# Patient Record
Sex: FEMALE | Race: BLACK OR AFRICAN AMERICAN | NOT HISPANIC OR LATINO | Employment: OTHER | URBAN - METROPOLITAN AREA
[De-identification: names, ages, dates, MRNs, and addresses within clinical notes are randomized per-mention and may not be internally consistent; named-entity substitution may affect disease eponyms.]

---

## 2017-08-31 ENCOUNTER — TELEPHONE (OUTPATIENT)
Dept: HEPATOLOGY | Facility: CLINIC | Age: 62
End: 2017-08-31

## 2017-08-31 NOTE — TELEPHONE ENCOUNTER
External referral received from Dr. Blake Darnell. HCV RNA and genotype 1a noted. Pt has CKD stage 3, nephrology notes are provided as well. Liver enzymes in 4/2017 WNL.    Spoke to pt. Offered to schedule at main campus but pt declined. States she only has a ride to [place] location.  Consult scheduled on 10/31. Reminder mailed.

## 2017-11-08 ENCOUNTER — TELEPHONE (OUTPATIENT)
Dept: HEPATOLOGY | Facility: CLINIC | Age: 62
End: 2017-11-08

## 2017-11-08 NOTE — TELEPHONE ENCOUNTER
Dr. Darnell ordered that patient be seen for hep c consult.  Patient cancelled 10/31 and 11/14 appt with ELIER Zafar at the [facility].  Attempt made to reach her by phone unsuccessful.  Sent letter requesting that she give us a call for scheduling.

## 2017-11-09 NOTE — TELEPHONE ENCOUNTER
Attempt made to reach patient again for scheduling unsuccessful.  Unable to LVM.  Medical records sent for scanning.

## 2018-08-09 ENCOUNTER — TELEPHONE (OUTPATIENT)
Dept: FAMILY MEDICINE | Facility: CLINIC | Age: 63
End: 2018-08-09

## 2018-08-09 ENCOUNTER — OFFICE VISIT (OUTPATIENT)
Dept: FAMILY MEDICINE | Facility: CLINIC | Age: 63
End: 2018-08-09
Payer: MEDICARE

## 2018-08-09 ENCOUNTER — HOSPITAL ENCOUNTER (OUTPATIENT)
Dept: RADIOLOGY | Facility: HOSPITAL | Age: 63
Discharge: HOME | End: 2018-08-09
Attending: FAMILY MEDICINE
Payer: MEDICARE

## 2018-08-09 VITALS
OXYGEN SATURATION: 99 % | DIASTOLIC BLOOD PRESSURE: 80 MMHG | HEART RATE: 74 BPM | RESPIRATION RATE: 18 BRPM | WEIGHT: 149.5 LBS | SYSTOLIC BLOOD PRESSURE: 142 MMHG | TEMPERATURE: 97 F | HEIGHT: 63 IN | BODY MASS INDEX: 26.49 KG/M2

## 2018-08-09 DIAGNOSIS — F31.10 BIPOLAR AFFECTIVE DISORDER, MANIC: ICD-10-CM

## 2018-08-09 DIAGNOSIS — I10 ESSENTIAL HYPERTENSION: Primary | ICD-10-CM
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
## 2018-08-09 PROBLEM — B18.2 CHRONIC HEPATITIS C VIRUS INFECTION: Status: ACTIVE | Noted: 2018-08-09

## 2018-08-09 PROCEDURE — 99202 OFFICE O/P NEW SF 15 MIN: CPT | Mod: S$GLB,,, | Performed by: FAMILY MEDICINE

## 2018-08-09 PROCEDURE — 3008F BODY MASS INDEX DOCD: CPT | Mod: CPTII,S$GLB,, | Performed by: FAMILY MEDICINE

## 2018-08-09 PROCEDURE — 99999 PR PBB SHADOW E&M-EST. PATIENT-LVL V: CPT | Mod: PBBFAC,,, | Performed by: FAMILY MEDICINE

## 2018-08-09 PROCEDURE — 73060 X-RAY EXAM OF HUMERUS: CPT | Mod: 26,LT,, | Performed by: RADIOLOGY

## 2018-08-09 PROCEDURE — 73030 X-RAY EXAM OF SHOULDER: CPT | Mod: TC,FY,PO,LT

## 2018-08-09 PROCEDURE — 73030 X-RAY EXAM OF SHOULDER: CPT | Mod: 26,LT,, | Performed by: RADIOLOGY

## 2018-08-09 PROCEDURE — 73060 X-RAY EXAM OF HUMERUS: CPT | Mod: TC,FY,PO,LT

## 2018-08-09 RX ORDER — VENLAFAXINE HYDROCHLORIDE 75 MG/1
1 CAPSULE, EXTENDED RELEASE ORAL DAILY
COMMUNITY
Start: 2017-07-25 | End: 2018-08-09 | Stop reason: SDUPTHER

## 2018-08-09 RX ORDER — LANOLIN ALCOHOL/MO/W.PET/CERES
1 CREAM (GRAM) TOPICAL DAILY
Refills: 0 | COMMUNITY
Start: 2018-07-28 | End: 2019-11-05 | Stop reason: SDUPTHER

## 2018-08-09 RX ORDER — PITAVASTATIN CALCIUM 2.09 MG/1
2 TABLET, FILM COATED ORAL
COMMUNITY
End: 2019-08-15 | Stop reason: SDUPTHER

## 2018-08-09 RX ORDER — NIFEDIPINE 60 MG/1
60 TABLET, EXTENDED RELEASE ORAL
COMMUNITY
Start: 2016-09-08 | End: 2018-08-14 | Stop reason: DRUGHIGH

## 2018-08-09 RX ORDER — CARVEDILOL 25 MG/1
25 TABLET ORAL 3 TIMES DAILY
COMMUNITY
Start: 2016-09-08 | End: 2018-11-21 | Stop reason: SDUPTHER

## 2018-08-09 RX ORDER — DIVALPROEX SODIUM 500 MG/1
500 TABLET, FILM COATED, EXTENDED RELEASE ORAL DAILY
COMMUNITY
End: 2018-10-15 | Stop reason: SDUPTHER

## 2018-08-09 RX ORDER — CLONIDINE HYDROCHLORIDE 0.1 MG/1
1 TABLET ORAL 3 TIMES DAILY
COMMUNITY
Start: 2017-06-29 | End: 2018-11-21 | Stop reason: SDUPTHER

## 2018-08-09 RX ORDER — NIFEDIPINE 90 MG/1
1 TABLET, EXTENDED RELEASE ORAL
COMMUNITY
Start: 2017-06-29 | End: 2018-11-21 | Stop reason: SDUPTHER

## 2018-08-09 RX ORDER — VENLAFAXINE HYDROCHLORIDE 75 MG/1
75 CAPSULE, EXTENDED RELEASE ORAL DAILY
Qty: 30 CAPSULE | Refills: 0 | Status: SHIPPED | OUTPATIENT
Start: 2018-08-09 | End: 2018-08-30 | Stop reason: SDUPTHER

## 2018-08-09 RX ORDER — ACETAMINOPHEN AND CODEINE PHOSPHATE 300; 30 MG/1; MG/1
1 TABLET ORAL DAILY PRN
Qty: 20 TABLET | Refills: 0 | Status: SHIPPED | OUTPATIENT
Start: 2018-08-09 | End: 2018-08-19

## 2018-08-09 RX ORDER — HYDRALAZINE HYDROCHLORIDE 50 MG/1
50 TABLET, FILM COATED ORAL 2 TIMES DAILY
COMMUNITY
End: 2018-11-21 | Stop reason: SDUPTHER

## 2018-08-09 NOTE — PROGRESS NOTES
Subjective:       Patient ID: Rose Milligan is a 63 y.o. female.    Chief Complaint: Fall (L shoulder pain from fall )      HPI   Ms. Scott presents to clinic today for concern of fall.   She states her last PCP was Dr. Mckinley.   She states she has had left shoulder pain for a few days.   She reports falling 2 Sunday ago and hit her shoulder.   She has had right rotator cuff tear in the past.   She has a hard time moving her left shoulder now.     She has a significant history of Bipolar disorder and will be seeing a psychiatrist here.   She recently moved to [place].   She also has a significant history of uncontrolled hypertension and cocaine use.   She denies any use of cocaine in the past 6 months.       Review of Systems   Constitutional: Negative for fever.   Respiratory: Negative for cough and shortness of breath.    Cardiovascular: Negative for chest pain.   Gastrointestinal: Negative for abdominal pain, diarrhea and vomiting.   Musculoskeletal: Positive for arthralgias.       Medication List with Changes/Refills   New Medications    ACETAMINOPHEN-CODEINE 300-30MG (TYLENOL #3) 300-30 MG TAB    Take 1 tablet by mouth daily as needed.   Current Medications    CARVEDILOL (COREG) 25 MG TABLET    Take 25 mg by mouth 3 (three) times daily.    CLONIDINE (CATAPRES) 0.1 MG TABLET    Take 1 tablet by mouth 3 (three) times daily.    DIVALPROEX ER (DEPAKOTE) 500 MG TB24    Take 500 mg by mouth once daily.    HYDRALAZINE (APRESOLINE) 50 MG TABLET    Take 50 mg by mouth 2 (two) times daily.    LISINOPRIL 10 MG TABLET    Take 1 tablet by mouth.    MAGNESIUM OXIDE (MAG-OX) 400 MG TABLET    Take 1 tablet by mouth once daily.    NIFEDIPINE (PROCARDIA-XL) 90 MG (OSM) 24 HR TABLET    Take 1 tablet by mouth.    ONDANSETRON (ZOFRAN-ODT) 4 MG TBDL    Take 4 mg by mouth every 8 (eight) hours as needed.    PITAVASTATIN CALCIUM 2 MG TAB TABLET    Take 2 mg by mouth.   Changed and/or Refilled Medications    Modified Medication  Previous Medication    VENLAFAXINE (EFFEXOR-XR) 75 MG 24 HR CAPSULE venlafaxine (EFFEXOR-XR) 75 MG 24 hr capsule       Take 1 capsule (75 mg total) by mouth once daily.    Take 1 capsule by mouth once daily.   Discontinued Medications    NIFEDIPINE (PROCARDIA-XL) 60 MG (OSM) 24 HR TABLET    Take 60 mg by mouth.       Patient Active Problem List   Diagnosis    Chronic hepatitis C virus infection    Essential hypertension    Bipolar affective disorder, manic    CKD (chronic kidney disease) stage 4, GFR 15-29 ml/min         Objective:     Physical Exam   Constitutional: She is oriented to person, place, and time. She appears well-developed and well-nourished. No distress.   HENT:   Head: Normocephalic and atraumatic.   Eyes: EOM are normal. Right eye exhibits no discharge. Left eye exhibits no discharge.   Cardiovascular: Normal rate and regular rhythm.   Pulmonary/Chest: Effort normal and breath sounds normal. No respiratory distress. She has no wheezes.   Musculoskeletal: She exhibits no edema.   limited rom in left shoulder due to pain   Tender on humerus      Neurological: She is alert and oriented to person, place, and time.   Skin: Skin is warm and dry. She is not diaphoretic. No erythema.   Psychiatric: She has a normal mood and affect.   Vitals reviewed.    Vitals:    08/09/18 1122   BP: (!) 142/80   Pulse: 74   Resp: 18   Temp: 97.4 °F (36.3 °C)   '  Assessment/  PLAN     Essential hypertension  -     Comprehensive metabolic panel; Future; Expected date: 08/23/2018  -     CBC auto differential; Future; Expected date: 08/23/2018    Bipolar affective disorder, manic  -     venlafaxine (EFFEXOR-XR) 75 MG 24 hr capsule; Take 1 capsule (75 mg total) by mouth once daily.  Dispense: 30 capsule; Refill: 0    Fall, initial encounter  -     Cancel: X-Ray Shoulder Trauma 3 view Left; Future; Expected date: 08/23/2018  -     X-Ray Humerus 2 View Left; Future; Expected date: 08/23/2018  -     Cancel: X-Ray Shoulder 2  or More Views Left; Future; Expected date: 08/09/2018  -     acetaminophen-codeine 300-30mg (TYLENOL #3) 300-30 mg Tab; Take 1 tablet by mouth daily as needed.  Dispense: 20 tablet; Refill: 0      Plan as above  rtc 1 month     Ric Tristan MD  [facility]

## 2018-08-09 NOTE — TELEPHONE ENCOUNTER
Unable to LVM for patient.     ----- Message from Ric Tristan MD sent at 8/9/2018  1:59 PM CDT -----  Please let pt know she has bone spur and arthritis   No fracture   If she want to see ortho, I can put referral in   My advice is to see ortho or try PT.

## 2018-08-10 NOTE — TELEPHONE ENCOUNTER
----- Message from Melanie Mota sent at 8/10/2018 12:50 PM CDT -----  Contact: granddaughter  She's calling  In regards to lab results ,pls call back at [phone] (home)

## 2018-08-13 ENCOUNTER — TELEPHONE (OUTPATIENT)
Dept: FAMILY MEDICINE | Facility: CLINIC | Age: 63
End: 2018-08-13

## 2018-08-13 DIAGNOSIS — M79.602 LEFT ARM PAIN: ICD-10-CM

## 2018-08-13 DIAGNOSIS — M19.012 OSTEOARTHRITIS OF LEFT SHOULDER, UNSPECIFIED OSTEOARTHRITIS TYPE: ICD-10-CM

## 2018-08-13 DIAGNOSIS — N18.4 CKD (CHRONIC KIDNEY DISEASE) STAGE 4, GFR 15-29 ML/MIN: Primary | ICD-10-CM

## 2018-08-13 NOTE — TELEPHONE ENCOUNTER
S/w patient. Scheduled Nephro and Ortho appointments. Patients verbalized understanding of appointment date and time.

## 2018-08-13 NOTE — TELEPHONE ENCOUNTER
Called patient to schedule nephro and ortho appointment. No answer. Could not leave voice message.

## 2018-08-13 NOTE — TELEPHONE ENCOUNTER
----- Message from Nandini Crowley sent at 8/13/2018  3:18 PM CDT -----  Contact: -robin   Calling for lab results. Needed for cardiology appt, which she's at now. Please return call back at [phone]    Thanks  Nandini Crowley

## 2018-08-13 NOTE — TELEPHONE ENCOUNTER
Spoke with pt voiced understanding of results.   Pt states she would like referral for ortho and nephro.

## 2018-08-13 NOTE — TELEPHONE ENCOUNTER
S/w patient.  Please let pt know that her kidney function does look decreased.   She said she already knows about this, not sure how decreased it is , because our last labs on her to compare is from 6 years ago.   If she needs referral to kidney doctor ,i  Can put one in patient verbalized understanding and states she would like a referral to see a kidney doctor.

## 2018-08-13 NOTE — TELEPHONE ENCOUNTER
S/w patient's granddaughter. Discussed lab results regarding kidney function. Patient's granddaughter verbalized understanding.

## 2018-08-14 ENCOUNTER — OFFICE VISIT (OUTPATIENT)
Dept: NEPHROLOGY | Facility: CLINIC | Age: 63
End: 2018-08-14
Payer: MEDICARE

## 2018-08-14 ENCOUNTER — OFFICE VISIT (OUTPATIENT)
Dept: ORTHOPEDICS | Facility: CLINIC | Age: 63
End: 2018-08-14
Payer: MEDICARE

## 2018-08-14 VITALS
HEART RATE: 85 BPM | WEIGHT: 149.5 LBS | BODY MASS INDEX: 26.49 KG/M2 | RESPIRATION RATE: 12 BRPM | DIASTOLIC BLOOD PRESSURE: 79 MMHG | HEIGHT: 63 IN | SYSTOLIC BLOOD PRESSURE: 114 MMHG

## 2018-08-14 VITALS
BODY MASS INDEX: 26.83 KG/M2 | HEART RATE: 81 BPM | DIASTOLIC BLOOD PRESSURE: 70 MMHG | SYSTOLIC BLOOD PRESSURE: 110 MMHG | HEIGHT: 63 IN | WEIGHT: 151.44 LBS

## 2018-08-14 DIAGNOSIS — M75.42 IMPINGEMENT SYNDROME OF LEFT SHOULDER: ICD-10-CM

## 2018-08-14 DIAGNOSIS — N18.30 CKD (CHRONIC KIDNEY DISEASE) STAGE 3, GFR 30-59 ML/MIN: Primary | ICD-10-CM

## 2018-08-14 DIAGNOSIS — B19.20 HEPATITIS C VIRUS INFECTION WITHOUT HEPATIC COMA, UNSPECIFIED CHRONICITY: ICD-10-CM

## 2018-08-14 DIAGNOSIS — N18.4 CKD (CHRONIC KIDNEY DISEASE) STAGE 4, GFR 15-29 ML/MIN: ICD-10-CM

## 2018-08-14 DIAGNOSIS — M75.82 ROTATOR CUFF TENDINITIS, LEFT: ICD-10-CM

## 2018-08-14 DIAGNOSIS — M25.512 ACUTE PAIN OF LEFT SHOULDER: Primary | ICD-10-CM

## 2018-08-14 DIAGNOSIS — M75.32 CALCIFIC TENDINITIS OF LEFT SHOULDER: ICD-10-CM

## 2018-08-14 DIAGNOSIS — R80.9 PROTEINURIA, UNSPECIFIED TYPE: ICD-10-CM

## 2018-08-14 DIAGNOSIS — M75.22 BICEPS TENDINITIS OF LEFT SHOULDER: ICD-10-CM

## 2018-08-14 DIAGNOSIS — M19.012 GLENOHUMERAL ARTHRITIS, LEFT: ICD-10-CM

## 2018-08-14 PROCEDURE — 99204 OFFICE O/P NEW MOD 45 MIN: CPT | Mod: 25,S$GLB,, | Performed by: PHYSICIAN ASSISTANT

## 2018-08-14 PROCEDURE — 99999 PR PBB SHADOW E&M-EST. PATIENT-LVL IV: CPT | Mod: PBBFAC,,, | Performed by: INTERNAL MEDICINE

## 2018-08-14 PROCEDURE — 99205 OFFICE O/P NEW HI 60 MIN: CPT | Mod: S$GLB,,, | Performed by: INTERNAL MEDICINE

## 2018-08-14 PROCEDURE — 3008F BODY MASS INDEX DOCD: CPT | Mod: CPTII,S$GLB,, | Performed by: INTERNAL MEDICINE

## 2018-08-14 PROCEDURE — 3008F BODY MASS INDEX DOCD: CPT | Mod: CPTII,S$GLB,, | Performed by: PHYSICIAN ASSISTANT

## 2018-08-14 PROCEDURE — 99999 PR PBB SHADOW E&M-EST. PATIENT-LVL IV: CPT | Mod: PBBFAC,,, | Performed by: PHYSICIAN ASSISTANT

## 2018-08-14 PROCEDURE — 20610 DRAIN/INJ JOINT/BURSA W/O US: CPT | Mod: LT,S$GLB,, | Performed by: PHYSICIAN ASSISTANT

## 2018-08-14 RX ORDER — LISINOPRIL 10 MG/1
1 TABLET ORAL
COMMUNITY
Start: 2017-06-29 | End: 2018-11-21 | Stop reason: SDUPTHER

## 2018-08-14 RX ORDER — ONDANSETRON 4 MG/1
4 TABLET, ORALLY DISINTEGRATING ORAL EVERY 8 HOURS PRN
Refills: 1 | COMMUNITY
Start: 2018-08-06 | End: 2019-02-04

## 2018-08-14 RX ORDER — METHYLPREDNISOLONE ACETATE 80 MG/ML
80 INJECTION, SUSPENSION INTRA-ARTICULAR; INTRALESIONAL; INTRAMUSCULAR; SOFT TISSUE ONCE
Status: COMPLETED | OUTPATIENT
Start: 2018-08-14 | End: 2018-08-14

## 2018-08-14 RX ADMIN — METHYLPREDNISOLONE ACETATE 80 MG: 80 INJECTION, SUSPENSION INTRA-ARTICULAR; INTRALESIONAL; INTRAMUSCULAR; SOFT TISSUE at 01:08

## 2018-08-14 NOTE — PROGRESS NOTES
"NEPHROLOGY CONSULTATION    PHYSICIAN REQUESTING THE CONSULT: Dr. Ric Tristan    REASON FOR CONSULTATION: Renal insufficiency    63 y.o.  female with history of HTN, Hep C, UTI, depression  presents to the renal clinic for evaluation of renal insufficiency. A consultation has been requested by the patient's PMD, Dr. Ric Tristan. Patient today presents to the clinic complaining of SOB with exercise, intermittent abdominal pain, hand paresthesia. She denies any headaches, congenital hearing loss, chest pain, hemoptysis, diarrhea, nausea/vomiting, hematuria, dysuria, LE swelling, rashes, nasal congestion. Patient reports strong NSAID use history. Sge had been on daily Aleve for about 3 years. Last use about 8 months ago.   Patient has a longstanding history of HTN that was diagnosed > 30 years ago. BP is currently well controlled at 110/70. However, patient reports poor BP control in past and states that her BP has been well controlled only for the past 4 years. In addition, patient reports history of Hep C (this was likely acquired due to crack use in past, currently not being treated, but patient will arrange for GI appointment this week), possible CHF (she was told by her Cardiologist Dr. Lionel Whaley that she had "CHF"), drug abuse (patient reports crack use in past which may have contributed to her heart disease, last use about 6 months ago). She denies any history of nephrolithiasis or DM2. Patient denies any history of renal disease in her family. Laboratory review revealed that the patient's renal function has been deteriorating with creatinine increasing from 1.3 on 10/5/11 to 2 on 8/9/18.     Past Medical History:   Diagnosis Date    Depression     Hepatitis C     Hypertension        No past surgical history on file.    Review of patient's allergies indicates:  No Known Allergies    Current Outpatient Medications   Medication Sig Dispense Refill    acetaminophen-codeine 300-30mg (TYLENOL #3) 300-30 mg Tab " Take 1 tablet by mouth daily as needed. 20 tablet 0    carvedilol (COREG) 25 MG tablet Take 25 mg by mouth 3 (three) times daily.      cloNIDine (CATAPRES) 0.1 MG tablet Take 1 tablet by mouth 3 (three) times daily.      divalproex ER (DEPAKOTE) 500 MG Tb24 Take 500 mg by mouth once daily.      hydrALAZINE (APRESOLINE) 50 MG tablet Take 50 mg by mouth 2 (two) times daily.      magnesium oxide (MAG-OX) 400 mg tablet Take 1 tablet by mouth once daily.  0    NIFEdipine (PROCARDIA-XL) 90 MG (OSM) 24 hr tablet Take 1 tablet by mouth.      ondansetron (ZOFRAN-ODT) 4 MG TbDL Take 4 mg by mouth every 8 (eight) hours as needed.  1    pitavastatin calcium 2 mg Tab tablet Take 2 mg by mouth.      venlafaxine (EFFEXOR-XR) 75 MG 24 hr capsule Take 1 capsule (75 mg total) by mouth once daily. 30 capsule 0    lisinopril 10 MG tablet Take 1 tablet by mouth.       No current facility-administered medications for this visit.        No family history on file.    Social History     Socioeconomic History    Marital status:      Spouse name: Not on file    Number of children: Not on file    Years of education: Not on file    Highest education level: Not on file   Social Needs    Financial resource strain: Not on file    Food insecurity - worry: Not on file    Food insecurity - inability: Not on file    Transportation needs - medical: Not on file    Transportation needs - non-medical: Not on file   Occupational History    Not on file   Tobacco Use    Smoking status: Current Some Day Smoker     Types: Cigarettes     Start date: 1/11/2016    Smokeless tobacco: Never Used   Substance and Sexual Activity    Alcohol use: Not on file    Drug use: Not on file    Sexual activity: Not on file   Other Topics Concern    Not on file   Social History Narrative    Not on file       Review of Systems:  1. GENERAL: patient denies any fever, weight changes, generalized weakness, dizziness.  2. HEENT: patient denies  "headaches, visual disturbances, swallowing problems, sinus pain, nasal congestion.  3. CARDIOVASCULAR: patient denies chest pain, palpitations.  4. PULMONARY: patient reports SOB with exercise, no coughing, hemoptysis, wheezing.  5. GASTROINTESTINAL: patient reports intermittent abdominal pain, no nausea, vomiting, diarrhea.  6. GENITOURINARY: patient denies dysuria, hematuria, hesitancy, frequency.  7. EXTREMITIES: patient denies LE edema, LE cramping.  8. DERMATOLOGY: patient denies rashes, ulcers.  9. NEURO: patient denies tremors, extremity weakness, she reports extremity numbness/tingling.  10. MUSCULOSKELETAL: patient denies joint pain, joint swelling.  11. HEMATOLOGY: patient denies rectal or gum bleeding.  12: PSYCH: patient denies anxiety, depression.      PHYSICAL EXAM:    /70   Pulse 81   Ht 5' 3" (1.6 m)   Wt 68.7 kg (151 lb 7.3 oz)   BMI 26.83 kg/m²     GENERAL: Pleasant lady presents to clinic with non-labored breathing.  HEENT: PER, no nasal discharge, no icterus, no oral exudates, moist mucosal membranes.  NECK: no thyroid mass, no lymphadenopathy.  HEART: RRR S1/S2, no rubs, good peripheral pulses.  LUNGS: CTA bilaterally, no wheezing, breathing is nonlabored.  ABDOMEN: soft, nontender, not distended, bowel sounds are present, no abdominal hernia.  EXTREM: no LE edema.  SKIN: no rashes, skin is warm and dry.  NEURO: A & O x 3, no obvious focal signs.    LABORATORY RESULTS:    Lab Results   Component Value Date    CREATININE 2.0 (H) 08/09/2018    BUN 31 (H) 08/09/2018     08/09/2018    K 4.9 08/09/2018     08/09/2018    CO2 28 08/09/2018      Lab Results   Component Value Date    CALCIUM 9.3 08/09/2018     Lab Results   Component Value Date    ALBUMIN 3.9 08/09/2018     Lab Results   Component Value Date    WBC 4.21 08/09/2018    HGB 12.3 08/09/2018    HCT 39.9 08/09/2018    MCV 96 08/09/2018     08/09/2018     Urinalysis: Trace protein, 1 RBC (1/25/18)    Renal US from " 8/30/17 ([facility]): left simple cyst, no hydronephrosis, other wise unremarkable.     ASSESSMENT AND PLAN:  63 y.o.  female with history of HTN, Hep C, UTI, depression  presents to the renal clinic for evaluation of renal insufficiency.     1. Renal insufficiency: Patient presents with renal insufficiency, consistent with CKD stage 4. Last creatinine was 2. Likely cause of her renal insufficiency is her past NSAID use. However, hypertension in past and hepatitis C may also have affected her renal function. Patient's renal function will be monitored closely and she will return to the clinic in 2-3 weeks for follow up. I will order a renal panel, CBC, urinalysis, protein creatinine ratio, PTH prior to his return visit. Patient was advised to avoid NSAID pain medications such as advil, aleve, motrin, ibuprofen, naprosyn, meloxicam, diclofenac, mobic and to hydrate with 60-80 ounces of water per day.     2. Electrolytes: at goal.     3. Acid base status: No acute issues.    4. Volume: Euvolemic.     5. Hypertension: Good BP control.     6. Medications: Reviewed. Agree with current medical regimen.     7. Hepatyitis C: patient will arrange for GI appointment.     8. Possible CHF: Will request records from Dr. Lionel Whaley.       Thank you very much for this consult. Please see my note in Epic for recommendations.    Total time spent was about 45 min. 50 % or more was spend on counseling about treatment options disease etiology. Level 5 consult.

## 2018-08-14 NOTE — LETTER
August 14, 2018      Ric Tristan MD  [address]  [place] [address]           [facility]  [address]  [place] [address]  Phone: [phone]  Fax: [phone]          Patient: Rose Milligan   MR Number: [id number]   YOB: 1955   Date of Visit: 8/14/2018       Dear Dr. Ric Tristan:    Thank you for referring Rose Milligan to me for evaluation. Attached you will find relevant portions of my assessment and plan of care.    If you have questions, please do not hesitate to call me. I look forward to following Rose Milligan along with you.    Sincerely,    Freddy Pedersen MD    Enclosure  CC:  No Recipients    If you would like to receive this communication electronically, please contact [email or web address] or [phone] to request more information on [facility] Link access.    For providers and/or their staff who would like to refer a patient to [facility], please contact us through our one-stop-shop provider referral line, Colin Schultz, at [phone].    If you feel you have received this communication in error or would no longer like to receive these types of communications, please e-mail [email or web address]

## 2018-08-14 NOTE — PATIENT INSTRUCTIONS
Avoid NSAID pain medications such as advil, aleve, motrin, ibuprofen, naprosyn, meloxicam, diclofenac, mobic.     Tylenol OK for pain.    Hydrate with 60-80 ounces of water per day.

## 2018-08-14 NOTE — PATIENT INSTRUCTIONS
Exercises for Shoulder Flexibility: External Rotation    This stretch can help restore shoulder flexibility and relieve pain over time. When stretching, be sure to breathe deeply. Follow any special instructions from your doctor or physical therapist:  1.  a doorway. Grasp the doorjamb with the hand on the frozen side. Your arm should be bent.  2. With the other hand, hold the elbow on the frozen side firmly against your body.  3. Standing in the same spot, rotate your body away from the doorjamb. Stop when you feel the stretch in the shoulder. At first, try to hold the stretch for 5 seconds.  4. Work up to doing 3 sets of this stretch, 3 times a day. Work up to holding the stretch for 30 to 60 seconds.  Note: Keep your arms as still as you can. Over time, rotate your body a little more to enhance the stretch. But be careful not to twist your back.  Frozen shoulder  Frozen shoulder is another name for adhesive capsulitis, which causes restricted movement in the shoulder. If you have frozen shoulder, this stretch may cause discomfort, especially when you first get started. A few months may pass before you achieve the results you want. But once your shoulder heals, it rarely becomes frozen again. So stick to your stretching program. If you have any questions, be sure to ask your doctor.   Date Last Reviewed: 8/16/2015 © 2000-2017 [employer]. [address] [place] [address], [place] [address]. All rights reserved. This information is not intended as a substitute for professional medical care. Always follow your healthcare professional's instructions.        Exercises for Shoulder Flexibility: Adduction (Reaching Across)    This stretch can help restore shoulder flexibility and relieve pain over time. When stretching, be sure to breathe deeply. And follow any special instructions from your doctor or physical therapist:  5. Put the hand from the side you want to stretch on your opposite shoulder. Your  elbow should point away from your body. Try to raise your elbow as close to shoulder height as you can.  6. With your other hand, push the raised elbow toward the opposite shoulder. Avoid turning your head. Stop when you feel the stretch. Try to hold the stretch for 5 seconds.  7. Work up to doing 3 sets of this stretch, 3 times a day. Work up to holding the stretch for 30 to 60 seconds.  Note: Be sure to push your elbow across your chest, not up toward your chin. Over time, try to push your elbow farther across your chest to enhance the stretch.  Frozen shoulder  Frozen shoulder is another name for adhesive capsulitis, which causes restricted movement in the shoulder. If you have frozen shoulder, this stretch may cause discomfort, especially when you first get started. A few months may pass before you achieve the results you want. Once your shoulder heals, it rarely becomes frozen again. So stick to your stretching program. If you have any questions, be sure to ask your doctor.   Date Last Reviewed: 8/16/2015  © 4291-0267 [employer]. [address] [place] [address]. All rights reserved. This information is not intended as a substitute for professional medical care. Always follow your healthcare professional's instructions.        Exercises for Shoulder Flexibility: Back Scratch    Improving your flexibility can reduce pain. Stretching exercises also can help increase your range of pain-free motion. Breathe normally when you exercise. Try to use smooth, fluid movements. Never force a stretch.  Note: Follow any special instructions you are given. If you feel pain, stop the exercise. If the pain continues after stopping, call your healthcare provider.  · Stand straight, placing the back of your hand on the side you want to stretch flat against your lower back.  · Throw one end of a towel over your shoulder. Grab it behind your back with your other hand.  · Pull down gently on the towel with  your front arm. Let your back arm slide up as high as is comfortable. Youll feel a stretch in your shoulder. Hold the stretch for a few seconds.  · Repeat 3 to 5 times. Build up to holding each stretch for 30 to 60 seconds.  For your safety, check with your healthcare provider before starting an exercise program.   Date Last Reviewed: 8/26/2015  © 4482-5838 [employer]. [address] [place] [address]. All rights reserved. This information is not intended as a substitute for professional medical care. Always follow your healthcare professional's instructions.        Exercises for Shoulder Flexibility: Wall Walk    Improving your flexibility can reduce pain. Stretching exercises also can help increase your range of pain-free motion. Breathe normally when you exercise. Use smooth, fluid movements.  Note: Follow any special instructions you are given. If you feel pain, stop the exercise. If the pain continues after stopping, call your healthcare provider:  · Stand with your shoulder about 2 feet from the wall.  · Raise your arm to shoulder level and gently walk your fingers up the wall as high as you can.  · Hold for a few seconds. Then walk your fingers back down.  · Repeat 3 times. Move closer to the wall as you repeat.  · Build up to holding each stretch for 30 seconds.  Caution: Do this stretch only if your healthcare provider recommends it. Dont do it when you are first injured.       Date Last Reviewed: 8/16/2015  © 0036-3466 [employer]. [address] [place] [address]. All rights reserved. This information is not intended as a substitute for professional medical care. Always follow your healthcare professional's instructions.        Pendulum (Flexibility)    8. Lean over next to a table, with your left arm supporting your weight on the table.  9. Relax your right arm and let it hang straight down.  10. Slowly begin to swing your right arm in a small [place].  Gradually make the [place] bigger if you can. Change direction after 1 minute of motion.  11. Next, swing your right arm backward and forward. Then move it side to side. Change direction after 1 minute of motion.  12. Repeat these movements for about 5 minutes.  13. Do this exercise 3 times a day, or as often as instructed.  Date Last Reviewed: 3/10/2016  © 3666-3182 [employer]. [address] [place] [address]. All rights reserved. This information is not intended as a substitute for professional medical care. Always follow your healthcare professional's instructions.        Shoulder Abduction (Flexibility)    14. Stand up straight. Or lie on your back on the floor with legs straight. Hold a broomstick horizontally. Cup the top of the broomstick with your right hand. Hold the broomstick just above the broom with your left hand, palm facing down.  15. Push the broomstick to the right with your left hand, raising your right arm up. Raise it only as high as feels comfortable.  16. Hold for a few seconds. Return to the starting position.  17. Repeat 3 to 5 times, or as instructed. Build up to holding each stretch for 10 to 30 seconds.     Tip: If you dont have a broom, you can also do this exercise with a cane, mop, or yardstick.      Date Last Reviewed: 3/10/2016  © 3007-7172 [employer]. [address] [place] [address]. All rights reserved. This information is not intended as a substitute for professional medical care. Always follow your healthcare professional's instructions.        Shoulder Flexion (Flexibility)    18. Sit in a chair sideways next to a table. Lay your right arm on the table, pointed forward.  19. Slowly lean forward.  Slide your arm forward on the table. Feel the stretch in your right shoulder.  20. Hold for 5 seconds. Slowly sit back up.  21. Repeat 5 times.  22. Repeat this exercise 3 times a day, or as instructed.  Date Last Reviewed: 3/10/2016  ©  1603-3951 [employer]. [address] [place] [address], [place] [address]. All rights reserved. This information is not intended as a substitute for professional medical care. Always follow your healthcare professional's instructions.

## 2018-08-14 NOTE — PROGRESS NOTES
Subjective:      Patient ID: Rose Milligan is a 63 y.o. female.    Chief Complaint: Pain and Injury of the Left Shoulder      HPI: Rose Milligan  is a 63 y.o. female who c/o Pain and Injury of the Left Shoulder   for duration of 2 weeks.  She injured her shoulder while at her sister's house.  She states her sister is a quarter.  She tripped over something on the floor falling into the dresser and striking the left shoulder before then falling on her left side on the ground.  Pain level today is 5/10 at rest.  Quality is aching and constant.  She denies numbness and tingling.  She was given a prescription of Tylenol No.  3 by Dr. Tristan last week.  That did not help alleviate her symptoms.  She complains of pain with movement of the shoulder as well as cold air.  She complains of associated stiffness.  She had no problems with the left shoulder prior to the injury.  She underwent a rotator cuff repair on the right shoulder last year.  She has recovered well from that.    History reviewed. No pertinent past medical history.  History reviewed. No pertinent surgical history.  History reviewed. No pertinent family history.  Social History     Socioeconomic History    Marital status:      Spouse name: Not on file    Number of children: Not on file    Years of education: Not on file    Highest education level: Not on file   Social Needs    Financial resource strain: Not on file    Food insecurity - worry: Not on file    Food insecurity - inability: Not on file    Transportation needs - medical: Not on file    Transportation needs - non-medical: Not on file   Occupational History    Not on file   Tobacco Use    Smoking status: Current Some Day Smoker     Types: Cigarettes     Start date: 1/11/2016    Smokeless tobacco: Never Used   Substance and Sexual Activity    Alcohol use: Not on file    Drug use: Not on file    Sexual activity: Not on file   Other Topics Concern    Not on file   Social History  Narrative    Not on file     Medication List with Changes/Refills   Current Medications    ACETAMINOPHEN-CODEINE 300-30MG (TYLENOL #3) 300-30 MG TAB    Take 1 tablet by mouth daily as needed.    CARVEDILOL (COREG) 25 MG TABLET    Take 25 mg by mouth 3 (three) times daily.    CLONIDINE (CATAPRES) 0.1 MG TABLET    Take 1 tablet by mouth 3 (three) times daily.    DIVALPROEX ER (DEPAKOTE) 500 MG TB24    Take 500 mg by mouth once daily.    HYDRALAZINE (APRESOLINE) 50 MG TABLET    Take 50 mg by mouth 2 (two) times daily.    MAGNESIUM OXIDE (MAG-OX) 400 MG TABLET    Take 1 tablet by mouth once daily.    NIFEDIPINE (PROCARDIA-XL) 90 MG (OSM) 24 HR TABLET    Take 1 tablet by mouth.    ONDANSETRON (ZOFRAN-ODT) 4 MG TBDL    Take 4 mg by mouth every 8 (eight) hours as needed.    PITAVASTATIN CALCIUM 2 MG TAB TABLET    Take 2 mg by mouth.    VENLAFAXINE (EFFEXOR-XR) 75 MG 24 HR CAPSULE    Take 1 capsule (75 mg total) by mouth once daily.   Discontinued Medications    NIFEDIPINE (PROCARDIA-XL) 60 MG (OSM) 24 HR TABLET    Take 60 mg by mouth.     Review of patient's allergies indicates:  No Known Allergies    Review of Systems   Constitution: Negative for fever.   Cardiovascular: Negative for chest pain.   Respiratory: Negative for cough and shortness of breath.    Skin: Negative for rash.   Musculoskeletal: Positive for joint pain and stiffness. Negative for joint swelling.   Gastrointestinal: Negative for heartburn.   Neurological: Negative for headaches and numbness.         Objective:        General    Nursing note and vitals reviewed.  Constitutional: She is oriented to person, place, and time. She appears well-developed and well-nourished.   HENT:   Head: Normocephalic and atraumatic.   Eyes: EOM are normal.   Cardiovascular: Normal rate and regular rhythm.    Pulmonary/Chest: Effort normal and breath sounds normal.   Abdominal: Soft.   Neurological: She is alert and oriented to person, place, and time.   Psychiatric: She  has a normal mood and affect. Her behavior is normal.         Right Shoulder Exam     Inspection/Observation   Swelling: absent  Bruising: absent  Scars: absent  Deformity: absent  Scapular Dyskinesia: negative    Range of Motion   Active abduction: normal   Passive abduction: normal   Extension: normal   Forward Flexion: normal   Forward Elevation: normal  Adduction: normal  External Rotation 0 degrees: normal   Internal rotation 0 degrees: normal     Left Shoulder Exam     Inspection/Observation   Swelling: absent  Bruising: absent  Scars: absent  Deformity: absent  Scapular Dyskinesia: negative    Tenderness   The patient is tender to palpation of the greater tuberosity, acromioclavicular joint and biceps tendon.    Range of Motion   Active abduction:  90 abnormal   Passive abduction: abnormal   Extension: abnormal   Forward Flexion: abnormal   Forward Elevation: 90 (passively) abnormal  Adduction: abnormal  External Rotation 0 degrees: abnormal   Internal rotation 0 degrees: abnormal     Tests & Signs   Cross arm: positive  Drop arm: negative  Mills test: positive  Impingement: positive  Active Compression Test (Nicholas's Sign): positive  Speed's Test: positive    Other   Sensation: normal     Comments:  Diffuse TTP  Some pain free PROM      Muscle Strength   Right Upper Extremity   Shoulder Abduction: 5/5   Shoulder Internal Rotation: 5/5   Shoulder External Rotation: 5/5   Left Upper Extremity  Shoulder Abduction: 4/5   Shoulder Internal Rotation: 5/5   Shoulder External Rotation: 4/5     Vascular Exam       Left Pulses      Radial:                    2+      Capillary Refill  Left Hand: normal capillary refill            Xray:   Left shoulder from 08/09/2018 images and report were reviewed today.  I agree with the radiologist's interpretation.  There are advanced degenerative changes at the left acromioclavicular joint with an inferiorly directed osteophyte that measures on the order of 5 mm.  Degenerative  changes are also seen at the glenohumeral joint albeit to a lesser extent.  A small calcific/ossific density projects about the acromial humeral space.  There is no acute fracture, subluxation, or dislocation.  Left humerus from 08/09/2018 images and report were reviewed today.  I agree with the radiologist's interpretation.  There are degenerative changes at the acromioclavicular joint.  No fracture or dislocation is seen.  Tiny posterior olecranon spur is seen.  Soft tissues appear normal.    Labs  GFR 29.6 on 8/9/18 coupled with decreased BUN and increased Cr indicates decreased kidney function    Assessment:       Encounter Diagnoses   Name Primary?    Acute pain of left shoulder Yes    Impingement syndrome of left shoulder     Rotator cuff tendinitis, left     Biceps tendinitis of left shoulder     Calcific tendinitis of left shoulder     Glenohumeral arthritis, left           Plan:       Ngoc was seen today for pain and injury.    Diagnoses and all orders for this visit:    Acute pain of left shoulder  -     methylPREDNISolone acetate injection 80 mg; Inject 1 mL (80 mg total) into the articular space once.  -     Ambulatory Referral to Physical/Occupational Therapy    Impingement syndrome of left shoulder  -     methylPREDNISolone acetate injection 80 mg; Inject 1 mL (80 mg total) into the articular space once.  -     Ambulatory Referral to Physical/Occupational Therapy    Rotator cuff tendinitis, left  -     methylPREDNISolone acetate injection 80 mg; Inject 1 mL (80 mg total) into the articular space once.  -     Ambulatory Referral to Physical/Occupational Therapy    Biceps tendinitis of left shoulder  -     methylPREDNISolone acetate injection 80 mg; Inject 1 mL (80 mg total) into the articular space once.  -     Ambulatory Referral to Physical/Occupational Therapy    Calcific tendinitis of left shoulder  -     methylPREDNISolone acetate injection 80 mg; Inject 1 mL (80 mg total) into the articular  space once.  -     Ambulatory Referral to Physical/Occupational Therapy    Glenohumeral arthritis, left  -     methylPREDNISolone acetate injection 80 mg; Inject 1 mL (80 mg total) into the articular space once.  -     Ambulatory Referral to Physical/Occupational Therapy    Ms. Grossman is a new patient new problem of the left shoulder as above. We have discussed treatment options. She wishes to pursue conservative treatment in the form of physical therapy.  We also discussed a subacromial steroid injection.  After reviewing the risks and benefits of the injection, she elected to proceed with the injection.  I noted her decreased BUN, increased creatinine level, and decreased GFR level.  Based on her lab results from 08/09/2018, she has decreased kidney function should not take oral anti-inflammatories.  Instead, I will submit an order to [facility] pharmacy for topical anti-inflammatory cream.  I will see her back in the office in 6 weeks.  If she has problems before then, she will notify the office.  She verbalizes understanding and agrees with the above plan.    Follow-up in about 6 weeks (around 9/25/2018).        Left Shoulder Injection Report:  After verbal consent was obtained for left shoulder injection, patient ID, site, and side were verified.  The left shoulder was sterilly prepped in the standard fashion.  A 22-gauge needle was introduced into left subacromial space from the posterior portal approach without complication. The left shoulder was then injected with 20 mg lidocaine plain and 80 mg depomedrol.  A sterile bandaid was applied.  The patient was informed to apply an ice pack approximately 10min once arriving home and not to do anything strenuous for 24hours. She was instructed to call if there were any problems. The patient was discharged in stable condition.  Pain improved significantly as did active and passive range of motion following the injection.    The patient understands, chooses and  consents to this plan and accepts all   the risks which include but are not limited to the risks mentioned above.     Disclaimer: This note was prepared using a voice recognition system and is likely to have sound alike errors within the text.

## 2018-08-17 DIAGNOSIS — Z12.39 BREAST CANCER SCREENING: ICD-10-CM

## 2018-08-21 ENCOUNTER — OFFICE VISIT (OUTPATIENT)
Dept: OBSTETRICS AND GYNECOLOGY | Facility: CLINIC | Age: 63
End: 2018-08-21
Payer: MEDICARE

## 2018-08-21 VITALS
DIASTOLIC BLOOD PRESSURE: 80 MMHG | SYSTOLIC BLOOD PRESSURE: 134 MMHG | WEIGHT: 146.38 LBS | HEIGHT: 63 IN | BODY MASS INDEX: 25.94 KG/M2

## 2018-08-21 DIAGNOSIS — R68.82 DECREASED LIBIDO: ICD-10-CM

## 2018-08-21 DIAGNOSIS — Z91.410 HISTORY OF ADULT DOMESTIC PHYSICAL ABUSE: ICD-10-CM

## 2018-08-21 DIAGNOSIS — Z78.0 ASYMPTOMATIC MENOPAUSAL STATE: ICD-10-CM

## 2018-08-21 DIAGNOSIS — Z01.419 WELL WOMAN EXAM WITH ROUTINE GYNECOLOGICAL EXAM: Primary | ICD-10-CM

## 2018-08-21 PROCEDURE — G0101 CA SCREEN;PELVIC/BREAST EXAM: HCPCS | Mod: S$GLB,,, | Performed by: OBSTETRICS & GYNECOLOGY

## 2018-08-21 PROCEDURE — 99999 PR PBB SHADOW E&M-EST. PATIENT-LVL III: CPT | Mod: PBBFAC,,, | Performed by: OBSTETRICS & GYNECOLOGY

## 2018-08-21 RX ORDER — HYDRALAZINE HYDROCHLORIDE 50 MG/1
50 TABLET, FILM COATED ORAL
COMMUNITY
End: 2018-08-21 | Stop reason: SDUPTHER

## 2018-08-21 RX ORDER — VENLAFAXINE HYDROCHLORIDE 75 MG/1
1 CAPSULE, EXTENDED RELEASE ORAL
COMMUNITY
Start: 2017-07-25 | End: 2018-08-21 | Stop reason: SDUPTHER
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
## 2018-08-22 ENCOUNTER — APPOINTMENT (OUTPATIENT)
Dept: RADIOLOGY | Facility: CLINIC | Age: 63
End: 2018-08-22
Attending: OBSTETRICS & GYNECOLOGY
Payer: MEDICARE

## 2018-08-22 DIAGNOSIS — Z01.419 WELL WOMAN EXAM WITH ROUTINE GYNECOLOGICAL EXAM: ICD-10-CM

## 2018-08-22 DIAGNOSIS — Z78.0 ASYMPTOMATIC MENOPAUSAL STATE: ICD-10-CM

## 2018-08-22 PROCEDURE — 77080 DXA BONE DENSITY AXIAL: CPT | Mod: 26,,, | Performed by: RADIOLOGY

## 2018-08-22 PROCEDURE — 77080 DXA BONE DENSITY AXIAL: CPT | Mod: TC,PO

## 2018-08-30 DIAGNOSIS — F31.10 BIPOLAR AFFECTIVE DISORDER, MANIC: ICD-10-CM

## 2018-08-30 RX ORDER — VENLAFAXINE HYDROCHLORIDE 75 MG/1
75 CAPSULE, EXTENDED RELEASE ORAL DAILY
Qty: 30 CAPSULE | Refills: 0 | Status: SHIPPED | OUTPATIENT
Start: 2018-08-30 | End: 2018-10-01 | Stop reason: SDUPTHER

## 2018-08-30 NOTE — TELEPHONE ENCOUNTER
----- Message from Heather Hays sent at 8/30/2018  8:07 AM CDT -----  Contact: Pt   Pt requested medication be prescribed for anxiety and to be called in to pharmacy below:       Please call patient once called in,            ..  [facility]  [phone]

## 2018-09-07 DIAGNOSIS — N18.30 CKD (CHRONIC KIDNEY DISEASE) STAGE 3, GFR 30-59 ML/MIN: Primary | ICD-10-CM

## 2018-09-08 ENCOUNTER — LAB VISIT (OUTPATIENT)
Dept: LAB | Facility: HOSPITAL | Age: 63
End: 2018-09-08
Attending: INTERNAL MEDICINE
Payer: MEDICARE

## 2018-09-08 DIAGNOSIS — N18.30 CKD (CHRONIC KIDNEY DISEASE) STAGE 3, GFR 30-59 ML/MIN: ICD-10-CM

## 2018-09-08 LAB
BACTERIA #/AREA URNS HPF: NORMAL /HPF
BILIRUB UR QL STRIP: NEGATIVE
CLARITY UR: CLEAR
COLOR UR: YELLOW
CREAT UR-MCNC: 167 MG/DL
GLUCOSE UR QL STRIP: NEGATIVE
HGB UR QL STRIP: NEGATIVE
KETONES UR QL STRIP: NEGATIVE
LEUKOCYTE ESTERASE UR QL STRIP: ABNORMAL
MICROSCOPIC COMMENT: NORMAL
NITRITE UR QL STRIP: NEGATIVE
PH UR STRIP: 6 [PH] (ref 5–8)
PROT UR QL STRIP: NEGATIVE
PROT UR-MCNC: 26 MG/DL
PROT/CREAT UR: 0.16 MG/G{CREAT}
RBC #/AREA URNS HPF: 1 /HPF (ref 0–4)
SP GR UR STRIP: 1.01 (ref 1–1.03)
SQUAMOUS #/AREA URNS HPF: 1 /HPF
URN SPEC COLLECT METH UR: ABNORMAL
WBC #/AREA URNS HPF: 3 /HPF (ref 0–5)

## 2018-09-08 PROCEDURE — 84156 ASSAY OF PROTEIN URINE: CPT

## 2018-09-08 PROCEDURE — 81000 URINALYSIS NONAUTO W/SCOPE: CPT | Mod: PO

## 2018-10-01 DIAGNOSIS — F31.10 BIPOLAR AFFECTIVE DISORDER, MANIC: ICD-10-CM

## 2018-10-01 RX ORDER — VENLAFAXINE HYDROCHLORIDE 75 MG/1
75 CAPSULE, EXTENDED RELEASE ORAL DAILY
Qty: 30 CAPSULE | Refills: 0 | Status: SHIPPED | OUTPATIENT
Start: 2018-10-01 | End: 2018-10-15 | Stop reason: SDUPTHER

## 2018-10-01 NOTE — TELEPHONE ENCOUNTER
----- Message from Fernando Bauman MA sent at 10/1/2018  8:50 AM CDT -----  Contact: self      ----- Message -----  From: Milagros Mcdaniel  Sent: 10/1/2018   8:13 AM  To: Deepak SPAIN Staff    Pt is requesting to have depression medication called in to pharmacy. Please call back at [phone].    Pt uses:      [facility] [place] [facility] - [address] AT [facility] [place] [address]  Phone: [phone] Fax: [phone]    [facility]    [place]    Thanks,   Milagros Mcdaniel

## 2018-10-15 ENCOUNTER — LAB VISIT (OUTPATIENT)
Dept: LAB | Facility: HOSPITAL | Age: 63
End: 2018-10-15
Attending: FAMILY MEDICINE
Payer: MEDICARE

## 2018-10-15 ENCOUNTER — OFFICE VISIT (OUTPATIENT)
Dept: FAMILY MEDICINE | Facility: CLINIC | Age: 63
End: 2018-10-15
Payer: MEDICARE

## 2018-10-15 VITALS
SYSTOLIC BLOOD PRESSURE: 138 MMHG | DIASTOLIC BLOOD PRESSURE: 78 MMHG | OXYGEN SATURATION: 98 % | HEART RATE: 91 BPM | HEIGHT: 63 IN | TEMPERATURE: 98 F | BODY MASS INDEX: 26.09 KG/M2 | WEIGHT: 147.25 LBS

## 2018-10-15 DIAGNOSIS — Z79.899 ON VALPROIC ACID THERAPY: ICD-10-CM

## 2018-10-15 DIAGNOSIS — N28.9 ABNORMAL RENAL FUNCTION: ICD-10-CM

## 2018-10-15 DIAGNOSIS — F31.9 BIPOLAR 1 DISORDER: Primary | ICD-10-CM

## 2018-10-15 DIAGNOSIS — F31.10 BIPOLAR AFFECTIVE DISORDER, MANIC: ICD-10-CM

## 2018-10-15 LAB
ALBUMIN SERPL BCP-MCNC: 3.7 G/DL
ANION GAP SERPL CALC-SCNC: 10 MMOL/L
BUN SERPL-MCNC: 22 MG/DL
CALCIUM SERPL-MCNC: 9 MG/DL
CHLORIDE SERPL-SCNC: 108 MMOL/L
CO2 SERPL-SCNC: 25 MMOL/L
CREAT SERPL-MCNC: 1.9 MG/DL
EST. GFR  (AFRICAN AMERICAN): 31.4 ML/MIN/1.73 M^2
EST. GFR  (NON AFRICAN AMERICAN): 27.3 ML/MIN/1.73 M^2
GLUCOSE SERPL-MCNC: 70 MG/DL
PHOSPHATE SERPL-MCNC: 3 MG/DL
POTASSIUM SERPL-SCNC: 4.8 MMOL/L
SODIUM SERPL-SCNC: 143 MMOL/L
VALPROATE SERPL-MCNC: 91.5 UG/ML

## 2018-10-15 PROCEDURE — 99214 OFFICE O/P EST MOD 30 MIN: CPT | Mod: S$PBB,,, | Performed by: FAMILY MEDICINE

## 2018-10-15 PROCEDURE — 99999 PR PBB SHADOW E&M-EST. PATIENT-LVL III: CPT | Mod: PBBFAC,,, | Performed by: FAMILY MEDICINE

## 2018-10-15 PROCEDURE — 3008F BODY MASS INDEX DOCD: CPT | Mod: CPTII,,, | Performed by: FAMILY MEDICINE

## 2018-10-15 PROCEDURE — 36415 COLL VENOUS BLD VENIPUNCTURE: CPT | Mod: PO

## 2018-10-15 PROCEDURE — 80069 RENAL FUNCTION PANEL: CPT

## 2018-10-15 PROCEDURE — 99213 OFFICE O/P EST LOW 20 MIN: CPT | Mod: PBBFAC,PO | Performed by: FAMILY MEDICINE

## 2018-10-15 PROCEDURE — 1101F PT FALLS ASSESS-DOCD LE1/YR: CPT | Mod: CPTII,,, | Performed by: FAMILY MEDICINE

## 2018-10-15 PROCEDURE — 80164 ASSAY DIPROPYLACETIC ACD TOT: CPT

## 2018-10-15 RX ORDER — DIVALPROEX SODIUM 500 MG/1
500 TABLET, FILM COATED, EXTENDED RELEASE ORAL DAILY
Qty: 30 TABLET | Refills: 0 | Status: SHIPPED | OUTPATIENT
Start: 2018-10-15 | End: 2018-11-11 | Stop reason: SDUPTHER

## 2018-10-15 RX ORDER — VENLAFAXINE HYDROCHLORIDE 75 MG/1
75 CAPSULE, EXTENDED RELEASE ORAL DAILY
Qty: 30 CAPSULE | Refills: 0 | Status: SHIPPED | OUTPATIENT
Start: 2018-10-15 | End: 2018-12-03 | Stop reason: SDUPTHER
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
## 2018-10-16 ENCOUNTER — TELEPHONE (OUTPATIENT)
Dept: FAMILY MEDICINE | Facility: CLINIC | Age: 63
End: 2018-10-16

## 2018-10-16 ENCOUNTER — INITIAL CONSULT (OUTPATIENT)
Dept: PSYCHIATRY | Facility: CLINIC | Age: 63
End: 2018-10-16
Payer: MEDICARE

## 2018-10-16 DIAGNOSIS — F31.9 BIPOLAR AFFECTIVE DISORDER, REMISSION STATUS UNSPECIFIED: Primary | ICD-10-CM

## 2018-10-16 PROCEDURE — 90791 PSYCH DIAGNOSTIC EVALUATION: CPT | Mod: S$GLB,,, | Performed by: PSYCHOLOGIST
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
## 2018-10-23 ENCOUNTER — OFFICE VISIT (OUTPATIENT)
Dept: PSYCHIATRY | Facility: CLINIC | Age: 63
End: 2018-10-23
Payer: COMMERCIAL

## 2018-10-23 DIAGNOSIS — F31.9 BIPOLAR AFFECTIVE DISORDER, REMISSION STATUS UNSPECIFIED: Primary | ICD-10-CM

## 2018-10-23 PROCEDURE — 90834 PSYTX W PT 45 MINUTES: CPT | Mod: S$GLB,,, | Performed by: PSYCHOLOGIST
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
## 2018-10-30 ENCOUNTER — OFFICE VISIT (OUTPATIENT)
Dept: PSYCHIATRY | Facility: CLINIC | Age: 63
End: 2018-10-30
Payer: MEDICARE

## 2018-10-30 DIAGNOSIS — F31.9 BIPOLAR AFFECTIVE DISORDER, REMISSION STATUS UNSPECIFIED: Primary | ICD-10-CM

## 2018-10-30 PROCEDURE — 90834 PSYTX W PT 45 MINUTES: CPT | Mod: S$GLB,,, | Performed by: PSYCHOLOGIST
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
## 2018-11-04 PROBLEM — F31.9 BIPOLAR AFFECTIVE DISORDER: Status: ACTIVE | Noted: 2018-11-04

## 2018-11-06 ENCOUNTER — OFFICE VISIT (OUTPATIENT)
Dept: PSYCHIATRY | Facility: CLINIC | Age: 63
End: 2018-11-06
Payer: MEDICARE

## 2018-11-06 DIAGNOSIS — F31.9 BIPOLAR AFFECTIVE DISORDER, REMISSION STATUS UNSPECIFIED: Primary | ICD-10-CM

## 2018-11-06 PROCEDURE — 90834 PSYTX W PT 45 MINUTES: CPT | Mod: S$GLB,,, | Performed by: PSYCHOLOGIST

## 2018-11-06 NOTE — PROGRESS NOTES
"Individual Psychotherapy (PhD/LCSW)    11/6/2018    Site:  [place]         Therapeutic Intervention: Met with patient.  Outpatient - Insight oriented psychotherapy 45 min - CPT code 95087    Chief complaint/reason for encounter: depression and mood swings     Interval history and content of current session: Patient presented casually dressed, oriented, and alert.  Patient reported experiencing no depressive symptoms.  Patient's thoughts, feelings, and behavior were explored for classic signs of macy such as pressured speech, impulsive behavior, euphoric mood, flight of ideas, high energy level, reduced need for sleep, and inflated self-esteem.  Patient reported that she currently experiences sleep disturbance and restlessness.  She indicated that she had to leave work early on Friday because she could not "keep still."  She noted that she has not felt this happy in some time. She reported that her family members have commented on some of the changes that she has made and how she appears to be happier.  Patient discussed continuing to keep her distance from her ex partner as she noted that he is no good for her since he will encourage her to do drugs and drink alcohol.  Discussed patient's social support system.  Patient reported that she has been in communication with a member of her Hindu and the patient plans to gift her one of her suits.  She reported that she reached out to her granddaughter who she was previously upset with and she has forgiven her.  Patient also reported that she receives social support from her sister.  Patient discussed being concerned about her mother as her mother's face was swollen. Patient discussed her goals for [id number].  Patient indicated that she plans her days each day at a time and focuses on making small steps toward her goals.    Treatment plan:  · Target symptoms: mood swings  · Why chosen therapy is appropriate versus another modality: relevant to diagnosis, patient " responds to this modality  · Outcome monitoring methods: self-report  · Therapeutic intervention type: insight oriented psychotherapy    Risk parameters:  Patient reports no suicidal ideation  Patient reports no homicidal ideation  Patient reports no self-injurious behavior  Patient reports no violent behavior    Verbal deficits: None    Patient's response to intervention:  The patient's response to intervention is accepting.    Progress toward goals and other mental status changes:  The patient's progress toward goals is fair .    Diagnosis:     ICD-10-CM ICD-9-CM   1. Bipolar affective disorder, remission status unspecified F31.9 296.80       Plan:  individual psychotherapy    Return to clinic: 1 week    Length of Service (minutes): 45

## 2018-11-11 RX ORDER — DIVALPROEX SODIUM 500 MG/1
TABLET, FILM COATED, EXTENDED RELEASE ORAL
Qty: 30 TABLET | Refills: 0 | Status: SHIPPED | OUTPATIENT
Start: 2018-11-11 | End: 2018-12-18 | Stop reason: SDUPTHER

## 2018-11-13 ENCOUNTER — OFFICE VISIT (OUTPATIENT)
Dept: PSYCHIATRY | Facility: CLINIC | Age: 63
End: 2018-11-13
Payer: COMMERCIAL

## 2018-11-13 ENCOUNTER — PATIENT OUTREACH (OUTPATIENT)
Dept: ADMINISTRATIVE | Facility: HOSPITAL | Age: 63
End: 2018-11-13

## 2018-11-13 DIAGNOSIS — F31.9 BIPOLAR AFFECTIVE DISORDER, REMISSION STATUS UNSPECIFIED: Primary | ICD-10-CM

## 2018-11-13 PROCEDURE — 90834 PSYTX W PT 45 MINUTES: CPT | Mod: S$GLB,,, | Performed by: PSYCHOLOGIST

## 2018-11-13 NOTE — PROGRESS NOTES
Individual Psychotherapy (PhD/LCSW)    11/13/2018    Site:  [place]         Therapeutic Intervention: Met with patient.  Outpatient - Insight oriented psychotherapy 45 min - CPT code 75344    Chief complaint/reason for encounter: depression, mood swings     Interval history and content of current session: Patient presented casually dressed, oriented, and alert.  Patient reported no symptoms of depression.  She denied current suicidal and homicidal ideations.  Patient reported feeling frustrated about getting a loan for her granddaughter and recently finding out that her granddaughter has not been repaying the loan.  Patient reported that her granddaughter still has not responded to her message that she sent last week.  Discussed patient using money to show her love for others.  Patient described a history of providing for others financially.  She reported that her ex boyfriend has been contacting her, but she still does not want to be with him.  Patient reported that she is getting back to being herself.  She has been doing things that she enjoys, such as spending time with her mother and sister and dressing well.  Patient reported that she cooked dinner and shared it with loved ones, which made her feel happy.  She noted that others have been telling her about how happy she looks and seems.         Treatment plan:  · Target symptoms: depression, mood swings  · Why chosen therapy is appropriate versus another modality: relevant to diagnosis, patient responds to this modality  · Outcome monitoring methods: self-report  · Therapeutic intervention type: insight oriented psychotherapy    Risk parameters:  Patient reports no suicidal ideation  Patient reports no homicidal ideation  Patient reports no self-injurious behavior  Patient reports no violent behavior    Verbal deficits: None    Patient's response to intervention:  The patient's response to intervention is accepting.    Progress toward goals and other mental  status changes:  The patient's progress toward goals is fair .    Diagnosis:     ICD-10-CM ICD-9-CM   1. Bipolar affective disorder, remission status unspecified F31.9 296.80       Plan:  individual psychotherapy    Return to clinic: 1 week    Length of Service (minutes): 45

## 2018-11-13 NOTE — PROGRESS NOTES
I have attempted without success to contact this patient to schedule an appointment.Contact on file is no longer in service.

## 2018-11-20 ENCOUNTER — TELEPHONE (OUTPATIENT)
Dept: PSYCHIATRY | Facility: CLINIC | Age: 63
End: 2018-11-20

## 2018-11-20 ENCOUNTER — OFFICE VISIT (OUTPATIENT)
Dept: PSYCHIATRY | Facility: CLINIC | Age: 63
End: 2018-11-20
Payer: MEDICARE

## 2018-11-20 ENCOUNTER — CLINICAL SUPPORT (OUTPATIENT)
Dept: CARDIOLOGY | Facility: CLINIC | Age: 63
End: 2018-11-20
Payer: MEDICARE

## 2018-11-20 ENCOUNTER — OFFICE VISIT (OUTPATIENT)
Dept: URGENT CARE | Facility: CLINIC | Age: 63
End: 2018-11-20
Payer: MEDICARE

## 2018-11-20 VITALS
DIASTOLIC BLOOD PRESSURE: 80 MMHG | HEIGHT: 63 IN | TEMPERATURE: 99 F | HEART RATE: 82 BPM | BODY MASS INDEX: 25.62 KG/M2 | WEIGHT: 144.63 LBS | OXYGEN SATURATION: 99 % | SYSTOLIC BLOOD PRESSURE: 150 MMHG | RESPIRATION RATE: 20 BRPM

## 2018-11-20 DIAGNOSIS — R07.9 CHEST PAIN, UNSPECIFIED TYPE: ICD-10-CM

## 2018-11-20 DIAGNOSIS — R07.9 CHEST PAIN, UNSPECIFIED TYPE: Primary | ICD-10-CM

## 2018-11-20 DIAGNOSIS — F31.9 BIPOLAR AFFECTIVE DISORDER, REMISSION STATUS UNSPECIFIED: Primary | ICD-10-CM

## 2018-11-20 PROCEDURE — 90834 PSYTX W PT 45 MINUTES: CPT | Mod: S$GLB,,, | Performed by: PSYCHOLOGIST

## 2018-11-20 PROCEDURE — 3008F BODY MASS INDEX DOCD: CPT | Mod: CPTII,S$GLB,, | Performed by: NURSE PRACTITIONER

## 2018-11-20 PROCEDURE — 93005 ELECTROCARDIOGRAM TRACING: CPT | Mod: S$GLB,,, | Performed by: NURSE PRACTITIONER

## 2018-11-20 PROCEDURE — 93010 ELECTROCARDIOGRAM REPORT: CPT | Mod: S$GLB,,, | Performed by: INTERNAL MEDICINE

## 2018-11-20 PROCEDURE — 99213 OFFICE O/P EST LOW 20 MIN: CPT | Mod: S$GLB,,, | Performed by: NURSE PRACTITIONER

## 2018-11-20 PROCEDURE — 1101F PT FALLS ASSESS-DOCD LE1/YR: CPT | Mod: CPTII,S$GLB,, | Performed by: NURSE PRACTITIONER

## 2018-11-20 PROCEDURE — 99999 PR PBB SHADOW E&M-EST. PATIENT-LVL III: CPT | Mod: PBBFAC,,, | Performed by: NURSE PRACTITIONER

## 2018-11-20 NOTE — PROGRESS NOTES
"Individual Psychotherapy (PhD/LCSW)    11/20/2018    Site:  [place]         Therapeutic Intervention: Met with patient.  Outpatient - Insight oriented psychotherapy 45 min - CPT code 71089    Chief complaint/reason for encounter: depression, anxiety, and mood swings     Interval history and content of current session: Patient presented casually dressed, oriented, and alert.  Patient reported experiencing sadness, excessive worry, tearfulness, and sleep disturbance.  Patient denied current suicidal and homicidal ideations.  Explored source of patient's mood.  Patient indicated that she is worried about her daughter's health and her son's wellbeing while he is in retirement.  She also indicated that she experienced some discord with her cousins recently.  She noted that she regrets selling her home in [place], [place] to move closer to her family.  She indicated feeling hurt and sad about how some of her family members treat her.  Patient reported that she heard that he granddaughter got hurt, but her granddaughter did not answer her call when the patient called to check on her.  Patient reported that she as been feeling sick (headache, redness in one eye, chest pain, pain in her arm) for the last two days and she has not been taking her medications because she does not have money for refills.  Encouraged patient to go to the Urgent Care (UC), patient agreed as she stated that she was "tired of feeling like this," and she was assisted to the UC.         Treatment plan:  · Target symptoms: mood swings, depression, anxiety  · Why chosen therapy is appropriate versus another modality: relevant to diagnosis, patient responds to this modality  · Outcome monitoring methods: self-report  · Therapeutic intervention type: insight oriented psychotherapy    Risk parameters:  Patient reports no suicidal ideation  Patient reports no homicidal ideation  Patient reports no self-injurious behavior  Patient reports no violent " behavior    Verbal deficits: None    Patient's response to intervention:  The patient's response to intervention is accepting.    Progress toward goals and other mental status changes:  The patient's progress toward goals is fair .    Diagnosis:     ICD-10-CM ICD-9-CM   1. Bipolar affective disorder, remission status unspecified F31.9 296.80       Plan:  individual psychotherapy    Return to clinic: 1 week    Length of Service (minutes): 45

## 2018-11-20 NOTE — PATIENT INSTRUCTIONS

## 2018-11-20 NOTE — PROGRESS NOTES
Subjective:       Patient ID: Rose Milligan is a 63 y.o. female.    Chief Complaint: Chest Pain    63 year old presents to Urgent Care with reports of chest pain with right arm numbness. Denies any other problems or concerns at this time.       Chest Pain    This is a new problem. The current episode started today. The onset quality is sudden. The problem has been unchanged. The pain is at a severity of 4/10. The pain is mild. The quality of the pain is described as sharp. The pain radiates to the right shoulder. Pertinent negatives include no abdominal pain, back pain, dizziness, fever, nausea, numbness, shortness of breath, vomiting or weakness.     Review of Systems   Constitutional: Negative for appetite change, chills and fever.   HENT: Negative for ear pain, sinus pressure, sore throat and trouble swallowing.    Eyes: Negative for visual disturbance.   Respiratory: Negative for shortness of breath.    Cardiovascular: Positive for chest pain.   Gastrointestinal: Negative for abdominal pain, diarrhea, nausea and vomiting.   Endocrine: Negative for cold intolerance, polyphagia and polyuria.   Genitourinary: Negative for decreased urine volume and dysuria.   Musculoskeletal: Negative for back pain.   Skin: Negative for rash.   Allergic/Immunologic: Negative for environmental allergies and food allergies.   Neurological: Negative for dizziness, tremors, weakness and numbness.   Hematological: Does not bruise/bleed easily.   Psychiatric/Behavioral: Negative for confusion and hallucinations. The patient is not nervous/anxious and is not hyperactive.    All other systems reviewed and are negative.      Objective:     Physical Exam   Constitutional: She is oriented to person, place, and time. She appears well-developed and well-nourished.   HENT:   Head: Normocephalic and atraumatic.   Right Ear: External ear normal.   Left Ear: External ear normal.   Nose: Nose normal.   Mouth/Throat: Oropharynx is clear and moist.    Eyes: Conjunctivae and EOM are normal. Pupils are equal, round, and reactive to light.   Neck: Normal range of motion. Neck supple.   Cardiovascular: Normal rate, regular rhythm, normal heart sounds and intact distal pulses.   No murmur heard.  Pulmonary/Chest: Effort normal and breath sounds normal. She has no wheezes.   Abdominal: Soft. Bowel sounds are normal. There is no tenderness.   Musculoskeletal: Normal range of motion.   Neurological: She is alert and oriented to person, place, and time. She has normal reflexes.   Skin: Skin is warm and dry. No rash noted.   Psychiatric: She has a normal mood and affect. Her behavior is normal. Judgment and thought content normal.   Nursing note and vitals reviewed.  EKG-NSR. Instructed patient to report to ER if symptoms do not improve or worsen.   Assessment:     1. Chest pain, unspecified type      Plan:   Ngoc was seen today for chest pain.    Diagnoses and all orders for this visit:    Chest pain, unspecified type  -     EKG 12-lead; Future

## 2018-11-21 ENCOUNTER — OFFICE VISIT (OUTPATIENT)
Dept: CARDIOLOGY | Facility: CLINIC | Age: 63
End: 2018-11-21
Payer: MEDICARE

## 2018-11-21 ENCOUNTER — OFFICE VISIT (OUTPATIENT)
Dept: OPHTHALMOLOGY | Facility: CLINIC | Age: 63
End: 2018-11-21
Payer: MEDICARE

## 2018-11-21 VITALS
WEIGHT: 142.88 LBS | HEART RATE: 72 BPM | BODY MASS INDEX: 25.32 KG/M2 | SYSTOLIC BLOOD PRESSURE: 152 MMHG | HEIGHT: 63 IN | DIASTOLIC BLOOD PRESSURE: 90 MMHG

## 2018-11-21 DIAGNOSIS — H20.9 IRITIS OF RIGHT EYE: Primary | ICD-10-CM

## 2018-11-21 DIAGNOSIS — N18.4 CKD (CHRONIC KIDNEY DISEASE) STAGE 4, GFR 15-29 ML/MIN: ICD-10-CM

## 2018-11-21 DIAGNOSIS — R00.2 PALPITATIONS: ICD-10-CM

## 2018-11-21 DIAGNOSIS — I10 ESSENTIAL HYPERTENSION: Primary | ICD-10-CM

## 2018-11-21 DIAGNOSIS — R07.89 OTHER CHEST PAIN: ICD-10-CM

## 2018-11-21 DIAGNOSIS — B18.2 CHRONIC HEPATITIS C WITHOUT HEPATIC COMA: ICD-10-CM

## 2018-11-21 PROCEDURE — 99999 PR PBB SHADOW E&M-EST. PATIENT-LVL III: CPT | Mod: PBBFAC,,, | Performed by: INTERNAL MEDICINE

## 2018-11-21 PROCEDURE — 3008F BODY MASS INDEX DOCD: CPT | Mod: CPTII,S$GLB,, | Performed by: INTERNAL MEDICINE

## 2018-11-21 PROCEDURE — 99204 OFFICE O/P NEW MOD 45 MIN: CPT | Mod: S$GLB,,, | Performed by: INTERNAL MEDICINE

## 2018-11-21 PROCEDURE — 99999 PR PBB SHADOW E&M-EST. PATIENT-LVL II: CPT | Mod: PBBFAC,,, | Performed by: OPTOMETRIST

## 2018-11-21 PROCEDURE — 1101F PT FALLS ASSESS-DOCD LE1/YR: CPT | Mod: CPTII,S$GLB,, | Performed by: INTERNAL MEDICINE

## 2018-11-21 PROCEDURE — 92002 INTRM OPH EXAM NEW PATIENT: CPT | Mod: S$GLB,,, | Performed by: OPTOMETRIST

## 2018-11-21 RX ORDER — HYDRALAZINE HYDROCHLORIDE 50 MG/1
50 TABLET, FILM COATED ORAL 2 TIMES DAILY
Qty: 90 TABLET | Refills: 1 | Status: SHIPPED | OUTPATIENT
Start: 2018-11-21 | End: 2019-08-15

## 2018-11-21 RX ORDER — PREDNISOLONE ACETATE 10 MG/ML
SUSPENSION/ DROPS OPHTHALMIC
Qty: 5 ML | Refills: 1 | Status: SHIPPED | OUTPATIENT
Start: 2018-11-21 | End: 2019-08-19

## 2018-11-21 RX ORDER — CARVEDILOL 25 MG/1
25 TABLET ORAL 2 TIMES DAILY WITH MEALS
Qty: 60 TABLET | Refills: 1 | Status: SHIPPED | OUTPATIENT
Start: 2018-11-21 | End: 2019-08-15

## 2018-11-21 RX ORDER — LISINOPRIL 10 MG/1
10 TABLET ORAL DAILY
Qty: 30 TABLET | Refills: 1 | Status: SHIPPED | OUTPATIENT
Start: 2018-11-21 | End: 2019-08-15

## 2018-11-21 RX ORDER — CLONIDINE HYDROCHLORIDE 0.1 MG/1
0.1 TABLET ORAL 3 TIMES DAILY
Qty: 90 TABLET | Refills: 1 | Status: SHIPPED | OUTPATIENT
Start: 2018-11-21 | End: 2019-08-15 | Stop reason: SDUPTHER

## 2018-11-21 RX ORDER — NIFEDIPINE 90 MG/1
90 TABLET, EXTENDED RELEASE ORAL DAILY
Qty: 30 TABLET | Refills: 1 | Status: SHIPPED | OUTPATIENT
Start: 2018-11-21 | End: 2019-08-15

## 2018-11-21 NOTE — PROGRESS NOTES
Subjective:   Patient ID:  Rose Milligan is a 63 y.o. female who presents for cardiac consult of Chest Pain; Hospital Follow Up; and Establish Care      HPI  The patient came in today for cardiac consult of Chest Pain; Hospital Follow Up; and Establish Care    11/21/18  This is a 63 year old female pt with HTN, tobacco abuse, bipolar, hep C, CKD4 presents for initial CV evaluation for chest pain/HTN.      Pt went to [facility] yesterday. She went for chest pain and L shoulder pain. Chest pain started about 3 days ago, and was off medicines for 2 weeks. She had bloodwork, ECG and was told to follow up hereShe called her PCP and only picked up bipolar meds.   Chest pain feels like tightness, for past few days been constant. Mild headache and nausea. Now she feels lightheaded. PT also gets palpitations frequenltly.     Patient feels no sob, no leg swelling, no PND , no dizziness, no syncope, no CNS symptoms.    Patient is compliant with medications.    Past Medical History:   Diagnosis Date    CHF (congestive heart failure)     Depression     Hepatitis C     Hypertension        Past Surgical History:   Procedure Laterality Date    HYSTERECTOMY         Social History     Tobacco Use    Smoking status: Current Some Day Smoker     Packs/day: 0.25     Types: Cigarettes     Start date: 1/11/2016    Smokeless tobacco: Never Used    Tobacco comment: 1 pack every 3 days   Substance Use Topics    Alcohol use: No     Frequency: Never    Drug use: No       Family History   Problem Relation Age of Onset    Heart attack Maternal Grandmother           Medication List           Accurate as of 11/21/18  9:56 AM. If you have any questions, ask your nurse or doctor.               CHANGE how you take these medications    carvedilol 25 MG tablet  Commonly known as:  COREG  Take 1 tablet (25 mg total) by mouth 2 (two) times daily with meals.  What changed:  when to take this  Changed by:  Cullen Hannon Md, MD     lisinopril 10 MG  tablet  Take 1 tablet (10 mg total) by mouth once daily.  What changed:  when to take this  Changed by:  Cullen Hannon Md, MD     NIFEdipine 90 MG (OSM) 24 hr tablet  Commonly known as:  PROCARDIA-XL  Take 1 tablet (90 mg total) by mouth once daily.  What changed:  when to take this  Changed by:  Cullen Hannon Md, MD        CONTINUE taking these medications    cloNIDine 0.1 MG tablet  Commonly known as:  CATAPRES  Take 1 tablet (0.1 mg total) by mouth 3 (three) times daily.     divalproex  MG Tb24  Commonly known as:  DEPAKOTE  TAKE 1 TABLET BY MOUTH EVERY DAY     hydrALAZINE 50 MG tablet  Commonly known as:  APRESOLINE  Take 1 tablet (50 mg total) by mouth 2 (two) times daily.     magnesium oxide 400 mg (241.3 mg magnesium) tablet  Commonly known as:  MAG-OX     ondansetron 4 MG Tbdl  Commonly known as:  ZOFRAN-ODT     pitavastatin calcium 2 mg Tab tablet  Commonly known as:  LIVALO     venlafaxine 75 MG 24 hr capsule  Commonly known as:  EFFEXOR-XR  Take 1 capsule (75 mg total) by mouth once daily.           Where to Get Your Medications      [facility] [address] [facility] & [address] [address]    Phone:  [phone]   · carvedilol 25 MG tablet  · cloNIDine 0.1 MG tablet  · hydrALAZINE 50 MG tablet  · lisinopril 10 MG tablet  · NIFEdipine 90 MG (OSM) 24 hr tablet         Review of Systems   Constitutional: Negative.    HENT: Negative.    Eyes: Negative.    Respiratory: Positive for shortness of breath.    Cardiovascular: Positive for chest pain and palpitations.   Gastrointestinal: Negative.    Genitourinary: Negative.    Musculoskeletal: Negative.    Skin: Negative.    Neurological: Positive for dizziness.   Endo/Heme/Allergies: Negative.    Psychiatric/Behavioral: The patient is nervous/anxious.    All 12 systems otherwise negative.      Wt Readings from Last 3 Encounters:   11/21/18 64.8 kg (142  "lb 13.7 oz)   11/20/18 65.6 kg (144 lb 10 oz)   10/15/18 66.8 kg (147 lb 4.3 oz)     Temp Readings from Last 3 Encounters:   11/20/18 98.6 °F (37 °C)   10/15/18 98.4 °F (36.9 °C)   08/09/18 97.4 °F (36.3 °C) (Oral)     BP Readings from Last 3 Encounters:   11/21/18 (!) 152/90   11/20/18 (!) 150/80   10/15/18 138/78     Pulse Readings from Last 3 Encounters:   11/21/18 72   11/20/18 82   10/15/18 91       BP (!) 152/90 (Patient Position: Sitting, BP Method: Small (Manual))   Pulse 72   Ht 5' 3" (1.6 m)   Wt 64.8 kg (142 lb 13.7 oz)   BMI 25.31 kg/m²     Objective:   Physical Exam   Constitutional: She is oriented to person, place, and time. She appears well-developed and well-nourished. No distress.   HENT:   Head: Normocephalic and atraumatic.   Nose: Nose normal.   Mouth/Throat: Oropharynx is clear and moist.   Eyes: Conjunctivae and EOM are normal. No scleral icterus.   Neck: Normal range of motion. Neck supple. No JVD present. No thyromegaly present.   Cardiovascular: Normal rate, regular rhythm, S1 normal and S2 normal. Exam reveals no gallop, no S3, no S4 and no friction rub.   No murmur heard.  Pulmonary/Chest: Effort normal and breath sounds normal. No stridor. No respiratory distress. She has no wheezes. She has no rales. She exhibits no tenderness.   Abdominal: Soft. Bowel sounds are normal. She exhibits no distension and no mass. There is no tenderness. There is no rebound.   Genitourinary:   Genitourinary Comments: Deferred   Musculoskeletal: Normal range of motion. She exhibits no edema, tenderness or deformity.   Lymphadenopathy:     She has no cervical adenopathy.   Neurological: She is alert and oriented to person, place, and time. She exhibits normal muscle tone. Coordination normal.   Skin: Skin is warm and dry. No rash noted. She is not diaphoretic. No erythema. No pallor.   Psychiatric: She has a normal mood and affect. Her behavior is normal. Judgment and thought content normal.   Nursing " note and vitals reviewed.      Lab Results   Component Value Date     10/15/2018    K 4.8 10/15/2018     10/15/2018    CO2 25 10/15/2018    BUN 22 10/15/2018    CREATININE 1.9 (H) 10/15/2018    GLU 70 10/15/2018    AST 57 (H) 08/09/2018    ALT 32 08/09/2018    ALBUMIN 3.7 10/15/2018    PROT 7.4 08/09/2018    BILITOT 0.4 08/09/2018    WBC 4.21 08/09/2018    HGB 12.3 08/09/2018    HCT 39.9 08/09/2018    MCV 96 08/09/2018     08/09/2018    TSH 0.600 08/25/2011    CHOL 179 08/25/2011    HDL 73 08/25/2011    LDLCALC 84.6 08/25/2011    TRIG 107 08/25/2011     Assessment:      1. Essential hypertension    2. CKD (chronic kidney disease) stage 4, GFR 15-29 ml/min    3. Other chest pain    4. Palpitations    5. Chronic hepatitis C without hepatic coma        Plan:   1. Chest pain, atypical  - likely sec to HTN, off meds past 2 weeks  - check 2D ECHO, had recent neg workup at [facility]  - stress test if CP persists    2. HTN  - restart home meds, refills given  - low salt diet  - nurse BP check next week    3. CKD4  - f/u with nephro/PCP    4. Palpitations  - Holter    5. Hep C  - cont tx per PCP    RTC in 3-4 weeks    Thank you for allowing me to participate in this patient's care. Please do not hesitate to contact me with any questions or concerns. Consult note has been forwarded to the referral physician.

## 2018-11-21 NOTE — PROGRESS NOTES
HPI     New Patient  Chief complaint: irritation, OD  Onset: about 4 days ago  Right eye is red   Matting in the AM  Blurred vision   Light sensitive 3+  Patient has been using a drop that her mother had but is not sure what it   is   Patient states eye was extremely itchy and drops help to clear the itching     Uses glasses and did not bring to exam      Last edited by BILLY Anthony, OD on 11/21/2018  2:42 PM. (History)            Assessment /Plan     For exam results, see Encounter Report.    Iritis of right eye  -     prednisoLONE acetate (PRED FORTE) 1 % DrpS; One drop OD 4 X a day for 4 days, then 3 X a day for 3 days, then 2 X a day for 2 days, then 1 X for one day, then stop.  Dispense: 5 mL; Refill: 1      OD:  Subclinical iritis with photophobia, circumlimbal flush (mild), trace if any flare with no cells,  IOP less in affected eye compared to OS,  Sluggish pupil OD and OS.  Drops above.  RTC 5 days to [facility] for FU.  She will also need a full dilated exam once this clears as her [facility] without is poor.

## 2018-11-21 NOTE — PATIENT INSTRUCTIONS
Established High Blood Pressure    High blood pressure (hypertension) is a chronic disease. Often, healthcare providers dont know what causes it. But it can be caused by certain health conditions and medicines.  If you have high blood pressure, you may not have any symptoms. If you do have symptoms, they may include headache, dizziness, changes in your vision, chest pain, and shortness of breath. But even without symptoms, high blood pressure thats not treated raises your risk for heart attack and stroke. High blood pressure is a serious health risk and shouldnt be ignored.  A blood pressure reading is made up of two numbers: a higher number over a lower number. The top number is the systolic pressure. The bottom number is the diastolic pressure. A normal blood pressure is a systolic pressure of  less than 120 over a diastolic pressure of less than 80. You will see your blood pressure readings written together. For example, a person with a systolic pressure of 188 and a diastolic pressure of 78 will have 118/78 written in the medical record.  High blood pressure is when either the top number is 140 or higher, or the bottom number is 90 or higher. This must be the result when taking your blood pressure a number of times. The blood pressures between normal and high are called prehypertension.  Home care  If you have high blood pressure, you should do what is listed below to lower your blood pressure. If you are taking medicines for high blood pressure, these methods may reduce or end your need for medicines in the future.  · Begin a weight-loss program if you are overweight.  · Cut back on how much salt you get in your diet. Heres how to do this:  ¨ Dont eat foods that have a lot of salt. These include olives, pickles, smoked meats, and salted potato chips.  ¨ Dont add salt to your food at the table.  ¨ Use only small amounts of salt when cooking.  · Start an exercise program. Talk with your healthcare  provider about the type of exercise program that would be best for you. It doesn't have to be hard. Even brisk walking for 20 minutes 3 times a week is a good form of exercise.  · Dont take medicines that stimulate the heart. This includes many over-the-counter cold and sinus decongestant pills and sprays, as well as diet pills. Check the warnings about hypertension on the label. Before buying any over-the-counter medicines or supplements, always ask the pharmacist about the product's potential interaction with your high blood pressure and your high blood pressure medicines.  · Stimulants such as amphetamine or cocaine could be deadly for someone with high blood pressure. Never take these.  · Limit how much caffeine you get in your diet. Switch to caffeine-free products.  · Stop smoking. If you are a long-time smoker, this can be hard. Talk to your healthcare provider about medicines and nicotine replacement options to help you. Also, enroll in a stop-smoking program to make it more likely that you will quit for good.  · Learn how to handle stress. This is an important part of any program to lower blood pressure. Learn about relaxation methods like meditation, yoga, or biofeedback.  · If your provider prescribed medicines, take them exactly as directed. Missing doses may cause your blood pressure get out of control.  · If you miss a dose or doses, check with your healthcare provider or pharmacist about what to do.  · Consider buying an automatic blood pressure machine. Ask your provider for a recommendation. You can get one of these at most pharmacies.     The American Heart Association recommends the following guidelines for home blood pressure monitoring:  · Don't smoke or drink coffee for 30 minutes before taking your blood pressure.  · Go to the bathroom before the test.  · Relax for 5 minutes before taking the measurement.  · Sit with your back supported (don't sit on a couch or soft chair); keep your feet on  the floor uncrossed. Place your arm on a solid flat surface (like a table) with the upper part of the arm at heart level. Place the middle of the cuff directly above the eye of the elbow. Check the monitor's instruction manual for an illustration.  · Take multiple readings. When you measure, take 2 to 3 readings one minute apart and record all of the results.  · Take your blood pressure at the same time every day, or as your healthcare provider recommends.  · Record the date, time, and blood pressure reading.  · Take the record with you to your next medical appointment. If your blood pressure monitor has a built-in memory, simply take the monitor with you to your next appointment.  · Call your provider if you have several high readings. Don't be frightened by a single high blood pressure reading, but if you get several high readings, check in with your healthcare provider.  · Note: When blood pressure reaches a systolic (top number) of 180 or higher OR diastolic (bottom number) of 110 or higher, seek emergency medical treatment.  Follow-up care  You will need to see your healthcare provider regularly. This is to check your blood pressure and to make changes to your medicines. Make a follow-up appointment as directed. Bring the record of your home blood pressure readings to the appointment.  When to seek medical advice  Call your healthcare provider right away if any of these occur:  · Blood pressure reaches a systolic (upper number) of 180 or higher OR a diastolic (bottom number) of 110 or higher  · Chest pain or shortness of breath  · Severe headache  · Throbbing or rushing sound in the ears  · Nosebleed  · Sudden severe pain in your belly (abdomen)  · Extreme drowsiness, confusion, or fainting  · Dizziness or spinning sensation (vertigo)  · Weakness of an arm or leg or one side of the face  · You have problems speaking or seeing   Date Last Reviewed: 12/1/2016  © 2311-8564 [employer]. [address] [place] [address]  [place] [address]. All rights reserved. This information is not intended as a substitute for professional medical care. Always follow your healthcare professional's instructions.

## 2018-11-27 ENCOUNTER — OFFICE VISIT (OUTPATIENT)
Dept: OPHTHALMOLOGY | Facility: CLINIC | Age: 63
End: 2018-11-27
Payer: MEDICARE

## 2018-11-27 ENCOUNTER — OFFICE VISIT (OUTPATIENT)
Dept: PSYCHIATRY | Facility: CLINIC | Age: 63
End: 2018-11-27
Payer: COMMERCIAL

## 2018-11-27 DIAGNOSIS — H20.9 IRITIS OF RIGHT EYE: Primary | ICD-10-CM

## 2018-11-27 DIAGNOSIS — F31.9 BIPOLAR AFFECTIVE DISORDER, REMISSION STATUS UNSPECIFIED: Primary | ICD-10-CM

## 2018-11-27 PROCEDURE — 92012 INTRM OPH EXAM EST PATIENT: CPT | Mod: S$GLB,,, | Performed by: OPTOMETRIST

## 2018-11-27 PROCEDURE — 99999 PR PBB SHADOW E&M-EST. PATIENT-LVL III: CPT | Mod: PBBFAC,,, | Performed by: OPTOMETRIST

## 2018-11-27 PROCEDURE — 90834 PSYTX W PT 45 MINUTES: CPT | Mod: S$GLB,,, | Performed by: PSYCHOLOGIST
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
## 2018-11-28 DIAGNOSIS — N18.30 CKD (CHRONIC KIDNEY DISEASE) STAGE 3, GFR 30-59 ML/MIN: Primary | ICD-10-CM

## 2018-12-01 ENCOUNTER — LAB VISIT (OUTPATIENT)
Dept: LAB | Facility: HOSPITAL | Age: 63
End: 2018-12-01
Attending: INTERNAL MEDICINE
Payer: MEDICARE

## 2018-12-01 DIAGNOSIS — B19.20 HEPATITIS C VIRUS INFECTION WITHOUT HEPATIC COMA, UNSPECIFIED CHRONICITY: ICD-10-CM

## 2018-12-01 DIAGNOSIS — N18.30 CKD (CHRONIC KIDNEY DISEASE) STAGE 3, GFR 30-59 ML/MIN: ICD-10-CM

## 2018-12-01 LAB
ALBUMIN SERPL BCP-MCNC: 3.2 G/DL
ANION GAP SERPL CALC-SCNC: 9 MMOL/L
BASOPHILS # BLD AUTO: 0.02 K/UL
BASOPHILS NFR BLD: 0.5 %
BUN SERPL-MCNC: 28 MG/DL
CALCIUM SERPL-MCNC: 8.9 MG/DL
CHLORIDE SERPL-SCNC: 110 MMOL/L
CO2 SERPL-SCNC: 26 MMOL/L
CREAT SERPL-MCNC: 2 MG/DL
DIFFERENTIAL METHOD: ABNORMAL
EOSINOPHIL # BLD AUTO: 0.2 K/UL
EOSINOPHIL NFR BLD: 3.7 %
ERYTHROCYTE [DISTWIDTH] IN BLOOD BY AUTOMATED COUNT: 13.2 %
EST. GFR  (AFRICAN AMERICAN): 29.6 ML/MIN/1.73 M^2
EST. GFR  (NON AFRICAN AMERICAN): 25.6 ML/MIN/1.73 M^2
GLUCOSE SERPL-MCNC: 68 MG/DL
HCT VFR BLD AUTO: 38.7 %
HGB BLD-MCNC: 11.9 G/DL
IMM GRANULOCYTES # BLD AUTO: 0.01 K/UL
IMM GRANULOCYTES NFR BLD AUTO: 0.2 %
LYMPHOCYTES # BLD AUTO: 2.2 K/UL
LYMPHOCYTES NFR BLD: 50.8 %
MCH RBC QN AUTO: 30.2 PG
MCHC RBC AUTO-ENTMCNC: 30.7 G/DL
MCV RBC AUTO: 98 FL
MONOCYTES # BLD AUTO: 0.5 K/UL
MONOCYTES NFR BLD: 11.7 %
NEUTROPHILS # BLD AUTO: 1.5 K/UL
NEUTROPHILS NFR BLD: 33.1 %
NRBC BLD-RTO: 0 /100 WBC
PHOSPHATE SERPL-MCNC: 4.8 MG/DL
PLATELET # BLD AUTO: 223 K/UL
PMV BLD AUTO: 11.9 FL
POTASSIUM SERPL-SCNC: 4.2 MMOL/L
PTH-INTACT SERPL-MCNC: 89 PG/ML
RBC # BLD AUTO: 3.94 M/UL
SODIUM SERPL-SCNC: 145 MMOL/L
WBC # BLD AUTO: 4.37 K/UL

## 2018-12-01 PROCEDURE — 80069 RENAL FUNCTION PANEL: CPT

## 2018-12-01 PROCEDURE — 82595 ASSAY OF CRYOGLOBULIN: CPT

## 2018-12-01 PROCEDURE — 36415 COLL VENOUS BLD VENIPUNCTURE: CPT | Mod: PO

## 2018-12-01 PROCEDURE — 85025 COMPLETE CBC W/AUTO DIFF WBC: CPT

## 2018-12-01 PROCEDURE — 83970 ASSAY OF PARATHORMONE: CPT

## 2018-12-03 ENCOUNTER — OFFICE VISIT (OUTPATIENT)
Dept: NEPHROLOGY | Facility: CLINIC | Age: 63
End: 2018-12-03
Payer: MEDICARE

## 2018-12-03 VITALS
SYSTOLIC BLOOD PRESSURE: 180 MMHG | BODY MASS INDEX: 25.31 KG/M2 | HEIGHT: 63 IN | DIASTOLIC BLOOD PRESSURE: 110 MMHG | HEART RATE: 76 BPM

## 2018-12-03 DIAGNOSIS — B18.2 CHRONIC HEPATITIS C WITHOUT HEPATIC COMA: ICD-10-CM

## 2018-12-03 DIAGNOSIS — F31.10 BIPOLAR AFFECTIVE DISORDER, MANIC: ICD-10-CM

## 2018-12-03 DIAGNOSIS — N18.4 CKD (CHRONIC KIDNEY DISEASE) STAGE 4, GFR 15-29 ML/MIN: Primary | ICD-10-CM

## 2018-12-03 PROCEDURE — 1101F PT FALLS ASSESS-DOCD LE1/YR: CPT | Mod: CPTII,S$GLB,, | Performed by: INTERNAL MEDICINE

## 2018-12-03 PROCEDURE — 3008F BODY MASS INDEX DOCD: CPT | Mod: CPTII,S$GLB,, | Performed by: INTERNAL MEDICINE

## 2018-12-03 PROCEDURE — 99214 OFFICE O/P EST MOD 30 MIN: CPT | Mod: S$GLB,,, | Performed by: INTERNAL MEDICINE

## 2018-12-03 PROCEDURE — 99999 PR PBB SHADOW E&M-EST. PATIENT-LVL III: CPT | Mod: PBBFAC,,, | Performed by: INTERNAL MEDICINE

## 2018-12-03 RX ORDER — VENLAFAXINE HYDROCHLORIDE 75 MG/1
75 CAPSULE, EXTENDED RELEASE ORAL DAILY
Qty: 30 CAPSULE | Refills: 6 | Status: SHIPPED | OUTPATIENT
Start: 2018-12-03 | End: 2019-05-02

## 2018-12-03 NOTE — PROGRESS NOTES
PROGRESS NOTE FOR ESTABLISHED PATIENT    PHYSICIAN REQUESTING THE CONSULT: Dr. Ric Tristan    REASON FOR VISIT: Renal insufficiency    63 y.o.  female with history of HTN, Hep C, UTI, depression  presents to the renal clinic for evaluation of renal insufficiency.     Patient reports that she ran out of her depression medication. Also with LE paresthesia.      Past Medical History:   Diagnosis Date    CHF (congestive heart failure)     Depression     Hepatitis C     Hypertension        Past Surgical History:   Procedure Laterality Date    HYSTERECTOMY         Review of patient's allergies indicates:  No Known Allergies    Current Outpatient Medications   Medication Sig Dispense Refill    carvedilol (COREG) 25 MG tablet Take 1 tablet (25 mg total) by mouth 2 (two) times daily with meals. 60 tablet 1    cloNIDine (CATAPRES) 0.1 MG tablet Take 1 tablet (0.1 mg total) by mouth 3 (three) times daily. 90 tablet 1    divalproex ER (DEPAKOTE) 500 MG Tb24 TAKE 1 TABLET BY MOUTH EVERY DAY 30 tablet 0    hydrALAZINE (APRESOLINE) 50 MG tablet Take 1 tablet (50 mg total) by mouth 2 (two) times daily. 90 tablet 1    lisinopril 10 MG tablet Take 1 tablet (10 mg total) by mouth once daily. 30 tablet 1    magnesium oxide (MAG-OX) 400 mg tablet Take 1 tablet by mouth once daily.  0    NIFEdipine (PROCARDIA-XL) 90 MG (OSM) 24 hr tablet Take 1 tablet (90 mg total) by mouth once daily. 30 tablet 1    ondansetron (ZOFRAN-ODT) 4 MG TbDL Take 4 mg by mouth every 8 (eight) hours as needed.  1    pitavastatin calcium 2 mg Tab tablet Take 2 mg by mouth.      prednisoLONE acetate (PRED FORTE) 1 % DrpS One drop OD 4 X a day for 4 days, then 3 X a day for 3 days, then 2 X a day for 2 days, then 1 X for one day, then stop. 5 mL 1    venlafaxine (EFFEXOR-XR) 75 MG 24 hr capsule Take 1 capsule (75 mg total) by mouth once daily. 30 capsule 0     No current facility-administered medications for this visit.        Family History  "  Problem Relation Age of Onset    Heart attack Maternal Grandmother        Social History     Socioeconomic History    Marital status:      Spouse name: Not on file    Number of children: Not on file    Years of education: Not on file    Highest education level: Not on file   Social Needs    Financial resource strain: Not on file    Food insecurity - worry: Not on file    Food insecurity - inability: Not on file    Transportation needs - medical: Not on file    Transportation needs - non-medical: Not on file   Occupational History    Not on file   Tobacco Use    Smoking status: Current Some Day Smoker     Packs/day: 0.25     Types: Cigarettes     Start date: 1/11/2016    Smokeless tobacco: Never Used    Tobacco comment: 1 pack every 3 days   Substance and Sexual Activity    Alcohol use: No     Frequency: Never    Drug use: No    Sexual activity: Not Currently   Other Topics Concern    Not on file   Social History Narrative    Not on file       Review of Systems:  1. GENERAL: patient denies any fever, weight changes, generalized weakness, dizziness.  2. HEENT: patient denies headaches, visual disturbances, swallowing problems, sinus pain, nasal congestion.  3. CARDIOVASCULAR: patient denies chest pain, palpitations.  4. PULMONARY: patient denies SOB, no coughing, hemoptysis, wheezing.  5. GASTROINTESTINAL: patient denies abdominal pain, no nausea, vomiting, diarrhea.  6. GENITOURINARY: patient denies dysuria, hematuria, hesitancy, frequency.  7. EXTREMITIES: patient denies LE edema, LE cramping.  8. DERMATOLOGY: patient denies rashes, ulcers.  9. NEURO: patient denies tremors, extremity weakness, she reports extremity numbness/tingling.  10. MUSCULOSKELETAL: patient denies joint pain, joint swelling.  11. HEMATOLOGY: patient denies rectal or gum bleeding.  12: PSYCH: patient denies anxiety, depression.      PHYSICAL EXAM:    BP (!) 180/110   Pulse 76   Ht 5' 3" (1.6 m)   BMI 25.31 kg/m² "     GENERAL: Pleasant lady presents to clinic with non-labored breathing.  HEENT: PER, no nasal discharge, no icterus, no oral exudates, moist mucosal membranes.  NECK: no thyroid mass, no lymphadenopathy.  HEART: RRR S1/S2, no rubs, good peripheral pulses.  LUNGS: CTA bilaterally, no wheezing, breathing is nonlabored.  ABDOMEN: soft, nontender, not distended, bowel sounds are present, no abdominal hernia.  EXTREM: no LE edema.  SKIN: no rashes, skin is warm and dry.  NEURO: A & O x 3, no obvious focal signs.    LABORATORY RESULTS:    Lab Results   Component Value Date    CREATININE 2.0 (H) 12/01/2018    BUN 28 (H) 12/01/2018     12/01/2018    K 4.2 12/01/2018     12/01/2018    CO2 26 12/01/2018      Lab Results   Component Value Date    PTH 89.0 (H) 12/01/2018    CALCIUM 8.9 12/01/2018    PHOS 4.8 (H) 12/01/2018     Lab Results   Component Value Date    ALBUMIN 3.2 (L) 12/01/2018     Lab Results   Component Value Date    WBC 4.37 12/01/2018    HGB 11.9 (L) 12/01/2018    HCT 38.7 12/01/2018    MCV 98 12/01/2018     12/01/2018     Urinalysis:no protein, no blood (12/1/18)    Renal US from 8/30/17 (): left simple cyst, no hydronephrosis, other wise unremarkable.     ASSESSMENT AND PLAN:  63 y.o.  female with history of HTN, Hep C, UTI, depression  presents to the renal clinic for evaluation of renal insufficiency.     1. Renal insufficiency: Patient presents with renal insufficiency, consistent with CKD stage 4. Last creatinine was 2. Likely cause of her renal insufficiency is her past NSAID use. However, hypertension in past and hepatitis C may also have affected her renal function. Patient's renal function will be monitored closely and she will return to the clinic in 3 months for follow up.  Patient was advised to avoid NSAID pain medications such as advil, aleve, motrin, ibuprofen, naprosyn, meloxicam, diclofenac, mobic and to hydrate with 60-80 ounces of water per day.      2. Electrolytes: at goal.     3. Acid base status: No acute issues.    4. Volume: Euvolemic.     BP elevated. Pateint will return in 1 week for BP check.     6. Medications: Reviewed. Agree with current medical regimen.     7. Hepatitis C: patient will arrange again for GI appointment.

## 2018-12-11 ENCOUNTER — CLINICAL SUPPORT (OUTPATIENT)
Dept: CARDIOLOGY | Facility: CLINIC | Age: 63
End: 2018-12-11
Attending: INTERNAL MEDICINE
Payer: MEDICARE

## 2018-12-11 ENCOUNTER — TELEPHONE (OUTPATIENT)
Dept: CARDIOLOGY | Facility: CLINIC | Age: 63
End: 2018-12-11

## 2018-12-11 ENCOUNTER — CLINICAL SUPPORT (OUTPATIENT)
Dept: NEPHROLOGY | Facility: CLINIC | Age: 63
End: 2018-12-11
Payer: MEDICARE

## 2018-12-11 VITALS — HEART RATE: 80 BPM | DIASTOLIC BLOOD PRESSURE: 98 MMHG | SYSTOLIC BLOOD PRESSURE: 140 MMHG

## 2018-12-11 DIAGNOSIS — R00.2 PALPITATIONS: ICD-10-CM

## 2018-12-11 DIAGNOSIS — I10 ESSENTIAL HYPERTENSION: ICD-10-CM

## 2018-12-11 DIAGNOSIS — R07.89 OTHER CHEST PAIN: ICD-10-CM

## 2018-12-11 LAB
AORTIC VALVE REGURGITATION: ABNORMAL
CRYOGLOB SER QL: NORMAL
DIASTOLIC DYSFUNCTION: YES
ESTIMATED PA SYSTOLIC PRESSURE: 44.59
GLOBAL PERICARDIAL EFFUSION: ABNORMAL
RETIRED EF AND QEF - SEE NOTES: 60 (ref 55–65)

## 2018-12-11 PROCEDURE — 99999 PR PBB SHADOW E&M-EST. PATIENT-LVL I: CPT | Mod: PBBFAC,,,

## 2018-12-11 PROCEDURE — 93224 XTRNL ECG REC UP TO 48 HRS: CPT | Mod: S$GLB,,, | Performed by: INTERNAL MEDICINE

## 2018-12-11 PROCEDURE — 93306 TTE W/DOPPLER COMPLETE: CPT | Mod: S$GLB,,, | Performed by: INTERNAL MEDICINE

## 2018-12-11 NOTE — PROGRESS NOTES
Nurse visit.  bp 140/98   Pulse 80.  No c/o of any symptoms.  Pt states she is taking medication as directed.

## 2018-12-11 NOTE — TELEPHONE ENCOUNTER
----- Message from Cullen Hannon MD sent at 12/11/2018  2:52 PM CST -----  Please call patient regarding normal result of overall heart function with mild stiffening of heart, need to control BP well and have a low salt diet to prevent extra fluid overload. If he/she has any questions please let me know or have him/her schedule an appt to see me soon to discuss. Thank you

## 2018-12-14 ENCOUNTER — TELEPHONE (OUTPATIENT)
Dept: CARDIOLOGY | Facility: CLINIC | Age: 63
End: 2018-12-14

## 2018-12-14 ENCOUNTER — TELEPHONE (OUTPATIENT)
Dept: PSYCHIATRY | Facility: CLINIC | Age: 63
End: 2018-12-14

## 2018-12-14 NOTE — TELEPHONE ENCOUNTER
Spoke with patient to advise patient of normal result of overall heart monitor did not reveal any adverse rhythms.  Left message to call office.

## 2018-12-17 ENCOUNTER — TELEPHONE (OUTPATIENT)
Dept: CARDIOLOGY | Facility: CLINIC | Age: 63
End: 2018-12-17

## 2018-12-17 NOTE — TELEPHONE ENCOUNTER
----- Message from Cullen Hannon MD sent at 12/14/2018  3:50 PM CST -----  Please call patient regarding normal result of overall heart monitor did not reveal any adverse rhythms. If he/she has any questions please let me know or have him/her schedule an appt to see me soon to discuss. Thank you

## 2018-12-18 RX ORDER — DIVALPROEX SODIUM 500 MG/1
TABLET, FILM COATED, EXTENDED RELEASE ORAL
Qty: 30 TABLET | Refills: 0 | Status: SHIPPED | OUTPATIENT
Start: 2018-12-18 | End: 2019-02-04

## 2019-01-07 ENCOUNTER — OFFICE VISIT (OUTPATIENT)
Dept: PSYCHIATRY | Facility: CLINIC | Age: 64
End: 2019-01-07
Payer: COMMERCIAL

## 2019-01-07 DIAGNOSIS — F31.9 BIPOLAR AFFECTIVE DISORDER, REMISSION STATUS UNSPECIFIED: Primary | ICD-10-CM

## 2019-01-07 PROCEDURE — 90834 PSYTX W PT 45 MINUTES: CPT | Mod: S$GLB,,, | Performed by: PSYCHOLOGIST

## 2019-01-07 PROCEDURE — 90834 PR PSYCHOTHERAPY W/PATIENT, 45 MIN: ICD-10-PCS | Mod: S$GLB,,, | Performed by: PSYCHOLOGIST

## 2019-01-07 NOTE — PROGRESS NOTES
Individual Psychotherapy (PhD/LCSW)    1/7/2019    Site:  [place]         Therapeutic Intervention: Met with patient.  Outpatient - Insight oriented psychotherapy 45 min - CPT code 63990    Chief complaint/reason for encounter: depression and mood swings     Interval history and content of current session: Patient presented casually dressed, oriented, and alert.  Patient reported that she has begun to feel less sad and can experience periods of catalino.  Patient denied current suicidal and homicidal ideations.  Patient discussed enjoying spending time alone and she noted that she only wants positive people in her life so she has been distancing herself from some of the people that she has had conflicts with.  Active listening skills were used as patient described a history of discord in her relationships with family members that led to her to feeling sad.  Patient reported that she plans to start exercising as it was helpful for improving her mood in the past.  Educated patient on the benefits of exercise to improve one's mood.  Patient also reported that she is out of her medication because she no longer has a psychiatrist.  Patient was referred to psychiatrist Lotus Cohen MD for a medication evaluation to help stabilize her mood.    Treatment plan:  · Target symptoms: mood swings  · Why chosen therapy is appropriate versus another modality: relevant to diagnosis, patient responds to this modality  · Outcome monitoring methods: self-report  · Therapeutic intervention type: insight oriented psychotherapy    Risk parameters:  Patient reports no suicidal ideation  Patient reports no homicidal ideation  Patient reports no self-injurious behavior  Patient reports no violent behavior    Verbal deficits: None    Patient's response to intervention:  The patient's response to intervention is accepting.    Progress toward goals and other mental status changes:  The patient's progress toward goals is fair  .    Diagnosis:     ICD-10-CM ICD-9-CM   1. Bipolar affective disorder, remission status unspecified F31.9 296.80       Plan:  individual psychotherapy and consult psychiatrist for medication evaluation    Return to clinic: 1 week    Length of Service (minutes): 45

## 2019-01-28 ENCOUNTER — OFFICE VISIT (OUTPATIENT)
Dept: PSYCHIATRY | Facility: CLINIC | Age: 64
End: 2019-01-28
Payer: MEDICARE

## 2019-01-28 DIAGNOSIS — F31.9 BIPOLAR AFFECTIVE DISORDER, REMISSION STATUS UNSPECIFIED: Primary | ICD-10-CM

## 2019-01-28 PROCEDURE — 90834 PR PSYCHOTHERAPY W/PATIENT, 45 MIN: ICD-10-PCS | Mod: S$GLB,,, | Performed by: PSYCHOLOGIST

## 2019-01-28 PROCEDURE — 90834 PSYTX W PT 45 MINUTES: CPT | Mod: S$GLB,,, | Performed by: PSYCHOLOGIST
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
## 2019-02-04 ENCOUNTER — OFFICE VISIT (OUTPATIENT)
Dept: PSYCHIATRY | Facility: CLINIC | Age: 64
End: 2019-02-04
Payer: COMMERCIAL

## 2019-02-04 ENCOUNTER — OFFICE VISIT (OUTPATIENT)
Dept: PSYCHIATRY | Facility: CLINIC | Age: 64
End: 2019-02-04
Payer: MEDICARE

## 2019-02-04 VITALS
BODY MASS INDEX: 28.08 KG/M2 | SYSTOLIC BLOOD PRESSURE: 188 MMHG | HEART RATE: 86 BPM | DIASTOLIC BLOOD PRESSURE: 116 MMHG | WEIGHT: 158.5 LBS

## 2019-02-04 DIAGNOSIS — F31.9 BIPOLAR I DISORDER: Primary | ICD-10-CM

## 2019-02-04 DIAGNOSIS — F14.21 COCAINE USE DISORDER, MODERATE, IN EARLY REMISSION: ICD-10-CM

## 2019-02-04 PROCEDURE — 90834 PR PSYCHOTHERAPY W/PATIENT, 45 MIN: ICD-10-PCS | Mod: S$GLB,,, | Performed by: PSYCHOLOGIST

## 2019-02-04 PROCEDURE — 99999 PR PBB SHADOW E&M-EST. PATIENT-LVL II: ICD-10-PCS | Mod: PBBFAC,,, | Performed by: PSYCHIATRY & NEUROLOGY

## 2019-02-04 PROCEDURE — 99999 PR PBB SHADOW E&M-EST. PATIENT-LVL II: CPT | Mod: PBBFAC,,, | Performed by: PSYCHIATRY & NEUROLOGY

## 2019-02-04 PROCEDURE — 90792 PSYCH DIAG EVAL W/MED SRVCS: CPT | Mod: S$GLB,,, | Performed by: PSYCHIATRY & NEUROLOGY

## 2019-02-04 PROCEDURE — 90834 PSYTX W PT 45 MINUTES: CPT | Mod: S$GLB,,, | Performed by: PSYCHOLOGIST

## 2019-02-04 PROCEDURE — 90792 PR PSYCHIATRIC DIAGNOSTIC EVALUATION W/MEDICAL SERVICES: ICD-10-PCS | Mod: S$GLB,,, | Performed by: PSYCHIATRY & NEUROLOGY

## 2019-02-04 RX ORDER — METHOCARBAMOL 500 MG/1
500 TABLET, FILM COATED ORAL 4 TIMES DAILY
COMMUNITY
End: 2020-05-04 | Stop reason: SDUPTHER

## 2019-02-04 RX ORDER — ZIPRASIDONE HYDROCHLORIDE 20 MG/1
20 CAPSULE ORAL 2 TIMES DAILY
Qty: 60 CAPSULE | Refills: 2 | Status: SHIPPED | OUTPATIENT
Start: 2019-02-04 | End: 2019-02-26 | Stop reason: SDUPTHER

## 2019-02-04 RX ORDER — HYDROXYZINE HYDROCHLORIDE 25 MG/1
25-50 TABLET, FILM COATED ORAL NIGHTLY
Qty: 60 TABLET | Refills: 1 | Status: SHIPPED | OUTPATIENT
Start: 2019-02-04 | End: 2019-05-03

## 2019-02-04 RX ORDER — ZIPRASIDONE HYDROCHLORIDE 20 MG/1
20 CAPSULE ORAL 2 TIMES DAILY
Qty: 60 CAPSULE | Refills: 11 | Status: SHIPPED | OUTPATIENT
Start: 2019-02-04 | End: 2019-02-04

## 2019-02-04 RX ORDER — HYDROXYZINE PAMOATE 25 MG/1
25 CAPSULE ORAL NIGHTLY
Qty: 60 CAPSULE | Refills: 1 | Status: SHIPPED | OUTPATIENT
Start: 2019-02-04 | End: 2019-02-04

## 2019-02-04 NOTE — PATIENT INSTRUCTIONS
"  Bipolar Disorder  Bipolar disorder is an illness that causes strong mood swings between depression and macy. It used to be called "manic depression." The mood swings are different from the normal ups and downs we all normally experience in our lives. They are more severe, last longer, and can interfere with work and relationships. These episodes are changes from our usual moods and behavior. Their severity can be mild, or drastic and explosive.  · In a manic episode, you may think fast and do things quickly. It may seem like you are getting a lot done. At first, this may feel very good. But in the extreme this can lead to a lifestyle that is disorganized, chaotic, and includes risky behavior (spending sprees, sexual acting-out, or drug use). In later stages, it may affect eating (no interest in food) and sleeping (unable to sleep for days at a time). Speech may speed up and become difficult for others to understand. You may appear to others as if you are in your own world.  · In a depressive episode, you may feel a lack of interest in normal activities. Sometimes there is sadness or guilt without any clear reason. Thinking may become slow and there can be a lack energy or feeling of hopelessness. Some people have thoughts of harming themselves at this stage. Thoughts can even turn to suicide.  Between these phases you may actually feel OK. This does not mean that the illness is gone. People with this disorder will usually have to treat it all of their life. Medicine and good care can greatly reduce the symptoms.  The exact cause of this illness is unknown. However, there is a genetic link that makes a person more likely to get this problem. Also, the use of drugs such as speed (amphetamine) and cocaine increase the chances of this illness appearing.  Home care  · On-going care and support helps people manage this disease.  Find a healthcare provider and therapist who meet your needs.  Seek help when you feel " like you may be heading into either a manic episode or a depressive state.  · Be sure to take your medicine and get regular blood work to check your the levels of medicine in your body.  Take the medicine and get the follow-up lab work as prescribed, even if you think you dont need to do it.  · Be certain to tell each of your healthcare providers about all of the prescription drugs, over-the-counter medicines, and supplements you take. Certain supplements interact with medicines and result in dangerous side effects.  You can also use your pharmacist as a resource person when you have questions about drug interactions.  · Talk with your family and trusted friends about your thoughts and feelings.  Ask them to help you recognize behavior changes early so you can get help and medicines can be adjusted.  · Alcohol and drugs can bring on an episode, and also make them worse  · If your life is severely impacted by this illness, the Americans with Disabilities Act (ADA) may provide help. The ADA protects people with chronic physical and mental health problems. If you are having trouble keeping jobs, managing workplace issues, or caring for yourself because of your bipolar disorder, contact your local ADA office to see if they can help. The [place] Department  of Justice operates a toll-free ADA information line at: 492.942.6136 (Voice); or 809-462-1062 (TTY).  They can help you locate a local office.    Follow-up care  Follow up with your doctor or therapist as advised. They can help you to find ways to improve your life.  Call 911  Call your healthcare provider right away if any of these occur:  · You have suicidal thoughts, a plan, and the means to  harm yourself  · Trouble breathing  · Confusion  · Drowsiness or trouble wakening  · Fainting or loss of consciousness  · Rapid heart rate, very low heart rate, or a new irregular heart rate  · Seizure  · New chest pain that becomes more severe, lasts longer, or spreads into your  shoulder, arm, neck, jaw, or back  When to seek medical advice  Call your healthcare provider right away if any of these occur:  · Feeling like your symptoms are getting worse (depression, agitation, excess energy)  · Unable to eat or sleep for more than 48 hours  · Feeling out of control (racing thoughts, poor concentration)  · Feeling like you want to harm yourself or another  · Being unable to care for yourself  Date Last Reviewed: 9/29/2015 © 2000-2017 [employer]. [address] [place] [address]. All rights reserved. This information is not intended as a substitute for professional medical care. Always follow your healthcare professional's instructions.        Hydroxyzine capsules or tablets  What is this medicine?  HYDROXYZINE (saira DROX i zeen) is an antihistamine. This medicine is used to treat allergy symptoms. It is also used to treat anxiety and tension. This medicine can be used with other medicines to induce sleep before surgery.  How should I use this medicine?  Take this medicine by mouth with a full glass of water. Follow the directions on the prescription label. You may take this medicine with food or on an empty stomach. Take your medicine at regular intervals. Do not take your medicine more often than directed.  Talk to your pediatrician regarding the use of this medicine in children. Special care may be needed. While this drug may be prescribed for children as young as 6 years of age for selected conditions, precautions do apply.  Patients over 65 years old may have a stronger reaction and need a smaller dose.  What side effects may I notice from receiving this medicine?  Side effects that you should report to your doctor or health care professional as soon as possible:  · fast or irregular heartbeat  · difficulty passing urine  · seizures  · slurred speech or confusion  · tremor  Side effects that usually do not require medical attention (report to your doctor or health  care professional if they continue or are bothersome):  · constipation  · drowsiness  · fatigue  · headache  · stomach upset  What may interact with this medicine?  · alcohol  · barbiturate medicines for sleep or seizures  · medicines for colds, allergies  · medicines for depression, anxiety, or emotional disturbances  · medicines for pain  · medicines for sleep  · muscle relaxants  What if I miss a dose?  If you miss a dose, take it as soon as you can. If it is almost time for your next dose, take only that dose. Do not take double or extra doses.  Where should I keep my medicine?  Keep out of the reach of children.  Store at room temperature between 15 and 30 degrees C (59 and 86 degrees F). Keep container tightly closed. Throw away any unused medicine after the expiration date.  What should I tell my health care provider before I take this medicine?  They need to know if you have any of these conditions:  · any chronic illness  · difficulty passing urine  · glaucoma  · heart disease  · kidney disease  · liver disease  · lung disease  · an unusual or allergic reaction to hydroxyzine, cetirizine, other medicines, foods, dyes, or preservatives  · pregnant or trying to get pregnant  · breast-feeding  What should I watch for while using this medicine?  Tell your doctor or health care professional if your symptoms do not improve.  You may get drowsy or dizzy. Do not drive, use machinery, or do anything that needs mental alertness until you know how this medicine affects you. Do not stand or sit up quickly, especially if you are an older patient. This reduces the risk of dizzy or fainting spells. Alcohol may interfere with the effect of this medicine. Avoid alcoholic drinks.  Your mouth may get dry. Chewing sugarless gum or sucking hard candy, and drinking plenty of water may help. Contact your doctor if the problem does not go away or is severe.  This medicine may cause dry eyes and blurred vision. If you wear contact  lenses you may feel some discomfort. Lubricating drops may help. See your eye doctor if the problem does not go away or is severe.  If you are receiving skin tests for allergies, tell your doctor you are using this medicine.  NOTE:This sheet is a summary. It may not cover all possible information. If you have questions about this medicine, talk to your doctor, pharmacist, or health care provider. Copyright© 2017 [employer]        Ziprasidone capsules  What is this medicine?  ZIPRASIDONE (zi PRAY si done) is used to treat schizophrenia and bipolar disorder, also known as manic-depression.  How should I use this medicine?  Take this medicine by mouth with a glass of water. Follow the directions on the prescription label. Take with food. Take your doses at regular intervals. Do not take your medicine more often than directed. Do not stop taking except on the advice of your doctor or health care professional.  Talk to your pediatrician regarding the use of this medicine in children. Special care may be needed.  What side effects may I notice from receiving this medicine?  Side effects that you should report to your doctor or health care professional as soon as possible:  · allergic reactions like skin rash, itching or hives, swelling of the face, lips, or tongue  · change in emotion or behavior such as feeling depressed, angry, or anxious  · chest pain  · difficulty breathing  · difficulty swallowing  · excessive thirst and/or hunger  · fast or irregular heartbeat or palpitations  · fever or chills, sore throat  · frequently needing to urinate  · inability to control muscle movements in the face, hands, arms, or legs  · loss of balance or difficulty walking  · lump or swelling on the neck  · prolonged erection  · redness, blistering, peeling or loosening of the skin, including inside the mouth  · seizures  · stiff muscles or jaw  · tremor  · uncontrollable movements or spasms of the face, tongue or mouth  · weakness  or loss of strength  Side effects that usually do not require medical attention (report to your doctor or health care professional if they continue or are bothersome):  · constipation  · drowsiness  · headache  · nausea or vomiting  · upset stomach  What may interact with this medicine?  Do not take this medicine with any of the following medications:  · alfuzosin  · arsenic trioxide  · certain antibiotics like clarithromycin, erythromycin, gatifloxacin, grepafloxacin, levofloxacin, moxifloxacin, pentamidine, sparfloxacin, telithromycin, troleandomycin  · certain medicines for depression  · certain medicines for fungal infections like fluconazole, itraconazole, ketoconazole, posaconazole, voriconazole  · certain medicines for irregular heart beat like amiodarone, dofetilide, dronedarone, flecainide, procainamide, quinidine, sotalol  · chloroquine  · cisapride  · clozapine  · dolasetron  · droperidol  · halofantrine  · haloperidol  · methadone  · other medicines that prolong the QT interval (cause an abnormal heart rhythm)  · palonosetron  · phenothiazines like chlorpromazine, mesoridazine, thioridazine  · pimozide  · propafenone  · risperidone  · sertindole  · tacrolimus  · vardenafil  This medicine may also interact with the following medications:  · carbamazepine  · certain medicines for blood pressure  · certain medicines for Parkinson's disease  · diuretics  · stimulant medicines for attention disorders, weight loss, or to stay awake like amphetamine, dextroamphetamine  What if I miss a dose?  If you miss a dose, take it as soon as you can. If it is almost time for your next dose, take only that dose. Do not take double or extra doses.  Where should I keep my medicine?  Keep out of the reach of children.  Store at room temperature between 15 and 30 degrees C (59 and 86 degrees F). Throw away any unused medicine after the expiration date.  What should I tell my health care provider before I take this  medicine?  They need to know if you have any of these conditions:  · dementia  · diabetes or high blood sugar  · heart disease, including heart failure  · irregular heartbeat  · liver disease  · low potassium level in the blood  · Parkinson's disease or other movement disorders  · previous heart attack or stroke  · suicidal thoughts, plans, or attempt by you or a family member  · an unusual or allergic reaction to ziprasidone, other medicines, foods, dyes, or preservatives  · pregnant or trying to get pregnant  · breast-feeding  What should I watch for while using this medicine?  Visit your doctor or health care professional for regular checks on your progress. It may be several weeks before you see the full effects of this medicine. Do not suddenly stop taking this medicine. Your doctor may want you to gradually reduce the dose.  You may get drowsy or dizzy. Do not drive, use machinery, or do anything that needs mental alertness until you know how this drug affects you. Do not stand or sit up quickly, especially if you are an older patient. This reduces the risk of dizzy or fainting spells. Alcohol can make you more drowsy and dizzy. Avoid alcoholic drinks.  This medicine can make you more sensitive to the sun. Keep out of the sun. If you cannot avoid being in the sun, wear protective clothing and use sunscreen. Do not use sun lamps or tanning beds/booths.  This medicine can reduce the response of your body to heat or cold. Dress warm in cold weather and stay hydrated in hot weather. If possible, avoid extreme temperatures like saunas, hot tubs, very hot or cold showers, or activities that can cause dehydration such as vigorous exercise.  Your mouth may get dry. Chewing sugarless gum or sucking hard candy, and drinking plenty of water may help. Contact your doctor if the problem does not go away or is severe.  NOTE:This sheet is a summary. It may not cover all possible information. If you have questions about this  medicine, talk to your doctor, pharmacist, or health care provider. Copyright© 2017 [employer]
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
## 2019-02-04 NOTE — PROGRESS NOTES
Pt stated she had not taken her medication this morning. She stated that she is on liver medication that has to be taken with food and that she has not eaten yet.
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
## 2019-02-26 ENCOUNTER — OFFICE VISIT (OUTPATIENT)
Dept: PSYCHIATRY | Facility: CLINIC | Age: 64
End: 2019-02-26
Payer: MEDICARE

## 2019-02-26 ENCOUNTER — CLINICAL SUPPORT (OUTPATIENT)
Dept: CARDIOLOGY | Facility: CLINIC | Age: 64
End: 2019-02-26
Payer: MEDICARE

## 2019-02-26 VITALS
SYSTOLIC BLOOD PRESSURE: 133 MMHG | WEIGHT: 167.31 LBS | BODY MASS INDEX: 29.64 KG/M2 | DIASTOLIC BLOOD PRESSURE: 85 MMHG | HEART RATE: 70 BPM

## 2019-02-26 DIAGNOSIS — F31.9 BIPOLAR I DISORDER: ICD-10-CM

## 2019-02-26 DIAGNOSIS — F31.10 BIPOLAR AFFECTIVE DISORDER, MANIC: Primary | ICD-10-CM

## 2019-02-26 PROCEDURE — 93000 ELECTROCARDIOGRAM COMPLETE: CPT | Mod: S$GLB,,, | Performed by: INTERNAL MEDICINE

## 2019-02-26 PROCEDURE — 3008F BODY MASS INDEX DOCD: CPT | Mod: CPTII,S$GLB,, | Performed by: PSYCHIATRY & NEUROLOGY

## 2019-02-26 PROCEDURE — 99999 PR PBB SHADOW E&M-EST. PATIENT-LVL II: ICD-10-PCS | Mod: PBBFAC,,, | Performed by: PSYCHIATRY & NEUROLOGY

## 2019-02-26 PROCEDURE — 93000 EKG 12-LEAD: ICD-10-PCS | Mod: S$GLB,,, | Performed by: INTERNAL MEDICINE

## 2019-02-26 PROCEDURE — 3079F PR MOST RECENT DIASTOLIC BLOOD PRESSURE 80-89 MM HG: ICD-10-PCS | Mod: CPTII,S$GLB,, | Performed by: PSYCHIATRY & NEUROLOGY

## 2019-02-26 PROCEDURE — 3075F PR MOST RECENT SYSTOLIC BLOOD PRESS GE 130-139MM HG: ICD-10-PCS | Mod: CPTII,S$GLB,, | Performed by: PSYCHIATRY & NEUROLOGY

## 2019-02-26 PROCEDURE — 99212 PR OFFICE/OUTPT VISIT, EST, LEVL II, 10-19 MIN: ICD-10-PCS | Mod: S$GLB,,, | Performed by: PSYCHIATRY & NEUROLOGY

## 2019-02-26 PROCEDURE — 99999 PR PBB SHADOW E&M-EST. PATIENT-LVL II: CPT | Mod: PBBFAC,,, | Performed by: PSYCHIATRY & NEUROLOGY

## 2019-02-26 PROCEDURE — 1101F PT FALLS ASSESS-DOCD LE1/YR: CPT | Mod: CPTII,S$GLB,, | Performed by: PSYCHIATRY & NEUROLOGY

## 2019-02-26 PROCEDURE — 1101F PR PT FALLS ASSESS DOC 0-1 FALLS W/OUT INJ PAST YR: ICD-10-PCS | Mod: CPTII,S$GLB,, | Performed by: PSYCHIATRY & NEUROLOGY

## 2019-02-26 PROCEDURE — 99212 OFFICE O/P EST SF 10 MIN: CPT | Mod: S$GLB,,, | Performed by: PSYCHIATRY & NEUROLOGY

## 2019-02-26 PROCEDURE — 3075F SYST BP GE 130 - 139MM HG: CPT | Mod: CPTII,S$GLB,, | Performed by: PSYCHIATRY & NEUROLOGY

## 2019-02-26 PROCEDURE — 3079F DIAST BP 80-89 MM HG: CPT | Mod: CPTII,S$GLB,, | Performed by: PSYCHIATRY & NEUROLOGY

## 2019-02-26 PROCEDURE — 3008F PR BODY MASS INDEX (BMI) DOCUMENTED: ICD-10-PCS | Mod: CPTII,S$GLB,, | Performed by: PSYCHIATRY & NEUROLOGY

## 2019-02-26 RX ORDER — ZIPRASIDONE HYDROCHLORIDE 20 MG/1
20 CAPSULE ORAL 2 TIMES DAILY
Qty: 60 CAPSULE | Refills: 3 | Status: SHIPPED | OUTPATIENT
Start: 2019-02-26 | End: 2019-02-28

## 2019-02-26 RX ORDER — TOPIRAMATE 25 MG/1
25 TABLET ORAL NIGHTLY
Qty: 30 TABLET | Refills: 2 | Status: SHIPPED | OUTPATIENT
Start: 2019-02-26 | End: 2019-05-03
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
## 2019-02-26 NOTE — PATIENT INSTRUCTIONS
Topiramate tablets  What is this medicine?  TOPIRAMATE (toe PYRE a mate) is used to treat seizures in adults or children with epilepsy. It is also used for the prevention of migraine headaches.  How should I use this medicine?  Take this medicine by mouth with a glass of water. Follow the directions on the prescription label. Do not crush or chew. You may take this medicine with meals. Take your medicine at regular intervals. Do not take it more often than directed.  Talk to your pediatrician regarding the use of this medic  Ziprasidone capsules  What is this medicine?  ZIPRASIDONE (zi PRAY si done) is used to treat schizophrenia and bipolar disorder, also known as manic-depression.  How should I use this medicine?  Take this medicine by mouth with a glass of water. Follow the directions on the prescription label. Take with food. Take your doses at regular intervals. Do not take your medicine more often than directed. Do not stop taking except on the advice of your doctor or health care professional.  Talk to your pediatrician regarding the use of this medicine in children. Special care may be needed.  What side effects may I notice from receiving this medicine?  Side effects that you should report to your doctor or health care professional as soon as possible:  · allergic reactions like skin rash, itching or hives, swelling of the face, lips, or tongue  · change in emotion or behavior such as feeling depressed, angry, or anxious  · chest pain  · difficulty breathing  · difficulty swallowing  · excessive thirst and/or hunger  · fast or irregular heartbeat or palpitations  · fever or chills, sore throat  · frequently needing to urinate  · inability to control muscle movements in the face, hands, arms, or legs  · loss of balance or difficulty walking  · lump or swelling on the neck  · prolonged erection  · redness, blistering, peeling or loosening of the skin, including inside the mouth  · seizures  · stiff muscles  or jaw  · tremor  · uncontrollable movements or spasms of the face, tongue or mouth  · weakness or loss of strength  Side effects that usually do not require medical attention (report to your doctor or health care professional if they continue or are bothersome):  · constipation  · drowsiness  · headache  · nausea or vomiting  · upset stomach  What may interact with this medicine?  Do not take this medicine with any of the following medications:  · alfuzosin  · arsenic trioxide  · certain antibiotics like clarithromycin, erythromycin, gatifloxacin, grepafloxacin, levofloxacin, moxifloxacin, pentamidine, sparfloxacin, telithromycin, troleandomycin  · certain medicines for depression  · certain medicines for fungal infections like fluconazole, itraconazole, ketoconazole, posaconazole, voriconazole  · certain medicines for irregular heart beat like amiodarone, dofetilide, dronedarone, flecainide, procainamide, quinidine, sotalol  · chloroquine  · cisapride  · clozapine  · dolasetron  · droperidol  · halofantrine  · haloperidol  · methadone  · other medicines that prolong the QT interval (cause an abnormal heart rhythm)  · palonosetron  · phenothiazines like chlorpromazine, mesoridazine, thioridazine  · pimozide  · propafenone  · risperidone  · sertindole  · tacrolimus  · vardenafil  This medicine may also interact with the following medications:  · carbamazepine  · certain medicines for blood pressure  · certain medicines for Parkinson's disease  · diuretics  · stimulant medicines for attention disorders, weight loss, or to stay awake like amphetamine, dextroamphetamine  What if I miss a dose?  If you miss a dose, take it as soon as you can. If it is almost time for your next dose, take only that dose. Do not take double or extra doses.  Where should I keep my medicine?  Keep out of the reach of children.  Store at room temperature between 15 and 30 degrees C (59 and 86 degrees F). Throw away any unused medicine after  the expiration date.  What should I tell my health care provider before I take this medicine?  They need to know if you have any of these conditions:  · dementia  · diabetes or high blood sugar  · heart disease, including heart failure  · irregular heartbeat  · liver disease  · low potassium level in the blood  · Parkinson's disease or other movement disorders  · previous heart attack or stroke  · suicidal thoughts, plans, or attempt by you or a family member  · an unusual or allergic reaction to ziprasidone, other medicines, foods, dyes, or preservatives  · pregnant or trying to get pregnant  · breast-feeding  What should I watch for while using this medicine?  Visit your doctor or health care professional for regular checks on your progress. It may be several weeks before you see the full effects of this medicine. Do not suddenly stop taking this medicine. Your doctor may want you to gradually reduce the dose.  You may get drowsy or dizzy. Do not drive, use machinery, or do anything that needs mental alertness until you know how this drug affects you. Do not stand or sit up quickly, especially if you are an older patient. This reduces the risk of dizzy or fainting spells. Alcohol can make you more drowsy and dizzy. Avoid alcoholic drinks.  This medicine can make you more sensitive to the sun. Keep out of the sun. If you cannot avoid being in the sun, wear protective clothing and use sunscreen. Do not use sun lamps or tanning beds/booths.  This medicine can reduce the response of your body to heat or cold. Dress warm in cold weather and stay hydrated in hot weather. If possible, avoid extreme temperatures like saunas, hot tubs, very hot or cold showers, or activities that can cause dehydration such as vigorous exercise.  Your mouth may get dry. Chewing sugarless gum or sucking hard candy, and drinking plenty of water may help. Contact your doctor if the problem does not go away or is severe.  NOTE:This sheet is a  summary. It may not cover all possible information. If you have questions about this medicine, talk to your doctor, pharmacist, or health care provider. Copyright© 2017 [employer]      ine in children. Special care may be needed. While this drug may be prescribed for children as young as 2 years of age for selected conditions, precautions do apply.  What side effects may I notice from receiving this medicine?  Side effects that you should report to your doctor or health care professional as soon as possible:  · allergic reactions like skin rash, itching or hives, swelling of the face, lips, or tongue  · decreased sweating and/or rise in body temperature  · depression  · difficulty breathing, fast or irregular breathing patterns  · difficulty speaking  · difficulty walking or controlling muscle movements  · hearing impairment  · redness, blistering, peeling or loosening of the skin, including inside the mouth  · tingling, pain or numbness in the hands or feet  · unusual bleeding or bruising  · unusually weak or tired  · worsening of mood, thoughts or actions of suicide or dying  Side effects that usually do not require medical attention (report to your doctor or health care professional if they continue or are bothersome):  · altered taste  · back pain, joint or muscle aches and pains  · diarrhea, or constipation  · headache  · loss of appetite  · nausea  · stomach upset, indigestion  · tremors  What may interact with this medicine?  Do not take this medicine with any of the following medications:  · probenecid  This medicine may also interact with the following medications:  · acetazolamide  · alcohol  · amitriptyline  · aspirin and aspirin-like medicines  · birth control pills  · certain medicines for depression  · certain medicines for seizures  · certain medicines that treat or prevent blood clots like warfarin, enoxaparin, dalteparin, apixaban, dabigatran, and  rivaroxaban  · digoxin  · hydrochlorothiazide  · lithium  · medicines for pain, sleep, or muscle relaxation  · metformin  · methazolamide  · NSAIDS, medicines for pain and inflammation, like ibuprofen or naproxen  · pioglitazone  · risperidone  What if I miss a dose?  If you miss a dose, take it as soon as you can. If your next dose is to be taken in less than 6 hours, then do not take the missed dose. Take the next dose at your regular time. Do not take double or extra doses.  Where should I keep my medicine?  Keep out of the reach of children.  Store at room temperature between 15 and 30 degrees C (59 and 86 degrees F) in a tightly closed container. Protect from moisture. Throw away any unused medicine after the expiration date.  What should I tell my health care provider before I take this medicine?  They need to know if you have any of these conditions:  · bleeding disorders  · cirrhosis of the liver or liver disease  · diarrhea  · glaucoma  · kidney stones or kidney disease  · low blood counts, like low white cell, platelet, or red cell counts  · lung disease like asthma, obstructive pulmonary disease, emphysema  · metabolic acidosis  · on a ketogenic diet  · schedule for surgery or a procedure  · suicidal thoughts, plans, or attempt; a previous suicide attempt by you or a family member  · an unusual or allergic reaction to topiramate, other medicines, foods, dyes, or preservatives  · pregnant or trying to get pregnant  · breast-feeding  What should I watch for while using this medicine?  Visit your doctor or health care professional for regular checks on your progress. Do not stop taking this medicine suddenly. This increases the risk of seizures if you are using this medicine to control epilepsy. Wear a medical identification bracelet or chain to say you have epilepsy or seizures, and carry a card that lists all your medicines.  This medicine can decrease sweating and increase your body temperature. Watch for  signs of  sweating or fever, especially in children. Avoid extreme heat, hot baths, and saunas. Be careful about exercising, especially in hot weather. Contact your health care provider right away if you notice a fever or decrease in sweating.  You should drink plenty of fluids while taking this medicine. If you have had kidney stones in the past, this will help to reduce your chances of forming kidney stones.  If you have stomach pain, with nausea or vomiting and yellowing of your eyes or skin, call your doctor immediately.  You may get drowsy, dizzy, or have blurred vision. Do not drive, use machinery, or do anything that needs mental alertness until you know how this medicine affects you. To reduce dizziness, do not sit or stand up quickly, especially if you are an older patient. Alcohol can increase drowsiness and dizziness. Avoid alcoholic drinks.  If you notice blurred vision, eye pain, or other eye problems, seek medical attention at once for an eye exam.  The use of this medicine may increase the chance of suicidal thoughts or actions. Pay special attention to how you are responding while on this medicine. Any worsening of mood, or thoughts of suicide or dying should be reported to your health care professional right away.  This medicine may increase the chance of developing metabolic acidosis. If left untreated, this can cause kidney stones, bone disease, or slowed growth in children. Symptoms include breathing fast, fatigue, loss of appetite, irregular heartbeat, or loss of consciousness. Call your doctor immediately if you experience any of these side effects. Also, tell your doctor about any surgery you plan on having while taking this medicine since this may increase your risk for metabolic acidosis.  Birth control pills may not work properly while you are taking this medicine. Talk to your doctor about using an extra method of birth control.  Women who become pregnant while using this medicine  may enroll in the [place] Antiepileptic Drug Pregnancy Registry by calling 1-425.482.5348. This registry collects information about the safety of antiepileptic drug use during pregnancy.  NOTE:This sheet is a summary. It may not cover all possible information. If you have questions about this medicine, talk to your doctor, pharmacist, or health care provider. Copyright© 2017 [employer]

## 2019-02-28 RX ORDER — RISPERIDONE 0.5 MG/1
0.5 TABLET ORAL NIGHTLY
Qty: 30 TABLET | Refills: 1 | Status: SHIPPED | OUTPATIENT
Start: 2019-02-28 | End: 2019-05-03

## 2019-02-28 NOTE — PROGRESS NOTES
Reviewed EKG, some changes noted from prior EKG in November. At this time will switch patient from Ziprasidone to Risperidone. Discussed plan with patient and she agrees. Will order follow up EKG and monitor for further changes.

## 2019-03-04 RX ORDER — DIVALPROEX SODIUM 500 MG/1
TABLET, FILM COATED, EXTENDED RELEASE ORAL
Qty: 30 TABLET | Refills: 0 | OUTPATIENT
Start: 2019-03-04

## 2019-05-02 ENCOUNTER — OFFICE VISIT (OUTPATIENT)
Dept: FAMILY MEDICINE | Facility: CLINIC | Age: 64
End: 2019-05-02
Payer: MEDICARE

## 2019-05-02 VITALS
HEART RATE: 72 BPM | SYSTOLIC BLOOD PRESSURE: 136 MMHG | BODY MASS INDEX: 28.95 KG/M2 | DIASTOLIC BLOOD PRESSURE: 76 MMHG | HEIGHT: 63 IN | OXYGEN SATURATION: 97 % | TEMPERATURE: 98 F | WEIGHT: 163.38 LBS

## 2019-05-02 DIAGNOSIS — F31.9 BIPOLAR 1 DISORDER: ICD-10-CM

## 2019-05-02 DIAGNOSIS — B18.2 CHRONIC HEPATITIS C WITHOUT HEPATIC COMA: ICD-10-CM

## 2019-05-02 DIAGNOSIS — I10 ESSENTIAL HYPERTENSION: Primary | ICD-10-CM

## 2019-05-02 DIAGNOSIS — M54.5 CHRONIC LOW BACK PAIN, UNSPECIFIED BACK PAIN LATERALITY, WITH SCIATICA PRESENCE UNSPECIFIED: ICD-10-CM

## 2019-05-02 DIAGNOSIS — N18.4 CKD (CHRONIC KIDNEY DISEASE) STAGE 4, GFR 15-29 ML/MIN: ICD-10-CM

## 2019-05-02 DIAGNOSIS — Z23 NEED FOR VACCINATION AGAINST STREPTOCOCCUS PNEUMONIAE: ICD-10-CM

## 2019-05-02 DIAGNOSIS — G89.29 CHRONIC LOW BACK PAIN, UNSPECIFIED BACK PAIN LATERALITY, WITH SCIATICA PRESENCE UNSPECIFIED: ICD-10-CM

## 2019-05-02 DIAGNOSIS — T50.905A ADVERSE EFFECT OF DRUG, INITIAL ENCOUNTER: ICD-10-CM

## 2019-05-02 PROCEDURE — 99214 OFFICE O/P EST MOD 30 MIN: CPT | Mod: S$GLB,,, | Performed by: FAMILY MEDICINE

## 2019-05-02 PROCEDURE — 99499 RISK ADDL DX/OHS AUDIT: ICD-10-PCS | Mod: S$GLB,,, | Performed by: FAMILY MEDICINE

## 2019-05-02 PROCEDURE — 99999 PR PBB SHADOW E&M-EST. PATIENT-LVL III: CPT | Mod: PBBFAC,,, | Performed by: FAMILY MEDICINE

## 2019-05-02 PROCEDURE — 90471 IMMUNIZATION ADMIN: CPT | Mod: PBBFAC,PO

## 2019-05-02 PROCEDURE — 99499 UNLISTED E&M SERVICE: CPT | Mod: S$GLB,,, | Performed by: FAMILY MEDICINE

## 2019-05-02 PROCEDURE — 99214 PR OFFICE/OUTPT VISIT, EST, LEVL IV, 30-39 MIN: ICD-10-PCS | Mod: S$GLB,,, | Performed by: FAMILY MEDICINE

## 2019-05-02 PROCEDURE — 99999 PR PBB SHADOW E&M-EST. PATIENT-LVL III: ICD-10-PCS | Mod: PBBFAC,,, | Performed by: FAMILY MEDICINE

## 2019-05-02 RX ORDER — VENLAFAXINE 37.5 MG/1
TABLET ORAL
Qty: 10 TABLET | Refills: 0 | Status: SHIPPED | OUTPATIENT
Start: 2019-05-02 | End: 2019-05-02 | Stop reason: SDUPTHER

## 2019-05-02 RX ORDER — VENLAFAXINE 37.5 MG/1
TABLET ORAL
Qty: 10 TABLET | Refills: 0 | Status: SHIPPED | OUTPATIENT
Start: 2019-05-02 | End: 2019-11-12

## 2019-05-02 RX ORDER — MUPIROCIN 20 MG/G
OINTMENT TOPICAL 3 TIMES DAILY
Qty: 15 G | Refills: 0 | Status: SHIPPED | OUTPATIENT
Start: 2019-05-02 | End: 2021-07-30
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
## 2019-05-03 ENCOUNTER — OFFICE VISIT (OUTPATIENT)
Dept: PSYCHIATRY | Facility: CLINIC | Age: 64
End: 2019-05-03
Payer: MEDICARE

## 2019-05-03 VITALS — BODY MASS INDEX: 28.9 KG/M2 | WEIGHT: 163.13 LBS

## 2019-05-03 DIAGNOSIS — F31.9 BIPOLAR AFFECTIVE DISORDER, REMISSION STATUS UNSPECIFIED: Primary | ICD-10-CM

## 2019-05-03 PROCEDURE — 99213 PR OFFICE/OUTPT VISIT, EST, LEVL III, 20-29 MIN: ICD-10-PCS | Mod: S$PBB,,, | Performed by: PSYCHIATRY & NEUROLOGY

## 2019-05-03 PROCEDURE — 99999 PR PBB SHADOW E&M-EST. PATIENT-LVL I: ICD-10-PCS | Mod: PBBFAC,,, | Performed by: PSYCHIATRY & NEUROLOGY

## 2019-05-03 PROCEDURE — 99213 OFFICE O/P EST LOW 20 MIN: CPT | Mod: S$PBB,,, | Performed by: PSYCHIATRY & NEUROLOGY

## 2019-05-03 PROCEDURE — 99999 PR PBB SHADOW E&M-EST. PATIENT-LVL I: CPT | Mod: PBBFAC,,, | Performed by: PSYCHIATRY & NEUROLOGY

## 2019-05-03 RX ORDER — HYDROXYZINE PAMOATE 25 MG/1
CAPSULE ORAL
Qty: 90 CAPSULE | Refills: 2 | Status: SHIPPED | OUTPATIENT
Start: 2019-05-03 | End: 2019-05-03 | Stop reason: SDUPTHER

## 2019-05-03 RX ORDER — RISPERIDONE 0.25 MG/1
0.25 TABLET ORAL 2 TIMES DAILY
Qty: 60 TABLET | Refills: 3 | Status: SHIPPED | OUTPATIENT
Start: 2019-05-03 | End: 2019-05-03

## 2019-05-03 RX ORDER — HYDROXYZINE PAMOATE 25 MG/1
CAPSULE ORAL
Qty: 90 CAPSULE | Refills: 2 | Status: SHIPPED | OUTPATIENT
Start: 2019-05-03 | End: 2019-10-22 | Stop reason: SDUPTHER

## 2019-05-03 RX ORDER — RISPERIDONE 0.25 MG/1
0.25 TABLET ORAL 2 TIMES DAILY
Qty: 60 TABLET | Refills: 3 | Status: SHIPPED | OUTPATIENT
Start: 2019-05-03 | End: 2019-11-12

## 2019-05-03 NOTE — PATIENT INSTRUCTIONS
Do not continue Topamax   Vistaril 25 mg as needed twice a day and at bedtime 50 mg  Risperdal .25 mg twice a day   Effexor (venelefaxine) decrease the dose 37.5 mg for 10 days and then stop  Come back in 4 weeks.     Resources:  · National Institutes of Mental Xuodhc808-568-4411rcl.[facility].nih.gov  · National [place] on Mental Povbzxi221-963-2939fjy.[place].org   · Mental Health Eaxhcfd397-189-5140fzn.[facility].org  · National Suicide Fckwaca842-383-0542 (800-SUICIDE)  · National Suicide Prevention Ggvnmjlx599-709-5749 (440-705-CUJN)www.suicidepreventionlifeline.org     Are you having suicidal thoughts?  You may be feeling helpless, hopeless, and that you cant go on. You may even have thoughts of suicide. But help is available. There are ways to ease this pain and manage the problems in your life.  If you are thinking about harming or killing yourself, please tell your healthcare provider or someone you care about immediately or go to the nearest crisis walk-in center or emergency room.  You can also call, toll-free,  · 800-SUICIDE (445-069-9066)   · 902-206-OHEX ([phone])

## 2019-05-07 ENCOUNTER — TELEPHONE (OUTPATIENT)
Dept: NEPHROLOGY | Facility: CLINIC | Age: 64
End: 2019-05-07

## 2019-05-07 NOTE — TELEPHONE ENCOUNTER
----- Message from Trinidad Carter sent at 5/7/2019  8:39 AM CDT -----  Contact: pt  .Type:  Sooner Apoointment Request    Caller is requesting a sooner appointment.  Caller declined first available appointment listed below.  Caller will not accept being placed on the waitlist and is requesting a message be sent to doctor.  Name of Caller: pt  When is the first available appointment? 6/24 (lilian)  Symptoms: liver check   Would the patient rather a call back or a response via [facility]?  Call back  Best Call Back Number: [phone] (home)    Additional Information:  Pt stated she completed the liver medication

## 2019-05-07 NOTE — TELEPHONE ENCOUNTER
----- Message from Xiomara Carter sent at 5/7/2019 10:20 AM CDT -----  .Type:  Patient Returning Call    Who Called:patient  Who Left Message for Patient:saad  Does the patient know what this is regarding?:  Would the patient rather a call back or a response via [facility]? call  Best Call Back Number:.[phone] (home)   Additional Information:

## 2019-05-09 ENCOUNTER — OFFICE VISIT (OUTPATIENT)
Dept: NEPHROLOGY | Facility: CLINIC | Age: 64
End: 2019-05-09
Payer: MEDICARE

## 2019-05-09 VITALS
DIASTOLIC BLOOD PRESSURE: 120 MMHG | BODY MASS INDEX: 29.46 KG/M2 | SYSTOLIC BLOOD PRESSURE: 168 MMHG | HEIGHT: 63 IN | WEIGHT: 166.25 LBS | HEART RATE: 78 BPM

## 2019-05-09 DIAGNOSIS — N18.30 CKD (CHRONIC KIDNEY DISEASE) STAGE 3, GFR 30-59 ML/MIN: Primary | ICD-10-CM

## 2019-05-09 PROCEDURE — 1101F PT FALLS ASSESS-DOCD LE1/YR: CPT | Mod: CPTII,S$GLB,, | Performed by: INTERNAL MEDICINE

## 2019-05-09 PROCEDURE — 3077F PR MOST RECENT SYSTOLIC BLOOD PRESSURE >= 140 MM HG: ICD-10-PCS | Mod: CPTII,S$GLB,, | Performed by: INTERNAL MEDICINE

## 2019-05-09 PROCEDURE — 99214 OFFICE O/P EST MOD 30 MIN: CPT | Mod: S$GLB,,, | Performed by: INTERNAL MEDICINE

## 2019-05-09 PROCEDURE — 3077F SYST BP >= 140 MM HG: CPT | Mod: CPTII,S$GLB,, | Performed by: INTERNAL MEDICINE

## 2019-05-09 PROCEDURE — 3080F DIAST BP >= 90 MM HG: CPT | Mod: CPTII,S$GLB,, | Performed by: INTERNAL MEDICINE

## 2019-05-09 PROCEDURE — 99214 PR OFFICE/OUTPT VISIT, EST, LEVL IV, 30-39 MIN: ICD-10-PCS | Mod: S$GLB,,, | Performed by: INTERNAL MEDICINE

## 2019-05-09 PROCEDURE — 99999 PR PBB SHADOW E&M-EST. PATIENT-LVL III: ICD-10-PCS | Mod: PBBFAC,,, | Performed by: INTERNAL MEDICINE

## 2019-05-09 PROCEDURE — 3080F PR MOST RECENT DIASTOLIC BLOOD PRESSURE >= 90 MM HG: ICD-10-PCS | Mod: CPTII,S$GLB,, | Performed by: INTERNAL MEDICINE

## 2019-05-09 PROCEDURE — 3008F PR BODY MASS INDEX (BMI) DOCUMENTED: ICD-10-PCS | Mod: CPTII,S$GLB,, | Performed by: INTERNAL MEDICINE

## 2019-05-09 PROCEDURE — 1101F PR PT FALLS ASSESS DOC 0-1 FALLS W/OUT INJ PAST YR: ICD-10-PCS | Mod: CPTII,S$GLB,, | Performed by: INTERNAL MEDICINE

## 2019-05-09 PROCEDURE — 99999 PR PBB SHADOW E&M-EST. PATIENT-LVL III: CPT | Mod: PBBFAC,,, | Performed by: INTERNAL MEDICINE

## 2019-05-09 PROCEDURE — 3008F BODY MASS INDEX DOCD: CPT | Mod: CPTII,S$GLB,, | Performed by: INTERNAL MEDICINE
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
## 2019-05-30 ENCOUNTER — OFFICE VISIT (OUTPATIENT)
Dept: GASTROENTEROLOGY | Facility: CLINIC | Age: 64
End: 2019-05-30
Payer: MEDICARE

## 2019-05-30 ENCOUNTER — LAB VISIT (OUTPATIENT)
Dept: LAB | Facility: HOSPITAL | Age: 64
End: 2019-05-30
Attending: INTERNAL MEDICINE
Payer: MEDICARE

## 2019-05-30 ENCOUNTER — PROCEDURE VISIT (OUTPATIENT)
Dept: GASTROENTEROLOGY | Facility: CLINIC | Age: 64
End: 2019-05-30
Attending: INTERNAL MEDICINE
Payer: MEDICARE

## 2019-05-30 VITALS
WEIGHT: 165.38 LBS | WEIGHT: 165.38 LBS | SYSTOLIC BLOOD PRESSURE: 158 MMHG | DIASTOLIC BLOOD PRESSURE: 102 MMHG | BODY MASS INDEX: 29.3 KG/M2 | BODY MASS INDEX: 29.3 KG/M2 | SYSTOLIC BLOOD PRESSURE: 158 MMHG | HEIGHT: 63 IN | DIASTOLIC BLOOD PRESSURE: 102 MMHG | HEIGHT: 63 IN | HEART RATE: 80 BPM | HEART RATE: 80 BPM

## 2019-05-30 DIAGNOSIS — B18.2 CHRONIC HEPATITIS C WITHOUT HEPATIC COMA: ICD-10-CM

## 2019-05-30 DIAGNOSIS — B18.2 CHRONIC HEPATITIS C WITHOUT HEPATIC COMA: Primary | ICD-10-CM

## 2019-05-30 LAB
ALBUMIN SERPL BCP-MCNC: 3.5 G/DL (ref 3.5–5.2)
ALBUMIN SERPL BCP-MCNC: 3.5 G/DL (ref 3.5–5.2)
ALP SERPL-CCNC: 102 U/L (ref 55–135)
ALP SERPL-CCNC: 102 U/L (ref 55–135)
ALT SERPL W/O P-5'-P-CCNC: 11 U/L (ref 10–44)
ALT SERPL W/O P-5'-P-CCNC: 11 U/L (ref 10–44)
ANION GAP SERPL CALC-SCNC: 10 MMOL/L (ref 8–16)
AST SERPL-CCNC: 20 U/L (ref 10–40)
AST SERPL-CCNC: 20 U/L (ref 10–40)
BILIRUB DIRECT SERPL-MCNC: 0.2 MG/DL (ref 0.1–0.3)
BILIRUB SERPL-MCNC: 0.3 MG/DL (ref 0.1–1)
BILIRUB SERPL-MCNC: 0.3 MG/DL (ref 0.1–1)
BUN SERPL-MCNC: 18 MG/DL (ref 8–23)
CALCIUM SERPL-MCNC: 9.4 MG/DL (ref 8.7–10.5)
CHLORIDE SERPL-SCNC: 107 MMOL/L (ref 95–110)
CO2 SERPL-SCNC: 23 MMOL/L (ref 23–29)
CREAT SERPL-MCNC: 1.9 MG/DL (ref 0.5–1.4)
EST. GFR  (AFRICAN AMERICAN): 31.4 ML/MIN/1.73 M^2
EST. GFR  (NON AFRICAN AMERICAN): 27.3 ML/MIN/1.73 M^2
GLUCOSE SERPL-MCNC: 90 MG/DL (ref 70–110)
POTASSIUM SERPL-SCNC: 4.1 MMOL/L (ref 3.5–5.1)
PROT SERPL-MCNC: 7.4 G/DL (ref 6–8.4)
PROT SERPL-MCNC: 7.4 G/DL (ref 6–8.4)
SODIUM SERPL-SCNC: 140 MMOL/L (ref 136–145)

## 2019-05-30 PROCEDURE — 99204 OFFICE O/P NEW MOD 45 MIN: CPT | Mod: S$GLB,,, | Performed by: INTERNAL MEDICINE

## 2019-05-30 PROCEDURE — 80053 COMPREHEN METABOLIC PANEL: CPT

## 2019-05-30 PROCEDURE — 86790 VIRUS ANTIBODY NOS: CPT

## 2019-05-30 PROCEDURE — 99204 PR OFFICE/OUTPT VISIT, NEW, LEVL IV, 45-59 MIN: ICD-10-PCS | Mod: S$GLB,,, | Performed by: INTERNAL MEDICINE

## 2019-05-30 PROCEDURE — 99499 UNLISTED E&M SERVICE: CPT | Mod: S$GLB,,, | Performed by: INTERNAL MEDICINE

## 2019-05-30 PROCEDURE — 99499 UNLISTED E&M SERVICE: CPT | Mod: HCNC,S$GLB,, | Performed by: INTERNAL MEDICINE

## 2019-05-30 PROCEDURE — 91200 LIVER ELASTOGRAPHY: CPT | Mod: PBBFAC,PN | Performed by: INTERNAL MEDICINE

## 2019-05-30 PROCEDURE — 99999 PR PBB SHADOW E&M-EST. PATIENT-LVL III: ICD-10-PCS | Mod: PBBFAC,,, | Performed by: INTERNAL MEDICINE

## 2019-05-30 PROCEDURE — 99499 RISK ADDL DX/OHS AUDIT: ICD-10-PCS | Mod: S$GLB,,, | Performed by: INTERNAL MEDICINE

## 2019-05-30 PROCEDURE — 36415 COLL VENOUS BLD VENIPUNCTURE: CPT

## 2019-05-30 PROCEDURE — 99499 RISK ADDL DX/OHS AUDIT: ICD-10-PCS | Mod: HCNC,S$GLB,, | Performed by: INTERNAL MEDICINE

## 2019-05-30 PROCEDURE — 91200 LIVER ELASTOGRAPHY: CPT | Mod: S$GLB,,, | Performed by: INTERNAL MEDICINE

## 2019-05-30 PROCEDURE — 91200 PR LIVER ELASTOGRAPHY W/OUT IMAG W/INTERP & REPORT: ICD-10-PCS | Mod: S$GLB,,, | Performed by: INTERNAL MEDICINE

## 2019-05-30 PROCEDURE — 99999 PR PBB SHADOW E&M-EST. PATIENT-LVL III: CPT | Mod: PBBFAC,,, | Performed by: INTERNAL MEDICINE

## 2019-05-30 PROCEDURE — 86706 HEP B SURFACE ANTIBODY: CPT

## 2019-05-30 PROCEDURE — 87340 HEPATITIS B SURFACE AG IA: CPT

## 2019-05-30 PROCEDURE — 87522 HEPATITIS C REVRS TRNSCRPJ: CPT

## 2019-05-30 RX ORDER — ZIPRASIDONE HYDROCHLORIDE 20 MG/1
20 CAPSULE ORAL 2 TIMES DAILY
COMMUNITY
End: 2019-10-07 | Stop reason: SDUPTHER

## 2019-05-30 RX ORDER — TOPIRAMATE 25 MG/1
25 TABLET ORAL NIGHTLY
COMMUNITY
End: 2019-08-19

## 2019-05-30 NOTE — PROGRESS NOTES
Subjective:     Rose Milligan is here for evaluation of Hepatitis C      HPI  Rose Milligan is here for follow-up of hepatitis-C.  [facility]  She thinks she started treatment in March or April.  She has now finished 8 weeks of [facility].  She wants to transfer her care to [facility].  Overall she feels well and denies any acute issues.    No evidence of liver decompensation: no ascites, confusion or GI bleeding.      Review of Systems   Constitutional: Negative.    HENT: Negative.    Eyes: Negative.    Respiratory: Negative.    Cardiovascular: Negative.    Gastrointestinal: Negative.    Genitourinary: Negative.    Musculoskeletal: Negative.    Skin: Negative.    Neurological: Negative.    Psychiatric/Behavioral: Negative.        Objective:     Physical Exam   Constitutional: She is oriented to person, place, and time. She appears well-developed and well-nourished. No distress.   HENT:   Head: Normocephalic and atraumatic.   Mouth/Throat: Oropharynx is clear and moist. No oropharyngeal exudate.   Eyes: Pupils are equal, round, and reactive to light. Conjunctivae are normal. Right eye exhibits no discharge. Left eye exhibits no discharge. No scleral icterus.   Pulmonary/Chest: Effort normal and breath sounds normal. No respiratory distress. She has no wheezes.   Abdominal: Soft. She exhibits no distension. There is no tenderness.   Musculoskeletal: She exhibits no edema.   Neurological: She is alert and oriented to person, place, and time.   Psychiatric: She has a normal mood and affect. Her behavior is normal.   Vitals reviewed.      Computed MELD-Na score unavailable. Necessary lab results were not found in the last year.  Computed MELD score unavailable. Necessary lab results were not found in the last year.    WBC   Date Value Ref Range Status   02/26/2019 7.50 3.90 - 12.70 K/uL Final     Hemoglobin   Date Value Ref Range Status   02/26/2019 14.2 12.0 - 16.0 g/dL Final     Hematocrit    Date Value Ref Range Status   02/26/2019 45.0 37.0 - 48.5 % Final     Platelets   Date Value Ref Range Status   02/26/2019 280 150 - 350 K/uL Final     BUN, Bld   Date Value Ref Range Status   02/26/2019 28 (H) 8 - 23 mg/dL Final     Creatinine   Date Value Ref Range Status   02/26/2019 1.8 (H) 0.5 - 1.4 mg/dL Final     Glucose   Date Value Ref Range Status   02/26/2019 111 (H) 70 - 110 mg/dL Final     Calcium   Date Value Ref Range Status   02/26/2019 9.1 8.7 - 10.5 mg/dL Final     Sodium   Date Value Ref Range Status   02/26/2019 144 136 - 145 mmol/L Final     Potassium   Date Value Ref Range Status   02/26/2019 4.5 3.5 - 5.1 mmol/L Final     Chloride   Date Value Ref Range Status   02/26/2019 107 95 - 110 mmol/L Final     AST   Date Value Ref Range Status   08/09/2018 57 (H) 10 - 40 U/L Final     ALT   Date Value Ref Range Status   08/09/2018 32 10 - 44 U/L Final     Alkaline Phosphatase   Date Value Ref Range Status   08/09/2018 87 55 - 135 U/L Final     Total Bilirubin   Date Value Ref Range Status   08/09/2018 0.4 0.1 - 1.0 mg/dL Final     Comment:     For infants and newborns, interpretation of results should be based  on gestational age, weight and in agreement with clinical  observations.  Premature Infant recommended reference ranges:  Up to 24 hours.............<8.0 mg/dL  Up to 48 hours............<12.0 mg/dL  3-5 days..................<15.0 mg/dL  6-29 days.................<15.0 mg/dL       Albumin   Date Value Ref Range Status   02/26/2019 3.6 3.5 - 5.2 g/dL Final     No results found for: INR      Assessment/Plan:     1. Chronic hepatitis C without hepatic coma      Rose Milligan is a 63 y.o. female withHepatitis C    Chronic hepatitis C without hepatic coma-status post treatment  -would like to stage with fibroscan  -check for end of treatment response  -will need SVR 12 labs  -check hepatitis a and B immune status  -would like to get outside records from [facility] especially ultrasound for  completeness  -     Hepatitis B surface antibody; Future; Expected date: 05/30/2019  -     Hepatitis B surface antigen; Future; Expected date: 05/30/2019  -     Hepatitis C RNA, quantitative, PCR; Future; Expected date: 05/30/2019  -     US Elastography Liver; Future; Expected date: 05/30/2019  -     Comprehensive metabolic panel; Future; Expected date: 05/30/2019  -     Hepatitis A antibody, IgG; Future; Expected date: 05/30/2019  -     Hepatic function panel; Future; Expected date: 05/30/2019  -     Hepatitis C RNA, quantitative, PCR; Future; Expected date: 05/30/2019    Return to clinic as needed based on the above testing      Enid Thomas MD

## 2019-05-30 NOTE — PROCEDURES
Fibroscan Procedure     Name: Rose Milligan  Date of Procedure : 2019   :: Enid Thomas MD  Diagnosis: HCV    Probe: M    Fibroscan readin.7 KPa    Fibrosis:F 0-1     CAP readin dB/m    Steatosis: :<S1       Interpretation:  No significant liver fibrosis or steatosis.

## 2019-05-31 LAB
HBV SURFACE AB SER-ACNC: POSITIVE M[IU]/ML
HBV SURFACE AG SERPL QL IA: NEGATIVE
HEPATITIS A ANTIBODY, IGG: POSITIVE

## 2019-06-03 LAB
HCV RNA SERPL NAA+PROBE-LOG IU: <1.08 LOG (10) IU/ML
HCV RNA SERPL QL NAA+PROBE: DETECTED IU/ML
HCV RNA SPEC NAA+PROBE-ACNC: <12 IU/ML

## 2019-06-05 DIAGNOSIS — B18.2 CHRONIC HEPATITIS C WITHOUT HEPATIC COMA: Primary | ICD-10-CM

## 2019-08-09 ENCOUNTER — OFFICE VISIT (OUTPATIENT)
Dept: FAMILY MEDICINE | Facility: CLINIC | Age: 64
End: 2019-08-09
Payer: MEDICARE

## 2019-08-09 DIAGNOSIS — I16.1 HYPERTENSIVE EMERGENCY: Primary | ICD-10-CM

## 2019-08-09 PROCEDURE — 1101F PR PT FALLS ASSESS DOC 0-1 FALLS W/OUT INJ PAST YR: ICD-10-PCS | Mod: HCNC,CPTII,S$GLB, | Performed by: FAMILY MEDICINE

## 2019-08-09 PROCEDURE — 99999 PR PBB SHADOW E&M-EST. PATIENT-LVL I: ICD-10-PCS | Mod: PBBFAC,HCNC,, | Performed by: FAMILY MEDICINE

## 2019-08-09 PROCEDURE — 1101F PT FALLS ASSESS-DOCD LE1/YR: CPT | Mod: HCNC,CPTII,S$GLB, | Performed by: FAMILY MEDICINE

## 2019-08-09 PROCEDURE — 99999 PR PBB SHADOW E&M-EST. PATIENT-LVL I: CPT | Mod: PBBFAC,HCNC,, | Performed by: FAMILY MEDICINE

## 2019-08-09 PROCEDURE — 99213 PR OFFICE/OUTPT VISIT, EST, LEVL III, 20-29 MIN: ICD-10-PCS | Mod: HCNC,S$GLB,, | Performed by: FAMILY MEDICINE

## 2019-08-09 PROCEDURE — 99213 OFFICE O/P EST LOW 20 MIN: CPT | Mod: HCNC,S$GLB,, | Performed by: FAMILY MEDICINE

## 2019-08-11 NOTE — PROGRESS NOTES
Subjective:      Patient ID: Rose Milligan is a 64 y.o. female.    Chief Complaint: No chief complaint on file.    HPI    Patient arrived without appointment today  Notes she was just discharged from the hospital due to CHF exacerbation  Notes that she has had changes in her medications from discharge but unsure when ones changes/did not bring meds with her  Severe headache today  Throbbing over entire head  Blood pressure 165/125 - with multiple readings about the same   No chest pain, sob, or changes in urination   Does have some blurry vision   Denies any drug use at this time - hx of drug abuse/cocaine     Past Medical History:   Diagnosis Date    CHF (congestive heart failure)     Depression     Hepatitis C     Hypertension        Past Surgical History:   Procedure Laterality Date    HYSTERECTOMY         Family History   Problem Relation Age of Onset    Heart attack Maternal Grandmother        Social History     Socioeconomic History    Marital status:      Spouse name: Not on file    Number of children: Not on file    Years of education: Not on file    Highest education level: Not on file   Occupational History    Not on file   Social Needs    Financial resource strain: Not on file    Food insecurity:     Worry: Not on file     Inability: Not on file    Transportation needs:     Medical: Not on file     Non-medical: Not on file   Tobacco Use    Smoking status: Current Some Day Smoker     Packs/day: 0.25     Types: Cigarettes     Start date: 1/11/2016    Smokeless tobacco: Never Used    Tobacco comment: 1 pack every 3 days   Substance and Sexual Activity    Alcohol use: No     Frequency: Never    Drug use: No    Sexual activity: Not Currently   Lifestyle    Physical activity:     Days per week: Not on file     Minutes per session: Not on file    Stress: Not on file   Relationships    Social connections:     Talks on phone: Not on file     Gets together: Not on file     Attends  Alevism service: Not on file     Active member of club or organization: Not on file     Attends meetings of clubs or organizations: Not on file     Relationship status: Not on file   Other Topics Concern    Not on file   Social History Narrative    Not on file       Health Maintenance Topics with due status: Not Due       Topic Last Completion Date    DEXA SCAN 08/22/2018    Pneumococcal Vaccine (65+ Low/Medium Risk) 05/02/2019       Medication List with Changes/Refills   Current Medications    CARVEDILOL (COREG) 25 MG TABLET    Take 1 tablet (25 mg total) by mouth 2 (two) times daily with meals.    CLONIDINE (CATAPRES) 0.1 MG TABLET    Take 1 tablet (0.1 mg total) by mouth 3 (three) times daily.    HYDRALAZINE (APRESOLINE) 50 MG TABLET    Take 1 tablet (50 mg total) by mouth 2 (two) times daily.    HYDROXYZINE PAMOATE (VISTARIL) 25 MG CAP    Take 2 capsules (50 mg total) by mouth every evening. May also take 1 capsule (25 mg total) 2 (two) times daily as needed.    LISINOPRIL 10 MG TABLET    Take 1 tablet (10 mg total) by mouth once daily.    MAGNESIUM OXIDE (MAG-OX) 400 MG TABLET    Take 1 tablet by mouth once daily.    METHOCARBAMOL (ROBAXIN) 500 MG TAB    Take 500 mg by mouth 4 (four) times daily.    MUPIROCIN (BACTROBAN) 2 % OINTMENT    Apply topically 3 (three) times daily.    NIFEDIPINE (PROCARDIA-XL) 90 MG (OSM) 24 HR TABLET    Take 1 tablet (90 mg total) by mouth once daily.    PITAVASTATIN CALCIUM 2 MG TAB TABLET    Take 2 mg by mouth.    PREDNISOLONE ACETATE (PRED FORTE) 1 % DRPS    One drop OD 4 X a day for 4 days, then 3 X a day for 3 days, then 2 X a day for 2 days, then 1 X for one day, then stop.    RISPERIDONE (RISPERDAL) 0.25 MG TAB    Take 1 tablet (0.25 mg total) by mouth 2 (two) times daily.    TOPIRAMATE (TOPAMAX) 25 MG TABLET    Take 25 mg by mouth every evening.    VENLAFAXINE (EFFEXOR) 37.5 MG TAB    Take one pill daily for three days, then 1/2 pill daily for three days, then stop taking  medication.    ZIPRASIDONE (GEODON) 20 MG CAP    Take 20 mg by mouth 2 (two) times daily.       Review of patient's allergies indicates:   Allergen Reactions    Tramadol Itching       Review of Systems   Constitutional: Negative for fever.   HENT: Negative for ear pain.    Eyes: Positive for blurred vision. Negative for pain.   Respiratory: Negative for shortness of breath.    Cardiovascular: Negative for chest pain, palpitations and leg swelling.   Genitourinary: Negative for frequency.   Neurological: Positive for headaches.       Objective:     There were no vitals filed for this visit.  There is no height or weight on file to calculate BMI.    Physical Exam   Constitutional: She is oriented to person, place, and time. She appears well-developed and well-nourished. No distress.   HENT:   Head: Normocephalic.   Cardiovascular: Normal rate and regular rhythm.   No murmur heard.  Pulmonary/Chest: Effort normal and breath sounds normal. No respiratory distress. She has no wheezes.   Musculoskeletal: She exhibits no edema.   Neurological: She is alert and oriented to person, place, and time.   Skin: Skin is warm and dry.   Psychiatric: She has a normal mood and affect.   Nursing note and vitals reviewed.      Assessment and Plan:     Hypertensive emergency    I advised that with her elevated blood pressure reading and symptoms that she would need to go to ER for further workup/decrease in blood pressure   Patient agreed to go, although declined EMS transport  Son came  patient from clinic - she was to be taken straingt to [facility] where she was just discharged from   Advised patient to get records from [facility] on discharge/medication changes and bring back to clinic for follow up     No follow-ups on file.

## 2019-08-12 ENCOUNTER — LAB VISIT (OUTPATIENT)
Dept: LAB | Facility: HOSPITAL | Age: 64
End: 2019-08-12
Attending: INTERNAL MEDICINE
Payer: MEDICARE

## 2019-08-12 DIAGNOSIS — N18.30 CKD (CHRONIC KIDNEY DISEASE) STAGE 3, GFR 30-59 ML/MIN: ICD-10-CM

## 2019-08-12 LAB
ALBUMIN SERPL BCP-MCNC: 3.2 G/DL (ref 3.5–5.2)
ANION GAP SERPL CALC-SCNC: 10 MMOL/L (ref 8–16)
BUN SERPL-MCNC: 34 MG/DL (ref 8–23)
CALCIUM SERPL-MCNC: 9.1 MG/DL (ref 8.7–10.5)
CHLORIDE SERPL-SCNC: 107 MMOL/L (ref 95–110)
CO2 SERPL-SCNC: 24 MMOL/L (ref 23–29)
CREAT SERPL-MCNC: 2.1 MG/DL (ref 0.5–1.4)
EST. GFR  (AFRICAN AMERICAN): 27.7 ML/MIN/1.73 M^2
EST. GFR  (NON AFRICAN AMERICAN): 24 ML/MIN/1.73 M^2
GLUCOSE SERPL-MCNC: 90 MG/DL (ref 70–110)
PHOSPHATE SERPL-MCNC: 3.4 MG/DL (ref 2.7–4.5)
POTASSIUM SERPL-SCNC: 4.6 MMOL/L (ref 3.5–5.1)
SODIUM SERPL-SCNC: 141 MMOL/L (ref 136–145)

## 2019-08-12 PROCEDURE — 36415 COLL VENOUS BLD VENIPUNCTURE: CPT | Mod: HCNC

## 2019-08-12 PROCEDURE — 80069 RENAL FUNCTION PANEL: CPT | Mod: HCNC

## 2019-08-14 ENCOUNTER — TELEPHONE (OUTPATIENT)
Dept: CARDIOLOGY | Facility: CLINIC | Age: 64
End: 2019-08-14

## 2019-08-14 NOTE — TELEPHONE ENCOUNTER
Returned call to patient states she went to the [facility] and was prescribed Lasix--would like to know if  would give her a prescription for lasix--informed patient will need to schedule an office appointment with Dr. Hannon to discuss medications--appointment has been scheduled on 8/15/19 at [facility] at 8:20 A.M.--patient verbalizes understanding of all information

## 2019-08-14 NOTE — TELEPHONE ENCOUNTER
----- Message from Pk Dandre sent at 8/14/2019  9:24 AM CDT -----  Contact: self [phone]  Pt would like to have prescription for Lasix sent to pharmacy; pt states medication was prescribed to her by ER doctor at [facility].  Please call back at [phone].  Md Linnette        .  [facility]/pharmacy #[id number] - [place] - [address] AT [facility]  [address] [place] [address]  Phone: [phone] Fax: [phone]

## 2019-08-15 ENCOUNTER — OFFICE VISIT (OUTPATIENT)
Dept: CARDIOLOGY | Facility: CLINIC | Age: 64
End: 2019-08-15
Payer: MEDICARE

## 2019-08-15 ENCOUNTER — CLINICAL SUPPORT (OUTPATIENT)
Dept: CARDIOLOGY | Facility: CLINIC | Age: 64
End: 2019-08-15
Payer: MEDICARE

## 2019-08-15 ENCOUNTER — HOSPITAL ENCOUNTER (OUTPATIENT)
Dept: RADIOLOGY | Facility: HOSPITAL | Age: 64
Discharge: HOME | End: 2019-08-15
Attending: INTERNAL MEDICINE
Payer: MEDICARE

## 2019-08-15 VITALS
SYSTOLIC BLOOD PRESSURE: 184 MMHG | HEIGHT: 63 IN | DIASTOLIC BLOOD PRESSURE: 126 MMHG | HEART RATE: 87 BPM | WEIGHT: 171.31 LBS | BODY MASS INDEX: 30.35 KG/M2

## 2019-08-15 DIAGNOSIS — I10 ESSENTIAL HYPERTENSION: Primary | ICD-10-CM

## 2019-08-15 DIAGNOSIS — I10 ESSENTIAL HYPERTENSION: ICD-10-CM

## 2019-08-15 DIAGNOSIS — I50.30 (HFPEF) HEART FAILURE WITH PRESERVED EJECTION FRACTION: ICD-10-CM

## 2019-08-15 DIAGNOSIS — B18.2 CHRONIC HEPATITIS C WITHOUT HEPATIC COMA: ICD-10-CM

## 2019-08-15 DIAGNOSIS — I50.30 (HFPEF) HEART FAILURE WITH PRESERVED EJECTION FRACTION: Primary | ICD-10-CM

## 2019-08-15 DIAGNOSIS — E78.49 OTHER HYPERLIPIDEMIA: ICD-10-CM

## 2019-08-15 DIAGNOSIS — N18.4 CKD (CHRONIC KIDNEY DISEASE) STAGE 4, GFR 15-29 ML/MIN: ICD-10-CM

## 2019-08-15 PROCEDURE — 99999 PR PBB SHADOW E&M-EST. PATIENT-LVL III: CPT | Mod: PBBFAC,HCNC,, | Performed by: INTERNAL MEDICINE

## 2019-08-15 PROCEDURE — 3080F DIAST BP >= 90 MM HG: CPT | Mod: HCNC,CPTII,S$GLB, | Performed by: INTERNAL MEDICINE

## 2019-08-15 PROCEDURE — 93000 ELECTROCARDIOGRAM COMPLETE: CPT | Mod: HCNC,S$GLB,, | Performed by: INTERNAL MEDICINE

## 2019-08-15 PROCEDURE — 1101F PT FALLS ASSESS-DOCD LE1/YR: CPT | Mod: HCNC,CPTII,S$GLB, | Performed by: INTERNAL MEDICINE

## 2019-08-15 PROCEDURE — 1101F PR PT FALLS ASSESS DOC 0-1 FALLS W/OUT INJ PAST YR: ICD-10-PCS | Mod: HCNC,CPTII,S$GLB, | Performed by: INTERNAL MEDICINE

## 2019-08-15 PROCEDURE — 71046 X-RAY EXAM CHEST 2 VIEWS: CPT | Mod: 26,HCNC,, | Performed by: RADIOLOGY

## 2019-08-15 PROCEDURE — 3080F PR MOST RECENT DIASTOLIC BLOOD PRESSURE >= 90 MM HG: ICD-10-PCS | Mod: HCNC,CPTII,S$GLB, | Performed by: INTERNAL MEDICINE

## 2019-08-15 PROCEDURE — 99215 PR OFFICE/OUTPT VISIT, EST, LEVL V, 40-54 MIN: ICD-10-PCS | Mod: HCNC,S$GLB,, | Performed by: INTERNAL MEDICINE

## 2019-08-15 PROCEDURE — 71046 X-RAY EXAM CHEST 2 VIEWS: CPT | Mod: TC,HCNC

## 2019-08-15 PROCEDURE — 99215 OFFICE O/P EST HI 40 MIN: CPT | Mod: HCNC,S$GLB,, | Performed by: INTERNAL MEDICINE

## 2019-08-15 PROCEDURE — 3077F PR MOST RECENT SYSTOLIC BLOOD PRESSURE >= 140 MM HG: ICD-10-PCS | Mod: HCNC,CPTII,S$GLB, | Performed by: INTERNAL MEDICINE

## 2019-08-15 PROCEDURE — 99999 PR PBB SHADOW E&M-EST. PATIENT-LVL III: ICD-10-PCS | Mod: PBBFAC,HCNC,, | Performed by: INTERNAL MEDICINE

## 2019-08-15 PROCEDURE — 93000 EKG 12-LEAD: ICD-10-PCS | Mod: HCNC,S$GLB,, | Performed by: INTERNAL MEDICINE

## 2019-08-15 PROCEDURE — 71046 XR CHEST PA AND LATERAL: ICD-10-PCS | Mod: 26,HCNC,, | Performed by: RADIOLOGY

## 2019-08-15 PROCEDURE — 3077F SYST BP >= 140 MM HG: CPT | Mod: HCNC,CPTII,S$GLB, | Performed by: INTERNAL MEDICINE

## 2019-08-15 RX ORDER — CLONIDINE HYDROCHLORIDE 0.1 MG/1
0.1 TABLET ORAL 3 TIMES DAILY
Qty: 90 TABLET | Refills: 1 | Status: SHIPPED | OUTPATIENT
Start: 2019-08-15 | End: 2019-10-15 | Stop reason: SDUPTHER

## 2019-08-15 RX ORDER — PITAVASTATIN CALCIUM 2.09 MG/1
2 TABLET, FILM COATED ORAL DAILY
Qty: 30 TABLET | Refills: 1 | Status: SHIPPED | OUTPATIENT
Start: 2019-08-15 | End: 2019-11-05 | Stop reason: SDUPTHER

## 2019-08-15 RX ORDER — CARVEDILOL 12.5 MG/1
12.5 TABLET ORAL 2 TIMES DAILY WITH MEALS
Qty: 60 TABLET | Refills: 1 | Status: SHIPPED | OUTPATIENT
Start: 2019-08-15 | End: 2019-10-15 | Stop reason: SDUPTHER

## 2019-08-15 RX ORDER — HYDRALAZINE HYDROCHLORIDE 25 MG/1
25 TABLET, FILM COATED ORAL EVERY 8 HOURS
Qty: 90 TABLET | Refills: 1 | Status: SHIPPED | OUTPATIENT
Start: 2019-08-15 | End: 2019-10-15 | Stop reason: SDUPTHER

## 2019-08-15 RX ORDER — FUROSEMIDE 40 MG/1
40 TABLET ORAL DAILY
Qty: 60 TABLET | Refills: 1 | Status: SHIPPED | OUTPATIENT
Start: 2019-08-15 | End: 2019-10-15 | Stop reason: SDUPTHER

## 2019-08-15 NOTE — PATIENT INSTRUCTIONS
Left-Sided Congestive Heart Failure (CHF)    The heart is a large muscle that pumps blood throughout the body. Blood carries oxygen to all the organs (including the brain), muscles, and skin of your body. After the body takes the oxygen out of the blood, the blood returns to the heart. The right side of the heart collects that blood and pumps it to the lungs to receive fresh oxygen. This oxygen-rich blood from the lungs then returns to the left side of the heart, where it is pumped back out to the brain and rest of the body, starting the process all over.   Heart failure occurs when the heart muscle is weakened. This can occur after a heart attack or with significant coronary artery disease. This affects the pumping action of the heart.   When the left side of the heart is weakened, it cant handle the blood it is getting from the lungs. Pressure then builds up in the veins of the lungs, causing fluid to leak into the lung tissues. This may be referred to as congestive heart failure. This causes you to feel short of breath, weak, or dizzy. These symptoms are often worse with exertion, such as climbing stairs or walking up hills. Lying flat is uncomfortable and can make your breathing worse. This may make sleeping difficult and force you to use extra pillows to elevate your upper body to help you sleep well.  Causes of heart failure include:  · Coronary artery disease  · Heart attack in the past (also known as acute myocardial infarction, or AMI)  · High blood pressure  · Damaged heart valve  · Diabetes  · Obesity  · Cigarette smoking  · Alcohol abuse  Treatment  Heart failure is a chronic condition. There is no cure. The purpose of medical treatment is to improve the pumping action of the heart, and remove excess water and fluids from the body. A number of medicines can help reach this goal, improve symptoms and keep the heart from becoming weaker. In some cases of severe heart failure, a mechanical device will be  placed in the heart to help the heart pump. Another major goal is to better treat the causes of heart failure, such as diabetes, high blood pressure, and your lifestyle.  Home care  · Check your weight every day. A sudden increase in weight gain could mean worsening heart failure.  ¨ Use the same scale every day.  ¨ Weigh yourself at the same time every day.  ¨ Make sure the scale is on the floor, not on a rug.  ¨ Keep a record of your weight every day, so your healthcare provider can see it. If you are not given a log sheet for this, keep a separate journal for this purpose.   · Cut back on how much salt (sodium) you eat:  ¨ Your provider will tell you how much salt to have daily, usually 2,000 mg or less.  ¨ Avoid high-salt foods. These include olives, pickles, smoked meats, processed foods, and salted potato chips.  ¨ Don't add salt to your food at the table. Use only small amounts of salt when cooking.  ¨ Avoid binging on salt-heavy meals.  · Follow your healthcare provider's recommendations about how much fluid you should have.  · Stop smoking.  · Cut back on the amount of alcohol you drink.  · Lose weight if you are overweight. The excess weight adds a lot of stress on the workload of the heart.  · Stay active. Talk to your provider about an exercise program that is safe for your heart.  · Keep your feet elevated to reduce swelling. Ask your provider about support hose as a preventive treatment for daytime leg swelling.  · Follow your healthcare provider's instructions closely.  Besides taking your medicine as instructed, an important part of treatment includes lifestyle changes. These include diet, physical activity, stopping smoking, and weight control.  Improve your diet. Often in the hospital, people are given a heart healthy diet. This includes more fresh foods, lower saturated fat, less processed foods, and lower salt.  Follow-up care  Follow up with your healthcare provider, or as advised. Make sure to  keep any appointments that were made for you. This can help better control heart failure.  If an X-ray was done, you will be told of any new findings that may affect your care.  Call 911  Call 911 if you:  · Become severely short of breath  · Feel lightheaded, or feel like you might pass out or faint  · Have chest pain or discomfort that is different than usual, the medicines your provider told you to use for this don't help, or the pain lasts longer than 10 to 15 minutes  · Develop a rapid heart rate suddenly  When to seek medical advice  Call your healthcare provider right away if you have any of these signs of worsening heart failure:  · Sudden weight gain --  more than 2 pounds in 1 day, or 5 pounds in 1 week, or whatever weight gain you were told to report by your provider  · Trouble breathing not related to being active  · New or increased swelling of your legs or ankles  · Swelling or pain in your abdomen  · Breathing trouble at night, waking up short of breath or needing more pillows to elevate your upper body to help you breathe  · Frequent coughing that doesnt go away  · Feeling much more tired than usual  Date Last Reviewed: 1/4/2016  © 1639-4055 [employer]. [address] [place] [address], [place] [address]. All rights reserved. This information is not intended as a substitute for professional medical care. Always follow your healthcare professional's instructions.          Low-Salt Diet  This diet removes foods that are high in salt. It also limits the amount of salt you use when cooking. It is most often used for people with high blood pressure, edema (fluid retention), and kidney, liver, or heart disease.  Table salt contains the mineral sodium. Your body needs sodium to work normally. But too much sodium can make your health problems worse. Your healthcare provider is recommending a low-salt (also called low-sodium) diet for you. Your total daily allowance of salt is 1,500 to 2,300 milligrams  (mg). It is less than 1 teaspoon of table salt. This means you can have only about 500 to 700 mg of sodium at each meal. People with certain health problems should limit salt intake to the lower end of the recommended range.    When you cook, dont add much salt. If you can cook without using salt, even better. Dont add salt to your food at the table.  When shopping, read food labels. Salt is often called sodium on the label. Choose foods that are salt-free, low salt, or very low salt. Note that foods with reduced salt may not lower your salt intake enough.    Beans, potatoes, and pasta  Ok: Dry beans, split peas, lentils, potatoes, rice, macaroni, pasta, spaghetti without added salt  Avoid: Potato chips, tortilla chips, and similar products  Breads and cereals  Ok: Low-sodium breads, rolls, cereals, and cakes; low-salt crackers, matzo crackers  Avoid: Salted crackers, pretzels, popcorn, Slovak toast, pancakes, muffins  Dairy  Ok: Milk, chocolate milk, hot chocolate mix, low-salt cheeses, and yogurt  Avoid: Processed cheese and cheese spreads; Roquefort, Camembert, and cottage cheese; buttermilk, instant breakfast drink  Desserts  Ok: Ice cream, frozen yogurt, juice bars, gelatin, cookies and pies, sugar, honey, jelly, hard candy  Avoid: Most pies, cakes and cookies prepared or processed with salt; instant pudding  Drinks  Ok: Tea, coffee, fizzy (carbonated) drinks, juices  Avoid: Flavored coffees, electrolyte replacement drinks, sports drinks  Meats  Ok: All fresh meat, fish, poultry, low-salt tuna, eggs, egg substitute  Avoid: Smoked, pickled, brine-cured, or salted meats and fish. This includes mcconnell, chipped beef, corned beef, hot dogs, deli meats, ham, kosher meats, salt pork, sausage, canned tuna, salted codfish, smoked salmon, herring, sardines, or anchovies.  Seasonings and spices  Ok: Most seasonings are okay. Good substitutes for salt include: fresh herb blends, hot sauce, lemon, garlic, gaston, vinegar,  dry mustard, parsley, cilantro, horseradish, tomato paste, regular margarine, mayonnaise, unsalted butter, cream cheese, vegetable oil, cream, low-salt salad dressing and gravy.  Avoid: Regular ketchup, relishes, pickles, soy sauce, teriyaki sauce, Worcestershire sauce, BBQ sauce, tartar sauce, meat tenderizer, chili sauce, regular gravy, regular salad dressing, salted butter  Soups  Ok: Low-salt soups and broths made with allowed foods  Avoid: Bouillon cubes, soups with smoked or salted meats, regular soup and broth  Vegetables  Ok: Most vegetables are okay; also low-salt tomato and vegetable juices  Avoid: Sauerkraut and other brine-soaked vegetables; pickles and other pickled vegetables; tomato juice, olives  Date Last Reviewed: 8/1/2016  © 5423-0912 [employer]. [address] [place] [address], [place] [address]. All rights reserved. This information is not intended as a substitute for professional medical care. Always follow your healthcare professional's instructions.

## 2019-08-15 NOTE — Clinical Note
Complicated pt but needs to work on herself and do better. Lack of insight or care but hopefully will get better.

## 2019-08-15 NOTE — PROGRESS NOTES
Subjective:   Patient ID:  Rose Milligan is a 64 y.o. female who presents for cardiac consult of Hypertension and Congestive Heart Failure      HPI  The patient came in today for cardiac consult of Hypertension and Congestive Heart Failure      Rose Milligan is a 64 y.o. female  with HFpEF, HTN, pulm HTN, tobacco abuse, h/o drug abuse, bipolar, hep C, CKD4 presents for follow up CV eval.      11/21/18  Pt went to [facility] yesterday. She went for chest pain and L shoulder pain. Chest pain started about 3 days ago, and was off medicines for 2 weeks. She had bloodwork, ECG and was told to follow up hereShe called her PCP and only picked up bipolar meds. Chest pain feels like tightness, for past few days been constant. Mild headache and nausea. Now she feels lightheaded. PT also gets palpitations frequenltly.     8/15/19 - hosp follow up   She was recently admitted to [facility] for CHF exac, presented to Dr. Cope's office with HTN urgency, unsure of her meds, she was sent back to ER at [facility]. She started to have a cough last and then had worsening orthopnea. She was diuresed 3.5L. She still has AUGUSTE. She doesn't have med list again, talked to her friend on phone has only 4 bottles at home, called pharmacy only hydralazine was being taken.     Patient feels  no PND , no dizziness, no syncope, no CNS symptoms.    Patient is not compliant with medications.    ECG - NSR, LAE, LVH, septal infarct, inferolat TWI    2D ECHO 12/11/2018   CONCLUSIONS     1 - Normal left ventricular systolic function (EF 60-65%).     2 - Impaired LV relaxation, elevated LAP (grade 2 diastolic dysfunction).     3 - Normal right ventricular systolic function .     4 - Severe left atrial enlargement.     5 - Pulmonary hypertension. The estimated PA systolic pressure is 45 mmHg.     6 - Concentric hypertrophy.     HOLTER  TEST DESCRIPTION   PREDOMINANT RHYTHM  1. Sinus rhythm with heart rates varying between 55 and 102 bpm with an average of 75 bpm.    VENTRICULAR ARRHYTHMIAS  1. There were very rare PVCs totalling 72 and averaging 1 per hour.   2. There were no episodes of ventricular tachycardia.    SUPRA VENTRICULAR ARRHYTHMIAS  1. There were very rare PACs totalling 50 and averaging 1 per hour.   2. There were no episodes of sustained supraventricular tachycardia.      Past Medical History:   Diagnosis Date    CHF (congestive heart failure)     Depression     Hepatitis C     Hypertension     Other hyperlipidemia 8/15/2019       Past Surgical History:   Procedure Laterality Date    HYSTERECTOMY         Social History     Tobacco Use    Smoking status: Current Some Day Smoker     Packs/day: 0.25     Types: Cigarettes     Start date: 1/11/2016    Smokeless tobacco: Never Used    Tobacco comment: 1 pack every 3 days   Substance Use Topics    Alcohol use: Yes     Frequency: Never    Drug use: No       Family History   Problem Relation Age of Onset    Heart attack Maternal Grandmother        Patient's Medications   New Prescriptions    FUROSEMIDE (LASIX) 40 MG TABLET    Take 1 tablet (40 mg total) by mouth once daily. For next 3 days take twice a day   Previous Medications    HYDROXYZINE PAMOATE (VISTARIL) 25 MG CAP    Take 2 capsules (50 mg total) by mouth every evening. May also take 1 capsule (25 mg total) 2 (two) times daily as needed.    MAGNESIUM OXIDE (MAG-OX) 400 MG TABLET    Take 1 tablet by mouth once daily.    METHOCARBAMOL (ROBAXIN) 500 MG TAB    Take 500 mg by mouth 4 (four) times daily.    MUPIROCIN (BACTROBAN) 2 % OINTMENT    Apply topically 3 (three) times daily.    PREDNISOLONE ACETATE (PRED FORTE) 1 % DRPS    One drop OD 4 X a day for 4 days, then 3 X a day for 3 days, then 2 X a day for 2 days, then 1 X for one day, then stop.    RISPERIDONE (RISPERDAL) 0.25 MG TAB    Take 1 tablet (0.25 mg total) by mouth 2 (two) times daily.    TOPIRAMATE (TOPAMAX) 25 MG TABLET    Take 25 mg by mouth every evening.    VENLAFAXINE (EFFEXOR) 37.5 MG  TAB    Take one pill daily for three days, then 1/2 pill daily for three days, then stop taking medication.    ZIPRASIDONE (GEODON) 20 MG CAP    Take 20 mg by mouth 2 (two) times daily.   Modified Medications    Modified Medication Previous Medication    CARVEDILOL (COREG) 12.5 MG TABLET carvedilol (COREG) 25 MG tablet       Take 1 tablet (12.5 mg total) by mouth 2 (two) times daily with meals.    Take 1 tablet (25 mg total) by mouth 2 (two) times daily with meals.    CLONIDINE (CATAPRES) 0.1 MG TABLET cloNIDine (CATAPRES) 0.1 MG tablet       Take 1 tablet (0.1 mg total) by mouth 3 (three) times daily.    Take 1 tablet (0.1 mg total) by mouth 3 (three) times daily.    HYDRALAZINE (APRESOLINE) 25 MG TABLET hydrALAZINE (APRESOLINE) 50 MG tablet       Take 1 tablet (25 mg total) by mouth every 8 (eight) hours.    Take 1 tablet (50 mg total) by mouth 2 (two) times daily.    PITAVASTATIN CALCIUM (LIVALO) 2 MG TAB TABLET pitavastatin calcium 2 mg Tab tablet       Take 1 tablet (2 mg total) by mouth once daily.    Take 2 mg by mouth.   Discontinued Medications    LISINOPRIL 10 MG TABLET    Take 1 tablet (10 mg total) by mouth once daily.    NIFEDIPINE (PROCARDIA-XL) 90 MG (OSM) 24 HR TABLET    Take 1 tablet (90 mg total) by mouth once daily.       Review of Systems   Constitutional: Negative.    HENT: Negative.    Eyes: Negative.    Respiratory: Positive for shortness of breath.    Cardiovascular: Positive for chest pain and palpitations.   Gastrointestinal: Negative.    Genitourinary: Negative.    Musculoskeletal: Negative.    Skin: Negative.    Neurological: Positive for dizziness.   Endo/Heme/Allergies: Negative.    Psychiatric/Behavioral: The patient is nervous/anxious.    All 12 systems otherwise negative.      Wt Readings from Last 3 Encounters:   08/15/19 77.7 kg (171 lb 4.8 oz)   05/30/19 75 kg (165 lb 5.5 oz)   05/30/19 75 kg (165 lb 5.5 oz)     Temp Readings from Last 3 Encounters:   05/02/19 98.1 °F (36.7 °C)  "  11/20/18 98.6 °F (37 °C)   10/15/18 98.4 °F (36.9 °C)     BP Readings from Last 3 Encounters:   08/15/19 (!) 184/126   05/30/19 (!) 158/102   05/30/19 (!) 158/102     Pulse Readings from Last 3 Encounters:   08/15/19 87   05/30/19 80   05/30/19 80       BP (!) 184/126 (BP Method: Large (Manual))   Pulse 87   Ht 5' 3" (1.6 m)   Wt 77.7 kg (171 lb 4.8 oz)   BMI 30.34 kg/m²     Objective:   Physical Exam   Constitutional: She is oriented to person, place, and time. She appears well-developed and well-nourished. No distress.   HENT:   Head: Normocephalic and atraumatic.   Nose: Nose normal.   Mouth/Throat: Oropharynx is clear and moist.   Eyes: Conjunctivae and EOM are normal. No scleral icterus.   Neck: Normal range of motion. Neck supple. No JVD present. No thyromegaly present.   Cardiovascular: Normal rate, regular rhythm, S1 normal and S2 normal. Exam reveals no gallop, no S3, no S4 and no friction rub.   No murmur heard.  Pulmonary/Chest: Effort normal and breath sounds normal. No stridor. No respiratory distress. She has no wheezes. She has no rales. She exhibits no tenderness.   Abdominal: Soft. Bowel sounds are normal. She exhibits no distension and no mass. There is no tenderness. There is no rebound.   Genitourinary:   Genitourinary Comments: Deferred   Musculoskeletal: Normal range of motion. She exhibits no edema, tenderness or deformity.   Lymphadenopathy:     She has no cervical adenopathy.   Neurological: She is alert and oriented to person, place, and time. She exhibits normal muscle tone. Coordination normal.   Skin: Skin is warm and dry. No rash noted. She is not diaphoretic. No erythema. No pallor.   Psychiatric: She has a normal mood and affect. Her behavior is normal. Judgment and thought content normal.   Nursing note and vitals reviewed.      Lab Results   Component Value Date     08/12/2019    K 4.6 08/12/2019     08/12/2019    CO2 24 08/12/2019    BUN 34 (H) 08/12/2019    " CREATININE 2.1 (H) 08/12/2019    GLU 90 08/12/2019    AST 20 05/30/2019    AST 20 05/30/2019    ALT 11 05/30/2019    ALT 11 05/30/2019    ALBUMIN 3.2 (L) 08/12/2019    PROT 7.4 05/30/2019    PROT 7.4 05/30/2019    BILITOT 0.3 05/30/2019    BILITOT 0.3 05/30/2019    WBC 7.50 02/26/2019    HGB 14.2 02/26/2019    HCT 45.0 02/26/2019    MCV 91 02/26/2019     02/26/2019    TSH 0.600 08/25/2011    CHOL 179 08/25/2011    HDL 73 08/25/2011    LDLCALC 84.6 08/25/2011    TRIG 107 08/25/2011     Assessment:      1. (HFpEF) heart failure with preserved ejection fraction    2. Essential hypertension    3. CKD (chronic kidney disease) stage 4, GFR 15-29 ml/min    4. Chronic hepatitis C without hepatic coma    5. Other hyperlipidemia        Plan:   1. HFPEF, recent exac  - AUGUSTE today  - increase lasix to 40 BID x 3 days  - BNP, BMP, and Mg ordered   - cont rest of meds  - low salt diet  - CXR ordered today     2. HTN  - restart home meds, refills given  - low salt diet    3. CKD4  - f/u with nephro/PCP    4. Hep C  - cont tx per PCP    5. HLD  - cont statin    A total of 45 minutes was spent in face to face time, of which 50 % was spent in counseling patient on disease process, complications, treatment, and side effects of medications.     The patient was explained the above plan and given opportunity to ask questions.  She understands, chooses and consents to this plan and accepts all the risks, which include but are not limited to the risks mentioned above.   She understands the alternative of having no testing, interventions or treatments at this time. She left content and without further questions.     Follow up next week in clinic.    Thank you for allowing me to participate in this patient's care. Please do not hesitate to contact me with any questions or concerns. Consult note has been forwarded to the referral physician.

## 2019-08-16 ENCOUNTER — TELEPHONE (OUTPATIENT)
Dept: CARDIOLOGY | Facility: CLINIC | Age: 64
End: 2019-08-16

## 2019-08-16 NOTE — TELEPHONE ENCOUNTER
Called to patient to give results of labs-abnormal result. BNP is elevated due to heart failure with fluid, continue lasix twice a day as we discussed for a few days and close follow up next week for labwork and re-eval---abnormal result.  and chest xray Enlarged heart likely due to extra fluid around the heart, continue current plan as discussed today, will see in clinic next week. Will get an echo to evaluate better.

## 2019-08-16 NOTE — TELEPHONE ENCOUNTER
----- Message from Cullen Hannon MD sent at 8/15/2019  4:28 PM CDT -----  Please call the patient regarding her abnormal result. BNP is elevated due to heart failure with fluid, continue lasix twice a day as we discussed for a few days and close follow up next week for labwork and re-eval.

## 2019-08-19 ENCOUNTER — OFFICE VISIT (OUTPATIENT)
Dept: NEPHROLOGY | Facility: CLINIC | Age: 64
End: 2019-08-19
Payer: MEDICARE

## 2019-08-19 VITALS
HEART RATE: 64 BPM | HEIGHT: 63 IN | SYSTOLIC BLOOD PRESSURE: 150 MMHG | RESPIRATION RATE: 20 BRPM | DIASTOLIC BLOOD PRESSURE: 96 MMHG | BODY MASS INDEX: 29.77 KG/M2 | WEIGHT: 168 LBS

## 2019-08-19 DIAGNOSIS — N18.4 CKD (CHRONIC KIDNEY DISEASE) STAGE 4, GFR 15-29 ML/MIN: Primary | ICD-10-CM

## 2019-08-19 DIAGNOSIS — N25.81 HYPERPARATHYROIDISM, SECONDARY RENAL: ICD-10-CM

## 2019-08-19 DIAGNOSIS — I10 ESSENTIAL HYPERTENSION: ICD-10-CM

## 2019-08-19 PROCEDURE — 99214 OFFICE O/P EST MOD 30 MIN: CPT | Mod: HCNC,S$GLB,, | Performed by: INTERNAL MEDICINE

## 2019-08-19 PROCEDURE — 3080F PR MOST RECENT DIASTOLIC BLOOD PRESSURE >= 90 MM HG: ICD-10-PCS | Mod: HCNC,CPTII,S$GLB, | Performed by: INTERNAL MEDICINE

## 2019-08-19 PROCEDURE — 99999 PR PBB SHADOW E&M-EST. PATIENT-LVL III: ICD-10-PCS | Mod: PBBFAC,HCNC,, | Performed by: INTERNAL MEDICINE

## 2019-08-19 PROCEDURE — 1101F PT FALLS ASSESS-DOCD LE1/YR: CPT | Mod: HCNC,CPTII,S$GLB, | Performed by: INTERNAL MEDICINE

## 2019-08-19 PROCEDURE — 3077F PR MOST RECENT SYSTOLIC BLOOD PRESSURE >= 140 MM HG: ICD-10-PCS | Mod: HCNC,CPTII,S$GLB, | Performed by: INTERNAL MEDICINE

## 2019-08-19 PROCEDURE — 3080F DIAST BP >= 90 MM HG: CPT | Mod: HCNC,CPTII,S$GLB, | Performed by: INTERNAL MEDICINE

## 2019-08-19 PROCEDURE — 99214 PR OFFICE/OUTPT VISIT, EST, LEVL IV, 30-39 MIN: ICD-10-PCS | Mod: HCNC,S$GLB,, | Performed by: INTERNAL MEDICINE

## 2019-08-19 PROCEDURE — 3077F SYST BP >= 140 MM HG: CPT | Mod: HCNC,CPTII,S$GLB, | Performed by: INTERNAL MEDICINE

## 2019-08-19 PROCEDURE — 1101F PR PT FALLS ASSESS DOC 0-1 FALLS W/OUT INJ PAST YR: ICD-10-PCS | Mod: HCNC,CPTII,S$GLB, | Performed by: INTERNAL MEDICINE

## 2019-08-19 PROCEDURE — 99999 PR PBB SHADOW E&M-EST. PATIENT-LVL III: CPT | Mod: PBBFAC,HCNC,, | Performed by: INTERNAL MEDICINE
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
## 2019-08-22 ENCOUNTER — PATIENT OUTREACH (OUTPATIENT)
Dept: ADMINISTRATIVE | Facility: HOSPITAL | Age: 64
End: 2019-08-22

## 2019-08-23 ENCOUNTER — TELEPHONE (OUTPATIENT)
Dept: CARDIOLOGY | Facility: CLINIC | Age: 64
End: 2019-08-23

## 2019-08-23 NOTE — TELEPHONE ENCOUNTER
Called to patient to give test results--BNP is elevated due to heart failure with fluid, continue lasix twice a day as we discussed for a few days and close follow up next week for labwork and re-eval and schedule appointment--  someone picked up call but did not respond

## 2019-08-25 DIAGNOSIS — F31.10 BIPOLAR AFFECTIVE DISORDER, MANIC: ICD-10-CM

## 2019-08-26 RX ORDER — VENLAFAXINE HYDROCHLORIDE 75 MG/1
CAPSULE, EXTENDED RELEASE ORAL
Qty: 30 CAPSULE | Refills: 6 | OUTPATIENT
Start: 2019-08-26

## 2019-09-13 ENCOUNTER — PATIENT OUTREACH (OUTPATIENT)
Dept: ADMINISTRATIVE | Facility: HOSPITAL | Age: 64
End: 2019-09-13

## 2019-10-07 RX ORDER — ZIPRASIDONE HYDROCHLORIDE 20 MG/1
20 CAPSULE ORAL 2 TIMES DAILY
Qty: 60 CAPSULE | Refills: 3 | Status: SHIPPED | OUTPATIENT
Start: 2019-10-07 | End: 2019-11-12 | Stop reason: SDUPTHER

## 2019-10-07 RX ORDER — ZIPRASIDONE HYDROCHLORIDE 20 MG/1
20 CAPSULE ORAL 2 TIMES DAILY
Qty: 60 CAPSULE | Refills: 3 | Status: CANCELLED | OUTPATIENT
Start: 2019-10-07

## 2019-10-15 ENCOUNTER — OFFICE VISIT (OUTPATIENT)
Dept: CARDIOLOGY | Facility: CLINIC | Age: 64
End: 2019-10-15
Payer: MEDICARE

## 2019-10-15 VITALS
HEART RATE: 75 BPM | WEIGHT: 174.25 LBS | SYSTOLIC BLOOD PRESSURE: 152 MMHG | DIASTOLIC BLOOD PRESSURE: 100 MMHG | BODY MASS INDEX: 30.87 KG/M2

## 2019-10-15 DIAGNOSIS — E66.09 CLASS 1 OBESITY DUE TO EXCESS CALORIES WITH SERIOUS COMORBIDITY AND BODY MASS INDEX (BMI) OF 30.0 TO 30.9 IN ADULT: ICD-10-CM

## 2019-10-15 DIAGNOSIS — B18.2 CHRONIC HEPATITIS C WITHOUT HEPATIC COMA: ICD-10-CM

## 2019-10-15 DIAGNOSIS — N18.4 CKD (CHRONIC KIDNEY DISEASE) STAGE 4, GFR 15-29 ML/MIN: ICD-10-CM

## 2019-10-15 DIAGNOSIS — E78.49 OTHER HYPERLIPIDEMIA: ICD-10-CM

## 2019-10-15 DIAGNOSIS — I10 ESSENTIAL HYPERTENSION: Primary | ICD-10-CM

## 2019-10-15 DIAGNOSIS — I50.32 CHRONIC HEART FAILURE WITH PRESERVED EJECTION FRACTION: ICD-10-CM

## 2019-10-15 PROBLEM — E66.811 CLASS 1 OBESITY DUE TO EXCESS CALORIES WITH SERIOUS COMORBIDITY AND BODY MASS INDEX (BMI) OF 30.0 TO 30.9 IN ADULT: Status: ACTIVE | Noted: 2019-10-15

## 2019-10-15 PROCEDURE — 3077F PR MOST RECENT SYSTOLIC BLOOD PRESSURE >= 140 MM HG: ICD-10-PCS | Mod: HCNC,CPTII,S$GLB, | Performed by: INTERNAL MEDICINE

## 2019-10-15 PROCEDURE — 99999 PR PBB SHADOW E&M-EST. PATIENT-LVL III: ICD-10-PCS | Mod: PBBFAC,HCNC,, | Performed by: INTERNAL MEDICINE

## 2019-10-15 PROCEDURE — 99214 OFFICE O/P EST MOD 30 MIN: CPT | Mod: HCNC,S$GLB,, | Performed by: INTERNAL MEDICINE

## 2019-10-15 PROCEDURE — 1101F PT FALLS ASSESS-DOCD LE1/YR: CPT | Mod: HCNC,CPTII,S$GLB, | Performed by: INTERNAL MEDICINE

## 2019-10-15 PROCEDURE — 3080F DIAST BP >= 90 MM HG: CPT | Mod: HCNC,CPTII,S$GLB, | Performed by: INTERNAL MEDICINE

## 2019-10-15 PROCEDURE — 99214 PR OFFICE/OUTPT VISIT, EST, LEVL IV, 30-39 MIN: ICD-10-PCS | Mod: HCNC,S$GLB,, | Performed by: INTERNAL MEDICINE

## 2019-10-15 PROCEDURE — 3077F SYST BP >= 140 MM HG: CPT | Mod: HCNC,CPTII,S$GLB, | Performed by: INTERNAL MEDICINE

## 2019-10-15 PROCEDURE — 1101F PR PT FALLS ASSESS DOC 0-1 FALLS W/OUT INJ PAST YR: ICD-10-PCS | Mod: HCNC,CPTII,S$GLB, | Performed by: INTERNAL MEDICINE

## 2019-10-15 PROCEDURE — 99999 PR PBB SHADOW E&M-EST. PATIENT-LVL III: CPT | Mod: PBBFAC,HCNC,, | Performed by: INTERNAL MEDICINE

## 2019-10-15 PROCEDURE — 3080F PR MOST RECENT DIASTOLIC BLOOD PRESSURE >= 90 MM HG: ICD-10-PCS | Mod: HCNC,CPTII,S$GLB, | Performed by: INTERNAL MEDICINE

## 2019-10-15 RX ORDER — HYDRALAZINE HYDROCHLORIDE 25 MG/1
25 TABLET, FILM COATED ORAL EVERY 8 HOURS
Qty: 90 TABLET | Refills: 1 | Status: SHIPPED | OUTPATIENT
Start: 2019-10-15 | End: 2019-10-24 | Stop reason: SDUPTHER

## 2019-10-15 RX ORDER — CLONIDINE HYDROCHLORIDE 0.1 MG/1
0.1 TABLET ORAL 3 TIMES DAILY
Qty: 90 TABLET | Refills: 1 | Status: SHIPPED | OUTPATIENT
Start: 2019-10-15 | End: 2019-11-05 | Stop reason: SDUPTHER

## 2019-10-15 RX ORDER — CARVEDILOL 25 MG/1
25 TABLET ORAL 2 TIMES DAILY WITH MEALS
Qty: 60 TABLET | Refills: 1 | Status: SHIPPED | OUTPATIENT
Start: 2019-10-15 | End: 2019-11-05 | Stop reason: SDUPTHER

## 2019-10-15 RX ORDER — FUROSEMIDE 40 MG/1
40 TABLET ORAL DAILY
Qty: 60 TABLET | Refills: 1 | Status: SHIPPED | OUTPATIENT
Start: 2019-10-15 | End: 2019-11-05 | Stop reason: SDUPTHER

## 2019-10-15 NOTE — PROGRESS NOTES
Subjective:   Patient ID:  Rose Milligan is a 64 y.o. female who presents for cardiac consult of Follow-up      HPI  The patient came in today for cardiac consult of Follow-up      Rose Milligan is a 64 y.o. female  with HFpEF, HTN, pulm HTN, tobacco abuse, h/o drug abuse, bipolar, hep C, CKD4 presents for follow up CV eval.      11/21/18  Pt went to [facility] yesterday. She went for chest pain and L shoulder pain. Chest pain started about 3 days ago, and was off medicines for 2 weeks. She had bloodwork, ECG and was told to follow up hereShe called her PCP and only picked up bipolar meds. Chest pain feels like tightness, for past few days been constant. Mild headache and nausea. Now she feels lightheaded. PT also gets palpitations frequenltly.     8/15/19 - hosp follow up   She was recently admitted to [facility] for CHF exac, presented to Dr. Cope's office with HTN urgency, unsure of her meds, she was sent back to ER at [facility]. She started to have a cough last and then had worsening orthopnea. She was diuresed 3.5L. She still has AUGUSTE. She doesn't have med list again, talked to her friend on phone has only 4 bottles at home, called pharmacy only hydralazine was being taken.     10/15/19  BP uncontrolled this AM. She has gained about 10-15 lbs lately, has been drinking more sodas and eating more. No AUGUSTE.     Patient feels  no PND , no dizziness, no syncope, no CNS symptoms.    Patient is not compliant with medications.    ECG - NSR, LAE, LVH, septal infarct, inferolat TWI    2D ECHO 12/11/2018   CONCLUSIONS     1 - Normal left ventricular systolic function (EF 60-65%).     2 - Impaired LV relaxation, elevated LAP (grade 2 diastolic dysfunction).     3 - Normal right ventricular systolic function .     4 - Severe left atrial enlargement.     5 - Pulmonary hypertension. The estimated PA systolic pressure is 45 mmHg.     6 - Concentric hypertrophy.     HOLTER  TEST DESCRIPTION   PREDOMINANT RHYTHM  1. Sinus rhythm with heart rates  varying between 55 and 102 bpm with an average of 75 bpm.   VENTRICULAR ARRHYTHMIAS  1. There were very rare PVCs totalling 72 and averaging 1 per hour.   2. There were no episodes of ventricular tachycardia.    SUPRA VENTRICULAR ARRHYTHMIAS  1. There were very rare PACs totalling 50 and averaging 1 per hour.   2. There were no episodes of sustained supraventricular tachycardia.      Past Medical History:   Diagnosis Date    CHF (congestive heart failure)     Depression     Hepatitis C     Hypertension     Other hyperlipidemia 8/15/2019       Past Surgical History:   Procedure Laterality Date    HYSTERECTOMY         Social History     Tobacco Use    Smoking status: Current Some Day Smoker     Packs/day: 0.25     Types: Cigarettes     Start date: 1/11/2016    Smokeless tobacco: Never Used    Tobacco comment: 1 pack every 3 days   Substance Use Topics    Alcohol use: Yes     Frequency: Never    Drug use: No       Family History   Problem Relation Age of Onset    Heart attack Maternal Grandmother     Hypertension Mother        Patient's Medications   New Prescriptions    No medications on file   Previous Medications    HYDROXYZINE PAMOATE (VISTARIL) 25 MG CAP    Take 2 capsules (50 mg total) by mouth every evening. May also take 1 capsule (25 mg total) 2 (two) times daily as needed.    MAGNESIUM OXIDE (MAG-OX) 400 MG TABLET    Take 1 tablet by mouth once daily.    METHOCARBAMOL (ROBAXIN) 500 MG TAB    Take 500 mg by mouth 4 (four) times daily.    MUPIROCIN (BACTROBAN) 2 % OINTMENT    Apply topically 3 (three) times daily.    PITAVASTATIN CALCIUM (LIVALO) 2 MG TAB TABLET    Take 1 tablet (2 mg total) by mouth once daily.    RISPERIDONE (RISPERDAL) 0.25 MG TAB    Take 1 tablet (0.25 mg total) by mouth 2 (two) times daily.    VENLAFAXINE (EFFEXOR) 37.5 MG TAB    Take one pill daily for three days, then 1/2 pill daily for three days, then stop taking medication.    ZIPRASIDONE (GEODON) 20 MG CAP    Take 1  capsule (20 mg total) by mouth 2 (two) times daily.   Modified Medications    Modified Medication Previous Medication    CARVEDILOL (COREG) 25 MG TABLET carvedilol (COREG) 12.5 MG tablet       Take 1 tablet (25 mg total) by mouth 2 (two) times daily with meals.    Take 1 tablet (12.5 mg total) by mouth 2 (two) times daily with meals.    CLONIDINE (CATAPRES) 0.1 MG TABLET cloNIDine (CATAPRES) 0.1 MG tablet       Take 1 tablet (0.1 mg total) by mouth 3 (three) times daily.    Take 1 tablet (0.1 mg total) by mouth 3 (three) times daily.    FUROSEMIDE (LASIX) 40 MG TABLET furosemide (LASIX) 40 MG tablet       Take 1 tablet (40 mg total) by mouth once daily. For next 3 days take twice a day    Take 1 tablet (40 mg total) by mouth once daily. For next 3 days take twice a day    HYDRALAZINE (APRESOLINE) 25 MG TABLET hydrALAZINE (APRESOLINE) 25 MG tablet       Take 1 tablet (25 mg total) by mouth every 8 (eight) hours.    Take 1 tablet (25 mg total) by mouth every 8 (eight) hours.   Discontinued Medications    No medications on file       Review of Systems   Constitutional: Negative.    HENT: Negative.    Eyes: Negative.    Respiratory: Positive for shortness of breath.    Cardiovascular: Positive for chest pain and palpitations.   Gastrointestinal: Negative.    Genitourinary: Negative.    Musculoskeletal: Negative.    Skin: Negative.    Neurological: Positive for dizziness.   Endo/Heme/Allergies: Negative.    Psychiatric/Behavioral: The patient is nervous/anxious.    All 12 systems otherwise negative.      Wt Readings from Last 3 Encounters:   10/15/19 79.1 kg (174 lb 4.4 oz)   08/19/19 76.2 kg (167 lb 15.9 oz)   08/15/19 77.7 kg (171 lb 4.8 oz)     Temp Readings from Last 3 Encounters:   05/02/19 98.1 °F (36.7 °C)   11/20/18 98.6 °F (37 °C)   10/15/18 98.4 °F (36.9 °C)     BP Readings from Last 3 Encounters:   10/15/19 (!) 152/100   08/19/19 (!) 150/96   08/15/19 (!) 184/126     Pulse Readings from Last 3 Encounters:    10/15/19 75   08/19/19 64   08/15/19 87       BP (!) 152/100 (BP Location: Right arm, Patient Position: Sitting, BP Method: Medium (Manual))   Pulse 75   Wt 79.1 kg (174 lb 4.4 oz)   BMI 30.87 kg/m²     Objective:   Physical Exam   Constitutional: She is oriented to person, place, and time. She appears well-developed and well-nourished. No distress.   HENT:   Head: Normocephalic and atraumatic.   Nose: Nose normal.   Mouth/Throat: Oropharynx is clear and moist.   Eyes: Conjunctivae and EOM are normal. No scleral icterus.   Neck: Normal range of motion. Neck supple. No JVD present. No thyromegaly present.   Cardiovascular: Normal rate, regular rhythm, S1 normal and S2 normal. Exam reveals no gallop, no S3, no S4 and no friction rub.   No murmur heard.  Pulmonary/Chest: Effort normal and breath sounds normal. No stridor. No respiratory distress. She has no wheezes. She has no rales. She exhibits no tenderness.   Abdominal: Soft. Bowel sounds are normal. She exhibits no distension and no mass. There is no tenderness. There is no rebound.   Genitourinary:   Genitourinary Comments: Deferred   Musculoskeletal: Normal range of motion. She exhibits no edema, tenderness or deformity.   Lymphadenopathy:     She has no cervical adenopathy.   Neurological: She is alert and oriented to person, place, and time. She exhibits normal muscle tone. Coordination normal.   Skin: Skin is warm and dry. No rash noted. She is not diaphoretic. No erythema. No pallor.   Psychiatric: She has a normal mood and affect. Her behavior is normal. Judgment and thought content normal.   Nursing note and vitals reviewed.      Lab Results   Component Value Date     08/15/2019    K 4.4 08/15/2019     08/15/2019    CO2 27 08/15/2019    BUN 22 08/15/2019    CREATININE 2.0 (H) 08/15/2019    GLU 85 08/15/2019    MG 2.0 08/15/2019    AST 20 05/30/2019    AST 20 05/30/2019    ALT 11 05/30/2019    ALT 11 05/30/2019    ALBUMIN 3.2 (L) 08/12/2019     PROT 7.4 05/30/2019    PROT 7.4 05/30/2019    BILITOT 0.3 05/30/2019    BILITOT 0.3 05/30/2019    WBC 7.50 02/26/2019    HGB 14.2 02/26/2019    HCT 45.0 02/26/2019    MCV 91 02/26/2019     02/26/2019    TSH 0.600 08/25/2011    CHOL 179 08/25/2011    HDL 73 08/25/2011    LDLCALC 84.6 08/25/2011    TRIG 107 08/25/2011    BNP 1,513 (H) 08/15/2019     Assessment:      1. Essential hypertension    2. Chronic heart failure with preserved ejection fraction    3. Other hyperlipidemia    4. CKD (chronic kidney disease) stage 4, GFR 15-29 ml/min    5. Chronic hepatitis C without hepatic coma    6. Class 1 obesity due to excess calories with serious comorbidity and body mass index (BMI) of 30.0 to 30.9 in adult        Plan:   1. HFPEF  - cont meds  - cont lasix  - euvolemic    2. HTN - uncontrolled  - restart home meds, refills given  - low salt diet    3. CKD4  - f/u with nephro/PCP    4. Hep C  - cont tx per PCP    5. HLD  - cont statin    6. Obese  - needs weight loss with diet/exercise     Thank you for allowing me to participate in this patient's care. Please do not hesitate to contact me with any questions or concerns. Consult note has been forwarded to the referral physician.

## 2019-10-17 DIAGNOSIS — Z12.39 BREAST CANCER SCREENING: ICD-10-CM

## 2019-10-22 ENCOUNTER — TELEPHONE (OUTPATIENT)
Dept: CARDIOLOGY | Facility: CLINIC | Age: 64
End: 2019-10-22

## 2019-10-22 RX ORDER — HYDROXYZINE PAMOATE 25 MG/1
CAPSULE ORAL
Qty: 90 CAPSULE | Refills: 2 | Status: SHIPPED | OUTPATIENT
Start: 2019-10-22 | End: 2019-11-12 | Stop reason: SDUPTHER

## 2019-10-22 NOTE — TELEPHONE ENCOUNTER
----- Message from Shannan Hoyos sent at 10/22/2019  8:53 AM CDT -----  Contact: pt  1. What is the name of the medication you are requesting? Hydroxyzine  2. What is the dose? 25mg  3. How do you take the medication? Orally, topically, etc? Orally  4. How often do you take this medication? n/a  5. Do you need a 30 day or 90 day supply? n/a  6. How many refills are you requesting? n/a  7. What is your preferred pharmacy and location of the pharmacy? [facility] [address] / [address]  8. Who can we contact with further questions? The pt at [phone]

## 2019-10-22 NOTE — TELEPHONE ENCOUNTER
Called to patient to inform that refill request for hydroxyzine has been forwarded to Dr. Zora Cope PCP--no answer

## 2019-10-24 RX ORDER — HYDRALAZINE HYDROCHLORIDE 25 MG/1
25 TABLET, FILM COATED ORAL EVERY 8 HOURS
Qty: 90 TABLET | Refills: 1 | Status: SHIPPED | OUTPATIENT
Start: 2019-10-24 | End: 2019-11-05 | Stop reason: SDUPTHER

## 2019-11-05 ENCOUNTER — TELEPHONE (OUTPATIENT)
Dept: PSYCHIATRY | Facility: CLINIC | Age: 64
End: 2019-11-05

## 2019-11-05 ENCOUNTER — OFFICE VISIT (OUTPATIENT)
Dept: CARDIOLOGY | Facility: CLINIC | Age: 64
End: 2019-11-05
Payer: MEDICARE

## 2019-11-05 VITALS
WEIGHT: 168.88 LBS | DIASTOLIC BLOOD PRESSURE: 80 MMHG | SYSTOLIC BLOOD PRESSURE: 122 MMHG | HEART RATE: 65 BPM | BODY MASS INDEX: 29.91 KG/M2

## 2019-11-05 DIAGNOSIS — E66.09 CLASS 1 OBESITY DUE TO EXCESS CALORIES WITH SERIOUS COMORBIDITY AND BODY MASS INDEX (BMI) OF 30.0 TO 30.9 IN ADULT: ICD-10-CM

## 2019-11-05 DIAGNOSIS — N18.4 CKD (CHRONIC KIDNEY DISEASE) STAGE 4, GFR 15-29 ML/MIN: ICD-10-CM

## 2019-11-05 DIAGNOSIS — B18.2 CHRONIC HEPATITIS C WITHOUT HEPATIC COMA: ICD-10-CM

## 2019-11-05 DIAGNOSIS — E78.49 OTHER HYPERLIPIDEMIA: ICD-10-CM

## 2019-11-05 DIAGNOSIS — I10 ESSENTIAL HYPERTENSION: ICD-10-CM

## 2019-11-05 DIAGNOSIS — I50.32 CHRONIC HEART FAILURE WITH PRESERVED EJECTION FRACTION: Primary | ICD-10-CM

## 2019-11-05 PROCEDURE — 3079F DIAST BP 80-89 MM HG: CPT | Mod: CPTII,S$GLB,, | Performed by: INTERNAL MEDICINE

## 2019-11-05 PROCEDURE — 3079F PR MOST RECENT DIASTOLIC BLOOD PRESSURE 80-89 MM HG: ICD-10-PCS | Mod: CPTII,S$GLB,, | Performed by: INTERNAL MEDICINE

## 2019-11-05 PROCEDURE — 1101F PR PT FALLS ASSESS DOC 0-1 FALLS W/OUT INJ PAST YR: ICD-10-PCS | Mod: CPTII,S$GLB,, | Performed by: INTERNAL MEDICINE

## 2019-11-05 PROCEDURE — 99499 RISK ADDL DX/OHS AUDIT: ICD-10-PCS | Mod: HCNC,S$GLB,, | Performed by: INTERNAL MEDICINE

## 2019-11-05 PROCEDURE — 99999 PR PBB SHADOW E&M-EST. PATIENT-LVL III: CPT | Mod: PBBFAC,,, | Performed by: INTERNAL MEDICINE

## 2019-11-05 PROCEDURE — 3074F SYST BP LT 130 MM HG: CPT | Mod: CPTII,S$GLB,, | Performed by: INTERNAL MEDICINE

## 2019-11-05 PROCEDURE — 1101F PT FALLS ASSESS-DOCD LE1/YR: CPT | Mod: CPTII,S$GLB,, | Performed by: INTERNAL MEDICINE

## 2019-11-05 PROCEDURE — 99999 PR PBB SHADOW E&M-EST. PATIENT-LVL III: ICD-10-PCS | Mod: PBBFAC,,, | Performed by: INTERNAL MEDICINE

## 2019-11-05 PROCEDURE — 3074F PR MOST RECENT SYSTOLIC BLOOD PRESSURE < 130 MM HG: ICD-10-PCS | Mod: CPTII,S$GLB,, | Performed by: INTERNAL MEDICINE

## 2019-11-05 PROCEDURE — 99499 UNLISTED E&M SERVICE: CPT | Mod: HCNC,S$GLB,, | Performed by: INTERNAL MEDICINE

## 2019-11-05 PROCEDURE — 99214 PR OFFICE/OUTPT VISIT, EST, LEVL IV, 30-39 MIN: ICD-10-PCS | Mod: S$GLB,,, | Performed by: INTERNAL MEDICINE

## 2019-11-05 PROCEDURE — 99214 OFFICE O/P EST MOD 30 MIN: CPT | Mod: S$GLB,,, | Performed by: INTERNAL MEDICINE

## 2019-11-05 RX ORDER — PITAVASTATIN CALCIUM 2.09 MG/1
2 TABLET, FILM COATED ORAL DAILY
Qty: 30 TABLET | Refills: 1 | Status: SHIPPED | OUTPATIENT
Start: 2019-11-05 | End: 2019-11-21 | Stop reason: SDUPTHER

## 2019-11-05 RX ORDER — CLONIDINE HYDROCHLORIDE 0.1 MG/1
0.1 TABLET ORAL 3 TIMES DAILY
Qty: 90 TABLET | Refills: 1 | Status: SHIPPED | OUTPATIENT
Start: 2019-11-05 | End: 2020-05-01 | Stop reason: SDUPTHER

## 2019-11-05 RX ORDER — FUROSEMIDE 40 MG/1
40 TABLET ORAL DAILY
Qty: 60 TABLET | Refills: 1 | Status: SHIPPED | OUTPATIENT
Start: 2019-11-05 | End: 2020-05-01 | Stop reason: SDUPTHER

## 2019-11-05 RX ORDER — CARVEDILOL 25 MG/1
25 TABLET ORAL 2 TIMES DAILY WITH MEALS
Qty: 60 TABLET | Refills: 1 | Status: SHIPPED | OUTPATIENT
Start: 2019-11-05 | End: 2019-11-13 | Stop reason: SDUPTHER

## 2019-11-05 RX ORDER — LANOLIN ALCOHOL/MO/W.PET/CERES
1 CREAM (GRAM) TOPICAL DAILY
Refills: 0 | COMMUNITY
Start: 2019-11-05 | End: 2022-09-29

## 2019-11-05 RX ORDER — HYDRALAZINE HYDROCHLORIDE 25 MG/1
25 TABLET, FILM COATED ORAL EVERY 8 HOURS
Qty: 90 TABLET | Refills: 1 | Status: SHIPPED | OUTPATIENT
Start: 2019-11-05 | End: 2020-05-01 | Stop reason: SDUPTHER

## 2019-11-05 NOTE — PROGRESS NOTES
Subjective:   Patient ID:  Rose Milligan is a 64 y.o. female who presents for cardiac consult of Hypertension (3 week f/u )      Hypertension   Pertinent negatives include no chest pain, palpitations or shortness of breath.     The patient came in today for cardiac consult of Hypertension (3 week f/u )      Rose Milligan is a 64 y.o. female  with HFpEF, HTN, pulm HTN, tobacco abuse, h/o drug abuse, bipolar, hep C, CKD4 presents for follow up CV eval.      11/21/18  Pt went to [facility] yesterday. She went for chest pain and L shoulder pain. Chest pain started about 3 days ago, and was off medicines for 2 weeks. She had bloodwork, ECG and was told to follow up hereShe called her PCP and only picked up bipolar meds. Chest pain feels like tightness, for past few days been constant. Mild headache and nausea. Now she feels lightheaded. PT also gets palpitations frequenltly.     8/15/19 - hosp follow up   She was recently admitted to [facility] for CHF exac, presented to Dr. Cope's office with HTN urgency, unsure of her meds, she was sent back to ER at [facility]. She started to have a cough last and then had worsening orthopnea. She was diuresed 3.5L. She still has AUGUSTE. She doesn't have med list again, talked to her friend on phone has only 4 bottles at home, called pharmacy only hydralazine was being taken.     10/15/19  BP uncontrolled this AM. She has gained about 10-15 lbs lately, has been drinking more sodas and eating more. No AUGUSTE.     11/5/19  Overall has been doing well lately. No SOB/AUGUSTE. No chest pain. Has been compliant with meds, lost a few pounds lately.     Patient feels  no PND , no dizziness, no syncope, no CNS symptoms.    Patient is not compliant with medications.    ECG - NSR, LAE, LVH, septal infarct, inferolat TWI    2D ECHO 12/11/2018   CONCLUSIONS     1 - Normal left ventricular systolic function (EF 60-65%).     2 - Impaired LV relaxation, elevated LAP (grade 2 diastolic dysfunction).     3 - Normal right  ventricular systolic function .     4 - Severe left atrial enlargement.     5 - Pulmonary hypertension. The estimated PA systolic pressure is 45 mmHg.     6 - Concentric hypertrophy.     HOLTER  TEST DESCRIPTION   PREDOMINANT RHYTHM  1. Sinus rhythm with heart rates varying between 55 and 102 bpm with an average of 75 bpm.   VENTRICULAR ARRHYTHMIAS  1. There were very rare PVCs totalling 72 and averaging 1 per hour.   2. There were no episodes of ventricular tachycardia.    SUPRA VENTRICULAR ARRHYTHMIAS  1. There were very rare PACs totalling 50 and averaging 1 per hour.   2. There were no episodes of sustained supraventricular tachycardia.      Past Medical History:   Diagnosis Date    CHF (congestive heart failure)     Depression     Hepatitis C     Hypertension     Other hyperlipidemia 8/15/2019       Past Surgical History:   Procedure Laterality Date    HYSTERECTOMY         Social History     Tobacco Use    Smoking status: Current Some Day Smoker     Packs/day: 0.25     Types: Cigarettes     Start date: 1/11/2016    Smokeless tobacco: Never Used    Tobacco comment: 1 pack every 3 days   Substance Use Topics    Alcohol use: Yes     Frequency: Never    Drug use: No       Family History   Problem Relation Age of Onset    Heart attack Maternal Grandmother     Hypertension Mother        Patient's Medications   New Prescriptions    No medications on file   Previous Medications    CARVEDILOL (COREG) 25 MG TABLET    Take 1 tablet (25 mg total) by mouth 2 (two) times daily with meals.    CLONIDINE (CATAPRES) 0.1 MG TABLET    Take 1 tablet (0.1 mg total) by mouth 3 (three) times daily.    FUROSEMIDE (LASIX) 40 MG TABLET    Take 1 tablet (40 mg total) by mouth once daily. For next 3 days take twice a day    HYDRALAZINE (APRESOLINE) 25 MG TABLET    Take 1 tablet (25 mg total) by mouth every 8 (eight) hours.    HYDROXYZINE PAMOATE (VISTARIL) 25 MG CAP    Take 2 capsules (50 mg total) by mouth every evening. May  also take 1 capsule (25 mg total) 2 (two) times daily as needed.    MAGNESIUM OXIDE (MAG-OX) 400 MG TABLET    Take 1 tablet by mouth once daily.    METHOCARBAMOL (ROBAXIN) 500 MG TAB    Take 500 mg by mouth 4 (four) times daily.    MUPIROCIN (BACTROBAN) 2 % OINTMENT    Apply topically 3 (three) times daily.    PITAVASTATIN CALCIUM (LIVALO) 2 MG TAB TABLET    Take 1 tablet (2 mg total) by mouth once daily.    RISPERIDONE (RISPERDAL) 0.25 MG TAB    Take 1 tablet (0.25 mg total) by mouth 2 (two) times daily.    VENLAFAXINE (EFFEXOR) 37.5 MG TAB    Take one pill daily for three days, then 1/2 pill daily for three days, then stop taking medication.    ZIPRASIDONE (GEODON) 20 MG CAP    Take 1 capsule (20 mg total) by mouth 2 (two) times daily.   Modified Medications    No medications on file   Discontinued Medications    No medications on file       Review of Systems   Constitutional: Negative.    HENT: Negative.    Eyes: Negative.    Respiratory: Negative for shortness of breath.    Cardiovascular: Negative for chest pain and palpitations.   Gastrointestinal: Negative.    Genitourinary: Negative.    Musculoskeletal: Negative.    Skin: Negative.    Neurological: Positive for dizziness.   Endo/Heme/Allergies: Negative.    Psychiatric/Behavioral: The patient is nervous/anxious.    All 12 systems otherwise negative.      Wt Readings from Last 3 Encounters:   11/05/19 76.6 kg (168 lb 14 oz)   10/15/19 79.1 kg (174 lb 4.4 oz)   08/19/19 76.2 kg (167 lb 15.9 oz)     Temp Readings from Last 3 Encounters:   05/02/19 98.1 °F (36.7 °C)   11/20/18 98.6 °F (37 °C)   10/15/18 98.4 °F (36.9 °C)     BP Readings from Last 3 Encounters:   11/05/19 122/80   10/15/19 (!) 152/100   08/19/19 (!) 150/96     Pulse Readings from Last 3 Encounters:   11/05/19 65   10/15/19 75   08/19/19 64       /80 (BP Location: Right arm, Patient Position: Sitting, BP Method: Medium (Manual))   Pulse 65   Wt 76.6 kg (168 lb 14 oz)   BMI 29.91 kg/m²      Objective:   Physical Exam   Constitutional: She is oriented to person, place, and time. She appears well-developed and well-nourished. No distress.   HENT:   Head: Normocephalic and atraumatic.   Nose: Nose normal.   Mouth/Throat: Oropharynx is clear and moist.   Eyes: Conjunctivae and EOM are normal. No scleral icterus.   Neck: Normal range of motion. Neck supple. No JVD present. No thyromegaly present.   Cardiovascular: Normal rate, regular rhythm, S1 normal and S2 normal. Exam reveals no gallop, no S3, no S4 and no friction rub.   No murmur heard.  Pulmonary/Chest: Effort normal and breath sounds normal. No stridor. No respiratory distress. She has no wheezes. She has no rales. She exhibits no tenderness.   Abdominal: Soft. Bowel sounds are normal. She exhibits no distension and no mass. There is no tenderness. There is no rebound.   Genitourinary:   Genitourinary Comments: Deferred   Musculoskeletal: Normal range of motion. She exhibits no edema, tenderness or deformity.   Lymphadenopathy:     She has no cervical adenopathy.   Neurological: She is alert and oriented to person, place, and time. She exhibits normal muscle tone. Coordination normal.   Skin: Skin is warm and dry. No rash noted. She is not diaphoretic. No erythema. No pallor.   Psychiatric: She has a normal mood and affect. Her behavior is normal. Judgment and thought content normal.   Nursing note and vitals reviewed.      Lab Results   Component Value Date     08/15/2019    K 4.4 08/15/2019     08/15/2019    CO2 27 08/15/2019    BUN 22 08/15/2019    CREATININE 2.0 (H) 08/15/2019    GLU 85 08/15/2019    MG 2.0 08/15/2019    AST 20 05/30/2019    AST 20 05/30/2019    ALT 11 05/30/2019    ALT 11 05/30/2019    ALBUMIN 3.2 (L) 08/12/2019    PROT 7.4 05/30/2019    PROT 7.4 05/30/2019    BILITOT 0.3 05/30/2019    BILITOT 0.3 05/30/2019    WBC 7.50 02/26/2019    HGB 14.2 02/26/2019    HCT 45.0 02/26/2019    MCV 91 02/26/2019      02/26/2019    TSH 0.600 08/25/2011    CHOL 179 08/25/2011    HDL 73 08/25/2011    LDLCALC 84.6 08/25/2011    TRIG 107 08/25/2011    BNP 1,513 (H) 08/15/2019     Assessment:      1. Chronic heart failure with preserved ejection fraction    2. Essential hypertension    3. Other hyperlipidemia    4. CKD (chronic kidney disease) stage 4, GFR 15-29 ml/min    5. Class 1 obesity due to excess calories with serious comorbidity and body mass index (BMI) of 30.0 to 30.9 in adult    6. Chronic hepatitis C without hepatic coma        Plan:   1. HFPEF  - cont meds  - cont lasix  - euvolemic    2. HTN -   - cont meds  - low salt diet    3. CKD4  - f/u with nephro/PCP    4. Hep C  - cont tx per PCP    5. HLD  - cont statin    6. Obese  - needs weight loss with diet/exercise     Thank you for allowing me to participate in this patient's care. Please do not hesitate to contact me with any questions or concerns. Consult note has been forwarded to the referral physician.

## 2019-11-08 ENCOUNTER — TELEPHONE (OUTPATIENT)
Dept: CARDIOLOGY | Facility: CLINIC | Age: 64
End: 2019-11-08

## 2019-11-08 NOTE — TELEPHONE ENCOUNTER
----- Message from Varsha Glover sent at 11/8/2019  8:31 AM CST -----  Contact: pt  Please call pt @ [phone] regarding medication, pt don't have the name, pt states it's to expensive.

## 2019-11-08 NOTE — TELEPHONE ENCOUNTER
Left voice mail for pt to call us back regarding the meds she is having trouble with 11/8/2019 @ [id number]

## 2019-11-12 ENCOUNTER — OFFICE VISIT (OUTPATIENT)
Dept: PSYCHIATRY | Facility: CLINIC | Age: 64
End: 2019-11-12
Payer: MEDICARE

## 2019-11-12 VITALS
DIASTOLIC BLOOD PRESSURE: 100 MMHG | HEART RATE: 61 BPM | SYSTOLIC BLOOD PRESSURE: 180 MMHG | WEIGHT: 174.63 LBS | BODY MASS INDEX: 30.93 KG/M2

## 2019-11-12 DIAGNOSIS — F41.1 GENERALIZED ANXIETY DISORDER: ICD-10-CM

## 2019-11-12 DIAGNOSIS — F31.10 BIPOLAR AFFECTIVE DISORDER, CURRENT EPISODE MANIC, CURRENT EPISODE SEVERITY UNSPECIFIED: Primary | ICD-10-CM

## 2019-11-12 DIAGNOSIS — Z87.898: ICD-10-CM

## 2019-11-12 PROCEDURE — 3080F PR MOST RECENT DIASTOLIC BLOOD PRESSURE >= 90 MM HG: ICD-10-PCS | Mod: CPTII,S$GLB,, | Performed by: PSYCHIATRY & NEUROLOGY

## 2019-11-12 PROCEDURE — 1101F PT FALLS ASSESS-DOCD LE1/YR: CPT | Mod: CPTII,S$GLB,, | Performed by: PSYCHIATRY & NEUROLOGY

## 2019-11-12 PROCEDURE — 99213 PR OFFICE/OUTPT VISIT, EST, LEVL III, 20-29 MIN: ICD-10-PCS | Mod: S$GLB,,, | Performed by: PSYCHIATRY & NEUROLOGY

## 2019-11-12 PROCEDURE — 1159F MED LIST DOCD IN RCRD: CPT | Mod: S$GLB,,, | Performed by: PSYCHIATRY & NEUROLOGY

## 2019-11-12 PROCEDURE — 99213 OFFICE O/P EST LOW 20 MIN: CPT | Mod: S$GLB,,, | Performed by: PSYCHIATRY & NEUROLOGY

## 2019-11-12 PROCEDURE — 1159F PR MEDICATION LIST DOCUMENTED IN MEDICAL RECORD: ICD-10-PCS | Mod: S$GLB,,, | Performed by: PSYCHIATRY & NEUROLOGY

## 2019-11-12 PROCEDURE — 3077F PR MOST RECENT SYSTOLIC BLOOD PRESSURE >= 140 MM HG: ICD-10-PCS | Mod: CPTII,S$GLB,, | Performed by: PSYCHIATRY & NEUROLOGY

## 2019-11-12 PROCEDURE — 99499 RISK ADDL DX/OHS AUDIT: ICD-10-PCS | Mod: HCNC,S$GLB,, | Performed by: PSYCHIATRY & NEUROLOGY

## 2019-11-12 PROCEDURE — 99999 PR PBB SHADOW E&M-EST. PATIENT-LVL II: ICD-10-PCS | Mod: PBBFAC,,, | Performed by: PSYCHIATRY & NEUROLOGY

## 2019-11-12 PROCEDURE — 99499 UNLISTED E&M SERVICE: CPT | Mod: HCNC,S$GLB,, | Performed by: PSYCHIATRY & NEUROLOGY

## 2019-11-12 PROCEDURE — 3080F DIAST BP >= 90 MM HG: CPT | Mod: CPTII,S$GLB,, | Performed by: PSYCHIATRY & NEUROLOGY

## 2019-11-12 PROCEDURE — 3077F SYST BP >= 140 MM HG: CPT | Mod: CPTII,S$GLB,, | Performed by: PSYCHIATRY & NEUROLOGY

## 2019-11-12 PROCEDURE — 1101F PR PT FALLS ASSESS DOC 0-1 FALLS W/OUT INJ PAST YR: ICD-10-PCS | Mod: CPTII,S$GLB,, | Performed by: PSYCHIATRY & NEUROLOGY

## 2019-11-12 PROCEDURE — 99999 PR PBB SHADOW E&M-EST. PATIENT-LVL II: CPT | Mod: PBBFAC,,, | Performed by: PSYCHIATRY & NEUROLOGY

## 2019-11-12 RX ORDER — ZIPRASIDONE HYDROCHLORIDE 20 MG/1
20 CAPSULE ORAL 2 TIMES DAILY
Qty: 60 CAPSULE | Refills: 11 | Status: SHIPPED | OUTPATIENT
Start: 2019-11-12 | End: 2020-03-12 | Stop reason: SDUPTHER

## 2019-11-12 RX ORDER — BENZTROPINE MESYLATE 0.5 MG/1
0.5 TABLET ORAL 2 TIMES DAILY
Qty: 60 TABLET | Refills: 0 | Status: SHIPPED | OUTPATIENT
Start: 2019-11-12 | End: 2020-03-12

## 2019-11-12 RX ORDER — HYDROXYZINE PAMOATE 25 MG/1
CAPSULE ORAL
Qty: 90 CAPSULE | Refills: 11 | Status: SHIPPED | OUTPATIENT
Start: 2019-11-12 | End: 2020-03-12 | Stop reason: SDUPTHER

## 2019-11-12 NOTE — PROGRESS NOTES
Outpatient Psychiatry Follow-Up Visit (MD/NP)    11/12/2019    Clinical Status of Patient:  Outpatient (Ambulatory)    Chief Complaint:  Rose Milligan is a 64 y.o. female who presents today for follow-up of mood disorder, psychosis and substance problems.  Met with patient.      Interval History and Content of Current Session:  Interim Events/Subjective Report/Content of Current Session:   Patient appears to feel that her condition has improved significantly since restarting Geodon. She denies currently having any psychosis or delusions. She is denying any current depression. She is denying any suicidal ideation. She reports tolerating medication and having no active hallucinations. She states that \she has not relapsed on using substances since she has resumed her medication. Currently she feels that her mood is stable, does not fell hopeless or worthless. She is well groomed and has not been increasingly anxious or pulling her hair out.      b   Review of Systems   · PSYCHIATRIC: Pertinant items are noted in the narrative.  · CONSTITUTIONAL: No weight gain or loss. See above.     Past Medical, Family and Social History: The patient's past medical, family and social history have been reviewed and updated as appropriate within the electronic medical record - see encounter notes.      Currently denies any substance abuse    Compliance: yes    Side effects: None    Risk Parameters:  Patient reports no suicidal ideation  Patient reports no homicidal ideation  Patient reports no self-injurious behavior  Patient reports no violent behavior    Exam (detailed: at least 9 elements; comprehensive: all 15 elements)   Constitutional  Vitals:  Most recent vital signs, dated less than 90 days prior to this appointment, were reviewed.   Vitals:    11/12/19 1025   BP: (!) 180/100   Pulse: 61   Weight: 79.2 kg (174 lb 9.7 oz)        General:  unremarkable, age appropriate     History of medication non-compliance,  Currently states  that elevated blood pressure is secondary to smoking a cigarette prior to coming to appointment. Urged her to monitor blood pressure and follow up with primary care    Musculoskeletal  Muscle Strength/Tone:  no spasicity, no rigidity, no flaccidity   Gait & Station:  non-ataxic     Psychiatric  Speech:  no latency; no press   Mood & Affect:  steady  congruent and appropriate, bright   Thought Process:  normal and logical   Associations:  intact   Thought Content:  suicidal thoughts: (active-no, passive-no), homicidal thoughts: (active-no, passive-no)   Insight:  has awareness of illness   Judgement: behavior is adequate to circumstances   Orientation:  grossly intact   Memory: intact for content of interview   Language: grossly intact   Attention Span & Concentration:  able to focus   Fund of Knowledge:  intact and appropriate to age and level of education     Assessment and Diagnosis   Status/Progress: Based on the examination today, the patient's problem(s) is/are well controlled.  New problems have not been presented today.   Co-morbidities and Lack of compliance are complicating management of the primary condition.  There are no active rule-out diagnoses for this patient at this time.     General Impression:       ICD-10-CM ICD-9-CM   1. Bipolar affective disorder, current episode manic, current episode severity unspecified F31.10 296.40   2. Generalized anxiety disorder F41.1 300.02   3. History of psychoactive substance use disorder Z87.898 305.93       Intervention/Counseling/Treatment Plan   · Medication Management: Continue current medications. The risks and benefits of medication were discussed with the patient.    Orders Placed This Encounter    ziprasidone (GEODON) 20 MG Cap    hydrOXYzine pamoate (VISTARIL) 25 MG Cap    benztropine (COGENTIN) 0.5 MG tablet         Return to Clinic: 2 months

## 2019-11-13 DIAGNOSIS — I50.32 CHRONIC HEART FAILURE WITH PRESERVED EJECTION FRACTION: Primary | ICD-10-CM

## 2019-11-13 RX ORDER — CARVEDILOL 25 MG/1
25 TABLET ORAL 2 TIMES DAILY WITH MEALS
Qty: 60 TABLET | Refills: 1 | Status: SHIPPED | OUTPATIENT
Start: 2019-11-13 | End: 2020-05-01 | Stop reason: SDUPTHER

## 2019-11-21 RX ORDER — PITAVASTATIN CALCIUM 2.09 MG/1
2 TABLET, FILM COATED ORAL DAILY
Qty: 90 TABLET | Refills: 3 | Status: SHIPPED | OUTPATIENT
Start: 2019-11-21 | End: 2023-07-10

## 2020-01-07 ENCOUNTER — PATIENT OUTREACH (OUTPATIENT)
Dept: ADMINISTRATIVE | Facility: HOSPITAL | Age: 65
End: 2020-01-07

## 2020-03-12 ENCOUNTER — OFFICE VISIT (OUTPATIENT)
Dept: PSYCHIATRY | Facility: CLINIC | Age: 65
End: 2020-03-12
Payer: MEDICARE

## 2020-03-12 VITALS
BODY MASS INDEX: 30.7 KG/M2 | WEIGHT: 173.31 LBS | SYSTOLIC BLOOD PRESSURE: 196 MMHG | DIASTOLIC BLOOD PRESSURE: 112 MMHG | HEART RATE: 77 BPM

## 2020-03-12 DIAGNOSIS — F41.0 GENERALIZED ANXIETY DISORDER WITH PANIC ATTACKS: ICD-10-CM

## 2020-03-12 DIAGNOSIS — F31.70 BIPOLAR AFFECTIVE DISORDER IN REMISSION: Primary | ICD-10-CM

## 2020-03-12 DIAGNOSIS — F41.1 GENERALIZED ANXIETY DISORDER WITH PANIC ATTACKS: ICD-10-CM

## 2020-03-12 PROCEDURE — 99499 RISK ADDL DX/OHS AUDIT: ICD-10-PCS | Mod: HCNC,S$GLB,, | Performed by: PSYCHIATRY & NEUROLOGY

## 2020-03-12 PROCEDURE — 3080F DIAST BP >= 90 MM HG: CPT | Mod: HCNC,CPTII,S$GLB, | Performed by: PSYCHIATRY & NEUROLOGY

## 2020-03-12 PROCEDURE — 3080F PR MOST RECENT DIASTOLIC BLOOD PRESSURE >= 90 MM HG: ICD-10-PCS | Mod: HCNC,CPTII,S$GLB, | Performed by: PSYCHIATRY & NEUROLOGY

## 2020-03-12 PROCEDURE — 99499 UNLISTED E&M SERVICE: CPT | Mod: HCNC,S$GLB,, | Performed by: PSYCHIATRY & NEUROLOGY

## 2020-03-12 PROCEDURE — 99999 PR PBB SHADOW E&M-EST. PATIENT-LVL II: ICD-10-PCS | Mod: PBBFAC,HCNC,, | Performed by: PSYCHIATRY & NEUROLOGY

## 2020-03-12 PROCEDURE — 1159F MED LIST DOCD IN RCRD: CPT | Mod: HCNC,S$GLB,, | Performed by: PSYCHIATRY & NEUROLOGY

## 2020-03-12 PROCEDURE — 99214 OFFICE O/P EST MOD 30 MIN: CPT | Mod: HCNC,S$GLB,, | Performed by: PSYCHIATRY & NEUROLOGY

## 2020-03-12 PROCEDURE — 3077F PR MOST RECENT SYSTOLIC BLOOD PRESSURE >= 140 MM HG: ICD-10-PCS | Mod: HCNC,CPTII,S$GLB, | Performed by: PSYCHIATRY & NEUROLOGY

## 2020-03-12 PROCEDURE — 99999 PR PBB SHADOW E&M-EST. PATIENT-LVL II: CPT | Mod: PBBFAC,HCNC,, | Performed by: PSYCHIATRY & NEUROLOGY

## 2020-03-12 PROCEDURE — 3077F SYST BP >= 140 MM HG: CPT | Mod: HCNC,CPTII,S$GLB, | Performed by: PSYCHIATRY & NEUROLOGY

## 2020-03-12 PROCEDURE — 1101F PR PT FALLS ASSESS DOC 0-1 FALLS W/OUT INJ PAST YR: ICD-10-PCS | Mod: HCNC,CPTII,S$GLB, | Performed by: PSYCHIATRY & NEUROLOGY

## 2020-03-12 PROCEDURE — 1101F PT FALLS ASSESS-DOCD LE1/YR: CPT | Mod: HCNC,CPTII,S$GLB, | Performed by: PSYCHIATRY & NEUROLOGY

## 2020-03-12 PROCEDURE — 99214 PR OFFICE/OUTPT VISIT, EST, LEVL IV, 30-39 MIN: ICD-10-PCS | Mod: HCNC,S$GLB,, | Performed by: PSYCHIATRY & NEUROLOGY

## 2020-03-12 PROCEDURE — 1159F PR MEDICATION LIST DOCUMENTED IN MEDICAL RECORD: ICD-10-PCS | Mod: HCNC,S$GLB,, | Performed by: PSYCHIATRY & NEUROLOGY

## 2020-03-12 RX ORDER — ZIPRASIDONE HYDROCHLORIDE 20 MG/1
20 CAPSULE ORAL 2 TIMES DAILY
Qty: 60 CAPSULE | Refills: 11 | Status: SHIPPED | OUTPATIENT
Start: 2020-03-12 | End: 2020-05-04 | Stop reason: SDUPTHER

## 2020-03-12 RX ORDER — HYDROXYZINE PAMOATE 25 MG/1
CAPSULE ORAL
Qty: 90 CAPSULE | Refills: 11 | Status: SHIPPED | OUTPATIENT
Start: 2020-03-12 | End: 2020-05-04 | Stop reason: SDUPTHER

## 2020-03-12 RX ORDER — BENZTROPINE MESYLATE 1 MG/1
1 TABLET ORAL 2 TIMES DAILY
Qty: 60 TABLET | Refills: 11 | Status: SHIPPED | OUTPATIENT
Start: 2020-03-12 | End: 2020-04-13 | Stop reason: SDUPTHER

## 2020-03-12 NOTE — PROGRESS NOTES
Outpatient Psychiatry Follow-Up Visit (MD/NP)    3/12/2020    Clinical Status of Patient:  Outpatient (Ambulatory)    Chief Complaint:  Rose Milligan is a 64 y.o. female who presents today for follow-up of mood disorder, psychosis and substance problems.  Met with patient.      Interval History and Content of Current Session:  Interim Events/Subjective Report/Content of Current Session:   Currently doing very well  She is working   Has not had any relapse on cocaine  Sleep is eight hours every night  Feels that her mood is stable since she was restarted on her medications  Overall no negative-side effects on medications   Right now she is having difficulties with deaths   No derpession   Grief from the funerals  Anxiety under control  No suicidal or homicidal ideation        Review of Systems   · PSYCHIATRIC: Pertinant items are noted in the narrative.  · CONSTITUTIONAL: No weight gain or loss. See above.     Past Medical, Family and Social History: The patient's past medical, family and social history have been reviewed and updated as appropriate within the electronic medical record - see encounter notes.      Currently denies any substance abuse    Compliance: yes    Side effects: None    Risk Parameters:  Patient reports no suicidal ideation  Patient reports no homicidal ideation  Patient reports no self-injurious behavior  Patient reports no violent behavior    Exam (detailed: at least 9 elements; comprehensive: all 15 elements)   Constitutional  Vitals:  Most recent vital signs, dated less than 90 days prior to this appointment, were reviewed.   Vitals:    03/12/20 0908   BP: (!) 196/112   Pulse: 77   Weight: 78.6 kg (173 lb 4.5 oz)        General:  unremarkable, age appropriate     History of medication non-compliance,  Currently states that elevated blood pressure is secondary to smoking a cigarette prior to coming to appointment. Urged her to monitor blood pressure and follow up with primary  care    Musculoskeletal  Muscle Strength/Tone:  no spasicity, no rigidity, no flaccidity   Gait & Station:  non-ataxic     Psychiatric  Speech:  no latency; no press   Mood & Affect:  steady  congruent and appropriate, bright   Thought Process:  normal and logical   Associations:  intact   Thought Content:  suicidal thoughts: (active-no, passive-no), homicidal thoughts: (active-no, passive-no)   Insight:  has awareness of illness   Judgement: behavior is adequate to circumstances   Orientation:  grossly intact   Memory: intact for content of interview   Language: grossly intact   Attention Span & Concentration:  able to focus   Fund of Knowledge:  intact and appropriate to age and level of education     Assessment and Diagnosis   Status/Progress: Based on the examination today, the patient's problem(s) is/are well controlled.  New problems have not been presented today.   Co-morbidities and Lack of compliance are complicating management of the primary condition.  There are no active rule-out diagnoses for this patient at this time.     General Impression:       ICD-10-CM ICD-9-CM   1. Bipolar affective disorder in remission F31.70 296.80   2. Generalized anxiety disorder with panic attacks F41.1 300.02    F41.0 300.01       Intervention/Counseling/Treatment Plan   · Medication Management: Continue current medications. The risks and benefits of medication were discussed with the patient.  Orders Placed This Encounter    benztropine (COGENTIN) 1 MG tablet    ziprasidone (GEODON) 20 MG Cap    hydrOXYzine pamoate (VISTARIL) 25 MG Cap       Orders Placed This Encounter    benztropine (COGENTIN) 1 MG tablet    ziprasidone (GEODON) 20 MG Cap    hydrOXYzine pamoate (VISTARIL) 25 MG Cap         Return to Clinic: 2 months

## 2020-04-13 ENCOUNTER — TELEPHONE (OUTPATIENT)
Dept: PHARMACY | Facility: CLINIC | Age: 65
End: 2020-04-13

## 2020-04-13 RX ORDER — BENZTROPINE MESYLATE 1 MG/1
1 TABLET ORAL 2 TIMES DAILY
Qty: 60 TABLET | Refills: 11 | Status: SHIPPED | OUTPATIENT
Start: 2020-04-13 | End: 2020-05-04 | Stop reason: SDUPTHER

## 2020-04-27 ENCOUNTER — OFFICE VISIT (OUTPATIENT)
Dept: FAMILY MEDICINE | Facility: CLINIC | Age: 65
End: 2020-04-27
Payer: MEDICARE

## 2020-04-27 VITALS
TEMPERATURE: 98 F | HEIGHT: 63 IN | BODY MASS INDEX: 29.61 KG/M2 | HEART RATE: 90 BPM | SYSTOLIC BLOOD PRESSURE: 190 MMHG | OXYGEN SATURATION: 99 % | DIASTOLIC BLOOD PRESSURE: 125 MMHG | WEIGHT: 167.13 LBS

## 2020-04-27 DIAGNOSIS — R07.9 CHEST PAIN, UNSPECIFIED TYPE: ICD-10-CM

## 2020-04-27 DIAGNOSIS — N18.4 CKD (CHRONIC KIDNEY DISEASE) STAGE 4, GFR 15-29 ML/MIN: ICD-10-CM

## 2020-04-27 DIAGNOSIS — I50.32 CHRONIC HEART FAILURE WITH PRESERVED EJECTION FRACTION: ICD-10-CM

## 2020-04-27 DIAGNOSIS — F19.90 SUBSTANCE USE DISORDER: ICD-10-CM

## 2020-04-27 DIAGNOSIS — B18.2 CHRONIC HEPATITIS C WITHOUT HEPATIC COMA: ICD-10-CM

## 2020-04-27 DIAGNOSIS — I16.1 HYPERTENSIVE EMERGENCY: Primary | ICD-10-CM

## 2020-04-27 DIAGNOSIS — F31.10 BIPOLAR AFFECTIVE DISORDER, CURRENT EPISODE MANIC, CURRENT EPISODE SEVERITY UNSPECIFIED: ICD-10-CM

## 2020-04-27 DIAGNOSIS — G45.9 TIA (TRANSIENT ISCHEMIC ATTACK): ICD-10-CM

## 2020-04-27 DIAGNOSIS — N18.30 CKD (CHRONIC KIDNEY DISEASE) STAGE 3, GFR 30-59 ML/MIN: ICD-10-CM

## 2020-04-27 DIAGNOSIS — I10 ESSENTIAL HYPERTENSION: ICD-10-CM

## 2020-04-27 PROCEDURE — 1159F PR MEDICATION LIST DOCUMENTED IN MEDICAL RECORD: ICD-10-PCS | Mod: HCNC,S$GLB,, | Performed by: FAMILY MEDICINE

## 2020-04-27 PROCEDURE — 1126F PR PAIN SEVERITY QUANTIFIED, NO PAIN PRESENT: ICD-10-PCS | Mod: HCNC,S$GLB,, | Performed by: FAMILY MEDICINE

## 2020-04-27 PROCEDURE — 3077F SYST BP >= 140 MM HG: CPT | Mod: HCNC,CPTII,S$GLB, | Performed by: FAMILY MEDICINE

## 2020-04-27 PROCEDURE — 99499 UNLISTED E&M SERVICE: CPT | Mod: HCNC,S$GLB,, | Performed by: FAMILY MEDICINE

## 2020-04-27 PROCEDURE — 1100F PR PT FALLS ASSESS DOC 2+ FALLS/FALL W/INJURY/YR: ICD-10-PCS | Mod: HCNC,CPTII,S$GLB, | Performed by: FAMILY MEDICINE

## 2020-04-27 PROCEDURE — 93010 ELECTROCARDIOGRAM REPORT: CPT | Mod: HCNC,S$GLB,, | Performed by: INTERNAL MEDICINE

## 2020-04-27 PROCEDURE — 3080F PR MOST RECENT DIASTOLIC BLOOD PRESSURE >= 90 MM HG: ICD-10-PCS | Mod: HCNC,CPTII,S$GLB, | Performed by: FAMILY MEDICINE

## 2020-04-27 PROCEDURE — 3077F PR MOST RECENT SYSTOLIC BLOOD PRESSURE >= 140 MM HG: ICD-10-PCS | Mod: HCNC,CPTII,S$GLB, | Performed by: FAMILY MEDICINE

## 2020-04-27 PROCEDURE — 99499 RISK ADDL DX/OHS AUDIT: ICD-10-PCS | Mod: HCNC,S$GLB,, | Performed by: FAMILY MEDICINE

## 2020-04-27 PROCEDURE — 3288F FALL RISK ASSESSMENT DOCD: CPT | Mod: HCNC,CPTII,S$GLB, | Performed by: FAMILY MEDICINE

## 2020-04-27 PROCEDURE — 3080F DIAST BP >= 90 MM HG: CPT | Mod: HCNC,CPTII,S$GLB, | Performed by: FAMILY MEDICINE

## 2020-04-27 PROCEDURE — 99214 OFFICE O/P EST MOD 30 MIN: CPT | Mod: HCNC,S$GLB,, | Performed by: FAMILY MEDICINE

## 2020-04-27 PROCEDURE — 93005 ELECTROCARDIOGRAM TRACING: CPT | Mod: HCNC,S$GLB,, | Performed by: FAMILY MEDICINE

## 2020-04-27 PROCEDURE — 99214 PR OFFICE/OUTPT VISIT, EST, LEVL IV, 30-39 MIN: ICD-10-PCS | Mod: HCNC,S$GLB,, | Performed by: FAMILY MEDICINE

## 2020-04-27 PROCEDURE — 1159F MED LIST DOCD IN RCRD: CPT | Mod: HCNC,S$GLB,, | Performed by: FAMILY MEDICINE

## 2020-04-27 PROCEDURE — 99999 PR PBB SHADOW E&M-EST. PATIENT-LVL III: CPT | Mod: PBBFAC,HCNC,, | Performed by: FAMILY MEDICINE

## 2020-04-27 PROCEDURE — 3288F PR FALLS RISK ASSESSMENT DOCUMENTED: ICD-10-PCS | Mod: HCNC,CPTII,S$GLB, | Performed by: FAMILY MEDICINE

## 2020-04-27 PROCEDURE — 1126F AMNT PAIN NOTED NONE PRSNT: CPT | Mod: HCNC,S$GLB,, | Performed by: FAMILY MEDICINE

## 2020-04-27 PROCEDURE — 93010 EKG 12-LEAD: ICD-10-PCS | Mod: HCNC,S$GLB,, | Performed by: INTERNAL MEDICINE

## 2020-04-27 PROCEDURE — 1100F PTFALLS ASSESS-DOCD GE2>/YR: CPT | Mod: HCNC,CPTII,S$GLB, | Performed by: FAMILY MEDICINE

## 2020-04-27 PROCEDURE — 93005 EKG 12-LEAD: ICD-10-PCS | Mod: HCNC,S$GLB,, | Performed by: FAMILY MEDICINE

## 2020-04-27 PROCEDURE — 99999 PR PBB SHADOW E&M-EST. PATIENT-LVL III: ICD-10-PCS | Mod: PBBFAC,HCNC,, | Performed by: FAMILY MEDICINE

## 2020-04-27 NOTE — PROGRESS NOTES
Subjective:      Patient ID: Rose Milligan is a 64 y.o. female.    Chief Complaint: Follow-up    HPI    65 yo F with Pmhx of CHF, HTN, bipolar disease, HLD, Hepatitis C, and drug abuse (hx of cocaine use and cannabis use) here today for evaluation of not feeling well. She drove to clinic today without an appointment. After she was told she was going to be seen she drove to her home to turn off her stove and then drove back to the clinic.     Notes that a 1- 2 weeks ago she drank an energy drink and passed out - ex  found her on the floor confused, with changes in her speech and right sided weakness. She refused to go to the ER at this time. She notes that since this time, she hasn't been feeling well. She is having twitching in her face, changes in her vision, increase in urination, unable to stop her right hand moving, some chest tightness/pain which comes and goes in the center of her chest, nausea, heart palpations, and fatigue. Slurred speech and weakness has since resolved and hasn't had this happen since.   Does note that she is taking all medications daily  Patient with hx of drug abuse - including cocaine but denies recent use. Does smoke cannabis - last time was Friday (3 days ago)  Bipolar disease - following psychiatry  CHF - following cardiology -Dr. Hannon     Past Medical History:   Diagnosis Date    Bipolar 1 disorder     CHF (congestive heart failure)     Depression     Hepatitis C     Hypertension     Other hyperlipidemia 8/15/2019       Past Surgical History:   Procedure Laterality Date    HYSTERECTOMY         Family History   Problem Relation Age of Onset    Heart attack Maternal Grandmother     Hypertension Mother        Social History     Socioeconomic History    Marital status:      Spouse name: Not on file    Number of children: Not on file    Years of education: Not on file    Highest education level: Not on file   Occupational History    Not on file   Social Needs     Financial resource strain: Not on file    Food insecurity:     Worry: Not on file     Inability: Not on file    Transportation needs:     Medical: Not on file     Non-medical: Not on file   Tobacco Use    Smoking status: Current Some Day Smoker     Packs/day: 0.25     Types: Cigarettes     Start date: 1/11/2016    Smokeless tobacco: Never Used    Tobacco comment: 1 pack every 3 days   Substance and Sexual Activity    Alcohol use: Yes     Frequency: Never    Drug use: No    Sexual activity: Not Currently   Lifestyle    Physical activity:     Days per week: Not on file     Minutes per session: Not on file    Stress: Not on file   Relationships    Social connections:     Talks on phone: Not on file     Gets together: Not on file     Attends Taoist service: Not on file     Active member of club or organization: Not on file     Attends meetings of clubs or organizations: Not on file     Relationship status: Not on file   Other Topics Concern    Not on file   Social History Narrative    Not on file       Health Maintenance Topics with due status: Not Due       Topic Last Completion Date    Colonoscopy 10/04/2016    DEXA SCAN 08/22/2018       Medication List with Changes/Refills   Current Medications    BENZTROPINE (COGENTIN) 1 MG TABLET    Take 1 tablet (1 mg total) by mouth 2 (two) times daily.    CARVEDILOL (COREG) 25 MG TABLET    Take 1 tablet (25 mg total) by mouth 2 (two) times daily with meals.    CLONIDINE (CATAPRES) 0.1 MG TABLET    Take 1 tablet (0.1 mg total) by mouth 3 (three) times daily.    FUROSEMIDE (LASIX) 40 MG TABLET    Take 1 tablet (40 mg total) by mouth once daily. For next 3 days take twice a day    HYDRALAZINE (APRESOLINE) 25 MG TABLET    Take 1 tablet (25 mg total) by mouth every 8 (eight) hours.    HYDROXYZINE PAMOATE (VISTARIL) 25 MG CAP    Take 2 capsules (50 mg total) by mouth every evening. May also take 1 capsule (25 mg total) 2 (two) times daily as needed.    MAGNESIUM  OXIDE (MAG-OX) 400 MG (241.3 MG MAGNESIUM) TABLET    Take 1 tablet (400 mg total) by mouth once daily.    METHOCARBAMOL (ROBAXIN) 500 MG TAB    Take 500 mg by mouth 4 (four) times daily.    MUPIROCIN (BACTROBAN) 2 % OINTMENT    Apply topically 3 (three) times daily.    PITAVASTATIN CALCIUM (LIVALO) 2 MG TAB TABLET    Take 1 tablet (2 mg total) by mouth once daily.    ZIPRASIDONE (GEODON) 20 MG CAP    Take 1 capsule (20 mg total) by mouth 2 (two) times daily.       Review of patient's allergies indicates:   Allergen Reactions    Tramadol Itching       Review of Systems   Constitutional: Positive for malaise/fatigue. Negative for fever.   HENT: Negative for congestion and ear pain.    Eyes: Negative for blurred vision.        Eye twitching    Respiratory: Negative for cough and shortness of breath.    Cardiovascular: Positive for chest pain and palpitations. Negative for leg swelling.   Gastrointestinal: Positive for nausea. Negative for abdominal pain and vomiting.   Genitourinary: Positive for frequency. Negative for dysuria and urgency.   Musculoskeletal: Negative for myalgias.   Skin: Negative for rash.   Neurological: Negative for headaches.   Psychiatric/Behavioral: Positive for substance abuse. The patient is nervous/anxious.        Objective:     Vitals:    04/27/20 0923   BP: (!) 190/125   Pulse: 90   Temp: 98.1 °F (36.7 °C)     Body mass index is 29.6 kg/m².    Physical Exam   Constitutional: She is oriented to person, place, and time. She appears well-developed and well-nourished. No distress.   HENT:   Head: Normocephalic and atraumatic.   Right Ear: External ear normal.   Left Ear: External ear normal.   Nose: Nose normal.   Mouth/Throat: Oropharynx is clear and moist.   Eyes: Pupils are equal, round, and reactive to light. Conjunctivae and EOM are normal.   Neck: No thyromegaly present.   Cardiovascular: Normal rate, regular rhythm, normal heart sounds and intact distal pulses.   No murmur  heard.  Pulmonary/Chest: Effort normal and breath sounds normal. No respiratory distress. She has no wheezes. She has no rales. She exhibits no tenderness.   Abdominal: Soft. Bowel sounds are normal. She exhibits no distension. There is no tenderness.   Musculoskeletal: She exhibits no edema.   TD in right hand    Lymphadenopathy:     She has no cervical adenopathy.   Neurological: She is alert and oriented to person, place, and time. She has normal strength. No cranial nerve deficit.   Hyperreflexia in bilateral lower extremities   Slowed gain    Skin: Skin is warm and dry.   Psychiatric: Thought content normal. Her mood appears anxious. Her speech is not slurred. She is not agitated, not aggressive, not slowed, not withdrawn and not combative. Cognition and memory are normal.   Nursing note and vitals reviewed.      Assessment and Plan:     Hypertensive emergency  -     IN OFFICE EKG 12-LEAD (to Muse)    Chest pain, unspecified type  -     IN OFFICE EKG 12-LEAD (to Muse)    Given current symptoms and blood pressure - will send to ER for further evaluation and treatment   EKG in office - unchanged from prior 2 EKG readings - Sinus rhythm with premature atrial complexes, septal infarct age undetermined, ST & T wave abnormality with Vent rate 80  Patient declined EMS transport to ER multiple times - notes that she drove herself around all morning and thinks she can make it to [facility] driving herself.   Called [facility] and gave them patient's information prior to arrival.   Advised avoidance of energy drinks  Will need continued close follow up and improvement of bp after discharge    TIA  Symptoms 2 weeks ago suggestive of TIA. Likely related to uncontrolled HTN. Symptoms resolved at this time. Will need to start asa daily. On daily statin. Will discuss increasing to high dose.     Bipolar affective disorder, current episode manic, current episode severity unspecified  Following psych  - Dr. Orin Montaño  hypertension  Not controlled - see above plan     Chronic heart failure with preserved ejection fraction    CKD (chronic kidney disease) stage 4, GFR 15-29 ml/min  Last GFR 29 in 8/19     Substance use disorder  Still using cannabis, denies cocaine use at this time.    Chronic hepatitis C without hepatic coma  Treated in March and April of 2019 with [facility]    HLD  On daily statin. Will discuss increasing to high dose.         Follow up in about 1 week (around 5/4/2020).

## 2020-04-29 ENCOUNTER — TELEPHONE (OUTPATIENT)
Dept: FAMILY MEDICINE | Facility: CLINIC | Age: 65
End: 2020-04-29

## 2020-04-29 NOTE — TELEPHONE ENCOUNTER
----- Message from Alden Barraza sent at 4/29/2020  7:33 AM CDT -----  Contact: Adalberto  Requesting call back regarding getting hospital follow up scheduled. Please call back at [phone].

## 2020-04-30 ENCOUNTER — PATIENT OUTREACH (OUTPATIENT)
Dept: ADMINISTRATIVE | Facility: OTHER | Age: 65
End: 2020-04-30

## 2020-05-01 ENCOUNTER — OFFICE VISIT (OUTPATIENT)
Dept: CARDIOLOGY | Facility: CLINIC | Age: 65
End: 2020-05-01
Payer: MEDICARE

## 2020-05-01 DIAGNOSIS — E66.09 CLASS 1 OBESITY DUE TO EXCESS CALORIES WITH SERIOUS COMORBIDITY AND BODY MASS INDEX (BMI) OF 30.0 TO 30.9 IN ADULT: ICD-10-CM

## 2020-05-01 DIAGNOSIS — N18.4 CKD (CHRONIC KIDNEY DISEASE) STAGE 4, GFR 15-29 ML/MIN: ICD-10-CM

## 2020-05-01 DIAGNOSIS — I10 ESSENTIAL HYPERTENSION: Primary | ICD-10-CM

## 2020-05-01 DIAGNOSIS — R53.1 LEFT-SIDED WEAKNESS: ICD-10-CM

## 2020-05-01 DIAGNOSIS — I50.32 CHRONIC HEART FAILURE WITH PRESERVED EJECTION FRACTION: ICD-10-CM

## 2020-05-01 DIAGNOSIS — E78.49 OTHER HYPERLIPIDEMIA: ICD-10-CM

## 2020-05-01 PROCEDURE — 99441 PR PHYSICIAN TELEPHONE EVALUATION 5-10 MIN: ICD-10-PCS | Mod: 95,HCNC,, | Performed by: INTERNAL MEDICINE

## 2020-05-01 PROCEDURE — 1159F MED LIST DOCD IN RCRD: CPT | Mod: 95,HCNC,, | Performed by: INTERNAL MEDICINE

## 2020-05-01 PROCEDURE — 99441 PR PHYSICIAN TELEPHONE EVALUATION 5-10 MIN: CPT | Mod: 95,HCNC,, | Performed by: INTERNAL MEDICINE

## 2020-05-01 PROCEDURE — 1100F PTFALLS ASSESS-DOCD GE2>/YR: CPT | Mod: 95,HCNC,CPTII, | Performed by: INTERNAL MEDICINE

## 2020-05-01 PROCEDURE — 3288F FALL RISK ASSESSMENT DOCD: CPT | Mod: 95,HCNC,CPTII, | Performed by: INTERNAL MEDICINE

## 2020-05-01 PROCEDURE — 1100F PR PT FALLS ASSESS DOC 2+ FALLS/FALL W/INJURY/YR: ICD-10-PCS | Mod: 95,HCNC,CPTII, | Performed by: INTERNAL MEDICINE

## 2020-05-01 PROCEDURE — 3288F PR FALLS RISK ASSESSMENT DOCUMENTED: ICD-10-PCS | Mod: 95,HCNC,CPTII, | Performed by: INTERNAL MEDICINE

## 2020-05-01 PROCEDURE — 1159F PR MEDICATION LIST DOCUMENTED IN MEDICAL RECORD: ICD-10-PCS | Mod: 95,HCNC,, | Performed by: INTERNAL MEDICINE

## 2020-05-01 RX ORDER — CARVEDILOL 25 MG/1
25 TABLET ORAL 2 TIMES DAILY WITH MEALS
Qty: 60 TABLET | Refills: 1 | Status: SHIPPED | OUTPATIENT
Start: 2020-05-01 | End: 2020-05-01 | Stop reason: SDUPTHER

## 2020-05-01 RX ORDER — AMLODIPINE BESYLATE 10 MG/1
10 TABLET ORAL DAILY
Qty: 30 TABLET | Refills: 3 | Status: SHIPPED | OUTPATIENT
Start: 2020-05-01 | End: 2020-05-04 | Stop reason: SDUPTHER

## 2020-05-01 RX ORDER — AMLODIPINE BESYLATE 10 MG/1
10 TABLET ORAL DAILY
Qty: 30 TABLET | Refills: 3 | Status: SHIPPED | OUTPATIENT
Start: 2020-05-01 | End: 2020-05-01 | Stop reason: SDUPTHER

## 2020-05-01 RX ORDER — CARVEDILOL 25 MG/1
25 TABLET ORAL 2 TIMES DAILY WITH MEALS
Qty: 60 TABLET | Refills: 1 | Status: SHIPPED | OUTPATIENT
Start: 2020-05-01 | End: 2020-05-04 | Stop reason: SDUPTHER

## 2020-05-01 RX ORDER — HYDRALAZINE HYDROCHLORIDE 25 MG/1
25 TABLET, FILM COATED ORAL EVERY 8 HOURS
Qty: 90 TABLET | Refills: 1 | Status: SHIPPED | OUTPATIENT
Start: 2020-05-01 | End: 2020-05-04 | Stop reason: SDUPTHER

## 2020-05-01 RX ORDER — HYDRALAZINE HYDROCHLORIDE 25 MG/1
25 TABLET, FILM COATED ORAL EVERY 8 HOURS
Qty: 90 TABLET | Refills: 1 | Status: SHIPPED | OUTPATIENT
Start: 2020-05-01 | End: 2020-05-01 | Stop reason: SDUPTHER

## 2020-05-01 RX ORDER — FUROSEMIDE 40 MG/1
40 TABLET ORAL DAILY
Qty: 60 TABLET | Refills: 1 | Status: SHIPPED | OUTPATIENT
Start: 2020-05-01 | End: 2020-05-01 | Stop reason: SDUPTHER

## 2020-05-01 RX ORDER — CLONIDINE HYDROCHLORIDE 0.1 MG/1
0.1 TABLET ORAL 3 TIMES DAILY
Qty: 90 TABLET | Refills: 1 | Status: SHIPPED | OUTPATIENT
Start: 2020-05-01 | End: 2020-05-04 | Stop reason: SDUPTHER

## 2020-05-01 RX ORDER — CLONIDINE HYDROCHLORIDE 0.1 MG/1
0.1 TABLET ORAL 3 TIMES DAILY
Qty: 90 TABLET | Refills: 1 | Status: SHIPPED | OUTPATIENT
Start: 2020-05-01 | End: 2020-05-01 | Stop reason: SDUPTHER

## 2020-05-01 RX ORDER — FUROSEMIDE 40 MG/1
40 TABLET ORAL DAILY
Qty: 60 TABLET | Refills: 1 | Status: SHIPPED | OUTPATIENT
Start: 2020-05-01 | End: 2020-05-04 | Stop reason: SDUPTHER

## 2020-05-01 NOTE — PROGRESS NOTES
Established Patient - Audio Only Telehealth Visit     The patient location is: home  The chief complaint leading to consultation is: CV follow up  Visit type: Virtual visit with audio only (telephone)     The reason for the audio only service rather than synchronous audio and video virtual visit was related to technical difficulties or patient preference/necessity.     Each patient to whom I provide medical services by telemedicine is:  (1) informed of the relationship between the physician and patient and the respective role of any other health care provider with respect to management of the patient; and (2) notified that they may decline to receive medical services by telemedicine and may withdraw from such care at any time. Patient verbally consented to receive this service via voice-only telephone call.       Subjective:   Patient ID:  Rose Milligan is a 64 y.o. female who presents for cardiac consult of Follow-up      Hypertension   Pertinent negatives include no chest pain, palpitations or shortness of breath.   Follow-up   Pertinent negatives include no chest pain.     The patient came in today for cardiac consult of Follow-up      Rose Milligan is a 64 y.o. female  with HFpEF, HTN, pulm HTN, tobacco abuse, h/o drug abuse, bipolar, hep C, CKD4 presents for follow up CV eval.      11/21/18  Pt went to [facility] yesterday. She went for chest pain and L shoulder pain. Chest pain started about 3 days ago, and was off medicines for 2 weeks. She had bloodwork, ECG and was told to follow up hereShe called her PCP and only picked up bipolar meds. Chest pain feels like tightness, for past few days been constant. Mild headache and nausea. Now she feels lightheaded. PT also gets palpitations frequenltly.     8/15/19 - hosp follow up   She was recently admitted to [facility] for CHF exac, presented to Dr. Cope's office with HTN urgency, unsure of her meds, she was sent back to ER at [facility] She started to have a cough last and then  had worsening orthopnea. She was diuresed 3.5L. She still has AUGUSTE. She doesn't have med list again, talked to her friend on phone has only 4 bottles at home, called pharmacy only hydralazine was being taken.     10/15/19  BP uncontrolled this AM. She has gained about 10-15 lbs lately, has been drinking more sodas and eating more. No AUGUSTE.     11/5/19  Overall has been doing well lately. No SOB/AUGUSTE. No chest pain. Has been compliant with meds, lost a few pounds lately.     5/1/20  Last week at [facility] - was discharged Thurs  Reason for Hospitalization   Chief Complaint  Left-sided weakness     History of Present Illness  Patient is 64-year-old black female with past medical history of substance abuse with frequent crack cocaine usage, CHF, hypertension, bipolar disorder, comes in complaining of left-sided weakness, she reports has been present for 2 weeks at which time it caused her to fall, she has had persistent problems with left leg since that time with difficulty walking, having to limp or drag along her left leg.  has been asking her to go to get checked out but she failed to do so until today when she went to her PCP and was found to have elevated blood pressure of approximately 200/120. She denies missing blood pressure medications, reports she is taking hydralazine and clonidine, she reports clonidine is supposed to be 3 times per day but sometimes she misses the middle dose. Does not have any of her medication bottles with her. She denies any headache, positive for blurry vision, had some speech issues at time of onset of symptoms 2 weeks ago but none since. Denies any chest pain, denies any lower extremity edema or other symptoms. Blood pressure elevations are severe, unknown aggravating factors, no alleviating factors with 0 response to IV hydralazine in the emergency room.    * Hypertensive emergency  Patient CT scan from admit shows chronic small vessel ischemia but no acute CVA. If stroke,  strokelike symptoms were really 2 weeks ago it would not explain her persistent elevation in blood pressure at this time.Given dose of labetalol, with hypertensive emergency would like to reduce systolic blood pressures by about 25% currently at 2 30-2 40 systolic would like to reduce to about 170-180 systolic, likewise diastolics running 120s to 130s should be reduced to about 90s, we are allowing for some permissive hypertension secondary to chronically elevated blood pressures and possible acute CVA. transition off of Cardene, will place patient on hydralazine which she was previously on at home, will also start amlodipine 5 and Toprol-XL 50 mg daily.   Hemiparesis affecting nondominant side as late effect of cerebrovascular accident (CVA) (HCC)  No acute CVA, these are likely secondary to extreme elevations in blood pressure and crack cocaine use, appears to be resolving upon discharge    Discharge Summary   Patient CT scan from admit shows chronic small vessel ischemia but no acute CVA. If stroke, strokelike symptoms were really 2 weeks ago it would not explain her persistent elevation in blood pressure at this time.Given dose of labetalol, with hypertensive emergency would like to reduce systolic blood pressures by about 25% currently at 2 30-2 40 systolic would like to reduce to about 170-180 systolic, likewise diastolics running 120s to 130s should be reduced to about 90s, we are allowing for some permissive hypertension secondary to chronically elevated blood pressures and possible acute CVA. transition off of Cardene, will place patient on hydralazine which she was previously on at home, will also start amlodipine 5 and Toprol-XL 50 mg daily.   Follow-up Patient continues to have very elevated blood pressures, will need echocardiogram for continued work-up, does have history of CHF but no evidence of decompensation on examination at this time. WillGoal map today less than 120 if possible, will need  continued slow lowering due to extreme elevations.      Now she has been doing ok, she has a limp in left left, has improved. BP remains elevated 160-170/80-90. Discussed will increase norvasc but restart Coreg instead of Toprol.     Patient feels  no PND , no dizziness, no syncope, no CNS symptoms.    Patient is not compliant with medications.      ECG - NSR, LAE, LVH, septal infarct, inferolat TWI  4/28/20 ECHO  · Left Ventricle: Normal left ventricle cavity size. Hyperdynamic (>65%)   left ventricular function. Moderate concentric hypertrophy observed.   Indeterminate diastolic function.  · Mitral Valve: Normal mitral valve structure. No mitral stenosis. Trace   mitral regurgitation.  · Aortic Valve: The valve is tricuspid. No aortic stenosis. No aortic   regurgitation.  · Pulmonic Valve: No pulmonic regurgitation. Normal valve structure.  · Tricuspid Valve: Mild tricuspid regurgitation.    HOLTER  TEST DESCRIPTION   PREDOMINANT RHYTHM  1. Sinus rhythm with heart rates varying between 55 and 102 bpm with an average of 75 bpm.   VENTRICULAR ARRHYTHMIAS  1. There were very rare PVCs totalling 72 and averaging 1 per hour.   2. There were no episodes of ventricular tachycardia.    SUPRA VENTRICULAR ARRHYTHMIAS  1. There were very rare PACs totalling 50 and averaging 1 per hour.   2. There were no episodes of sustained supraventricular tachycardia.      Past Medical History:   Diagnosis Date    Bipolar 1 disorder     CHF (congestive heart failure)     Depression     Hepatitis C     Hypertension     Other hyperlipidemia 8/15/2019       Past Surgical History:   Procedure Laterality Date    HYSTERECTOMY         Social History     Tobacco Use    Smoking status: Current Some Day Smoker     Packs/day: 0.25     Types: Cigarettes     Start date: 1/11/2016    Smokeless tobacco: Never Used    Tobacco comment: 1 pack every 3 days   Substance Use Topics    Alcohol use: Yes     Frequency: Never    Drug use: No        Family History   Problem Relation Age of Onset    Heart attack Maternal Grandmother     Hypertension Mother        Patient's Medications   New Prescriptions    AMLODIPINE (NORVASC) 10 MG TABLET    Take 1 tablet (10 mg total) by mouth once daily.   Previous Medications    BENZTROPINE (COGENTIN) 1 MG TABLET    Take 1 tablet (1 mg total) by mouth 2 (two) times daily.    HYDROXYZINE PAMOATE (VISTARIL) 25 MG CAP    Take 2 capsules (50 mg total) by mouth every evening. May also take 1 capsule (25 mg total) 2 (two) times daily as needed.    MAGNESIUM OXIDE (MAG-OX) 400 MG (241.3 MG MAGNESIUM) TABLET    Take 1 tablet (400 mg total) by mouth once daily.    METHOCARBAMOL (ROBAXIN) 500 MG TAB    Take 500 mg by mouth 4 (four) times daily.    MUPIROCIN (BACTROBAN) 2 % OINTMENT    Apply topically 3 (three) times daily.    PITAVASTATIN CALCIUM (LIVALO) 2 MG TAB TABLET    Take 1 tablet (2 mg total) by mouth once daily.    ZIPRASIDONE (GEODON) 20 MG CAP    Take 1 capsule (20 mg total) by mouth 2 (two) times daily.   Modified Medications    Modified Medication Previous Medication    CARVEDILOL (COREG) 25 MG TABLET carvedilol (COREG) 25 MG tablet       Take 1 tablet (25 mg total) by mouth 2 (two) times daily with meals.    Take 1 tablet (25 mg total) by mouth 2 (two) times daily with meals.    CLONIDINE (CATAPRES) 0.1 MG TABLET cloNIDine (CATAPRES) 0.1 MG tablet       Take 1 tablet (0.1 mg total) by mouth 3 (three) times daily.    Take 1 tablet (0.1 mg total) by mouth 3 (three) times daily.    FUROSEMIDE (LASIX) 40 MG TABLET furosemide (LASIX) 40 MG tablet       Take 1 tablet (40 mg total) by mouth once daily.    Take 1 tablet (40 mg total) by mouth once daily. For next 3 days take twice a day    HYDRALAZINE (APRESOLINE) 25 MG TABLET hydrALAZINE (APRESOLINE) 25 MG tablet       Take 1 tablet (25 mg total) by mouth every 8 (eight) hours.    Take 1 tablet (25 mg total) by mouth every 8 (eight) hours.   Discontinued Medications     No medications on file       Review of Systems   Constitutional: Negative.    HENT: Negative.    Eyes: Negative.    Respiratory: Negative for shortness of breath.    Cardiovascular: Negative for chest pain and palpitations.   Gastrointestinal: Negative.    Genitourinary: Negative.    Musculoskeletal: Negative.    Skin: Negative.    Neurological: Positive for dizziness.   Endo/Heme/Allergies: Negative.    Psychiatric/Behavioral: The patient is not nervous/anxious.    All 12 systems otherwise negative.      Wt Readings from Last 3 Encounters:   04/27/20 75.8 kg (167 lb 1.7 oz)   11/05/19 76.6 kg (168 lb 14 oz)   10/15/19 79.1 kg (174 lb 4.4 oz)     Temp Readings from Last 3 Encounters:   04/27/20 98.1 °F (36.7 °C) (Tympanic)   05/02/19 98.1 °F (36.7 °C)   11/20/18 98.6 °F (37 °C)     BP Readings from Last 3 Encounters:   04/27/20 (!) 190/125   11/05/19 122/80   10/15/19 (!) 152/100     Pulse Readings from Last 3 Encounters:   04/27/20 90   11/05/19 65   10/15/19 75       There were no vitals taken for this visit.    Objective:   Physical Exam   Constitutional: She is oriented to person, place, and time.   Neurological: She is alert and oriented to person, place, and time.   Psychiatric: She has a normal mood and affect. Her behavior is normal. Judgment and thought content normal.       Lab Results   Component Value Date     08/15/2019    K 4.4 08/15/2019     08/15/2019    CO2 27 08/15/2019    BUN 22 08/15/2019    CREATININE 2.0 (H) 08/15/2019    GLU 85 08/15/2019    MG 2.0 08/15/2019    AST 20 05/30/2019    AST 20 05/30/2019    ALT 11 05/30/2019    ALT 11 05/30/2019    ALBUMIN 3.2 (L) 08/12/2019    PROT 7.4 05/30/2019    PROT 7.4 05/30/2019    BILITOT 0.3 05/30/2019    BILITOT 0.3 05/30/2019    WBC 7.50 02/26/2019    HGB 14.2 02/26/2019    HCT 45.0 02/26/2019    MCV 91 02/26/2019     02/26/2019    TSH 0.600 08/25/2011    CHOL 179 08/25/2011    HDL 73 08/25/2011    LDLCALC 84.6 08/25/2011    TRIG 107  08/25/2011    BNP 1,513 (H) 08/15/2019     Assessment:      1. Essential hypertension    2. Chronic heart failure with preserved ejection fraction    3. Other hyperlipidemia    4. CKD (chronic kidney disease) stage 4, GFR 15-29 ml/min    5. Class 1 obesity due to excess calories with serious comorbidity and body mass index (BMI) of 30.0 to 30.9 in adult        Plan:   1. HFPEF  - cont meds  - cont lasix  - euvolemic    2. HTN -   - cont meds - increase norvasc   - low salt diet    3. CKD4  - f/u with nephro/PCP    4. Hep C  - cont tx per PCP    5. HLD  - cont statin    6. Obese  - needs weight loss with diet/exercise     7. Recent TIA/CVA - L sided weakness?  - cont meds  - BP improved  - close follow up    Thank you for allowing me to participate in this patient's care. Please do not hesitate to contact me with any questions or concerns. Consult note has been forwarded to the referral physician.          This service was not originating from a related E/M service provided within the previous 7 days nor will  to an E/M service or procedure within the next 24 hours or my soonest available appointment.  Prevailing standard of care was able to be met in this audio-only visit.

## 2020-05-01 NOTE — Clinical Note
2 week follow up with BP log. Can you make sure she gets her scrips, I increased Norvasc to 10 mg. Thanks

## 2020-05-04 ENCOUNTER — OFFICE VISIT (OUTPATIENT)
Dept: FAMILY MEDICINE | Facility: CLINIC | Age: 65
End: 2020-05-04
Payer: MEDICARE

## 2020-05-04 VITALS
WEIGHT: 163.81 LBS | HEART RATE: 73 BPM | BODY MASS INDEX: 29.02 KG/M2 | SYSTOLIC BLOOD PRESSURE: 136 MMHG | TEMPERATURE: 98 F | DIASTOLIC BLOOD PRESSURE: 97 MMHG | HEIGHT: 63 IN

## 2020-05-04 DIAGNOSIS — F19.90 SUBSTANCE USE DISORDER: ICD-10-CM

## 2020-05-04 DIAGNOSIS — Z09 HOSPITAL DISCHARGE FOLLOW-UP: Primary | ICD-10-CM

## 2020-05-04 DIAGNOSIS — E78.5 HYPERLIPIDEMIA, UNSPECIFIED HYPERLIPIDEMIA TYPE: ICD-10-CM

## 2020-05-04 DIAGNOSIS — B18.2 CHRONIC HEPATITIS C WITHOUT HEPATIC COMA: ICD-10-CM

## 2020-05-04 DIAGNOSIS — R29.898 LEFT LEG WEAKNESS: ICD-10-CM

## 2020-05-04 DIAGNOSIS — N18.4 CKD (CHRONIC KIDNEY DISEASE) STAGE 4, GFR 15-29 ML/MIN: ICD-10-CM

## 2020-05-04 DIAGNOSIS — F31.70 BIPOLAR AFFECTIVE DISORDER IN REMISSION: ICD-10-CM

## 2020-05-04 DIAGNOSIS — I10 ESSENTIAL HYPERTENSION: ICD-10-CM

## 2020-05-04 DIAGNOSIS — I50.32 CHRONIC HEART FAILURE WITH PRESERVED EJECTION FRACTION: ICD-10-CM

## 2020-05-04 PROCEDURE — 1126F AMNT PAIN NOTED NONE PRSNT: CPT | Mod: HCNC,S$GLB,, | Performed by: REGISTERED NURSE

## 2020-05-04 PROCEDURE — 99999 PR PBB SHADOW E&M-EST. PATIENT-LVL V: CPT | Mod: PBBFAC,HCNC,, | Performed by: REGISTERED NURSE

## 2020-05-04 PROCEDURE — 3080F PR MOST RECENT DIASTOLIC BLOOD PRESSURE >= 90 MM HG: ICD-10-PCS | Mod: HCNC,CPTII,S$GLB, | Performed by: REGISTERED NURSE

## 2020-05-04 PROCEDURE — 99214 PR OFFICE/OUTPT VISIT, EST, LEVL IV, 30-39 MIN: ICD-10-PCS | Mod: HCNC,S$GLB,, | Performed by: REGISTERED NURSE

## 2020-05-04 PROCEDURE — 1126F PR PAIN SEVERITY QUANTIFIED, NO PAIN PRESENT: ICD-10-PCS | Mod: HCNC,S$GLB,, | Performed by: REGISTERED NURSE

## 2020-05-04 PROCEDURE — 3075F SYST BP GE 130 - 139MM HG: CPT | Mod: HCNC,CPTII,S$GLB, | Performed by: REGISTERED NURSE

## 2020-05-04 PROCEDURE — 1101F PR PT FALLS ASSESS DOC 0-1 FALLS W/OUT INJ PAST YR: ICD-10-PCS | Mod: HCNC,CPTII,S$GLB, | Performed by: REGISTERED NURSE

## 2020-05-04 PROCEDURE — 1159F PR MEDICATION LIST DOCUMENTED IN MEDICAL RECORD: ICD-10-PCS | Mod: HCNC,S$GLB,, | Performed by: REGISTERED NURSE

## 2020-05-04 PROCEDURE — 1159F MED LIST DOCD IN RCRD: CPT | Mod: HCNC,S$GLB,, | Performed by: REGISTERED NURSE

## 2020-05-04 PROCEDURE — 3080F DIAST BP >= 90 MM HG: CPT | Mod: HCNC,CPTII,S$GLB, | Performed by: REGISTERED NURSE

## 2020-05-04 PROCEDURE — 3075F PR MOST RECENT SYSTOLIC BLOOD PRESS GE 130-139MM HG: ICD-10-PCS | Mod: HCNC,CPTII,S$GLB, | Performed by: REGISTERED NURSE

## 2020-05-04 PROCEDURE — 99999 PR PBB SHADOW E&M-EST. PATIENT-LVL V: ICD-10-PCS | Mod: PBBFAC,HCNC,, | Performed by: REGISTERED NURSE

## 2020-05-04 PROCEDURE — 1101F PT FALLS ASSESS-DOCD LE1/YR: CPT | Mod: HCNC,CPTII,S$GLB, | Performed by: REGISTERED NURSE

## 2020-05-04 PROCEDURE — 99214 OFFICE O/P EST MOD 30 MIN: CPT | Mod: HCNC,S$GLB,, | Performed by: REGISTERED NURSE

## 2020-05-04 RX ORDER — HYDROXYZINE HYDROCHLORIDE 25 MG/1
TABLET, FILM COATED ORAL
COMMUNITY
End: 2021-05-04 | Stop reason: SDUPTHER

## 2020-05-04 RX ORDER — HYDRALAZINE HYDROCHLORIDE 25 MG/1
25 TABLET, FILM COATED ORAL EVERY 8 HOURS
Qty: 90 TABLET | Refills: 1 | Status: SHIPPED | OUTPATIENT
Start: 2020-05-04 | End: 2020-05-22 | Stop reason: SDUPTHER

## 2020-05-04 RX ORDER — LISINOPRIL 10 MG/1
TABLET ORAL
COMMUNITY
End: 2020-05-22

## 2020-05-04 RX ORDER — HYDROXYZINE PAMOATE 25 MG/1
50 CAPSULE ORAL
COMMUNITY
End: 2021-05-04

## 2020-05-04 RX ORDER — ZIPRASIDONE HYDROCHLORIDE 20 MG/1
20 CAPSULE ORAL
COMMUNITY
End: 2021-07-10 | Stop reason: SDUPTHER

## 2020-05-04 RX ORDER — NIFEDIPINE 90 MG/1
TABLET, EXTENDED RELEASE ORAL
COMMUNITY
End: 2020-05-22 | Stop reason: SDUPTHER

## 2020-05-04 RX ORDER — LANOLIN ALCOHOL/MO/W.PET/CERES
CREAM (GRAM) TOPICAL
COMMUNITY
End: 2021-07-30 | Stop reason: SDUPTHER

## 2020-05-04 RX ORDER — RISPERIDONE 0.5 MG/1
TABLET ORAL
COMMUNITY
End: 2022-05-16

## 2020-05-04 RX ORDER — TOPIRAMATE 25 MG/1
TABLET ORAL
COMMUNITY
End: 2022-05-16

## 2020-05-04 RX ORDER — AMLODIPINE BESYLATE 10 MG/1
10 TABLET ORAL DAILY
Qty: 30 TABLET | Refills: 3 | Status: SHIPPED | OUTPATIENT
Start: 2020-05-04 | End: 2020-05-22 | Stop reason: SDUPTHER

## 2020-05-04 RX ORDER — CLONIDINE HYDROCHLORIDE 0.1 MG/1
0.1 TABLET ORAL
COMMUNITY
End: 2020-05-22 | Stop reason: SDUPTHER

## 2020-05-04 RX ORDER — NIFEDIPINE 90 MG/1
TABLET, FILM COATED, EXTENDED RELEASE ORAL
COMMUNITY
End: 2020-05-22

## 2020-05-04 RX ORDER — ATORVASTATIN CALCIUM 40 MG/1
40 TABLET, FILM COATED ORAL
COMMUNITY
Start: 2020-04-29 | End: 2021-04-29

## 2020-05-04 RX ORDER — CLONIDINE HYDROCHLORIDE 0.1 MG/1
0.1 TABLET ORAL 3 TIMES DAILY
Qty: 90 TABLET | Refills: 1 | Status: SHIPPED | OUTPATIENT
Start: 2020-05-04 | End: 2020-05-04 | Stop reason: SDUPTHER

## 2020-05-04 RX ORDER — RISPERIDONE 0.25 MG/1
0.25 TABLET ORAL
COMMUNITY
End: 2022-05-16

## 2020-05-04 RX ORDER — FUROSEMIDE 40 MG/1
40 TABLET ORAL
COMMUNITY
Start: 2020-04-29 | End: 2020-11-05 | Stop reason: SDUPTHER

## 2020-05-04 RX ORDER — BENZTROPINE MESYLATE 1 MG/1
1 TABLET ORAL
COMMUNITY
End: 2022-05-16

## 2020-05-04 RX ORDER — METHOCARBAMOL 500 MG/1
TABLET, FILM COATED ORAL
COMMUNITY
End: 2022-09-29

## 2020-05-04 RX ORDER — CARVEDILOL 25 MG/1
25 TABLET ORAL 2 TIMES DAILY WITH MEALS
Qty: 60 TABLET | Refills: 1 | Status: SHIPPED | OUTPATIENT
Start: 2020-05-04 | End: 2020-05-22 | Stop reason: SDUPTHER

## 2020-05-04 RX ORDER — ONDANSETRON 4 MG/1
TABLET, ORALLY DISINTEGRATING ORAL
COMMUNITY
End: 2022-09-29

## 2020-05-04 RX ORDER — ATENOLOL 100 MG/1
TABLET ORAL
COMMUNITY
End: 2020-05-22

## 2020-05-04 RX ORDER — IRBESARTAN 75 MG/1
TABLET ORAL
COMMUNITY
Start: 2020-04-20 | End: 2020-05-22 | Stop reason: SDUPTHER

## 2020-05-04 RX ORDER — ASPIRIN 325 MG
TABLET, DELAYED RELEASE (ENTERIC COATED) ORAL
COMMUNITY
Start: 2020-04-29 | End: 2021-07-30

## 2020-05-04 RX ORDER — TRAZODONE HYDROCHLORIDE 150 MG/1
TABLET ORAL
COMMUNITY
End: 2022-05-16

## 2020-05-04 RX ORDER — METOPROLOL SUCCINATE 50 MG/1
50 TABLET, EXTENDED RELEASE ORAL
COMMUNITY
Start: 2020-04-30 | End: 2020-05-22

## 2020-05-04 RX ORDER — NAPROXEN AND ESOMEPRAZOLE MAGNESIUM 20; 500 MG/1; MG/1
TABLET, DELAYED RELEASE ORAL
COMMUNITY
End: 2021-07-31

## 2020-05-04 NOTE — PROGRESS NOTES
Subjective:       Patient ID: Rose Milligan is a 64 y.o. female.    Chief Complaint   Patient presents with    Hospital Follow Up         HOSPITAL: [facility]  Admitted: 4/27/2020  Discharged: 4/29/2020        HPI    Rose Milligan is here today for follow-up of recent hospitalization.  I have reviewed the patient's medical history in detail and updated the computerized patient record.    Per discharge summary [facility]:  History of Present Illness  Patient is 64-year-old black female with past medical history of substance abuse with frequent crack cocaine usage, CHF, hypertension, bipolar disorder, comes in complaining of left-sided weakness, she reports has been present for 2 weeks at which time it caused her to fall, she has had persistent problems with left leg since that time with difficulty walking, having to limp or drag along her left leg.  has been asking her to go to get checked out but she failed to do so until today when she went to her PCP and was found to have elevated blood pressure of approximately 200/120. She denies missing blood pressure medications, reports she is taking hydralazine and clonidine, she reports clonidine is supposed to be 3 times per day but sometimes she misses the middle dose. Does not have any of her medication bottles with her. She denies any headache, positive for blurry vision, had some speech issues at time of onset of symptoms 2 weeks ago but none since. Denies any chest pain, denies any lower extremity edema or other symptoms. Blood pressure elevations are severe, unknown aggravating factors, no alleviating factors with 0 response to IV hydralazine in the emergency room.     Hypertensive emergency  Patient CT scan from admit shows chronic small vessel ischemia but no acute CVA. If stroke, strokelike symptoms were really 2 weeks ago it would not explain her persistent elevation in blood pressure at this time.Given dose of labetalol, with hypertensive emergency would like to  "reduce systolic blood pressures by about 25% currently at 2 30-2 40 systolic would like to reduce to about 170-180 systolic, likewise diastolics running 120s to 130s should be reduced to about 90s, we are allowing for some permissive hypertension secondary to chronically elevated blood pressures and possible acute CVA. transition off of Cardene, will place patient on hydralazine which she was previously on at home, will also start amlodipine 5 and Toprol-XL 50 mg daily.     Hemiparesis affecting nondominant side as late effect of cerebrovascular accident (CVA) ([facility])  No acute CVA, these are likely secondary to extreme elevations in blood pressure and crack cocaine use, appears to be resolving upon discharge    CKD (chronic kidney disease) stage 3, GFR 30-59 ml/min ([facility])  Also related to chronically elevated blood pressures, creatinine actually little improved from 11 months ago, improvement after admission    Bipolar affective disorder, currently manic, moderate ([facility])  Inappropriately elevated affect will need psychiatric medication while here we will try to verify exactly which psychiatric medication she is taking, mental status stable    Chronic systolic (congestive) heart failure (HCC)  Secondary to chronic cocaine use and chronically elevated blood pressure. Known chronic CHF, by examination no obvious decompensation, no JVD, no peripheral edema.     Substance abuse ([facility])  Reports she has been clean from crack cocaine for 2 weeks, continues to smoke, denies any alcohol usage. Continue to  on dangers of crack cocaine use, will need further evaluation into cardiac status.       She reports feeling well and doing better.  Her main concern is left leg weakness, she reports "they told me I had a light stroke".  Muscle strength normal to upper extremities, feels some weakness to the LLE.  Home blood pressures running 150/90ish at home, on medication as ordered.  Does see Dr. Cohen once every 3 months for her " mental health needs.  No fever, chills, chest pain, edema, dizziness, slurred speech, memory problems or headaches.  Denies drug use relapse.      Review of Systems   Constitutional: Negative.    HENT: Negative.    Eyes: Negative.    Respiratory: Negative.    Cardiovascular: Negative.    Gastrointestinal: Negative.    Musculoskeletal: Negative.    Integumentary:  Negative.   Neurological: Negative for vertigo, numbness and headaches.        LLE weakness   Hematological: Negative.    Psychiatric/Behavioral: Negative.           Review of patient's allergies indicates:   Allergen Reactions    Tramadol Itching         Patient Active Problem List   Diagnosis    Chronic hepatitis C virus infection    Essential hypertension    Bipolar affective disorder, manic    CKD (chronic kidney disease) stage 4, GFR 15-29 ml/min    Bipolar affective disorder    Other chest pain    (HFpEF) heart failure with preserved ejection fraction    Other hyperlipidemia    Class 1 obesity due to excess calories with serious comorbidity and body mass index (BMI) of 30.0 to 30.9 in adult    Substance use disorder    CKD (chronic kidney disease) stage 3, GFR 30-59 ml/min    Left-sided weakness         Current Outpatient Medications:     amLODIPine (NORVASC) 10 MG tablet, Take 1 tablet (10 mg total) by mouth once daily., Disp: 30 tablet, Rfl: 3    aspirin (ECOTRIN) 325 MG EC tablet, , Disp: , Rfl:     atenoloL (TENORMIN) 100 MG tablet, atenolol 100 mg tablet, Disp: , Rfl:     atorvastatin (LIPITOR) 40 MG tablet, Take 40 mg by mouth., Disp: , Rfl:     benztropine (COGENTIN) 1 MG tablet, Take 1 mg by mouth., Disp: , Rfl:     carvediloL (COREG) 25 MG tablet, Take 1 tablet (25 mg total) by mouth 2 (two) times daily with meals., Disp: 60 tablet, Rfl: 1    cloNIDine (CATAPRES) 0.1 MG tablet, Take 0.1 mg by mouth., Disp: , Rfl:     flu vaccine [id number],4 yr up, 45 mcg (15 mcg x 3)/0.5 mL Susp, Fluvirin [id number] 45 mcg (15 mcg x 3)/0.5  mL intramuscular suspension, Disp: , Rfl:     furosemide (LASIX) 40 MG tablet, Take 40 mg by mouth., Disp: , Rfl:     hydrALAZINE (APRESOLINE) 25 MG tablet, Take 1 tablet (25 mg total) by mouth every 8 (eight) hours., Disp: 90 tablet, Rfl: 1    hydroxyzine HCL (ATARAX) 25 MG tablet, hydroxyzine HCl 25 mg tablet, Disp: , Rfl:     hydrOXYzine pamoate (VISTARIL) 25 MG Cap, Take 50 mg by mouth., Disp: , Rfl:     influenza (FLUZONE HIGH-DOSE) 180 mcg/0.5 mL vaccine, Fluzone High-Dose [id number] (PF) 180 mcg/0.5 mL intramuscular syringe  ADM 0.5ML IM UTD, Disp: , Rfl:     irbesartan (AVAPRO) 75 MG tablet, , Disp: , Rfl:     lisinopriL 10 MG tablet, lisinopril 10 mg tablet, Disp: , Rfl:     magnesium oxide (MAG-OX) 400 mg (241.3 mg magnesium) tablet, Take 1 tablet (400 mg total) by mouth once daily., Disp: , Rfl: 0    magnesium oxide (MAG-OX) 400 mg (241.3 mg magnesium) tablet, magnesium oxide 400 mg (241.3 mg magnesium) tablet  TAKE 1 TABLET BY MOUTH EVERY DAY, Disp: , Rfl:     methocarbamoL (ROBAXIN) 500 MG Tab, methocarbamol 500 mg tablet  TAKE 2 TABLETS BY MOUTH EVERY 6 HOURS AS NEEDED FOR SPASM, Disp: , Rfl:     metoprolol succinate (TOPROL-XL) 50 MG 24 hr tablet, Take 50 mg by mouth., Disp: , Rfl:     naproxen-esomeprazole (VIMOVO) 500-20 mg TbID, naproxen 500 mg tablet  TAKE 1 TABLET BY MOUTH TWICE A DAY, Disp: , Rfl:     NIFEdipine (ADALAT CC) 90 MG TbSR, nifedipine ER 90 mg tablet,extended release  TAKE 1 TABLET DAILY, Disp: , Rfl:     NIFEdipine (PROCARDIA-XL) 90 MG (OSM) 24 hr tablet, nifedipine ER 90 mg tablet,extended release 24 hr  TAKE 1 TABLET BY MOUTH EVERY DAY, Disp: , Rfl:     ondansetron (ZOFRAN-ODT) 4 MG TbDL, ondansetron 4 mg disintegrating tablet  TAKE 1 TABLET BY MOUTH EVERY 8 HOURS AS NEEDED FOR NAUSEA, Disp: , Rfl:     pitavastatin calcium (LIVALO) 2 mg Tab tablet, Take 1 tablet (2 mg total) by mouth once daily., Disp: 90 tablet, Rfl: 3    risperiDONE (RISPERDAL) 0.25 MG Tab, Take  "0.25 mg by mouth., Disp: , Rfl:     topiramate (TOPAMAX) 25 MG tablet, topiramate 25 mg tablet  TAKE 1 TABLET (25 MG TOTAL) BY MOUTH EVERY EVENING., Disp: , Rfl:     traZODone (DESYREL) 150 MG tablet, trazodone 150 mg tablet, Disp: , Rfl:     ziprasidone (GEODON) 20 MG Cap, Take 20 mg by mouth., Disp: , Rfl:     mupirocin (BACTROBAN) 2 % ointment, Apply topically 3 (three) times daily. (Patient not taking: Reported on 5/4/2020), Disp: 15 g, Rfl: 0    risperiDONE (RISPERDAL) 0.5 MG Tab, risperidone 0.5 mg tablet  TAKE 1 TABLET (0.5 MG TOTAL) BY MOUTH EVERY EVENING., Disp: , Rfl:       Past Medical History:   Diagnosis Date    Bipolar 1 disorder     CHF (congestive heart failure)     Depression     Hepatitis C     Hypertension     Other hyperlipidemia 8/15/2019       Past Surgical History:   Procedure Laterality Date    HYSTERECTOMY         Family History   Problem Relation Age of Onset    Heart attack Maternal Grandmother     Hypertension Mother        Social History     Tobacco Use    Smoking status: Current Some Day Smoker     Packs/day: 0.25     Types: Cigarettes     Start date: 1/11/2016    Smokeless tobacco: Never Used    Tobacco comment: 1 pack every 3 days   Substance Use Topics    Alcohol use: Yes     Frequency: Never    Drug use: No           Objective:     Vitals:    05/04/20 1107   BP: (!) 136/97   Pulse: 73   Temp: 98 °F (36.7 °C)   Weight: 74.3 kg (163 lb 12.8 oz)   Height: 5' 3" (1.6 m)       Estimated body mass index is 29.02 kg/m² as calculated from the following:    Height as of this encounter: 5' 3" (1.6 m).    Weight as of this encounter: 74.3 kg (163 lb 12.8 oz).      Physical Exam   Constitutional: She is oriented to person, place, and time. She appears well-developed and well-nourished. No distress.   HENT:   Head: Normocephalic.   Right Ear: External ear normal.   Left Ear: External ear normal.   Nose: Nose normal.   Mouth/Throat: Oropharynx is clear and moist. No " "oropharyngeal exudate.   Eyes: Pupils are equal, round, and reactive to light. Conjunctivae and EOM are normal. No scleral icterus.   Neck: Normal range of motion. Neck supple. No JVD present. No tracheal deviation present. No thyromegaly present.   Cardiovascular: Normal rate, regular rhythm, normal heart sounds and intact distal pulses. Exam reveals no gallop and no friction rub.   No murmur heard.  Pulmonary/Chest: Effort normal and breath sounds normal. No respiratory distress. She exhibits no tenderness.   Abdominal: Soft. Bowel sounds are normal.   Lymphadenopathy:     She has no cervical adenopathy.   Neurological: She is alert and oriented to person, place, and time. No sensory deficit. She exhibits normal muscle tone. Coordination and gait normal.   Left leg slightly weaker than right, no severe limp or gait problem   Skin: She is not diaphoretic.         Diagnosis       1. Hospital discharge follow-up    2. Left leg weakness    3. Essential hypertension    4. Hyperlipidemia, unspecified hyperlipidemia type    5. Chronic heart failure with preserved ejection fraction    6. CKD (chronic kidney disease) stage 4, GFR 15-29 ml/min    7. Chronic hepatitis C without hepatic coma    8. Substance use disorder    9. Bipolar affective disorder in remission          Assessment/ Plan     Hospital discharge follow-up  [facility] records reviewed in Epic.    Left leg weakness --- patient reports having a "light stroke" but per documentation, this is due to previous CVA, nothing acute.  No problems with speech and memory.  -     Ambulatory referral/consult to Physical/Occupational Therapy; Future; Expected date: 05/11/2020    Essential hypertension --- controlled today in office but she reports home readings running around 150/80.  She was seen by Cardiology on 5/1/2020, no medication adjustment.  Continue BP monitoring.  Low-salt/caffeine intake, healthy diet.  Smoking cessation.    Hyperlipidemia, unspecified hyperlipidemia " type --- stable, on medication as ordered.  Followed by Cardiology and PCP.    Chronic heart failure with preserved ejection fraction --- stable, followed by Cardiology, on medication as ordered.  Last echocardiogram shows EF 60-65 % with PA pressure of 45, grade 2 diastolic dysfunction and severe left atrial enlargement.    CKD (chronic kidney disease) stage 4, GFR 15-29 ml/min --- stable, followed by Neph (Dr. Pedersen) with last office visit 8/2019.  Need to follow-up with Nephrology for recheck/evaluation and update treatment plan.    Chronic hepatitis C without hepatic coma --- stable, has been followed by Dr. Thomas previously.  No jaundice or abdominal issues reported today.    Substance use disorder --- drug of choice is cocaine, she reports has not used in a few weeks and does not plan on restarting.  Support given.    Bipolar affective disorder in remission --- stable, followed by Dr. Cohen on medication as ordered.          Follow-up:  Return to clinic for annual wellness exam with PCP in 3 months.        Patient Care Team:  Zora Cope MD as PCP - General (Family Medicine)      MALLORY Granados  [facility]

## 2020-05-05 PROBLEM — R07.89 OTHER CHEST PAIN: Status: RESOLVED | Noted: 2018-11-21 | Resolved: 2020-05-05

## 2020-05-05 PROBLEM — F31.10 BIPOLAR AFFECTIVE DISORDER, MANIC: Status: RESOLVED | Noted: 2018-08-09 | Resolved: 2020-05-05

## 2020-05-05 PROBLEM — N18.30 CKD (CHRONIC KIDNEY DISEASE) STAGE 3, GFR 30-59 ML/MIN: Status: RESOLVED | Noted: 2020-04-27 | Resolved: 2020-05-05

## 2020-05-20 ENCOUNTER — PATIENT OUTREACH (OUTPATIENT)
Dept: ADMINISTRATIVE | Facility: OTHER | Age: 65
End: 2020-05-20

## 2020-05-22 ENCOUNTER — OFFICE VISIT (OUTPATIENT)
Dept: CARDIOLOGY | Facility: CLINIC | Age: 65
End: 2020-05-22
Payer: MEDICARE

## 2020-05-22 VITALS
HEIGHT: 63 IN | HEART RATE: 85 BPM | WEIGHT: 161.19 LBS | BODY MASS INDEX: 28.56 KG/M2 | OXYGEN SATURATION: 97 % | SYSTOLIC BLOOD PRESSURE: 142 MMHG | DIASTOLIC BLOOD PRESSURE: 90 MMHG

## 2020-05-22 DIAGNOSIS — I10 ESSENTIAL HYPERTENSION: ICD-10-CM

## 2020-05-22 DIAGNOSIS — E78.49 OTHER HYPERLIPIDEMIA: ICD-10-CM

## 2020-05-22 DIAGNOSIS — N18.4 CKD (CHRONIC KIDNEY DISEASE) STAGE 4, GFR 15-29 ML/MIN: ICD-10-CM

## 2020-05-22 DIAGNOSIS — R53.1 LEFT-SIDED WEAKNESS: ICD-10-CM

## 2020-05-22 DIAGNOSIS — E66.09 CLASS 1 OBESITY DUE TO EXCESS CALORIES WITH SERIOUS COMORBIDITY AND BODY MASS INDEX (BMI) OF 30.0 TO 30.9 IN ADULT: ICD-10-CM

## 2020-05-22 DIAGNOSIS — I50.32 CHRONIC HEART FAILURE WITH PRESERVED EJECTION FRACTION: Primary | ICD-10-CM

## 2020-05-22 PROCEDURE — 1159F PR MEDICATION LIST DOCUMENTED IN MEDICAL RECORD: ICD-10-PCS | Mod: HCNC,S$GLB,, | Performed by: INTERNAL MEDICINE

## 2020-05-22 PROCEDURE — 1126F AMNT PAIN NOTED NONE PRSNT: CPT | Mod: HCNC,S$GLB,, | Performed by: INTERNAL MEDICINE

## 2020-05-22 PROCEDURE — 1101F PR PT FALLS ASSESS DOC 0-1 FALLS W/OUT INJ PAST YR: ICD-10-PCS | Mod: HCNC,CPTII,S$GLB, | Performed by: INTERNAL MEDICINE

## 2020-05-22 PROCEDURE — 99999 PR PBB SHADOW E&M-EST. PATIENT-LVL III: CPT | Mod: PBBFAC,HCNC,, | Performed by: INTERNAL MEDICINE

## 2020-05-22 PROCEDURE — 1126F PR PAIN SEVERITY QUANTIFIED, NO PAIN PRESENT: ICD-10-PCS | Mod: HCNC,S$GLB,, | Performed by: INTERNAL MEDICINE

## 2020-05-22 PROCEDURE — 1101F PT FALLS ASSESS-DOCD LE1/YR: CPT | Mod: HCNC,CPTII,S$GLB, | Performed by: INTERNAL MEDICINE

## 2020-05-22 PROCEDURE — 3077F SYST BP >= 140 MM HG: CPT | Mod: HCNC,CPTII,S$GLB, | Performed by: INTERNAL MEDICINE

## 2020-05-22 PROCEDURE — 99499 RISK ADDL DX/OHS AUDIT: ICD-10-PCS | Mod: HCNC,S$GLB,, | Performed by: INTERNAL MEDICINE

## 2020-05-22 PROCEDURE — 1159F MED LIST DOCD IN RCRD: CPT | Mod: HCNC,S$GLB,, | Performed by: INTERNAL MEDICINE

## 2020-05-22 PROCEDURE — 99214 OFFICE O/P EST MOD 30 MIN: CPT | Mod: HCNC,S$GLB,, | Performed by: INTERNAL MEDICINE

## 2020-05-22 PROCEDURE — 99499 UNLISTED E&M SERVICE: CPT | Mod: HCNC,S$GLB,, | Performed by: INTERNAL MEDICINE

## 2020-05-22 PROCEDURE — 3080F PR MOST RECENT DIASTOLIC BLOOD PRESSURE >= 90 MM HG: ICD-10-PCS | Mod: HCNC,CPTII,S$GLB, | Performed by: INTERNAL MEDICINE

## 2020-05-22 PROCEDURE — 99999 PR PBB SHADOW E&M-EST. PATIENT-LVL III: ICD-10-PCS | Mod: PBBFAC,HCNC,, | Performed by: INTERNAL MEDICINE

## 2020-05-22 PROCEDURE — 99214 PR OFFICE/OUTPT VISIT, EST, LEVL IV, 30-39 MIN: ICD-10-PCS | Mod: HCNC,S$GLB,, | Performed by: INTERNAL MEDICINE

## 2020-05-22 PROCEDURE — 3080F DIAST BP >= 90 MM HG: CPT | Mod: HCNC,CPTII,S$GLB, | Performed by: INTERNAL MEDICINE

## 2020-05-22 PROCEDURE — 3077F PR MOST RECENT SYSTOLIC BLOOD PRESSURE >= 140 MM HG: ICD-10-PCS | Mod: HCNC,CPTII,S$GLB, | Performed by: INTERNAL MEDICINE

## 2020-05-22 RX ORDER — CLONIDINE HYDROCHLORIDE 0.1 MG/1
0.1 TABLET ORAL 3 TIMES DAILY
Qty: 90 TABLET | Refills: 1 | Status: SHIPPED | OUTPATIENT
Start: 2020-05-22 | End: 2021-12-29 | Stop reason: SDUPTHER

## 2020-05-22 RX ORDER — AMLODIPINE BESYLATE 10 MG/1
10 TABLET ORAL DAILY
Qty: 90 TABLET | Refills: 1 | Status: SHIPPED | OUTPATIENT
Start: 2020-05-22 | End: 2021-07-10 | Stop reason: SDUPTHER

## 2020-05-22 RX ORDER — IRBESARTAN 75 MG/1
75 TABLET ORAL DAILY
Qty: 90 TABLET | Refills: 1 | Status: SHIPPED | OUTPATIENT
Start: 2020-05-22 | End: 2022-12-02 | Stop reason: SDUPTHER

## 2020-05-22 RX ORDER — CARVEDILOL 25 MG/1
25 TABLET ORAL 2 TIMES DAILY WITH MEALS
Qty: 120 TABLET | Refills: 1 | Status: SHIPPED | OUTPATIENT
Start: 2020-05-22 | End: 2020-07-13 | Stop reason: SDUPTHER

## 2020-05-22 RX ORDER — HYDRALAZINE HYDROCHLORIDE 50 MG/1
50 TABLET, FILM COATED ORAL EVERY 8 HOURS
Qty: 90 TABLET | Refills: 1 | Status: SHIPPED | OUTPATIENT
Start: 2020-05-22 | End: 2021-07-06 | Stop reason: SDUPTHER

## 2020-05-22 RX ORDER — NIFEDIPINE 90 MG/1
90 TABLET, EXTENDED RELEASE ORAL DAILY
Qty: 90 TABLET | Refills: 1 | Status: SHIPPED | OUTPATIENT
Start: 2020-05-22 | End: 2021-07-30

## 2020-05-22 NOTE — PROGRESS NOTES
Subjective:   Patient ID:  Rose Milligan is a 64 y.o. female who presents for cardiac consult of Follow-up      Follow-up   Pertinent negatives include no chest pain.   Hypertension   Pertinent negatives include no chest pain, palpitations or shortness of breath.     The patient came in today for cardiac consult of Follow-up      Rose Milligan is a 64 y.o. female  with HFpEF, HTN, pulm HTN, tobacco abuse, h/o drug abuse, bipolar, hep C, CKD4 presents for follow up CV eval.      11/21/18  Pt went to [facility] yesterday. She went for chest pain and L shoulder pain. Chest pain started about 3 days ago, and was off medicines for 2 weeks. She had bloodwork, ECG and was told to follow up hereShe called her PCP and only picked up bipolar meds. Chest pain feels like tightness, for past few days been constant. Mild headache and nausea. Now she feels lightheaded. PT also gets palpitations frequenltly.     8/15/19 - hosp follow up   She was recently admitted to [facility] for CHF exac, presented to Dr. Cope's office with HTN urgency, unsure of her meds, she was sent back to ER at [facility]. She started to have a cough last and then had worsening orthopnea. She was diuresed 3.5L. She still has AUGUSTE. She doesn't have med list again, talked to her friend on phone has only 4 bottles at home, called pharmacy only hydralazine was being taken.     10/15/19  BP uncontrolled this AM. She has gained about 10-15 lbs lately, has been drinking more sodas and eating more. No AUGUSTE.     11/5/19  Overall has been doing well lately. No SOB/AGUUSTE. No chest pain. Has been compliant with meds, lost a few pounds lately.     5/1/20  Last week at [facility] - was discharged Thurs  Reason for Hospitalization   Chief Complaint  Left-sided weakness     History of Present Illness  Patient is 64-year-old black female with past medical history of substance abuse with frequent crack cocaine usage, CHF, hypertension, bipolar disorder, comes in complaining of left-sided  weakness, she reports has been present for 2 weeks at which time it caused her to fall, she has had persistent problems with left leg since that time with difficulty walking, having to limp or drag along her left leg.  has been asking her to go to get checked out but she failed to do so until today when she went to her PCP and was found to have elevated blood pressure of approximately 200/120. She denies missing blood pressure medications, reports she is taking hydralazine and clonidine, she reports clonidine is supposed to be 3 times per day but sometimes she misses the middle dose. Does not have any of her medication bottles with her. She denies any headache, positive for blurry vision, had some speech issues at time of onset of symptoms 2 weeks ago but none since. Denies any chest pain, denies any lower extremity edema or other symptoms. Blood pressure elevations are severe, unknown aggravating factors, no alleviating factors with 0 response to IV hydralazine in the emergency room.    * Hypertensive emergency  Patient CT scan from admit shows chronic small vessel ischemia but no acute CVA. If stroke, strokelike symptoms were really 2 weeks ago it would not explain her persistent elevation in blood pressure at this time.Given dose of labetalol, with hypertensive emergency would like to reduce systolic blood pressures by about 25% currently at 2 30-2 40 systolic would like to reduce to about 170-180 systolic, likewise diastolics running 120s to 130s should be reduced to about 90s, we are allowing for some permissive hypertension secondary to chronically elevated blood pressures and possible acute CVA. transition off of Cardene, will place patient on hydralazine which she was previously on at home, will also start amlodipine 5 and Toprol-XL 50 mg daily.   Hemiparesis affecting nondominant side as late effect of cerebrovascular accident (CVA) (HCC)  No acute CVA, these are likely secondary to extreme  elevations in blood pressure and crack cocaine use, appears to be resolving upon discharge    Discharge Summary   Patient CT scan from admit shows chronic small vessel ischemia but no acute CVA. If stroke, strokelike symptoms were really 2 weeks ago it would not explain her persistent elevation in blood pressure at this time.Given dose of labetalol, with hypertensive emergency would like to reduce systolic blood pressures by about 25% currently at 2 30-2 40 systolic would like to reduce to about 170-180 systolic, likewise diastolics running 120s to 130s should be reduced to about 90s, we are allowing for some permissive hypertension secondary to chronically elevated blood pressures and possible acute CVA. transition off of Cardene, will place patient on hydralazine which she was previously on at home, will also start amlodipine 5 and Toprol-XL 50 mg daily.   Follow-up Patient continues to have very elevated blood pressures, will need echocardiogram for continued work-up, does have history of CHF but no evidence of decompensation on examination at this time. WillGoal map today less than 120 if possible, will need continued slow lowering due to extreme elevations.      Now she has been doing ok, she has a limp in left left, has improved. BP remains elevated 160-170/80-90. Discussed will increase norvasc but restart Coreg instead of Toprol.     5/22/20  She has been drinking some energy drinks which caused her to pass out, her BP was 200/140. She fell on L shoulder and has improved with strength. BP improved but remains elevated, will adjust meds again.     Patient feels  no PND , no dizziness, no syncope, no CNS symptoms. She is a  for [employer].     Patient is not compliant with medications.      ECG - NSR, LAE, LVH, septal infarct, inferolat TWI  4/28/20 ECHO  · Left Ventricle: Normal left ventricle cavity size. Hyperdynamic (>65%)   left ventricular function. Moderate concentric hypertrophy observed.    Indeterminate diastolic function.  · Mitral Valve: Normal mitral valve structure. No mitral stenosis. Trace   mitral regurgitation.  · Aortic Valve: The valve is tricuspid. No aortic stenosis. No aortic   regurgitation.  · Pulmonic Valve: No pulmonic regurgitation. Normal valve structure.  · Tricuspid Valve: Mild tricuspid regurgitation.    HOLTER  TEST DESCRIPTION   PREDOMINANT RHYTHM  1. Sinus rhythm with heart rates varying between 55 and 102 bpm with an average of 75 bpm.   VENTRICULAR ARRHYTHMIAS  1. There were very rare PVCs totalling 72 and averaging 1 per hour.   2. There were no episodes of ventricular tachycardia.    SUPRA VENTRICULAR ARRHYTHMIAS  1. There were very rare PACs totalling 50 and averaging 1 per hour.   2. There were no episodes of sustained supraventricular tachycardia.      Past Medical History:   Diagnosis Date    Bipolar 1 disorder     CHF (congestive heart failure)     Depression     Hepatitis C     Hypertension     Other hyperlipidemia 8/15/2019       Past Surgical History:   Procedure Laterality Date    HYSTERECTOMY         Social History     Tobacco Use    Smoking status: Current Some Day Smoker     Packs/day: 0.25     Types: Cigarettes     Start date: 1/11/2016    Smokeless tobacco: Never Used    Tobacco comment: 1 pack every 3 days   Substance Use Topics    Alcohol use: Yes     Frequency: Never    Drug use: No       Family History   Problem Relation Age of Onset    Heart attack Maternal Grandmother     Hypertension Mother        Patient's Medications   New Prescriptions    No medications on file   Previous Medications    ASPIRIN (ECOTRIN) 325 MG EC TABLET        ATORVASTATIN (LIPITOR) 40 MG TABLET    Take 40 mg by mouth.    BENZTROPINE (COGENTIN) 1 MG TABLET    Take 1 mg by mouth.    FLU VACCINE [id number],4 YR UP, 45 MCG (15 MCG X 3)/0.5 ML SUSP    Fluvirin [id number] 45 mcg (15 mcg x 3)/0.5 mL intramuscular suspension    FUROSEMIDE (LASIX) 40 MG TABLET    Take 40 mg  by mouth.    HYDROXYZINE HCL (ATARAX) 25 MG TABLET    hydroxyzine HCl 25 mg tablet    HYDROXYZINE PAMOATE (VISTARIL) 25 MG CAP    Take 50 mg by mouth.    INFLUENZA (FLUZONE HIGH-DOSE) 180 MCG/0.5 ML VACCINE    Fluzone High-Dose [id number] (PF) 180 mcg/0.5 mL intramuscular syringe   ADM 0.5ML IM UTD    MAGNESIUM OXIDE (MAG-OX) 400 MG (241.3 MG MAGNESIUM) TABLET    Take 1 tablet (400 mg total) by mouth once daily.    MAGNESIUM OXIDE (MAG-OX) 400 MG (241.3 MG MAGNESIUM) TABLET    magnesium oxide 400 mg (241.3 mg magnesium) tablet   TAKE 1 TABLET BY MOUTH EVERY DAY    METHOCARBAMOL (ROBAXIN) 500 MG TAB    methocarbamol 500 mg tablet   TAKE 2 TABLETS BY MOUTH EVERY 6 HOURS AS NEEDED FOR SPASM    MUPIROCIN (BACTROBAN) 2 % OINTMENT    Apply topically 3 (three) times daily.    NAPROXEN-ESOMEPRAZOLE (VIMOVO) 500-20 MG TBID    naproxen 500 mg tablet   TAKE 1 TABLET BY MOUTH TWICE A DAY    ONDANSETRON (ZOFRAN-ODT) 4 MG TBDL    ondansetron 4 mg disintegrating tablet   TAKE 1 TABLET BY MOUTH EVERY 8 HOURS AS NEEDED FOR NAUSEA    PITAVASTATIN CALCIUM (LIVALO) 2 MG TAB TABLET    Take 1 tablet (2 mg total) by mouth once daily.    RISPERIDONE (RISPERDAL) 0.25 MG TAB    Take 0.25 mg by mouth.    RISPERIDONE (RISPERDAL) 0.5 MG TAB    risperidone 0.5 mg tablet   TAKE 1 TABLET (0.5 MG TOTAL) BY MOUTH EVERY EVENING.    TOPIRAMATE (TOPAMAX) 25 MG TABLET    topiramate 25 mg tablet   TAKE 1 TABLET (25 MG TOTAL) BY MOUTH EVERY EVENING.    TRAZODONE (DESYREL) 150 MG TABLET    trazodone 150 mg tablet    ZIPRASIDONE (GEODON) 20 MG CAP    Take 20 mg by mouth.   Modified Medications    Modified Medication Previous Medication    AMLODIPINE (NORVASC) 10 MG TABLET amLODIPine (NORVASC) 10 MG tablet       Take 1 tablet (10 mg total) by mouth once daily.    Take 1 tablet (10 mg total) by mouth once daily.    CARVEDILOL (COREG) 25 MG TABLET carvediloL (COREG) 25 MG tablet       Take 1 tablet (25 mg total) by mouth 2 (two) times daily with meals.    Take  1 tablet (25 mg total) by mouth 2 (two) times daily with meals.    CLONIDINE (CATAPRES) 0.1 MG TABLET cloNIDine (CATAPRES) 0.1 MG tablet       Take 1 tablet (0.1 mg total) by mouth 3 (three) times daily.    Take 0.1 mg by mouth.    HYDRALAZINE (APRESOLINE) 50 MG TABLET hydrALAZINE (APRESOLINE) 25 MG tablet       Take 1 tablet (50 mg total) by mouth every 8 (eight) hours.    Take 1 tablet (25 mg total) by mouth every 8 (eight) hours.    IRBESARTAN (AVAPRO) 75 MG TABLET irbesartan (AVAPRO) 75 MG tablet       Take 1 tablet (75 mg total) by mouth once daily.        NIFEDIPINE (PROCARDIA-XL) 90 MG (OSM) 24 HR TABLET NIFEdipine (PROCARDIA-XL) 90 MG (OSM) 24 hr tablet       Take 1 tablet (90 mg total) by mouth once daily.    nifedipine ER 90 mg tablet,extended release 24 hr   TAKE 1 TABLET BY MOUTH EVERY DAY   Discontinued Medications    ATENOLOL (TENORMIN) 100 MG TABLET    atenolol 100 mg tablet    LISINOPRIL 10 MG TABLET    lisinopril 10 mg tablet    METOPROLOL SUCCINATE (TOPROL-XL) 50 MG 24 HR TABLET    Take 50 mg by mouth.    NIFEDIPINE (ADALAT CC) 90 MG TBSR    nifedipine ER 90 mg tablet,extended release   TAKE 1 TABLET DAILY       Review of Systems   Constitutional: Negative.    HENT: Negative.    Eyes: Negative.    Respiratory: Negative for shortness of breath.    Cardiovascular: Negative for chest pain and palpitations.   Gastrointestinal: Negative.    Genitourinary: Negative.    Musculoskeletal: Negative.    Skin: Negative.    Neurological: Positive for dizziness.   Endo/Heme/Allergies: Negative.    Psychiatric/Behavioral: The patient is not nervous/anxious.    All 12 systems otherwise negative.      Wt Readings from Last 3 Encounters:   05/22/20 73.1 kg (161 lb 2.5 oz)   05/04/20 74.3 kg (163 lb 12.8 oz)   04/27/20 75.8 kg (167 lb 1.7 oz)     Temp Readings from Last 3 Encounters:   05/04/20 98 °F (36.7 °C)   04/27/20 98.1 °F (36.7 °C) (Tympanic)   05/02/19 98.1 °F (36.7 °C)     BP Readings from Last 3 Encounters:  "  05/22/20 (!) 142/90   05/04/20 (!) 136/97   04/27/20 (!) 190/125     Pulse Readings from Last 3 Encounters:   05/22/20 85   05/04/20 73   04/27/20 90       BP (!) 142/90 (BP Location: Left arm, Patient Position: Sitting, BP Method: Large (Manual))   Pulse 85   Ht 5' 3" (1.6 m)   Wt 73.1 kg (161 lb 2.5 oz)   SpO2 97%   BMI 28.55 kg/m²     Objective:   Physical Exam   Constitutional: She is oriented to person, place, and time. She appears well-developed and well-nourished. No distress.   HENT:   Head: Normocephalic and atraumatic.   Nose: Nose normal.   Mouth/Throat: Oropharynx is clear and moist.   Eyes: Conjunctivae and EOM are normal. No scleral icterus.   Neck: Normal range of motion. Neck supple. No JVD present. No thyromegaly present.   Cardiovascular: Normal rate, regular rhythm, S1 normal and S2 normal. Exam reveals no gallop, no S3, no S4 and no friction rub.   No murmur heard.  Pulmonary/Chest: Effort normal and breath sounds normal. No stridor. No respiratory distress. She has no wheezes. She has no rales. She exhibits no tenderness.   Abdominal: Soft. Bowel sounds are normal. She exhibits no distension and no mass. There is no tenderness. There is no rebound.   Genitourinary:   Genitourinary Comments: Deferred   Musculoskeletal: Normal range of motion. She exhibits no edema, tenderness or deformity.   Lymphadenopathy:     She has no cervical adenopathy.   Neurological: She is alert and oriented to person, place, and time. She exhibits normal muscle tone. Coordination normal.   Skin: Skin is warm and dry. No rash noted. She is not diaphoretic. No erythema. No pallor.   Psychiatric: She has a normal mood and affect. Her behavior is normal. Judgment and thought content normal.   Nursing note and vitals reviewed.      Lab Results   Component Value Date     08/15/2019    K 4.4 08/15/2019     08/15/2019    CO2 27 08/15/2019    BUN 22 08/15/2019    CREATININE 2.0 (H) 08/15/2019    GLU 85 " 08/15/2019    MG 2.0 08/15/2019    AST 20 05/30/2019    AST 20 05/30/2019    ALT 11 05/30/2019    ALT 11 05/30/2019    ALBUMIN 3.2 (L) 08/12/2019    PROT 7.4 05/30/2019    PROT 7.4 05/30/2019    BILITOT 0.3 05/30/2019    BILITOT 0.3 05/30/2019    WBC 7.50 02/26/2019    HGB 14.2 02/26/2019    HCT 45.0 02/26/2019    MCV 91 02/26/2019     02/26/2019    TSH 0.600 08/25/2011    CHOL 179 08/25/2011    HDL 73 08/25/2011    LDLCALC 84.6 08/25/2011    TRIG 107 08/25/2011    BNP 1,513 (H) 08/15/2019     Assessment:      1. Chronic heart failure with preserved ejection fraction    2. Other hyperlipidemia    3. Essential hypertension    4. CKD (chronic kidney disease) stage 4, GFR 15-29 ml/min    5. Class 1 obesity due to excess calories with serious comorbidity and body mass index (BMI) of 30.0 to 30.9 in adult    6. Left-sided weakness        Plan:   1. HFPEF  - cont meds  - cont lasix  - euvolemic    2. HTN - adjusted meds   - cont meds   - low salt diet    3. CKD4  - f/u with nephro/PCP    4. Hep C  - cont tx per PCP    5. HLD  - cont statin    6. Obese  - needs weight loss with diet/exercise     7. Recent TIA/CVA - L sided weakness? - resolved   - cont meds  - BP improved    Thank you for allowing me to participate in this patient's care. Please do not hesitate to contact me with any questions or concerns. Consult note has been forwarded to the referral physician.       This service was not originating from a related E/M service provided within the previous 7 days nor will  to an E/M service or procedure within the next 24 hours or my soonest available appointment.  Prevailing standard of care was able to be met in this audio-only visit.

## 2020-06-24 ENCOUNTER — PATIENT OUTREACH (OUTPATIENT)
Dept: ADMINISTRATIVE | Facility: OTHER | Age: 65
End: 2020-06-24

## 2020-06-24 NOTE — PROGRESS NOTES
Requested updates within Care Everywhere.  Patient's chart was reviewed for overdue DAVID topics.

## 2020-07-13 DIAGNOSIS — I50.32 CHRONIC HEART FAILURE WITH PRESERVED EJECTION FRACTION: ICD-10-CM

## 2020-07-13 RX ORDER — CARVEDILOL 25 MG/1
25 TABLET ORAL 2 TIMES DAILY WITH MEALS
Qty: 120 TABLET | Refills: 1 | Status: SHIPPED | OUTPATIENT
Start: 2020-07-13 | End: 2020-11-05 | Stop reason: SDUPTHER

## 2020-07-27 ENCOUNTER — PATIENT OUTREACH (OUTPATIENT)
Dept: ADMINISTRATIVE | Facility: HOSPITAL | Age: 65
End: 2020-07-27

## 2020-08-14 NOTE — PROGRESS NOTES
"Subjective:       Patient ID: Rose Milligan is a 65 y.o. female.    Chief Complaint   Patient presents with    Fall    Extremity Weakness       HPI     Patient reports chronic LLE weakness.  She did have a hospitalization this past April due to HTN and possible TIA.  Since that time, her left leg has been dragging and weak.  She had a fall in June of 2020 in which her left leg gave out on her and she lost her balance.  She thinks she may have had a "reaction to an energy pill my friend gave me".  Not sure what she took but she said she needed "something to wake me up".  No pain since injury, mainly concerned about leg weakness.    Denies dizziness, blurred vision, cp or sob.      Review of Systems   Constitutional: Negative.    Respiratory: Negative.    Cardiovascular: Negative.    Musculoskeletal: Positive for gait problem. Negative for arthralgias and joint swelling.   Skin: Negative.    Neurological: Positive for weakness. Negative for tremors, syncope, light-headedness, numbness and headaches.         Review of patient's allergies indicates:   Allergen Reactions    Tramadol Itching         Patient Active Problem List   Diagnosis    Chronic hepatitis C virus infection    Essential hypertension    CKD (chronic kidney disease) stage 4, GFR 15-29 ml/min    Bipolar affective disorder    (HFpEF) heart failure with preserved ejection fraction    Other hyperlipidemia    Class 1 obesity due to excess calories with serious comorbidity and body mass index (BMI) of 30.0 to 30.9 in adult    Substance use disorder    Left-sided weakness         Current Outpatient Medications:     amLODIPine (NORVASC) 10 MG tablet, Take 1 tablet (10 mg total) by mouth once daily., Disp: 90 tablet, Rfl: 1    aspirin (ECOTRIN) 325 MG EC tablet, , Disp: , Rfl:     atorvastatin (LIPITOR) 40 MG tablet, Take 40 mg by mouth., Disp: , Rfl:     benztropine (COGENTIN) 1 MG tablet, Take 1 mg by mouth., Disp: , Rfl:     carvediloL (COREG) " 25 MG tablet, Take 1 tablet (25 mg total) by mouth 2 (two) times daily with meals., Disp: 120 tablet, Rfl: 1    cloNIDine (CATAPRES) 0.1 MG tablet, Take 1 tablet (0.1 mg total) by mouth 3 (three) times daily., Disp: 90 tablet, Rfl: 1    furosemide (LASIX) 40 MG tablet, Take 40 mg by mouth., Disp: , Rfl:     hydrALAZINE (APRESOLINE) 50 MG tablet, Take 1 tablet (50 mg total) by mouth every 8 (eight) hours., Disp: 90 tablet, Rfl: 1    hydroxyzine HCL (ATARAX) 25 MG tablet, hydroxyzine HCl 25 mg tablet, Disp: , Rfl:     hydrOXYzine pamoate (VISTARIL) 25 MG Cap, Take 50 mg by mouth., Disp: , Rfl:     irbesartan (AVAPRO) 75 MG tablet, Take 1 tablet (75 mg total) by mouth once daily., Disp: 90 tablet, Rfl: 1    magnesium oxide (MAG-OX) 400 mg (241.3 mg magnesium) tablet, Take 1 tablet (400 mg total) by mouth once daily., Disp: , Rfl: 0    magnesium oxide (MAG-OX) 400 mg (241.3 mg magnesium) tablet, magnesium oxide 400 mg (241.3 mg magnesium) tablet  TAKE 1 TABLET BY MOUTH EVERY DAY, Disp: , Rfl:     methocarbamoL (ROBAXIN) 500 MG Tab, methocarbamol 500 mg tablet  TAKE 2 TABLETS BY MOUTH EVERY 6 HOURS AS NEEDED FOR SPASM, Disp: , Rfl:     mupirocin (BACTROBAN) 2 % ointment, Apply topically 3 (three) times daily., Disp: 15 g, Rfl: 0    naproxen-esomeprazole (VIMOVO) 500-20 mg TbID, naproxen 500 mg tablet  TAKE 1 TABLET BY MOUTH TWICE A DAY, Disp: , Rfl:     NIFEdipine (PROCARDIA-XL) 90 MG (OSM) 24 hr tablet, Take 1 tablet (90 mg total) by mouth once daily., Disp: 90 tablet, Rfl: 1    ondansetron (ZOFRAN-ODT) 4 MG TbDL, ondansetron 4 mg disintegrating tablet  TAKE 1 TABLET BY MOUTH EVERY 8 HOURS AS NEEDED FOR NAUSEA, Disp: , Rfl:     pitavastatin calcium (LIVALO) 2 mg Tab tablet, Take 1 tablet (2 mg total) by mouth once daily., Disp: 90 tablet, Rfl: 3    risperiDONE (RISPERDAL) 0.25 MG Tab, Take 0.25 mg by mouth., Disp: , Rfl:     risperiDONE (RISPERDAL) 0.5 MG Tab, risperidone 0.5 mg tablet  TAKE 1 TABLET  "(0.5 MG TOTAL) BY MOUTH EVERY EVENING., Disp: , Rfl:     topiramate (TOPAMAX) 25 MG tablet, topiramate 25 mg tablet  TAKE 1 TABLET (25 MG TOTAL) BY MOUTH EVERY EVENING., Disp: , Rfl:     traZODone (DESYREL) 150 MG tablet, trazodone 150 mg tablet, Disp: , Rfl:     ziprasidone (GEODON) 20 MG Cap, Take 20 mg by mouth., Disp: , Rfl:         Past medical, surgical, family and social histories have been reviewed today.      Objective:     Vitals:    08/17/20 1329   BP: 128/76   Pulse: 79   Resp: 18   Temp: 97.3 °F (36.3 °C)   TempSrc: Tympanic   SpO2: 99%   Weight: 73.3 kg (161 lb 9.6 oz)   Height: 5' 2.75" (1.594 m)   PainSc: 0-No pain         Physical Exam  Vitals signs reviewed.   Constitutional:       General: She is not in acute distress.  HENT:      Head: Normocephalic and atraumatic.   Eyes:      Pupils: Pupils are equal, round, and reactive to light.   Neck:      Musculoskeletal: Normal range of motion and neck supple. No muscular tenderness.      Vascular: No carotid bruit.   Cardiovascular:      Rate and Rhythm: Normal rate and regular rhythm.      Pulses: Normal pulses.      Heart sounds: Normal heart sounds. No murmur. No gallop.    Pulmonary:      Effort: Pulmonary effort is normal.      Breath sounds: Normal breath sounds.   Musculoskeletal: Normal range of motion.         General: No swelling, tenderness or deformity.   Skin:     General: Skin is warm and dry.      Capillary Refill: Capillary refill takes less than 2 seconds.   Neurological:      General: No focal deficit present.      Mental Status: She is alert and oriented to person, place, and time.      Sensory: No sensory deficit.      Motor: Weakness (LLE) present.      Gait: Gait abnormal (drags LLE slightly).      Deep Tendon Reflexes: Reflexes normal.   Psychiatric:         Mood and Affect: Mood normal.         Behavior: Behavior normal.         Thought Content: Thought content normal.         Judgment: Judgment normal.           Diagnosis "       1. Left leg weakness    2. Gait abnormality          Assessment/ Plan     Left leg weakness --- referred to [facility] for gait training and rehab of LLE.  Safety precautions at home discussed.  -     Ambulatory referral/consult to Physical/Occupational Therapy; Future; Expected date: 08/24/2020    Gait abnormality  -     Ambulatory referral/consult to Physical/Occupational Therapy; Future; Expected date: 08/24/2020        Follow-up:  Return prn.      MALLORY Granados  [facility]

## 2020-08-17 ENCOUNTER — OFFICE VISIT (OUTPATIENT)
Dept: FAMILY MEDICINE | Facility: CLINIC | Age: 65
End: 2020-08-17
Payer: MEDICARE

## 2020-08-17 VITALS
HEIGHT: 63 IN | HEART RATE: 79 BPM | TEMPERATURE: 97 F | BODY MASS INDEX: 28.64 KG/M2 | DIASTOLIC BLOOD PRESSURE: 76 MMHG | SYSTOLIC BLOOD PRESSURE: 128 MMHG | WEIGHT: 161.63 LBS | RESPIRATION RATE: 18 BRPM | OXYGEN SATURATION: 99 %

## 2020-08-17 DIAGNOSIS — R29.898 LEFT LEG WEAKNESS: Primary | ICD-10-CM

## 2020-08-17 DIAGNOSIS — R26.9 GAIT ABNORMALITY: ICD-10-CM

## 2020-08-17 PROCEDURE — 99499 RISK ADDL DX/OHS AUDIT: ICD-10-PCS | Mod: S$GLB,,, | Performed by: REGISTERED NURSE

## 2020-08-17 PROCEDURE — 3078F PR MOST RECENT DIASTOLIC BLOOD PRESSURE < 80 MM HG: ICD-10-PCS | Mod: HCNC,CPTII,S$GLB, | Performed by: REGISTERED NURSE

## 2020-08-17 PROCEDURE — 3008F BODY MASS INDEX DOCD: CPT | Mod: HCNC,CPTII,S$GLB, | Performed by: REGISTERED NURSE

## 2020-08-17 PROCEDURE — 3078F DIAST BP <80 MM HG: CPT | Mod: HCNC,CPTII,S$GLB, | Performed by: REGISTERED NURSE

## 2020-08-17 PROCEDURE — 99213 PR OFFICE/OUTPT VISIT, EST, LEVL III, 20-29 MIN: ICD-10-PCS | Mod: HCNC,S$GLB,, | Performed by: REGISTERED NURSE

## 2020-08-17 PROCEDURE — 1159F PR MEDICATION LIST DOCUMENTED IN MEDICAL RECORD: ICD-10-PCS | Mod: HCNC,S$GLB,, | Performed by: REGISTERED NURSE

## 2020-08-17 PROCEDURE — 1159F MED LIST DOCD IN RCRD: CPT | Mod: HCNC,S$GLB,, | Performed by: REGISTERED NURSE

## 2020-08-17 PROCEDURE — 3074F SYST BP LT 130 MM HG: CPT | Mod: HCNC,CPTII,S$GLB, | Performed by: REGISTERED NURSE

## 2020-08-17 PROCEDURE — 3074F PR MOST RECENT SYSTOLIC BLOOD PRESSURE < 130 MM HG: ICD-10-PCS | Mod: HCNC,CPTII,S$GLB, | Performed by: REGISTERED NURSE

## 2020-08-17 PROCEDURE — 99213 OFFICE O/P EST LOW 20 MIN: CPT | Mod: HCNC,S$GLB,, | Performed by: REGISTERED NURSE

## 2020-08-17 PROCEDURE — 99999 PR PBB SHADOW E&M-EST. PATIENT-LVL V: ICD-10-PCS | Mod: PBBFAC,HCNC,, | Performed by: REGISTERED NURSE

## 2020-08-17 PROCEDURE — 1101F PT FALLS ASSESS-DOCD LE1/YR: CPT | Mod: HCNC,CPTII,S$GLB, | Performed by: REGISTERED NURSE

## 2020-08-17 PROCEDURE — 99999 PR PBB SHADOW E&M-EST. PATIENT-LVL V: CPT | Mod: PBBFAC,HCNC,, | Performed by: REGISTERED NURSE

## 2020-08-17 PROCEDURE — 1101F PR PT FALLS ASSESS DOC 0-1 FALLS W/OUT INJ PAST YR: ICD-10-PCS | Mod: HCNC,CPTII,S$GLB, | Performed by: REGISTERED NURSE

## 2020-08-17 PROCEDURE — 1126F AMNT PAIN NOTED NONE PRSNT: CPT | Mod: HCNC,S$GLB,, | Performed by: REGISTERED NURSE

## 2020-08-17 PROCEDURE — 1126F PR PAIN SEVERITY QUANTIFIED, NO PAIN PRESENT: ICD-10-PCS | Mod: HCNC,S$GLB,, | Performed by: REGISTERED NURSE

## 2020-08-17 PROCEDURE — 99499 UNLISTED E&M SERVICE: CPT | Mod: S$GLB,,, | Performed by: REGISTERED NURSE

## 2020-08-17 PROCEDURE — 3008F PR BODY MASS INDEX (BMI) DOCUMENTED: ICD-10-PCS | Mod: HCNC,CPTII,S$GLB, | Performed by: REGISTERED NURSE

## 2020-08-26 ENCOUNTER — TELEPHONE (OUTPATIENT)
Dept: FAMILY MEDICINE | Facility: CLINIC | Age: 65
End: 2020-08-26

## 2020-08-26 NOTE — TELEPHONE ENCOUNTER
*DANE*  Patient said that when she gets upset even the slightest bit, one side of her face begins to twitch. She's currently otw to her therapy appt, but her daughter is a nurse and told her to call the doctor when it happens because she thinks it might be a stroke.  I advised her that if she thinks she's having a stroke she needs to call 911 immediately, or go to the ER.  If she's driving and begins to feel weak, faint, headaches, or anything that alters her physical and mental state she should pull over and call 911.   She verbally verified understanding, says she feels ok right now, and thanked me for the information.

## 2020-11-05 DIAGNOSIS — I50.32 CHRONIC HEART FAILURE WITH PRESERVED EJECTION FRACTION: ICD-10-CM

## 2020-11-05 RX ORDER — CARVEDILOL 25 MG/1
25 TABLET ORAL 2 TIMES DAILY WITH MEALS
Qty: 60 TABLET | Refills: 5 | Status: SHIPPED | OUTPATIENT
Start: 2020-11-05 | End: 2021-03-30 | Stop reason: SDUPTHER

## 2020-11-05 RX ORDER — FUROSEMIDE 40 MG/1
40 TABLET ORAL DAILY
Qty: 30 TABLET | Refills: 6 | Status: SHIPPED | OUTPATIENT
Start: 2020-11-05 | End: 2021-03-30 | Stop reason: SDUPTHER

## 2021-02-03 ENCOUNTER — PES CALL (OUTPATIENT)
Dept: ADMINISTRATIVE | Facility: CLINIC | Age: 66
End: 2021-02-03

## 2021-02-03 DIAGNOSIS — Z12.31 OTHER SCREENING MAMMOGRAM: ICD-10-CM

## 2021-03-26 ENCOUNTER — TELEPHONE (OUTPATIENT)
Dept: ADMINISTRATIVE | Facility: HOSPITAL | Age: 66
End: 2021-03-26

## 2021-03-30 DIAGNOSIS — I50.32 CHRONIC HEART FAILURE WITH PRESERVED EJECTION FRACTION: ICD-10-CM

## 2021-03-30 RX ORDER — FUROSEMIDE 40 MG/1
40 TABLET ORAL DAILY
Qty: 90 TABLET | Refills: 1 | Status: SHIPPED | OUTPATIENT
Start: 2021-03-30 | End: 2021-07-10 | Stop reason: SDUPTHER

## 2021-03-30 RX ORDER — CARVEDILOL 25 MG/1
25 TABLET ORAL 2 TIMES DAILY WITH MEALS
Qty: 180 TABLET | Refills: 1 | Status: SHIPPED | OUTPATIENT
Start: 2021-03-30 | End: 2022-09-29

## 2021-03-31 ENCOUNTER — PATIENT OUTREACH (OUTPATIENT)
Dept: ADMINISTRATIVE | Facility: OTHER | Age: 66
End: 2021-03-31

## 2021-04-06 DIAGNOSIS — E78.49 OTHER HYPERLIPIDEMIA: ICD-10-CM

## 2021-04-06 DIAGNOSIS — I50.32 CHRONIC HEART FAILURE WITH PRESERVED EJECTION FRACTION: ICD-10-CM

## 2021-04-06 DIAGNOSIS — F19.90 SUBSTANCE USE DISORDER: Primary | ICD-10-CM

## 2021-04-06 DIAGNOSIS — I10 ESSENTIAL HYPERTENSION: ICD-10-CM

## 2021-04-06 DIAGNOSIS — N18.4 CKD (CHRONIC KIDNEY DISEASE) STAGE 4, GFR 15-29 ML/MIN: ICD-10-CM

## 2021-04-07 ENCOUNTER — TELEPHONE (OUTPATIENT)
Dept: PSYCHIATRY | Facility: CLINIC | Age: 66
End: 2021-04-07

## 2021-05-04 ENCOUNTER — PES CALL (OUTPATIENT)
Dept: ADMINISTRATIVE | Facility: CLINIC | Age: 66
End: 2021-05-04

## 2021-05-04 RX ORDER — HYDROXYZINE HYDROCHLORIDE 25 MG/1
TABLET, FILM COATED ORAL
Qty: 30 TABLET | Refills: 0 | Status: SHIPPED | OUTPATIENT
Start: 2021-05-04 | End: 2021-07-07 | Stop reason: SDUPTHER

## 2021-05-10 ENCOUNTER — OFFICE VISIT (OUTPATIENT)
Dept: FAMILY MEDICINE | Facility: CLINIC | Age: 66
End: 2021-05-10
Payer: MEDICARE

## 2021-05-10 VITALS
DIASTOLIC BLOOD PRESSURE: 82 MMHG | WEIGHT: 173.19 LBS | HEIGHT: 63 IN | OXYGEN SATURATION: 98 % | SYSTOLIC BLOOD PRESSURE: 140 MMHG | BODY MASS INDEX: 30.69 KG/M2 | HEART RATE: 76 BPM | RESPIRATION RATE: 20 BRPM | TEMPERATURE: 98 F

## 2021-05-10 DIAGNOSIS — N18.4 CKD (CHRONIC KIDNEY DISEASE) STAGE 4, GFR 15-29 ML/MIN: ICD-10-CM

## 2021-05-10 DIAGNOSIS — H53.8 BLURRED VISION, BILATERAL: ICD-10-CM

## 2021-05-10 DIAGNOSIS — Z00.00 ENCOUNTER FOR PREVENTIVE HEALTH EXAMINATION: Primary | ICD-10-CM

## 2021-05-10 DIAGNOSIS — I10 ESSENTIAL HYPERTENSION: ICD-10-CM

## 2021-05-10 DIAGNOSIS — F31.70 BIPOLAR AFFECTIVE DISORDER IN REMISSION: ICD-10-CM

## 2021-05-10 DIAGNOSIS — F19.90 SUBSTANCE USE DISORDER: ICD-10-CM

## 2021-05-10 DIAGNOSIS — E66.09 CLASS 1 OBESITY DUE TO EXCESS CALORIES WITH SERIOUS COMORBIDITY AND BODY MASS INDEX (BMI) OF 30.0 TO 30.9 IN ADULT: ICD-10-CM

## 2021-05-10 DIAGNOSIS — B18.2 CHRONIC HEPATITIS C WITHOUT HEPATIC COMA: ICD-10-CM

## 2021-05-10 DIAGNOSIS — I50.32 CHRONIC HEART FAILURE WITH PRESERVED EJECTION FRACTION: ICD-10-CM

## 2021-05-10 DIAGNOSIS — Z12.31 SCREENING MAMMOGRAM, ENCOUNTER FOR: ICD-10-CM

## 2021-05-10 DIAGNOSIS — I27.9 CHRONIC PULMONARY HEART DISEASE: ICD-10-CM

## 2021-05-10 DIAGNOSIS — Z72.0 TOBACCO USE: ICD-10-CM

## 2021-05-10 PROBLEM — I27.81 COR PULMONALE, CHRONIC: Status: ACTIVE | Noted: 2021-05-10

## 2021-05-10 PROCEDURE — 99499 UNLISTED E&M SERVICE: CPT | Mod: S$GLB,,, | Performed by: NURSE PRACTITIONER

## 2021-05-10 PROCEDURE — 1158F ADVNC CARE PLAN TLK DOCD: CPT | Mod: S$GLB,,, | Performed by: NURSE PRACTITIONER

## 2021-05-10 PROCEDURE — 1101F PR PT FALLS ASSESS DOC 0-1 FALLS W/OUT INJ PAST YR: ICD-10-PCS | Mod: CPTII,S$GLB,, | Performed by: NURSE PRACTITIONER

## 2021-05-10 PROCEDURE — 99999 PR PBB SHADOW E&M-EST. PATIENT-LVL V: CPT | Mod: PBBFAC,,, | Performed by: NURSE PRACTITIONER

## 2021-05-10 PROCEDURE — 1158F PR ADVANCE CARE PLANNING DISCUSS DOCUMENTED IN MEDICAL RECORD: ICD-10-PCS | Mod: S$GLB,,, | Performed by: NURSE PRACTITIONER

## 2021-05-10 PROCEDURE — G0439 PPPS, SUBSEQ VISIT: HCPCS | Mod: S$GLB,,, | Performed by: NURSE PRACTITIONER

## 2021-05-10 PROCEDURE — 3008F BODY MASS INDEX DOCD: CPT | Mod: CPTII,S$GLB,, | Performed by: NURSE PRACTITIONER

## 2021-05-10 PROCEDURE — 3288F PR FALLS RISK ASSESSMENT DOCUMENTED: ICD-10-PCS | Mod: CPTII,S$GLB,, | Performed by: NURSE PRACTITIONER

## 2021-05-10 PROCEDURE — 3288F FALL RISK ASSESSMENT DOCD: CPT | Mod: CPTII,S$GLB,, | Performed by: NURSE PRACTITIONER

## 2021-05-10 PROCEDURE — 3008F PR BODY MASS INDEX (BMI) DOCUMENTED: ICD-10-PCS | Mod: CPTII,S$GLB,, | Performed by: NURSE PRACTITIONER

## 2021-05-10 PROCEDURE — 99499 RISK ADDL DX/OHS AUDIT: ICD-10-PCS | Mod: S$GLB,,, | Performed by: NURSE PRACTITIONER

## 2021-05-10 PROCEDURE — 1101F PT FALLS ASSESS-DOCD LE1/YR: CPT | Mod: CPTII,S$GLB,, | Performed by: NURSE PRACTITIONER

## 2021-05-10 PROCEDURE — 1126F AMNT PAIN NOTED NONE PRSNT: CPT | Mod: S$GLB,,, | Performed by: NURSE PRACTITIONER

## 2021-05-10 PROCEDURE — 99999 PR PBB SHADOW E&M-EST. PATIENT-LVL V: ICD-10-PCS | Mod: PBBFAC,,, | Performed by: NURSE PRACTITIONER

## 2021-05-10 PROCEDURE — 1126F PR PAIN SEVERITY QUANTIFIED, NO PAIN PRESENT: ICD-10-PCS | Mod: S$GLB,,, | Performed by: NURSE PRACTITIONER

## 2021-05-10 PROCEDURE — G0439 PR MEDICARE ANNUAL WELLNESS SUBSEQUENT VISIT: ICD-10-PCS | Mod: S$GLB,,, | Performed by: NURSE PRACTITIONER

## 2021-05-11 PROBLEM — I69.359 HEMIPARESIS AFFECTING NONDOMINANT SIDE AS LATE EFFECT OF CEREBROVASCULAR ACCIDENT (CVA): Status: ACTIVE | Noted: 2020-04-28

## 2021-05-11 PROBLEM — Z72.0 TOBACCO USE: Status: ACTIVE | Noted: 2021-05-11

## 2021-05-11 PROBLEM — F19.11 HISTORY OF SUBSTANCE ABUSE: Status: ACTIVE | Noted: 2021-05-11

## 2021-06-10 ENCOUNTER — TELEPHONE (OUTPATIENT)
Dept: PSYCHIATRY | Facility: CLINIC | Age: 66
End: 2021-06-10

## 2021-07-06 DIAGNOSIS — I50.32 CHRONIC HEART FAILURE WITH PRESERVED EJECTION FRACTION: ICD-10-CM

## 2021-07-06 RX ORDER — HYDRALAZINE HYDROCHLORIDE 50 MG/1
50 TABLET, FILM COATED ORAL EVERY 8 HOURS
Qty: 90 TABLET | Refills: 1 | Status: SHIPPED | OUTPATIENT
Start: 2021-07-06 | End: 2021-07-10 | Stop reason: SDUPTHER

## 2021-07-07 RX ORDER — HYDROXYZINE HYDROCHLORIDE 25 MG/1
TABLET, FILM COATED ORAL
Qty: 30 TABLET | Refills: 0 | Status: SHIPPED | OUTPATIENT
Start: 2021-07-07 | End: 2021-12-29

## 2021-07-10 ENCOUNTER — HOSPITAL ENCOUNTER (EMERGENCY)
Facility: HOSPITAL | Age: 66
Discharge: HOME | End: 2021-07-10
Attending: EMERGENCY MEDICINE
Payer: MEDICARE

## 2021-07-10 VITALS
RESPIRATION RATE: 18 BRPM | WEIGHT: 176.06 LBS | HEART RATE: 73 BPM | BODY MASS INDEX: 31.18 KG/M2 | DIASTOLIC BLOOD PRESSURE: 90 MMHG | OXYGEN SATURATION: 99 % | SYSTOLIC BLOOD PRESSURE: 180 MMHG | TEMPERATURE: 98 F

## 2021-07-10 DIAGNOSIS — I10 HYPERTENSION, UNSPECIFIED TYPE: ICD-10-CM

## 2021-07-10 DIAGNOSIS — Z76.0 ENCOUNTER FOR MEDICATION REFILL: Primary | ICD-10-CM

## 2021-07-10 DIAGNOSIS — I50.32 CHRONIC HEART FAILURE WITH PRESERVED EJECTION FRACTION: ICD-10-CM

## 2021-07-10 PROCEDURE — 99281 EMR DPT VST MAYX REQ PHY/QHP: CPT

## 2021-07-10 RX ORDER — ZIPRASIDONE HYDROCHLORIDE 20 MG/1
20 CAPSULE ORAL 2 TIMES DAILY
Qty: 60 CAPSULE | Refills: 0 | Status: SHIPPED | OUTPATIENT
Start: 2021-07-10 | End: 2021-07-30 | Stop reason: SDUPTHER

## 2021-07-10 RX ORDER — HYDRALAZINE HYDROCHLORIDE 50 MG/1
50 TABLET, FILM COATED ORAL EVERY 8 HOURS
Qty: 90 TABLET | Refills: 0 | Status: SHIPPED | OUTPATIENT
Start: 2021-07-10 | End: 2021-09-08

## 2021-07-10 RX ORDER — AMLODIPINE BESYLATE 10 MG/1
10 TABLET ORAL DAILY
Qty: 30 TABLET | Refills: 0 | Status: SHIPPED | OUTPATIENT
Start: 2021-07-10 | End: 2021-08-02

## 2021-07-10 RX ORDER — FUROSEMIDE 40 MG/1
40 TABLET ORAL DAILY
Qty: 30 TABLET | Refills: 0 | Status: SHIPPED | OUTPATIENT
Start: 2021-07-10 | End: 2021-07-16 | Stop reason: SDUPTHER

## 2021-07-16 DIAGNOSIS — I50.32 CHRONIC HEART FAILURE WITH PRESERVED EJECTION FRACTION: ICD-10-CM

## 2021-07-16 RX ORDER — FUROSEMIDE 40 MG/1
40 TABLET ORAL DAILY
Qty: 30 TABLET | Refills: 0 | Status: SHIPPED | OUTPATIENT
Start: 2021-07-16 | End: 2021-12-29 | Stop reason: SDUPTHER

## 2021-07-30 ENCOUNTER — OFFICE VISIT (OUTPATIENT)
Dept: FAMILY MEDICINE | Facility: CLINIC | Age: 66
End: 2021-07-30
Payer: MEDICARE

## 2021-07-30 ENCOUNTER — LAB VISIT (OUTPATIENT)
Dept: LAB | Facility: HOSPITAL | Age: 66
End: 2021-07-30
Attending: FAMILY MEDICINE
Payer: MEDICARE

## 2021-07-30 VITALS
HEART RATE: 89 BPM | HEIGHT: 63 IN | WEIGHT: 176.13 LBS | BODY MASS INDEX: 31.21 KG/M2 | SYSTOLIC BLOOD PRESSURE: 118 MMHG | OXYGEN SATURATION: 98 % | TEMPERATURE: 98 F | DIASTOLIC BLOOD PRESSURE: 72 MMHG

## 2021-07-30 DIAGNOSIS — I10 ESSENTIAL HYPERTENSION: ICD-10-CM

## 2021-07-30 DIAGNOSIS — Z00.00 ENCOUNTER FOR WELLNESS EXAMINATION: Primary | ICD-10-CM

## 2021-07-30 DIAGNOSIS — F19.90 SUBSTANCE USE DISORDER: ICD-10-CM

## 2021-07-30 DIAGNOSIS — I50.32 CHRONIC HEART FAILURE WITH PRESERVED EJECTION FRACTION: ICD-10-CM

## 2021-07-30 DIAGNOSIS — N18.4 CKD (CHRONIC KIDNEY DISEASE) STAGE 4, GFR 15-29 ML/MIN: ICD-10-CM

## 2021-07-30 DIAGNOSIS — Z86.19 HISTORY OF HEPATITIS C: ICD-10-CM

## 2021-07-30 DIAGNOSIS — Z78.0 POSTMENOPAUSAL: ICD-10-CM

## 2021-07-30 DIAGNOSIS — Z79.899 LONG TERM USE OF DRUG: ICD-10-CM

## 2021-07-30 DIAGNOSIS — Z23 NEED FOR VACCINATION AGAINST STREPTOCOCCUS PNEUMONIAE: ICD-10-CM

## 2021-07-30 DIAGNOSIS — F31.70 BIPOLAR AFFECTIVE DISORDER IN REMISSION: ICD-10-CM

## 2021-07-30 DIAGNOSIS — M65.30 TRIGGER FINGER, UNSPECIFIED FINGER, UNSPECIFIED LATERALITY: ICD-10-CM

## 2021-07-30 DIAGNOSIS — Z00.00 ENCOUNTER FOR WELLNESS EXAMINATION: ICD-10-CM

## 2021-07-30 DIAGNOSIS — Z13.820 SCREENING FOR OSTEOPOROSIS: ICD-10-CM

## 2021-07-30 LAB
ERYTHROCYTE [DISTWIDTH] IN BLOOD BY AUTOMATED COUNT: 13.2 % (ref 11.5–14.5)
HCT VFR BLD AUTO: 46.2 % (ref 37–48.5)
HGB BLD-MCNC: 14.9 G/DL (ref 12–16)
MCH RBC QN AUTO: 27.8 PG (ref 27–31)
MCHC RBC AUTO-ENTMCNC: 32.3 G/DL (ref 32–36)
MCV RBC AUTO: 86 FL (ref 82–98)
PLATELET # BLD AUTO: 331 K/UL (ref 150–450)
PMV BLD AUTO: 10.4 FL (ref 9.2–12.9)
RBC # BLD AUTO: 5.36 M/UL (ref 4–5.4)
WBC # BLD AUTO: 7.43 K/UL (ref 3.9–12.7)

## 2021-07-30 PROCEDURE — 99397 PR PREVENTIVE VISIT,EST,65 & OVER: ICD-10-PCS | Mod: 25,S$GLB,, | Performed by: FAMILY MEDICINE

## 2021-07-30 PROCEDURE — 3008F PR BODY MASS INDEX (BMI) DOCUMENTED: ICD-10-PCS | Mod: CPTII,S$GLB,, | Performed by: FAMILY MEDICINE

## 2021-07-30 PROCEDURE — G0009 PNEUMOCOCCAL POLYSACCHARIDE VACCINE 23-VALENT =>2YO SQ IM: ICD-10-PCS | Mod: S$GLB,,, | Performed by: FAMILY MEDICINE

## 2021-07-30 PROCEDURE — 3078F PR MOST RECENT DIASTOLIC BLOOD PRESSURE < 80 MM HG: ICD-10-PCS | Mod: CPTII,S$GLB,, | Performed by: FAMILY MEDICINE

## 2021-07-30 PROCEDURE — G0009 ADMIN PNEUMOCOCCAL VACCINE: HCPCS | Mod: S$GLB,,, | Performed by: FAMILY MEDICINE

## 2021-07-30 PROCEDURE — 1160F PR REVIEW ALL MEDS BY PRESCRIBER/CLIN PHARMACIST DOCUMENTED: ICD-10-PCS | Mod: CPTII,S$GLB,, | Performed by: FAMILY MEDICINE

## 2021-07-30 PROCEDURE — 83036 HEMOGLOBIN GLYCOSYLATED A1C: CPT | Performed by: FAMILY MEDICINE

## 2021-07-30 PROCEDURE — 99499 RISK ADDL DX/OHS AUDIT: ICD-10-PCS | Mod: S$GLB,,, | Performed by: FAMILY MEDICINE

## 2021-07-30 PROCEDURE — 1159F MED LIST DOCD IN RCRD: CPT | Mod: CPTII,S$GLB,, | Performed by: FAMILY MEDICINE

## 2021-07-30 PROCEDURE — 1126F AMNT PAIN NOTED NONE PRSNT: CPT | Mod: CPTII,S$GLB,, | Performed by: FAMILY MEDICINE

## 2021-07-30 PROCEDURE — 36415 COLL VENOUS BLD VENIPUNCTURE: CPT | Mod: PO | Performed by: FAMILY MEDICINE

## 2021-07-30 PROCEDURE — 3078F DIAST BP <80 MM HG: CPT | Mod: CPTII,S$GLB,, | Performed by: FAMILY MEDICINE

## 2021-07-30 PROCEDURE — 99499 UNLISTED E&M SERVICE: CPT | Mod: S$GLB,,, | Performed by: FAMILY MEDICINE

## 2021-07-30 PROCEDURE — 80053 COMPREHEN METABOLIC PANEL: CPT | Performed by: FAMILY MEDICINE

## 2021-07-30 PROCEDURE — 1159F PR MEDICATION LIST DOCUMENTED IN MEDICAL RECORD: ICD-10-PCS | Mod: CPTII,S$GLB,, | Performed by: FAMILY MEDICINE

## 2021-07-30 PROCEDURE — 1160F RVW MEDS BY RX/DR IN RCRD: CPT | Mod: CPTII,S$GLB,, | Performed by: FAMILY MEDICINE

## 2021-07-30 PROCEDURE — 3074F PR MOST RECENT SYSTOLIC BLOOD PRESSURE < 130 MM HG: ICD-10-PCS | Mod: CPTII,S$GLB,, | Performed by: FAMILY MEDICINE

## 2021-07-30 PROCEDURE — 84443 ASSAY THYROID STIM HORMONE: CPT | Performed by: FAMILY MEDICINE

## 2021-07-30 PROCEDURE — 99397 PER PM REEVAL EST PAT 65+ YR: CPT | Mod: 25,S$GLB,, | Performed by: FAMILY MEDICINE

## 2021-07-30 PROCEDURE — 90732 PNEUMOCOCCAL POLYSACCHARIDE VACCINE 23-VALENT =>2YO SQ IM: ICD-10-PCS | Mod: S$GLB,,, | Performed by: FAMILY MEDICINE

## 2021-07-30 PROCEDURE — 3008F BODY MASS INDEX DOCD: CPT | Mod: CPTII,S$GLB,, | Performed by: FAMILY MEDICINE

## 2021-07-30 PROCEDURE — 99999 PR PBB SHADOW E&M-EST. PATIENT-LVL V: CPT | Mod: PBBFAC,,, | Performed by: FAMILY MEDICINE

## 2021-07-30 PROCEDURE — 1101F PT FALLS ASSESS-DOCD LE1/YR: CPT | Mod: CPTII,S$GLB,, | Performed by: FAMILY MEDICINE

## 2021-07-30 PROCEDURE — 3074F SYST BP LT 130 MM HG: CPT | Mod: CPTII,S$GLB,, | Performed by: FAMILY MEDICINE

## 2021-07-30 PROCEDURE — 1126F PR PAIN SEVERITY QUANTIFIED, NO PAIN PRESENT: ICD-10-PCS | Mod: CPTII,S$GLB,, | Performed by: FAMILY MEDICINE

## 2021-07-30 PROCEDURE — 99999 PR PBB SHADOW E&M-EST. PATIENT-LVL V: ICD-10-PCS | Mod: PBBFAC,,, | Performed by: FAMILY MEDICINE

## 2021-07-30 PROCEDURE — 1101F PR PT FALLS ASSESS DOC 0-1 FALLS W/OUT INJ PAST YR: ICD-10-PCS | Mod: CPTII,S$GLB,, | Performed by: FAMILY MEDICINE

## 2021-07-30 PROCEDURE — 90732 PPSV23 VACC 2 YRS+ SUBQ/IM: CPT | Mod: S$GLB,,, | Performed by: FAMILY MEDICINE

## 2021-07-30 PROCEDURE — 80061 LIPID PANEL: CPT | Performed by: FAMILY MEDICINE

## 2021-07-30 PROCEDURE — 3288F PR FALLS RISK ASSESSMENT DOCUMENTED: ICD-10-PCS | Mod: CPTII,S$GLB,, | Performed by: FAMILY MEDICINE

## 2021-07-30 PROCEDURE — 85027 COMPLETE CBC AUTOMATED: CPT | Performed by: FAMILY MEDICINE

## 2021-07-30 PROCEDURE — 3288F FALL RISK ASSESSMENT DOCD: CPT | Mod: CPTII,S$GLB,, | Performed by: FAMILY MEDICINE

## 2021-07-30 RX ORDER — ASPIRIN 81 MG/1
81 TABLET ORAL DAILY
Qty: 90 TABLET | Refills: 3 | Status: SHIPPED | OUTPATIENT
Start: 2021-07-30 | End: 2022-09-15

## 2021-07-30 RX ORDER — ZIPRASIDONE HYDROCHLORIDE 20 MG/1
20 CAPSULE ORAL 2 TIMES DAILY
Qty: 60 CAPSULE | Refills: 1 | Status: SHIPPED | OUTPATIENT
Start: 2021-07-30 | End: 2021-12-27

## 2021-07-30 RX ORDER — FLUTICASONE PROPIONATE 50 MCG
1 SPRAY, SUSPENSION (ML) NASAL
COMMUNITY
Start: 2021-07-22 | End: 2022-07-22

## 2021-07-31 DIAGNOSIS — N18.4 CKD (CHRONIC KIDNEY DISEASE) STAGE 4, GFR 15-29 ML/MIN: Primary | ICD-10-CM

## 2021-07-31 LAB
ALBUMIN SERPL BCP-MCNC: 3.9 G/DL (ref 3.5–5.2)
ALP SERPL-CCNC: 128 U/L (ref 55–135)
ALT SERPL W/O P-5'-P-CCNC: 10 U/L (ref 10–44)
ANION GAP SERPL CALC-SCNC: 10 MMOL/L (ref 8–16)
AST SERPL-CCNC: 17 U/L (ref 10–40)
BILIRUB SERPL-MCNC: 0.2 MG/DL (ref 0.1–1)
BUN SERPL-MCNC: 36 MG/DL (ref 8–23)
CALCIUM SERPL-MCNC: 10 MG/DL (ref 8.7–10.5)
CHLORIDE SERPL-SCNC: 106 MMOL/L (ref 95–110)
CHOLEST SERPL-MCNC: 131 MG/DL (ref 120–199)
CHOLEST/HDLC SERPL: 2.8 {RATIO} (ref 2–5)
CO2 SERPL-SCNC: 26 MMOL/L (ref 23–29)
CREAT SERPL-MCNC: 3 MG/DL (ref 0.5–1.4)
EST. GFR  (AFRICAN AMERICAN): 17.7 ML/MIN/1.73 M^2
EST. GFR  (NON AFRICAN AMERICAN): 15.4 ML/MIN/1.73 M^2
ESTIMATED AVG GLUCOSE: 114 MG/DL (ref 68–131)
GLUCOSE SERPL-MCNC: 66 MG/DL (ref 70–110)
HBA1C MFR BLD: 5.6 % (ref 4–5.6)
HDLC SERPL-MCNC: 47 MG/DL (ref 40–75)
HDLC SERPL: 35.9 % (ref 20–50)
LDLC SERPL CALC-MCNC: 67.2 MG/DL (ref 63–159)
NONHDLC SERPL-MCNC: 84 MG/DL
POTASSIUM SERPL-SCNC: 4.6 MMOL/L (ref 3.5–5.1)
PROT SERPL-MCNC: 8.2 G/DL (ref 6–8.4)
SODIUM SERPL-SCNC: 142 MMOL/L (ref 136–145)
TRIGL SERPL-MCNC: 84 MG/DL (ref 30–150)
TSH SERPL DL<=0.005 MIU/L-ACNC: 1.1 UIU/ML (ref 0.4–4)

## 2021-08-02 ENCOUNTER — TELEPHONE (OUTPATIENT)
Dept: ORTHOPEDICS | Facility: CLINIC | Age: 66
End: 2021-08-02

## 2021-08-03 ENCOUNTER — TELEPHONE (OUTPATIENT)
Dept: ORTHOPEDICS | Facility: CLINIC | Age: 66
End: 2021-08-03

## 2021-08-04 ENCOUNTER — TELEPHONE (OUTPATIENT)
Dept: ORTHOPEDICS | Facility: CLINIC | Age: 66
End: 2021-08-04

## 2021-12-28 NOTE — TELEPHONE ENCOUNTER
Does patient have a psychiatrist? If not, can you message psychiatry department and ask them to schedule this lady an appointment. She was seeing Dr. Cohen but she has left and I'm worried patient has not gotten set up with a new provider. She NEEDS to be following psychiatry due to her mental illness.

## 2021-12-29 ENCOUNTER — TELEPHONE (OUTPATIENT)
Dept: PSYCHIATRY | Facility: CLINIC | Age: 66
End: 2021-12-29
Payer: MEDICARE

## 2021-12-29 DIAGNOSIS — I50.32 CHRONIC HEART FAILURE WITH PRESERVED EJECTION FRACTION: ICD-10-CM

## 2021-12-29 RX ORDER — ZIPRASIDONE HYDROCHLORIDE 20 MG/1
CAPSULE ORAL
Qty: 60 CAPSULE | Refills: 3 | Status: SHIPPED | OUTPATIENT
Start: 2021-12-29 | End: 2022-05-16

## 2021-12-29 RX ORDER — HYDRALAZINE HYDROCHLORIDE 50 MG/1
50 TABLET, FILM COATED ORAL EVERY 8 HOURS
Qty: 270 TABLET | Refills: 1 | Status: SHIPPED | OUTPATIENT
Start: 2021-12-29 | End: 2022-08-23

## 2021-12-29 RX ORDER — FUROSEMIDE 40 MG/1
40 TABLET ORAL DAILY
Qty: 30 TABLET | Refills: 2 | Status: SHIPPED | OUTPATIENT
Start: 2021-12-29 | End: 2022-03-09

## 2021-12-29 RX ORDER — HYDROXYZINE HYDROCHLORIDE 25 MG/1
TABLET, FILM COATED ORAL
Qty: 30 TABLET | Refills: 0 | Status: SHIPPED | OUTPATIENT
Start: 2021-12-29 | End: 2023-08-04

## 2021-12-29 RX ORDER — CLONIDINE HYDROCHLORIDE 0.1 MG/1
0.1 TABLET ORAL 3 TIMES DAILY
Qty: 90 TABLET | Refills: 1 | Status: SHIPPED | OUTPATIENT
Start: 2021-12-29 | End: 2023-02-28

## 2021-12-29 NOTE — TELEPHONE ENCOUNTER
No new care gaps identified.  Powered by [email or web address]. Reference number: [id number].   12/29/2021 9:33:32 AM CST

## 2021-12-29 NOTE — TELEPHONE ENCOUNTER
That's ok. I know she does have a hard time making appointments - I just wanted to make sure she had one in the future. Thanks for getting her scheduled.

## 2022-01-31 ENCOUNTER — TELEPHONE (OUTPATIENT)
Dept: FAMILY MEDICINE | Facility: CLINIC | Age: 67
End: 2022-01-31

## 2022-01-31 NOTE — TELEPHONE ENCOUNTER
Called pt several times to see what pt symptoms are, pt phone is not receiving calls at this time. Called 4x

## 2022-01-31 NOTE — TELEPHONE ENCOUNTER
Spoke with pt and pt stated that she has been having tingling in her fingers that radiates to her elbow and pt ha been very tired. Pt has been rescheduled for wednesday

## 2022-02-01 NOTE — PROGRESS NOTES
"Subjective:       Patient ID: Rose Milligan is a 66 y.o. female.      Chief Complaint   Patient presents with    Numbness       HPI    Mrs. Milligan is here today w/ her son.  She was recently diagnosed with TIA, recent [facility] ER visit on 1/27 and 2/2/22.    See chart note:    Reason Comments   Weakness Pt presents per EMS w/ generalized weakness, progressing x "days". States weakness/numbness began to medial RUE "this weekend", and has progressed to generalized weakness. Seen Thursday HTN emergency and numbness to RUE. Acute CVA workup negative, but showed chronic infarcts and micro-hemorrhages in brain. AAOx4, resp e/u, MENDEZ, NADN   Extremity Weakness      She continues with tingling in right hand, radiating up right arm.  Some RUE numbness reported.  Reports fatigue and "my mouth has been crooked".  Right shoulder joint has been sore w/ stiffness, localized pain w/out swelling.  Is not currently followed by Neurology.  On ASA and statin.      Review of Systems   Constitutional: Positive for activity change and fatigue. Negative for appetite change, chills, diaphoresis and fever.   Eyes: Negative for photophobia and visual disturbance.   Respiratory: Negative.    Musculoskeletal: Positive for arthralgias and gait problem. Negative for joint swelling.   Neurological: Positive for speech difficulty, weakness and numbness. Negative for dizziness, tremors, syncope, light-headedness and headaches.           Review of patient's allergies indicates:   Allergen Reactions    Tramadol Itching         Patient Active Problem List   Diagnosis    Chronic hepatitis C virus infection    Essential hypertension    CKD (chronic kidney disease) stage 4, GFR 15-29 ml/min    Bipolar affective disorder    (HFpEF) heart failure with preserved ejection fraction    Other hyperlipidemia    Class 1 obesity due to excess calories with serious comorbidity and body mass index (BMI) of 30.0 to 30.9 in adult    Substance use disorder    " Chronic pulmonary heart disease    Tobacco use           Current Outpatient Medications:     amLODIPine (NORVASC) 10 MG tablet, TAKE 1 TABLET BY MOUTH EVERY DAY, Disp: 90 tablet, Rfl: 3    aspirin (ECOTRIN) 81 MG EC tablet, Take 1 tablet (81 mg total) by mouth once daily., Disp: 90 tablet, Rfl: 3    benztropine (COGENTIN) 1 MG tablet, Take 1 mg by mouth., Disp: , Rfl:     carvediloL (COREG) 25 MG tablet, Take 1 tablet (25 mg total) by mouth 2 (two) times daily with meals., Disp: 180 tablet, Rfl: 1    cloNIDine (CATAPRES) 0.1 MG tablet, Take 1 tablet (0.1 mg total) by mouth 3 (three) times daily., Disp: 90 tablet, Rfl: 1    fluticasone propionate (FLONASE) 50 mcg/actuation nasal spray, 1 spray by Nasal route., Disp: , Rfl:     furosemide (LASIX) 40 MG tablet, Take 1 tablet (40 mg total) by mouth once daily., Disp: 30 tablet, Rfl: 2    hydrALAZINE (APRESOLINE) 50 MG tablet, Take 1 tablet (50 mg total) by mouth every 8 (eight) hours., Disp: 270 tablet, Rfl: 1    hydrOXYzine HCL (ATARAX) 25 MG tablet, TAKE 1 TABLET EVERY DAY AS NEEDED, Disp: 30 tablet, Rfl: 0    irbesartan (AVAPRO) 75 MG tablet, Take 1 tablet (75 mg total) by mouth once daily., Disp: 90 tablet, Rfl: 1    magnesium oxide (MAG-OX) 400 mg (241.3 mg magnesium) tablet, Take 1 tablet (400 mg total) by mouth once daily., Disp: , Rfl: 0    methocarbamoL (ROBAXIN) 500 MG Tab, methocarbamol 500 mg tablet  TAKE 2 TABLETS BY MOUTH EVERY 6 HOURS AS NEEDED FOR SPASM, Disp: , Rfl:     ondansetron (ZOFRAN-ODT) 4 MG TbDL, ondansetron 4 mg disintegrating tablet  TAKE 1 TABLET BY MOUTH EVERY 8 HOURS AS NEEDED FOR NAUSEA, Disp: , Rfl:     pitavastatin calcium (LIVALO) 2 mg Tab tablet, Take 1 tablet (2 mg total) by mouth once daily. (Patient not taking: Reported on 8/17/2020), Disp: 90 tablet, Rfl: 3    risperiDONE (RISPERDAL) 0.25 MG Tab, Take 0.25 mg by mouth., Disp: , Rfl:     risperiDONE (RISPERDAL) 0.5 MG Tab, risperidone 0.5 mg tablet  TAKE 1 TABLET  "(0.5 MG TOTAL) BY MOUTH EVERY EVENING., Disp: , Rfl:     topiramate (TOPAMAX) 25 MG tablet, topiramate 25 mg tablet  TAKE 1 TABLET (25 MG TOTAL) BY MOUTH EVERY EVENING., Disp: , Rfl:     traZODone (DESYREL) 150 MG tablet, trazodone 150 mg tablet, Disp: , Rfl:     ziprasidone (GEODON) 20 MG Cap, TAKE 1 CAPSULE TWICE DAILY, Disp: 60 capsule, Rfl: 3          Past medical, surgical, family and social histories have been reviewed today.      Objective:     Vitals:    02/02/22 1554   BP: 138/70   Pulse: 76   Temp: 98.1 °F (36.7 °C)   SpO2: 97%   Weight: 70 kg (154 lb 3.4 oz)   Height: 5' 3" (1.6 m)         Physical Exam  Constitutional:       General: She is not in acute distress.     Appearance: She is not ill-appearing or diaphoretic.   HENT:      Head:      Comments: Mild mouth drooping  Cardiovascular:      Rate and Rhythm: Normal rate and regular rhythm.   Pulmonary:      Effort: Pulmonary effort is normal.      Breath sounds: Normal breath sounds.   Musculoskeletal:         General: Tenderness present. No swelling.      Right shoulder: Tenderness present. No swelling or deformity. Decreased range of motion. Normal strength. Normal pulse.        Arms:       Right lower leg: No edema.      Left lower leg: No edema.   Skin:     Capillary Refill: Capillary refill takes less than 2 seconds.   Neurological:      Mental Status: She is alert and oriented to person, place, and time.      Motor: Weakness present.      Gait: Gait abnormal (in w/c, not evaluated).   Psychiatric:         Mood and Affect: Mood normal. Affect is flat.         Speech: Speech is not rapid and pressured or slurred.         Behavior: Behavior normal.         Thought Content: Thought content normal.         Judgment: Judgment normal.           Assessment       ICD-10-CM ICD-9-CM    1. TIA (transient ischemic attack)  G45.9 435.9 Ambulatory referral/consult to Neurology   2. Facial droop  R29.810 781.94 Ambulatory referral/consult to Neurology   3. " Right shoulder pain, unspecified chronicity  M25.511 719.41 diclofenac sodium (VOLTAREN) 1 % Gel        Follow-up       Return to clinic as needed.      MALLORY Granados  [facility]

## 2022-02-02 ENCOUNTER — OFFICE VISIT (OUTPATIENT)
Dept: FAMILY MEDICINE | Facility: CLINIC | Age: 67
End: 2022-02-02
Payer: MEDICARE

## 2022-02-02 VITALS
OXYGEN SATURATION: 97 % | DIASTOLIC BLOOD PRESSURE: 70 MMHG | BODY MASS INDEX: 27.32 KG/M2 | HEIGHT: 63 IN | TEMPERATURE: 98 F | SYSTOLIC BLOOD PRESSURE: 138 MMHG | WEIGHT: 154.19 LBS | HEART RATE: 76 BPM

## 2022-02-02 DIAGNOSIS — R29.810 FACIAL DROOP: ICD-10-CM

## 2022-02-02 DIAGNOSIS — G45.9 TIA (TRANSIENT ISCHEMIC ATTACK): Primary | ICD-10-CM

## 2022-02-02 DIAGNOSIS — M25.511 RIGHT SHOULDER PAIN, UNSPECIFIED CHRONICITY: ICD-10-CM

## 2022-02-02 PROCEDURE — 1126F PR PAIN SEVERITY QUANTIFIED, NO PAIN PRESENT: ICD-10-PCS | Mod: HCNC,CPTII,S$GLB, | Performed by: REGISTERED NURSE

## 2022-02-02 PROCEDURE — 3078F DIAST BP <80 MM HG: CPT | Mod: HCNC,CPTII,S$GLB, | Performed by: REGISTERED NURSE

## 2022-02-02 PROCEDURE — 1100F PR PT FALLS ASSESS DOC 2+ FALLS/FALL W/INJURY/YR: ICD-10-PCS | Mod: HCNC,CPTII,S$GLB, | Performed by: REGISTERED NURSE

## 2022-02-02 PROCEDURE — 1159F MED LIST DOCD IN RCRD: CPT | Mod: HCNC,CPTII,S$GLB, | Performed by: REGISTERED NURSE

## 2022-02-02 PROCEDURE — 3008F BODY MASS INDEX DOCD: CPT | Mod: HCNC,CPTII,S$GLB, | Performed by: REGISTERED NURSE

## 2022-02-02 PROCEDURE — 3288F FALL RISK ASSESSMENT DOCD: CPT | Mod: HCNC,CPTII,S$GLB, | Performed by: REGISTERED NURSE

## 2022-02-02 PROCEDURE — 99214 OFFICE O/P EST MOD 30 MIN: CPT | Mod: HCNC,S$GLB,, | Performed by: REGISTERED NURSE

## 2022-02-02 PROCEDURE — 99999 PR PBB SHADOW E&M-EST. PATIENT-LVL V: CPT | Mod: PBBFAC,HCNC,, | Performed by: REGISTERED NURSE

## 2022-02-02 PROCEDURE — 1126F AMNT PAIN NOTED NONE PRSNT: CPT | Mod: HCNC,CPTII,S$GLB, | Performed by: REGISTERED NURSE

## 2022-02-02 PROCEDURE — 3075F SYST BP GE 130 - 139MM HG: CPT | Mod: HCNC,CPTII,S$GLB, | Performed by: REGISTERED NURSE

## 2022-02-02 PROCEDURE — 3008F PR BODY MASS INDEX (BMI) DOCUMENTED: ICD-10-PCS | Mod: HCNC,CPTII,S$GLB, | Performed by: REGISTERED NURSE

## 2022-02-02 PROCEDURE — 99214 PR OFFICE/OUTPT VISIT, EST, LEVL IV, 30-39 MIN: ICD-10-PCS | Mod: HCNC,S$GLB,, | Performed by: REGISTERED NURSE

## 2022-02-02 PROCEDURE — 1100F PTFALLS ASSESS-DOCD GE2>/YR: CPT | Mod: HCNC,CPTII,S$GLB, | Performed by: REGISTERED NURSE

## 2022-02-02 PROCEDURE — 3078F PR MOST RECENT DIASTOLIC BLOOD PRESSURE < 80 MM HG: ICD-10-PCS | Mod: HCNC,CPTII,S$GLB, | Performed by: REGISTERED NURSE

## 2022-02-02 PROCEDURE — 99999 PR PBB SHADOW E&M-EST. PATIENT-LVL V: ICD-10-PCS | Mod: PBBFAC,HCNC,, | Performed by: REGISTERED NURSE

## 2022-02-02 PROCEDURE — 3075F PR MOST RECENT SYSTOLIC BLOOD PRESS GE 130-139MM HG: ICD-10-PCS | Mod: HCNC,CPTII,S$GLB, | Performed by: REGISTERED NURSE

## 2022-02-02 PROCEDURE — 3288F PR FALLS RISK ASSESSMENT DOCUMENTED: ICD-10-PCS | Mod: HCNC,CPTII,S$GLB, | Performed by: REGISTERED NURSE

## 2022-02-02 PROCEDURE — 1159F PR MEDICATION LIST DOCUMENTED IN MEDICAL RECORD: ICD-10-PCS | Mod: HCNC,CPTII,S$GLB, | Performed by: REGISTERED NURSE

## 2022-02-02 RX ORDER — DICLOFENAC SODIUM 10 MG/G
2 GEL TOPICAL 4 TIMES DAILY PRN
Qty: 100 G | Refills: 2 | Status: SHIPPED | OUTPATIENT
Start: 2022-02-02 | End: 2022-05-05

## 2022-02-02 RX ORDER — CEPHALEXIN 500 MG/1
CAPSULE ORAL
COMMUNITY
Start: 2021-10-06 | End: 2022-02-02

## 2022-02-02 RX ORDER — ATORVASTATIN CALCIUM 40 MG/1
1 TABLET, FILM COATED ORAL NIGHTLY
COMMUNITY
Start: 2022-01-28 | End: 2022-09-29

## 2022-02-02 RX ORDER — INFLUENZA VACCINE, ADJUVANTED 15; 15; 15; 15 UG/.5ML; UG/.5ML; UG/.5ML; UG/.5ML
INJECTION, SUSPENSION INTRAMUSCULAR
COMMUNITY
Start: 2021-11-26 | End: 2022-02-02

## 2022-03-10 ENCOUNTER — TELEPHONE (OUTPATIENT)
Dept: PSYCHIATRY | Facility: CLINIC | Age: 67
End: 2022-03-10
Payer: MEDICARE

## 2022-05-16 ENCOUNTER — OFFICE VISIT (OUTPATIENT)
Dept: PSYCHIATRY | Facility: CLINIC | Age: 67
End: 2022-05-16
Payer: MEDICARE

## 2022-05-16 DIAGNOSIS — F31.62 BIPOLAR AFFECTIVE DISORDER, MIXED, MODERATE: Primary | ICD-10-CM

## 2022-05-16 DIAGNOSIS — F14.21: ICD-10-CM

## 2022-05-16 PROCEDURE — 99499 UNLISTED E&M SERVICE: CPT | Mod: S$GLB,,, | Performed by: PSYCHIATRY & NEUROLOGY

## 2022-05-16 PROCEDURE — 99999 PR PBB SHADOW E&M-EST. PATIENT-LVL I: ICD-10-PCS | Mod: PBBFAC,,, | Performed by: PSYCHIATRY & NEUROLOGY

## 2022-05-16 PROCEDURE — 99215 OFFICE O/P EST HI 40 MIN: CPT | Mod: S$GLB,,, | Performed by: PSYCHIATRY & NEUROLOGY

## 2022-05-16 PROCEDURE — 99215 PR OFFICE/OUTPT VISIT, EST, LEVL V, 40-54 MIN: ICD-10-PCS | Mod: S$GLB,,, | Performed by: PSYCHIATRY & NEUROLOGY

## 2022-05-16 PROCEDURE — 99499 RISK ADDL DX/OHS AUDIT: ICD-10-PCS | Mod: S$GLB,,, | Performed by: PSYCHIATRY & NEUROLOGY

## 2022-05-16 PROCEDURE — 99999 PR PBB SHADOW E&M-EST. PATIENT-LVL I: CPT | Mod: PBBFAC,,, | Performed by: PSYCHIATRY & NEUROLOGY

## 2022-05-16 RX ORDER — ZIPRASIDONE HYDROCHLORIDE 20 MG/1
20 CAPSULE ORAL 2 TIMES DAILY
Qty: 180 CAPSULE | Refills: 0 | Status: SHIPPED | OUTPATIENT
Start: 2022-05-16 | End: 2022-07-18 | Stop reason: SDUPTHER

## 2022-05-16 NOTE — PATIENT INSTRUCTIONS
PLAN:     She asks for counselor  / as such scheduled with Soc Worker as well  FOLLOW UP      7/15/2022  1:00 PM NEW PATIENT - PSYCHIATRY ([facility]) [facility] Cammy Hernandez LCSW   Location Instructions:    Please park on the [address] side and use the Clnic entrance. Check in on the 2nd floor, to the left.           7/18/2022  9:00 AM ESTABLISHED PATIENT [facility] Lenny Guthrie MD     Meds: see after visit summary  / renew geodon 20 mg twice daily    References:     Relaxation stress reduction workbook: OLGA Wynn PhD ( used: $7-10)    Feeling Good Website: Lenny Mahoney MD / www.[employer] website (free) / mansoor. PODCASTS    Anxiety &  phobia workbook by SHALOM Cazares PhD  (web retailers: used: $ 7-10)    [place]: Path to Better Sleep : https://www.veterantraining.[place].gov/insomnia/ (free)       Pt expressed appreciation for the visit today and did not have further question at this time though pt  was still informed to:     Call  if problems.    Call / Report Side Effects to Psyc MD     Encouraged to follow up with primary care / Gen Med MD for continued monitoring of general health and wellness.    Understanding was expressed; and no further concerns nor questions were raised at this time.     remember healthy self care:   eat right  attempt adequate rest   HANDWASHING / encourage such mansoor. During this corona virus time   walk or light exercise within reason and as your general med team approves  read or explore any of reference materials / homework mentioned  reach out (I.e.,  connect with)  others who nuture and bring out best in you  avoid risky behaviors  keep your appointments  IF you  cannot make your appt THEN please call or go online to reschedule.  avoid  alcohol and illicit substances.  Look for the positive.  All is often relative-seek balance  Call sooner if needed : [phone]   Call 911 or go to Emergency Room  (ER)  if any acute concerns

## 2022-05-16 NOTE — PROGRESS NOTES
Rose Milligan   2022     Disclaimer: Evaluation and treatment is based on information presented to date. Any new information may affect assessment and findings.     Location: In [facility]      Who (in attendance) :    pt herself     Time 60 min      1st visit with D Post Psyc MD     Prior Patient of Dr Lotus Clark MD / has not been into psyc MD since: 3-     S: Patient's Own Perception of Condition (& Side Effects) :  none / says      O:      BACKGROUND: Social history    City Born: [place] (near [place], [place])    Siblings (full or half)  Brothers: none  Sisters: two     Parents:     Briefly Describe  your Mom: bipolar  Briefly Describe your Dad: not in her life ; only went to his      Stepdad (Oliver Rousseau): very good man    Bio mom  Alive 87 yr old / bed ridden live with sister     Bio Mom: Occupation: cook [employer]   StepDad:  Occupation: [employer]     Marital Status:      Children   Girls  (ages): [place] / going [employer] nursing LPN   Boys (ages): 1 boy / caught murder charge    Education: GED / last [employer]     Christianity / Spiritual: Zoroastrian     Legal Issues? none  DWI ? n  snf time? n    Employment:   Longest Job? [employer] 8 yrs / [employer]     Pt is retired     CURRENT PRESENTATION:      Says desires to get refills of her psyc medication    Says no longer taking cogentin    Says only taking geodon 20 mg bid and helping    Says did not like way felt on Risperdal / such as added as allergy intolerance      Constitutional Health Concerns:      Review of Systems   Constitutional: Negative.    HENT: Negative.    Eyes: Negative.    Respiratory:        Smoker    Cardiovascular:        HTN    Gastrointestinal: Negative.    Genitourinary: Negative.    Musculoskeletal: Negative.    Skin: Negative.    Neurological: Negative.    Endo/Heme/Allergies: Negative.        Tobacco:   21 yr old when start smoking  cigarettes    Pack last 1 week    Cocaine / started in 20s ; last use march 2022    Longest off cocaine 4 yrs / about 33 y old      Laboratory Data  No visits with results within 1 Month(s) from this visit.   Latest known visit with results is:   Lab Visit on 07/30/2021   Component Date Value Ref Range Status    WBC 07/30/2021 7.43  3.90 - 12.70 K/uL Final    RBC 07/30/2021 5.36  4.00 - 5.40 M/uL Final    Hemoglobin 07/30/2021 14.9  12.0 - 16.0 g/dL Final    Hematocrit 07/30/2021 46.2  37.0 - 48.5 % Final    MCV 07/30/2021 86  82 - 98 fL Final    MCH 07/30/2021 27.8  27.0 - 31.0 pg Final    MCHC 07/30/2021 32.3  32.0 - 36.0 g/dL Final    RDW 07/30/2021 13.2  11.5 - 14.5 % Final    Platelets 07/30/2021 331  150 - 450 K/uL Final    MPV 07/30/2021 10.4  9.2 - 12.9 fL Final    Sodium 07/30/2021 142  136 - 145 mmol/L Final    Potassium 07/30/2021 4.6  3.5 - 5.1 mmol/L Final    Chloride 07/30/2021 106  95 - 110 mmol/L Final    CO2 07/30/2021 26  23 - 29 mmol/L Final    Glucose 07/30/2021 66 (A) 70 - 110 mg/dL Final    BUN 07/30/2021 36 (A) 8 - 23 mg/dL Final    Creatinine 07/30/2021 3.0 (A) 0.5 - 1.4 mg/dL Final    Calcium 07/30/2021 10.0  8.7 - 10.5 mg/dL Final    Total Protein 07/30/2021 8.2  6.0 - 8.4 g/dL Final    Albumin 07/30/2021 3.9  3.5 - 5.2 g/dL Final    Total Bilirubin 07/30/2021 0.2  0.1 - 1.0 mg/dL Final    Alkaline Phosphatase 07/30/2021 128  55 - 135 U/L Final    AST 07/30/2021 17  10 - 40 U/L Final    ALT 07/30/2021 10  10 - 44 U/L Final    Anion Gap 07/30/2021 10  8 - 16 mmol/L Final    eGFR if  07/30/2021 17.7 (A) >60 mL/min/1.73 m^2 Final    eGFR if non African American 07/30/2021 15.4 (A) >60 mL/min/1.73 m^2 Final    Hemoglobin A1C 07/30/2021 5.6  4.0 - 5.6 % Final    Estimated Avg Glucose 07/30/2021 114  68 - 131 mg/dL Final    Cholesterol 07/30/2021 131  120 - 199 mg/dL Final    Triglycerides 07/30/2021 84  30 - 150 mg/dL Final    HDL 07/30/2021 47  40  - 75 mg/dL Final    LDL Cholesterol 07/30/2021 67.2  63.0 - 159.0 mg/dL Final    HDL/Cholesterol Ratio 07/30/2021 35.9  20.0 - 50.0 % Final    Total Cholesterol/HDL Ratio 07/30/2021 2.8  2.0 - 5.0 Final    Non-HDL Cholesterol 07/30/2021 84  mg/dL Final    TSH 07/30/2021 1.098  0.400 - 4.000 uIU/mL Final        Mental Status Exam:      Appearance: casual   Oriented: x 3   Attitude: cooperative   Eye Contact: good  Behavior: bit anxious / talking bit briskly    Mood: says ok   \Cognition: alert  Concentration: grossly intact   Affect: appropriate range      Anxiety: mild / moderate      Thought Process: goal directed     Speech:       Volume : WNL       Quantity WNL       Quality: appears to openly answer questions      Threats: no SI / no HI     Psychosis: denies all      Estimate of Intellectual Function: average   Impulse Control: no thoughts of harm to self/ others      Musculoskeletal:  No tremor      AIMS zero 5-      Patient Active Problem List   Diagnosis    Chronic hepatitis C virus infection    Essential hypertension    CKD (chronic kidney disease) stage 4, GFR 15-29 ml/min    Bipolar affective disorder    (HFpEF) heart failure with preserved ejection fraction    Other hyperlipidemia    Class 1 obesity due to excess calories with serious comorbidity and body mass index (BMI) of 30.0 to 30.9 in adult    Substance use disorder    Chronic pulmonary heart disease    Tobacco use    Bipolar affective disorder, mixed, moderate    Cocaine use disorder, moderate, in early remission, dependence, says lasy use march 2022 / started at about 21 yr old; longest off 4 yrs          Current Outpatient Medications:     amLODIPine (NORVASC) 10 MG tablet, TAKE 1 TABLET BY MOUTH EVERY DAY, Disp: 90 tablet, Rfl: 3    aspirin (ECOTRIN) 81 MG EC tablet, Take 1 tablet (81 mg total) by mouth once daily., Disp: 90 tablet, Rfl: 3    atorvastatin (LIPITOR) 40 MG tablet, Take 1 tablet by mouth every evening.,  Disp: , Rfl:     carvediloL (COREG) 25 MG tablet, Take 1 tablet (25 mg total) by mouth 2 (two) times daily with meals., Disp: 180 tablet, Rfl: 1    cloNIDine (CATAPRES) 0.1 MG tablet, Take 1 tablet (0.1 mg total) by mouth 3 (three) times daily., Disp: 90 tablet, Rfl: 1    diclofenac sodium (VOLTAREN) 1 % Gel, APPLY 2 G TOPICALLY 4 (FOUR) TIMES DAILY AS NEEDED., Disp: 100 g, Rfl: 2    fluticasone propionate (FLONASE) 50 mcg/actuation nasal spray, 1 spray by Nasal route., Disp: , Rfl:     furosemide (LASIX) 40 MG tablet, TAKE 1 TABLET EVERY DAY, Disp: 90 tablet, Rfl: 1    hydrALAZINE (APRESOLINE) 50 MG tablet, Take 1 tablet (50 mg total) by mouth every 8 (eight) hours., Disp: 270 tablet, Rfl: 1    hydrOXYzine HCL (ATARAX) 25 MG tablet, TAKE 1 TABLET EVERY DAY AS NEEDED, Disp: 30 tablet, Rfl: 0    irbesartan (AVAPRO) 75 MG tablet, Take 1 tablet (75 mg total) by mouth once daily., Disp: 90 tablet, Rfl: 1    magnesium oxide (MAG-OX) 400 mg (241.3 mg magnesium) tablet, Take 1 tablet (400 mg total) by mouth once daily., Disp: , Rfl: 0    methocarbamoL (ROBAXIN) 500 MG Tab, methocarbamol 500 mg tablet  TAKE 2 TABLETS BY MOUTH EVERY 6 HOURS AS NEEDED FOR SPASM, Disp: , Rfl:     ondansetron (ZOFRAN-ODT) 4 MG TbDL, ondansetron 4 mg disintegrating tablet  TAKE 1 TABLET BY MOUTH EVERY 8 HOURS AS NEEDED FOR NAUSEA, Disp: , Rfl:     pitavastatin calcium (LIVALO) 2 mg Tab tablet, Take 1 tablet (2 mg total) by mouth once daily., Disp: 90 tablet, Rfl: 3    ziprasidone (GEODON) 20 MG Cap, Take 1 capsule (20 mg total) by mouth 2 (two) times daily., Disp: 180 capsule, Rfl: 0     Social History     Tobacco Use   Smoking Status Current Some Day Smoker    Packs/day: 0.25    Types: Cigarettes    Start date: 1/11/2016   Smokeless Tobacco Never Used   Tobacco Comment    1 pack every 3 days        Review of patient's allergies indicates:   Allergen Reactions    Tramadol Itching    Risperdal [risperidone] Other (See Comments)      Did not like way felt        ASSESSMENT:   Encounter Diagnoses   Name Primary?    Bipolar affective disorder, mixed, moderate Yes    Cocaine use disorder, moderate, in early remission, dependence, says lasy use march 2022 / started at about 21 yr old; longest off 4 yrs        Patient Instructions       PLAN:     She asks for counselor  / as such scheduled with Soc Worker as well  FOLLOW UP      7/15/2022  1:00 PM NEW PATIENT - PSYCHIATRY ([facility]) [facility] Cammy Hernandez LCSW   Location Instructions:    Please park on the [address] side and use the Clnic entrance. Check in on the 2nd floor, to the left.           7/18/2022  9:00 AM ESTABLISHED PATIENT [facility] Lenny Guthrie MD     Meds: see after visit summary  / renew geodon 20 mg twice daily    References:     Relaxation stress reduction workbook: OLGA Wynn PhD ( used: $7-10)    Feeling Good Website: Lenny Mahoney MD / www.[employer] website (free) / mansoor. PODCASTS    Anxiety &  phobia workbook by SHALOM Cazares PhD  (web retailers: used: $ 7-10)    [place]: Path to Better Sleep : https://www.veterantraining.[place].gov/insomnia/ (free)       Pt expressed appreciation for the visit today and did not have further question at this time though pt  was still informed to:     Call  if problems.    Call / Report Side Effects to Psyc MD     Encouraged to follow up with primary care / Gen Med MD for continued monitoring of general health and wellness.    Understanding was expressed; and no further concerns nor questions were raised at this time.     remember healthy self care:   eat right  attempt adequate rest   HANDWASHING / encourage such mansoor. During this corona virus time   walk or light exercise within reason and as your general med team approves  read or explore any of reference materials / homework mentioned  reach out (I.e.,  connect with)  others who nuture and bring out best in you  avoid risky  behaviors  keep your appointments  IF you  cannot make your appt THEN please call or go online to reschedule.  avoid  alcohol and illicit substances.  Look for the positive.  All is often relative-seek balance  Call sooner if needed : [phone]   Call 911 or go to Emergency Room  (ER)  if any acute concerns

## 2022-07-15 ENCOUNTER — OFFICE VISIT (OUTPATIENT)
Dept: PSYCHIATRY | Facility: CLINIC | Age: 67
End: 2022-07-15
Payer: MEDICARE

## 2022-07-15 DIAGNOSIS — F31.62 BIPOLAR AFFECTIVE DISORDER, MIXED, MODERATE: Primary | ICD-10-CM

## 2022-07-15 PROCEDURE — 99499 NO LOS: ICD-10-PCS | Mod: S$GLB,,, | Performed by: SOCIAL WORKER

## 2022-07-15 PROCEDURE — 99499 UNLISTED E&M SERVICE: CPT | Mod: S$GLB,,, | Performed by: SOCIAL WORKER

## 2022-07-15 NOTE — PROGRESS NOTES
Pt came to therapy today and stated that she had been fired from her job at the [employer].  She had been off of her bipolar meds and reportedly spoke inappropriately to a customer.  Pt stated she is glad to be back on meds but because of her loss of income she does not feel she can afford to pay copays for therapy and psychiatry.  I encouraged her to continue seeing Dr. Guthrie if she could only do one.  I invited her to return to therapy when she is in a better financial situation.  I was unable to discern if she had a legitimate financial concern or if she thought therapy was required to continue to be seen by Dr. Guthrie.        Cammy Hernandez   07/15/2022   1:21 PM

## 2022-07-18 ENCOUNTER — OFFICE VISIT (OUTPATIENT)
Dept: PSYCHIATRY | Facility: CLINIC | Age: 67
End: 2022-07-18
Payer: MEDICARE

## 2022-07-18 VITALS
DIASTOLIC BLOOD PRESSURE: 104 MMHG | HEIGHT: 63 IN | BODY MASS INDEX: 27.19 KG/M2 | HEART RATE: 66 BPM | SYSTOLIC BLOOD PRESSURE: 177 MMHG | WEIGHT: 153.44 LBS

## 2022-07-18 DIAGNOSIS — F31.62 BIPOLAR AFFECTIVE DISORDER, MIXED, MODERATE: Primary | ICD-10-CM

## 2022-07-18 DIAGNOSIS — F41.9 ANXIETY DISORDER, UNSPECIFIED TYPE: ICD-10-CM

## 2022-07-18 DIAGNOSIS — I50.32 CHRONIC HEART FAILURE WITH PRESERVED EJECTION FRACTION: ICD-10-CM

## 2022-07-18 DIAGNOSIS — F14.21: ICD-10-CM

## 2022-07-18 PROCEDURE — 3077F SYST BP >= 140 MM HG: CPT | Mod: CPTII,S$GLB,, | Performed by: PSYCHIATRY & NEUROLOGY

## 2022-07-18 PROCEDURE — 99214 PR OFFICE/OUTPT VISIT, EST, LEVL IV, 30-39 MIN: ICD-10-PCS | Mod: S$GLB,,, | Performed by: PSYCHIATRY & NEUROLOGY

## 2022-07-18 PROCEDURE — 3077F PR MOST RECENT SYSTOLIC BLOOD PRESSURE >= 140 MM HG: ICD-10-PCS | Mod: CPTII,S$GLB,, | Performed by: PSYCHIATRY & NEUROLOGY

## 2022-07-18 PROCEDURE — 99999 PR PBB SHADOW E&M-EST. PATIENT-LVL II: CPT | Mod: PBBFAC,,, | Performed by: PSYCHIATRY & NEUROLOGY

## 2022-07-18 PROCEDURE — 3008F BODY MASS INDEX DOCD: CPT | Mod: CPTII,S$GLB,, | Performed by: PSYCHIATRY & NEUROLOGY

## 2022-07-18 PROCEDURE — 3080F PR MOST RECENT DIASTOLIC BLOOD PRESSURE >= 90 MM HG: ICD-10-PCS | Mod: CPTII,S$GLB,, | Performed by: PSYCHIATRY & NEUROLOGY

## 2022-07-18 PROCEDURE — 99214 OFFICE O/P EST MOD 30 MIN: CPT | Mod: S$GLB,,, | Performed by: PSYCHIATRY & NEUROLOGY

## 2022-07-18 PROCEDURE — 99499 RISK ADDL DX/OHS AUDIT: ICD-10-PCS | Mod: S$GLB,,, | Performed by: PSYCHIATRY & NEUROLOGY

## 2022-07-18 PROCEDURE — 99499 UNLISTED E&M SERVICE: CPT | Mod: S$GLB,,, | Performed by: PSYCHIATRY & NEUROLOGY

## 2022-07-18 PROCEDURE — 99999 PR PBB SHADOW E&M-EST. PATIENT-LVL II: ICD-10-PCS | Mod: PBBFAC,,, | Performed by: PSYCHIATRY & NEUROLOGY

## 2022-07-18 PROCEDURE — 3080F DIAST BP >= 90 MM HG: CPT | Mod: CPTII,S$GLB,, | Performed by: PSYCHIATRY & NEUROLOGY

## 2022-07-18 PROCEDURE — 3008F PR BODY MASS INDEX (BMI) DOCUMENTED: ICD-10-PCS | Mod: CPTII,S$GLB,, | Performed by: PSYCHIATRY & NEUROLOGY

## 2022-07-18 RX ORDER — CARVEDILOL 25 MG/1
25 TABLET ORAL 2 TIMES DAILY WITH MEALS
Qty: 180 TABLET | Refills: 1 | OUTPATIENT
Start: 2022-07-18

## 2022-07-18 RX ORDER — ZIPRASIDONE HYDROCHLORIDE 20 MG/1
20 CAPSULE ORAL 2 TIMES DAILY
Qty: 180 CAPSULE | Refills: 0 | Status: SHIPPED | OUTPATIENT
Start: 2022-07-18 | End: 2023-05-05 | Stop reason: SDUPTHER

## 2022-07-18 NOTE — PROGRESS NOTES
Rose Milligan   2022     Disclaimer: Evaluation and treatment is based on information presented to date. Any new information may affect assessment and findings.     Location: In [facility]      Who (in attendance) :    pt herself     Does not have My Chart     Prior Patient of Dr Lotus Clark MD / has not been into shannan MD since: 1-       BACKGROUND: Social history    City Born: [place] (near [place], [place])    Siblings (full or half)  Brothers: none  Sisters: two     Parents:     Briefly Describe  your Mom: bipolar  Briefly Describe your Dad: not in her life ; only went to his      Stepdad (Oliver Rousseau): very good man    Bio mom  Alive 87 yr old / bed ridden live with sister     Bio Mom: Occupation: cook [employer]   StepDad:  Occupation: [email or web address] [place]     Marital Status:      Children   Girls  (ages): [facility] / going [employer] nursing LPN   Boys (ages): 1 boy / caught murder charge    Education: GED / last [employer]     Sikhism / Spiritual: Protestant     Legal Issues? none  DWI ? n  senior living time? n    Employment:   Longest Job? [employer] 8 yrs / [employer]     Pt is retired          S: Patient's Own Perception of Condition (& Side Effects) :  none / says      O:     CURRENT PRESENTATION:     Biggest issue for her is that out of work x 2 months / tho does not know much of how job search engines work . I gave her info of [employer] to assist.     As per Cammy Hernandez [facility] note 7-15-22 :    Pt came to therapy today and stated that she had been fired from her job at the [employer].  She had been off of her bipolar meds and reportedly spoke inappropriately to a customer.  Pt stated she is glad to be back on meds but because of her loss of income she does not feel she can afford to pay copays for therapy and psychiatry.  I encouraged her to continue seeing Dr. Guthrie if she could only do one.  I invited her to return  to therapy when she is in a better financial situation.  I was unable to discern if she had a legitimate financial concern or if she thought therapy was required to continue to be seen by Dr. Guthrie.      Lives alone / oldest sister / close to her / Jeanette dye / call each other daily     Says desires to get refills of her psyc medication    Says no longer taking cogentin    Says only taking geodon 20 mg bid and helping    Says did not like way felt on Risperdal / such as added as allergy intolerance      Constitutional Health Concerns:      Review of Systems   Constitutional: Negative.    HENT: Negative.    Eyes: Negative.    Respiratory:        Smoker    Cardiovascular:        HTN    Gastrointestinal: Negative.    Genitourinary: Negative.    Musculoskeletal: Negative.    Skin: Negative.    Neurological: Negative.    Endo/Heme/Allergies: Negative.        Tobacco:   21 yr old when start smoking cigarettes    Pack last 1 week    Cocaine / started in 20s ; last use march 2022    Longest off cocaine 4 yrs / about 33 y old     GAD7 7/18/2022   1. Feeling nervous, anxious, or on edge? 1   2. Not being able to stop or control worrying? 2   3. Worrying too much about different things? 3   4. Trouble relaxing? 2   5. Being so restless that it is hard to sit still? 2   6. Becoming easily annoyed or irritable? 2   7. Feeling afraid as if something awful might happen? 2   8. If you checked off any problems, how difficult have these problems made it for you to do your work, take care of things at home, or get along with other people? 1   DOROTEO-7 Score 14        Depression Patient Health Questionnaire 7/18/2022 5/10/2021   Over the last two weeks how often have you been bothered by little interest or pleasure in doing things Several days Not at all   Over the last two weeks how often have you been bothered by feeling down, depressed or hopeless More than half the days Not at all   PHQ-2 Total Score 3 0   Over the last two weeks how  often have you been bothered by trouble falling or staying asleep, or sleeping too much More than half the days -   Over the last two weeks how often have you been bothered by feeling tired or having little energy Several days -   Over the last two weeks how often have you been bothered by a poor appetite or overeating Not at all -   Over the last two weeks how often have you been bothered by feeling bad about yourself - or that you are a failure or have let yourself or your family down Not at all -   Over the last two weeks how often have you been bothered by trouble concentrating on things, such as reading the newspaper or watching television Several days -   Over the last two weeks how often have you been bothered by moving or speaking so slowly that other people could have noticed. Or the opposite - being so fidgety or restless that you have been moving around a lot more than usual. Several days -   Over the last two weeks how often have you been bothered by thoughts that you would be better off dead, or of hurting yourself Not at all -   If you checked off any problems, how difficult have these problems made it for you to do your work, take care of things at home or get along with other people? Somewhat difficult -   Total Score 8 -   Interpretation Mild -      Laboratory Data  No visits with results within 1 Month(s) from this visit.   Latest known visit with results is:   Lab Visit on 07/30/2021   Component Date Value Ref Range Status    WBC 07/30/2021 7.43  3.90 - 12.70 K/uL Final    RBC 07/30/2021 5.36  4.00 - 5.40 M/uL Final    Hemoglobin 07/30/2021 14.9  12.0 - 16.0 g/dL Final    Hematocrit 07/30/2021 46.2  37.0 - 48.5 % Final    MCV 07/30/2021 86  82 - 98 fL Final    MCH 07/30/2021 27.8  27.0 - 31.0 pg Final    MCHC 07/30/2021 32.3  32.0 - 36.0 g/dL Final    RDW 07/30/2021 13.2  11.5 - 14.5 % Final    Platelets 07/30/2021 331  150 - 450 K/uL Final    MPV 07/30/2021 10.4  9.2 - 12.9 fL Final     Sodium 07/30/2021 142  136 - 145 mmol/L Final    Potassium 07/30/2021 4.6  3.5 - 5.1 mmol/L Final    Chloride 07/30/2021 106  95 - 110 mmol/L Final    CO2 07/30/2021 26  23 - 29 mmol/L Final    Glucose 07/30/2021 66 (A) 70 - 110 mg/dL Final    BUN 07/30/2021 36 (A) 8 - 23 mg/dL Final    Creatinine 07/30/2021 3.0 (A) 0.5 - 1.4 mg/dL Final    Calcium 07/30/2021 10.0  8.7 - 10.5 mg/dL Final    Total Protein 07/30/2021 8.2  6.0 - 8.4 g/dL Final    Albumin 07/30/2021 3.9  3.5 - 5.2 g/dL Final    Total Bilirubin 07/30/2021 0.2  0.1 - 1.0 mg/dL Final    Alkaline Phosphatase 07/30/2021 128  55 - 135 U/L Final    AST 07/30/2021 17  10 - 40 U/L Final    ALT 07/30/2021 10  10 - 44 U/L Final    Anion Gap 07/30/2021 10  8 - 16 mmol/L Final    eGFR if  07/30/2021 17.7 (A) >60 mL/min/1.73 m^2 Final    eGFR if non African American 07/30/2021 15.4 (A) >60 mL/min/1.73 m^2 Final    Hemoglobin A1C 07/30/2021 5.6  4.0 - 5.6 % Final    Estimated Avg Glucose 07/30/2021 114  68 - 131 mg/dL Final    Cholesterol 07/30/2021 131  120 - 199 mg/dL Final    Triglycerides 07/30/2021 84  30 - 150 mg/dL Final    HDL 07/30/2021 47  40 - 75 mg/dL Final    LDL Cholesterol 07/30/2021 67.2  63.0 - 159.0 mg/dL Final    HDL/Cholesterol Ratio 07/30/2021 35.9  20.0 - 50.0 % Final    Total Cholesterol/HDL Ratio 07/30/2021 2.8  2.0 - 5.0 Final    Non-HDL Cholesterol 07/30/2021 84  mg/dL Final    TSH 07/30/2021 1.098  0.400 - 4.000 uIU/mL Final        Mental Status Exam:      Appearance: casual   Oriented: x 3   Attitude: cooperative   Eye Contact: good  Behavior: bit anxious / talking bit briskly    Mood: says anxious as out of a job x 2 months   Cognition: alert  Concentration: grossly intact   Affect: appropriate range      Anxiety: moderate      Thought Process: goal directed     Speech:       Volume : WNL       Quantity WNL       Quality: appears to openly answer questions      Threats: no SI / no HI     Psychosis:  denies all      Estimate of Intellectual Function: average   Impulse Control: no thoughts of harm to self/ others      Musculoskeletal:  No tremor      AIMS zero 5-      Patient Active Problem List   Diagnosis    Chronic hepatitis C virus infection    Essential hypertension    CKD (chronic kidney disease) stage 4, GFR 15-29 ml/min    Bipolar affective disorder    (HFpEF) heart failure with preserved ejection fraction    Other hyperlipidemia    Class 1 obesity due to excess calories with serious comorbidity and body mass index (BMI) of 30.0 to 30.9 in adult    Substance use disorder    Chronic pulmonary heart disease    Tobacco use    Bipolar affective disorder, mixed, moderate    Cocaine use disorder, moderate, in early remission, dependence, says lasy use march 2022 / started at about 21 yr old; longest off 4 yrs    Anxiety disorder          Current Outpatient Medications:     amLODIPine (NORVASC) 10 MG tablet, TAKE 1 TABLET BY MOUTH EVERY DAY, Disp: 90 tablet, Rfl: 3    aspirin (ECOTRIN) 81 MG EC tablet, Take 1 tablet (81 mg total) by mouth once daily., Disp: 90 tablet, Rfl: 3    atorvastatin (LIPITOR) 40 MG tablet, Take 1 tablet by mouth every evening., Disp: , Rfl:     carvediloL (COREG) 25 MG tablet, Take 1 tablet (25 mg total) by mouth 2 (two) times daily with meals., Disp: 180 tablet, Rfl: 1    cloNIDine (CATAPRES) 0.1 MG tablet, Take 1 tablet (0.1 mg total) by mouth 3 (three) times daily., Disp: 90 tablet, Rfl: 1    diclofenac sodium (VOLTAREN) 1 % Gel, APPLY 2 G TOPICALLY 4 (FOUR) TIMES DAILY AS NEEDED., Disp: 100 g, Rfl: 2    fluticasone propionate (FLONASE) 50 mcg/actuation nasal spray, 1 spray by Nasal route., Disp: , Rfl:     furosemide (LASIX) 40 MG tablet, TAKE 1 TABLET EVERY DAY, Disp: 90 tablet, Rfl: 1    hydrALAZINE (APRESOLINE) 50 MG tablet, Take 1 tablet (50 mg total) by mouth every 8 (eight) hours., Disp: 270 tablet, Rfl: 1    hydrOXYzine HCL (ATARAX) 25 MG tablet,  TAKE 1 TABLET EVERY DAY AS NEEDED, Disp: 30 tablet, Rfl: 0    irbesartan (AVAPRO) 75 MG tablet, Take 1 tablet (75 mg total) by mouth once daily., Disp: 90 tablet, Rfl: 1    magnesium oxide (MAG-OX) 400 mg (241.3 mg magnesium) tablet, Take 1 tablet (400 mg total) by mouth once daily., Disp: , Rfl: 0    methocarbamoL (ROBAXIN) 500 MG Tab, methocarbamol 500 mg tablet  TAKE 2 TABLETS BY MOUTH EVERY 6 HOURS AS NEEDED FOR SPASM, Disp: , Rfl:     ondansetron (ZOFRAN-ODT) 4 MG TbDL, ondansetron 4 mg disintegrating tablet  TAKE 1 TABLET BY MOUTH EVERY 8 HOURS AS NEEDED FOR NAUSEA, Disp: , Rfl:     pitavastatin calcium (LIVALO) 2 mg Tab tablet, Take 1 tablet (2 mg total) by mouth once daily., Disp: 90 tablet, Rfl: 3    ziprasidone (GEODON) 20 MG Cap, Take 1 capsule (20 mg total) by mouth 2 (two) times daily., Disp: 180 capsule, Rfl: 0     Social History     Tobacco Use   Smoking Status Current Some Day Smoker    Packs/day: 0.25    Types: Cigarettes    Start date: 1/11/2016   Smokeless Tobacco Never Used   Tobacco Comment    1 pack every 3 days        Review of patient's allergies indicates:   Allergen Reactions    Tramadol Itching    Risperdal [risperidone] Other (See Comments)     Did not like way felt        ASSESSMENT:   Encounter Diagnoses   Name Primary?    Bipolar affective disorder, mixed, moderate Yes    Anxiety disorder, unspecified type     Cocaine use disorder, moderate, in early remission, dependence, says lasy use march 2022 / started at about 21 yr old; longest off 4 yrs        Patient Instructions       PLAN:     Follow up      10/3/2022 10:00 AM ESTABLISHED PATIENT [facility] Lenny Guthrie MD     Gave info on [facility]    Meds: see after visit summary  / renew geodon 20 mg twice daily    References:     Relaxation stress reduction workbook: OLGA Wynn PhD ( used: $7-10)    Feeling Good Website: Lenny Mahoney MD / [email or web address] website  (free) / mansoor. PODCASTS    Anxiety &  phobia workbook by SHALOM Cazares PhD  (web retailers: used: $ 7-10)    [place]: Path to Better Sleep : https://www.veterantraining.[place].gov/insomnia/ (free)       Pt expressed appreciation for the visit today and did not have further question at this time though pt  was still informed to:     Call  if problems.    Call / Report Side Effects to Psyc MD     Encouraged to follow up with primary care / Gen Med MD for continued monitoring of general health and wellness.    Understanding was expressed; and no further concerns nor questions were raised at this time.     remember healthy self care:   eat right  attempt adequate rest   HANDWASHING / encourage such mansoor. During this corona virus time   walk or light exercise within reason and as your general med team approves  read or explore any of reference materials / homework mentioned  reach out (I.e.,  connect with)  others who nuture and bring out best in you  avoid risky behaviors  keep your appointments  IF you  cannot make your appt THEN please call or go online to reschedule.  avoid  alcohol and illicit substances.  Look for the positive.  All is often relative-seek balance  Call sooner if needed : [phone]   Call 911 or go to Emergency Room  (ER)  if any acute concerns

## 2022-07-18 NOTE — TELEPHONE ENCOUNTER
Patient called wanting to know why her coreg is not being refilled from [facility]        Refusal reason: Patient no longer under prescriber care

## 2022-07-18 NOTE — PATIENT INSTRUCTIONS
PLAN:     Follow up      10/3/2022 10:00 AM ESTABLISHED PATIENT [facility] Lenny Guthrie MD     Gave info on [facility]    Meds: see after visit summary  / renew geodon 20 mg twice daily    References:     Relaxation stress reduction workbook: OLGA Wynn PhD ( used: $7-10)    Feeling Good Website: Lenny Mahoney MD / www.[employer] website (free) / mansoor. PODCASTS    Anxiety &  phobia workbook by SHALOM Cazares PhD  (web retailers: used: $ 7-10)    [place]: Path to Better Sleep : https://www.veterantraining.[place].gov/insomnia/ (free)       Pt expressed appreciation for the visit today and did not have further question at this time though pt  was still informed to:     Call  if problems.    Call / Report Side Effects to Psyc MD     Encouraged to follow up with primary care / Gen Med MD for continued monitoring of general health and wellness.    Understanding was expressed; and no further concerns nor questions were raised at this time.     remember healthy self care:   eat right  attempt adequate rest   HANDWASHING / encourage such mansoor. During this corona virus time   walk or light exercise within reason and as your general med team approves  read or explore any of reference materials / homework mentioned  reach out (I.e.,  connect with)  others who nuture and bring out best in you  avoid risky behaviors  keep your appointments  IF you  cannot make your appt THEN please call or go online to reschedule.  avoid  alcohol and illicit substances.  Look for the positive.  All is often relative-seek balance  Call sooner if needed : [phone]   Call 911 or go to Emergency Room  (ER)  if any acute concerns

## 2022-08-24 ENCOUNTER — TELEPHONE (OUTPATIENT)
Dept: ADMINISTRATIVE | Facility: HOSPITAL | Age: 67
End: 2022-08-24
Payer: MEDICARE

## 2022-09-19 ENCOUNTER — TELEPHONE (OUTPATIENT)
Dept: ADMINISTRATIVE | Facility: HOSPITAL | Age: 67
End: 2022-09-19
Payer: MEDICARE

## 2022-09-21 ENCOUNTER — TELEPHONE (OUTPATIENT)
Dept: FAMILY MEDICINE | Facility: CLINIC | Age: 67
End: 2022-09-21

## 2022-09-21 NOTE — TELEPHONE ENCOUNTER
Pt walked into clinic today. States she had covid and Dr. Hannon (cardiologist) has not been refilling her meds for 3 months. Pt wants a new cardiologist, she does not want to see him. Please advise.    Pt states her BP was 219/160 this morning, advised pt to go to ER. Retook BP in clinic and it was 178/110. Pt states she is very stressed about the situation and voiced understanding about need to be evaluated by someone today.

## 2022-09-28 PROBLEM — F31.62 BIPOLAR AFFECTIVE DISORDER, MIXED, MODERATE: Status: RESOLVED | Noted: 2022-05-16 | Resolved: 2022-09-28

## 2022-09-28 NOTE — PROGRESS NOTES
Subjective:      Rose Milligan is a 67 y.o. female, here today for COVID follow-up.      PCP: Zora Cope MD --- last visit 7/30/2021  The patient's last visit with me was on 2/2/2022.      HPI:    Mrs. Milligan is here for follow-up of COVID infection.  Seen at  about 2 weeks ago for fatigue and elevated bp.  COVID test positive, does report positive known exposure.  Was sent to [facility] for overnight observation since her bp was so high.  Sent home next day, feeling much better although still very tired.  Sleeping well, good appetite.  Does not check her bp at home regularly, on med as ordered.      Review of Systems   Constitutional:  Positive for fatigue. Negative for chills, diaphoresis and fever.   HENT: Negative.     Eyes:  Negative for photophobia, pain and visual disturbance.   Respiratory: Negative.     Cardiovascular: Negative.    Gastrointestinal: Negative.    Genitourinary: Negative.    Neurological:  Negative for tremors, syncope, light-headedness and headaches.       Review of patient's allergies indicates:   Allergen Reactions    Tramadol Itching    Risperdal [risperidone] Other (See Comments)     Did not like way felt       Patient Active Problem List   Diagnosis    Chronic hepatitis C virus infection    Essential hypertension    CKD (chronic kidney disease) stage 4, GFR 15-29 ml/min    Bipolar affective disorder    (HFpEF) heart failure with preserved ejection fraction    Other hyperlipidemia    Class 1 obesity due to excess calories with serious comorbidity and body mass index (BMI) of 30.0 to 30.9 in adult    Substance use disorder    Chronic pulmonary heart disease    Tobacco use    Cocaine use disorder, moderate, in early remission, dependence, says linsey use march 2022 / started at about 21 yr old; longest off 4 yrs    Anxiety disorder         Current Outpatient Medications:     amLODIPine (NORVASC) 10 MG tablet, TAKE 1 TABLET BY MOUTH EVERY DAY, Disp: 90 tablet, Rfl: 3    aspirin (ECOTRIN)  "81 MG EC tablet, TAKE 1 TABLET EVERY DAY, Disp: 90 tablet, Rfl: 3    atorvastatin (LIPITOR) 40 MG tablet, Take 1 tablet by mouth every evening., Disp: , Rfl:     carvediloL (COREG) 25 MG tablet, Take 1 tablet (25 mg total) by mouth 2 (two) times daily with meals., Disp: 180 tablet, Rfl: 1    cloNIDine (CATAPRES) 0.1 MG tablet, Take 1 tablet (0.1 mg total) by mouth 3 (three) times daily., Disp: 90 tablet, Rfl: 1    diclofenac sodium (VOLTAREN) 1 % Gel, APPLY 2 G TOPICALLY 4 (FOUR) TIMES DAILY AS NEEDED., Disp: 100 g, Rfl: 2    furosemide (LASIX) 40 MG tablet, Take 1 tablet (40 mg total) by mouth once daily., Disp: 90 tablet, Rfl: 0    hydrALAZINE (APRESOLINE) 50 MG tablet, TAKE 1 TABLET EVERY 8 HOURS., Disp: 270 tablet, Rfl: 1    hydrOXYzine HCL (ATARAX) 25 MG tablet, TAKE 1 TABLET EVERY DAY AS NEEDED, Disp: 30 tablet, Rfl: 0    irbesartan (AVAPRO) 75 MG tablet, Take 1 tablet (75 mg total) by mouth once daily., Disp: 90 tablet, Rfl: 1    magnesium oxide (MAG-OX) 400 mg (241.3 mg magnesium) tablet, Take 1 tablet (400 mg total) by mouth once daily., Disp: , Rfl: 0    methocarbamoL (ROBAXIN) 500 MG Tab, methocarbamol 500 mg tablet  TAKE 2 TABLETS BY MOUTH EVERY 6 HOURS AS NEEDED FOR SPASM, Disp: , Rfl:     ondansetron (ZOFRAN-ODT) 4 MG TbDL, ondansetron 4 mg disintegrating tablet  TAKE 1 TABLET BY MOUTH EVERY 8 HOURS AS NEEDED FOR NAUSEA, Disp: , Rfl:     pitavastatin calcium (LIVALO) 2 mg Tab tablet, Take 1 tablet (2 mg total) by mouth once daily., Disp: 90 tablet, Rfl: 3    ziprasidone (GEODON) 20 MG Cap, Take 1 capsule (20 mg total) by mouth 2 (two) times daily., Disp: 180 capsule, Rfl: 0      Past medical, surgical, family and social histories have been reviewed today.      Objective:     Vitals:    09/29/22 1016   BP: 138/88   BP Location: Right arm   Patient Position: Sitting   BP Method: Small (Manual)   Pulse: 86   Temp: 96.8 °F (36 °C)   SpO2: 99%   Weight: 67.7 kg (149 lb 2.3 oz)   Height: 5' 3" (1.6 m) "       Physical Exam  Vitals reviewed.   Constitutional:       General: She is not in acute distress.  HENT:      Head: Normocephalic and atraumatic.   Eyes:      Extraocular Movements: Extraocular movements intact.      Pupils: Pupils are equal, round, and reactive to light.   Neck:      Vascular: No carotid bruit.   Cardiovascular:      Rate and Rhythm: Normal rate and regular rhythm.      Pulses: Normal pulses.      Heart sounds: Normal heart sounds. No murmur heard.    No gallop.   Pulmonary:      Effort: Pulmonary effort is normal.      Breath sounds: Normal breath sounds.   Musculoskeletal:         General: No swelling, tenderness or deformity. Normal range of motion.      Cervical back: Normal range of motion and neck supple. No rigidity. No muscular tenderness.   Skin:     General: Skin is warm and dry.      Capillary Refill: Capillary refill takes less than 2 seconds.      Findings: No rash.   Neurological:      General: No focal deficit present.      Mental Status: She is alert and oriented to person, place, and time. Mental status is at baseline.      Cranial Nerves: No cranial nerve deficit.      Sensory: No sensory deficit.      Motor: No weakness.      Coordination: Coordination normal.      Gait: Gait normal.      Deep Tendon Reflexes: Reflexes normal.   Psychiatric:         Mood and Affect: Mood normal.         Behavior: Behavior normal.         Thought Content: Thought content normal.         Judgment: Judgment normal.         Diagnosis/Assessment:     1. COVID-19 virus infection --- recovering, feeling well but still with main complaint of fatigue/malaise.    2. Essential hypertension --- stable, on med as ordered.      Plan:     Supportive care, rest, hydration.  Vitamins, supplement and immune boosters discussed.    Follow-Up:     RTC as directed and/or prn.      MALLORY Granados  [facility]       25 minutes of total time spent on the encounter, which includes face  to face time and non-face to face time preparing to see the patient (eg, review of tests), obtaining and/or reviewing separately obtained history, and documenting clinical information in the electronic or other health record.  Also includes independent interpretation of results (not separately reported) and communicating results to the patient/family/caregiver, with care coordination (not separately reported).

## 2022-09-29 ENCOUNTER — OFFICE VISIT (OUTPATIENT)
Dept: FAMILY MEDICINE | Facility: CLINIC | Age: 67
End: 2022-09-29
Payer: MEDICARE

## 2022-09-29 VITALS
HEART RATE: 86 BPM | SYSTOLIC BLOOD PRESSURE: 138 MMHG | DIASTOLIC BLOOD PRESSURE: 88 MMHG | OXYGEN SATURATION: 99 % | HEIGHT: 63 IN | WEIGHT: 149.13 LBS | BODY MASS INDEX: 26.42 KG/M2 | TEMPERATURE: 97 F

## 2022-09-29 DIAGNOSIS — I10 ESSENTIAL HYPERTENSION: ICD-10-CM

## 2022-09-29 DIAGNOSIS — U07.1 COVID-19 VIRUS INFECTION: Primary | ICD-10-CM

## 2022-09-29 PROCEDURE — 99999 PR PBB SHADOW E&M-EST. PATIENT-LVL III: ICD-10-PCS | Mod: PBBFAC,,, | Performed by: REGISTERED NURSE

## 2022-09-29 PROCEDURE — 99999 PR PBB SHADOW E&M-EST. PATIENT-LVL III: CPT | Mod: PBBFAC,,, | Performed by: REGISTERED NURSE

## 2022-09-29 PROCEDURE — 3288F FALL RISK ASSESSMENT DOCD: CPT | Mod: CPTII,S$GLB,, | Performed by: REGISTERED NURSE

## 2022-09-29 PROCEDURE — 99213 OFFICE O/P EST LOW 20 MIN: CPT | Mod: S$GLB,,, | Performed by: REGISTERED NURSE

## 2022-09-29 PROCEDURE — 1126F PR PAIN SEVERITY QUANTIFIED, NO PAIN PRESENT: ICD-10-PCS | Mod: CPTII,S$GLB,, | Performed by: REGISTERED NURSE

## 2022-09-29 PROCEDURE — 1126F AMNT PAIN NOTED NONE PRSNT: CPT | Mod: CPTII,S$GLB,, | Performed by: REGISTERED NURSE

## 2022-09-29 PROCEDURE — 99213 PR OFFICE/OUTPT VISIT, EST, LEVL III, 20-29 MIN: ICD-10-PCS | Mod: S$GLB,,, | Performed by: REGISTERED NURSE

## 2022-09-29 PROCEDURE — 1101F PR PT FALLS ASSESS DOC 0-1 FALLS W/OUT INJ PAST YR: ICD-10-PCS | Mod: CPTII,S$GLB,, | Performed by: REGISTERED NURSE

## 2022-09-29 PROCEDURE — 1159F MED LIST DOCD IN RCRD: CPT | Mod: CPTII,S$GLB,, | Performed by: REGISTERED NURSE

## 2022-09-29 PROCEDURE — 3075F SYST BP GE 130 - 139MM HG: CPT | Mod: CPTII,S$GLB,, | Performed by: REGISTERED NURSE

## 2022-09-29 PROCEDURE — 3079F PR MOST RECENT DIASTOLIC BLOOD PRESSURE 80-89 MM HG: ICD-10-PCS | Mod: CPTII,S$GLB,, | Performed by: REGISTERED NURSE

## 2022-09-29 PROCEDURE — 1101F PT FALLS ASSESS-DOCD LE1/YR: CPT | Mod: CPTII,S$GLB,, | Performed by: REGISTERED NURSE

## 2022-09-29 PROCEDURE — 3288F PR FALLS RISK ASSESSMENT DOCUMENTED: ICD-10-PCS | Mod: CPTII,S$GLB,, | Performed by: REGISTERED NURSE

## 2022-09-29 PROCEDURE — 3079F DIAST BP 80-89 MM HG: CPT | Mod: CPTII,S$GLB,, | Performed by: REGISTERED NURSE

## 2022-09-29 PROCEDURE — 3075F PR MOST RECENT SYSTOLIC BLOOD PRESS GE 130-139MM HG: ICD-10-PCS | Mod: CPTII,S$GLB,, | Performed by: REGISTERED NURSE

## 2022-09-29 PROCEDURE — 1159F PR MEDICATION LIST DOCUMENTED IN MEDICAL RECORD: ICD-10-PCS | Mod: CPTII,S$GLB,, | Performed by: REGISTERED NURSE

## 2022-10-05 DIAGNOSIS — I10 ESSENTIAL HYPERTENSION: ICD-10-CM

## 2022-10-26 ENCOUNTER — IMMUNIZATION (OUTPATIENT)
Dept: FAMILY MEDICINE | Facility: CLINIC | Age: 67
End: 2022-10-26
Payer: MEDICARE

## 2022-10-26 PROCEDURE — G0008 FLU VACCINE - QUADRIVALENT - ADJUVANTED: ICD-10-PCS | Mod: S$GLB,,, | Performed by: FAMILY MEDICINE

## 2022-10-26 PROCEDURE — 90694 VACC AIIV4 NO PRSRV 0.5ML IM: CPT | Mod: S$GLB,,, | Performed by: FAMILY MEDICINE

## 2022-10-26 PROCEDURE — 90694 FLU VACCINE - QUADRIVALENT - ADJUVANTED: ICD-10-PCS | Mod: S$GLB,,, | Performed by: FAMILY MEDICINE

## 2022-10-26 PROCEDURE — G0008 ADMIN INFLUENZA VIRUS VAC: HCPCS | Mod: S$GLB,,, | Performed by: FAMILY MEDICINE

## 2022-10-26 RX ORDER — CARVEDILOL 3.12 MG/1
3.12 TABLET ORAL 2 TIMES DAILY
COMMUNITY
End: 2022-10-26 | Stop reason: SDUPTHER

## 2022-10-26 RX ORDER — CARVEDILOL 3.12 MG/1
3.12 TABLET ORAL 2 TIMES DAILY
Qty: 30 TABLET | Refills: 0 | Status: SHIPPED | OUTPATIENT
Start: 2022-10-26 | End: 2022-11-14 | Stop reason: SDUPTHER

## 2022-10-26 NOTE — TELEPHONE ENCOUNTER
Care Due:                  Date            Visit Type   Department     Provider  --------------------------------------------------------------------------------                                EP -                              PRIMARY      [facility]  Last Visit: 07-      CARE ([facility])   CARITO Cope                              EP -                              PRIMARY      [facility]  Next Visit: 11-      CARE ([facility])   CARITO Cope                                                            Last  Test          Frequency    Reason                     Performed    Due Date  --------------------------------------------------------------------------------    CMP.........  12 months..  furosemide...............  07- 07-    [facility] Embedded Care Gaps. Reference number: [id number]. 10/26/2022   11:19:12 AM CDT

## 2022-10-26 NOTE — TELEPHONE ENCOUNTER
----- Message from Kandis Lopez sent at 10/26/2022 10:04 AM CDT -----  Type:  RX Refill Request    Who Called: pt  Refill or New Rx:refill  RX Name and Strength:Carvedilol 3.125 mg   How is the patient currently taking it? (ex. 1XDay):2 xday  Is this a 30 day or 90 day RX:30  Preferred Pharmacy with phone number:[facility]  Local or Mail Order:local  Ordering Provider:emergency room prescribed it  Would the patient rather a call back or a response via [facility]? Call back  Best Call Back Number:[phone]  Additional Information:

## 2022-10-26 NOTE — TELEPHONE ENCOUNTER
----- Message from Kandis Lopez sent at 10/26/2022 10:08 AM CDT -----  Contact: [phone]  Type:  Mammogram    Caller is requesting to schedule their annual mammogram appointment.  Order is not listed in EPIC.  Please enter order and contact patient to schedule.  Name of Caller:pt  [facility]  Would the patient rather a call back or a response via [facility]? call  Best Call Back Number:[phone]  Additional Information:

## 2022-11-10 RX ORDER — HYDROXYZINE HYDROCHLORIDE 25 MG/1
TABLET, FILM COATED ORAL
Qty: 30 TABLET | Refills: 0 | OUTPATIENT
Start: 2022-11-10

## 2022-11-14 NOTE — TELEPHONE ENCOUNTER
----- Message from Varsha Glover sent at 11/14/2022 11:36 AM CST -----  Type:  RX Refill Request    Who Called: patient  Refill or New Rx:refill  RX Name and Strength:carvediloL  How is the patient currently taking it? (ex. 1XDay):2xday  Is this a 30 day or 90 day RX:90day  Preferred Pharmacy with phone number:[facility]  Local or Mail Order:mail  Ordering Provider:Dr Cope  Would the patient rather a call back or a response via [facility]? Call back  Best Call Back Number:[phone]  Additional Information: na

## 2022-11-14 NOTE — TELEPHONE ENCOUNTER
No new care gaps identified.  [facility] Embedded Care Gaps. Reference number: [id number]. 11/14/2022   11:55:38 AM CST

## 2022-11-15 RX ORDER — CARVEDILOL 3.12 MG/1
3.12 TABLET ORAL 2 TIMES DAILY
Qty: 30 TABLET | Refills: 2 | Status: SHIPPED | OUTPATIENT
Start: 2022-11-15 | End: 2022-12-16 | Stop reason: SDUPTHER

## 2022-11-30 ENCOUNTER — PES CALL (OUTPATIENT)
Dept: ADMINISTRATIVE | Facility: CLINIC | Age: 67
End: 2022-11-30
Payer: MEDICARE

## 2022-11-30 DIAGNOSIS — F31.62 BIPOLAR AFFECTIVE DISORDER, MIXED, MODERATE: ICD-10-CM

## 2022-12-02 ENCOUNTER — OFFICE VISIT (OUTPATIENT)
Dept: FAMILY MEDICINE | Facility: CLINIC | Age: 67
End: 2022-12-02
Payer: MEDICARE

## 2022-12-02 VITALS
WEIGHT: 142.63 LBS | HEIGHT: 63 IN | HEART RATE: 84 BPM | SYSTOLIC BLOOD PRESSURE: 202 MMHG | BODY MASS INDEX: 25.27 KG/M2 | RESPIRATION RATE: 20 BRPM | TEMPERATURE: 97 F | DIASTOLIC BLOOD PRESSURE: 122 MMHG | OXYGEN SATURATION: 98 %

## 2022-12-02 DIAGNOSIS — E66.09 CLASS 1 OBESITY DUE TO EXCESS CALORIES WITH SERIOUS COMORBIDITY AND BODY MASS INDEX (BMI) OF 30.0 TO 30.9 IN ADULT: ICD-10-CM

## 2022-12-02 DIAGNOSIS — I50.32 CHRONIC HEART FAILURE WITH PRESERVED EJECTION FRACTION: ICD-10-CM

## 2022-12-02 DIAGNOSIS — B18.2 CHRONIC HEPATITIS C WITHOUT HEPATIC COMA: ICD-10-CM

## 2022-12-02 DIAGNOSIS — F14.90 COCAINE USE: ICD-10-CM

## 2022-12-02 DIAGNOSIS — Z00.00 ENCOUNTER FOR PREVENTIVE HEALTH EXAMINATION: Primary | ICD-10-CM

## 2022-12-02 DIAGNOSIS — Z72.0 TOBACCO USE: ICD-10-CM

## 2022-12-02 DIAGNOSIS — I10 UNCONTROLLED HYPERTENSION: ICD-10-CM

## 2022-12-02 DIAGNOSIS — F31.70 BIPOLAR AFFECTIVE DISORDER IN REMISSION: ICD-10-CM

## 2022-12-02 DIAGNOSIS — I27.9 CHRONIC PULMONARY HEART DISEASE: ICD-10-CM

## 2022-12-02 DIAGNOSIS — E78.49 OTHER HYPERLIPIDEMIA: ICD-10-CM

## 2022-12-02 DIAGNOSIS — F41.9 ANXIETY DISORDER, UNSPECIFIED TYPE: ICD-10-CM

## 2022-12-02 DIAGNOSIS — N18.4 CKD (CHRONIC KIDNEY DISEASE) STAGE 4, GFR 15-29 ML/MIN: ICD-10-CM

## 2022-12-02 PROCEDURE — 1160F RVW MEDS BY RX/DR IN RCRD: CPT | Mod: CPTII,S$GLB,, | Performed by: NURSE PRACTITIONER

## 2022-12-02 PROCEDURE — 3288F PR FALLS RISK ASSESSMENT DOCUMENTED: ICD-10-PCS | Mod: CPTII,S$GLB,, | Performed by: NURSE PRACTITIONER

## 2022-12-02 PROCEDURE — 1101F PT FALLS ASSESS-DOCD LE1/YR: CPT | Mod: CPTII,S$GLB,, | Performed by: NURSE PRACTITIONER

## 2022-12-02 PROCEDURE — 3008F PR BODY MASS INDEX (BMI) DOCUMENTED: ICD-10-PCS | Mod: CPTII,S$GLB,, | Performed by: NURSE PRACTITIONER

## 2022-12-02 PROCEDURE — 99999 PR PBB SHADOW E&M-EST. PATIENT-LVL V: ICD-10-PCS | Mod: PBBFAC,,, | Performed by: NURSE PRACTITIONER

## 2022-12-02 PROCEDURE — 1159F PR MEDICATION LIST DOCUMENTED IN MEDICAL RECORD: ICD-10-PCS | Mod: CPTII,S$GLB,, | Performed by: NURSE PRACTITIONER

## 2022-12-02 PROCEDURE — 1160F PR REVIEW ALL MEDS BY PRESCRIBER/CLIN PHARMACIST DOCUMENTED: ICD-10-PCS | Mod: CPTII,S$GLB,, | Performed by: NURSE PRACTITIONER

## 2022-12-02 PROCEDURE — 3077F PR MOST RECENT SYSTOLIC BLOOD PRESSURE >= 140 MM HG: ICD-10-PCS | Mod: CPTII,S$GLB,, | Performed by: NURSE PRACTITIONER

## 2022-12-02 PROCEDURE — 4010F ACE/ARB THERAPY RXD/TAKEN: CPT | Mod: CPTII,S$GLB,, | Performed by: NURSE PRACTITIONER

## 2022-12-02 PROCEDURE — 4010F PR ACE/ARB THEARPY RXD/TAKEN: ICD-10-PCS | Mod: CPTII,S$GLB,, | Performed by: NURSE PRACTITIONER

## 2022-12-02 PROCEDURE — 3288F FALL RISK ASSESSMENT DOCD: CPT | Mod: CPTII,S$GLB,, | Performed by: NURSE PRACTITIONER

## 2022-12-02 PROCEDURE — 3008F BODY MASS INDEX DOCD: CPT | Mod: CPTII,S$GLB,, | Performed by: NURSE PRACTITIONER

## 2022-12-02 PROCEDURE — 1101F PR PT FALLS ASSESS DOC 0-1 FALLS W/OUT INJ PAST YR: ICD-10-PCS | Mod: CPTII,S$GLB,, | Performed by: NURSE PRACTITIONER

## 2022-12-02 PROCEDURE — 1126F AMNT PAIN NOTED NONE PRSNT: CPT | Mod: CPTII,S$GLB,, | Performed by: NURSE PRACTITIONER

## 2022-12-02 PROCEDURE — 1126F PR PAIN SEVERITY QUANTIFIED, NO PAIN PRESENT: ICD-10-PCS | Mod: CPTII,S$GLB,, | Performed by: NURSE PRACTITIONER

## 2022-12-02 PROCEDURE — 3080F DIAST BP >= 90 MM HG: CPT | Mod: CPTII,S$GLB,, | Performed by: NURSE PRACTITIONER

## 2022-12-02 PROCEDURE — 1159F MED LIST DOCD IN RCRD: CPT | Mod: CPTII,S$GLB,, | Performed by: NURSE PRACTITIONER

## 2022-12-02 PROCEDURE — 99999 PR PBB SHADOW E&M-EST. PATIENT-LVL V: CPT | Mod: PBBFAC,,, | Performed by: NURSE PRACTITIONER

## 2022-12-02 PROCEDURE — 3080F PR MOST RECENT DIASTOLIC BLOOD PRESSURE >= 90 MM HG: ICD-10-PCS | Mod: CPTII,S$GLB,, | Performed by: NURSE PRACTITIONER

## 2022-12-02 PROCEDURE — G0439 PR MEDICARE ANNUAL WELLNESS SUBSEQUENT VISIT: ICD-10-PCS | Mod: S$GLB,,, | Performed by: NURSE PRACTITIONER

## 2022-12-02 PROCEDURE — G0439 PPPS, SUBSEQ VISIT: HCPCS | Mod: S$GLB,,, | Performed by: NURSE PRACTITIONER

## 2022-12-02 PROCEDURE — 3077F SYST BP >= 140 MM HG: CPT | Mod: CPTII,S$GLB,, | Performed by: NURSE PRACTITIONER

## 2022-12-02 RX ORDER — IRBESARTAN 75 MG/1
75 TABLET ORAL DAILY
Qty: 90 TABLET | Refills: 1 | Status: SHIPPED | OUTPATIENT
Start: 2022-12-02 | End: 2022-12-16 | Stop reason: SDUPTHER

## 2022-12-02 NOTE — PATIENT INSTRUCTIONS
Counseling and Referral of Other Preventative  (Italic type indicates deductible and co-insurance are waived)    Patient Name: Rose Milligan  Today's Date: 12/2/2022    Health Maintenance       Date Due Completion Date    TETANUS VACCINE Never done ---    Mammogram 08/14/2018 8/14/2017    Shingles Vaccine (2 of 2) 10/30/2019 9/4/2019    DEXA Scan 08/22/2021 8/22/2018    COVID-19 Vaccine (4 - Booster for Pfizer series) 03/17/2022 1/20/2022    Colorectal Cancer Screening 10/04/2026 10/4/2016    Lipid Panel 01/28/2027 1/28/2022        No orders of the defined types were placed in this encounter.    The following information is provided to all patients.  This information is to help you find resources for any of the problems found today that may be affecting your health:                Living healthy guide: www.[facility].[place].gov      Understanding Diabetes: www.diabetes.org      Eating healthy: www.cdc.gov/healthyweight      [facility] home safety checklist: www.cdc.gov/steadi/patient.html      Agency on Aging: www.goea.[place].[facility]      Alcoholics anonymous (AA): www.aa.org      Physical Activity: www.chi.nih.gov/wi9bwtm      Tobacco use: www.quitwithusla.org

## 2022-12-02 NOTE — PROGRESS NOTES
"  Rose Milligan presented for a  Medicare AWV and comprehensive Health Risk Assessment today. The following components were reviewed and updated:    Medical history  Family History  Social history  Allergies and Current Medications  Health Risk Assessment  Health Maintenance  Care Team         ** See Completed Assessments for Annual Wellness Visit within the encounter summary.**         The following assessments were completed:  Living Situation  CAGE  Depression Screening  Timed Get Up and Go  Whisper Test  Cognitive Function Screening  Nutrition Screening  ADL Screening  PAQ Screening        Vitals:    12/02/22 1359 12/02/22 1420   BP: (!) 243/134 (!) 202/122   Pulse: 90 84   Resp: 20    Temp: 97.4 °F (36.3 °C)    SpO2: 98%    Weight: 64.7 kg (142 lb 10.2 oz)    Height: 5' 3" (1.6 m)      Body mass index is 25.27 kg/m².  Physical Exam  Vitals and nursing note reviewed.   Constitutional:       Appearance: Normal appearance. She is well-developed.   HENT:      Head: Normocephalic and atraumatic.   Eyes:      Pupils: Pupils are equal, round, and reactive to light.   Neck:      Vascular: No carotid bruit.   Cardiovascular:      Rate and Rhythm: Normal rate and regular rhythm.      Pulses: Normal pulses.      Heart sounds: Normal heart sounds. No murmur heard.    No gallop.   Pulmonary:      Effort: Pulmonary effort is normal.      Breath sounds: Normal breath sounds.   Abdominal:      General: Bowel sounds are normal. There is no distension.      Palpations: Abdomen is soft.      Tenderness: There is no abdominal tenderness.   Musculoskeletal:         General: No tenderness. Normal range of motion.   Skin:     General: Skin is warm and dry.   Neurological:      Mental Status: She is alert.      Motor: No abnormal muscle tone.   Psychiatric:         Mood and Affect: Mood is elated.         Speech: Speech normal.         Behavior: Behavior normal.         Thought Content: Thought content normal.         Judgment: " Judgment normal.     Current Outpatient Medications   Medication Instructions    aspirin (ECOTRIN) 81 MG EC tablet TAKE 1 TABLET EVERY DAY    carvediloL (COREG) 3.125 mg, Oral, 2 times daily, Take 3.125 mg by mouth 2 (two) times a day.    cloNIDine (CATAPRES) 0.1 mg, Oral, 3 times daily    diclofenac sodium (VOLTAREN) 2 g, Topical (Top), 4 times daily PRN    furosemide (LASIX) 40 mg, Oral, Daily    hydrALAZINE (APRESOLINE) 50 MG tablet TAKE 1 TABLET EVERY 8 HOURS.    hydrOXYzine HCL (ATARAX) 25 MG tablet TAKE 1 TABLET EVERY DAY AS NEEDED    irbesartan (AVAPRO) 75 mg, Oral, Daily    pitavastatin calcium (LIVALO) 2 mg, Oral, Daily    ziprasidone (GEODON) 20 mg, Oral, 2 times daily           Diagnoses and health risks identified today and associated recommendations/orders:    1. Encounter for preventive health examination  PT REQUEST TO DISCUSS HEALTH MAINTEANCE [facility]  PCP    2. Uncontrolled hypertension  Ambulatory referral/consult to Cardiology  Chronic and Ongoing. BP elevated - asymptomatic-states compliance with all meds but states she out of the Avapro-   Spoke with  - she spoke with pt - decline  HOSP visit.     Dr. Cope refill on Avapro an recommend pt see Ricki in 2 weeks- 121/6/ 22-      3. Chronic heart failure with preserved ejection fraction  CONCLUSIONS  ECHO 20218    1 - Normal left ventricular systolic function (EF 60-65%).     2 - Impaired LV relaxation, elevated LAP (grade 2 diastolic dysfunction).     3 - Normal right ventricular systolic function .     4 - Severe left atrial enlargement.     5 - Pulmonary hypertension. The estimated PA systolic pressure is 45 mmHg.     6 - Concentric hypertrophy.    Chronic and Ongoing. Has not follow up with card md   Ambulatory referral/consult to Cardiology; see # 2  12/20/22    4. Chronic pulmonary heart disease   Ambulatory referral/consult to Cardiology;  Chronic and Ongoing. See # 2 - follow up with PCP    5. Other hyperlipidemia  Chronic and  Ongoing. See # 2 follow with PCP    6. CKD (chronic kidney disease) stage 4, GFR 15-29 ml/min  Chronic and Ongoing. See #2 Follow up PCP    7. Bipolar affective disorder in remission  Chronic and Ongoing on GEODON - seeing yakov wade     8. Cocaine use   Chronic and Ongoing.  States she last used yesterday- follow up with PCP    9. Chronic hepatitis C without hepatic coma  Chronic and Ongoing. Follow up with PCP    10. Tobacco use  Chronic and Ongoing. Still smokes with cigerettes- decline smoking cessation program    11. Class 1 obesity due to excess calories with serious comorbidity and body mass index (BMI) of 30.0 to 30.9 in adults  BMI 25. 27 KG DIET AND EXERCISE    12. Anxiety disorder, unspecified type  Chronic and Ongoing on Geodon     I offered to discuss advanced care planning, including how to pick a person who would make decisions for you if you were unable to make them for yourself, called a health care power of , and what kind of decisions you might make such as use of life sustaining treatments such as ventilators and tube feeding when faced with a life limiting illness recorded on a living will that they will need to know. (How you want to be cared for as you near the end of your natural life)     X  Patient is unable to engage in a discussion regarding advanced directives due to severe physical and/or cognitive impairment    Provided Ngoc with a 5-10 year written screening schedule and personal prevention plan. Recommendations were developed using the USPSTF age appropriate recommendations. Education, counseling, and referrals were provided as needed. After Visit Summary printed and given to patient which includes a list of additional screenings\tests needed.    Follow up for 2 weeks BP check with Dallas Robison with Cardiology.    Melanie Hoyos NP  .

## 2022-12-04 RX ORDER — ZIPRASIDONE HYDROCHLORIDE 20 MG/1
CAPSULE ORAL
Qty: 180 CAPSULE | Refills: 0 | OUTPATIENT
Start: 2022-12-04

## 2022-12-05 ENCOUNTER — TELEPHONE (OUTPATIENT)
Dept: FAMILY MEDICINE | Facility: CLINIC | Age: 67
End: 2022-12-05
Payer: MEDICARE

## 2022-12-05 PROBLEM — F14.90 COCAINE USE: Status: ACTIVE | Noted: 2022-05-16

## 2022-12-15 NOTE — PROGRESS NOTES
Subjective:      Rose Milligan is a 67 y.o. female here today for:  HYPERTENSION FOLLOW-UP    PCP:  Zora Cope MD    HPI    Rose Milligan is here for follow-up of chronic hypertension.    Last office blood pressure documented on 12/2/2022 as 202/122.  She admits at that time, using cocaine but none since that time.  She reports increased stress and uses when stress level increases.  Does reports increased salt intake, walking for exercise.  No home bp checks.  Sleeping well.      Hypertension Medications               carvediloL (COREG) 3.125 MG tablet Take 1 tablet (3.125 mg total) by mouth 2 (two) times a day. Take 3.125 mg by mouth 2 (two) times a day.    cloNIDine (CATAPRES) 0.1 MG tablet Take 1 tablet (0.1 mg total) by mouth 3 (three) times daily.    furosemide (LASIX) 40 MG tablet Take 1 tablet (40 mg total) by mouth once daily.    hydrALAZINE (APRESOLINE) 50 MG tablet TAKE 1 TABLET EVERY 8 HOURS.    irbesartan (AVAPRO) 75 MG tablet Take 1 tablet (75 mg total) by mouth once daily.       BP Readings from Last 6 Encounters:   12/02/22 (!) 202/122   09/29/22 138/88   02/02/22 138/70   07/30/21 118/72   07/10/21 (!) 180/90   05/10/21 (!) 140/82         Review of Systems   Constitutional:  Negative for activity change, appetite change, chills, diaphoresis, fatigue and fever.   HENT: Negative.     Eyes:  Negative for photophobia, pain and visual disturbance.   Respiratory:  Negative for cough, chest tightness, shortness of breath and wheezing.    Cardiovascular:  Negative for chest pain, palpitations and leg swelling.   Gastrointestinal:  Negative for abdominal distention, abdominal pain, anal bleeding, blood in stool, constipation, diarrhea, nausea, rectal pain and vomiting.   Endocrine: Negative for polydipsia, polyphagia and polyuria.   Genitourinary: Negative.    Musculoskeletal: Negative.    Skin:  Negative for rash and wound.   Neurological: Negative.  Negative for dizziness, tremors, seizures,  syncope, facial asymmetry, speech difficulty, weakness, light-headedness, numbness and headaches.   Hematological:  Negative for adenopathy. Does not bruise/bleed easily.   Psychiatric/Behavioral:  Positive for agitation and dysphoric mood. Negative for decreased concentration, hallucinations, self-injury, sleep disturbance and suicidal ideas. The patient is nervous/anxious. The patient is not hyperactive.        Review of patient's allergies indicates:   Allergen Reactions    Tramadol Itching    Risperdal [risperidone] Other (See Comments)     Did not like way felt       Patient Active Problem List   Diagnosis    Chronic hepatitis C virus infection    Uncontrolled hypertension    CKD (chronic kidney disease) stage 4, GFR 15-29 ml/min    Bipolar affective disorder    (HFpEF) heart failure with preserved ejection fraction    Other hyperlipidemia    Class 1 obesity due to excess calories with serious comorbidity and body mass index (BMI) of 30.0 to 30.9 in adult    Substance use disorder    Chronic pulmonary heart disease    Tobacco use    Cocaine use    Anxiety disorder         Current Outpatient Medications:     aspirin (ECOTRIN) 81 MG EC tablet, TAKE 1 TABLET EVERY DAY, Disp: 90 tablet, Rfl: 3    carvediloL (COREG) 3.125 MG tablet, Take 1 tablet (3.125 mg total) by mouth 2 (two) times a day. Take 3.125 mg by mouth 2 (two) times a day., Disp: 30 tablet, Rfl: 2    cloNIDine (CATAPRES) 0.1 MG tablet, Take 1 tablet (0.1 mg total) by mouth 3 (three) times daily., Disp: 90 tablet, Rfl: 1    diclofenac sodium (VOLTAREN) 1 % Gel, APPLY 2 G TOPICALLY 4 (FOUR) TIMES DAILY AS NEEDED., Disp: 100 g, Rfl: 2    furosemide (LASIX) 40 MG tablet, Take 1 tablet (40 mg total) by mouth once daily., Disp: 90 tablet, Rfl: 0    hydrALAZINE (APRESOLINE) 50 MG tablet, TAKE 1 TABLET EVERY 8 HOURS., Disp: 270 tablet, Rfl: 1    hydrOXYzine HCL (ATARAX) 25 MG tablet, TAKE 1 TABLET EVERY DAY AS NEEDED, Disp: 30 tablet, Rfl: 0    irbesartan  "(AVAPRO) 75 MG tablet, Take 1 tablet (75 mg total) by mouth once daily., Disp: 90 tablet, Rfl: 1    pitavastatin calcium (LIVALO) 2 mg Tab tablet, Take 1 tablet (2 mg total) by mouth once daily. (Patient not taking: Reported on 12/2/2022), Disp: 90 tablet, Rfl: 3    ziprasidone (GEODON) 20 MG Cap, Take 1 capsule (20 mg total) by mouth 2 (two) times daily., Disp: 180 capsule, Rfl: 0      Past medical, surgical, family and social histories have been reviewed today.      Objective:     Vitals:    12/16/22 1018 12/16/22 1040   BP: (!) 140/78 (!) 160/90   Pulse: 83    Temp: 98 °F (36.7 °C)    SpO2: 97%    Weight: 64.4 kg (141 lb 13.9 oz)    Height: 5' 3" (1.6 m)    PainSc: 0-No pain        Physical Exam  Vitals reviewed.   Constitutional:       General: She is not in acute distress.  Eyes:      Pupils: Pupils are equal, round, and reactive to light.   Cardiovascular:      Rate and Rhythm: Normal rate and regular rhythm.      Pulses: Normal pulses.      Heart sounds: Normal heart sounds. No murmur heard.  Pulmonary:      Effort: Pulmonary effort is normal.      Breath sounds: Normal breath sounds.   Musculoskeletal:         General: Normal range of motion.      Cervical back: Normal range of motion and neck supple. No rigidity.      Right lower leg: No edema.      Left lower leg: No edema.   Skin:     Capillary Refill: Capillary refill takes less than 2 seconds.   Neurological:      Mental Status: She is alert and oriented to person, place, and time.      Motor: No weakness.      Gait: Gait normal.   Psychiatric:         Mood and Affect: Mood normal.         Behavior: Behavior normal.         Thought Content: Thought content normal.         Judgment: Judgment normal.       LABS REVIEWED:    Sodium   Date Value Ref Range Status   07/30/2021 142 136 - 145 mmol/L Final     Potassium   Date Value Ref Range Status   07/30/2021 4.6 3.5 - 5.1 mmol/L Final     Chloride   Date Value Ref Range Status   07/30/2021 106 95 - 110 " mmol/L Final     CO2   Date Value Ref Range Status   07/30/2021 26 23 - 29 mmol/L Final     Glucose   Date Value Ref Range Status   07/30/2021 66 (L) 70 - 110 mg/dL Final     BUN   Date Value Ref Range Status   07/30/2021 36 (H) 8 - 23 mg/dL Final     Creatinine   Date Value Ref Range Status   07/30/2021 3.0 (H) 0.5 - 1.4 mg/dL Final     Calcium   Date Value Ref Range Status   07/30/2021 10.0 8.7 - 10.5 mg/dL Final     Total Protein   Date Value Ref Range Status   07/30/2021 8.2 6.0 - 8.4 g/dL Final     Albumin   Date Value Ref Range Status   07/30/2021 3.9 3.5 - 5.2 g/dL Final     Total Bilirubin   Date Value Ref Range Status   07/30/2021 0.2 0.1 - 1.0 mg/dL Final     Comment:     For infants and newborns, interpretation of results should be based  on gestational age, weight and in agreement with clinical  observations.    Premature Infant recommended reference ranges:  Up to 24 hours.............<8.0 mg/dL  Up to 48 hours............<12.0 mg/dL  3-5 days..................<15.0 mg/dL  6-29 days.................<15.0 mg/dL       Alkaline Phosphatase   Date Value Ref Range Status   07/30/2021 128 55 - 135 U/L Final     AST   Date Value Ref Range Status   07/30/2021 17 10 - 40 U/L Final     ALT   Date Value Ref Range Status   07/30/2021 10 10 - 44 U/L Final     Anion Gap   Date Value Ref Range Status   07/30/2021 10 8 - 16 mmol/L Final       eGFR if    Date Value Ref Range Status   07/30/2021 17.7 (A) >60 mL/min/1.73 m^2 Final        Lab Results   Component Value Date    CHOL 131 07/30/2021     Lab Results   Component Value Date    HDL 47 07/30/2021     Lab Results   Component Value Date    LDLCALC 67.2 07/30/2021     Lab Results   Component Value Date    TRIG 84 07/30/2021       Lab Results   Component Value Date    TSH 1.098 07/30/2021       Lab Results   Component Value Date    HGBA1C 5.4 01/28/2022         Diagnosis/Assessment:     1. Uncontrolled hypertension  - irbesartan (AVAPRO) 150 MG tablet;  Take 1 tablet (150 mg total) by mouth once daily.  Dispense: 90 tablet; Refill: 0  - carvediloL (COREG) 6.25 MG tablet; Take 1 tablet (6.25 mg total) by mouth 2 (two) times daily with meals.  Dispense: 180 tablet; Refill: 0    2. Substance use disorder  - Ambulatory referral/consult to Social Work; Future    3. Stress  - Ambulatory referral/consult to Social Work; Future       Plan:     Medications:  Increase Avapro from 75 to 150 mg daily  Increase Coreg from 3.125 bid to 6.25 bid.    To Social Work for discussion on stress reduction, healthy coping skills.  Refer for drug rehab if needed, declines at this time.    The patient is advised to:  quit smoking, begin progressive daily aerobic exercise program, follow a low fat, low cholesterol diet, reduce salt in diet and cooking, reduce exposure to stress, and improve dietary compliance      Follow-up:     See Cardiology as scheduled on 12/20/2022.  RTC as directed or on prn basis.        MALLORY Granados  [facility]       40 minutes of total time spent on the encounter, which includes face to face time and non-face to face time preparing to see the patient.  This included obtaining and/or reviewing separately obtained history, and documenting clinical information in the electronic or other health record.   Also includes independent interpretation of results (not separately reported) and communicating results to the patient/family/caregiver, with care coordination (not separately reported).

## 2022-12-16 ENCOUNTER — OFFICE VISIT (OUTPATIENT)
Dept: FAMILY MEDICINE | Facility: CLINIC | Age: 67
End: 2022-12-16
Payer: MEDICARE

## 2022-12-16 VITALS
OXYGEN SATURATION: 97 % | DIASTOLIC BLOOD PRESSURE: 90 MMHG | SYSTOLIC BLOOD PRESSURE: 160 MMHG | BODY MASS INDEX: 25.14 KG/M2 | HEIGHT: 63 IN | TEMPERATURE: 98 F | WEIGHT: 141.88 LBS | HEART RATE: 83 BPM

## 2022-12-16 DIAGNOSIS — F19.90 SUBSTANCE USE DISORDER: ICD-10-CM

## 2022-12-16 DIAGNOSIS — I10 UNCONTROLLED HYPERTENSION: Primary | ICD-10-CM

## 2022-12-16 DIAGNOSIS — F43.9 STRESS: ICD-10-CM

## 2022-12-16 PROCEDURE — 3077F PR MOST RECENT SYSTOLIC BLOOD PRESSURE >= 140 MM HG: ICD-10-PCS | Mod: HCNC,CPTII,S$GLB, | Performed by: REGISTERED NURSE

## 2022-12-16 PROCEDURE — 3008F BODY MASS INDEX DOCD: CPT | Mod: HCNC,CPTII,S$GLB, | Performed by: REGISTERED NURSE

## 2022-12-16 PROCEDURE — 1101F PR PT FALLS ASSESS DOC 0-1 FALLS W/OUT INJ PAST YR: ICD-10-PCS | Mod: HCNC,CPTII,S$GLB, | Performed by: REGISTERED NURSE

## 2022-12-16 PROCEDURE — 3080F PR MOST RECENT DIASTOLIC BLOOD PRESSURE >= 90 MM HG: ICD-10-PCS | Mod: HCNC,CPTII,S$GLB, | Performed by: REGISTERED NURSE

## 2022-12-16 PROCEDURE — 3077F SYST BP >= 140 MM HG: CPT | Mod: HCNC,CPTII,S$GLB, | Performed by: REGISTERED NURSE

## 2022-12-16 PROCEDURE — 99999 PR PBB SHADOW E&M-EST. PATIENT-LVL IV: CPT | Mod: PBBFAC,HCNC,, | Performed by: REGISTERED NURSE

## 2022-12-16 PROCEDURE — 1126F AMNT PAIN NOTED NONE PRSNT: CPT | Mod: HCNC,CPTII,S$GLB, | Performed by: REGISTERED NURSE

## 2022-12-16 PROCEDURE — 3288F PR FALLS RISK ASSESSMENT DOCUMENTED: ICD-10-PCS | Mod: HCNC,CPTII,S$GLB, | Performed by: REGISTERED NURSE

## 2022-12-16 PROCEDURE — 99999 PR PBB SHADOW E&M-EST. PATIENT-LVL IV: ICD-10-PCS | Mod: PBBFAC,HCNC,, | Performed by: REGISTERED NURSE

## 2022-12-16 PROCEDURE — 3080F DIAST BP >= 90 MM HG: CPT | Mod: HCNC,CPTII,S$GLB, | Performed by: REGISTERED NURSE

## 2022-12-16 PROCEDURE — 1101F PT FALLS ASSESS-DOCD LE1/YR: CPT | Mod: HCNC,CPTII,S$GLB, | Performed by: REGISTERED NURSE

## 2022-12-16 PROCEDURE — 1126F PR PAIN SEVERITY QUANTIFIED, NO PAIN PRESENT: ICD-10-PCS | Mod: HCNC,CPTII,S$GLB, | Performed by: REGISTERED NURSE

## 2022-12-16 PROCEDURE — 3008F PR BODY MASS INDEX (BMI) DOCUMENTED: ICD-10-PCS | Mod: HCNC,CPTII,S$GLB, | Performed by: REGISTERED NURSE

## 2022-12-16 PROCEDURE — 99215 OFFICE O/P EST HI 40 MIN: CPT | Mod: HCNC,S$GLB,, | Performed by: REGISTERED NURSE

## 2022-12-16 PROCEDURE — 99215 PR OFFICE/OUTPT VISIT, EST, LEVL V, 40-54 MIN: ICD-10-PCS | Mod: HCNC,S$GLB,, | Performed by: REGISTERED NURSE

## 2022-12-16 PROCEDURE — 3288F FALL RISK ASSESSMENT DOCD: CPT | Mod: HCNC,CPTII,S$GLB, | Performed by: REGISTERED NURSE

## 2022-12-16 RX ORDER — IRBESARTAN 150 MG/1
150 TABLET ORAL DAILY
Qty: 90 TABLET | Refills: 0 | Status: SHIPPED | OUTPATIENT
Start: 2022-12-16 | End: 2023-03-16

## 2022-12-16 RX ORDER — CARVEDILOL 6.25 MG/1
6.25 TABLET ORAL 2 TIMES DAILY WITH MEALS
Qty: 180 TABLET | Refills: 0 | Status: SHIPPED | OUTPATIENT
Start: 2022-12-16 | End: 2023-06-05

## 2022-12-16 RX ORDER — CARVEDILOL 6.25 MG/1
6.25 TABLET ORAL 2 TIMES DAILY WITH MEALS
Qty: 180 TABLET | Refills: 0 | Status: SHIPPED | OUTPATIENT
Start: 2022-12-16 | End: 2022-12-16

## 2022-12-16 NOTE — PATIENT INSTRUCTIONS
Low-Salt Diet  This diet removes foods that are high in salt. It also limits the amount of salt you use when cooking. It is most often used for people with high blood pressure, edema (fluid retention), and kidney, liver, or heart disease.  Table salt contains the mineral sodium. Your body needs sodium to work normally. But too much sodium can make your health problems worse. Your healthcare provider is recommending a low-salt (also called low-sodium) diet for you. Your total daily allowance of salt is 1,500 to 2,300 milligrams (mg). It is less than 1 teaspoon of table salt. This means you can have only about 500 to 700 mg of sodium at each meal. People with certain health problems should limit salt intake to the lower end of the recommended range.    When you cook, dont add much salt. If you can cook without using salt, even better. Dont add salt to your food at the table.  When shopping, read food labels. Salt is often called sodium on the label. Choose foods that are salt-free, low salt, or very low salt. Note that foods with reduced salt may not lower your salt intake enough.    Beans, potatoes, and pasta  Ok: Dry beans, split peas, lentils, potatoes, rice, macaroni, pasta, spaghetti without added salt  Avoid: Potato chips, tortilla chips, and similar products  Breads and cereals  Ok: Low-sodium breads, rolls, cereals, and cakes; low-salt crackers, matzo crackers  Avoid: Salted crackers, pretzels, popcorn, Amharic toast, pancakes, muffins  Dairy  Ok: Milk, chocolate milk, hot chocolate mix, low-salt cheeses, and yogurt  Avoid: Processed cheese and cheese spreads; Roquefort, Camembert, and cottage cheese; buttermilk, instant breakfast drink  Desserts  Ok: Ice cream, frozen yogurt, juice bars, gelatin, cookies and pies, sugar, honey, jelly, hard candy  Avoid: Most pies, cakes and cookies prepared or processed with salt; instant pudding  Drinks  Ok: Tea, coffee, fizzy (carbonated) drinks, juices  Avoid: Flavored  Operative Report    PATIENT NAME: Mariana Rivera  MEDICAL RECORD NO. [id number]  SURGEON: Landon Hammans MD    Primary Care Physician: JODY Boyle  Date: 12/16/22        PROCEDURE PERFORMED: Right IJ single lumen PAC with US and fluoro  PREOPERATIVE DIAGNOSIS: lymphoma  POSTOPERATIVE DIAGNOSIS: Same  SURGEON:  Katerin Maya MD   Assistant: Anjelica Spangler  ANESTHESIA:  Monitored Local Anesthesia with Sedation and local  ESTIMATED BLOOD LOSS:  minimal  SPECIMEN:  none  COMPLICATIONS:  None; patient tolerated the procedure well. FINDINGS: patent right IJ  DISPOSITION: PACU - hemodynamically stable. CONDITION: stable      Indications: The patient presented with a recent diagnosis of lymphoma, now in need of port placement. Procedure Details     After the risks and benefits of the procedure were explained, informed consent was obtained, and the patient was taken to the operating room. The patient was placed in supine position and sedation was begun. The neck and chest were prepped and draped in normal sterile fashion. Preoperative antibiotics had been given. A time out was performed. The patient was placed in head down position. An ultrasound was used to visualize the right IJ. Local anesthetic was injected and an 18 gauge cook needle was used to cannulate the vein. Dark red, non-pulsatile blood was returned. The guide wire was inserted through the needle and advanced without difficulty. Fluoro was used to insure correct placement of the guidewire. A location was then chosen on the chest for creating a pocket. Local anesthetic was injected at this location, as well as along the path for tunneling the catheter. A 3 cm incision was made, and cautery was used to dissect down to the chest wall. A pocket was created inferior to this. A small incision was then made in the skin of the neck at the level of the guidewire. The catheter and port were assembled and flushed.  The catheter was then tunneled from the chest incision out the small neck incision. The port was secured to the chest wall using PDS. Fluoro was used to guide trimming the catheter to the appropriate length. The sheath dilator was then placed over the guidewire and advanced. The dilator and guidewire were removed. The catheter was inserted in the sheath, the sheath was broken in half, and the catheter was advanced. Fluoro was used to insure appropriate positioning of the catheter tip. There were no signs of pneumothorax. The port was then aspirated and flushed with heparin solution. The small neck incision was closed with 4-0 monocryl subcuticular stitch. The chest incision was re approximated with 3-0 vicryl and then the skin was closed using 4-0 monocryl subcuticular stitches. Sterile dressings with dermabond were applied. The patient tolerated the procedure well, was removed from anesthesia, and taken to the recovery room in stable condition.                 Sabine Angulo MD    Electronically signed 12/16/22 at 3:37 PM coffees, electrolyte replacement drinks, sports drinks  Meats  Ok: All fresh meat, fish, poultry, low-salt tuna, eggs, egg substitute  Avoid: Smoked, pickled, brine-cured, or salted meats and fish. This includes mcconnell, chipped beef, corned beef, hot dogs, deli meats, ham, kosher meats, salt pork, sausage, canned tuna, salted codfish, smoked salmon, herring, sardines, or anchovies.  Seasonings and spices  Ok: Most seasonings are okay. Good substitutes for salt include: fresh herb blends, hot sauce, lemon, garlic, gaston, vinegar, dry mustard, parsley, cilantro, horseradish, tomato paste, regular margarine, mayonnaise, unsalted butter, cream cheese, vegetable oil, cream, low-salt salad dressing and gravy.  Avoid: Regular ketchup, relishes, pickles, soy sauce, teriyaki sauce, Worcestershire sauce, BBQ sauce, tartar sauce, meat tenderizer, chili sauce, regular gravy, regular salad dressing, salted butter  Soups  Ok: Low-salt soups and broths made with allowed foods  Avoid: Bouillon cubes, soups with smoked or salted meats, regular soup and broth  Vegetables  Ok: Most vegetables are okay; also low-salt tomato and vegetable juices  Avoid: Sauerkraut and other brine-soaked vegetables; pickles and other pickled vegetables; tomato juice, olives  Date Last Reviewed: 8/1/2016 © 2000-2017 [employer]. [address] [place] [id number]. All rights reserved. This information is not intended as a substitute for professional medical care. Always follow your healthcare professional's instructions.        .Key components of the Mediterranean diet    The Mediterranean diet emphasizes:    Eating primarily plant-based foods, such as fruits and vegetables, whole grains, legumes and nuts  Replacing butter with healthy fats such as olive oil and canola oil  Using herbs and spices instead of salt to flavor foods  Limiting red meat to no more than a few times a month  Eating fish and poultry at least twice a  week  Enjoying meals with family and friends  Drinking red wine in moderation (optional)  Getting plenty of exercise      The Mediterranean diet pyramid

## 2022-12-20 ENCOUNTER — TELEPHONE (OUTPATIENT)
Dept: CARDIOLOGY | Facility: CLINIC | Age: 67
End: 2022-12-20
Payer: MEDICARE

## 2022-12-20 ENCOUNTER — HOSPITAL ENCOUNTER (OUTPATIENT)
Dept: CARDIOLOGY | Facility: HOSPITAL | Age: 67
Discharge: HOME | End: 2022-12-20
Attending: INTERNAL MEDICINE
Payer: MEDICARE

## 2022-12-20 ENCOUNTER — OFFICE VISIT (OUTPATIENT)
Dept: CARDIOLOGY | Facility: CLINIC | Age: 67
End: 2022-12-20
Payer: MEDICARE

## 2022-12-20 VITALS
WEIGHT: 140.88 LBS | BODY MASS INDEX: 24.96 KG/M2 | SYSTOLIC BLOOD PRESSURE: 150 MMHG | DIASTOLIC BLOOD PRESSURE: 80 MMHG | HEIGHT: 63 IN | OXYGEN SATURATION: 100 % | HEART RATE: 76 BPM

## 2022-12-20 DIAGNOSIS — R06.09 DOE (DYSPNEA ON EXERTION): ICD-10-CM

## 2022-12-20 DIAGNOSIS — I50.32 CHRONIC HEART FAILURE WITH PRESERVED EJECTION FRACTION: ICD-10-CM

## 2022-12-20 DIAGNOSIS — R94.31 ABNORMAL ECG: ICD-10-CM

## 2022-12-20 DIAGNOSIS — I10 ESSENTIAL HYPERTENSION: Primary | ICD-10-CM

## 2022-12-20 DIAGNOSIS — Z76.89 ENCOUNTER TO ESTABLISH CARE: ICD-10-CM

## 2022-12-20 DIAGNOSIS — I27.9 CHRONIC PULMONARY HEART DISEASE: ICD-10-CM

## 2022-12-20 DIAGNOSIS — E78.49 OTHER HYPERLIPIDEMIA: ICD-10-CM

## 2022-12-20 DIAGNOSIS — Z76.89 ENCOUNTER TO ESTABLISH CARE: Primary | ICD-10-CM

## 2022-12-20 PROCEDURE — 3008F PR BODY MASS INDEX (BMI) DOCUMENTED: ICD-10-PCS | Mod: CPTII,S$GLB,, | Performed by: INTERNAL MEDICINE

## 2022-12-20 PROCEDURE — 99214 OFFICE O/P EST MOD 30 MIN: CPT | Mod: 25,S$GLB,, | Performed by: INTERNAL MEDICINE

## 2022-12-20 PROCEDURE — 99999 PR PBB SHADOW E&M-EST. PATIENT-LVL III: ICD-10-PCS | Mod: PBBFAC,,, | Performed by: INTERNAL MEDICINE

## 2022-12-20 PROCEDURE — 99999 PR PBB SHADOW E&M-EST. PATIENT-LVL III: CPT | Mod: PBBFAC,,, | Performed by: INTERNAL MEDICINE

## 2022-12-20 PROCEDURE — 3079F PR MOST RECENT DIASTOLIC BLOOD PRESSURE 80-89 MM HG: ICD-10-PCS | Mod: CPTII,S$GLB,, | Performed by: INTERNAL MEDICINE

## 2022-12-20 PROCEDURE — 99214 PR OFFICE/OUTPT VISIT, EST, LEVL IV, 30-39 MIN: ICD-10-PCS | Mod: 25,S$GLB,, | Performed by: INTERNAL MEDICINE

## 2022-12-20 PROCEDURE — 3077F SYST BP >= 140 MM HG: CPT | Mod: CPTII,S$GLB,, | Performed by: INTERNAL MEDICINE

## 2022-12-20 PROCEDURE — 3079F DIAST BP 80-89 MM HG: CPT | Mod: CPTII,S$GLB,, | Performed by: INTERNAL MEDICINE

## 2022-12-20 PROCEDURE — 3008F BODY MASS INDEX DOCD: CPT | Mod: CPTII,S$GLB,, | Performed by: INTERNAL MEDICINE

## 2022-12-20 PROCEDURE — 1159F MED LIST DOCD IN RCRD: CPT | Mod: CPTII,S$GLB,, | Performed by: INTERNAL MEDICINE

## 2022-12-20 PROCEDURE — 93005 ELECTROCARDIOGRAM TRACING: CPT

## 2022-12-20 PROCEDURE — 93010 ELECTROCARDIOGRAM REPORT: CPT | Mod: ,,, | Performed by: INTERNAL MEDICINE

## 2022-12-20 PROCEDURE — 3077F PR MOST RECENT SYSTOLIC BLOOD PRESSURE >= 140 MM HG: ICD-10-PCS | Mod: CPTII,S$GLB,, | Performed by: INTERNAL MEDICINE

## 2022-12-20 PROCEDURE — 4010F ACE/ARB THERAPY RXD/TAKEN: CPT | Mod: CPTII,S$GLB,, | Performed by: INTERNAL MEDICINE

## 2022-12-20 PROCEDURE — 4010F PR ACE/ARB THEARPY RXD/TAKEN: ICD-10-PCS | Mod: CPTII,S$GLB,, | Performed by: INTERNAL MEDICINE

## 2022-12-20 PROCEDURE — 1159F PR MEDICATION LIST DOCUMENTED IN MEDICAL RECORD: ICD-10-PCS | Mod: CPTII,S$GLB,, | Performed by: INTERNAL MEDICINE

## 2022-12-20 PROCEDURE — 93010 EKG 12-LEAD: ICD-10-PCS | Mod: ,,, | Performed by: INTERNAL MEDICINE

## 2022-12-20 NOTE — PROGRESS NOTES
Subjective:    Patient ID:  Rose Milligan is a 67 y.o. female who presents for evaluation of Hypertension, Hyperlipidemia, and Congestive Heart Failure      HPI pt presents for eval.  She sees Dr. Hannon, Cardiology.  Her current med conditions include HFpEF, HTN, pulm HTN, tobacco abuse, h/o drug abuse, bipolar, hep C, CKD4.  Pt last saw Dr. Hannon in 2020. Lost to f/u.  Chart reviewed.  No angina.  Chronic AUGUSTE, stable.  BP improved today per pt.  PCP added meds recently.  No syncope.  ECG today 12/20/22 NSR, possible LAE, LVH, possible septal infarct, inferolateral ST-T abnl.  No acute changes from last several ecgs.  Mindful of diet.  Lipids good on statin tx.  Has cocaine use history.    Past Medical History:   Diagnosis Date    Bipolar 1 disorder     CHF (congestive heart failure)     Depression     Hepatitis C     Hypertension     Other hyperlipidemia 8/15/2019             Current Outpatient Medications:     aspirin (ECOTRIN) 81 MG EC tablet, TAKE 1 TABLET EVERY DAY, Disp: 90 tablet, Rfl: 3    carvediloL (COREG) 6.25 MG tablet, Take 1 tablet (6.25 mg total) by mouth 2 (two) times daily with meals., Disp: 180 tablet, Rfl: 0    cloNIDine (CATAPRES) 0.1 MG tablet, Take 1 tablet (0.1 mg total) by mouth 3 (three) times daily., Disp: 90 tablet, Rfl: 1    diclofenac sodium (VOLTAREN) 1 % Gel, APPLY 2 G TOPICALLY 4 (FOUR) TIMES DAILY AS NEEDED., Disp: 100 g, Rfl: 2    furosemide (LASIX) 40 MG tablet, Take 1 tablet (40 mg total) by mouth once daily., Disp: 90 tablet, Rfl: 0    hydrALAZINE (APRESOLINE) 50 MG tablet, TAKE 1 TABLET EVERY 8 HOURS., Disp: 270 tablet, Rfl: 1    hydrOXYzine HCL (ATARAX) 25 MG tablet, TAKE 1 TABLET EVERY DAY AS NEEDED, Disp: 30 tablet, Rfl: 0    irbesartan (AVAPRO) 150 MG tablet, Take 1 tablet (150 mg total) by mouth once daily., Disp: 90 tablet, Rfl: 0    pitavastatin calcium (LIVALO) 2 mg Tab tablet, Take 1 tablet (2 mg total) by mouth once daily., Disp: 90 tablet, Rfl: 3    ziprasidone  "(GEODON) 20 MG Cap, Take 1 capsule (20 mg total) by mouth 2 (two) times daily., Disp: 180 capsule, Rfl: 0    Review of Systems   Constitutional: Negative.   HENT: Negative.     Eyes: Negative.    Cardiovascular:  Positive for dyspnea on exertion.   Respiratory:  Positive for shortness of breath.    Endocrine: Negative.    Hematologic/Lymphatic: Negative.    Skin: Negative.    Musculoskeletal: Negative.    Gastrointestinal: Negative.    Genitourinary: Negative.    Neurological: Negative.    Psychiatric/Behavioral: Negative.     Allergic/Immunologic: Negative.        BP (!) 150/80 (BP Location: Right arm, Patient Position: Sitting, BP Method: Large (Manual))   Pulse 76   Ht 5' 3" (1.6 m)   Wt 63.9 kg (140 lb 14 oz)   SpO2 100%   BMI 24.95 kg/m²     Wt Readings from Last 3 Encounters:   12/20/22 63.9 kg (140 lb 14 oz)   12/16/22 64.4 kg (141 lb 13.9 oz)   12/02/22 64.7 kg (142 lb 10.2 oz)     Temp Readings from Last 3 Encounters:   12/16/22 98 °F (36.7 °C)   12/02/22 97.4 °F (36.3 °C)   09/29/22 96.8 °F (36 °C)     BP Readings from Last 3 Encounters:   12/20/22 (!) 150/80   12/16/22 (!) 160/90   12/02/22 (!) 202/122     Pulse Readings from Last 3 Encounters:   12/20/22 76   12/16/22 83   12/02/22 84          Objective:    Physical Exam  Vitals and nursing note reviewed.   Constitutional:       General: She is not in acute distress.     Appearance: Normal appearance. She is well-developed. She is not ill-appearing or diaphoretic.   HENT:      Head: Normocephalic.   Neck:      Thyroid: No thyromegaly.      Vascular: No carotid bruit or JVD.   Cardiovascular:      Rate and Rhythm: Normal rate and regular rhythm.      Pulses: Normal pulses.           Radial pulses are 2+ on the right side and 2+ on the left side.      Heart sounds: Normal heart sounds, S1 normal and S2 normal. No murmur heard.    No friction rub. No gallop.   Pulmonary:      Effort: Pulmonary effort is normal.      Breath sounds: Normal breath sounds. " No wheezing or rales.   Abdominal:      General: Bowel sounds are normal. There is no abdominal bruit.      Palpations: Abdomen is soft.      Tenderness: There is no abdominal tenderness.   Musculoskeletal:      Cervical back: Neck supple.   Lymphadenopathy:      Cervical: No cervical adenopathy.   Skin:     General: Skin is warm.   Neurological:      Mental Status: She is alert and oriented to person, place, and time.   Psychiatric:         Behavior: Behavior normal. Behavior is cooperative.       I have reviewed all pertinent labs and cardiac studies.      Chemistry        Component Value Date/Time     07/30/2021 1600    K 4.6 07/30/2021 1600     07/30/2021 1600    CO2 26 07/30/2021 1600    BUN 36 (H) 07/30/2021 1600    CREATININE 3.0 (H) 07/30/2021 1600    GLU 66 (L) 07/30/2021 1600        Component Value Date/Time    CALCIUM 10.0 07/30/2021 1600    ALKPHOS 128 07/30/2021 1600    AST 17 07/30/2021 1600    ALT 10 07/30/2021 1600    BILITOT 0.2 07/30/2021 1600    ESTGFRAFRICA 17.7 (A) 07/30/2021 1600    EGFRNONAA 15.4 (A) 07/30/2021 1600        Lab Results   Component Value Date    WBC 7.43 07/30/2021    HGB 14.9 07/30/2021    HCT 46.2 07/30/2021    MCV 86 07/30/2021     07/30/2021       Lab Results   Component Value Date    HGBA1C 5.4 01/28/2022     Lab Results   Component Value Date    CHOL 131 07/30/2021    CHOL 179 08/25/2011     Lab Results   Component Value Date    HDL 47 07/30/2021    HDL 73 08/25/2011     Lab Results   Component Value Date    LDLCALC 67.2 07/30/2021    LDLCALC 84.6 08/25/2011     Lab Results   Component Value Date    TRIG 84 07/30/2021    TRIG 107 08/25/2011     Lab Results   Component Value Date    CHOLHDL 35.9 07/30/2021    CHOLHDL 40.8 08/25/2011     No results found for this or any previous visit.        Assessment:       1. Essential hypertension    2. Chronic heart failure with preserved ejection fraction    3. Chronic pulmonary heart disease    4. Other  hyperlipidemia    5. Abnormal ECG    6. AUGUSTE (dyspnea on exertion)         Plan:             Stable CV status on current med tx.  HTN goal < 130/80.  PCP recently adjusted HTN meds, improving per pt.  Home BP monitoring encouraged.  Daily exercise.  Cardiac diet.  Statin.  Drug abuse cessation.  F/u 4 months with Dr. Hannon with echo.

## 2023-01-05 ENCOUNTER — HOSPITAL ENCOUNTER (OUTPATIENT)
Dept: CARDIOLOGY | Facility: HOSPITAL | Age: 68
Discharge: HOME | End: 2023-01-05
Attending: INTERNAL MEDICINE
Payer: MEDICARE

## 2023-01-05 VITALS
SYSTOLIC BLOOD PRESSURE: 150 MMHG | DIASTOLIC BLOOD PRESSURE: 80 MMHG | HEIGHT: 63 IN | WEIGHT: 140 LBS | BODY MASS INDEX: 24.8 KG/M2

## 2023-01-05 DIAGNOSIS — I50.32 CHRONIC HEART FAILURE WITH PRESERVED EJECTION FRACTION: ICD-10-CM

## 2023-01-05 DIAGNOSIS — I27.9 CHRONIC PULMONARY HEART DISEASE: ICD-10-CM

## 2023-01-05 PROCEDURE — 93306 ECHO (CUPID ONLY): ICD-10-PCS | Mod: 26,HCNC,, | Performed by: STUDENT IN AN ORGANIZED HEALTH CARE EDUCATION/TRAINING PROGRAM

## 2023-01-05 PROCEDURE — 93306 TTE W/DOPPLER COMPLETE: CPT | Mod: HCNC

## 2023-01-05 PROCEDURE — 93306 TTE W/DOPPLER COMPLETE: CPT | Mod: 26,HCNC,, | Performed by: STUDENT IN AN ORGANIZED HEALTH CARE EDUCATION/TRAINING PROGRAM

## 2023-01-06 LAB
AORTIC ROOT ANNULUS: 2.91 CM
ASCENDING AORTA: 3.15 CM
AV INDEX (PROSTH): 0.64
AV MEAN GRADIENT: 6 MMHG
AV PEAK GRADIENT: 10 MMHG
AV VALVE AREA: 2.14 CM2
AV VELOCITY RATIO: 0.64
BSA FOR ECHO PROCEDURE: 1.68 M2
CV ECHO LV RWT: 0.61 CM
DOP CALC AO PEAK VEL: 1.57 M/S
DOP CALC AO VTI: 30.6 CM
DOP CALC LVOT AREA: 3.4 CM2
DOP CALC LVOT DIAMETER: 2.07 CM
DOP CALC LVOT PEAK VEL: 1.01 M/S
DOP CALC LVOT STROKE VOLUME: 65.59 CM3
DOP CALC RVOT PEAK VEL: 0.79 M/S
DOP CALC RVOT VTI: 16.8 CM
DOP CALCLVOT PEAK VEL VTI: 19.5 CM
E WAVE DECELERATION TIME: 230.28 MSEC
E/A RATIO: 59
E/E' RATIO: 19.67 M/S
ECHO LV POSTERIOR WALL: 1.33 CM (ref 0.6–1.1)
EJECTION FRACTION: 60 %
FRACTIONAL SHORTENING: 29 % (ref 28–44)
INTERVENTRICULAR SEPTUM: 1.81 CM (ref 0.6–1.1)
IVC DIAMETER: 0.96 CM
IVRT: 119.89 MSEC
LA MAJOR: 5.42 CM
LA MINOR: 5.03 CM
LA WIDTH: 4.1 CM
LEFT ATRIUM SIZE: 4.37 CM
LEFT ATRIUM VOLUME INDEX MOD: 37.5 ML/M2
LEFT ATRIUM VOLUME INDEX: 47.9 ML/M2
LEFT ATRIUM VOLUME MOD: 62.28 CM3
LEFT ATRIUM VOLUME: 79.46 CM3
LEFT INTERNAL DIMENSION IN SYSTOLE: 3.12 CM (ref 2.1–4)
LEFT VENTRICLE DIASTOLIC VOLUME INDEX: 52.25 ML/M2
LEFT VENTRICLE DIASTOLIC VOLUME: 86.74 ML
LEFT VENTRICLE MASS INDEX: 171 G/M2
LEFT VENTRICLE SYSTOLIC VOLUME INDEX: 23.3 ML/M2
LEFT VENTRICLE SYSTOLIC VOLUME: 38.62 ML
LEFT VENTRICULAR INTERNAL DIMENSION IN DIASTOLE: 4.38 CM (ref 3.5–6)
LEFT VENTRICULAR MASS: 284.5 G
LV LATERAL E/E' RATIO: 29.5 M/S
LV SEPTAL E/E' RATIO: 14.75 M/S
LVOT MG: 2.04 MMHG
LVOT MV: 0.65 CM/S
MV PEAK A VEL: 0.01 M/S
MV PEAK E VEL: 0.59 M/S
MV STENOSIS PRESSURE HALF TIME: 66.78 MS
MV VALVE AREA P 1/2 METHOD: 3.29 CM2
PISA TR MAX VEL: 2.53 M/S
PULM VEIN S/D RATIO: 1.85
PV MEAN GRADIENT: 1.42 MMHG
PV PEAK D VEL: 0.27 M/S
PV PEAK S VEL: 0.5 M/S
PV PEAK VELOCITY: 1 CM/S
RA MAJOR: 4.92 CM
RA PRESSURE: 3 MMHG
RA WIDTH: 2.71 CM
RIGHT VENTRICULAR END-DIASTOLIC DIMENSION: 3.11 CM
SINUS: 2.92 CM
STJ: 2.32 CM
TDI LATERAL: 0.02 M/S
TDI SEPTAL: 0.04 M/S
TDI: 0.03 M/S
TR MAX PG: 26 MMHG
TRICUSPID ANNULAR PLANE SYSTOLIC EXCURSION: 2.24 CM
TV REST PULMONARY ARTERY PRESSURE: 29 MMHG

## 2023-02-07 DIAGNOSIS — Z00.00 ENCOUNTER FOR MEDICARE ANNUAL WELLNESS EXAM: ICD-10-CM

## 2023-02-09 DIAGNOSIS — Z00.00 ENCOUNTER FOR MEDICARE ANNUAL WELLNESS EXAM: ICD-10-CM

## 2023-02-21 ENCOUNTER — PATIENT OUTREACH (OUTPATIENT)
Dept: ADMINISTRATIVE | Facility: HOSPITAL | Age: 68
End: 2023-02-21
Payer: MEDICARE

## 2023-02-21 DIAGNOSIS — Z12.31 ENCOUNTER FOR SCREENING MAMMOGRAM FOR MALIGNANT NEOPLASM OF BREAST: Primary | ICD-10-CM

## 2023-02-21 NOTE — PROGRESS NOTES
Attempted to contact patient by phone for a MAMMOGRAM SCHEDULING, was able to speak with patient, schedule for 3/21/2023.  Updated Care Everywhere and Team.

## 2023-03-06 ENCOUNTER — TELEPHONE (OUTPATIENT)
Dept: FAMILY MEDICINE | Facility: CLINIC | Age: 68
End: 2023-03-06
Payer: MEDICARE

## 2023-03-06 NOTE — TELEPHONE ENCOUNTER
----- Message from Glenroy Hoyos sent at 3/6/2023  1:22 PM CST -----      Name of Who is Calling:PT          What is the request in detail:PT is requesting a call back to discuss medication that was to be sent to her pharmacy on her behalf, she could not tell me the name of the medication. Please be Advised!          Can the clinic reply by [facility]:no          What Number to Call Back if not in [phone]

## 2023-03-08 ENCOUNTER — TELEPHONE (OUTPATIENT)
Dept: PSYCHIATRY | Facility: CLINIC | Age: 68
End: 2023-03-08
Payer: MEDICARE

## 2023-03-08 NOTE — TELEPHONE ENCOUNTER
Returned call. No answer unable to leave a message. Voice mail has not been set up    ----- Message from Kandis Lopez sent at 3/8/2023  9:24 AM CST -----  Contact: [phone]  Patient has been requesting medication and states that she has tried multiple ways ,Please have someone call patient to advise why her medication can not be filled [phone] or why it has not been filled ziprasidone (GEODON) 20 MG Cap.Thanks          [id number] - [place] - [address] AT [facility]  [address] [place] [address]  Phone: [phone] Fax: [phone]

## 2023-03-14 ENCOUNTER — TELEPHONE (OUTPATIENT)
Dept: PSYCHIATRY | Facility: CLINIC | Age: 68
End: 2023-03-14
Payer: MEDICARE

## 2023-03-14 ENCOUNTER — TELEPHONE (OUTPATIENT)
Dept: PSYCHIATRY | Facility: CLINIC | Age: 68
End: 2023-03-14

## 2023-03-14 NOTE — TELEPHONE ENCOUNTER
----- Message from Karmen Chisholm sent at 3/14/2023  8:20 AM CDT -----  Contact: [phone]  Patient is requesting a call back regarding getting an appointment for tomorrow. Please call the patient back at [phone]. Thanks

## 2023-03-20 ENCOUNTER — TELEPHONE (OUTPATIENT)
Dept: PSYCHIATRY | Facility: CLINIC | Age: 68
End: 2023-03-20
Payer: MEDICARE

## 2023-03-20 NOTE — TELEPHONE ENCOUNTER
----- Message from Mohinder Mckinley sent at 3/20/2023 10:26 AM CDT -----  Contact: [phone]  Pt is calling in regards to scheduling an appt. Please call pt back at [phone]. Thanks KB

## 2023-03-28 ENCOUNTER — PATIENT OUTREACH (OUTPATIENT)
Dept: ADMINISTRATIVE | Facility: HOSPITAL | Age: 68
End: 2023-03-28
Payer: MEDICARE

## 2023-03-28 ENCOUNTER — TELEPHONE (OUTPATIENT)
Dept: CARDIOLOGY | Facility: CLINIC | Age: 68
End: 2023-03-28
Payer: MEDICARE

## 2023-03-28 NOTE — PROGRESS NOTES
"Transitional Care Note    Family and/or Caretaker present at visit?  No.  Diagnostic tests reviewed/disposition: No diagnosic tests pending after this hospitalization.  Disease/illness education: Discussed, all questions answered.  Home health/community services discussion/referrals: Patient does not have home health established from hospital visit.  They do not need home health.  If needed, we will set up home health for the patient.   Establishment or re-establishment of referral orders for community resources:  Referral for smoking cessation clinic.  Drug rehab if needed, declines at this time .   Discussion with other health care providers: No discussion with other health care providers necessary.       Subjective:      Rose Milligan is a 67 y.o. female here to the office today for:  HOSPITAL FOLLOW-UP    HPI:    Rose Milligan is here today for a hospital follow-up.  I have reviewed the patient's medical history in detail and updated the computerized patient record.    Admitted to [facility] [place] on 3/26, discharged home on 3/27/23.  Reports she had been feeling extremely weak and tired.  More rest did not help.  Seen at ER and admitted for overnight observation.  She states "they told me I have a muscle problem w/ my heart and kidney disease".    Feeling slightly better, still tired.  Not checking home bp.  Reports decreased appetite with some weight loss.  Reports cocaine use about 1 week ago, just prior to her going to ER.  Does have a f/u appt with Dr. Hannon on Monday 4/3/23.  Last drug use about 1 week ago --- cocaine -- week prior to going to ER      Review of Systems   Constitutional:  Positive for activity change, appetite change and fatigue. Negative for chills, diaphoresis and fever.        Wt Readings from Last 3 Encounters:  03/29/23 1110 : 62.8 kg (138 lb 7.2 oz)  01/05/23 1048 : 63.5 kg (140 lb)  12/20/22 0937 : 63.9 kg (140 lb 14 oz)  No sig weight loss noted per documented weights.   Respiratory: Negative.   "   Cardiovascular:  Negative for chest pain, palpitations and leg swelling.   Neurological:  Positive for weakness. Negative for tremors, syncope, light-headedness and headaches.          Review of patient's allergies indicates:   Allergen Reactions    Tramadol Itching    Risperdal [risperidone] Other (See Comments)     Did not like way felt       Patient Active Problem List   Diagnosis    Chronic hepatitis C virus infection    Uncontrolled hypertension    CKD (chronic kidney disease) stage 4, GFR 15-29 ml/min    Bipolar affective disorder    (HFpEF) heart failure with preserved ejection fraction    Other hyperlipidemia    Class 1 obesity due to excess calories with serious comorbidity and body mass index (BMI) of 30.0 to 30.9 in adult    Substance use disorder    Chronic pulmonary heart disease    Tobacco use    Cocaine use    Anxiety disorder    Abnormal ECG         Current Outpatient Medications:     aspirin (ECOTRIN) 81 MG EC tablet, TAKE 1 TABLET EVERY DAY, Disp: 90 tablet, Rfl: 3    carvediloL (COREG) 6.25 MG tablet, Take 1 tablet (6.25 mg total) by mouth 2 (two) times daily with meals., Disp: 180 tablet, Rfl: 0    cloNIDine (CATAPRES) 0.1 MG tablet, TAKE 1 TABLET THREE TIMES DAILY, Disp: 180 tablet, Rfl: 0    diclofenac sodium (VOLTAREN) 1 % Gel, APPLY 2 G TOPICALLY 4 (FOUR) TIMES DAILY AS NEEDED., Disp: 100 g, Rfl: 2    furosemide (LASIX) 40 MG tablet, TAKE 1 TABLET ONE TIME DAILY. OVERDUE FOR LAB AND ANNUAL/LAST REFILL., Disp: 30 tablet, Rfl: 0    hydrALAZINE (APRESOLINE) 50 MG tablet, TAKE 1 TABLET EVERY 8 HOURS., Disp: 270 tablet, Rfl: 1    hydrOXYzine HCL (ATARAX) 25 MG tablet, TAKE 1 TABLET EVERY DAY AS NEEDED, Disp: 30 tablet, Rfl: 0    irbesartan (AVAPRO) 150 MG tablet, TAKE 1 TABLET BY MOUTH EVERY DAY, Disp: 90 tablet, Rfl: 0    pitavastatin calcium (LIVALO) 2 mg Tab tablet, Take 1 tablet (2 mg total) by mouth once daily., Disp: 90 tablet, Rfl: 3    ziprasidone (GEODON) 20 MG Cap, Take 1 capsule (20  "mg total) by mouth 2 (two) times daily., Disp: 180 capsule, Rfl: 0    Past medical, surgical, family and social histories have been reviewed today.      Objective:     Vitals:    03/29/23 1110   BP: 138/62   Pulse: 81   Temp: 97.6 °F (36.4 °C)   SpO2: 99%   Weight: 62.8 kg (138 lb 7.2 oz)   Height: 5' 3" (1.6 m)       Physical Exam  Vitals reviewed.   Constitutional:       General: She is not in acute distress.  HENT:      Head: Normocephalic and atraumatic.   Eyes:      Extraocular Movements: Extraocular movements intact.      Pupils: Pupils are equal, round, and reactive to light.   Neck:      Vascular: No carotid bruit.   Cardiovascular:      Rate and Rhythm: Normal rate and regular rhythm.      Pulses: Normal pulses.      Heart sounds: Normal heart sounds. No murmur heard.    No gallop.   Pulmonary:      Effort: Pulmonary effort is normal.      Breath sounds: Normal breath sounds.   Musculoskeletal:         General: No swelling, tenderness or deformity. Normal range of motion.      Cervical back: Normal range of motion and neck supple. No rigidity. No muscular tenderness.   Lymphadenopathy:      Cervical: No cervical adenopathy.   Skin:     General: Skin is warm and dry.      Capillary Refill: Capillary refill takes less than 2 seconds.      Findings: No rash.   Neurological:      Mental Status: She is alert and oriented to person, place, and time. Mental status is at baseline.      Sensory: No sensory deficit.      Motor: No weakness.      Coordination: Coordination normal.      Gait: Gait normal.   Psychiatric:         Mood and Affect: Mood normal.         Behavior: Behavior normal.         Thought Content: Thought content normal.         Judgment: Judgment normal.         Diagnosis:       1. Hospital discharge follow-up  CBC Auto Differential    Comprehensive Metabolic Panel      2. Chronic fatigue and malaise  CBC Auto Differential    Comprehensive Metabolic Panel    TSH    FOLATE    Vitamin B12    Vitamin D "      3. Hypertension, unspecified type  Microalbumin/creatinine urine ratio    CBC Auto Differential    Comprehensive Metabolic Panel    TSH      4. Chronic heart failure with preserved ejection fraction  Per Cardiology      5. CKD (chronic kidney disease) stage 4, GFR 15-29 ml/min  Microalbumin/creatinine urine ratio    CBC Auto Differential    Comprehensive Metabolic Panel      6. Tobacco use  Ambulatory referral/consult to Smoking Cessation Program      7. Substance use disorder  DRUGS OF ABUSE SCREEN, BLOOD  Encouraged and strongly advised the need to stop drug use for multiple reasons.  She understands and verb understanding, declines rehab at this time.          Plan:     See Cardiology as scheduled.  Healthy diet and lifestyle changes stressed.    Follow-up:     Pending lab.  See PCP in 3 months for annual, overdue.  RTC as directed and/or prn.        MALLORY Granados  [facility]       Patient Care Team:  Zora Cope MD as PCP - General (Family Medicine)  Cullen Hannon MD as Consulting Physician (Cardiology)      30 minutes of total time spent on the encounter, which includes face to face time and non-face to face time preparing to see the patient (eg, review of tests), obtaining and/or reviewing separately obtained history, and documenting clinical information in the electronic or other health record.  Also includes independent interpretation of results (not separately reported) and communicating results to the patient/family/caregiver, with care coordination (not separately reported).

## 2023-03-28 NOTE — TELEPHONE ENCOUNTER
Tried calling pt to inform her of pt with R[facility] for 04/04/23      [facility]. Please call her back at [phone]

## 2023-03-29 ENCOUNTER — OFFICE VISIT (OUTPATIENT)
Dept: FAMILY MEDICINE | Facility: CLINIC | Age: 68
End: 2023-03-29
Payer: MEDICARE

## 2023-03-29 ENCOUNTER — LAB VISIT (OUTPATIENT)
Dept: LAB | Facility: HOSPITAL | Age: 68
End: 2023-03-29
Attending: REGISTERED NURSE
Payer: MEDICARE

## 2023-03-29 VITALS
TEMPERATURE: 98 F | OXYGEN SATURATION: 99 % | HEART RATE: 81 BPM | WEIGHT: 138.44 LBS | BODY MASS INDEX: 24.53 KG/M2 | DIASTOLIC BLOOD PRESSURE: 62 MMHG | SYSTOLIC BLOOD PRESSURE: 138 MMHG | HEIGHT: 63 IN

## 2023-03-29 DIAGNOSIS — R53.81 CHRONIC FATIGUE AND MALAISE: ICD-10-CM

## 2023-03-29 DIAGNOSIS — F19.90 SUBSTANCE USE DISORDER: ICD-10-CM

## 2023-03-29 DIAGNOSIS — I50.32 CHRONIC HEART FAILURE WITH PRESERVED EJECTION FRACTION: ICD-10-CM

## 2023-03-29 DIAGNOSIS — Z72.0 TOBACCO USE: ICD-10-CM

## 2023-03-29 DIAGNOSIS — I10 HYPERTENSION, UNSPECIFIED TYPE: ICD-10-CM

## 2023-03-29 DIAGNOSIS — N18.4 CKD (CHRONIC KIDNEY DISEASE) STAGE 4, GFR 15-29 ML/MIN: ICD-10-CM

## 2023-03-29 DIAGNOSIS — Z09 HOSPITAL DISCHARGE FOLLOW-UP: ICD-10-CM

## 2023-03-29 DIAGNOSIS — R53.82 CHRONIC FATIGUE AND MALAISE: ICD-10-CM

## 2023-03-29 DIAGNOSIS — Z09 HOSPITAL DISCHARGE FOLLOW-UP: Primary | ICD-10-CM

## 2023-03-29 LAB
ALBUMIN SERPL BCP-MCNC: 3.9 G/DL (ref 3.5–5.2)
ALP SERPL-CCNC: 99 U/L (ref 55–135)
ALT SERPL W/O P-5'-P-CCNC: 9 U/L (ref 10–44)
ANION GAP SERPL CALC-SCNC: 11 MMOL/L (ref 8–16)
AST SERPL-CCNC: 16 U/L (ref 10–40)
BASOPHILS # BLD AUTO: 0.06 K/UL (ref 0–0.2)
BASOPHILS NFR BLD: 1.3 % (ref 0–1.9)
BILIRUB SERPL-MCNC: 0.4 MG/DL (ref 0.1–1)
BUN SERPL-MCNC: 34 MG/DL (ref 8–23)
CALCIUM SERPL-MCNC: 9.6 MG/DL (ref 8.7–10.5)
CHLORIDE SERPL-SCNC: 106 MMOL/L (ref 95–110)
CO2 SERPL-SCNC: 26 MMOL/L (ref 23–29)
CREAT SERPL-MCNC: 2.1 MG/DL (ref 0.5–1.4)
DIFFERENTIAL METHOD: ABNORMAL
EOSINOPHIL # BLD AUTO: 0.2 K/UL (ref 0–0.5)
EOSINOPHIL NFR BLD: 4.5 % (ref 0–8)
ERYTHROCYTE [DISTWIDTH] IN BLOOD BY AUTOMATED COUNT: 13.6 % (ref 11.5–14.5)
EST. GFR  (NO RACE VARIABLE): 25 ML/MIN/1.73 M^2
GLUCOSE SERPL-MCNC: 82 MG/DL (ref 70–110)
HCT VFR BLD AUTO: 44.4 % (ref 37–48.5)
HGB BLD-MCNC: 13.6 G/DL (ref 12–16)
IMM GRANULOCYTES # BLD AUTO: 0.01 K/UL (ref 0–0.04)
IMM GRANULOCYTES NFR BLD AUTO: 0.2 % (ref 0–0.5)
LYMPHOCYTES # BLD AUTO: 1.9 K/UL (ref 1–4.8)
LYMPHOCYTES NFR BLD: 43.1 % (ref 18–48)
MCH RBC QN AUTO: 28.7 PG (ref 27–31)
MCHC RBC AUTO-ENTMCNC: 30.6 G/DL (ref 32–36)
MCV RBC AUTO: 94 FL (ref 82–98)
MONOCYTES # BLD AUTO: 0.5 K/UL (ref 0.3–1)
MONOCYTES NFR BLD: 12.1 % (ref 4–15)
NEUTROPHILS # BLD AUTO: 1.7 K/UL (ref 1.8–7.7)
NEUTROPHILS NFR BLD: 38.8 % (ref 38–73)
NRBC BLD-RTO: 0 /100 WBC
PLATELET # BLD AUTO: 268 K/UL (ref 150–450)
PMV BLD AUTO: 11.8 FL (ref 9.2–12.9)
POTASSIUM SERPL-SCNC: 4.3 MMOL/L (ref 3.5–5.1)
PROT SERPL-MCNC: 7.4 G/DL (ref 6–8.4)
RBC # BLD AUTO: 4.74 M/UL (ref 4–5.4)
SODIUM SERPL-SCNC: 143 MMOL/L (ref 136–145)
TSH SERPL DL<=0.005 MIU/L-ACNC: 1.34 UIU/ML (ref 0.4–4)
WBC # BLD AUTO: 4.45 K/UL (ref 3.9–12.7)

## 2023-03-29 PROCEDURE — 3008F BODY MASS INDEX DOCD: CPT | Mod: HCNC,CPTII,S$GLB, | Performed by: REGISTERED NURSE

## 2023-03-29 PROCEDURE — 80307 DRUG TEST PRSMV CHEM ANLYZR: CPT | Mod: HCNC | Performed by: REGISTERED NURSE

## 2023-03-29 PROCEDURE — 82607 VITAMIN B-12: CPT | Mod: HCNC | Performed by: REGISTERED NURSE

## 2023-03-29 PROCEDURE — 99999 PR PBB SHADOW E&M-EST. PATIENT-LVL III: CPT | Mod: PBBFAC,HCNC,, | Performed by: REGISTERED NURSE

## 2023-03-29 PROCEDURE — 99214 PR OFFICE/OUTPT VISIT, EST, LEVL IV, 30-39 MIN: ICD-10-PCS | Mod: HCNC,S$GLB,, | Performed by: REGISTERED NURSE

## 2023-03-29 PROCEDURE — 3078F PR MOST RECENT DIASTOLIC BLOOD PRESSURE < 80 MM HG: ICD-10-PCS | Mod: HCNC,CPTII,S$GLB, | Performed by: REGISTERED NURSE

## 2023-03-29 PROCEDURE — 3075F SYST BP GE 130 - 139MM HG: CPT | Mod: HCNC,CPTII,S$GLB, | Performed by: REGISTERED NURSE

## 2023-03-29 PROCEDURE — 1101F PT FALLS ASSESS-DOCD LE1/YR: CPT | Mod: HCNC,CPTII,S$GLB, | Performed by: REGISTERED NURSE

## 2023-03-29 PROCEDURE — 85025 COMPLETE CBC W/AUTO DIFF WBC: CPT | Mod: HCNC | Performed by: REGISTERED NURSE

## 2023-03-29 PROCEDURE — 84443 ASSAY THYROID STIM HORMONE: CPT | Mod: HCNC | Performed by: REGISTERED NURSE

## 2023-03-29 PROCEDURE — 1159F MED LIST DOCD IN RCRD: CPT | Mod: HCNC,CPTII,S$GLB, | Performed by: REGISTERED NURSE

## 2023-03-29 PROCEDURE — 3075F PR MOST RECENT SYSTOLIC BLOOD PRESS GE 130-139MM HG: ICD-10-PCS | Mod: HCNC,CPTII,S$GLB, | Performed by: REGISTERED NURSE

## 2023-03-29 PROCEDURE — 3078F DIAST BP <80 MM HG: CPT | Mod: HCNC,CPTII,S$GLB, | Performed by: REGISTERED NURSE

## 2023-03-29 PROCEDURE — 1101F PR PT FALLS ASSESS DOC 0-1 FALLS W/OUT INJ PAST YR: ICD-10-PCS | Mod: HCNC,CPTII,S$GLB, | Performed by: REGISTERED NURSE

## 2023-03-29 PROCEDURE — 80053 COMPREHEN METABOLIC PANEL: CPT | Mod: HCNC | Performed by: REGISTERED NURSE

## 2023-03-29 PROCEDURE — 4010F ACE/ARB THERAPY RXD/TAKEN: CPT | Mod: HCNC,CPTII,S$GLB, | Performed by: REGISTERED NURSE

## 2023-03-29 PROCEDURE — 99999 PR PBB SHADOW E&M-EST. PATIENT-LVL III: ICD-10-PCS | Mod: PBBFAC,HCNC,, | Performed by: REGISTERED NURSE

## 2023-03-29 PROCEDURE — 1126F AMNT PAIN NOTED NONE PRSNT: CPT | Mod: HCNC,CPTII,S$GLB, | Performed by: REGISTERED NURSE

## 2023-03-29 PROCEDURE — 3062F POS MACROALBUMINURIA REV: CPT | Mod: HCNC,CPTII,S$GLB, | Performed by: REGISTERED NURSE

## 2023-03-29 PROCEDURE — 82306 VITAMIN D 25 HYDROXY: CPT | Mod: HCNC | Performed by: REGISTERED NURSE

## 2023-03-29 PROCEDURE — 1159F PR MEDICATION LIST DOCUMENTED IN MEDICAL RECORD: ICD-10-PCS | Mod: HCNC,CPTII,S$GLB, | Performed by: REGISTERED NURSE

## 2023-03-29 PROCEDURE — 3062F PR POS MACROALBUMINURIA RESULT DOCUMENTED/REVIEW: ICD-10-PCS | Mod: HCNC,CPTII,S$GLB, | Performed by: REGISTERED NURSE

## 2023-03-29 PROCEDURE — 36415 COLL VENOUS BLD VENIPUNCTURE: CPT | Mod: HCNC,PO | Performed by: REGISTERED NURSE

## 2023-03-29 PROCEDURE — 3066F PR DOCUMENTATION OF TREATMENT FOR NEPHROPATHY: ICD-10-PCS | Mod: HCNC,CPTII,S$GLB, | Performed by: REGISTERED NURSE

## 2023-03-29 PROCEDURE — 3008F PR BODY MASS INDEX (BMI) DOCUMENTED: ICD-10-PCS | Mod: HCNC,CPTII,S$GLB, | Performed by: REGISTERED NURSE

## 2023-03-29 PROCEDURE — 99214 OFFICE O/P EST MOD 30 MIN: CPT | Mod: HCNC,S$GLB,, | Performed by: REGISTERED NURSE

## 2023-03-29 PROCEDURE — 82746 ASSAY OF FOLIC ACID SERUM: CPT | Mod: HCNC | Performed by: REGISTERED NURSE

## 2023-03-29 PROCEDURE — 3288F FALL RISK ASSESSMENT DOCD: CPT | Mod: HCNC,CPTII,S$GLB, | Performed by: REGISTERED NURSE

## 2023-03-29 PROCEDURE — 4010F PR ACE/ARB THEARPY RXD/TAKEN: ICD-10-PCS | Mod: HCNC,CPTII,S$GLB, | Performed by: REGISTERED NURSE

## 2023-03-29 PROCEDURE — 3066F NEPHROPATHY DOC TX: CPT | Mod: HCNC,CPTII,S$GLB, | Performed by: REGISTERED NURSE

## 2023-03-29 PROCEDURE — 3288F PR FALLS RISK ASSESSMENT DOCUMENTED: ICD-10-PCS | Mod: HCNC,CPTII,S$GLB, | Performed by: REGISTERED NURSE

## 2023-03-29 PROCEDURE — 1126F PR PAIN SEVERITY QUANTIFIED, NO PAIN PRESENT: ICD-10-PCS | Mod: HCNC,CPTII,S$GLB, | Performed by: REGISTERED NURSE

## 2023-03-30 LAB
25(OH)D3+25(OH)D2 SERPL-MCNC: 12 NG/ML (ref 30–96)
FOLATE SERPL-MCNC: 4.1 NG/ML (ref 4–24)
VIT B12 SERPL-MCNC: 828 PG/ML (ref 210–950)

## 2023-04-01 DIAGNOSIS — N18.4 CKD (CHRONIC KIDNEY DISEASE) STAGE 4, GFR 15-29 ML/MIN: Primary | ICD-10-CM

## 2023-04-01 DIAGNOSIS — R80.9 MICROALBUMINURIA: ICD-10-CM

## 2023-04-01 LAB
AMPHETAMINES SERPL QL: NEGATIVE
BARBITURATES SERPL QL SCN: NEGATIVE
BENZODIAZ SERPL QL SCN: NEGATIVE
BZE SERPL QL: NEGATIVE
CARBOXYTHC SERPL QL SCN: NEGATIVE
ETHANOL SERPL QL SCN: NEGATIVE
METHADONE SERPL QL SCN: NEGATIVE
OPIATES SERPL QL SCN: NEGATIVE
PCP SERPL QL SCN: NEGATIVE
PROPOXYPH SERPL QL: NEGATIVE

## 2023-04-04 ENCOUNTER — LAB VISIT (OUTPATIENT)
Dept: LAB | Facility: HOSPITAL | Age: 68
End: 2023-04-04
Attending: INTERNAL MEDICINE
Payer: MEDICARE

## 2023-04-04 ENCOUNTER — OFFICE VISIT (OUTPATIENT)
Dept: CARDIOLOGY | Facility: CLINIC | Age: 68
End: 2023-04-04
Payer: MEDICARE

## 2023-04-04 ENCOUNTER — TELEPHONE (OUTPATIENT)
Dept: CARDIOLOGY | Facility: HOSPITAL | Age: 68
End: 2023-04-04
Payer: MEDICARE

## 2023-04-04 VITALS
BODY MASS INDEX: 24.13 KG/M2 | OXYGEN SATURATION: 100 % | DIASTOLIC BLOOD PRESSURE: 70 MMHG | SYSTOLIC BLOOD PRESSURE: 134 MMHG | HEART RATE: 77 BPM | WEIGHT: 136.25 LBS

## 2023-04-04 DIAGNOSIS — Z86.73 HISTORY OF TIA (TRANSIENT ISCHEMIC ATTACK): ICD-10-CM

## 2023-04-04 DIAGNOSIS — R07.89 OTHER CHEST PAIN: ICD-10-CM

## 2023-04-04 DIAGNOSIS — I50.32 CHRONIC HEART FAILURE WITH PRESERVED EJECTION FRACTION: Primary | ICD-10-CM

## 2023-04-04 DIAGNOSIS — N18.4 CKD (CHRONIC KIDNEY DISEASE) STAGE 4, GFR 15-29 ML/MIN: ICD-10-CM

## 2023-04-04 DIAGNOSIS — R09.89 BRUIT: ICD-10-CM

## 2023-04-04 DIAGNOSIS — I50.32 CHRONIC HEART FAILURE WITH PRESERVED EJECTION FRACTION: ICD-10-CM

## 2023-04-04 DIAGNOSIS — R06.09 DOE (DYSPNEA ON EXERTION): ICD-10-CM

## 2023-04-04 DIAGNOSIS — R94.31 ABNORMAL ECG: ICD-10-CM

## 2023-04-04 DIAGNOSIS — I10 ESSENTIAL HYPERTENSION: ICD-10-CM

## 2023-04-04 DIAGNOSIS — B18.2 CHRONIC HEPATITIS C WITHOUT HEPATIC COMA: ICD-10-CM

## 2023-04-04 DIAGNOSIS — R00.2 PALPITATIONS: ICD-10-CM

## 2023-04-04 LAB — BNP SERPL-MCNC: 305 PG/ML (ref 0–99)

## 2023-04-04 PROCEDURE — 4010F PR ACE/ARB THEARPY RXD/TAKEN: ICD-10-PCS | Mod: HCNC,CPTII,S$GLB, | Performed by: INTERNAL MEDICINE

## 2023-04-04 PROCEDURE — 3008F PR BODY MASS INDEX (BMI) DOCUMENTED: ICD-10-PCS | Mod: HCNC,CPTII,S$GLB, | Performed by: INTERNAL MEDICINE

## 2023-04-04 PROCEDURE — 1159F PR MEDICATION LIST DOCUMENTED IN MEDICAL RECORD: ICD-10-PCS | Mod: HCNC,CPTII,S$GLB, | Performed by: INTERNAL MEDICINE

## 2023-04-04 PROCEDURE — 99999 PR PBB SHADOW E&M-EST. PATIENT-LVL V: CPT | Mod: PBBFAC,HCNC,, | Performed by: INTERNAL MEDICINE

## 2023-04-04 PROCEDURE — 1159F MED LIST DOCD IN RCRD: CPT | Mod: HCNC,CPTII,S$GLB, | Performed by: INTERNAL MEDICINE

## 2023-04-04 PROCEDURE — 3078F DIAST BP <80 MM HG: CPT | Mod: HCNC,CPTII,S$GLB, | Performed by: INTERNAL MEDICINE

## 2023-04-04 PROCEDURE — 99215 PR OFFICE/OUTPT VISIT, EST, LEVL V, 40-54 MIN: ICD-10-PCS | Mod: HCNC,S$GLB,, | Performed by: INTERNAL MEDICINE

## 2023-04-04 PROCEDURE — 3008F BODY MASS INDEX DOCD: CPT | Mod: HCNC,CPTII,S$GLB, | Performed by: INTERNAL MEDICINE

## 2023-04-04 PROCEDURE — 3062F POS MACROALBUMINURIA REV: CPT | Mod: HCNC,CPTII,S$GLB, | Performed by: INTERNAL MEDICINE

## 2023-04-04 PROCEDURE — 99999 PR PBB SHADOW E&M-EST. PATIENT-LVL V: ICD-10-PCS | Mod: PBBFAC,HCNC,, | Performed by: INTERNAL MEDICINE

## 2023-04-04 PROCEDURE — 3066F NEPHROPATHY DOC TX: CPT | Mod: HCNC,CPTII,S$GLB, | Performed by: INTERNAL MEDICINE

## 2023-04-04 PROCEDURE — 3288F PR FALLS RISK ASSESSMENT DOCUMENTED: ICD-10-PCS | Mod: HCNC,CPTII,S$GLB, | Performed by: INTERNAL MEDICINE

## 2023-04-04 PROCEDURE — 3066F PR DOCUMENTATION OF TREATMENT FOR NEPHROPATHY: ICD-10-PCS | Mod: HCNC,CPTII,S$GLB, | Performed by: INTERNAL MEDICINE

## 2023-04-04 PROCEDURE — 3075F PR MOST RECENT SYSTOLIC BLOOD PRESS GE 130-139MM HG: ICD-10-PCS | Mod: HCNC,CPTII,S$GLB, | Performed by: INTERNAL MEDICINE

## 2023-04-04 PROCEDURE — 1101F PR PT FALLS ASSESS DOC 0-1 FALLS W/OUT INJ PAST YR: ICD-10-PCS | Mod: HCNC,CPTII,S$GLB, | Performed by: INTERNAL MEDICINE

## 2023-04-04 PROCEDURE — 3288F FALL RISK ASSESSMENT DOCD: CPT | Mod: HCNC,CPTII,S$GLB, | Performed by: INTERNAL MEDICINE

## 2023-04-04 PROCEDURE — 99215 OFFICE O/P EST HI 40 MIN: CPT | Mod: HCNC,S$GLB,, | Performed by: INTERNAL MEDICINE

## 2023-04-04 PROCEDURE — 1101F PT FALLS ASSESS-DOCD LE1/YR: CPT | Mod: HCNC,CPTII,S$GLB, | Performed by: INTERNAL MEDICINE

## 2023-04-04 PROCEDURE — 1160F PR REVIEW ALL MEDS BY PRESCRIBER/CLIN PHARMACIST DOCUMENTED: ICD-10-PCS | Mod: HCNC,CPTII,S$GLB, | Performed by: INTERNAL MEDICINE

## 2023-04-04 PROCEDURE — 3078F PR MOST RECENT DIASTOLIC BLOOD PRESSURE < 80 MM HG: ICD-10-PCS | Mod: HCNC,CPTII,S$GLB, | Performed by: INTERNAL MEDICINE

## 2023-04-04 PROCEDURE — 1126F PR PAIN SEVERITY QUANTIFIED, NO PAIN PRESENT: ICD-10-PCS | Mod: HCNC,CPTII,S$GLB, | Performed by: INTERNAL MEDICINE

## 2023-04-04 PROCEDURE — 83880 ASSAY OF NATRIURETIC PEPTIDE: CPT | Mod: HCNC | Performed by: INTERNAL MEDICINE

## 2023-04-04 PROCEDURE — 3075F SYST BP GE 130 - 139MM HG: CPT | Mod: HCNC,CPTII,S$GLB, | Performed by: INTERNAL MEDICINE

## 2023-04-04 PROCEDURE — 1126F AMNT PAIN NOTED NONE PRSNT: CPT | Mod: HCNC,CPTII,S$GLB, | Performed by: INTERNAL MEDICINE

## 2023-04-04 PROCEDURE — 3062F PR POS MACROALBUMINURIA RESULT DOCUMENTED/REVIEW: ICD-10-PCS | Mod: HCNC,CPTII,S$GLB, | Performed by: INTERNAL MEDICINE

## 2023-04-04 PROCEDURE — 36415 COLL VENOUS BLD VENIPUNCTURE: CPT | Mod: HCNC | Performed by: INTERNAL MEDICINE

## 2023-04-04 PROCEDURE — 1160F RVW MEDS BY RX/DR IN RCRD: CPT | Mod: HCNC,CPTII,S$GLB, | Performed by: INTERNAL MEDICINE

## 2023-04-04 PROCEDURE — 4010F ACE/ARB THERAPY RXD/TAKEN: CPT | Mod: HCNC,CPTII,S$GLB, | Performed by: INTERNAL MEDICINE

## 2023-04-04 NOTE — PROGRESS NOTES
Subjective:   Patient ID:  Rose Milligan is a 67 y.o. female who presents for cardiac consult of No chief complaint on file.      The patient came in today for cardiac consult of No chief complaint on file.      Rose Milligan is a 67 y.o. female  with HFpEF, HTN, pulm HTN, tobacco abuse, h/o drug abuse, bipolar, hep C, CKD4 presents for follow up CV eval.      11/21/18  Pt went to [facility] yesterday. She went for chest pain and L shoulder pain. Chest pain started about 3 days ago, and was off medicines for 2 weeks. She had bloodwork, ECG and was told to follow up hereShe called her PCP and only picked up bipolar meds. Chest pain feels like tightness, for past few days been constant. Mild headache and nausea. Now she feels lightheaded. PT also gets palpitations frequenltly.     8/15/19 - hosp follow up   She was recently admitted to [facility] for CHF exac, presented to Dr. Cope's office with HTN urgency, unsure of her meds, she was sent back to ER at [facility]. She started to have a cough last and then had worsening orthopnea. She was diuresed 3.5L. She still has AUGUSTE. She doesn't have med list again, talked to her friend on phone has only 4 bottles at home, called pharmacy only hydralazine was being taken.     10/15/19  BP uncontrolled this AM. She has gained about 10-15 lbs lately, has been drinking more sodas and eating more. No AUGUSTE.     11/5/19  Overall has been doing well lately. No SOB/AUGUSTE. No chest pain. Has been compliant with meds, lost a few pounds lately.     5/1/20  Last week at [facility] - was discharged Thurs  Reason for Hospitalization   Chief Complaint  Left-sided weakness     History of Present Illness  Patient is 64-year-old black female with past medical history of substance abuse with frequent crack cocaine usage, CHF, hypertension, bipolar disorder, comes in complaining of left-sided weakness, she reports has been present for 2 weeks at which time it caused her to fall, she has had persistent problems with left leg  since that time with difficulty walking, having to limp or drag along her left leg.  has been asking her to go to get checked out but she failed to do so until today when she went to her PCP and was found to have elevated blood pressure of approximately 200/120. She denies missing blood pressure medications, reports she is taking hydralazine and clonidine, she reports clonidine is supposed to be 3 times per day but sometimes she misses the middle dose. Does not have any of her medication bottles with her. She denies any headache, positive for blurry vision, had some speech issues at time of onset of symptoms 2 weeks ago but none since. Denies any chest pain, denies any lower extremity edema or other symptoms. Blood pressure elevations are severe, unknown aggravating factors, no alleviating factors with 0 response to IV hydralazine in the emergency room.    * Hypertensive emergency  Patient CT scan from admit shows chronic small vessel ischemia but no acute CVA. If stroke, strokelike symptoms were really 2 weeks ago it would not explain her persistent elevation in blood pressure at this time.Given dose of labetalol, with hypertensive emergency would like to reduce systolic blood pressures by about 25% currently at 2 30-2 40 systolic would like to reduce to about 170-180 systolic, likewise diastolics running 120s to 130s should be reduced to about 90s, we are allowing for some permissive hypertension secondary to chronically elevated blood pressures and possible acute CVA. transition off of Cardene, will place patient on hydralazine which she was previously on at home, will also start amlodipine 5 and Toprol-XL 50 mg daily.   Hemiparesis affecting nondominant side as late effect of cerebrovascular accident (CVA) (HCC)  No acute CVA, these are likely secondary to extreme elevations in blood pressure and crack cocaine use, appears to be resolving upon discharge    Discharge Summary   Patient CT scan from admit  shows chronic small vessel ischemia but no acute CVA. If stroke, strokelike symptoms were really 2 weeks ago it would not explain her persistent elevation in blood pressure at this time.Given dose of labetalol, with hypertensive emergency would like to reduce systolic blood pressures by about 25% currently at 2 30-2 40 systolic would like to reduce to about 170-180 systolic, likewise diastolics running 120s to 130s should be reduced to about 90s, we are allowing for some permissive hypertension secondary to chronically elevated blood pressures and possible acute CVA. transition off of Cardene, will place patient on hydralazine which she was previously on at home, will also start amlodipine 5 and Toprol-XL 50 mg daily.   Follow-up Patient continues to have very elevated blood pressures, will need echocardiogram for continued work-up, does have history of CHF but no evidence of decompensation on examination at this time. WillGoal map today less than 120 if possible, will need continued slow lowering due to extreme elevations.      Now she has been doing ok, she has a limp in left left, has improved. BP remains elevated 160-170/80-90. Discussed will increase norvasc but restart Coreg instead of Toprol.     5/22/20  She has been drinking some energy drinks which caused her to pass out, her BP was 200/140. She fell on L shoulder and has improved with strength. BP improved but remains elevated, will adjust meds again.     4/4/23  Follow up with me since 5/2020. She saw Dr. Jenkins Dec 2022. BP and Hr stable. BMI 24 - 136 lbs. ECHO 1/2023 with normal bi V function, grade 3 DD, mild TR, trivial peric effusion. The estimated PA systolic pressure is greater than 26 mmHg.    She has L arm pain, feels like she has a pinched nerve in neck also. She went to [facility] recently - unsure workup/treatment. She had another TIA.     Patient feels  no PND , no dizziness, no syncope, no CNS symptoms. She is a cook at [employer].     Patient  is not compliant with medications.      Prior ECG - NSR, LAE, LVH, septal infarct, inferolat TWI    Results for orders placed during the hospital encounter of 01/05/23    Echo    Interpretation Summary  · The left ventricle is normal in size with concentric hypertrophy and normal systolic function.  · Moderate left atrial enlargement.  · Grade III left ventricular diastolic dysfunction.  · The estimated PA systolic pressure is 29 mmHg.  · Normal right ventricular size with normal right ventricular systolic function.  · Normal central venous pressure (3 mmHg).  · The estimated ejection fraction is 60%.  · Mild tricuspid regurgitation.  · Trivial pericardial effusion.      4/28/20 ECHO  · Left Ventricle: Normal left ventricle cavity size. Hyperdynamic (>65%)   left ventricular function. Moderate concentric hypertrophy observed.   Indeterminate diastolic function.  · Mitral Valve: Normal mitral valve structure. No mitral stenosis. Trace   mitral regurgitation.  · Aortic Valve: The valve is tricuspid. No aortic stenosis. No aortic   regurgitation.  · Pulmonic Valve: No pulmonic regurgitation. Normal valve structure.  · Tricuspid Valve: Mild tricuspid regurgitation.    HOLTER  TEST DESCRIPTION   PREDOMINANT RHYTHM  1. Sinus rhythm with heart rates varying between 55 and 102 bpm with an average of 75 bpm.   VENTRICULAR ARRHYTHMIAS  1. There were very rare PVCs totalling 72 and averaging 1 per hour.   2. There were no episodes of ventricular tachycardia.    SUPRA VENTRICULAR ARRHYTHMIAS  1. There were very rare PACs totalling 50 and averaging 1 per hour.   2. There were no episodes of sustained supraventricular tachycardia.      Past Medical History:   Diagnosis Date    Bipolar 1 disorder     CHF (congestive heart failure)     Depression     Hepatitis C     Hypertension     Other hyperlipidemia 8/15/2019       Past Surgical History:   Procedure Laterality Date    HYSTERECTOMY         Social History     Tobacco Use     Smoking status: Some Days     Packs/day: 0.25     Types: Cigarettes     Start date: 1/11/2016    Smokeless tobacco: Never    Tobacco comments:     1 pack every 3 days   Substance Use Topics    Alcohol use: Yes    Drug use: Yes     Types: Cocaine       Family History   Problem Relation Age of Onset    Heart attack Maternal Grandmother     Hypertension Mother        Patient's Medications   New Prescriptions    No medications on file   Previous Medications    ASPIRIN (ECOTRIN) 81 MG EC TABLET    TAKE 1 TABLET EVERY DAY    CARVEDILOL (COREG) 6.25 MG TABLET    Take 1 tablet (6.25 mg total) by mouth 2 (two) times daily with meals.    CLONIDINE (CATAPRES) 0.1 MG TABLET    TAKE 1 TABLET THREE TIMES DAILY    DICLOFENAC SODIUM (VOLTAREN) 1 % GEL    APPLY 2 G TOPICALLY 4 (FOUR) TIMES DAILY AS NEEDED.    FUROSEMIDE (LASIX) 40 MG TABLET    TAKE 1 TABLET ONE TIME DAILY. OVERDUE FOR LAB AND ANNUAL/LAST REFILL.    HYDRALAZINE (APRESOLINE) 50 MG TABLET    TAKE 1 TABLET EVERY 8 HOURS.    HYDROXYZINE HCL (ATARAX) 25 MG TABLET    TAKE 1 TABLET EVERY DAY AS NEEDED    IRBESARTAN (AVAPRO) 150 MG TABLET    TAKE 1 TABLET BY MOUTH EVERY DAY    PITAVASTATIN CALCIUM (LIVALO) 2 MG TAB TABLET    Take 1 tablet (2 mg total) by mouth once daily.    ZIPRASIDONE (GEODON) 20 MG CAP    Take 1 capsule (20 mg total) by mouth 2 (two) times daily.   Modified Medications    No medications on file   Discontinued Medications    No medications on file       Review of Systems   Constitutional: Negative.    HENT: Negative.     Eyes: Negative.    Respiratory:  Negative for shortness of breath.    Cardiovascular:  Negative for chest pain and palpitations.   Gastrointestinal: Negative.    Genitourinary: Negative.    Musculoskeletal: Negative.    Skin: Negative.    Neurological:  Positive for dizziness.   Endo/Heme/Allergies: Negative.    Psychiatric/Behavioral:  The patient is not nervous/anxious.    All 12 systems otherwise negative.    Wt Readings from Last 3  Encounters:   04/04/23 61.8 kg (136 lb 3.9 oz)   03/29/23 62.8 kg (138 lb 7.2 oz)   01/05/23 63.5 kg (140 lb)     Temp Readings from Last 3 Encounters:   03/29/23 97.6 °F (36.4 °C)   12/16/22 98 °F (36.7 °C)   12/02/22 97.4 °F (36.3 °C)     BP Readings from Last 3 Encounters:   04/04/23 134/70   03/29/23 138/62   01/05/23 (!) 150/80     Pulse Readings from Last 3 Encounters:   04/04/23 77   03/29/23 81   12/20/22 76       /70   Pulse 77   Wt 61.8 kg (136 lb 3.9 oz)   SpO2 100%   BMI 24.13 kg/m²     Objective:   Physical Exam  Vitals and nursing note reviewed.   Constitutional:       General: She is not in acute distress.     Appearance: She is well-developed. She is not diaphoretic.   HENT:      Head: Normocephalic and atraumatic.      Nose: Nose normal.   Eyes:      General: No scleral icterus.     Conjunctiva/sclera: Conjunctivae normal.   Neck:      Thyroid: No thyromegaly.      Vascular: No JVD.   Cardiovascular:      Rate and Rhythm: Normal rate and regular rhythm.      Heart sounds: S1 normal and S2 normal. No murmur heard.    No friction rub. No gallop. No S3 or S4 sounds.   Pulmonary:      Effort: Pulmonary effort is normal. No respiratory distress.      Breath sounds: Normal breath sounds. No stridor. No wheezing or rales.   Chest:      Chest wall: No tenderness.   Abdominal:      General: Bowel sounds are normal. There is no distension.      Palpations: Abdomen is soft. There is no mass.      Tenderness: There is no abdominal tenderness. There is no rebound.   Genitourinary:     Comments: Deferred  Musculoskeletal:         General: No tenderness or deformity. Normal range of motion.      Cervical back: Normal range of motion and neck supple.   Lymphadenopathy:      Cervical: No cervical adenopathy.   Skin:     General: Skin is warm and dry.      Coloration: Skin is not pale.      Findings: No erythema or rash.   Neurological:      Mental Status: She is alert and oriented to person, place, and  time.      Motor: No abnormal muscle tone.      Coordination: Coordination normal.   Psychiatric:         Behavior: Behavior normal.         Thought Content: Thought content normal.         Judgment: Judgment normal.       Lab Results   Component Value Date     03/29/2023    K 4.3 03/29/2023     03/29/2023    CO2 26 03/29/2023    BUN 34 (H) 03/29/2023    CREATININE 2.1 (H) 03/29/2023    GLU 82 03/29/2023    HGBA1C 5.4 01/28/2022    HGBA1C 5.6 07/30/2021    MG 2.0 08/15/2019    AST 16 03/29/2023    ALT 9 (L) 03/29/2023    ALBUMIN 3.9 03/29/2023    PROT 7.4 03/29/2023    BILITOT 0.4 03/29/2023    WBC 4.45 03/29/2023    HGB 13.6 03/29/2023    HCT 44.4 03/29/2023    MCV 94 03/29/2023     03/29/2023    TSH 1.343 03/29/2023    CHOL 131 07/30/2021    HDL 47 07/30/2021    LDLCALC 67.2 07/30/2021    TRIG 84 07/30/2021    BNP 1,513 (H) 08/15/2019     Assessment:      1. Chronic heart failure with preserved ejection fraction    2. Abnormal ECG    3. AUGUSTE (dyspnea on exertion)    4. Essential hypertension    5. CKD (chronic kidney disease) stage 4, GFR 15-29 ml/min    6. Chronic hepatitis C without hepatic coma    7. History of TIA (transient ischemic attack)    8. Palpitations    9. Other chest pain          Plan:   1. HFPEF, with occ CP/SOB  - order pharm nuclear stress test r/o ischemia, pt cannot walk on treadmill due to dyspnea   - order BNP  - cont meds  - cont lasix  - ECHO 1/2023 with normal bi V function, grade 3 DD, mild TR, trivial peric effusion. The estimated PA systolic pressure is greater than 26 mmHg.    2. HTN - adjusted meds   - cont meds   - low salt diet    3. CKD4  - f/u with nephro/PCP    4. Hep C  - cont tx per PCP    5. HLD  - cont statin    6. SOB ? COPD  - refer to pulm     7. H/O TIA/CVA - L sided weakness? -again 3/2023  - cont meds  - order carotid u/s   Order 14 day Vital monitor   - f/u Neuro  - BP improved    Thank you for allowing me to participate in this patient's care.  Please do not hesitate to contact me with any questions or concerns. Consult note has been forwarded to the referral physician.

## 2023-04-05 ENCOUNTER — PATIENT MESSAGE (OUTPATIENT)
Dept: PULMONOLOGY | Facility: CLINIC | Age: 68
End: 2023-04-05
Payer: MEDICARE

## 2023-04-13 ENCOUNTER — TELEPHONE (OUTPATIENT)
Dept: CARDIOLOGY | Facility: CLINIC | Age: 68
End: 2023-04-13
Payer: MEDICARE

## 2023-04-13 NOTE — TELEPHONE ENCOUNTER
Spoke with pt in regards to       If her cardiac testing comes back normal and she isn't having any cardiac issues she should be able to go to back. If she has ongoing neurologic issues though she needs to go discuss with her PCP and neurologist. She may need to have evaluation with PT/OT and they can ultimately guide her as well. Please have her follow up with PCP per Riddhi      Pt verbalized understanding with no questions or concerns     ----- Message -----  From: Jumana Whaley MA  Sent: 4/13/2023   3:58 PM CDT  To: Cullen Hannon MD  Subject: FW: pt call back                                 Pt wants to know can you write her a work form stating she can work 3 days a week, pt stated she can't mentally handle being home all the anahy, please advise  ----- Message -----  From: Kenna Hoyos  Sent: 4/13/2023  12:40 PM CDT  To: Riddhi Berman Staff  Subject: pt call back                                     Name of Who is Calling:MILLIGAN, ROSE [[id number]]           What is the request in detail: Pt is requesting a work release form so she can work three days a week            Can the clinic reply by [facility]:           What Number to Call Back if not in [facility]:[phone]

## 2023-05-05 DIAGNOSIS — F31.62 BIPOLAR AFFECTIVE DISORDER, MIXED, MODERATE: ICD-10-CM

## 2023-05-05 RX ORDER — ZIPRASIDONE HYDROCHLORIDE 20 MG/1
20 CAPSULE ORAL 2 TIMES DAILY
Qty: 60 CAPSULE | Refills: 2 | Status: ON HOLD | OUTPATIENT
Start: 2023-05-05 | End: 2023-08-06 | Stop reason: SDUPTHER

## 2023-05-05 NOTE — TELEPHONE ENCOUNTER
Pt states she was seeing Dr Guthrie who gave it to her. States she is re-establishing with psych on August 28th, has an appt with Dr. Dela Cruz at [facility].

## 2023-05-05 NOTE — TELEPHONE ENCOUNTER
----- Message from Frederick Obrien sent at 5/5/2023 12:07 PM CDT -----  Type:  RX Refill Request    Who Called: Ngoc   Refill or New Rx:refill   RX Name and Strength:ziprasidone (GEODON) 20 MG Cap  How is the patient currently taking it? (ex. 1XDay):  Is this a 30 day or 90 day RX:  Preferred Pharmacy with phone number:  [facility]/pharmacy #[id number] - [place] - [address] [place] [address]  [place] [address]  Phone: [phone] Fax: [phone]  Local or Mail Order:local   Ordering Provider:Local   Would the patient rather a call back or a response via [facility]? Call back   Best Call Back Number:[phone]  Additional Information:     Thanks  CF

## 2023-05-05 NOTE — TELEPHONE ENCOUNTER
No care due was identified.  [facility] Embedded Care Due Messages. Reference number: [id number].   5/05/2023 1:50:54 PM CDT

## 2023-05-25 DIAGNOSIS — N18.4 CKD (CHRONIC KIDNEY DISEASE) STAGE 4, GFR 15-29 ML/MIN: Primary | ICD-10-CM

## 2023-06-02 DIAGNOSIS — I10 UNCONTROLLED HYPERTENSION: ICD-10-CM

## 2023-06-05 RX ORDER — CARVEDILOL 6.25 MG/1
TABLET ORAL
Qty: 180 TABLET | Refills: 0 | Status: ON HOLD | OUTPATIENT
Start: 2023-06-05 | End: 2023-08-06 | Stop reason: SDUPTHER

## 2023-06-06 ENCOUNTER — HOSPITAL ENCOUNTER (OUTPATIENT)
Dept: RADIOLOGY | Facility: HOSPITAL | Age: 68
Discharge: HOME | End: 2023-06-06
Attending: INTERNAL MEDICINE
Payer: MEDICARE

## 2023-06-06 ENCOUNTER — HOSPITAL ENCOUNTER (OUTPATIENT)
Dept: CARDIOLOGY | Facility: HOSPITAL | Age: 68
Discharge: HOME | End: 2023-06-06
Attending: INTERNAL MEDICINE
Payer: MEDICARE

## 2023-06-06 DIAGNOSIS — Z86.73 HISTORY OF TIA (TRANSIENT ISCHEMIC ATTACK): ICD-10-CM

## 2023-06-06 DIAGNOSIS — I10 ESSENTIAL HYPERTENSION: ICD-10-CM

## 2023-06-06 DIAGNOSIS — R00.2 PALPITATIONS: ICD-10-CM

## 2023-06-06 DIAGNOSIS — R94.31 ABNORMAL ECG: ICD-10-CM

## 2023-06-06 DIAGNOSIS — R06.09 DOE (DYSPNEA ON EXERTION): ICD-10-CM

## 2023-06-06 DIAGNOSIS — I50.32 CHRONIC HEART FAILURE WITH PRESERVED EJECTION FRACTION: ICD-10-CM

## 2023-06-06 DIAGNOSIS — N18.4 CKD (CHRONIC KIDNEY DISEASE) STAGE 4, GFR 15-29 ML/MIN: ICD-10-CM

## 2023-06-06 DIAGNOSIS — R09.89 BRUIT: ICD-10-CM

## 2023-06-06 DIAGNOSIS — R07.89 OTHER CHEST PAIN: ICD-10-CM

## 2023-06-06 LAB
CV STRESS BASE HR: 63 BPM
DIASTOLIC BLOOD PRESSURE: 101 MMHG
LEFT ARM DIASTOLIC BLOOD PRESSURE: 112 MMHG
LEFT ARM SYSTOLIC BLOOD PRESSURE: 175 MMHG
LEFT CBA DIAS: 15 CM/S
LEFT CBA SYS: 38 CM/S
LEFT CCA DIST DIAS: 15 CM/S
LEFT CCA DIST SYS: 43 CM/S
LEFT CCA MID DIAS: 17 CM/S
LEFT CCA MID SYS: 46 CM/S
LEFT CCA PROX DIAS: 11 CM/S
LEFT CCA PROX SYS: 31 CM/S
LEFT ECA DIAS: 7 CM/S
LEFT ECA SYS: 36 CM/S
LEFT ICA DIST DIAS: 24 CM/S
LEFT ICA DIST SYS: 56 CM/S
LEFT ICA MID DIAS: 18 CM/S
LEFT ICA MID SYS: 43 CM/S
LEFT ICA PROX DIAS: 19 CM/S
LEFT ICA PROX SYS: 42 CM/S
LEFT VERTEBRAL DIAS: 24 CM/S
LEFT VERTEBRAL SYS: 69 CM/S
NUC REST EJECTION FRACTION: 50
NUC STRESS EJECTION FRACTION: 53 %
OHS CV CAROTID RIGHT ICA EDV HIGHEST: 28
OHS CV CAROTID ULTRASOUND LEFT ICA/CCA RATIO: 1.3
OHS CV CAROTID ULTRASOUND RIGHT ICA/CCA RATIO: 1.64
OHS CV CPX 85 PERCENT MAX PREDICTED HEART RATE MALE: 123
OHS CV CPX ESTIMATED METS: 1
OHS CV CPX MAX PREDICTED HEART RATE: 145
OHS CV CPX PATIENT IS FEMALE: 1
OHS CV CPX PATIENT IS MALE: 0
OHS CV CPX PEAK DIASTOLIC BLOOD PRESSURE: 107 MMHG
OHS CV CPX PEAK HEAR RATE: 98 BPM
OHS CV CPX PEAK RATE PRESSURE PRODUCT: NORMAL
OHS CV CPX PEAK SYSTOLIC BLOOD PRESSURE: 195 MMHG
OHS CV CPX PERCENT MAX PREDICTED HEART RATE ACHIEVED: 67
OHS CV CPX RATE PRESSURE PRODUCT PRESENTING: NORMAL
OHS CV PV CAROTID LEFT HIGHEST CCA: 46
OHS CV PV CAROTID LEFT HIGHEST ICA: 56
OHS CV PV CAROTID RIGHT HIGHEST CCA: 44
OHS CV PV CAROTID RIGHT HIGHEST ICA: 72
OHS CV US CAROTID LEFT HIGHEST EDV: 24
RIGHT ARM DIASTOLIC BLOOD PRESSURE: 104 MMHG
RIGHT ARM SYSTOLIC BLOOD PRESSURE: 170 MMHG
RIGHT CBA DIAS: 10 CM/S
RIGHT CBA SYS: 28 CM/S
RIGHT CCA DIST DIAS: 16 CM/S
RIGHT CCA DIST SYS: 44 CM/S
RIGHT CCA MID DIAS: 11 CM/S
RIGHT CCA MID SYS: 44 CM/S
RIGHT CCA PROX DIAS: 10 CM/S
RIGHT CCA PROX SYS: 35 CM/S
RIGHT ECA DIAS: 6 CM/S
RIGHT ECA SYS: 37 CM/S
RIGHT ICA DIST DIAS: 15 CM/S
RIGHT ICA DIST SYS: 38 CM/S
RIGHT ICA MID DIAS: 28 CM/S
RIGHT ICA MID SYS: 72 CM/S
RIGHT ICA PROX DIAS: 15 CM/S
RIGHT ICA PROX SYS: 37 CM/S
RIGHT VERTEBRAL DIAS: 12 CM/S
RIGHT VERTEBRAL SYS: 40 CM/S
STRESS ECHO POST EXERCISE DUR MIN: 0 MINUTES
STRESS ECHO POST EXERCISE DUR SEC: 40 SECONDS
SYSTOLIC BLOOD PRESSURE: 203 MMHG

## 2023-06-06 PROCEDURE — 93016 NUCLEAR STRESS - CARDIOLOGY INTERPRETED (CUPID ONLY): ICD-10-PCS | Mod: ,,, | Performed by: INTERNAL MEDICINE

## 2023-06-06 PROCEDURE — 93880 EXTRACRANIAL BILAT STUDY: CPT | Mod: 26,,, | Performed by: INTERNAL MEDICINE

## 2023-06-06 PROCEDURE — 78452 HT MUSCLE IMAGE SPECT MULT: CPT | Mod: 26,,, | Performed by: INTERNAL MEDICINE

## 2023-06-06 PROCEDURE — 93016 CV STRESS TEST SUPVJ ONLY: CPT | Mod: ,,, | Performed by: INTERNAL MEDICINE

## 2023-06-06 PROCEDURE — 78452 HT MUSCLE IMAGE SPECT MULT: CPT

## 2023-06-06 PROCEDURE — 63600175 PHARM REV CODE 636 W HCPCS: Performed by: INTERNAL MEDICINE

## 2023-06-06 PROCEDURE — 93018 CV STRESS TEST I&R ONLY: CPT | Mod: ,,, | Performed by: INTERNAL MEDICINE

## 2023-06-06 PROCEDURE — 93017 CV STRESS TEST TRACING ONLY: CPT

## 2023-06-06 PROCEDURE — A9502 TC99M TETROFOSMIN: HCPCS

## 2023-06-06 PROCEDURE — 93880 EXTRACRANIAL BILAT STUDY: CPT

## 2023-06-06 PROCEDURE — 78452 NUCLEAR STRESS - CARDIOLOGY INTERPRETED (CUPID ONLY): ICD-10-PCS | Mod: 26,,, | Performed by: INTERNAL MEDICINE

## 2023-06-06 PROCEDURE — 93880 CV US DOPPLER CAROTID (CUPID ONLY): ICD-10-PCS | Mod: 26,,, | Performed by: INTERNAL MEDICINE

## 2023-06-06 PROCEDURE — 93018 NUCLEAR STRESS - CARDIOLOGY INTERPRETED (CUPID ONLY): ICD-10-PCS | Mod: ,,, | Performed by: INTERNAL MEDICINE

## 2023-06-06 PROCEDURE — 93248 CV CARDIAC MONITOR - 3-15 DAY ADULT (CUPID ONLY): ICD-10-PCS | Mod: ,,, | Performed by: INTERNAL MEDICINE

## 2023-06-06 PROCEDURE — 93248 EXT ECG>7D<15D REV&INTERPJ: CPT | Mod: ,,, | Performed by: INTERNAL MEDICINE

## 2023-06-06 RX ORDER — REGADENOSON 0.08 MG/ML
0.4 INJECTION, SOLUTION INTRAVENOUS ONCE
Status: COMPLETED | OUTPATIENT
Start: 2023-06-06 | End: 2023-06-06

## 2023-06-06 RX ADMIN — REGADENOSON 0.4 MG: 0.08 INJECTION, SOLUTION INTRAVENOUS at 01:06

## 2023-06-15 DIAGNOSIS — I10 UNCONTROLLED HYPERTENSION: ICD-10-CM

## 2023-06-16 RX ORDER — IRBESARTAN 150 MG/1
TABLET ORAL
Qty: 90 TABLET | Refills: 0 | Status: ON HOLD | OUTPATIENT
Start: 2023-06-16 | End: 2023-08-06 | Stop reason: SDUPTHER

## 2023-06-28 LAB
OHS CV HOLTER SINUS AVERAGE HR: 76
OHS CV HOLTER SINUS MAX HR: 116
OHS CV HOLTER SINUS MIN HR: 55

## 2023-07-03 NOTE — PROGRESS NOTES
"SUBJECTIVE:     Rose Milligan is a 67 y.o. female is here today with c/o:  Knots/lumps      HPI:    Mrs. Milligan is here with c/o recurrent lumps under skin.  Onset about 1 month ago, recurrent but usually in same spot.  Usually located to mouth and vagina.  Does drain "powder", dry substance, some pain to area.  Some mild itching after drainage.  No txmt to date.      REVIEW OF SYSTEMS:    Review of Systems   Constitutional:  Negative for chills and fever.   Skin:         Recurrent lumps/cysts   All other systems reviewed and are negative.      CURRENT ALLERGIES:    Review of patient's allergies indicates:   Allergen Reactions    Tramadol Itching    Risperdal [risperidone] Other (See Comments)     Did not like way felt       CURRENT PROBLEM LIST:    Patient Active Problem List   Diagnosis    Chronic hepatitis C virus infection    Uncontrolled hypertension    CKD (chronic kidney disease) stage 4, GFR 15-29 ml/min    Bipolar affective disorder    (HFpEF) heart failure with preserved ejection fraction    Other hyperlipidemia    Class 1 obesity due to excess calories with serious comorbidity and body mass index (BMI) of 30.0 to 30.9 in adult    Substance use disorder    Chronic pulmonary heart disease    Tobacco use    Cocaine use    Anxiety disorder    Abnormal ECG       CURRENT MEDICATION LIST:      Current Outpatient Medications:     aspirin (ECOTRIN) 81 MG EC tablet, TAKE 1 TABLET EVERY DAY, Disp: 90 tablet, Rfl: 3    carvediloL (COREG) 6.25 MG tablet, TAKE 1 TABLET BY MOUTH TWICE A DAY, Disp: 180 tablet, Rfl: 0    cloNIDine (CATAPRES) 0.1 MG tablet, TAKE 1 TABLET THREE TIMES DAILY, Disp: 180 tablet, Rfl: 0    diclofenac sodium (VOLTAREN) 1 % Gel, APPLY 2 G TOPICALLY 4 (FOUR) TIMES DAILY AS NEEDED., Disp: 100 g, Rfl: 2    furosemide (LASIX) 40 MG tablet, TAKE 1 TABLET ONE TIME DAILY. OVERDUE FOR LAB AND ANNUAL/LAST REFILL., Disp: 30 tablet, Rfl: 2    hydrALAZINE (APRESOLINE) 50 MG tablet, TAKE 1 TABLET EVERY 8 " "HOURS., Disp: 270 tablet, Rfl: 1    hydrOXYzine HCL (ATARAX) 25 MG tablet, TAKE 1 TABLET EVERY DAY AS NEEDED, Disp: 30 tablet, Rfl: 0    irbesartan (AVAPRO) 150 MG tablet, TAKE 1 TABLET BY MOUTH EVERY DAY, Disp: 90 tablet, Rfl: 0    pitavastatin calcium (LIVALO) 2 mg Tab tablet, Take 1 tablet (2 mg total) by mouth once daily., Disp: 90 tablet, Rfl: 3    ziprasidone (GEODON) 20 MG Cap, Take 1 capsule (20 mg total) by mouth 2 (two) times daily., Disp: 60 capsule, Rfl: 2      HISTORY:    Past medical, surgical, family and social histories have been reviewed today.      OBJECTIVE:     Vitals:    07/06/23 0943   BP: 138/86   Pulse: 84   Resp: 18   Temp: 97.7 °F (36.5 °C)   SpO2: 96%   Weight: 63.2 kg (139 lb 3.5 oz)   Height: 5' 3" (1.6 m)   PainSc: 0-No pain       Physical Exam  Vitals reviewed.   HENT:      Mouth/Throat:        Comments: Old lesion/cyst to left lateral upper lip, healing.  New cyst forming right lower lateral lip.  Genitourinary:         Comments: Small cyst/lump, mild TTP  Neurological:      Mental Status: She is alert and oriented to person, place, and time.   Psychiatric:         Mood and Affect: Mood normal.         Behavior: Behavior normal.         Thought Content: Thought content normal.         Judgment: Judgment normal.       ASSESSMENT:     1. Vulvar lump  Ambulatory referral/consult to Obstetrics / Gynecology      2. Medication refill  cloNIDine (CATAPRES) 0.1 MG tablet          PLAN:     Will start with GYN but may need to see Derm for oral lesions.  RTC as directed and/or prn.        MALLORY Granados  [facility]       20 minutes of total time spent on the encounter, which includes face to face time and non-face to face time preparing to see the patient.  This includes obtaining and/or reviewing separately obtained history, performing a medically appropriate examination and/or evaluation, and counseling and educating the patient/family/caregiver.  Includes " documenting clinical information in the electronic or other health record, independently interpreting results (not separately reported) and communicating results to the patient/family/caregiver, with care coordination (not separately reported).  Medications, tests and/or procedures ordered as necessary along with referring and communicating with other health professionals (when not separately reported).

## 2023-07-05 DIAGNOSIS — R00.2 PALPITATIONS: Primary | ICD-10-CM

## 2023-07-06 ENCOUNTER — OFFICE VISIT (OUTPATIENT)
Dept: FAMILY MEDICINE | Facility: CLINIC | Age: 68
End: 2023-07-06
Payer: MEDICARE

## 2023-07-06 VITALS
HEIGHT: 63 IN | OXYGEN SATURATION: 96 % | BODY MASS INDEX: 24.67 KG/M2 | TEMPERATURE: 98 F | RESPIRATION RATE: 18 BRPM | DIASTOLIC BLOOD PRESSURE: 86 MMHG | HEART RATE: 84 BPM | SYSTOLIC BLOOD PRESSURE: 138 MMHG | WEIGHT: 139.25 LBS

## 2023-07-06 DIAGNOSIS — Z76.0 MEDICATION REFILL: ICD-10-CM

## 2023-07-06 DIAGNOSIS — N90.89 VULVAR LUMP: Primary | ICD-10-CM

## 2023-07-06 PROCEDURE — 3062F PR POS MACROALBUMINURIA RESULT DOCUMENTED/REVIEW: ICD-10-PCS | Mod: HCNC,CPTII,S$GLB, | Performed by: REGISTERED NURSE

## 2023-07-06 PROCEDURE — 1126F PR PAIN SEVERITY QUANTIFIED, NO PAIN PRESENT: ICD-10-PCS | Mod: HCNC,CPTII,S$GLB, | Performed by: REGISTERED NURSE

## 2023-07-06 PROCEDURE — 4010F PR ACE/ARB THEARPY RXD/TAKEN: ICD-10-PCS | Mod: HCNC,CPTII,S$GLB, | Performed by: REGISTERED NURSE

## 2023-07-06 PROCEDURE — 3066F NEPHROPATHY DOC TX: CPT | Mod: HCNC,CPTII,S$GLB, | Performed by: REGISTERED NURSE

## 2023-07-06 PROCEDURE — 99999 PR PBB SHADOW E&M-EST. PATIENT-LVL III: ICD-10-PCS | Mod: PBBFAC,HCNC,, | Performed by: REGISTERED NURSE

## 2023-07-06 PROCEDURE — 3075F PR MOST RECENT SYSTOLIC BLOOD PRESS GE 130-139MM HG: ICD-10-PCS | Mod: HCNC,CPTII,S$GLB, | Performed by: REGISTERED NURSE

## 2023-07-06 PROCEDURE — 4010F ACE/ARB THERAPY RXD/TAKEN: CPT | Mod: HCNC,CPTII,S$GLB, | Performed by: REGISTERED NURSE

## 2023-07-06 PROCEDURE — 99213 OFFICE O/P EST LOW 20 MIN: CPT | Mod: HCNC,S$GLB,, | Performed by: REGISTERED NURSE

## 2023-07-06 PROCEDURE — 3079F PR MOST RECENT DIASTOLIC BLOOD PRESSURE 80-89 MM HG: ICD-10-PCS | Mod: HCNC,CPTII,S$GLB, | Performed by: REGISTERED NURSE

## 2023-07-06 PROCEDURE — 1126F AMNT PAIN NOTED NONE PRSNT: CPT | Mod: HCNC,CPTII,S$GLB, | Performed by: REGISTERED NURSE

## 2023-07-06 PROCEDURE — 3079F DIAST BP 80-89 MM HG: CPT | Mod: HCNC,CPTII,S$GLB, | Performed by: REGISTERED NURSE

## 2023-07-06 PROCEDURE — 3008F PR BODY MASS INDEX (BMI) DOCUMENTED: ICD-10-PCS | Mod: HCNC,CPTII,S$GLB, | Performed by: REGISTERED NURSE

## 2023-07-06 PROCEDURE — 3288F FALL RISK ASSESSMENT DOCD: CPT | Mod: HCNC,CPTII,S$GLB, | Performed by: REGISTERED NURSE

## 2023-07-06 PROCEDURE — 1101F PT FALLS ASSESS-DOCD LE1/YR: CPT | Mod: HCNC,CPTII,S$GLB, | Performed by: REGISTERED NURSE

## 2023-07-06 PROCEDURE — 3008F BODY MASS INDEX DOCD: CPT | Mod: HCNC,CPTII,S$GLB, | Performed by: REGISTERED NURSE

## 2023-07-06 PROCEDURE — 99999 PR PBB SHADOW E&M-EST. PATIENT-LVL III: CPT | Mod: PBBFAC,HCNC,, | Performed by: REGISTERED NURSE

## 2023-07-06 PROCEDURE — 99213 PR OFFICE/OUTPT VISIT, EST, LEVL III, 20-29 MIN: ICD-10-PCS | Mod: HCNC,S$GLB,, | Performed by: REGISTERED NURSE

## 2023-07-06 PROCEDURE — 3062F POS MACROALBUMINURIA REV: CPT | Mod: HCNC,CPTII,S$GLB, | Performed by: REGISTERED NURSE

## 2023-07-06 PROCEDURE — 1101F PR PT FALLS ASSESS DOC 0-1 FALLS W/OUT INJ PAST YR: ICD-10-PCS | Mod: HCNC,CPTII,S$GLB, | Performed by: REGISTERED NURSE

## 2023-07-06 PROCEDURE — 3075F SYST BP GE 130 - 139MM HG: CPT | Mod: HCNC,CPTII,S$GLB, | Performed by: REGISTERED NURSE

## 2023-07-06 PROCEDURE — 3288F PR FALLS RISK ASSESSMENT DOCUMENTED: ICD-10-PCS | Mod: HCNC,CPTII,S$GLB, | Performed by: REGISTERED NURSE

## 2023-07-06 PROCEDURE — 3066F PR DOCUMENTATION OF TREATMENT FOR NEPHROPATHY: ICD-10-PCS | Mod: HCNC,CPTII,S$GLB, | Performed by: REGISTERED NURSE

## 2023-07-06 RX ORDER — CLONIDINE HYDROCHLORIDE 0.1 MG/1
0.1 TABLET ORAL 3 TIMES DAILY
Qty: 180 TABLET | Refills: 0 | Status: ON HOLD | OUTPATIENT
Start: 2023-07-06 | End: 2023-08-06 | Stop reason: SDUPTHER

## 2023-07-07 ENCOUNTER — OFFICE VISIT (OUTPATIENT)
Dept: OBSTETRICS AND GYNECOLOGY | Facility: CLINIC | Age: 68
End: 2023-07-07
Payer: MEDICARE

## 2023-07-07 VITALS
WEIGHT: 140.63 LBS | SYSTOLIC BLOOD PRESSURE: 128 MMHG | DIASTOLIC BLOOD PRESSURE: 86 MMHG | HEIGHT: 63 IN | BODY MASS INDEX: 24.92 KG/M2

## 2023-07-07 DIAGNOSIS — N90.89 VULVAR LUMP: ICD-10-CM

## 2023-07-07 PROCEDURE — 1126F PR PAIN SEVERITY QUANTIFIED, NO PAIN PRESENT: ICD-10-PCS | Mod: HCNC,CPTII,S$GLB, | Performed by: NURSE PRACTITIONER

## 2023-07-07 PROCEDURE — 3062F POS MACROALBUMINURIA REV: CPT | Mod: HCNC,CPTII,S$GLB, | Performed by: NURSE PRACTITIONER

## 2023-07-07 PROCEDURE — 4010F ACE/ARB THERAPY RXD/TAKEN: CPT | Mod: HCNC,CPTII,S$GLB, | Performed by: NURSE PRACTITIONER

## 2023-07-07 PROCEDURE — 3066F PR DOCUMENTATION OF TREATMENT FOR NEPHROPATHY: ICD-10-PCS | Mod: HCNC,CPTII,S$GLB, | Performed by: NURSE PRACTITIONER

## 2023-07-07 PROCEDURE — 3288F FALL RISK ASSESSMENT DOCD: CPT | Mod: HCNC,CPTII,S$GLB, | Performed by: NURSE PRACTITIONER

## 2023-07-07 PROCEDURE — 99202 PR OFFICE/OUTPT VISIT, NEW, LEVL II, 15-29 MIN: ICD-10-PCS | Mod: HCNC,S$GLB,, | Performed by: NURSE PRACTITIONER

## 2023-07-07 PROCEDURE — 1160F PR REVIEW ALL MEDS BY PRESCRIBER/CLIN PHARMACIST DOCUMENTED: ICD-10-PCS | Mod: HCNC,CPTII,S$GLB, | Performed by: NURSE PRACTITIONER

## 2023-07-07 PROCEDURE — 3079F DIAST BP 80-89 MM HG: CPT | Mod: HCNC,CPTII,S$GLB, | Performed by: NURSE PRACTITIONER

## 2023-07-07 PROCEDURE — 1101F PR PT FALLS ASSESS DOC 0-1 FALLS W/OUT INJ PAST YR: ICD-10-PCS | Mod: HCNC,CPTII,S$GLB, | Performed by: NURSE PRACTITIONER

## 2023-07-07 PROCEDURE — 99999 PR PBB SHADOW E&M-EST. PATIENT-LVL IV: CPT | Mod: PBBFAC,HCNC,, | Performed by: NURSE PRACTITIONER

## 2023-07-07 PROCEDURE — 3062F PR POS MACROALBUMINURIA RESULT DOCUMENTED/REVIEW: ICD-10-PCS | Mod: HCNC,CPTII,S$GLB, | Performed by: NURSE PRACTITIONER

## 2023-07-07 PROCEDURE — 1159F MED LIST DOCD IN RCRD: CPT | Mod: HCNC,CPTII,S$GLB, | Performed by: NURSE PRACTITIONER

## 2023-07-07 PROCEDURE — 3008F BODY MASS INDEX DOCD: CPT | Mod: HCNC,CPTII,S$GLB, | Performed by: NURSE PRACTITIONER

## 2023-07-07 PROCEDURE — 1101F PT FALLS ASSESS-DOCD LE1/YR: CPT | Mod: HCNC,CPTII,S$GLB, | Performed by: NURSE PRACTITIONER

## 2023-07-07 PROCEDURE — 3066F NEPHROPATHY DOC TX: CPT | Mod: HCNC,CPTII,S$GLB, | Performed by: NURSE PRACTITIONER

## 2023-07-07 PROCEDURE — 3008F PR BODY MASS INDEX (BMI) DOCUMENTED: ICD-10-PCS | Mod: HCNC,CPTII,S$GLB, | Performed by: NURSE PRACTITIONER

## 2023-07-07 PROCEDURE — 99999 PR PBB SHADOW E&M-EST. PATIENT-LVL IV: ICD-10-PCS | Mod: PBBFAC,HCNC,, | Performed by: NURSE PRACTITIONER

## 2023-07-07 PROCEDURE — 3288F PR FALLS RISK ASSESSMENT DOCUMENTED: ICD-10-PCS | Mod: HCNC,CPTII,S$GLB, | Performed by: NURSE PRACTITIONER

## 2023-07-07 PROCEDURE — 3079F PR MOST RECENT DIASTOLIC BLOOD PRESSURE 80-89 MM HG: ICD-10-PCS | Mod: HCNC,CPTII,S$GLB, | Performed by: NURSE PRACTITIONER

## 2023-07-07 PROCEDURE — 3074F PR MOST RECENT SYSTOLIC BLOOD PRESSURE < 130 MM HG: ICD-10-PCS | Mod: HCNC,CPTII,S$GLB, | Performed by: NURSE PRACTITIONER

## 2023-07-07 PROCEDURE — 3074F SYST BP LT 130 MM HG: CPT | Mod: HCNC,CPTII,S$GLB, | Performed by: NURSE PRACTITIONER

## 2023-07-07 PROCEDURE — 1160F RVW MEDS BY RX/DR IN RCRD: CPT | Mod: HCNC,CPTII,S$GLB, | Performed by: NURSE PRACTITIONER

## 2023-07-07 PROCEDURE — 99202 OFFICE O/P NEW SF 15 MIN: CPT | Mod: HCNC,S$GLB,, | Performed by: NURSE PRACTITIONER

## 2023-07-07 PROCEDURE — 1126F AMNT PAIN NOTED NONE PRSNT: CPT | Mod: HCNC,CPTII,S$GLB, | Performed by: NURSE PRACTITIONER

## 2023-07-07 PROCEDURE — 4010F PR ACE/ARB THEARPY RXD/TAKEN: ICD-10-PCS | Mod: HCNC,CPTII,S$GLB, | Performed by: NURSE PRACTITIONER

## 2023-07-07 PROCEDURE — 1159F PR MEDICATION LIST DOCUMENTED IN MEDICAL RECORD: ICD-10-PCS | Mod: HCNC,CPTII,S$GLB, | Performed by: NURSE PRACTITIONER

## 2023-07-07 NOTE — PROGRESS NOTES
Subjective:       Patient ID: Rose Milligan is a 67 y.o. female.    Chief Complaint:  Vaginal Bump    No LMP recorded. Patient has had a hysterectomy.  History of Present Illness  Have knots on face lips or vagina   This has been going on the three weeks but she does not like the way it makes her mouth swell sometimes   She went to her PCP and her PCP referred to GYN because sometimes the bumps will come around her clitoris. She is also complaining of a tender cyst on her scalp     Hysterectomy re:  bleeding   OB History    Para Term  AB Living   5 3 3   2 3   SAB IAB Ectopic Multiple Live Births       2   3      # Outcome Date GA Lbr Federico/2nd Weight Sex Delivery Anes PTL Lv   5 Ectopic            4 Ectopic            3 Term      Vag-Spont   SONYA   2 Term      Vag-Spont   SONYA   1 Term      Vag-Spont   SONYA       Review of Systems  Review of Systems        Objective:    Physical Exam  Genitourinary:     Exam position: Lithotomy position.             Assessment:     1. Vulvar lump              Plan:   Ngoc was seen today for vaginal bump.    Diagnoses and all orders for this visit:    Vulvar lump  -     Ambulatory referral/consult to Obstetrics / Gynecology  -     Ambulatory referral/consult to Dermatology; Future    Referred to dermatology for second opinion - unsure if this is molluscum or a genital wart  States that she will sometimes get these nodules around her lips (oral)

## 2023-07-10 ENCOUNTER — HOSPITAL ENCOUNTER (OUTPATIENT)
Dept: CARDIOLOGY | Facility: HOSPITAL | Age: 68
Discharge: HOME | End: 2023-07-10
Attending: INTERNAL MEDICINE
Payer: MEDICARE

## 2023-07-10 ENCOUNTER — OFFICE VISIT (OUTPATIENT)
Dept: CARDIOLOGY | Facility: CLINIC | Age: 68
End: 2023-07-10
Payer: MEDICARE

## 2023-07-10 VITALS
OXYGEN SATURATION: 98 % | WEIGHT: 143.5 LBS | DIASTOLIC BLOOD PRESSURE: 84 MMHG | SYSTOLIC BLOOD PRESSURE: 126 MMHG | BODY MASS INDEX: 25.42 KG/M2 | HEART RATE: 82 BPM

## 2023-07-10 VITALS — BODY MASS INDEX: 25.42 KG/M2 | WEIGHT: 143.5 LBS

## 2023-07-10 DIAGNOSIS — N18.4 CKD (CHRONIC KIDNEY DISEASE) STAGE 4, GFR 15-29 ML/MIN: ICD-10-CM

## 2023-07-10 DIAGNOSIS — R94.31 ABNORMAL ECG: ICD-10-CM

## 2023-07-10 DIAGNOSIS — R00.2 PALPITATIONS: Primary | ICD-10-CM

## 2023-07-10 DIAGNOSIS — I10 ESSENTIAL HYPERTENSION: ICD-10-CM

## 2023-07-10 DIAGNOSIS — Z86.73 HISTORY OF TIA (TRANSIENT ISCHEMIC ATTACK): ICD-10-CM

## 2023-07-10 DIAGNOSIS — F17.200 SMOKING: ICD-10-CM

## 2023-07-10 DIAGNOSIS — R06.09 DOE (DYSPNEA ON EXERTION): ICD-10-CM

## 2023-07-10 DIAGNOSIS — I27.9 CHRONIC PULMONARY HEART DISEASE: ICD-10-CM

## 2023-07-10 DIAGNOSIS — R07.89 OTHER CHEST PAIN: ICD-10-CM

## 2023-07-10 DIAGNOSIS — R00.2 PALPITATIONS: ICD-10-CM

## 2023-07-10 DIAGNOSIS — B18.2 CHRONIC HEPATITIS C WITHOUT HEPATIC COMA: ICD-10-CM

## 2023-07-10 DIAGNOSIS — I50.32 CHRONIC HEART FAILURE WITH PRESERVED EJECTION FRACTION: ICD-10-CM

## 2023-07-10 PROCEDURE — 1160F RVW MEDS BY RX/DR IN RCRD: CPT | Mod: HCNC,CPTII,S$GLB, | Performed by: INTERNAL MEDICINE

## 2023-07-10 PROCEDURE — 3008F BODY MASS INDEX DOCD: CPT | Mod: HCNC,CPTII,S$GLB, | Performed by: INTERNAL MEDICINE

## 2023-07-10 PROCEDURE — 3066F PR DOCUMENTATION OF TREATMENT FOR NEPHROPATHY: ICD-10-PCS | Mod: HCNC,CPTII,S$GLB, | Performed by: INTERNAL MEDICINE

## 2023-07-10 PROCEDURE — 3079F DIAST BP 80-89 MM HG: CPT | Mod: HCNC,CPTII,S$GLB, | Performed by: INTERNAL MEDICINE

## 2023-07-10 PROCEDURE — 99999 PR PBB SHADOW E&M-EST. PATIENT-LVL IV: ICD-10-PCS | Mod: PBBFAC,HCNC,, | Performed by: INTERNAL MEDICINE

## 2023-07-10 PROCEDURE — 3062F PR POS MACROALBUMINURIA RESULT DOCUMENTED/REVIEW: ICD-10-PCS | Mod: HCNC,CPTII,S$GLB, | Performed by: INTERNAL MEDICINE

## 2023-07-10 PROCEDURE — 4010F PR ACE/ARB THEARPY RXD/TAKEN: ICD-10-PCS | Mod: HCNC,CPTII,S$GLB, | Performed by: INTERNAL MEDICINE

## 2023-07-10 PROCEDURE — 1126F AMNT PAIN NOTED NONE PRSNT: CPT | Mod: HCNC,CPTII,S$GLB, | Performed by: INTERNAL MEDICINE

## 2023-07-10 PROCEDURE — 1101F PR PT FALLS ASSESS DOC 0-1 FALLS W/OUT INJ PAST YR: ICD-10-PCS | Mod: HCNC,CPTII,S$GLB, | Performed by: INTERNAL MEDICINE

## 2023-07-10 PROCEDURE — 93010 ELECTROCARDIOGRAM REPORT: CPT | Mod: HCNC,,, | Performed by: INTERNAL MEDICINE

## 2023-07-10 PROCEDURE — 99999 PR PBB SHADOW E&M-EST. PATIENT-LVL IV: CPT | Mod: PBBFAC,HCNC,, | Performed by: INTERNAL MEDICINE

## 2023-07-10 PROCEDURE — 99214 PR OFFICE/OUTPT VISIT, EST, LEVL IV, 30-39 MIN: ICD-10-PCS | Mod: HCNC,S$GLB,, | Performed by: INTERNAL MEDICINE

## 2023-07-10 PROCEDURE — 3074F SYST BP LT 130 MM HG: CPT | Mod: HCNC,CPTII,S$GLB, | Performed by: INTERNAL MEDICINE

## 2023-07-10 PROCEDURE — 99214 OFFICE O/P EST MOD 30 MIN: CPT | Mod: HCNC,S$GLB,, | Performed by: INTERNAL MEDICINE

## 2023-07-10 PROCEDURE — 1126F PR PAIN SEVERITY QUANTIFIED, NO PAIN PRESENT: ICD-10-PCS | Mod: HCNC,CPTII,S$GLB, | Performed by: INTERNAL MEDICINE

## 2023-07-10 PROCEDURE — 4010F ACE/ARB THERAPY RXD/TAKEN: CPT | Mod: HCNC,CPTII,S$GLB, | Performed by: INTERNAL MEDICINE

## 2023-07-10 PROCEDURE — 3074F PR MOST RECENT SYSTOLIC BLOOD PRESSURE < 130 MM HG: ICD-10-PCS | Mod: HCNC,CPTII,S$GLB, | Performed by: INTERNAL MEDICINE

## 2023-07-10 PROCEDURE — 93010 EKG 12-LEAD: ICD-10-PCS | Mod: HCNC,,, | Performed by: INTERNAL MEDICINE

## 2023-07-10 PROCEDURE — 3066F NEPHROPATHY DOC TX: CPT | Mod: HCNC,CPTII,S$GLB, | Performed by: INTERNAL MEDICINE

## 2023-07-10 PROCEDURE — 3288F PR FALLS RISK ASSESSMENT DOCUMENTED: ICD-10-PCS | Mod: HCNC,CPTII,S$GLB, | Performed by: INTERNAL MEDICINE

## 2023-07-10 PROCEDURE — 1101F PT FALLS ASSESS-DOCD LE1/YR: CPT | Mod: HCNC,CPTII,S$GLB, | Performed by: INTERNAL MEDICINE

## 2023-07-10 PROCEDURE — 3062F POS MACROALBUMINURIA REV: CPT | Mod: HCNC,CPTII,S$GLB, | Performed by: INTERNAL MEDICINE

## 2023-07-10 PROCEDURE — 1160F PR REVIEW ALL MEDS BY PRESCRIBER/CLIN PHARMACIST DOCUMENTED: ICD-10-PCS | Mod: HCNC,CPTII,S$GLB, | Performed by: INTERNAL MEDICINE

## 2023-07-10 PROCEDURE — 3288F FALL RISK ASSESSMENT DOCD: CPT | Mod: HCNC,CPTII,S$GLB, | Performed by: INTERNAL MEDICINE

## 2023-07-10 PROCEDURE — 1159F PR MEDICATION LIST DOCUMENTED IN MEDICAL RECORD: ICD-10-PCS | Mod: HCNC,CPTII,S$GLB, | Performed by: INTERNAL MEDICINE

## 2023-07-10 PROCEDURE — 93005 ELECTROCARDIOGRAM TRACING: CPT | Mod: HCNC

## 2023-07-10 PROCEDURE — 1159F MED LIST DOCD IN RCRD: CPT | Mod: HCNC,CPTII,S$GLB, | Performed by: INTERNAL MEDICINE

## 2023-07-10 PROCEDURE — 3008F PR BODY MASS INDEX (BMI) DOCUMENTED: ICD-10-PCS | Mod: HCNC,CPTII,S$GLB, | Performed by: INTERNAL MEDICINE

## 2023-07-10 PROCEDURE — 3079F PR MOST RECENT DIASTOLIC BLOOD PRESSURE 80-89 MM HG: ICD-10-PCS | Mod: HCNC,CPTII,S$GLB, | Performed by: INTERNAL MEDICINE

## 2023-07-10 RX ORDER — HYDRALAZINE HYDROCHLORIDE 50 MG/1
50 TABLET, FILM COATED ORAL EVERY 8 HOURS
Qty: 270 TABLET | Refills: 1 | Status: ON HOLD | OUTPATIENT
Start: 2023-07-10 | End: 2023-08-06 | Stop reason: SDUPTHER

## 2023-07-10 RX ORDER — PRAVASTATIN SODIUM 40 MG/1
40 TABLET ORAL NIGHTLY
Qty: 90 TABLET | Refills: 1 | Status: ON HOLD | OUTPATIENT
Start: 2023-07-10 | End: 2023-08-06 | Stop reason: SDUPTHER

## 2023-07-10 NOTE — PROGRESS NOTES
Subjective:   Patient ID:  Rose Milligan is a 67 y.o. female who presents for cardiac consult of No chief complaint on file.      The patient came in today for cardiac consult of No chief complaint on file.      Rose Milligan is a 67 y.o. female  with HFpEF, HTN, pulm HTN, tobacco abuse, h/o drug abuse, bipolar, hep C, CKD4 presents for follow up CV eval.      11/21/18  Pt went to [facility] yesterday. She went for chest pain and L shoulder pain. Chest pain started about 3 days ago, and was off medicines for 2 weeks. She had bloodwork, ECG and was told to follow up hereShe called her PCP and only picked up bipolar meds. Chest pain feels like tightness, for past few days been constant. Mild headache and nausea. Now she feels lightheaded. PT also gets palpitations frequenltly.     8/15/19 - hosp follow up   She was recently admitted to [facility] for CHF exac, presented to Dr. Cope's office with HTN urgency, unsure of her meds, she was sent back to ER at [facility]. She started to have a cough last and then had worsening orthopnea. She was diuresed 3.5L. She still has AUGUSTE. She doesn't have med list again, talked to her friend on phone has only 4 bottles at home, called pharmacy only hydralazine was being taken.     10/15/19  BP uncontrolled this AM. She has gained about 10-15 lbs lately, has been drinking more sodas and eating more. No AUGUSTE.     11/5/19  Overall has been doing well lately. No SOB/AUGUSTE. No chest pain. Has been compliant with meds, lost a few pounds lately.     5/1/20  Last week at [facility] - was discharged Thurs  Reason for Hospitalization   Chief Complaint  Left-sided weakness     History of Present Illness  Patient is 64-year-old black female with past medical history of substance abuse with frequent crack cocaine usage, CHF, hypertension, bipolar disorder, comes in complaining of left-sided weakness, she reports has been present for 2 weeks at which time it caused her to fall, she has had persistent problems with left leg  since that time with difficulty walking, having to limp or drag along her left leg.  has been asking her to go to get checked out but she failed to do so until today when she went to her PCP and was found to have elevated blood pressure of approximately 200/120. She denies missing blood pressure medications, reports she is taking hydralazine and clonidine, she reports clonidine is supposed to be 3 times per day but sometimes she misses the middle dose. Does not have any of her medication bottles with her. She denies any headache, positive for blurry vision, had some speech issues at time of onset of symptoms 2 weeks ago but none since. Denies any chest pain, denies any lower extremity edema or other symptoms. Blood pressure elevations are severe, unknown aggravating factors, no alleviating factors with 0 response to IV hydralazine in the emergency room.    * Hypertensive emergency  Patient CT scan from admit shows chronic small vessel ischemia but no acute CVA. If stroke, strokelike symptoms were really 2 weeks ago it would not explain her persistent elevation in blood pressure at this time.Given dose of labetalol, with hypertensive emergency would like to reduce systolic blood pressures by about 25% currently at 2 30-2 40 systolic would like to reduce to about 170-180 systolic, likewise diastolics running 120s to 130s should be reduced to about 90s, we are allowing for some permissive hypertension secondary to chronically elevated blood pressures and possible acute CVA. transition off of Cardene, will place patient on hydralazine which she was previously on at home, will also start amlodipine 5 and Toprol-XL 50 mg daily.   Hemiparesis affecting nondominant side as late effect of cerebrovascular accident (CVA) (HCC)  No acute CVA, these are likely secondary to extreme elevations in blood pressure and crack cocaine use, appears to be resolving upon discharge    Discharge Summary   Patient CT scan from admit  shows chronic small vessel ischemia but no acute CVA. If stroke, strokelike symptoms were really 2 weeks ago it would not explain her persistent elevation in blood pressure at this time.Given dose of labetalol, with hypertensive emergency would like to reduce systolic blood pressures by about 25% currently at 2 30-2 40 systolic would like to reduce to about 170-180 systolic, likewise diastolics running 120s to 130s should be reduced to about 90s, we are allowing for some permissive hypertension secondary to chronically elevated blood pressures and possible acute CVA. transition off of Cardene, will place patient on hydralazine which she was previously on at home, will also start amlodipine 5 and Toprol-XL 50 mg daily.   Follow-up Patient continues to have very elevated blood pressures, will need echocardiogram for continued work-up, does have history of CHF but no evidence of decompensation on examination at this time. WillGoal map today less than 120 if possible, will need continued slow lowering due to extreme elevations.      Now she has been doing ok, she has a limp in left left, has improved. BP remains elevated 160-170/80-90. Discussed will increase norvasc but restart Coreg instead of Toprol.     5/22/20  She has been drinking some energy drinks which caused her to pass out, her BP was 200/140. She fell on L shoulder and has improved with strength. BP improved but remains elevated, will adjust meds again.     4/4/23  Follow up with me since 5/2020. She saw Dr. Jenkins Dec 2022. BP and Hr stable. BMI 24 - 136 lbs. ECHO 1/2023 with normal bi V function, grade 3 DD, mild TR, trivial peric effusion. The estimated PA systolic pressure is greater than 26 mmHg.  She has L arm pain, feels like she has a pinched nerve in neck also. She went to [facility] recently - unsure workup/treatment. She had another TIA.     7/10/23  Vital monitor 6/2023 with brief SVT/NSVT, rare PVCS, PACs noted; AVG HR 76 bpm. Nuclear stress 6/2023 neg  for ischemia, basal, mid lateral wall scar noted.   Carotids 6/2023 overall normal.   BNP was 305, improved from prior, continued on lasix.     BP and HR well controlled. BMI 25 - 143 lbs. She is still very active. Wants to go back to work.     Patient feels  no PND , no CNS symptoms. She is a cook at [employer].     Patient is not compliant with medications.    Prior ECG - NSR, LAE, LVH, septal infarct, inferolat TWI    Conclusion 6/2023     There is 0-19% right Internal Carotid Stenosis.  There is 0-19% left Internal Carotid Stenosis.    Conclusion 6/2023    The patient was monitored for a total of 12d 16h, underlying rhythm is Sinus.  The minimum heart rate was 55 bpm; the maximum 116 bpm; the average 76 bpm.  0 % of Atrial fibrillation/Atrial flutter with longest episode of 0 ms.  -- AV block with 0 %  There were 0 pauses, the longest pause was 0 ms at --.  7 episodes of VT were found with Longest VT at 10 beats .  21 supraventricular episodes were found. Longest SVT Episode 15 beats, Fastest  bpm  There were a total of 1346 PVCs with 2 morphologies and 7 couplets. Overall PVC [facility] at 0.1 %  There were a total of 1132 PSVCs with 1 morphologies and 0 couplets. Overall PSVC [facility] at 0.08 %  There is a total of 1 patient events    Results for orders placed during the hospital encounter of 06/06/23    Nuclear Stress - Cardiology Interpreted    Interpretation Summary    Abnormal myocardial perfusion scan.    There is a moderate to severe intensity, moderate sized, fixed perfusion abnormality consistent with scar in the basal to mid lateral wall(s).    There are no other significant perfusion abnormalities.    The gated perfusion images showed an ejection fraction of 50% at rest. The gated perfusion images showed an ejection fraction of 53% post stress.    The ECG portion of the study is uninterpretable due to abnormal baseline.    The patient reported no chest pain during the stress test.    There were  no arrhythmias during stress.      Results for orders placed during the hospital encounter of 01/05/23    Echo    Interpretation Summary  · The left ventricle is normal in size with concentric hypertrophy and normal systolic function.  · Moderate left atrial enlargement.  · Grade III left ventricular diastolic dysfunction.  · The estimated PA systolic pressure is 29 mmHg.  · Normal right ventricular size with normal right ventricular systolic function.  · Normal central venous pressure (3 mmHg).  · The estimated ejection fraction is 60%.  · Mild tricuspid regurgitation.  · Trivial pericardial effusion.      Past Medical History:   Diagnosis Date    Bipolar 1 disorder     CHF (congestive heart failure)     Depression     Hepatitis C     Hypertension     Other hyperlipidemia 8/15/2019       Past Surgical History:   Procedure Laterality Date    HYSTERECTOMY         Social History     Tobacco Use    Smoking status: Some Days     Packs/day: 0.25     Types: Cigarettes     Start date: 1/11/2016    Smokeless tobacco: Never    Tobacco comments:     1 pack every 3 days   Substance Use Topics    Alcohol use: Yes    Drug use: Yes     Types: Cocaine       Family History   Problem Relation Age of Onset    Heart attack Maternal Grandmother     Hypertension Mother        Patient's Medications   New Prescriptions    No medications on file   Previous Medications    ASPIRIN (ECOTRIN) 81 MG EC TABLET    TAKE 1 TABLET EVERY DAY    CARVEDILOL (COREG) 6.25 MG TABLET    TAKE 1 TABLET BY MOUTH TWICE A DAY    CLONIDINE (CATAPRES) 0.1 MG TABLET    Take 1 tablet (0.1 mg total) by mouth 3 (three) times daily.    DICLOFENAC SODIUM (VOLTAREN) 1 % GEL    APPLY 2 G TOPICALLY 4 (FOUR) TIMES DAILY AS NEEDED.    FUROSEMIDE (LASIX) 40 MG TABLET    TAKE 1 TABLET ONE TIME DAILY. OVERDUE FOR LAB AND ANNUAL/LAST REFILL.    HYDRALAZINE (APRESOLINE) 50 MG TABLET    TAKE 1 TABLET EVERY 8 HOURS.    HYDROXYZINE HCL (ATARAX) 25 MG TABLET    TAKE 1 TABLET EVERY  DAY AS NEEDED    IRBESARTAN (AVAPRO) 150 MG TABLET    TAKE 1 TABLET BY MOUTH EVERY DAY    PITAVASTATIN CALCIUM (LIVALO) 2 MG TAB TABLET    Take 1 tablet (2 mg total) by mouth once daily.    ZIPRASIDONE (GEODON) 20 MG CAP    Take 1 capsule (20 mg total) by mouth 2 (two) times daily.   Modified Medications    No medications on file   Discontinued Medications    No medications on file       Review of Systems   Constitutional: Negative.    HENT: Negative.     Eyes: Negative.    Respiratory:  Positive for shortness of breath.    Cardiovascular:  Positive for palpitations. Negative for chest pain.   Gastrointestinal: Negative.    Genitourinary: Negative.    Musculoskeletal: Negative.    Skin: Negative.    Neurological:  Positive for dizziness.   Endo/Heme/Allergies: Negative.    Psychiatric/Behavioral:  The patient is not nervous/anxious.    All 12 systems otherwise negative.    Wt Readings from Last 3 Encounters:   07/10/23 65.1 kg (143 lb 8.3 oz)   07/10/23 65.1 kg (143 lb 8.3 oz)   07/07/23 63.8 kg (140 lb 10.5 oz)     Temp Readings from Last 3 Encounters:   07/06/23 97.7 °F (36.5 °C)   03/29/23 97.6 °F (36.4 °C)   12/16/22 98 °F (36.7 °C)     BP Readings from Last 3 Encounters:   07/10/23 126/84   07/07/23 128/86   07/06/23 138/86     Pulse Readings from Last 3 Encounters:   07/10/23 82   07/06/23 84   04/04/23 77       /84   Pulse 82   Wt 65.1 kg (143 lb 8.3 oz)   SpO2 98%   BMI 25.42 kg/m²     Objective:   Physical Exam  Vitals and nursing note reviewed.   Constitutional:       General: She is not in acute distress.     Appearance: She is well-developed. She is not diaphoretic.   HENT:      Head: Normocephalic and atraumatic.      Nose: Nose normal.   Eyes:      General: No scleral icterus.     Conjunctiva/sclera: Conjunctivae normal.   Neck:      Thyroid: No thyromegaly.      Vascular: No JVD.   Cardiovascular:      Rate and Rhythm: Normal rate and regular rhythm.      Heart sounds: S1 normal and S2  normal. No murmur heard.    No friction rub. No gallop. No S3 or S4 sounds.   Pulmonary:      Effort: Pulmonary effort is normal. No respiratory distress.      Breath sounds: Normal breath sounds. No stridor. No wheezing or rales.   Chest:      Chest wall: No tenderness.   Abdominal:      General: Bowel sounds are normal. There is no distension.      Palpations: Abdomen is soft. There is no mass.      Tenderness: There is no abdominal tenderness. There is no rebound.   Genitourinary:     Comments: Deferred  Musculoskeletal:         General: No tenderness or deformity. Normal range of motion.      Cervical back: Normal range of motion and neck supple.   Lymphadenopathy:      Cervical: No cervical adenopathy.   Skin:     General: Skin is warm and dry.      Coloration: Skin is not pale.      Findings: No erythema or rash.   Neurological:      Mental Status: She is alert and oriented to person, place, and time.      Motor: No abnormal muscle tone.      Coordination: Coordination normal.   Psychiatric:         Behavior: Behavior normal.         Thought Content: Thought content normal.         Judgment: Judgment normal.       Lab Results   Component Value Date     03/29/2023    K 4.3 03/29/2023     03/29/2023    CO2 26 03/29/2023    BUN 34 (H) 03/29/2023    CREATININE 2.1 (H) 03/29/2023    GLU 82 03/29/2023    HGBA1C 5.4 01/28/2022    HGBA1C 5.6 07/30/2021    MG 2.0 08/15/2019    AST 16 03/29/2023    ALT 9 (L) 03/29/2023    ALBUMIN 3.9 03/29/2023    PROT 7.4 03/29/2023    BILITOT 0.4 03/29/2023    WBC 4.45 03/29/2023    HGB 13.6 03/29/2023    HCT 44.4 03/29/2023    MCV 94 03/29/2023     03/29/2023    TSH 1.343 03/29/2023    CHOL 145 01/28/2022    CHOL 131 07/30/2021    HDL 42 (L) 01/28/2022    HDL 47 07/30/2021    LDLCALC 88 01/28/2022    LDLCALC 67.2 07/30/2021    TRIG 74 01/28/2022    TRIG 84 07/30/2021     (H) 04/04/2023     Assessment:      1. Palpitations    2. History of TIA (transient  ischemic attack)    3. Chronic heart failure with preserved ejection fraction    4. Abnormal ECG    5. AUGUSTE (dyspnea on exertion)    6. Essential hypertension    7. CKD (chronic kidney disease) stage 4, GFR 15-29 ml/min    8. Chronic hepatitis C without hepatic coma    9. Chronic pulmonary heart disease    10. Other chest pain    11. Smoking            Plan:   1. HFPEF, with occ CP/SOB; last   - Nuclear stress 6/2023 neg for ischemia, basal, mid lateral wall scar note  - cont meds  - cont lasix  - ECHO 1/2023 with normal bi V function, grade 3 DD, mild TR, trivial peric effusion. The estimated PA systolic pressure is greater than 26 mmHg.    2. HTN - adjusted meds   - cont meds   - low salt diet    3. CKD4  - f/u with nephro/PCP    4. Hep C  - cont tx per PCP    5. HLD  - cont statin - changed to pravastatin 40mg  - unsure if she was taking livalo     6. SOB ? COPD with smoking abuse  - refer to smoking cessation  - refer to pulm     7. H/O TIA/CVA - L sided weakness? -again 3/2023  - cont meds  - Vital monitor 6/2023 with brief SVT/NSVT, rare PVCS, PACs noted; AVG HR 76 bpm.  - Carotids 6/2023 overall normal.    - f/u Neuro  - BP improved    Pt is stable for work, proceed as needed. Needs to monitor BP and HR and for any stroke syptoms.     Thank you for allowing me to participate in this patient's care. Please do not hesitate to contact me with any questions or concerns. Consult note has been forwarded to the referral physician.

## 2023-07-12 DIAGNOSIS — I50.32 CHRONIC HEART FAILURE WITH PRESERVED EJECTION FRACTION: ICD-10-CM

## 2023-07-12 RX ORDER — FUROSEMIDE 40 MG/1
TABLET ORAL
Qty: 90 TABLET | Refills: 0 | Status: ON HOLD | OUTPATIENT
Start: 2023-07-12 | End: 2023-08-06 | Stop reason: SDUPTHER

## 2023-07-12 NOTE — TELEPHONE ENCOUNTER
Care Due:                  Date            Visit Type   Department     Provider  --------------------------------------------------------------------------------                                EP -                              PRIMARY      [facility]  Last Visit: 07-      CARE ([facility])   MEDICINE       Zora Cope  Next Visit: None Scheduled  None         None Found                                                            Last  Test          Frequency    Reason                     Performed    Due Date  --------------------------------------------------------------------------------    Office Visit  12 months..  irbesartan...............  07- 07-    [facility] Embedded Care Due Messages. Reference number: [id number].   7/12/2023 10:54:21 AM CDT

## 2023-07-17 ENCOUNTER — TELEPHONE (OUTPATIENT)
Dept: NEUROLOGY | Facility: CLINIC | Age: 68
End: 2023-07-17
Payer: MEDICARE

## 2023-07-17 ENCOUNTER — TELEPHONE (OUTPATIENT)
Dept: NEPHROLOGY | Facility: CLINIC | Age: 68
End: 2023-07-17
Payer: MEDICARE

## 2023-07-17 ENCOUNTER — TELEPHONE (OUTPATIENT)
Dept: PSYCHIATRY | Facility: CLINIC | Age: 68
End: 2023-07-17
Payer: MEDICARE

## 2023-07-17 NOTE — TELEPHONE ENCOUNTER
----- Message from Jennifer Chavez sent at 7/17/2023 11:57 AM CDT -----  Who Called: Pt    What is the request in detail: Requesting call back to discuss Appt being scheduled at [facility] instead of [facility]. Please advise    Can the clinic reply by [facility]? No    Best Call Back Number: [phone]      Additional Information:

## 2023-07-17 NOTE — TELEPHONE ENCOUNTER
----- Message from Jennifer Chavez sent at 7/17/2023 11:49 AM CDT -----  Who Called: Pt     What is the request in detail: Requesting call back to discuss needing to be scheduled at [facility] instead of [facility]. Pt needs labs and urine switched to [place] as well. Please advise    Can the clinic reply by [facility]? No    Best Call Back Number: [phone]      Additional Information:

## 2023-07-17 NOTE — TELEPHONE ENCOUNTER
----- Message from Jennifer Chavez sent at 7/17/2023 11:53 AM CDT -----  Who Called: PT    What is the request in detail: Requesting call back to discuss needing to be seen at [facility] instead of [facility]. Please advise    Can the clinic reply by [facility]? No    Best Call Back Number: [phone]      Additional Information:

## 2023-07-17 NOTE — TELEPHONE ENCOUNTER
----- Message from Jennifer Chavez sent at 7/17/2023 11:57 AM CDT -----  Who Called: Pt    What is the request in detail: Requesting call back to discuss Appt being scheduled at [facility] instead of [facility]. Please advise    Can the clinic reply by [facility]? No    Best Call Back Number: [phone]      Additional Information:

## 2023-07-19 ENCOUNTER — OFFICE VISIT (OUTPATIENT)
Dept: NEUROLOGY | Facility: CLINIC | Age: 68
End: 2023-07-19
Payer: MEDICARE

## 2023-07-19 ENCOUNTER — TELEPHONE (OUTPATIENT)
Dept: NEUROLOGY | Facility: CLINIC | Age: 68
End: 2023-07-19

## 2023-07-19 ENCOUNTER — LAB VISIT (OUTPATIENT)
Dept: LAB | Facility: HOSPITAL | Age: 68
End: 2023-07-19
Attending: PSYCHIATRY & NEUROLOGY
Payer: MEDICARE

## 2023-07-19 VITALS
DIASTOLIC BLOOD PRESSURE: 102 MMHG | RESPIRATION RATE: 16 BRPM | BODY MASS INDEX: 24.96 KG/M2 | SYSTOLIC BLOOD PRESSURE: 152 MMHG | HEIGHT: 63 IN | WEIGHT: 140.88 LBS | HEART RATE: 69 BPM

## 2023-07-19 DIAGNOSIS — Z86.73 HISTORY OF TIA (TRANSIENT ISCHEMIC ATTACK): ICD-10-CM

## 2023-07-19 DIAGNOSIS — E78.5 HYPERLIPIDEMIA, UNSPECIFIED HYPERLIPIDEMIA TYPE: ICD-10-CM

## 2023-07-19 DIAGNOSIS — I50.32 CHRONIC HEART FAILURE WITH PRESERVED EJECTION FRACTION: ICD-10-CM

## 2023-07-19 DIAGNOSIS — I63.89 OTHER CEREBRAL INFARCTION: ICD-10-CM

## 2023-07-19 DIAGNOSIS — R79.9 ABNORMAL FINDING OF BLOOD CHEMISTRY, UNSPECIFIED: ICD-10-CM

## 2023-07-19 DIAGNOSIS — I10 UNCONTROLLED HYPERTENSION: ICD-10-CM

## 2023-07-19 DIAGNOSIS — Z86.73 HISTORY OF TIA (TRANSIENT ISCHEMIC ATTACK): Primary | ICD-10-CM

## 2023-07-19 LAB
CHOLEST SERPL-MCNC: 178 MG/DL (ref 120–199)
CHOLEST/HDLC SERPL: 3 {RATIO} (ref 2–5)
ESTIMATED AVG GLUCOSE: 100 MG/DL (ref 68–131)
HBA1C MFR BLD: 5.1 % (ref 4–5.6)
HDLC SERPL-MCNC: 60 MG/DL (ref 40–75)
HDLC SERPL: 33.7 % (ref 20–50)
LDLC SERPL CALC-MCNC: 106.2 MG/DL (ref 63–159)
NONHDLC SERPL-MCNC: 118 MG/DL
TRIGL SERPL-MCNC: 59 MG/DL (ref 30–150)

## 2023-07-19 PROCEDURE — 99999 PR PBB SHADOW E&M-EST. PATIENT-LVL V: ICD-10-PCS | Mod: PBBFAC,HCNC,, | Performed by: PSYCHIATRY & NEUROLOGY

## 2023-07-19 PROCEDURE — 3008F BODY MASS INDEX DOCD: CPT | Mod: HCNC,CPTII,S$GLB, | Performed by: PSYCHIATRY & NEUROLOGY

## 2023-07-19 PROCEDURE — 3008F PR BODY MASS INDEX (BMI) DOCUMENTED: ICD-10-PCS | Mod: HCNC,CPTII,S$GLB, | Performed by: PSYCHIATRY & NEUROLOGY

## 2023-07-19 PROCEDURE — 3080F PR MOST RECENT DIASTOLIC BLOOD PRESSURE >= 90 MM HG: ICD-10-PCS | Mod: HCNC,CPTII,S$GLB, | Performed by: PSYCHIATRY & NEUROLOGY

## 2023-07-19 PROCEDURE — 80061 LIPID PANEL: CPT | Mod: HCNC | Performed by: PSYCHIATRY & NEUROLOGY

## 2023-07-19 PROCEDURE — 1101F PT FALLS ASSESS-DOCD LE1/YR: CPT | Mod: HCNC,CPTII,S$GLB, | Performed by: PSYCHIATRY & NEUROLOGY

## 2023-07-19 PROCEDURE — 3288F FALL RISK ASSESSMENT DOCD: CPT | Mod: HCNC,CPTII,S$GLB, | Performed by: PSYCHIATRY & NEUROLOGY

## 2023-07-19 PROCEDURE — 36415 COLL VENOUS BLD VENIPUNCTURE: CPT | Mod: HCNC | Performed by: PSYCHIATRY & NEUROLOGY

## 2023-07-19 PROCEDURE — 1160F PR REVIEW ALL MEDS BY PRESCRIBER/CLIN PHARMACIST DOCUMENTED: ICD-10-PCS | Mod: HCNC,CPTII,S$GLB, | Performed by: PSYCHIATRY & NEUROLOGY

## 2023-07-19 PROCEDURE — 3062F PR POS MACROALBUMINURIA RESULT DOCUMENTED/REVIEW: ICD-10-PCS | Mod: HCNC,CPTII,S$GLB, | Performed by: PSYCHIATRY & NEUROLOGY

## 2023-07-19 PROCEDURE — 3288F PR FALLS RISK ASSESSMENT DOCUMENTED: ICD-10-PCS | Mod: HCNC,CPTII,S$GLB, | Performed by: PSYCHIATRY & NEUROLOGY

## 2023-07-19 PROCEDURE — 1101F PR PT FALLS ASSESS DOC 0-1 FALLS W/OUT INJ PAST YR: ICD-10-PCS | Mod: HCNC,CPTII,S$GLB, | Performed by: PSYCHIATRY & NEUROLOGY

## 2023-07-19 PROCEDURE — 3080F DIAST BP >= 90 MM HG: CPT | Mod: HCNC,CPTII,S$GLB, | Performed by: PSYCHIATRY & NEUROLOGY

## 2023-07-19 PROCEDURE — 99999 PR PBB SHADOW E&M-EST. PATIENT-LVL V: CPT | Mod: PBBFAC,HCNC,, | Performed by: PSYCHIATRY & NEUROLOGY

## 2023-07-19 PROCEDURE — 3066F NEPHROPATHY DOC TX: CPT | Mod: HCNC,CPTII,S$GLB, | Performed by: PSYCHIATRY & NEUROLOGY

## 2023-07-19 PROCEDURE — 1126F AMNT PAIN NOTED NONE PRSNT: CPT | Mod: HCNC,CPTII,S$GLB, | Performed by: PSYCHIATRY & NEUROLOGY

## 2023-07-19 PROCEDURE — 1160F RVW MEDS BY RX/DR IN RCRD: CPT | Mod: HCNC,CPTII,S$GLB, | Performed by: PSYCHIATRY & NEUROLOGY

## 2023-07-19 PROCEDURE — 3077F SYST BP >= 140 MM HG: CPT | Mod: HCNC,CPTII,S$GLB, | Performed by: PSYCHIATRY & NEUROLOGY

## 2023-07-19 PROCEDURE — 4010F ACE/ARB THERAPY RXD/TAKEN: CPT | Mod: HCNC,CPTII,S$GLB, | Performed by: PSYCHIATRY & NEUROLOGY

## 2023-07-19 PROCEDURE — 83036 HEMOGLOBIN GLYCOSYLATED A1C: CPT | Mod: HCNC | Performed by: PSYCHIATRY & NEUROLOGY

## 2023-07-19 PROCEDURE — 99204 PR OFFICE/OUTPT VISIT, NEW, LEVL IV, 45-59 MIN: ICD-10-PCS | Mod: HCNC,S$GLB,, | Performed by: PSYCHIATRY & NEUROLOGY

## 2023-07-19 PROCEDURE — 3066F PR DOCUMENTATION OF TREATMENT FOR NEPHROPATHY: ICD-10-PCS | Mod: HCNC,CPTII,S$GLB, | Performed by: PSYCHIATRY & NEUROLOGY

## 2023-07-19 PROCEDURE — 3062F POS MACROALBUMINURIA REV: CPT | Mod: HCNC,CPTII,S$GLB, | Performed by: PSYCHIATRY & NEUROLOGY

## 2023-07-19 PROCEDURE — 3077F PR MOST RECENT SYSTOLIC BLOOD PRESSURE >= 140 MM HG: ICD-10-PCS | Mod: HCNC,CPTII,S$GLB, | Performed by: PSYCHIATRY & NEUROLOGY

## 2023-07-19 PROCEDURE — 4010F PR ACE/ARB THEARPY RXD/TAKEN: ICD-10-PCS | Mod: HCNC,CPTII,S$GLB, | Performed by: PSYCHIATRY & NEUROLOGY

## 2023-07-19 PROCEDURE — 99204 OFFICE O/P NEW MOD 45 MIN: CPT | Mod: HCNC,S$GLB,, | Performed by: PSYCHIATRY & NEUROLOGY

## 2023-07-19 PROCEDURE — 1126F PR PAIN SEVERITY QUANTIFIED, NO PAIN PRESENT: ICD-10-PCS | Mod: HCNC,CPTII,S$GLB, | Performed by: PSYCHIATRY & NEUROLOGY

## 2023-07-19 PROCEDURE — 1159F PR MEDICATION LIST DOCUMENTED IN MEDICAL RECORD: ICD-10-PCS | Mod: HCNC,CPTII,S$GLB, | Performed by: PSYCHIATRY & NEUROLOGY

## 2023-07-19 PROCEDURE — 1159F MED LIST DOCD IN RCRD: CPT | Mod: HCNC,CPTII,S$GLB, | Performed by: PSYCHIATRY & NEUROLOGY

## 2023-07-19 NOTE — PATIENT INSTRUCTIONS
-- ASA 81 mg daily PO   -- Pravastatin 40 mg daily   -- MRI brain and MRA head and neck   -- Lipid profile   -- HbA1c   -- ECHO   -- We had an in depth discussion with patient and family regarding the imaging findings, symptoms etiology and our plan    -- We discussed her vascular risk factors and the need for her to continue to modify her risks e.g. taking her mediations, proper diet, and exercise.  -- Work with primary care provider on stroke risk factor management, BP <140/90, cholesterol monitoring  -- We discussed the need for her to continue to exercise her body and her mind with physical activity and mental activities.  -- We discussed BEFAST and the need for her to recognize stroke symptoms and report to the ER early is needed.    B-Balance  E-Eyes, vision  F-Facial Droop  A-Arm or leg weakness on one side  S-Speech trouble  T-Time - CALL 911!    -- Please have this patient follow up in stroke clinic in 2-3 months

## 2023-07-19 NOTE — TELEPHONE ENCOUNTER
Good morning,     Could someone please contact Ms. Grossman to schedule her Echo saline test.     Best regards

## 2023-07-19 NOTE — PROGRESS NOTES
Consultation Requested by: PCP   Chief Complaint: TIA    Service used: NO    HPI: Rose Milligan is a 68 y.o. 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 presented to my clinic for the evaluation of left-sided weakness. She has had persistent problems with left leg since that time with difficulty walking, having to limp or drag along her left leg.  has been asking her to go to get checked out but she failed to do so until couple of weeks ago when she went to her PCP and was found to have elevated blood pressure of approximately 200/120. Patient CT scan from admit shows chronic small vessel ischemia but no acute CVA. Today reported resolution of her symptoms and back to baseline. Denies HA, dizziness, focal weakness, numbness, trouble talking, vision problem, fall or LOC.     Past Medical History:   Diagnosis Date    Bipolar 1 disorder     CHF (congestive heart failure)     Depression     Hepatitis C     Hypertension     Other hyperlipidemia 8/15/2019     Past Surgical History:   Procedure Laterality Date    HYSTERECTOMY       Review of patient's allergies indicates:   Allergen Reactions    Tramadol Itching    Risperdal [risperidone] Other (See Comments)     Did not like way felt       Current Outpatient Medications:     aspirin (ECOTRIN) 81 MG EC tablet, TAKE 1 TABLET EVERY DAY, Disp: 90 tablet, Rfl: 3    carvediloL (COREG) 6.25 MG tablet, TAKE 1 TABLET BY MOUTH TWICE A DAY, Disp: 180 tablet, Rfl: 0    cloNIDine (CATAPRES) 0.1 MG tablet, Take 1 tablet (0.1 mg total) by mouth 3 (three) times daily., Disp: 180 tablet, Rfl: 0    diclofenac sodium (VOLTAREN) 1 % Gel, APPLY 2 G TOPICALLY 4 (FOUR) TIMES DAILY AS NEEDED., Disp: 100 g, Rfl: 2    furosemide (LASIX) 40 MG tablet, TAKE 1 TABLET ONE TIME DAILY. OVERDUE FOR LAB AND ANNUAL/LAST REFILL., Disp: 90 tablet,  Rfl: 0    hydrALAZINE (APRESOLINE) 50 MG tablet, Take 1 tablet (50 mg total) by mouth every 8 (eight) hours., Disp: 270 tablet, Rfl: 1    hydrOXYzine HCL (ATARAX) 25 MG tablet, TAKE 1 TABLET EVERY DAY AS NEEDED, Disp: 30 tablet, Rfl: 0    irbesartan (AVAPRO) 150 MG tablet, TAKE 1 TABLET BY MOUTH EVERY DAY, Disp: 90 tablet, Rfl: 0    pravastatin (PRAVACHOL) 40 MG tablet, Take 1 tablet (40 mg total) by mouth every evening., Disp: 90 tablet, Rfl: 1    ziprasidone (GEODON) 20 MG Cap, Take 1 capsule (20 mg total) by mouth 2 (two) times daily., Disp: 60 capsule, Rfl: 2    Social History     Socioeconomic History    Marital status:    Tobacco Use    Smoking status: Some Days     Packs/day: 0.25     Types: Cigarettes     Start date: 1/11/2016    Smokeless tobacco: Never    Tobacco comments:     1 pack every 3 days   Substance and Sexual Activity    Alcohol use: Yes    Drug use: Yes     Types: Cocaine    Sexual activity: Not Currently     Family History   Problem Relation Age of Onset    Heart attack Maternal Grandmother     Hypertension Mother      Review of Systems  Constitutional: no fevers, no weight changes,  No diaphoresis  HEENT: No congestion, no doublevision, no dysphagia, no rhinnorhea, no lacrimation  Cardiovascular: no chest pain or palpitations  Respiratory: no shortness of breath, no cough  Gastrointestinal: no diarrhea, no constipation  Genitourinary: no dysuria  Musculoskeletal: no joint swelling. No myalgia  Skin: no rash  Psychiatric: no hallucinations, no depression or anxiety  Neurologic: as per HPI  All other review of systems is negative and as per HPI.    Vitals:    07/19/23 0907   BP: (!) 152/102   Pulse: 69   Resp: 16      Exam  General: Pleasant, conversant, Well-kempt  HEENT: Head atraumatic. No nasal abnormality. No gaze preference. EOMI.  Cardiovascular: Regular  Lungs: Not in distress   Mental Status: Awake alert and oriented x III. Follows commands. No aphasia or dysarthria. Naming and  repetition intact. Mood is appropriate.  Cranial Nerve: PERRL. VFF. EOMI. Facial sensation intact. Right facial asymmetry from old stroke. Hearing intact. Palate elevates. Uvula midline. SCM strong. No tongue deviation.  Motor: Normal tone. No cogwheel rigidity. No bradykinesia. No pronator drift. 5/5 strength bilaterally in the upper extremities including deltoids, biceps, triceps, wrist extensors/flexors, finger flexors/extensors, interossei, and APB. 5/5 strength bilaterally in the lower extremities including iliopsoas, hamstring, quadriceps, tibialis anterior, gastrocnemius, EHL.  Deep Tendon Reflexes: 2+ in biceps, tricepts, brachioradialis b/l.   Sensory: Intact to soft touch, cold. No neglect.  Cerebellar: Finger to nose intact. Heel to shin intact.  Gait: Normal narrow based gait.     NIHSS - 1 (right facial weakness from old stroke)      Other tests:     HbA1c: 5.4 (1 year ago)  LDL: 88 (1 year ago)    Carotid US:    ECHO: (December 2022)  The left ventricle is normal in size with concentric hypertrophy and normal systolic function.  Moderate left atrial enlargement.  Grade III left ventricular diastolic dysfunction.  The estimated PA systolic pressure is 29 mmHg.  Normal right ventricular size with normal right ventricular systolic function.  Normal central venous pressure (3 mmHg).  The estimated ejection fraction is 60%.  Mild tricuspid regurgitation.  Trivial pericardial effusion.    MRI Brain: (January 2022)   1.  No acute stroke or hemorrhage.   2. There are multiple chronic lacunar infarctions throughout the basal ganglia, thalami and a moderate degree of chronic microvascular ischemia of the periventricular white matter. There are also multiple scattered chronic microhemorrhages, suggesting this is chronic hypertensive encephalopathy.    Assessment:    Rose Milligan is a 68 y.o. 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  has been asking her to go to get checked out but she failed to do so until couple of weeks ago when she went to her PCP and was found to have elevated blood pressure of approximately 200/120. Patient CT scan from admit shows chronic small vessel ischemia but no acute CVA. Today reported resolution of her symptoms and back to baseline. Likely TIA.      Plan:    -- Activity as tolerated  -- Normoglycemia  -- Normothermia  -- BP goal normotensive   -- ASA 81 mg daily PO   -- Pravastatin 40 mg daily   -- MRI brain and MRA head and neck   -- Lipid profile   -- HbA1c   -- ECHO   -- We had an in depth discussion with patient and family regarding the imaging findings, symptoms etiology and our plan    -- We discussed her vascular risk factors and the need for her to continue to modify her risks e.g. taking her mediations, proper diet, and exercise.  -- Work with primary care provider on stroke risk factor management, BP <140/90, cholesterol monitoring  -- We discussed the need for her to continue to exercise her body and her mind with physical activity and mental activities.  -- We discussed BEFAST and the need for her to recognize stroke symptoms and report to the ER early is needed.    B-Balance  E-Eyes, vision  F-Facial Droop  A-Arm or leg weakness on one side  S-Speech trouble  T-Time - CALL 911!    -- Please have this patient follow up in stroke clinic in 2-3 months    Marcos Welsh MD  Neurology   [facility]

## 2023-07-31 ENCOUNTER — HOSPITAL ENCOUNTER (OUTPATIENT)
Dept: CARDIOLOGY | Facility: HOSPITAL | Age: 68
Discharge: HOME | End: 2023-07-31
Attending: PSYCHIATRY & NEUROLOGY
Payer: MEDICARE

## 2023-07-31 VITALS
WEIGHT: 140 LBS | HEIGHT: 63 IN | DIASTOLIC BLOOD PRESSURE: 87 MMHG | SYSTOLIC BLOOD PRESSURE: 138 MMHG | HEART RATE: 60 BPM | BODY MASS INDEX: 24.8 KG/M2

## 2023-07-31 DIAGNOSIS — I10 UNCONTROLLED HYPERTENSION: ICD-10-CM

## 2023-07-31 DIAGNOSIS — E78.5 HYPERLIPIDEMIA, UNSPECIFIED HYPERLIPIDEMIA TYPE: ICD-10-CM

## 2023-07-31 DIAGNOSIS — Z86.73 HISTORY OF TIA (TRANSIENT ISCHEMIC ATTACK): ICD-10-CM

## 2023-07-31 LAB
AORTIC ROOT ANNULUS: 2.8 CM
ASCENDING AORTA: 2.61 CM
AV INDEX (PROSTH): 0.56
AV MEAN GRADIENT: 5 MMHG
AV PEAK GRADIENT: 8 MMHG
AV VALVE AREA BY VELOCITY RATIO: 1.55 CM²
AV VALVE AREA: 1.53 CM²
AV VELOCITY RATIO: 0.57
BSA FOR ECHO PROCEDURE: 1.68 M2
CV ECHO LV RWT: 0.73 CM
DOP CALC AO PEAK VEL: 1.45 M/S
DOP CALC AO VTI: 29.1 CM
DOP CALC LVOT AREA: 2.7 CM2
DOP CALC LVOT DIAMETER: 1.86 CM
DOP CALC LVOT PEAK VEL: 0.83 M/S
DOP CALC LVOT STROKE VOLUME: 44.54 CM3
DOP CALC RVOT PEAK VEL: 0.6 M/S
DOP CALC RVOT VTI: 15.9 CM
DOP CALCLVOT PEAK VEL VTI: 16.4 CM
E WAVE DECELERATION TIME: 335.74 MSEC
E/A RATIO: 0.53
E/E' RATIO: 13.71 M/S
ECHO LV POSTERIOR WALL: 1.44 CM (ref 0.6–1.1)
FRACTIONAL SHORTENING: 32 % (ref 28–44)
INTERVENTRICULAR SEPTUM: 1.8 CM (ref 0.6–1.1)
IVRT: 134.16 MSEC
LA MAJOR: 4.32 CM
LA MINOR: 4.09 CM
LA WIDTH: 3.4 CM
LEFT ATRIUM SIZE: 3.62 CM
LEFT ATRIUM VOLUME INDEX MOD: 17.7 ML/M2
LEFT ATRIUM VOLUME INDEX: 26.5 ML/M2
LEFT ATRIUM VOLUME MOD: 29.44 CM3
LEFT ATRIUM VOLUME: 43.96 CM3
LEFT INTERNAL DIMENSION IN SYSTOLE: 2.69 CM (ref 2.1–4)
LEFT VENTRICLE DIASTOLIC VOLUME INDEX: 40.59 ML/M2
LEFT VENTRICLE DIASTOLIC VOLUME: 67.38 ML
LEFT VENTRICLE MASS INDEX: 155 G/M2
LEFT VENTRICLE SYSTOLIC VOLUME INDEX: 16.2 ML/M2
LEFT VENTRICLE SYSTOLIC VOLUME: 26.87 ML
LEFT VENTRICULAR INTERNAL DIMENSION IN DIASTOLE: 3.94 CM (ref 3.5–6)
LEFT VENTRICULAR MASS: 257.67 G
LV LATERAL E/E' RATIO: 16 M/S
LV SEPTAL E/E' RATIO: 12 M/S
LVOT MG: 1.5 MMHG
LVOT MV: 0.57 CM/S
MV PEAK A VEL: 0.91 M/S
MV PEAK E VEL: 0.48 M/S
MV STENOSIS PRESSURE HALF TIME: 97.36 MS
MV VALVE AREA P 1/2 METHOD: 2.26 CM2
PISA TR MAX VEL: 2.27 M/S
PULM VEIN S/D RATIO: 1.14
PV MEAN GRADIENT: 1 MMHG
PV PEAK D VEL: 0.36 M/S
PV PEAK GRADIENT: 2 MMHG
PV PEAK S VEL: 0.41 M/S
PV PEAK VELOCITY: 0.76 M/S
RA MAJOR: 4.24 CM
RA PRESSURE ESTIMATED: 3 MMHG
RA WIDTH: 3.01 CM
RIGHT VENTRICULAR END-DIASTOLIC DIMENSION: 2.85 CM
RV TB RVSP: 5 MMHG
SINUS: 2.41 CM
STJ: 2.2 CM
TDI LATERAL: 0.03 M/S
TDI SEPTAL: 0.04 M/S
TDI: 0.04 M/S
TR MAX PG: 21 MMHG
Z-SCORE OF LEFT VENTRICULAR DIMENSION IN END DIASTOLE: -1.66
Z-SCORE OF LEFT VENTRICULAR DIMENSION IN END SYSTOLE: -0.54

## 2023-07-31 PROCEDURE — 93306 TTE W/DOPPLER COMPLETE: CPT | Mod: HCNC

## 2023-07-31 PROCEDURE — 93306 ECHO (CUPID ONLY): ICD-10-PCS | Mod: 26,HCNC,, | Performed by: INTERNAL MEDICINE

## 2023-07-31 PROCEDURE — 93306 TTE W/DOPPLER COMPLETE: CPT | Mod: 26,HCNC,, | Performed by: INTERNAL MEDICINE

## 2023-08-04 ENCOUNTER — HOSPITAL ENCOUNTER (OUTPATIENT)
Dept: RADIOLOGY | Facility: HOSPITAL | Age: 68
Discharge: HOME | DRG: 064 | End: 2023-08-04
Attending: PSYCHIATRY & NEUROLOGY
Payer: MEDICARE

## 2023-08-04 ENCOUNTER — TELEPHONE (OUTPATIENT)
Dept: NEUROLOGY | Facility: CLINIC | Age: 68
End: 2023-08-04
Payer: MEDICARE

## 2023-08-04 ENCOUNTER — HOSPITAL ENCOUNTER (INPATIENT)
Facility: HOSPITAL | Age: 68
LOS: 2 days | Discharge: HOME HEALTH | DRG: 064 | End: 2023-08-06
Attending: EMERGENCY MEDICINE | Admitting: INTERNAL MEDICINE
Payer: MEDICARE

## 2023-08-04 DIAGNOSIS — I50.32 CHRONIC HEART FAILURE WITH PRESERVED EJECTION FRACTION: ICD-10-CM

## 2023-08-04 DIAGNOSIS — I63.89 OTHER CEREBRAL INFARCTION: ICD-10-CM

## 2023-08-04 DIAGNOSIS — E78.49 OTHER HYPERLIPIDEMIA: ICD-10-CM

## 2023-08-04 DIAGNOSIS — F31.62 BIPOLAR AFFECTIVE DISORDER, MIXED, MODERATE: ICD-10-CM

## 2023-08-04 DIAGNOSIS — I63.9 CVA (CEREBRAL VASCULAR ACCIDENT): ICD-10-CM

## 2023-08-04 DIAGNOSIS — I63.9 ACUTE CVA (CEREBROVASCULAR ACCIDENT): ICD-10-CM

## 2023-08-04 DIAGNOSIS — I10 CHRONIC HYPERTENSION: ICD-10-CM

## 2023-08-04 DIAGNOSIS — Z72.0 TOBACCO USE: ICD-10-CM

## 2023-08-04 DIAGNOSIS — I67.1 BRAIN ANEURYSM: ICD-10-CM

## 2023-08-04 DIAGNOSIS — I10 UNCONTROLLED HYPERTENSION: ICD-10-CM

## 2023-08-04 DIAGNOSIS — N18.4 CKD (CHRONIC KIDNEY DISEASE) STAGE 4, GFR 15-29 ML/MIN: ICD-10-CM

## 2023-08-04 DIAGNOSIS — Z76.0 MEDICATION REFILL: ICD-10-CM

## 2023-08-04 DIAGNOSIS — B18.2 CHRONIC HEPATITIS C WITHOUT HEPATIC COMA: ICD-10-CM

## 2023-08-04 DIAGNOSIS — I63.9 CEREBROVASCULAR ACCIDENT (CVA), UNSPECIFIED MECHANISM: Primary | ICD-10-CM

## 2023-08-04 DIAGNOSIS — I67.1 CEREBRAL ANEURYSM: Primary | ICD-10-CM

## 2023-08-04 LAB
ALBUMIN SERPL BCP-MCNC: 4 G/DL (ref 3.5–5.2)
ALP SERPL-CCNC: 124 U/L (ref 55–135)
ALT SERPL W/O P-5'-P-CCNC: 11 U/L (ref 10–44)
ANION GAP SERPL CALC-SCNC: 13 MMOL/L (ref 8–16)
AST SERPL-CCNC: 16 U/L (ref 10–40)
BASOPHILS # BLD AUTO: 0.03 K/UL (ref 0–0.2)
BASOPHILS NFR BLD: 0.7 % (ref 0–1.9)
BILIRUB SERPL-MCNC: 0.3 MG/DL (ref 0.1–1)
BSA FOR ECHO PROCEDURE: 1.68 M2
BUN SERPL-MCNC: 43 MG/DL (ref 8–23)
CALCIUM SERPL-MCNC: 9.5 MG/DL (ref 8.7–10.5)
CHLORIDE SERPL-SCNC: 110 MMOL/L (ref 95–110)
CO2 SERPL-SCNC: 19 MMOL/L (ref 23–29)
CREAT SERPL-MCNC: 2.3 MG/DL (ref 0.5–1.4)
CV ECHO LV RWT: 0.65 CM
DIFFERENTIAL METHOD: NORMAL
E WAVE DECELERATION TIME: 296.01 MSEC
E/A RATIO: 0.63
E/E' RATIO: 14.44 M/S
ECHO LV POSTERIOR WALL: 1.46 CM (ref 0.6–1.1)
EOSINOPHIL # BLD AUTO: 0.2 K/UL (ref 0–0.5)
EOSINOPHIL NFR BLD: 3.6 % (ref 0–8)
ERYTHROCYTE [DISTWIDTH] IN BLOOD BY AUTOMATED COUNT: 12.7 % (ref 11.5–14.5)
EST. GFR  (NO RACE VARIABLE): 22 ML/MIN/1.73 M^2
FRACTIONAL SHORTENING: 33 % (ref 28–44)
GLUCOSE SERPL-MCNC: 132 MG/DL (ref 70–110)
HCT VFR BLD AUTO: 41.1 % (ref 37–48.5)
HGB BLD-MCNC: 13.4 G/DL (ref 12–16)
IMM GRANULOCYTES # BLD AUTO: 0.01 K/UL (ref 0–0.04)
IMM GRANULOCYTES NFR BLD AUTO: 0.2 % (ref 0–0.5)
INR PPP: 1 (ref 0.8–1.2)
INTERVENTRICULAR SEPTUM: 1.84 CM (ref 0.6–1.1)
LEFT ATRIUM SIZE: 3.99 CM
LEFT INTERNAL DIMENSION IN SYSTOLE: 2.98 CM (ref 2.1–4)
LEFT VENTRICLE DIASTOLIC VOLUME INDEX: 54.64 ML/M2
LEFT VENTRICLE DIASTOLIC VOLUME: 90.7 ML
LEFT VENTRICLE MASS INDEX: 190 G/M2
LEFT VENTRICLE SYSTOLIC VOLUME INDEX: 20.7 ML/M2
LEFT VENTRICLE SYSTOLIC VOLUME: 34.34 ML
LEFT VENTRICULAR INTERNAL DIMENSION IN DIASTOLE: 4.46 CM (ref 3.5–6)
LEFT VENTRICULAR MASS: 315.57 G
LV LATERAL E/E' RATIO: 16.25 M/S
LV SEPTAL E/E' RATIO: 13 M/S
LYMPHOCYTES # BLD AUTO: 2 K/UL (ref 1–4.8)
LYMPHOCYTES NFR BLD: 45.5 % (ref 18–48)
MCH RBC QN AUTO: 27.1 PG (ref 27–31)
MCHC RBC AUTO-ENTMCNC: 32.6 G/DL (ref 32–36)
MCV RBC AUTO: 83 FL (ref 82–98)
MONOCYTES # BLD AUTO: 0.5 K/UL (ref 0.3–1)
MONOCYTES NFR BLD: 10.5 % (ref 4–15)
MV PEAK A VEL: 1.03 M/S
MV PEAK E VEL: 0.65 M/S
MV STENOSIS PRESSURE HALF TIME: 85.84 MS
MV VALVE AREA P 1/2 METHOD: 2.56 CM2
NEUTROPHILS # BLD AUTO: 1.8 K/UL (ref 1.8–7.7)
NEUTROPHILS NFR BLD: 39.5 % (ref 38–73)
NRBC BLD-RTO: 0 /100 WBC
PISA MRMAX VEL: 3.04 M/S
PISA TR MAX VEL: 1.97 M/S
PLATELET # BLD AUTO: 253 K/UL (ref 150–450)
PMV BLD AUTO: 10.4 FL (ref 9.2–12.9)
POTASSIUM SERPL-SCNC: 5 MMOL/L (ref 3.5–5.1)
PROT SERPL-MCNC: 7.7 G/DL (ref 6–8.4)
PROTHROMBIN TIME: 10.5 SEC (ref 9–12.5)
RA PRESSURE ESTIMATED: 8 MMHG
RBC # BLD AUTO: 4.94 M/UL (ref 4–5.4)
RV TB RVSP: 10 MMHG
SODIUM SERPL-SCNC: 142 MMOL/L (ref 136–145)
TDI LATERAL: 0.04 M/S
TDI SEPTAL: 0.05 M/S
TDI: 0.05 M/S
TR MAX PG: 16 MMHG
TRICUSPID ANNULAR PLANE SYSTOLIC EXCURSION: 2.5 CM
TSH SERPL DL<=0.005 MIU/L-ACNC: 0.76 UIU/ML (ref 0.4–4)
TV REST PULMONARY ARTERY PRESSURE: 24 MMHG
WBC # BLD AUTO: 4.46 K/UL (ref 3.9–12.7)
Z-SCORE OF LEFT VENTRICULAR DIMENSION IN END DIASTOLE: -0.42
Z-SCORE OF LEFT VENTRICULAR DIMENSION IN END SYSTOLE: 0.27

## 2023-08-04 PROCEDURE — 36415 COLL VENOUS BLD VENIPUNCTURE: CPT | Mod: HCNC | Performed by: INTERNAL MEDICINE

## 2023-08-04 PROCEDURE — 70544 MR ANGIOGRAPHY HEAD W/O DYE: CPT | Mod: 26,HCNC,59, | Performed by: RADIOLOGY

## 2023-08-04 PROCEDURE — 99285 EMERGENCY DEPT VISIT HI MDM: CPT | Mod: 25,HCNC

## 2023-08-04 PROCEDURE — 70551 MRI BRAIN STEM W/O DYE: CPT | Mod: TC,HCNC

## 2023-08-04 PROCEDURE — 70547 MR ANGIOGRAPHY NECK W/O DYE: CPT | Mod: 26,HCNC,, | Performed by: RADIOLOGY

## 2023-08-04 PROCEDURE — 25000003 PHARM REV CODE 250: Mod: HCNC | Performed by: INTERNAL MEDICINE

## 2023-08-04 PROCEDURE — 85610 PROTHROMBIN TIME: CPT | Mod: HCNC | Performed by: NURSE PRACTITIONER

## 2023-08-04 PROCEDURE — 70544 MR ANGIOGRAPHY HEAD W/O DYE: CPT | Mod: TC,HCNC

## 2023-08-04 PROCEDURE — 63600175 PHARM REV CODE 636 W HCPCS: Mod: HCNC | Performed by: NURSE PRACTITIONER

## 2023-08-04 PROCEDURE — 63600175 PHARM REV CODE 636 W HCPCS: Mod: HCNC | Performed by: INTERNAL MEDICINE

## 2023-08-04 PROCEDURE — 70544 MRA BRAIN WITHOUT CONTRAST: ICD-10-PCS | Mod: 26,HCNC,59, | Performed by: RADIOLOGY

## 2023-08-04 PROCEDURE — 80061 LIPID PANEL: CPT | Mod: HCNC | Performed by: INTERNAL MEDICINE

## 2023-08-04 PROCEDURE — 85025 COMPLETE CBC W/AUTO DIFF WBC: CPT | Mod: HCNC | Performed by: NURSE PRACTITIONER

## 2023-08-04 PROCEDURE — 70547 MRA NECK WITHOUT CONTRAST: ICD-10-PCS | Mod: 26,HCNC,, | Performed by: RADIOLOGY

## 2023-08-04 PROCEDURE — 80053 COMPREHEN METABOLIC PANEL: CPT | Mod: HCNC | Performed by: NURSE PRACTITIONER

## 2023-08-04 PROCEDURE — 21400001 HC TELEMETRY ROOM: Mod: HCNC

## 2023-08-04 PROCEDURE — 70547 MR ANGIOGRAPHY NECK W/O DYE: CPT | Mod: TC,HCNC

## 2023-08-04 PROCEDURE — 70551 MRI BRAIN STEM W/O DYE: CPT | Mod: 26,HCNC,, | Performed by: RADIOLOGY

## 2023-08-04 PROCEDURE — 70551 MRI BRAIN WITHOUT CONTRAST: ICD-10-PCS | Mod: 26,HCNC,, | Performed by: RADIOLOGY

## 2023-08-04 PROCEDURE — 84443 ASSAY THYROID STIM HORMONE: CPT | Mod: HCNC | Performed by: INTERNAL MEDICINE

## 2023-08-04 RX ORDER — ONDANSETRON 2 MG/ML
4 INJECTION INTRAMUSCULAR; INTRAVENOUS EVERY 6 HOURS PRN
Status: DISCONTINUED | OUTPATIENT
Start: 2023-08-04 | End: 2023-08-06 | Stop reason: HOSPADM

## 2023-08-04 RX ORDER — ZIPRASIDONE HYDROCHLORIDE 20 MG/1
20 CAPSULE ORAL 2 TIMES DAILY
Status: DISCONTINUED | OUTPATIENT
Start: 2023-08-04 | End: 2023-08-06 | Stop reason: HOSPADM

## 2023-08-04 RX ORDER — CLOPIDOGREL BISULFATE 75 MG/1
75 TABLET ORAL DAILY
Status: DISCONTINUED | OUTPATIENT
Start: 2023-08-04 | End: 2023-08-06 | Stop reason: HOSPADM

## 2023-08-04 RX ORDER — PRAVASTATIN SODIUM 20 MG/1
40 TABLET ORAL NIGHTLY
Status: DISCONTINUED | OUTPATIENT
Start: 2023-08-04 | End: 2023-08-06 | Stop reason: HOSPADM

## 2023-08-04 RX ORDER — HYDRALAZINE HYDROCHLORIDE 20 MG/ML
10 INJECTION INTRAMUSCULAR; INTRAVENOUS EVERY 6 HOURS PRN
Status: DISCONTINUED | OUTPATIENT
Start: 2023-08-04 | End: 2023-08-05

## 2023-08-04 RX ORDER — ENOXAPARIN SODIUM 100 MG/ML
30 INJECTION SUBCUTANEOUS EVERY 24 HOURS
Status: DISCONTINUED | OUTPATIENT
Start: 2023-08-04 | End: 2023-08-06 | Stop reason: HOSPADM

## 2023-08-04 RX ORDER — LABETALOL HYDROCHLORIDE 5 MG/ML
10 INJECTION, SOLUTION INTRAVENOUS EVERY 6 HOURS PRN
Status: DISCONTINUED | OUTPATIENT
Start: 2023-08-04 | End: 2023-08-05

## 2023-08-04 RX ORDER — HYDRALAZINE HYDROCHLORIDE 20 MG/ML
10 INJECTION INTRAMUSCULAR; INTRAVENOUS ONCE
Status: COMPLETED | OUTPATIENT
Start: 2023-08-04 | End: 2023-08-04

## 2023-08-04 RX ORDER — SODIUM CHLORIDE 0.9 % (FLUSH) 0.9 %
10 SYRINGE (ML) INJECTION
Status: DISCONTINUED | OUTPATIENT
Start: 2023-08-04 | End: 2023-08-06 | Stop reason: HOSPADM

## 2023-08-04 RX ORDER — HYDROXYZINE HYDROCHLORIDE 50 MG/1
50 TABLET, FILM COATED ORAL 2 TIMES DAILY PRN
Status: ON HOLD | COMMUNITY
Start: 2023-07-20 | End: 2023-08-06 | Stop reason: SDUPTHER

## 2023-08-04 RX ORDER — LABETALOL HYDROCHLORIDE 5 MG/ML
10 INJECTION, SOLUTION INTRAVENOUS EVERY 6 HOURS PRN
Status: DISCONTINUED | OUTPATIENT
Start: 2023-08-04 | End: 2023-08-04

## 2023-08-04 RX ORDER — ASPIRIN 81 MG/1
81 TABLET ORAL DAILY
Status: DISCONTINUED | OUTPATIENT
Start: 2023-08-05 | End: 2023-08-06 | Stop reason: HOSPADM

## 2023-08-04 RX ADMIN — LABETALOL HYDROCHLORIDE 10 MG: 5 INJECTION INTRAVENOUS at 05:08

## 2023-08-04 RX ADMIN — HYDRALAZINE HYDROCHLORIDE 10 MG: 20 INJECTION, SOLUTION INTRAMUSCULAR; INTRAVENOUS at 08:08

## 2023-08-04 RX ADMIN — ENOXAPARIN SODIUM 30 MG: 30 INJECTION SUBCUTANEOUS at 05:08

## 2023-08-04 RX ADMIN — ZIPRASIDONE HYDROCHLORIDE 20 MG: 20 CAPSULE ORAL at 08:08

## 2023-08-04 RX ADMIN — CLOPIDOGREL BISULFATE 75 MG: 75 TABLET ORAL at 05:08

## 2023-08-04 RX ADMIN — PRAVASTATIN SODIUM 40 MG: 20 TABLET ORAL at 08:08

## 2023-08-04 RX ADMIN — HYDRALAZINE HYDROCHLORIDE 10 MG: 20 INJECTION, SOLUTION INTRAMUSCULAR; INTRAVENOUS at 06:08

## 2023-08-04 NOTE — H&P
[facility] Medicine  History & Physical    Patient Name: Rose Milligan  MRN: [id number]  Patient Class: IP- Inpatient  Admission Date: 8/4/2023  Attending Physician: Gerald Kc MD  Primary Care Provider: Zora Cope MD         Patient information was obtained from patient and ER records.     Subjective:     Principal Problem:<principal problem not specified>    Chief Complaint:   Chief Complaint   Patient presents with    Abnormal MRI     Pt sent here for [id number] brain aneurism and multiple small infarcts. Pt reports generalized weakness, slight HA, and blurred vision. Reports taking BP meds today but is very stressed. Pt is not on blood thinners        HPI:   67 y/o AAF with a PMHx of  H/O CVA with residual finding , CKD stage 3 HTN , H/P polysubstance drug aust ,Tobacco abuse  , HLD, Bipolar , Depression and HCV presented to the ER with a c/o Fatigue and left lower extremity weakness for the last days which has gotten worse .Pt was referred to the ED from Dr. Klein (Neurology) after brain MRI done this morning showed punctate acute left thalamic infarct and right MCA bifurcation aneurysm. Symptoms are constant and moderate in severity. No mitigating or exacerbating factors reported. Associated sxs include diaphoresis and fatigue. Patient denies any fever, chills, n/v/d, SOB, CP, weakness, numbness, and all other sxs at this time. She also complaining of walking difficulties.    ER COURSE: MRI brain show Punctate acute left thalamic infarct. MRA brain :5.2 x 5.7 mm proximal right posterior M2 aneurysm  ER VS:   BP Pulse Resp Temp SpO2   (!) 198/113 89 19 98.2 °F (36.8 °C) 97 %     Pt will be admitted to inpt with a dx of acute CVA      Past Medical History:   Diagnosis Date    Bipolar 1 disorder     CHF (congestive heart failure)     Depression     Hepatitis C     Hypertension     Other hyperlipidemia 8/15/2019       Past Surgical History:   Procedure Laterality  Date    HYSTERECTOMY         Review of patient's allergies indicates:   Allergen Reactions    Tramadol Itching    Risperdal [risperidone] Other (See Comments)     Did not like way felt       No current facility-administered medications on file prior to encounter.     Current Outpatient Medications on File Prior to Encounter   Medication Sig    aspirin (ECOTRIN) 81 MG EC tablet TAKE 1 TABLET EVERY DAY    carvediloL (COREG) 6.25 MG tablet TAKE 1 TABLET BY MOUTH TWICE A DAY    cloNIDine (CATAPRES) 0.1 MG tablet Take 1 tablet (0.1 mg total) by mouth 3 (three) times daily.    furosemide (LASIX) 40 MG tablet TAKE 1 TABLET ONE TIME DAILY. OVERDUE FOR LAB AND ANNUAL/LAST REFILL.    hydrALAZINE (APRESOLINE) 50 MG tablet Take 1 tablet (50 mg total) by mouth every 8 (eight) hours.    hydrOXYzine (ATARAX) 50 MG tablet Take 50 mg by mouth 2 (two) times daily as needed.    irbesartan (AVAPRO) 150 MG tablet TAKE 1 TABLET BY MOUTH EVERY DAY    pravastatin (PRAVACHOL) 40 MG tablet Take 1 tablet (40 mg total) by mouth every evening.    ziprasidone (GEODON) 20 MG Cap Take 1 capsule (20 mg total) by mouth 2 (two) times daily.    [DISCONTINUED] diclofenac sodium (VOLTAREN) 1 % Gel APPLY 2 G TOPICALLY 4 (FOUR) TIMES DAILY AS NEEDED. (Patient not taking: Reported on 7/19/2023)    [DISCONTINUED] hydrOXYzine HCL (ATARAX) 25 MG tablet TAKE 1 TABLET EVERY DAY AS NEEDED (Patient not taking: Reported on 7/19/2023)     Family History       Problem Relation (Age of Onset)    Heart attack Maternal Grandmother    Hypertension Mother          Tobacco Use    Smoking status: Some Days     Current packs/day: 0.25     Average packs/day: 0.3 packs/day for 7.6 years (1.9 ttl pk-yrs)     Types: Cigarettes     Start date: 1/11/2016    Smokeless tobacco: Never    Tobacco comments:     1 pack every 3 days   Substance and Sexual Activity    Alcohol use: Yes    Drug use: Yes     Types: Cocaine    Sexual activity: Not Currently     Review of  Systems   Constitutional:  Positive for activity change and fatigue.   HENT: Negative.     Eyes: Negative.    Respiratory: Negative.     Cardiovascular: Negative.    Gastrointestinal: Negative.    Endocrine: Negative.    Genitourinary: Negative.    Musculoskeletal: Negative.    Allergic/Immunologic: Negative.    Neurological:  Positive for weakness.   Psychiatric/Behavioral: Negative.     All other systems reviewed and are negative.    Objective:     Vital Signs (Most Recent):  Temp: 98 °F (36.7 °C) (08/04/23 1427)  Pulse: 69 (08/04/23 1427)  Resp: 20 (08/04/23 1427)  BP: (!) 179/97 (08/04/23 1427)  SpO2: 98 % (08/04/23 1427) Vital Signs (24h Range):  Temp:  [98 °F (36.7 °C)-98.2 °F (36.8 °C)] 98 °F (36.7 °C)  Pulse:  [69-89] 69  Resp:  [19-20] 20  SpO2:  [97 %-98 %] 98 %  BP: (179-198)/() 179/97     Weight: 63.5 kg (140 lb)  Body mass index is 24.8 kg/m².     Physical Exam  Vitals and nursing note reviewed.   Constitutional:       Appearance: She is ill-appearing.   HENT:      Head: Normocephalic and atraumatic.      Nose: Nose normal.      Mouth/Throat:      Mouth: Mucous membranes are moist.      Pharynx: Oropharynx is clear.   Eyes:      Extraocular Movements: Extraocular movements intact.      Conjunctiva/sclera: Conjunctivae normal.      Pupils: Pupils are equal, round, and reactive to light.   Cardiovascular:      Rate and Rhythm: Normal rate and regular rhythm.   Pulmonary:      Effort: Pulmonary effort is normal. No respiratory distress.      Breath sounds: No stridor. No wheezing or rhonchi.   Abdominal:      General: Abdomen is flat. Bowel sounds are normal. There is no distension.      Palpations: Abdomen is soft. There is no mass.      Tenderness: There is no abdominal tenderness.      Hernia: No hernia is present.   Musculoskeletal:         General: No swelling, tenderness, deformity or signs of injury. Normal range of motion.      Cervical back: Normal range of motion. No rigidity or tenderness.    Skin:     General: Skin is warm.      Coloration: Skin is not jaundiced or pale.      Findings: No bruising or erythema.   Neurological:      General: No focal deficit present.      Mental Status: She is alert and oriented to person, place, and time.      Cranial Nerves: No cranial nerve deficit.      Sensory: No sensory deficit.      Motor: Weakness present.      Comments: Slurred speech , right facial drop and stuttering  chronic   Left lower weakness new    Psychiatric:         Mood and Affect: Mood normal.              CRANIAL NERVES     CN III, IV, VI   Pupils are equal, round, and reactive to light.       Significant Labs: All pertinent labs within the past 24 hours have been reviewed.  Recent Lab Results         08/04/23  1303        Albumin 4.0       Alkaline Phosphatase 124       ALT 11       Anion Gap 13       AST 16       Baso # 0.03       Basophil % 0.7       BILIRUBIN TOTAL 0.3  Comment: For infants and newborns, interpretation of results should be based  on gestational age, weight and in agreement with clinical  observations.    Premature Infant recommended reference ranges:  Up to 24 hours.............<8.0 mg/dL  Up to 48 hours............<12.0 mg/dL  3-5 days..................<15.0 mg/dL  6-29 days.................<15.0 mg/dL         BUN 43       Calcium 9.5       Chloride 110       CO2 19       Creatinine 2.3       Differential Method Automated       eGFR 22       Eos # 0.2       Eosinophil % 3.6       Glucose 132       Gran # (ANC) 1.8       Gran % 39.5       Hematocrit 41.1       Hemoglobin 13.4       Immature Grans (Abs) 0.01  Comment: Mild elevation in immature granulocytes is non specific and   can be seen in a variety of conditions including stress response,   acute inflammation, trauma and pregnancy. Correlation with other   laboratory and clinical findings is essential.         Immature Granulocytes 0.2       INR 1.0  Comment: Coumadin Therapy:  2.0 - 3.0 for INR for all indicators except  mechanical heart valves  and antiphospholipid syndromes which should use 2.5 - 3.5.         Lymph # 2.0       Lymph % 45.5       MCH 27.1       MCHC 32.6       MCV 83       Mono # 0.5       Mono % 10.5       MPV 10.4       nRBC 0       Platelets 253       Potassium 5.0       PROTEIN TOTAL 7.7       Protime 10.5       RBC 4.94       RDW 12.7       Sodium 142       WBC 4.46               Significant Imaging: I have reviewed all pertinent imaging results/findings within the past 24 hours.    Assessment/Plan:     Brain aneurysm  Neurology consulted  Need better BP control   Outopt NSGY f/u       Acute CVA (cerebrovascular accident)    Antithrombotics for secondary stroke prevention: Antiplatelets: Aspirin: 81 mg daily  Clopidogrel: 75 mg daily    Statins for secondary stroke prevention and hyperlipidemia, if present:   Statins: Atorvastatin- 40 mg daily    Aggressive risk factor modification: HTN, Smoking, HLD     Rehab efforts: The patient has been evaluated by a stroke team provider and the therapy needs have been fully considered based off the presenting complaints and exam findings. The following therapy evaluations are needed: PT evaluate and treat, OT evaluate and treat, SLP evaluate and treat    Diagnostics ordered/pending: MRA head to assess vasculature, MRA neck/arch to assess vasculature, MRI head without contrast to assess brain parenchyma, TTE to assess cardiac function/status     VTE prophylaxis: Enoxaparin 40 mg SQ every 24 hours    BP parameters: Infarct: No intervention, SBP <220          Tobacco use  Assistance with smoking cessation was offered, including:  [x]  Medications  [x]  Counseling  []  Printed Information on Smoking Cessation  []  Referral to a Smoking Cessation Program    Patient was counseled regarding smoking for >10 minutes.        Other hyperlipidemia  Resume statin       (HFpEF) heart failure with preserved ejection fraction  Compensated  TTE  reviewed       Bipolar 1  disorder  Stable  Resume Geodon po   Denies any SI/VH/AH     CKD (chronic kidney disease) stage 4, GFR 15-29 ml/min  Stable   Monitor  Avoid nephrotoxic medication       Uncontrolled hypertension  Will keep SBP< 180  Due to aneurism   Permissive HTN for 24 to 48 hrs due to acute CVA  Resume BP accordingly   Labetalol prn       Chronic hepatitis C virus infection  outpt f/u         VTE Risk Mitigation (From admission, onward)         Ordered     enoxaparin injection 30 mg  Daily         08/04/23 1546     IP VTE HIGH RISK PATIENT  Once         08/04/23 1546     Place sequential compression device  Until discontinued         08/04/23 1546                           Gerald Kc MD  Department of Hospital Medicine  [facility] - Telemetry ([facility])

## 2023-08-04 NOTE — HPI
69 y/o AAF with a PMHx of  H/O CVA with residual finding , CKD stage 3 HTN , H/P polysubstance drug aust ,Tobacco abuse  , HLD, Bipolar , Depression and HCV presented to the ER with a c/o Fatigue and left lower extremity weakness for the last days which has gotten worse .Pt was referred to the ED from Dr. Klein (Neurology) after brain MRI done this morning showed punctate acute left thalamic infarct and right MCA bifurcation aneurysm. Symptoms are constant and moderate in severity. No mitigating or exacerbating factors reported. Associated sxs include diaphoresis and fatigue. Patient denies any fever, chills, n/v/d, SOB, CP, weakness, numbness, and all other sxs at this time. She also complaining of walking difficulties.    ER COURSE: MRI brain show Punctate acute left thalamic infarct. MRA brain :5.2 x 5.7 mm proximal right posterior M2 aneurysm  ER VS:   BP Pulse Resp Temp SpO2   (!) 198/113 89 19 98.2 °F (36.8 °C) 97 %     Pt will be admitted to inpt with a dx of acute CVA

## 2023-08-04 NOTE — PHARMACY MED REC
"Admission Medication History     The home medication history was taken by Og Hoyos.    You may go to "Admission" then "Reconcile Home Medications" tabs to review and/or act upon these items.     The home medication list has been updated by the Pharmacy department.   Please read ALL comments highlighted in yellow.   Please address this information as you see fit.    Feel free to contact us if you have any questions or require assistance.      Medications listed below were obtained from: Patient/family and Analytic software- [employer]  (Not in a hospital admission)        Og Hoyos  [id number]    Current Outpatient Medications on File Prior to Encounter   Medication Sig Dispense Refill Last Dose    aspirin (ECOTRIN) 81 MG EC tablet TAKE 1 TABLET EVERY DAY 90 tablet 3 8/4/2023    carvediloL (COREG) 6.25 MG tablet TAKE 1 TABLET BY MOUTH TWICE A  tablet 0 8/4/2023    cloNIDine (CATAPRES) 0.1 MG tablet Take 1 tablet (0.1 mg total) by mouth 3 (three) times daily. 180 tablet 0 8/4/2023    furosemide (LASIX) 40 MG tablet TAKE 1 TABLET ONE TIME DAILY. OVERDUE FOR LAB AND ANNUAL/LAST REFILL. 90 tablet 0 8/4/2023    hydrALAZINE (APRESOLINE) 50 MG tablet Take 1 tablet (50 mg total) by mouth every 8 (eight) hours. 270 tablet 1 8/4/2023    hydrOXYzine (ATARAX) 50 MG tablet Take 50 mg by mouth 2 (two) times daily as needed.   8/4/2023    irbesartan (AVAPRO) 150 MG tablet TAKE 1 TABLET BY MOUTH EVERY DAY 90 tablet 0 8/4/2023    pravastatin (PRAVACHOL) 40 MG tablet Take 1 tablet (40 mg total) by mouth every evening. 90 tablet 1 Past Month    ziprasidone (GEODON) 20 MG Cap Take 1 capsule (20 mg total) by mouth 2 (two) times daily. 60 capsule 2 8/4/2023     .          "

## 2023-08-04 NOTE — SUBJECTIVE & OBJECTIVE
Past Medical History:   Diagnosis Date    Bipolar 1 disorder     CHF (congestive heart failure)     Depression     Hepatitis C     Hypertension     Other hyperlipidemia 8/15/2019       Past Surgical History:   Procedure Laterality Date    HYSTERECTOMY         Review of patient's allergies indicates:   Allergen Reactions    Tramadol Itching    Risperdal [risperidone] Other (See Comments)     Did not like way felt       No current facility-administered medications on file prior to encounter.     Current Outpatient Medications on File Prior to Encounter   Medication Sig    aspirin (ECOTRIN) 81 MG EC tablet TAKE 1 TABLET EVERY DAY    carvediloL (COREG) 6.25 MG tablet TAKE 1 TABLET BY MOUTH TWICE A DAY    cloNIDine (CATAPRES) 0.1 MG tablet Take 1 tablet (0.1 mg total) by mouth 3 (three) times daily.    furosemide (LASIX) 40 MG tablet TAKE 1 TABLET ONE TIME DAILY. OVERDUE FOR LAB AND ANNUAL/LAST REFILL.    hydrALAZINE (APRESOLINE) 50 MG tablet Take 1 tablet (50 mg total) by mouth every 8 (eight) hours.    hydrOXYzine (ATARAX) 50 MG tablet Take 50 mg by mouth 2 (two) times daily as needed.    irbesartan (AVAPRO) 150 MG tablet TAKE 1 TABLET BY MOUTH EVERY DAY    pravastatin (PRAVACHOL) 40 MG tablet Take 1 tablet (40 mg total) by mouth every evening.    ziprasidone (GEODON) 20 MG Cap Take 1 capsule (20 mg total) by mouth 2 (two) times daily.    [DISCONTINUED] diclofenac sodium (VOLTAREN) 1 % Gel APPLY 2 G TOPICALLY 4 (FOUR) TIMES DAILY AS NEEDED. (Patient not taking: Reported on 7/19/2023)    [DISCONTINUED] hydrOXYzine HCL (ATARAX) 25 MG tablet TAKE 1 TABLET EVERY DAY AS NEEDED (Patient not taking: Reported on 7/19/2023)     Family History       Problem Relation (Age of Onset)    Heart attack Maternal Grandmother    Hypertension Mother          Tobacco Use    Smoking status: Some Days     Current packs/day: 0.25     Average packs/day: 0.3 packs/day for 7.6 years (1.9 ttl pk-yrs)     Types: Cigarettes     Start date:  1/11/2016    Smokeless tobacco: Never    Tobacco comments:     1 pack every 3 days   Substance and Sexual Activity    Alcohol use: Yes    Drug use: Yes     Types: Cocaine    Sexual activity: Not Currently     Review of Systems   Constitutional:  Positive for activity change and fatigue.   HENT: Negative.     Eyes: Negative.    Respiratory: Negative.     Cardiovascular: Negative.    Gastrointestinal: Negative.    Endocrine: Negative.    Genitourinary: Negative.    Musculoskeletal: Negative.    Allergic/Immunologic: Negative.    Neurological:  Positive for weakness.   Psychiatric/Behavioral: Negative.     All other systems reviewed and are negative.    Objective:     Vital Signs (Most Recent):  Temp: 98 °F (36.7 °C) (08/04/23 1427)  Pulse: 69 (08/04/23 1427)  Resp: 20 (08/04/23 1427)  BP: (!) 179/97 (08/04/23 1427)  SpO2: 98 % (08/04/23 1427) Vital Signs (24h Range):  Temp:  [98 °F (36.7 °C)-98.2 °F (36.8 °C)] 98 °F (36.7 °C)  Pulse:  [69-89] 69  Resp:  [19-20] 20  SpO2:  [97 %-98 %] 98 %  BP: (179-198)/() 179/97     Weight: 63.5 kg (140 lb)  Body mass index is 24.8 kg/m².     Physical Exam  Vitals and nursing note reviewed.   Constitutional:       Appearance: She is ill-appearing.   HENT:      Head: Normocephalic and atraumatic.      Nose: Nose normal.      Mouth/Throat:      Mouth: Mucous membranes are moist.      Pharynx: Oropharynx is clear.   Eyes:      Extraocular Movements: Extraocular movements intact.      Conjunctiva/sclera: Conjunctivae normal.      Pupils: Pupils are equal, round, and reactive to light.   Cardiovascular:      Rate and Rhythm: Normal rate and regular rhythm.   Pulmonary:      Effort: Pulmonary effort is normal. No respiratory distress.      Breath sounds: No stridor. No wheezing or rhonchi.   Abdominal:      General: Abdomen is flat. Bowel sounds are normal. There is no distension.      Palpations: Abdomen is soft. There is no mass.      Tenderness: There is no abdominal tenderness.       Hernia: No hernia is present.   Musculoskeletal:         General: No swelling, tenderness, deformity or signs of injury. Normal range of motion.      Cervical back: Normal range of motion. No rigidity or tenderness.   Skin:     General: Skin is warm.      Coloration: Skin is not jaundiced or pale.      Findings: No bruising or erythema.   Neurological:      General: No focal deficit present.      Mental Status: She is alert and oriented to person, place, and time.      Cranial Nerves: No cranial nerve deficit.      Sensory: No sensory deficit.      Motor: Weakness present.      Comments: Slurred speech , right facial drop and stuttering  chronic   Left lower weakness new    Psychiatric:         Mood and Affect: Mood normal.              CRANIAL NERVES     CN III, IV, VI   Pupils are equal, round, and reactive to light.       Significant Labs: All pertinent labs within the past 24 hours have been reviewed.  Recent Lab Results         08/04/23  1303        Albumin 4.0       Alkaline Phosphatase 124       ALT 11       Anion Gap 13       AST 16       Baso # 0.03       Basophil % 0.7       BILIRUBIN TOTAL 0.3  Comment: For infants and newborns, interpretation of results should be based  on gestational age, weight and in agreement with clinical  observations.    Premature Infant recommended reference ranges:  Up to 24 hours.............<8.0 mg/dL  Up to 48 hours............<12.0 mg/dL  3-5 days..................<15.0 mg/dL  6-29 days.................<15.0 mg/dL         BUN 43       Calcium 9.5       Chloride 110       CO2 19       Creatinine 2.3       Differential Method Automated       eGFR 22       Eos # 0.2       Eosinophil % 3.6       Glucose 132       Gran # (ANC) 1.8       Gran % 39.5       Hematocrit 41.1       Hemoglobin 13.4       Immature Grans (Abs) 0.01  Comment: Mild elevation in immature granulocytes is non specific and   can be seen in a variety of conditions including stress response,   acute  inflammation, trauma and pregnancy. Correlation with other   laboratory and clinical findings is essential.         Immature Granulocytes 0.2       INR 1.0  Comment: Coumadin Therapy:  2.0 - 3.0 for INR for all indicators except mechanical heart valves  and antiphospholipid syndromes which should use 2.5 - 3.5.         Lymph # 2.0       Lymph % 45.5       MCH 27.1       MCHC 32.6       MCV 83       Mono # 0.5       Mono % 10.5       MPV 10.4       nRBC 0       Platelets 253       Potassium 5.0       PROTEIN TOTAL 7.7       Protime 10.5       RBC 4.94       RDW 12.7       Sodium 142       WBC 4.46               Significant Imaging: I have reviewed all pertinent imaging results/findings within the past 24 hours.

## 2023-08-04 NOTE — ASSESSMENT & PLAN NOTE
Will keep SBP< 180  Due to aneurism   Permissive HTN for 24 to 48 hrs due to acute CVA  Resume BP accordingly   Labetalol prn

## 2023-08-04 NOTE — ASSESSMENT & PLAN NOTE
Antithrombotics for secondary stroke prevention: Antiplatelets: Aspirin: 81 mg daily  Clopidogrel: 75 mg daily    Statins for secondary stroke prevention and hyperlipidemia, if present:   Statins: Atorvastatin- 40 mg daily    Aggressive risk factor modification: HTN, Smoking, HLD     Rehab efforts: The patient has been evaluated by a stroke team provider and the therapy needs have been fully considered based off the presenting complaints and exam findings. The following therapy evaluations are needed: PT evaluate and treat, OT evaluate and treat, SLP evaluate and treat    Diagnostics ordered/pending: MRA head to assess vasculature, MRA neck/arch to assess vasculature, MRI head without contrast to assess brain parenchyma, TTE to assess cardiac function/status     VTE prophylaxis: Enoxaparin 40 mg SQ every 24 hours    BP parameters: Infarct: No intervention, SBP <220

## 2023-08-04 NOTE — ED NOTES
Pt to ED stating she has been fatigued and diaphoretic for a few days now. Pt had outpatient MRI this morning and was called and told to come to ED because she was found to have a brain aneurysm and small infarcts. Endorses blurred vision, HA and weakness this morning but those symptoms have mostly resolved. VSS.

## 2023-08-04 NOTE — PROGRESS NOTES
08-    Brain MRI showed acute stroke and MRA lily showed aneurysm.    Needs to come to the hospital for evaluation. I also referred her to neurosurgery for the aneurysm.

## 2023-08-04 NOTE — FIRST PROVIDER EVALUATION
" Emergency Department TeleTriage Encounter Note      CHIEF COMPLAINT    Chief Complaint   Patient presents with    Abnormal MRI     Pt sent here for [id number] brain aneurism and multiple small infarcts. Pt reports generalized weakness, slight HA, and blurred vision. Reports taking BP meds today but is very stressed. Pt is not on blood thinners       VITAL SIGNS   Initial Vitals [08/04/23 1246]   BP Pulse Resp Temp SpO2   (!) 198/113 89 19 98.2 °F (36.8 °C) 97 %      MAP       --            ALLERGIES    Review of patient's allergies indicates:   Allergen Reactions    Tramadol Itching    Risperdal [risperidone] Other (See Comments)     Did not like way felt       PROVIDER TRIAGE NOTE  This is a teletriage evaluation of a 68 y.o. female presenting to the ED complaining of referred to ED for abnormal MRI on 7/19. Pt states that she feels, "tired and nervous" but otherwise at baseline.    Initial orders will be placed and care will be transferred to an alternate provider when patient is roomed for a full evaluation. Any additional orders and the final disposition will be determined by that provider.         ORDERS  Labs Reviewed   CBC W/ AUTO DIFFERENTIAL   COMPREHENSIVE METABOLIC PANEL   PROTIME-INR       ED Orders (720h ago, onward)      Start Ordered     Status Ordering Provider    08/04/23 1254 08/04/23 1253  CBC auto differential  STAT         Ordered MAUREEN BINGHAM N.    08/04/23 1254 08/04/23 1253  Insert Saline lock IV  Once         Ordered MAUREEN BINGHAM N.    08/04/23 1254 08/04/23 1253  Comprehensive metabolic panel  STAT         Ordered MAUREEN BINGHAM N.    08/04/23 1254 08/04/23 1253  Diet NPO  Diet effective now         Ordered MAUREEN BINGHAM N.    08/04/23 1254 08/04/23 1253  Possible Hospitalization  Once        Comments: For further elaboration on reason for hospitalization.    Ordered MAUREEN BINGHAM N.    08/04/23 1254 08/04/23 1253  Protime-INR  STAT         " MAUREEN Hartman              Virtual Visit Note: The provider triage portion of this emergency department evaluation and documentation was performed via [employer], a HIPAA-compliant telemedicine application, in concert with a tele-presenter in the room. A face to face patient evaluation with one of my colleagues will occur once the patient is placed in an emergency department room.      DISCLAIMER: This note was prepared with [employer] voice recognition transcription software. Garbled syntax, mangled pronouns, and other bizarre constructions may be attributed to that software system.

## 2023-08-04 NOTE — ED PROVIDER NOTES
SCRIBE #1 NOTE: I, Branden Mckoy, am scribing for, and in the presence of, Nelly John MD. I have scribed the entire note.      History      Chief Complaint   Patient presents with    Abnormal MRI     Pt sent here for [id number] brain aneurism and multiple small infarcts. Pt reports generalized weakness, slight HA, and blurred vision. Reports taking BP meds today but is very stressed. Pt is not on blood thinners       Review of patient's allergies indicates:   Allergen Reactions    Tramadol Itching    Risperdal [risperidone] Other (See Comments)     Did not like way felt        HPI   HPI    8/4/2023, 2:09 PM   History obtained from the patient      History of Present Illness: Rose Milligan is a 68 y.o. female patient with a PMHx of CHF, HTN who presents to the Emergency Department for abnormal imaging. Pt was referred to the ED from Dr. Klein (Neurology) after brain MRI done this morning showed punctate acute left thalamic infarct and right MCA bifurcation aneurysm. Symptoms are constant and moderate in severity. No mitigating or exacerbating factors reported. Associated sxs include diaphoresis and fatigue. Patient denies any fever, chills, n/v/d, SOB, CP, weakness, numbness, and all other sxs at this time. Pt currently follows with Dr. Klein (Neurology) and Dr. Hannon (Cardiology). No further complaints or concerns at this time.       Arrival mode: Personal vehicle     PCP: Zora Cope MD       Past Medical History:  Past Medical History:   Diagnosis Date    Bipolar 1 disorder     CHF (congestive heart failure)     Depression     Hepatitis C     Hypertension     Other hyperlipidemia 8/15/2019       Past Surgical History:  Past Surgical History:   Procedure Laterality Date    HYSTERECTOMY           Family History:  Family History   Problem Relation Age of Onset    Heart attack Maternal Grandmother     Hypertension Mother        Social History:  Social History     Tobacco Use    Smoking status: Some Days      Current packs/day: 0.25     Average packs/day: 0.3 packs/day for 7.6 years (1.9 ttl pk-yrs)     Types: Cigarettes     Start date: 1/11/2016    Smokeless tobacco: Never    Tobacco comments:     1 pack every 3 days   Substance and Sexual Activity    Alcohol use: Yes    Drug use: Yes     Types: Cocaine    Sexual activity: Not Currently       ROS   Review of Systems   Constitutional:  Positive for diaphoresis and fatigue. Negative for chills and fever.   HENT:  Negative for sore throat.    Respiratory:  Negative for shortness of breath.    Cardiovascular:  Negative for chest pain.   Gastrointestinal:  Negative for diarrhea, nausea and vomiting.   Genitourinary:  Negative for dysuria.   Musculoskeletal:  Negative for back pain.   Skin:  Negative for rash.   Neurological:  Negative for dizziness, weakness, numbness and headaches.   Hematological:  Does not bruise/bleed easily.   All other systems reviewed and are negative.    Physical Exam      Initial Vitals [08/04/23 1246]   BP Pulse Resp Temp SpO2   (!) 198/113 89 19 98.2 °F (36.8 °C) 97 %      MAP       --          Physical Exam  Nursing Notes and Vital Signs Reviewed.  Constitutional: Patient is in no acute distress. Well-developed and well-nourished.  Head: Atraumatic. Normocephalic.  Eyes: PERRL. EOM intact. Conjunctivae are not pale. No scleral icterus.  ENT: Mucous membranes are moist. Oropharynx is clear and symmetric.    Neck: Supple. Full ROM.   Cardiovascular: Regular rate. Regular rhythm. No murmurs, rubs, or gallops. Distal pulses are 2+ and symmetric.  Pulmonary/Chest: No respiratory distress. Clear to auscultation bilaterally. No wheezing or rales.  Abdominal: Soft and non-distended.  There is no tenderness.  No rebound, guarding, or rigidity.   Musculoskeletal: Moves all extremities. No obvious deformities. No edema.  Skin: Warm and dry.  Neurological:  Alert, awake, and appropriate.  Intermittently slurred speech, which appears chronic.  No acute focal  neurological deficits are appreciated.  Psychiatric: Normal affect. Good eye contact. Appropriate in content.    ED Course    Procedures  ED Vital Signs:  Vitals:    08/04/23 1246 08/04/23 1247   BP: (!) 198/113    Pulse: 89    Resp: 19    Temp: 98.2 °F (36.8 °C)    TempSrc: Oral    SpO2: 97%    Weight:  63.5 kg (140 lb)       Abnormal Lab Results:  Labs Reviewed   COMPREHENSIVE METABOLIC PANEL - Abnormal; Notable for the following components:       Result Value    CO2 19 (*)     Glucose 132 (*)     BUN 43 (*)     Creatinine 2.3 (*)     eGFR 22 (*)     All other components within normal limits   CBC W/ AUTO DIFFERENTIAL   PROTIME-INR        All Lab Results:  Results for orders placed or performed during the hospital encounter of 08/04/23   CBC auto differential   Result Value Ref Range    WBC 4.46 3.90 - 12.70 K/uL    RBC 4.94 4.00 - 5.40 M/uL    Hemoglobin 13.4 12.0 - 16.0 g/dL    Hematocrit 41.1 37.0 - 48.5 %    MCV 83 82 - 98 fL    MCH 27.1 27.0 - 31.0 pg    MCHC 32.6 32.0 - 36.0 g/dL    RDW 12.7 11.5 - 14.5 %    Platelets 253 150 - 450 K/uL    MPV 10.4 9.2 - 12.9 fL    Immature Granulocytes 0.2 0.0 - 0.5 %    Gran # (ANC) 1.8 1.8 - 7.7 K/uL    Immature Grans (Abs) 0.01 0.00 - 0.04 K/uL    Lymph # 2.0 1.0 - 4.8 K/uL    Mono # 0.5 0.3 - 1.0 K/uL    Eos # 0.2 0.0 - 0.5 K/uL    Baso # 0.03 0.00 - 0.20 K/uL    nRBC 0 0 /100 WBC    Gran % 39.5 38.0 - 73.0 %    Lymph % 45.5 18.0 - 48.0 %    Mono % 10.5 4.0 - 15.0 %    Eosinophil % 3.6 0.0 - 8.0 %    Basophil % 0.7 0.0 - 1.9 %    Differential Method Automated    Comprehensive metabolic panel   Result Value Ref Range    Sodium 142 136 - 145 mmol/L    Potassium 5.0 3.5 - 5.1 mmol/L    Chloride 110 95 - 110 mmol/L    CO2 19 (L) 23 - 29 mmol/L    Glucose 132 (H) 70 - 110 mg/dL    BUN 43 (H) 8 - 23 mg/dL    Creatinine 2.3 (H) 0.5 - 1.4 mg/dL    Calcium 9.5 8.7 - 10.5 mg/dL    Total Protein 7.7 6.0 - 8.4 g/dL    Albumin 4.0 3.5 - 5.2 g/dL    Total Bilirubin 0.3 0.1 - 1.0  mg/dL    Alkaline Phosphatase 124 55 - 135 U/L    AST 16 10 - 40 U/L    ALT 11 10 - 44 U/L    eGFR 22 (A) >60 mL/min/1.73 m^2    Anion Gap 13 8 - 16 mmol/L   Protime-INR   Result Value Ref Range    Prothrombin Time 10.5 9.0 - 12.5 sec    INR 1.0 0.8 - 1.2     Imaging Results:  Imaging Results    None                 The Emergency Provider reviewed the vital signs and test results, which are outlined above.    ED Discussion     2:24 PM: Discussed case with Dr. Rosales ([facility]). Dr. Rosales agrees with current care and management of pt and accepts admission.   Admitting Service: [facility]  Admitting Physician: Dr. Rosales  Admit to: Inpatient    2:25 PM: Re-evaluated pt. I have discussed test results, shared treatment plan, and the need for admission with patient and family at bedside. Pt and family express understanding at this time and agree with all information. All questions answered. Pt and family have no further questions or concerns at this time. Pt is ready for admit.         ED Medication(s):  Medications - No data to display    New Prescriptions    No medications on file       Medical Decision Making    Medical Decision Making:   History:   Old Records Summarized: records from clinic visits.       <> Summary of Records: Seen by neurology on 7/19 for new stroke symptoms, had outpatient MRI done which showed stroke and aneursym, sent ot e ER for admission and further workup.   Differential Diagnosis:   Stroke  Clinical Tests:   Lab Tests: Ordered and Reviewed  ED Management:  Hx of prior strokes, now with new stroke based on outpatient MRI done with aneursym noted, patient denies any active symptoms at present, exam otherwise normal except chronic slurred speech noted, lab work reviewed and otherwise normal, ECG no ischemic changes and normal sinus rhythm, hospital med consulted for admission for further workup.            Scribe Attestation:   Scribe #1: I performed the above scribed  service and the documentation accurately describes the services I performed. I attest to the accuracy of the note.    Attending:   Physician Attestation Statement for Scribe #1: I, Nelly John MD, personally performed the services described in this documentation, as scribed by Branden Mckoy, in my presence, and it is both accurate and complete.          Clinical Impression       ICD-10-CM ICD-9-CM   1. Cerebrovascular accident (CVA), unspecified mechanism  I63.9 434.91   2. Chronic hypertension  I10 401.9       Disposition:   Disposition: Admitted  Condition: Fair         Nelly John MD  08/09/23 1007

## 2023-08-05 LAB
ALBUMIN SERPL BCP-MCNC: 3.8 G/DL (ref 3.5–5.2)
ALP SERPL-CCNC: 103 U/L (ref 55–135)
ALT SERPL W/O P-5'-P-CCNC: 10 U/L (ref 10–44)
ANION GAP SERPL CALC-SCNC: 9 MMOL/L (ref 8–16)
APTT PPP: 27.3 SEC (ref 21–32)
AST SERPL-CCNC: 13 U/L (ref 10–40)
BASOPHILS # BLD AUTO: 0.03 K/UL (ref 0–0.2)
BASOPHILS NFR BLD: 0.6 % (ref 0–1.9)
BILIRUB SERPL-MCNC: 0.5 MG/DL (ref 0.1–1)
BUN SERPL-MCNC: 41 MG/DL (ref 8–23)
CALCIUM SERPL-MCNC: 9.4 MG/DL (ref 8.7–10.5)
CHLORIDE SERPL-SCNC: 110 MMOL/L (ref 95–110)
CHOLEST SERPL-MCNC: 155 MG/DL (ref 120–199)
CHOLEST/HDLC SERPL: 3.5 {RATIO} (ref 2–5)
CO2 SERPL-SCNC: 24 MMOL/L (ref 23–29)
CREAT SERPL-MCNC: 2.2 MG/DL (ref 0.5–1.4)
DIFFERENTIAL METHOD: NORMAL
EOSINOPHIL # BLD AUTO: 0.2 K/UL (ref 0–0.5)
EOSINOPHIL NFR BLD: 4.8 % (ref 0–8)
ERYTHROCYTE [DISTWIDTH] IN BLOOD BY AUTOMATED COUNT: 12.7 % (ref 11.5–14.5)
EST. GFR  (NO RACE VARIABLE): 24 ML/MIN/1.73 M^2
GLUCOSE SERPL-MCNC: 94 MG/DL (ref 70–110)
HCT VFR BLD AUTO: 40.9 % (ref 37–48.5)
HDLC SERPL-MCNC: 44 MG/DL (ref 40–75)
HDLC SERPL: 28.4 % (ref 20–50)
HGB BLD-MCNC: 13.2 G/DL (ref 12–16)
IMM GRANULOCYTES # BLD AUTO: 0.01 K/UL (ref 0–0.04)
IMM GRANULOCYTES NFR BLD AUTO: 0.2 % (ref 0–0.5)
INR PPP: 1 (ref 0.8–1.2)
LDLC SERPL CALC-MCNC: 101.4 MG/DL (ref 63–159)
LYMPHOCYTES # BLD AUTO: 2.1 K/UL (ref 1–4.8)
LYMPHOCYTES NFR BLD: 43.3 % (ref 18–48)
MAGNESIUM SERPL-MCNC: 2.1 MG/DL (ref 1.6–2.6)
MCH RBC QN AUTO: 27.6 PG (ref 27–31)
MCHC RBC AUTO-ENTMCNC: 32.3 G/DL (ref 32–36)
MCV RBC AUTO: 86 FL (ref 82–98)
MONOCYTES # BLD AUTO: 0.6 K/UL (ref 0.3–1)
MONOCYTES NFR BLD: 12.2 % (ref 4–15)
NEUTROPHILS # BLD AUTO: 1.9 K/UL (ref 1.8–7.7)
NEUTROPHILS NFR BLD: 38.9 % (ref 38–73)
NONHDLC SERPL-MCNC: 111 MG/DL
NRBC BLD-RTO: 0 /100 WBC
PHOSPHATE SERPL-MCNC: 3.7 MG/DL (ref 2.7–4.5)
PLATELET # BLD AUTO: 255 K/UL (ref 150–450)
PMV BLD AUTO: 10.8 FL (ref 9.2–12.9)
POTASSIUM SERPL-SCNC: 4.6 MMOL/L (ref 3.5–5.1)
PROT SERPL-MCNC: 7.3 G/DL (ref 6–8.4)
PROTHROMBIN TIME: 11 SEC (ref 9–12.5)
RBC # BLD AUTO: 4.78 M/UL (ref 4–5.4)
SODIUM SERPL-SCNC: 143 MMOL/L (ref 136–145)
TRIGL SERPL-MCNC: 48 MG/DL (ref 30–150)
TROPONIN I SERPL DL<=0.01 NG/ML-MCNC: 0.04 NG/ML (ref 0–0.03)
WBC # BLD AUTO: 4.83 K/UL (ref 3.9–12.7)

## 2023-08-05 PROCEDURE — 92610 EVALUATE SWALLOWING FUNCTION: CPT | Mod: HCNC

## 2023-08-05 PROCEDURE — 25000003 PHARM REV CODE 250: Mod: HCNC | Performed by: INTERNAL MEDICINE

## 2023-08-05 PROCEDURE — 83735 ASSAY OF MAGNESIUM: CPT | Mod: HCNC | Performed by: INTERNAL MEDICINE

## 2023-08-05 PROCEDURE — 92523 SPEECH SOUND LANG COMPREHEN: CPT | Mod: HCNC

## 2023-08-05 PROCEDURE — 36415 COLL VENOUS BLD VENIPUNCTURE: CPT | Mod: HCNC | Performed by: INTERNAL MEDICINE

## 2023-08-05 PROCEDURE — 99223 PR INITIAL HOSPITAL CARE,LEVL III: ICD-10-PCS | Mod: HCNC,GC,, | Performed by: PSYCHIATRY & NEUROLOGY

## 2023-08-05 PROCEDURE — 84484 ASSAY OF TROPONIN QUANT: CPT | Mod: HCNC | Performed by: INTERNAL MEDICINE

## 2023-08-05 PROCEDURE — 63600175 PHARM REV CODE 636 W HCPCS: Mod: HCNC | Performed by: NURSE PRACTITIONER

## 2023-08-05 PROCEDURE — 85025 COMPLETE CBC W/AUTO DIFF WBC: CPT | Mod: HCNC | Performed by: INTERNAL MEDICINE

## 2023-08-05 PROCEDURE — S4991 NICOTINE PATCH NONLEGEND: HCPCS | Mod: HCNC | Performed by: NURSE PRACTITIONER

## 2023-08-05 PROCEDURE — 21400001 HC TELEMETRY ROOM: Mod: HCNC

## 2023-08-05 PROCEDURE — 63600175 PHARM REV CODE 636 W HCPCS: Mod: HCNC | Performed by: INTERNAL MEDICINE

## 2023-08-05 PROCEDURE — 97161 PT EVAL LOW COMPLEX 20 MIN: CPT | Mod: HCNC

## 2023-08-05 PROCEDURE — 97166 OT EVAL MOD COMPLEX 45 MIN: CPT | Mod: HCNC

## 2023-08-05 PROCEDURE — 25000003 PHARM REV CODE 250: Mod: HCNC | Performed by: NURSE PRACTITIONER

## 2023-08-05 PROCEDURE — 85730 THROMBOPLASTIN TIME PARTIAL: CPT | Mod: HCNC | Performed by: INTERNAL MEDICINE

## 2023-08-05 PROCEDURE — 80053 COMPREHEN METABOLIC PANEL: CPT | Mod: HCNC | Performed by: INTERNAL MEDICINE

## 2023-08-05 PROCEDURE — 85610 PROTHROMBIN TIME: CPT | Mod: HCNC | Performed by: INTERNAL MEDICINE

## 2023-08-05 PROCEDURE — 99223 1ST HOSP IP/OBS HIGH 75: CPT | Mod: HCNC,GC,, | Performed by: PSYCHIATRY & NEUROLOGY

## 2023-08-05 PROCEDURE — 84100 ASSAY OF PHOSPHORUS: CPT | Mod: HCNC | Performed by: INTERNAL MEDICINE

## 2023-08-05 PROCEDURE — 97530 THERAPEUTIC ACTIVITIES: CPT | Mod: HCNC

## 2023-08-05 RX ORDER — IBUPROFEN 200 MG
1 TABLET ORAL DAILY
Status: DISCONTINUED | OUTPATIENT
Start: 2023-08-05 | End: 2023-08-06 | Stop reason: HOSPADM

## 2023-08-05 RX ORDER — ACETAMINOPHEN 325 MG/1
650 TABLET ORAL EVERY 6 HOURS PRN
Status: DISCONTINUED | OUTPATIENT
Start: 2023-08-05 | End: 2023-08-06 | Stop reason: HOSPADM

## 2023-08-05 RX ORDER — HYDRALAZINE HYDROCHLORIDE 50 MG/1
50 TABLET, FILM COATED ORAL EVERY 8 HOURS
Status: DISCONTINUED | OUTPATIENT
Start: 2023-08-05 | End: 2023-08-06 | Stop reason: HOSPADM

## 2023-08-05 RX ORDER — LABETALOL HYDROCHLORIDE 5 MG/ML
10 INJECTION, SOLUTION INTRAVENOUS EVERY 6 HOURS PRN
Status: DISCONTINUED | OUTPATIENT
Start: 2023-08-05 | End: 2023-08-06 | Stop reason: HOSPADM

## 2023-08-05 RX ORDER — CARVEDILOL 6.25 MG/1
6.25 TABLET ORAL 2 TIMES DAILY
Status: DISCONTINUED | OUTPATIENT
Start: 2023-08-05 | End: 2023-08-06 | Stop reason: HOSPADM

## 2023-08-05 RX ORDER — HYDRALAZINE HYDROCHLORIDE 20 MG/ML
10 INJECTION INTRAMUSCULAR; INTRAVENOUS EVERY 6 HOURS PRN
Status: DISCONTINUED | OUTPATIENT
Start: 2023-08-05 | End: 2023-08-06 | Stop reason: HOSPADM

## 2023-08-05 RX ORDER — TALC
6 POWDER (GRAM) TOPICAL NIGHTLY PRN
Status: DISCONTINUED | OUTPATIENT
Start: 2023-08-05 | End: 2023-08-06 | Stop reason: HOSPADM

## 2023-08-05 RX ORDER — LOSARTAN POTASSIUM 50 MG/1
50 TABLET ORAL DAILY
Status: DISCONTINUED | OUTPATIENT
Start: 2023-08-05 | End: 2023-08-06 | Stop reason: HOSPADM

## 2023-08-05 RX ADMIN — ACETAMINOPHEN 650 MG: 325 TABLET ORAL at 12:08

## 2023-08-05 RX ADMIN — HYDRALAZINE HYDROCHLORIDE 10 MG: 20 INJECTION, SOLUTION INTRAMUSCULAR; INTRAVENOUS at 11:08

## 2023-08-05 RX ADMIN — NICOTINE 1 PATCH: 21 PATCH, EXTENDED RELEASE TRANSDERMAL at 10:08

## 2023-08-05 RX ADMIN — LOSARTAN POTASSIUM 50 MG: 50 TABLET, FILM COATED ORAL at 03:08

## 2023-08-05 RX ADMIN — ENOXAPARIN SODIUM 30 MG: 30 INJECTION SUBCUTANEOUS at 03:08

## 2023-08-05 RX ADMIN — CLOPIDOGREL BISULFATE 75 MG: 75 TABLET ORAL at 07:08

## 2023-08-05 RX ADMIN — HYDRALAZINE HYDROCHLORIDE 10 MG: 20 INJECTION, SOLUTION INTRAMUSCULAR; INTRAVENOUS at 12:08

## 2023-08-05 RX ADMIN — PRAVASTATIN SODIUM 40 MG: 20 TABLET ORAL at 11:08

## 2023-08-05 RX ADMIN — ASPIRIN 81 MG: 81 TABLET, COATED ORAL at 07:08

## 2023-08-05 RX ADMIN — ZIPRASIDONE HYDROCHLORIDE 20 MG: 20 CAPSULE ORAL at 11:08

## 2023-08-05 RX ADMIN — LABETALOL HYDROCHLORIDE 10 MG: 5 INJECTION INTRAVENOUS at 12:08

## 2023-08-05 RX ADMIN — HYDRALAZINE HYDROCHLORIDE 50 MG: 50 TABLET ORAL at 11:08

## 2023-08-05 RX ADMIN — MELATONIN TAB 3 MG 6 MG: 3 TAB at 11:08

## 2023-08-05 RX ADMIN — HYDRALAZINE HYDROCHLORIDE 50 MG: 50 TABLET ORAL at 03:08

## 2023-08-05 RX ADMIN — MELATONIN TAB 3 MG 6 MG: 3 TAB at 12:08

## 2023-08-05 RX ADMIN — ZIPRASIDONE HYDROCHLORIDE 20 MG: 20 CAPSULE ORAL at 07:08

## 2023-08-05 RX ADMIN — CARVEDILOL 6.25 MG: 6.25 TABLET, FILM COATED ORAL at 11:08

## 2023-08-05 NOTE — PT/OT/SLP EVAL
"Occupational Therapy Evaluation and Treatment    Name: Rose Milligan  MRN: [id number]  Admitting Diagnosis: Acute CVA (cerebrovascular accident)  Recent Surgery: * No surgery found *      Recommendations:     Discharge Recommendations: outpatient OT  Level of Assistance Recommended: Intermittent assistance and Intermittent supervision  Discharge Equipment Recommendations: shower chair  Barriers to discharge: None    Assessment:     Rose Milligan is a 68 y.o. female with a medical diagnosis of Acute CVA (cerebrovascular accident). She presents with performance deficits affecting function including weakness, impaired endurance, impaired self care skills, impaired functional mobility, impaired balance, impaired cardiopulmonary response to activity.    Rehab Prognosis: Good; patient would benefit from acute OT services to address these deficits and reach maximum level of function.    Plan:     Patient to be seen 2 x/week to address the above listed problems via self-care/home management, therapeutic activities, therapeutic exercises, neuromuscular re-education  Plan of Care Expires: 08/19/23  Plan of Care Reviewed with: patient    Subjective     Chief Complaint: Reported "I am doing so much better today."  Patient Comments/Goals: get better  Pain/Comfort:  Pain Rating 1: 0/10    Social History:  Living Environment: Patient lives alone in a first floor apartment with no steps/stairs to enter.  Prior Level of Function: Prior to admission, patient was independent with ADLs and community distance ambulation.  Roles and Routines: Patient was driving and not working prior to admission. 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  Equipment Used at Home: none  DME owned (not currently used): rollator  Assistance Upon Discharge: family    Objective:     Communicated with nurse, Wisam, prior to session. Patient found up in chair with peripheral IV, telemetry upon OT entry to room.    General Precautions: Standard, fall   Orthopedic " "Precautions: N/A   Braces: N/A    Respiratory Status: Room air    Occupational Performance    Gait belt applied - Yes    Bed Mobility:   OOB in chair at start and end of session    Functional Mobility/Transfers:  Sit <> Stand Transfer with stand by assistance with no AD  Bed <> Chair Transfer using Step Transfer technique with stand by assistance with no AD  Functional Mobility: Patient completed x220ft functional mobility without AD and SBA to increase dynamic standing balance and activity tolerance needed for ADL completion.    Activities of Daily Living:  Upper Body Dressing: set up assistance  Lower Body Dressing: set up assistance. Donned socks while seated EOB.    Cognitive/Visual Perceptual:  Cognitive/Psychosocial Skills:    -     Oriented to: Person, Place, Time, Situation  -     Follows Commands/attention: Follows two-step commands    Physical Exam:  Balance:    -     Sitting: supervision  -     Standing: stand by assistance  Upper Extremity Range of Motion:     -       Right Upper Extremity: WFL  -       Left Upper Extremity: WFL  Upper Extremity Strength:    -       Right Upper Extremity: Deficits: grossly 4+/5  -       Left Upper Extremity: Deficits: grossly 4+/5   Strength:    -       Right Upper Extremity: Deficits: fair  -       Left Upper Extremity: Deficits: fair  R hand dominance  No significant unilateral deficits elicited.  Reported baseline "stiffiness" in L shdr.    AMPAC 6 Click ADL:  AMPAC Total Score: 20    Treatment & Education:  Therapist provided facilitation and instruction of proper body mechanics, energy conservation, and fall prevention strategies during tasks listed above  Patient educated on role of OT, POC, and goals for therapy  Patient educated on importance of OOB activities with staff member assistance and sitting OOB majority of the day  Encouraged completion of B UE AROM therex throughout the day to increase functional strength and activity tolerance needed for ADL " completion.    Patient left up in chair with all lines intact, call button in reach, RN notified, and chair alarm on.    GOALS:   Multidisciplinary Problems       Occupational Therapy Goals          Problem: Occupational Therapy    Goal Priority Disciplines Outcome Interventions   Occupational Therapy Goal     OT, PT/OT     Description: Goals to be met by: 8/19/23     Patient will increase functional independence with ADLs by performing:    Toileting from toilet with Modified [place] for hygiene and clothing management.   Toilet transfer to toilet with Modified [place].  Increased functional strength in B UE to grossly WFL.                         History:     Past Medical History:   Diagnosis Date    Acute CVA (cerebrovascular accident) 8/4/2023    Bipolar 1 disorder     CHF (congestive heart failure)     Depression     Hepatitis C     Hypertension     Other hyperlipidemia 8/15/2019         Past Surgical History:   Procedure Laterality Date    HYSTERECTOMY         Time Tracking:     OT Date of Treatment: 08/05/23  OT Start Time: 0835  OT Stop Time: 0900  OT Total Time (min): 25 min    Billable Minutes: Evaluation 15 and Therapeutic Activity 10    Vivi Pozo, OT  8/5/2023

## 2023-08-05 NOTE — ASSESSMENT & PLAN NOTE
-Patient is not chronically on statin. will start this hospitalization with new CVA.   -Last LDL was   Lab Results   Component Value Date    LDLCALC 101.4 08/04/2023      -follow up with PCP/cardiology OP

## 2023-08-05 NOTE — HOSPITAL COURSE
67 y/o female admitted with c/o worsening left sided weakness for the past couple of days. She has a h/o stroke ~ 2 years ago and is non-compliant with her ASA, has been out for a couple of months. MRI in ER shows punctate acute left thalamic infarct and right MCA bifurcation aneurysm. Neurology consulted, started on ASA/Plavix (x 21 days)/statin. She was found to have hypertensive urgency on admission, will not allow for permissive HTN d/t aneurysm. Patient was NPO until ST evaluated her and recommended diet. ST evaluated and swallowing was intact, recommended a regular diet.   TTE negative bubble study, grade I diastolic dysfunction. Continue telemetry monitoring.   PT/OT evaluated patient and recommended OP therapy on discharge.   Lipid panel - .4 (goal < 70 after CVA), continue statin.   Restarted home anti-hypertensives and blood pressure improved prior to discharge.   Patient was out of all her medication, will send prescriptions on discharge.   Will need aspirin and Plavix (x 21 days) then just continue aspirin indefinitely.   Will send prescription for nicotine patch and send a referral to smoking cessation program.     Follow up with PCP in 3-5 days on discharge.   Referral sent to neurology for OP follow up after stroke.   Referral was sent for Nsg for continued management and monitoring of her aneurysm. Instructed patient if she gets a bad headache, she needs to go back to the ER for evaluation.     Patient seen and examined on the day of discharge.  All questions and concerns were addressed prior to discharge.    Face to face encounter with patient: 36 minutes

## 2023-08-05 NOTE — ASSESSMENT & PLAN NOTE
-Creatine stable for now  -BMP reviewed- noted Estimated Creatinine Clearance: 21.3 mL/min (A) (based on SCr of 2.2 mg/dL (H)). according to latest data  -Monitor UOP and serial BMP and adjust therapy as needed  -Renally dose meds and avoid nephrotoxins

## 2023-08-05 NOTE — PLAN OF CARE
OT tamra completed. Sit>stand SBA, functional mobility x220ft without AD and SBA, step>pivot to bedside chair with SBA. Recommending OPOT at d/c.

## 2023-08-05 NOTE — CONSULTS
Food & Nutrition Education     Diet Education: Cardiac  and Low Sodium Nutrition Therapy     Learners: Patient      Nutrition Education provided with handouts:     Cardiac TLC, Low Sodium Nutrition Therapy ([email or web address])     Comments:   PMH: Chronic hepatitis C, HTN, CKD 4, Bipolar, Heart failure, HLD, Brain aneurysm    68 y.o. Female admitted for Acute CVA. Visited pt at bedside, pt food tray empty. Pt confirmed 100% PO intake of lunch, great appetite during admit and PTA, stated she was very hungry and that the food is very good. Pt stated her normal weight is 140 lbs, last weighed at MD office.      RD educated patient on low sodium, general healthful diet r/t recent hospital diagnosis. Recommended a well balanced diet with a variety of fresh foods, fruits and vegetables (5 cups/day), whole grains (3 oz/day), and fat-free or low fat dairy. Discussed reading food packages, food labels, and nutrition facts labels to identify nutrient content of foods.   Discussed the importance of limiting sodium to less than 2,000 mg per day. Recommended salt free seasonings and other herbs and spices in meals to enhance flavor without additional sodium.   Discussed dietary sources of cholesterol, the importance of incorporating healthy fats into the diet, and avoiding saturated and trans fats for heart health. For a generally healthy diet, aim for total fat less than 25-35% of calories.   Discussed the importance of fiber (especially soluble fiber), dietary sources, and a goal intake of >20-30g/day.     Pt expressed appreciation and understanding of nutritional education. Encouraged pt to use RD contact information at the top of the handouts for any questions/ concerns she may have in the future.     NFPE not performed, pt appears well nourished.     All questions and concerns answered.     Provided handout with dietitian's contact information.     *Please re-consult as needed.     Thank you,     Anni Pinto,  Registration Eligible, Provisional LDN

## 2023-08-05 NOTE — PROGRESS NOTES
[facility] Medicine  Progress Note    Patient Name: Rose Milligan  MRN: [id number]  Patient Class: IP- Inpatient   Admission Date: 8/4/2023  Length of Stay: 1 days  Attending Physician: Ignacio Red,*  Primary Care Provider: Zora Cope MD        Subjective:     Principal Problem:Acute CVA (cerebrovascular accident)      HPI:  69 y/o AAF with a PMHx of  H/O CVA with residual finding , CKD stage 3 HTN , H/P polysubstance drug aust ,Tobacco abuse  , HLD, Bipolar , Depression and HCV presented to the ER with a c/o Fatigue and left lower extremity weakness for the last days which has gotten worse .Pt was referred to the ED from Dr. Klein (Neurology) after brain MRI done this morning showed punctate acute left thalamic infarct and right MCA bifurcation aneurysm. Symptoms are constant and moderate in severity. No mitigating or exacerbating factors reported. Associated sxs include diaphoresis and fatigue. Patient denies any fever, chills, n/v/d, SOB, CP, weakness, numbness, and all other sxs at this time. She also complaining of walking difficulties.    ER COURSE: MRI brain show Punctate acute left thalamic infarct. MRA brain :5.2 x 5.7 mm proximal right posterior M2 aneurysm  ER VS:   BP Pulse Resp Temp SpO2   (!) 198/113 89 19 98.2 °F (36.8 °C) 97 %     Pt will be admitted to inpt with a dx of acute CVA    Overview/Hospital Course:  69 y/o female admitted with c/o worsening left sided weakness for the past couple of days. She has a h/o stroke ~ 2 years ago and is non-compliant with her ASA, has been out for a couple of months. MRI in ER shows punctate acute left thalamic infarct and right MCA bifurcation aneurysm. Neurology consulted, started on ASA/Plavix (x 21 days)/statin. She was found to have hypertensive urgency on admission, will not allow for permissive HTN d/t aneurysm. Patient was NPO until ST evaluated her and recommended diet.   TTE negative bubble study, grade I  diastolic dysfunction. Continue telemetry monitoring.   PT/OT evaluated patient and recommended OP therapy on discharge.   Lipid panel - .4 (goal < 70 after CVA), continue statin.       Interval History: awake, alert, sitting up in couch, NAD. Patient reports she feels better today. PT/OT/ST evaluation recommends OP therapy on d/c. BP still elevated, will treat with aneurysm and not allow for permissive htn. Neurology following. Plan to d/c home tomorrow if stable.    Review of Systems   Constitutional:  Negative for fatigue and fever.   Respiratory:  Negative for cough and shortness of breath.    Cardiovascular:  Negative for chest pain and palpitations.   Gastrointestinal:  Negative for nausea and vomiting.   All other systems reviewed and are negative.    Objective:     Vital Signs (Most Recent):  Temp: 97.7 °F (36.5 °C) (08/05/23 1217)  Pulse: 68 (08/05/23 1217)  Resp: 19 (08/05/23 1217)  BP: (!) 184/98 (08/05/23 1217)  SpO2: 98 % (08/05/23 1217) Vital Signs (24h Range):  Temp:  [97.1 °F (36.2 °C)-98.7 °F (37.1 °C)] 97.7 °F (36.5 °C)  Pulse:  [58-84] 68  Resp:  [16-20] 19  SpO2:  [98 %-99 %] 98 %  BP: (139-210)/() 184/98     Weight: 63.5 kg (140 lb)  Body mass index is 24.8 kg/m².  No intake or output data in the 24 hours ending 08/05/23 1325      Physical Exam  Vitals and nursing note reviewed.   Constitutional:       Appearance: Normal appearance.   Cardiovascular:      Rate and Rhythm: Normal rate and regular rhythm.      Heart sounds: No murmur heard.  Pulmonary:      Effort: Pulmonary effort is normal.      Breath sounds: Normal breath sounds.   Abdominal:      General: Bowel sounds are normal.      Palpations: Abdomen is soft.   Musculoskeletal:         General: Normal range of motion.   Skin:     General: Skin is warm and dry.   Neurological:      General: No focal deficit present.      Mental Status: She is alert and oriented to person, place, and time.   Psychiatric:         Mood and  Affect: Mood normal.         Behavior: Behavior normal.             Significant Labs: All pertinent labs within the past 24 hours have been reviewed.  CBC:   Recent Labs   Lab 08/04/23  1303 08/05/23  0420   WBC 4.46 4.83   HGB 13.4 13.2   HCT 41.1 40.9    255     CMP:   Recent Labs   Lab 08/04/23  1303 08/05/23  0420    143   K 5.0 4.6    110   CO2 19* 24   * 94   BUN 43* 41*   CREATININE 2.3* 2.2*   CALCIUM 9.5 9.4   PROT 7.7 7.3   ALBUMIN 4.0 3.8   BILITOT 0.3 0.5   ALKPHOS 124 103   AST 16 13   ALT 11 10   ANIONGAP 13 9     Magnesium:   Recent Labs   Lab 08/05/23  0420   MG 2.1     Troponin:   Recent Labs   Lab 08/05/23  0420   TROPONINI 0.042*       Significant Imaging: I have reviewed all pertinent imaging results/findings within the past 24 hours.    Assessment/Plan:      * Acute CVA (cerebrovascular accident)    Antithrombotics for secondary stroke prevention: Antiplatelets: Aspirin: 81 mg daily  Clopidogrel: 75 mg daily (x 21 days, then only aspirin indefinitely)    Statins for secondary stroke prevention and hyperlipidemia, if present:   Statins: Atorvastatin- 40 mg daily    Aggressive risk factor modification: HTN, Smoking, HLD     Rehab efforts: The patient has been evaluated by a stroke team provider and the therapy needs have been fully considered based off the presenting complaints and exam findings. The following therapy evaluations are needed: PT evaluate and treat, OT evaluate and treat, SLP evaluate and treat    Diagnostics ordered/pending: MRA head to assess vasculature, MRA neck/arch to assess vasculature, MRI head without contrast to assess brain parenchyma, TTE to assess cardiac function/status     VTE prophylaxis: Enoxaparin 40 mg SQ every 24 hours    BP parameters: Infarct: No intervention, SBP <220    Brain aneurysm  -Neurology following, appreciate  -Need better BP control  -hydralazine/labetol PRN SBP > 160  -Outopt NSGY f/u     Tobacco use  Assistance with  "smoking cessation was offered, including:  [x]  Medications  [x]  Counseling  []  Printed Information on Smoking Cessation  []  Referral to a Smoking Cessation Program    Patient was counseled regarding smoking for >10 minutes.  Ordered Nicotine patch today per patient request    Other hyperlipidemia   -Patient is not chronically on statin. will start this hospitalization with new CVA.   -Last LDL was   Lab Results   Component Value Date    LDLCALC 101.4 08/04/2023      -follow up with PCP/cardiology OP    (HFpEF) heart failure with preserved ejection fraction  Patient is identified as having Diastolic (HFpEF) heart failure that is Acute on Chronic. CHF is currently controlled. Latest ECHO performed and demonstrates- Results for orders placed during the hospital encounter of 08/04/23    Echo    Interpretation Summary    Left Ventricle: The left ventricle is normal in size. Moderately increased wall thickness. There is moderate concentrict hypertrophy. Normal wall motion. There is normal systolic function with a visually estimated ejection fraction of 55 - 70%. Grade I diastolic dysfunction.    Left Atrium: Left atrium is mildly dilated.    Right Ventricle: Normal right ventricular cavity size. Wall thickness is normal. Right ventricle wall motion  is normal. Systolic function is normal.    Mitral Valve: There is mild regurgitation.    Tricuspid Valve: There is mild regurgitation.    IVC/SVC: Intermediate venous pressure at 8 mmHg.    Bubble study negative  Monitor clinical status closely. Monitor on telemetry. Patient is off CHF pathway.  Monitor strict Is&Os and daily weights.  Place on fluid restriction of 1.5 L. Continue to stress to patient importance of self efficacy and  on diet for CHF. Last BNP reviewed- and noted below No results for input(s): "BNP", "BNPTRIAGEBLO" in the last 168 hours.     Bipolar 1 disorder  -Stable, continue to monitor  -cont home Geodon BID  -Denies any SI/VH/AH     CKD (chronic " kidney disease) stage 4, GFR 15-29 ml/min  -Creatine stable for now  -BMP reviewed- noted Estimated Creatinine Clearance: 21.3 mL/min (A) (based on SCr of 2.2 mg/dL (H)). according to latest data  -Monitor UOP and serial BMP and adjust therapy as needed  -Renally dose meds and avoid nephrotoxins    Uncontrolled hypertension  Chronic, uncontrolled  -Latest blood pressure and vitals reviewed-   Temp:  [97.1 °F (36.2 °C)-98.7 °F (37.1 °C)]   Pulse:  [58-84]   Resp:  [16-20]   BP: (139-210)/()   SpO2:  [98 %-99 %] .   -Home meds for hypertension were reviewed and noted below.   Hypertension Medications               carvediloL (COREG) 6.25 MG tablet TAKE 1 TABLET BY MOUTH TWICE A DAY    cloNIDine (CATAPRES) 0.1 MG tablet Take 1 tablet (0.1 mg total) by mouth 3 (three) times daily.    furosemide (LASIX) 40 MG tablet TAKE 1 TABLET ONE TIME DAILY. OVERDUE FOR LAB AND ANNUAL/LAST REFILL.    hydrALAZINE (APRESOLINE) 50 MG tablet Take 1 tablet (50 mg total) by mouth every 8 (eight) hours.    irbesartan (AVAPRO) 150 MG tablet TAKE 1 TABLET BY MOUTH EVERY DAY          -While in the hospital, will manage blood pressure as follows; Adjust home antihypertensive regimen as follows- hold Lasix and clonidine for now, restart irbesartan, Coreg, hydralazine. Do not allow for permissive HTN with aneurysm, initial BP control immediately per neurology  -PRN anti-hypertensive medication if patient's BP > 160/100   -optimize pain management    Chronic hepatitis C virus infection  -unknown treatment status  -will need hepatology follow up on discharge      VTE Risk Mitigation (From admission, onward)           Ordered     enoxaparin injection 30 mg  Daily         08/04/23 1546     IP VTE HIGH RISK PATIENT  Once         08/04/23 1546     Place sequential compression device  Until discontinued         08/04/23 1546                    Discharge Planning   MIKI:      Code Status: Full Code   Is the patient medically ready for discharge?:  No    Reason for patient still in hospital (select all that apply): Patient trending condition, Laboratory test, Treatment and Consult recommendations  Discharge Plan A: Home Health              Felipa Hayes NP  Department of Hospital Medicine   [facility] - Telemetry ([facility])

## 2023-08-05 NOTE — PT/OT/SLP EVAL
"Speech Language Pathology Evaluation  Cognitive/Bedside Swallow    Patient Name:  Rose Milligan   MRN:  [id number]  Admitting Diagnosis: Acute CVA (cerebrovascular accident)    Recommendations:                  General Recommendations:  Dysphagia therapy and Cognitive-linguistic therapy  Diet recommendations:  Regular Diet - IDDSI Level 7, Thin liquids - IDDSI Level 0   Aspiration Precautions: Standard aspiration precautions   General Precautions: Standard, aspiration  Communication strategies:  none    Assessment:     Rose Milligan is a 68 y.o. female with an SLP diagnosis of Cognitive-communication Impairment.  She presents with a cognitive-communication disorder characterized by decreased attention, immediate and short term memory, and visuospatial skills. She reports word finding deficits that have been present over the past couple of months. However, no significant word finding deficits appreciated during today's assessment. She may benefit from a more comprehensive language and cognitive assessment (inpatient rehabilitation or outpatient therapy).     History:     Past Medical History:   Diagnosis Date    Acute CVA (cerebrovascular accident) 2023    Bipolar 1 disorder     CHF (congestive heart failure)     Depression     Hepatitis C     Hypertension     Other hyperlipidemia 8/15/2019       Past Surgical History:   Procedure Laterality Date    HYSTERECTOMY         Social History: Patient lives alone.     Prior diet: Thin liquids/Regular consistencies.    Relevant Imagin2023: MRI Brain with impressions per Gil Hoskins MD:   "Impression:  Punctate acute left thalamic infarct.  Multifocal remote infarcts.  Scattered microhemorrhages and mild-to-moderate white matter changes in keeping with chronic microvascular ischemic change.  0.5 cm right MCA bifurcation aneurysm.  Please see concurrent MRA for further evaluation."    Subjective     Patient seen sitting up in chair. She reported she was hungry " and ready to eat.     Patient goals: To go home. Patient expressed willingness to participate in post-acute speech therapy.      Pain/Comfort:  Pain Rating 1: 0/10  Pain Rating Post-Intervention 1: 0/10  Pain Rating 2: 0/10  Pain Rating Post-Intervention 2: 0/10    Respiratory Status: Room air    Objective:     Cognitive Status:    COGNITIVE ASSESSMENT:   The [employer] Mental Status (UMS) Examination is a screening tool to detect cognitive impairment. The assessment consists questions regarding orientation, visuospatial skills, immediate memory and delayed memory. The SLUMS was administered today to quickly screen for cognitive impairment. See below for results:    Item Response Score    What day of the week is it?   1/1   What is the year?   1/1   What state are we in?   1/1   You have $100 and you go to the store and buy a dozen apples for $3 and a tricycle for $20.  How much did you spend?   How much do you have left?  Q 1: 1/1      Q 2: 0/2 1/2   Please name as many animals as you can in one minute   1/3   What were the five objects I asked you to remember?   0/5   I am going to give you a series of numbers and I would like you to give them me backwards.  Trial 1: 0/1  Trial 2: 0/1  0/2   This is clock face. Please put in the hour markers and the time at ten minutes to eleven o'clock.  Hour markers: 0/2  Hands: 2/2 2/4   Please place an X in the triangle.   Which of the above figures is the largest? Q1: 2/1  Q2: 2/1 2/2   I am going to tell you a story. Please listen carefully because afterwards, I'm going to ask you some questions about it.  Q1: 2/2  Q2: 0/2  Q3: 0/2  Q4: 0/2 2/8   TOTAL:   11/30     Scoring:    High school education  Less than high school education   Normal 27-30 25-30   Mild Neurocognitive Disorder 21-26 20-24   Dementia 1-20 1-19     ELIAZAR Sunshine, N Don, CHASE Mckee, HM Tristen III, and NICOL Elkins. The [employer] [place] [employer] Mental Status   (UMS) Examination for detecting mild  cognitive impairment and dementia is more sensitive than the Mini-Mental Status Exaination (MMSE) - A  study. Am J Geriatr Psych 14:900-10, 2006.     Description:   According the scores from the SLUMS, the patient falls into the dementia category, indicating a high likelihood of a presence of a cognitive-communication disorder. She presents with intact orientation, ability to participate in conversational tasks, and ability to follow all directions. The patient demonstrated deficits in immediate and short term memory skills, visuospatial skills, and naming. Unknown if score during naming task is related to deficits in attention or a true language impairment. Patient would benefit from further language and cognitive assessment post-acute.      Receptive Language:   Comprehension:   Patient able to participate in conversational tasks and follow all directions. No significant receptive language deficits observed. However, patient would benefit from further language assessment post-acute.       Expressive Language:  Verbal:    Patient able to participate in conversational tasks and verbalize all wants/needs. She reports moments of word finding deficits. However, this was not observed during today's assessment. Patient would benefit from further language assessment post-acute.       Motor Speech:  Patient presents with decreased labial ROM. However, this is not significantly impacting her speech. No significant motor speech deficits observed during today's assessment.     Visual-Spatial:  Patient demonstrated difficulty with clock drawing task during the UMS examination today. Visual-spatial deficits likely present. However, patient would benefit from further cognitive assessment post-acute.       Oral Musculature Evaluation  Oral Musculature: facial asymmetry present  Dentition: present and adequate  Secretion Management: adequate  Mucosal Quality: good  Mandibular Strength and Mobility: WFL  Oral Labial Strength  and Mobility: impaired retraction  Lingual Strength and Mobility: WFL  Velar Elevation: impaired  Buccal Strength and Mobility: WFL  Volitional Cough: Present/adequate  Volitional Swallow: Present  Voice Prior to PO Intake: WFL  Oral Musculature Comments: Patient with decreased ROM during labial retraction (left side); Decreased velum ROM (right). Likely due to multiple infarcts.    Clinical Swallow Exam      [place] Swallow Protocol:  [place] Swallow Protocol dictates patient remain NPO if fail screener; (Dyanar et al. 2014) however, as objective swallow assessment is not available for greater than a week, patient will remain on current diet until objective assessment is completed unless otherwise indicated.     The [place] Swallow Protocol was administered. The patient was alert and provided the instructions prior to the beginning of the protocol. The patient consumed 3 oz before putting the cup down. Patient drank with consecutive swallows. No overt s/s of pharyngeal dysphagia or aspiration were observed during today's assessment. The patient passed the screener.       PO Trials:   Patient presented with:     Consistency Amount/Presentation Description   Thin liquid (IDDSI 0) cup sip/self regulated Oral phase WFL. No overt s/s of pharyngeal dysphagia.    Puree (applesauce (IDDSI 4) tablespoon/ self regulated Oral phase WFL. No overt s/s of pharyngeal dysphagia.    Solid (IDDSI 7) Cracker/ self fed Oral phase WFL. No overt s/s of pharyngeal dysphagia.      *Thickened liquids were not used in this assessment. Samuelfe (2018) reported that thickened liquids have no sound evidence as reducing risk of pneumonia in patients with dysphagia and can cause harm by increasing risk of dehydration. It also presents an increased risk of UTI, electrolyte imbalance, constipation, fecal impaction, cognitive impairment, functional decline, and even death (Langmore, 2002; Maxine, 2016).  This supports the assertion that we should confirm a  patient requires thickened liquids with an instrumental swallow study prior to recommending them.    INTERPRETATION AND RISK ASSESSMENT:  Clinical swallow evaluation (CSE) revealed oral phase characterized by adequate lingual, labial, and buccal strength and range of motion for lip closure, bolus preparation and propulsion. The patient had no anterior loss of the bolus with complete closure of the lips around the straw/spoon. No significant residue remained in the oral cavity following the swallow. Patient without overt clinical signs/symptoms of aspiration on any PO trials given. Patient had previously reported a globus sensation to nursing. However, no globus sensation reported during today's assessment. Patient would benefit from follow up with ST to assess for globus sensation and ensure continued absence of s/s of pharyngeal dysphagia.        Goals:   Multidisciplinary Problems       SLP Goals          Problem: SLP    Goal Priority Disciplines Outcome   SLP Goal     SLP    Description: 1. Patient will tolerate IDDSI 0/7 with no overt s/s of pharyngeal dysphagia.   2. Patient will improve cognitive-communication skills in order to promote safety/independence during activities of daily living.                        Plan:     Patient to be seen:  2 x/week, 3 x/week   Plan of Care expires:  08/12/23  Plan of Care reviewed with:  patient   SLP Follow-Up:  Yes       Discharge recommendations:  Discharge Facility/Level of Care Needs: other (see comments) (pending acute progress)   Barriers to Discharge:  None    Time Tracking:     SLP Treatment Date:   08/05/23  Speech Start Time:  0940  Speech Stop Time:  0956     Speech Total Time (min):  16 min    Billable Minutes: Eval 8  and Eval Swallow and Oral Function 8    08/05/2023

## 2023-08-05 NOTE — ASSESSMENT & PLAN NOTE
Chronic, uncontrolled  -Latest blood pressure and vitals reviewed-   Temp:  [97.1 °F (36.2 °C)-98.7 °F (37.1 °C)]   Pulse:  [58-84]   Resp:  [16-20]   BP: (139-210)/()   SpO2:  [98 %-99 %] .   -Home meds for hypertension were reviewed and noted below.   Hypertension Medications             carvediloL (COREG) 6.25 MG tablet TAKE 1 TABLET BY MOUTH TWICE A DAY    cloNIDine (CATAPRES) 0.1 MG tablet Take 1 tablet (0.1 mg total) by mouth 3 (three) times daily.    furosemide (LASIX) 40 MG tablet TAKE 1 TABLET ONE TIME DAILY. OVERDUE FOR LAB AND ANNUAL/LAST REFILL.    hydrALAZINE (APRESOLINE) 50 MG tablet Take 1 tablet (50 mg total) by mouth every 8 (eight) hours.    irbesartan (AVAPRO) 150 MG tablet TAKE 1 TABLET BY MOUTH EVERY DAY        -While in the hospital, will manage blood pressure as follows; Adjust home antihypertensive regimen as follows- hold Lasix and clonidine for now, restart irbesartan, Coreg, hydralazine. Do not allow for permissive HTN with aneurysm, initial BP control immediately per neurology  -PRN anti-hypertensive medication if patient's BP > 160/100   -optimize pain management

## 2023-08-05 NOTE — SUBJECTIVE & OBJECTIVE
Interval History: awake, alert, sitting up in couch, NAD. Patient reports she feels better today. PT/OT/ST evaluation recommends OP therapy on d/c. BP still elevated, will treat with aneurysm and not allow for permissive htn. Neurology following. Plan to d/c home tomorrow if stable.    Review of Systems   Constitutional:  Negative for fatigue and fever.   Respiratory:  Negative for cough and shortness of breath.    Cardiovascular:  Negative for chest pain and palpitations.   Gastrointestinal:  Negative for nausea and vomiting.   All other systems reviewed and are negative.    Objective:     Vital Signs (Most Recent):  Temp: 97.7 °F (36.5 °C) (08/05/23 1217)  Pulse: 68 (08/05/23 1217)  Resp: 19 (08/05/23 1217)  BP: (!) 184/98 (08/05/23 1217)  SpO2: 98 % (08/05/23 1217) Vital Signs (24h Range):  Temp:  [97.1 °F (36.2 °C)-98.7 °F (37.1 °C)] 97.7 °F (36.5 °C)  Pulse:  [58-84] 68  Resp:  [16-20] 19  SpO2:  [98 %-99 %] 98 %  BP: (139-210)/() 184/98     Weight: 63.5 kg (140 lb)  Body mass index is 24.8 kg/m².  No intake or output data in the 24 hours ending 08/05/23 1325      Physical Exam  Vitals and nursing note reviewed.   Constitutional:       Appearance: Normal appearance.   Cardiovascular:      Rate and Rhythm: Normal rate and regular rhythm.      Heart sounds: No murmur heard.  Pulmonary:      Effort: Pulmonary effort is normal.      Breath sounds: Normal breath sounds.   Abdominal:      General: Bowel sounds are normal.      Palpations: Abdomen is soft.   Musculoskeletal:         General: Normal range of motion.   Skin:     General: Skin is warm and dry.   Neurological:      General: No focal deficit present.      Mental Status: She is alert and oriented to person, place, and time.   Psychiatric:         Mood and Affect: Mood normal.         Behavior: Behavior normal.             Significant Labs: All pertinent labs within the past 24 hours have been reviewed.  CBC:   Recent Labs   Lab 08/04/23  1303  08/05/23  0420   WBC 4.46 4.83   HGB 13.4 13.2   HCT 41.1 40.9    255     CMP:   Recent Labs   Lab 08/04/23  1303 08/05/23  0420    143   K 5.0 4.6    110   CO2 19* 24   * 94   BUN 43* 41*   CREATININE 2.3* 2.2*   CALCIUM 9.5 9.4   PROT 7.7 7.3   ALBUMIN 4.0 3.8   BILITOT 0.3 0.5   ALKPHOS 124 103   AST 16 13   ALT 11 10   ANIONGAP 13 9     Magnesium:   Recent Labs   Lab 08/05/23  0420   MG 2.1     Troponin:   Recent Labs   Lab 08/05/23  0420   TROPONINI 0.042*       Significant Imaging: I have reviewed all pertinent imaging results/findings within the past 24 hours.

## 2023-08-05 NOTE — PLAN OF CARE
[facility] - Telemetry (Hospital)  Initial Discharge Assessment       Primary Care Provider: Zora Cope MD    Admission Diagnosis: CVA (cerebral vascular accident) [I63.9]  Chronic hypertension [I10]  Cerebrovascular accident (CVA), unspecified mechanism [I63.9]    Admission Date: 8/4/2023  Expected Discharge Date:     Transition of Care Barriers: None    Payor: HU[employer] MANAGED MEDICARE / Plan: HU[employer] MEDICARE HMO / Product Type: Capitation /     Extended Emergency Contact Information  Primary Emergency Contact: dixie dye  Mobile Phone: [phone]  Relation: Sister   needed? No  Secondary Emergency Contact: Marysol Samuel  Address: [id number] [place] [address] [address] [facility]  Home Phone: [phone]  Mobile Phone: [phone]  Relation: Grandchild    Discharge Plan A: Home Health  Discharge Plan B: (P) Home with [facility] - [place], [place] - [address] [facility] [address]  [address] [address]  Phone: [phone] Fax: [phone]    [id number] - [place] - [address] [facility]  [address] [place] [address]  Phone: [phone] Fax: [phone]      Initial Assessment (most recent)       Adult Discharge Assessment - 08/05/23 1130          Discharge Assessment    Assessment Type Discharge Planning Assessment     Confirmed/corrected address, phone number and insurance Yes     Confirmed Demographics Correct on Facesheet     Source of Information patient     When was your last doctors appointment? 08/04/23     Communicated MIKI with patient/caregiver Date not available/Unable to determine     Reason For Admission Acute CVA (cerebrovascular accident)     People in Home alone     Do you expect to return to your current living situation? Yes     Do you have help at home or someone to help you manage your care at home? No     Prior to hospitilization cognitive status:  Alert/Oriented     Current cognitive status: Alert/Oriented     Walking or Climbing Stairs ambulation difficulty, requires equipment     Home Accessibility wheelchair accessible     Home Layout Able to live on 1st floor     Equipment Currently Used at Home other (see comments)   Chair    Readmission within 30 days? No     Patient currently being followed by outpatient case management? No (P)      Do you currently have service(s) that help you manage your care at home? No (P)      Do you take prescription medications? Yes     Do you have prescription coverage? Yes (P)      Coverage HU[employer] MEDICARE HMO (P)      Do you have any problems affording any of your prescribed medications? No (P)      Is the patient taking medications as prescribed? yes (P)      Who is going to help you get home at discharge? Self (P)      How do you get to doctors appointments? car, drives self     Are you on dialysis? No     Do you take coumadin? No     Discharge Plan A Home Health     Discharge Plan B Home with family (P)      DME Needed Upon Discharge  none     Discharge Plan discussed with: Patient     Transition of Care Barriers None        Physical Activity    On average, how many days per week do you engage in moderate to strenuous exercise (like a brisk walk)? 0 days (P)      On average, how many minutes do you engage in exercise at this level? 0 min (P)         Financial Resource Strain    How hard is it for you to pay for the very basics like food, housing, medical care, and heating? Somewhat hard        Housing Stability    In the last 12 months, was there a time when you were not able to pay the mortgage or rent on time? No     In the last 12 months, was there a time when you did not have a steady place to sleep or slept in a shelter (including now)? No        Transportation Needs    In the past 12 months, has lack of transportation kept you from medical appointments or from getting medications? No     In the past 12 months, has  lack of transportation kept you from meetings, work, or from getting things needed for daily living? No        Food Insecurity    Within the past 12 months, you worried that your food would run out before you got the money to buy more. Sometimes true        Stress    Do you feel stress - tense, restless, nervous, or anxious, or unable to sleep at night because your mind is troubled all the time - these days? To some extent        Social Connections    In a typical week, how many times do you talk on the phone with family, friends, or neighbors? Twice a week     How often do you get together with friends or relatives? Once a week     How often do you attend Alevism or Yazdanism services? More than 4 times per year     Do you belong to any clubs or organizations such as Alevism groups, unions, fraternal or athletic groups, or school groups? No     How often do you attend meetings of the clubs or organizations you belong to? Never     Are you , , , , never , or living with a partner?         Alcohol Use    Q1: How often do you have a drink containing alcohol? Never     Q2: How many drinks containing alcohol do you have on a typical day when you are drinking? Patient does not drink     Q3: How often do you have six or more drinks on one occasion? Never                   SW spoke with patient for D/c planning. Patient reported that her daughter will move in with her once she is D/C to help her. She also reported that she will drive herself home due to driving herself to the hospital. When completing the Social Determinates patient reported that she has difficulty getting food.  However she just renewed her [employer] and is in a program where she will get $200.00 back and therefore she may not need any assistance.  Patient also requested to get  when D/C.     Malu Juarez LMSW 8/5/2023 12:21 PM           Malu Juarez LMSW 8/5/2023 12:11 PM

## 2023-08-05 NOTE — PT/OT/SLP EVAL
Physical Therapy Evaluation    Patient Name:  Rose Milligan   MRN:  [id number]    Recommendations:     Discharge Recommendations: outpatient PT   Discharge Equipment Recommendations: shower chair   Barriers to discharge: Decreased caregiver support    Assessment:     Rose Milligan is a 68 y.o. female admitted with a medical diagnosis of Acute CVA (cerebrovascular accident).  She presents with the following impairments/functional limitations: weakness, gait instability, impaired endurance, impaired balance, decreased lower extremity function, decreased safety awareness, impaired functional mobility .    Rehab Prognosis: Good; patient would benefit from acute skilled PT services to address these deficits and reach maximum level of function.    Recent Surgery: * No surgery found *      Plan:     During this hospitalization, patient to be seen 3 x/week to address the identified rehab impairments via gait training, therapeutic activities, therapeutic exercises, neuromuscular re-education and progress toward the following goals:    Plan of Care Expires:  08/19/23    Subjective     Chief Complaint: weak  Patient/Family Comments/goals: stronger to go home  Pain/Comfort:       Patients cultural, spiritual, Episcopal conflicts given the current situation: yes    Living Environment:  Pt lives home alone but her daughter is supposed to be moving in with her. Pt usually lives in a one story apartment and is a community mobilizer without an AD.  Prior to admission, patients level of function was independent.  Equipment used at home: none (has rollator but states she no longer uses it).  DME owned (not currently used):  rollator .  Upon discharge, patient will have assistance from family.    Objective:     Communicated with nurse amena prior to session.  Patient found up in chair with peripheral IV, telemetry  upon PT entry to room.    General Precautions: Standard,    Orthopedic Precautions:N/A   Braces: N/A  Respiratory Status:  Room air    Exams:  RLE ROM: WFL  RLE Strength: WFL  LLE ROM: WFL  LLE Strength: WFL    Functional Mobility:  Transfers:     Sit to Stand:  stand by assistance with no AD  Gait: ambulated with no AD for 220 ft with CGA to sba for safety  Balance: ambulated with       AM-PAC 6 CLICK MOBILITY  Total Score:16       Treatment & Education:  PT worked with pt on therex including LAQ 1x 10 followed by transfers and gait as above. PT educated in call don't fall to have someone assist her to return to bed when ready as pt up in chair at end of PT session.     Patient left up in chair with all lines intact, call button in reach, and nurse notified.    GOALS:   Multidisciplinary Problems       Physical Therapy Goals          Problem: Physical Therapy    Goal Priority Disciplines Outcome Goal Variances Interventions   Physical Therapy Goal     PT, PT/OT      Description: LTG'S TO BE MET IN 14 DAYS (8/19/23)  PT WILL REQUIRE mod i FOR BED MOBILITY  PT WILL REQUIRE mod i FOR BED<>CHAIR TF'S  PT WILL AMB with least restrictive  FEET With mod i  PT WILL INC AMPAC SCORE BY 2 POINTS TO PROGRESS GROSS FUNC MOBILITY                         History:     Past Medical History:   Diagnosis Date    Acute CVA (cerebrovascular accident) 8/4/2023    Bipolar 1 disorder     CHF (congestive heart failure)     Depression     Hepatitis C     Hypertension     Other hyperlipidemia 8/15/2019       Past Surgical History:   Procedure Laterality Date    HYSTERECTOMY         Time Tracking:     PT Received On: 08/05/23  PT Start Time: 0840     PT Stop Time: 0905  PT Total Time (min): 25 min     Billable Minutes: Evaluation 15 and Therapeutic Activity 10      08/05/2023

## 2023-08-05 NOTE — PLAN OF CARE
P.T. EVAL COMPLETE.  PT CURRENTLY REQUIRES cga to sba with no ad for 220 ft for safety and transfers sit to stand with sba  P.T. RECOMMENDS outpatient AT D/C

## 2023-08-05 NOTE — ASSESSMENT & PLAN NOTE
Assistance with smoking cessation was offered, including:  [x]  Medications  [x]  Counseling  []  Printed Information on Smoking Cessation  []  Referral to a Smoking Cessation Program    Patient was counseled regarding smoking for >10 minutes.  Ordered Nicotine patch today per patient request

## 2023-08-05 NOTE — ASSESSMENT & PLAN NOTE
"Patient is identified as having Diastolic (HFpEF) heart failure that is Acute on Chronic. CHF is currently controlled. Latest ECHO performed and demonstrates- Results for orders placed during the hospital encounter of 08/04/23    Echo    Interpretation Summary    Left Ventricle: The left ventricle is normal in size. Moderately increased wall thickness. There is moderate concentrict hypertrophy. Normal wall motion. There is normal systolic function with a visually estimated ejection fraction of 55 - 70%. Grade I diastolic dysfunction.    Left Atrium: Left atrium is mildly dilated.    Right Ventricle: Normal right ventricular cavity size. Wall thickness is normal. Right ventricle wall motion  is normal. Systolic function is normal.    Mitral Valve: There is mild regurgitation.    Tricuspid Valve: There is mild regurgitation.    IVC/SVC: Intermediate venous pressure at 8 mmHg.    Bubble study negative  Monitor clinical status closely. Monitor on telemetry. Patient is off CHF pathway.  Monitor strict Is&Os and daily weights.  Place on fluid restriction of 1.5 L. Continue to stress to patient importance of self efficacy and  on diet for CHF. Last BNP reviewed- and noted below No results for input(s): "BNP", "BNPTRIAGEBLO" in the last 168 hours.   "

## 2023-08-05 NOTE — CONSULTS
"[facility] Tele-Consultation from Neurology    Consultation started: 8/5/2023 at 9:59 AM   The chief complaint leading to consultation is: stroke, left thalamic  This consultation was requested by:    The patient location is: [facility]  The patient arrived at: 10:00am  Spoke nurse at bedside with patient assisting consultant. Also present with the patient at the time of the consultation:None    Inpatient consult to Telemedicine-General Neurology  Consult performed by: Guerline Ambrosio MD  Consult ordered by: Gerald Bo MD           Subjective:     History of Present Illness:  Rose Milligan is a 68 y.o. female who presents with stroke.    5/1/20 - hypertensive crisis with left weakness. Occurred again 3/2023 with facial weakness  Seen by cardiology 7/10/23 for "palpitations"  7/19/23 seen by Dr. Welsh (neuro) for "TIA" (left weakness) symptoms. Had a few weeks of elevated BP. At baseline on 7/19/23 per note. Stroke w/u started. MRI brain 8/3 showed acute left thalamic stroke and MRA demonstrated 0.5cm right MCA bifurcation aneurysm.  8/4 she was advised to come to hospital by Dr. Klein (neuro) for further w/u. On admission, reported fatigue and diaphoresis x few days. Exam with slurred speech, right facial droop, and stuttering ("chronic"). LLE weakness new.    Not taking her aspirin, has been out of medication for 2 months.  Reports elevated recently as she has issues with TID medication. Last month her BP was 130-140's with spikes to 160's. Uses a pill tray.    Patient information was obtained from patient, past medical records, ER records, and primary team.    Past Medical History:   Diagnosis Date    Acute CVA (cerebrovascular accident) 8/4/2023    Bipolar 1 disorder     CHF (congestive heart failure)     Depression     Hepatitis C     Hypertension     Other hyperlipidemia 8/15/2019       Past Surgical History:   Procedure Laterality Date    HYSTERECTOMY         Family History   Problem " Relation Age of Onset    Heart attack Maternal Grandmother     Hypertension Mother     No family hx of aneurysm or stroke.    Social History     Tobacco Use    Smoking status: Some Days     Current packs/day: 0.25     Average packs/day: 0.3 packs/day for 7.6 years (1.9 ttl pk-yrs)     Types: Cigarettes     Start date: 1/11/2016    Smokeless tobacco: Never    Tobacco comments:     1 pack every 3 days   Substance Use Topics    Alcohol use: Yes    Reports that she is no longer drinking alcohol as of this visit.  No current drug use, last drug use was at least 10 years ago.    Medications:   Current Facility-Administered Medications:     acetaminophen tablet 650 mg, 650 mg, Oral, Q6H PRN, Nolan Brantley NP, 650 mg at 08/05/23 0053    aspirin EC tablet 81 mg, 81 mg, Oral, Daily, Gerald Bo MD, 81 mg at 08/05/23 0746    clopidogreL tablet 75 mg, 75 mg, Oral, Daily, Gerald Bo MD, 75 mg at 08/05/23 0746    enoxaparin injection 30 mg, 30 mg, Subcutaneous, Daily, Gerald Bo MD, 30 mg at 08/04/23 1709    hydrALAZINE injection 10 mg, 10 mg, Intravenous, Q6H PRN, Gerald Bo MD, 10 mg at 08/04/23 1846    labetaloL injection 10 mg, 10 mg, Intravenous, Q6H PRN, Gerald Bo MD, 10 mg at 08/05/23 0058    melatonin tablet 6 mg, 6 mg, Oral, Nightly PRN, Nolan Brantley NP, 6 mg at 08/05/23 0053    nicotine 21 mg/24 hr 1 patch, 1 patch, Transdermal, Daily, Felipa Hayes NP    ondansetron injection 4 mg, 4 mg, Intravenous, Q6H PRN, Gerald Bo MD    pravastatin tablet 40 mg, 40 mg, Oral, QHS, Gerald Bo MD, 40 mg at 08/04/23 2017    sodium chloride 0.9% bolus 500 mL 500 mL, 500 mL, Intravenous, Continuous PRN, Gerald Bo MD    sodium chloride 0.9% flush 10 mL, 10 mL, Intravenous, PRN, Gerald Bo MD    ziprasidone capsule 20 mg, 20 mg, Oral, BID, Gerald Bo MD, 20 mg at 08/05/23  "0746      Current Outpatient Medications on File Prior to Encounter   Medication Sig Dispense Refill Last Dose    aspirin (ECOTRIN) 81 MG EC tablet TAKE 1 TABLET EVERY DAY 90 tablet 3 8/4/2023    carvediloL (COREG) 6.25 MG tablet TAKE 1 TABLET BY MOUTH TWICE A  tablet 0 8/4/2023    cloNIDine (CATAPRES) 0.1 MG tablet Take 1 tablet (0.1 mg total) by mouth 3 (three) times daily. 180 tablet 0 8/4/2023    furosemide (LASIX) 40 MG tablet TAKE 1 TABLET ONE TIME DAILY. OVERDUE FOR LAB AND ANNUAL/LAST REFILL. 90 tablet 0 8/4/2023    hydrALAZINE (APRESOLINE) 50 MG tablet Take 1 tablet (50 mg total) by mouth every 8 (eight) hours. 270 tablet 1 8/4/2023    hydrOXYzine (ATARAX) 50 MG tablet Take 50 mg by mouth 2 (two) times daily as needed.   8/4/2023    irbesartan (AVAPRO) 150 MG tablet TAKE 1 TABLET BY MOUTH EVERY DAY 90 tablet 0 8/4/2023    pravastatin (PRAVACHOL) 40 MG tablet Take 1 tablet (40 mg total) by mouth every evening. 90 tablet 1 Past Month    ziprasidone (GEODON) 20 MG Cap Take 1 capsule (20 mg total) by mouth 2 (two) times daily. 60 capsule 2 8/4/2023         Allergies:   Review of patient's allergies indicates:   Allergen Reactions    Tramadol Itching    Risperdal [risperidone] Other (See Comments)     Did not like way felt       Review Of Systems: ROS      Objective:     Vitals: Blood pressure (!) 170/89, pulse 74, temperature 97.7 °F (36.5 °C), resp. rate 18, height 5' 3" (1.6 m), weight 63.5 kg (140 lb), SpO2 99 %. Reviewed for date of service     Constitutional: Well-developed, well-nourished, appears stated age    Neurological:    Mental status: Alert and oriented to person, place, time, and situation; no dysarthria; no aphasia; normal recent and remote memory; follows commands  Cranial nerves:   CN III, IV, VI: Extraocular movements full, no nystagmus visualized  CN V: Symmetric sensation to touch   CN VII: right NLFF (slight) on right  CN VIII: Hearing grossly intact   CN XI: Strong shoulder shrug B "   CN XII: Tongue appears midline   Motor: 3/5 x 4, at least. No drift in UEs. Slowed TORI's b/l, L>R.   Sensory: Intact to touch in all four extremities   Coordination: No dysmetria or tremor with outstretched hands       Labs:Reviewed for date of service  , TSH wnl, a1c 5.1  Radiology (i.e. PET Scan, X-ray, CT, MRI): Reviewed for date of service    8/4/23 MRI/MRAx2: Punctate focus of restricted diffusion within the left thalamus concerning for acute infarct.  Multifocal areas of encephalomalacia within the bilateral thalami, bilateral basal ganglia and bilateral centrum semiovale.  There are presumed bilateral prominent basal ganglia perivascular spaces.  The brain parenchyma demonstrates patchy and confluent areas of periventricular and subcortical T2/FLAIR signal hyperintensity, which are nonspecific most suggestive of mild to moderate chronic microvascular ischemic change.  No parenchymal edema, mass or mass effect.  There are scattered areas of gradient susceptibility artifact within the thalami, bilateral basal ganglia, and to a lesser extent the bilateral cerebral and cerebellar hemispheres.     Major skull base flow voids are present.  There appears to be a 0.5 cm right MCA bifurcation aneurysm.    Assessment - Diagnosis - Goals:     Impression: Rose Milligan 68 y.o. female with acute ischemic infarct and R MCA aneurysm. Has history of uncontrolled HTN, still smokes (although she has cut down), and has issues with adherence with her HTN meds. Reports some difficulty keeping her meds straight. No LVO or significant intracranial stenosis, but she does have aneurysm as above. TTE - bubble study neg, mild left atrium dilation, Grade 1 diastolic dysfunction.    Recommendations:     Antithrombotics for secondary stroke prevention: Previously non-adherent with aspirin regimen for past few months. Then start dual-antiplatelet therapy with ASA 81mg and clopidogrel 75mg daily for 21 days. Then continue ASA 81mg  indefinitely.      Statins for secondary stroke prevention and hyperlipidemia, if present: Recommend initiation or continuation of a high-intensity statin for goal LDL < 70mg/dL.     Aggressive risk factor modification:  tobacco use and HTN optimization. Extensive counseling given for both - encouraged tobacco cessation and compliance with meds, set timer if needed.     Rehab efforts:  The following therapy evaluations are needed: PT evaluate and treat, OT evaluate and treat, SLP evaluate and treat, PM&R evaluate for appropriate placement     Diagnostics recommended:   - Labs: hemoglobin A1c (goal <7%, at goal), lipid panel (LDL goal <70mg/dL)  - Treat hyperglycemia to achieve a blood glucose level in a range of 140-180 mg/dL and to closely monitor to prevent hypoglycemia (Class IIa, Level C-LD).     VTE prophylaxis: Place SCDs, lovenox 40 daily     BP parameters: Infarct: Given aneurysm and her symptoms stable now for at least a few days, okay to initiate BP control     Please contact us with any further questions or concerns or if patient has any acute neurological changes (new symptoms, worsening deficits).    Other: will need neurosurgical eval for stroke. Primary neurologist was messaged for hospital follow-up (Dr. Welsh)    Consultation ended:  10:53 AM     Total time spent with patient: > 70 Minutes      The attending portion of this evaluation, treatment, and documentation was performed per Guerline Ambrosio MD via audiovisual.        Thank you for this consult. Please do not hesitate to contact us with questions.

## 2023-08-05 NOTE — ASSESSMENT & PLAN NOTE
-Neurology following, appreciate  -Need better BP control  -hydralazine/labetol PRN SBP > 160  -Outopt NSGY f/u

## 2023-08-06 VITALS
BODY MASS INDEX: 24.06 KG/M2 | WEIGHT: 135.81 LBS | TEMPERATURE: 98 F | OXYGEN SATURATION: 98 % | RESPIRATION RATE: 20 BRPM | HEART RATE: 69 BPM | SYSTOLIC BLOOD PRESSURE: 153 MMHG | HEIGHT: 63 IN | DIASTOLIC BLOOD PRESSURE: 84 MMHG

## 2023-08-06 LAB
ALBUMIN SERPL BCP-MCNC: 3.9 G/DL (ref 3.5–5.2)
ALP SERPL-CCNC: 105 U/L (ref 55–135)
ALT SERPL W/O P-5'-P-CCNC: 12 U/L (ref 10–44)
ANION GAP SERPL CALC-SCNC: 10 MMOL/L (ref 8–16)
AST SERPL-CCNC: 15 U/L (ref 10–40)
BASOPHILS # BLD AUTO: 0.04 K/UL (ref 0–0.2)
BASOPHILS NFR BLD: 0.8 % (ref 0–1.9)
BILIRUB SERPL-MCNC: 0.5 MG/DL (ref 0.1–1)
BUN SERPL-MCNC: 40 MG/DL (ref 8–23)
CALCIUM SERPL-MCNC: 9.4 MG/DL (ref 8.7–10.5)
CHLORIDE SERPL-SCNC: 108 MMOL/L (ref 95–110)
CO2 SERPL-SCNC: 23 MMOL/L (ref 23–29)
CREAT SERPL-MCNC: 2.2 MG/DL (ref 0.5–1.4)
DIFFERENTIAL METHOD: NORMAL
EOSINOPHIL # BLD AUTO: 0.2 K/UL (ref 0–0.5)
EOSINOPHIL NFR BLD: 3.8 % (ref 0–8)
ERYTHROCYTE [DISTWIDTH] IN BLOOD BY AUTOMATED COUNT: 13.1 % (ref 11.5–14.5)
EST. GFR  (NO RACE VARIABLE): 24 ML/MIN/1.73 M^2
GLUCOSE SERPL-MCNC: 97 MG/DL (ref 70–110)
HCT VFR BLD AUTO: 44.6 % (ref 37–48.5)
HGB BLD-MCNC: 14.6 G/DL (ref 12–16)
IMM GRANULOCYTES # BLD AUTO: 0.02 K/UL (ref 0–0.04)
IMM GRANULOCYTES NFR BLD AUTO: 0.4 % (ref 0–0.5)
LYMPHOCYTES # BLD AUTO: 2 K/UL (ref 1–4.8)
LYMPHOCYTES NFR BLD: 41.7 % (ref 18–48)
MCH RBC QN AUTO: 27.5 PG (ref 27–31)
MCHC RBC AUTO-ENTMCNC: 32.7 G/DL (ref 32–36)
MCV RBC AUTO: 84 FL (ref 82–98)
MONOCYTES # BLD AUTO: 0.5 K/UL (ref 0.3–1)
MONOCYTES NFR BLD: 10.5 % (ref 4–15)
NEUTROPHILS # BLD AUTO: 2 K/UL (ref 1.8–7.7)
NEUTROPHILS NFR BLD: 42.8 % (ref 38–73)
NRBC BLD-RTO: 0 /100 WBC
PLATELET # BLD AUTO: 268 K/UL (ref 150–450)
PMV BLD AUTO: 12.1 FL (ref 9.2–12.9)
POTASSIUM SERPL-SCNC: 5.3 MMOL/L (ref 3.5–5.1)
PROT SERPL-MCNC: 7.6 G/DL (ref 6–8.4)
RBC # BLD AUTO: 5.31 M/UL (ref 4–5.4)
SODIUM SERPL-SCNC: 141 MMOL/L (ref 136–145)
WBC # BLD AUTO: 4.77 K/UL (ref 3.9–12.7)

## 2023-08-06 PROCEDURE — 36415 COLL VENOUS BLD VENIPUNCTURE: CPT | Mod: HCNC | Performed by: INTERNAL MEDICINE

## 2023-08-06 PROCEDURE — 80053 COMPREHEN METABOLIC PANEL: CPT | Mod: HCNC | Performed by: STUDENT IN AN ORGANIZED HEALTH CARE EDUCATION/TRAINING PROGRAM

## 2023-08-06 PROCEDURE — S4991 NICOTINE PATCH NONLEGEND: HCPCS | Mod: HCNC | Performed by: NURSE PRACTITIONER

## 2023-08-06 PROCEDURE — 25000003 PHARM REV CODE 250: Mod: HCNC | Performed by: NURSE PRACTITIONER

## 2023-08-06 PROCEDURE — 25000003 PHARM REV CODE 250: Mod: HCNC | Performed by: STUDENT IN AN ORGANIZED HEALTH CARE EDUCATION/TRAINING PROGRAM

## 2023-08-06 PROCEDURE — 63600175 PHARM REV CODE 636 W HCPCS: Mod: HCNC | Performed by: INTERNAL MEDICINE

## 2023-08-06 PROCEDURE — 63600175 PHARM REV CODE 636 W HCPCS: Mod: HCNC | Performed by: NURSE PRACTITIONER

## 2023-08-06 PROCEDURE — 25000003 PHARM REV CODE 250: Mod: HCNC | Performed by: INTERNAL MEDICINE

## 2023-08-06 PROCEDURE — 36415 COLL VENOUS BLD VENIPUNCTURE: CPT | Mod: HCNC | Performed by: STUDENT IN AN ORGANIZED HEALTH CARE EDUCATION/TRAINING PROGRAM

## 2023-08-06 PROCEDURE — 85025 COMPLETE CBC W/AUTO DIFF WBC: CPT | Mod: HCNC | Performed by: INTERNAL MEDICINE

## 2023-08-06 RX ORDER — HYDROXYZINE HYDROCHLORIDE 50 MG/1
50 TABLET, FILM COATED ORAL 2 TIMES DAILY PRN
Qty: 20 TABLET | Refills: 0 | Status: SHIPPED | OUTPATIENT
Start: 2023-08-06

## 2023-08-06 RX ORDER — ZIPRASIDONE HYDROCHLORIDE 20 MG/1
20 CAPSULE ORAL 2 TIMES DAILY
Qty: 60 CAPSULE | Refills: 0 | Status: SHIPPED | OUTPATIENT
Start: 2023-08-06 | End: 2023-08-28 | Stop reason: SDUPTHER

## 2023-08-06 RX ORDER — IBUPROFEN 200 MG
1 TABLET ORAL DAILY
Qty: 14 PATCH | Refills: 0 | Status: SHIPPED | OUTPATIENT
Start: 2023-08-07 | End: 2024-01-16 | Stop reason: SDUPTHER

## 2023-08-06 RX ORDER — CLONIDINE HYDROCHLORIDE 0.1 MG/1
0.1 TABLET ORAL 3 TIMES DAILY
Qty: 180 TABLET | Refills: 0 | Status: SHIPPED | OUTPATIENT
Start: 2023-08-06 | End: 2023-10-12

## 2023-08-06 RX ORDER — CARVEDILOL 6.25 MG/1
6.25 TABLET ORAL 2 TIMES DAILY
Qty: 180 TABLET | Refills: 0 | Status: SHIPPED | OUTPATIENT
Start: 2023-08-06 | End: 2023-09-07 | Stop reason: SDUPTHER

## 2023-08-06 RX ORDER — PRAVASTATIN SODIUM 40 MG/1
40 TABLET ORAL NIGHTLY
Qty: 90 TABLET | Refills: 0 | Status: SHIPPED | OUTPATIENT
Start: 2023-08-06 | End: 2023-09-07 | Stop reason: SDUPTHER

## 2023-08-06 RX ORDER — ASPIRIN 81 MG/1
81 TABLET ORAL DAILY
Qty: 90 TABLET | Refills: 0 | Status: SHIPPED | OUTPATIENT
Start: 2023-08-06 | End: 2023-10-24 | Stop reason: SDUPTHER

## 2023-08-06 RX ORDER — CLOPIDOGREL BISULFATE 75 MG/1
75 TABLET ORAL DAILY
Qty: 20 TABLET | Refills: 0 | Status: SHIPPED | OUTPATIENT
Start: 2023-08-07 | End: 2023-12-22

## 2023-08-06 RX ORDER — HYDRALAZINE HYDROCHLORIDE 50 MG/1
50 TABLET, FILM COATED ORAL EVERY 8 HOURS
Qty: 270 TABLET | Refills: 0 | Status: SHIPPED | OUTPATIENT
Start: 2023-08-06 | End: 2023-10-12 | Stop reason: SDUPTHER

## 2023-08-06 RX ORDER — FUROSEMIDE 40 MG/1
20 TABLET ORAL DAILY
Qty: 45 TABLET | Refills: 0 | Status: SHIPPED | OUTPATIENT
Start: 2023-08-06 | End: 2024-01-16 | Stop reason: SDUPTHER

## 2023-08-06 RX ORDER — CLONIDINE HYDROCHLORIDE 0.1 MG/1
0.1 TABLET ORAL 3 TIMES DAILY
Status: DISCONTINUED | OUTPATIENT
Start: 2023-08-06 | End: 2023-08-06 | Stop reason: HOSPADM

## 2023-08-06 RX ORDER — IRBESARTAN 150 MG/1
150 TABLET ORAL DAILY
Qty: 90 TABLET | Refills: 0 | Status: SHIPPED | OUTPATIENT
Start: 2023-08-06 | End: 2023-08-28

## 2023-08-06 RX ADMIN — HYDRALAZINE HYDROCHLORIDE 50 MG: 50 TABLET ORAL at 06:08

## 2023-08-06 RX ADMIN — CLOPIDOGREL BISULFATE 75 MG: 75 TABLET ORAL at 08:08

## 2023-08-06 RX ADMIN — LABETALOL HYDROCHLORIDE 10 MG: 5 INJECTION INTRAVENOUS at 12:08

## 2023-08-06 RX ADMIN — ZIPRASIDONE HYDROCHLORIDE 20 MG: 20 CAPSULE ORAL at 08:08

## 2023-08-06 RX ADMIN — HYDRALAZINE HYDROCHLORIDE 10 MG: 20 INJECTION, SOLUTION INTRAMUSCULAR; INTRAVENOUS at 07:08

## 2023-08-06 RX ADMIN — ACETAMINOPHEN 650 MG: 325 TABLET ORAL at 12:08

## 2023-08-06 RX ADMIN — NICOTINE 1 PATCH: 21 PATCH, EXTENDED RELEASE TRANSDERMAL at 08:08

## 2023-08-06 RX ADMIN — LOSARTAN POTASSIUM 50 MG: 50 TABLET, FILM COATED ORAL at 08:08

## 2023-08-06 RX ADMIN — ASPIRIN 81 MG: 81 TABLET, COATED ORAL at 08:08

## 2023-08-06 RX ADMIN — CLONIDINE HYDROCHLORIDE 0.1 MG: 0.1 TABLET ORAL at 08:08

## 2023-08-06 RX ADMIN — ONDANSETRON 4 MG: 2 INJECTION INTRAMUSCULAR; INTRAVENOUS at 04:08

## 2023-08-06 RX ADMIN — CARVEDILOL 6.25 MG: 6.25 TABLET, FILM COATED ORAL at 08:08

## 2023-08-06 NOTE — PLAN OF CARE
[facility] - Telemetry (Hospital)  Discharge Final Note    Primary Care Provider: Zora Cope MD    Expected Discharge Date: 8/6/2023    Final Discharge Note (most recent)       Final Note - 08/06/23 1113          Final Note    Assessment Type Final Discharge Note (P)      Anticipated Discharge Disposition Home-Health Care Svc (P)    [facility]       Post-Acute Status    Post-Acute Authorization Home Health (P)      Home Health Status Referrals Sent (P)    [facility]    Discharge Delays None known at this time (P)                      Important Message from Medicare             Contact Info       Zora Cope MD   Specialty: Family Medicine   Relationship: PCP - General    [address] [place] [address]   Phone: [phone]       Next Steps: Schedule an appointment as soon as possible for a visit in 3 day(s)    Instructions: call office and schedule a hospital follow up in 3-5 days    Cullen Hannon MD   Specialty: Cardiology, Internal Medicine   Relationship: Consulting Physician    [address] [facility]  [place] [place] [address]   Phone: [phone]       Next Steps: Schedule an appointment as soon as possible for a visit in 1 week(s)    Instructions: call office and schedule a hospital follow up in 1-2 weeks          Malu Juarez LMSW 8/6/2023 11:14 AM

## 2023-08-06 NOTE — ASSESSMENT & PLAN NOTE
Antithrombotics for secondary stroke prevention: Antiplatelets: Aspirin: 81 mg daily  Clopidogrel: 75 mg daily (x 21 days, then only aspirin indefinitely)    Statins for secondary stroke prevention and hyperlipidemia, if present:   Statins: Atorvastatin- 40 mg daily    Aggressive risk factor modification: HTN, Smoking, HLD     Rehab efforts: The patient has been evaluated by a stroke team provider and the therapy needs have been fully considered based off the presenting complaints and exam findings. The following therapy evaluations are needed: PT evaluate and treat, OT evaluate and treat, SLP evaluate and treat    Diagnostics ordered/pending: MRA head to assess vasculature, MRA neck/arch to assess vasculature, MRI head without contrast to assess brain parenchyma, TTE to assess cardiac function/status     VTE prophylaxis: Enoxaparin 40 mg SQ every 24 hours    BP parameters: Infarct: No intervention, SBP <220

## 2023-08-06 NOTE — ASSESSMENT & PLAN NOTE
Chronic, uncontrolled  -Latest blood pressure and vitals reviewed-   Temp:  [97.7 °F (36.5 °C)-98.1 °F (36.7 °C)]   Pulse:  []   Resp:  [16-20]   BP: (135-212)/()   SpO2:  [96 %-99 %] .   -Home meds for hypertension were reviewed and noted below.   Hypertension Medications             carvediloL (COREG) 6.25 MG tablet TAKE 1 TABLET BY MOUTH TWICE A DAY    cloNIDine (CATAPRES) 0.1 MG tablet Take 1 tablet (0.1 mg total) by mouth 3 (three) times daily.    furosemide (LASIX) 40 MG tablet TAKE 1 TABLET ONE TIME DAILY. OVERDUE FOR LAB AND ANNUAL/LAST REFILL.    hydrALAZINE (APRESOLINE) 50 MG tablet Take 1 tablet (50 mg total) by mouth every 8 (eight) hours.    irbesartan (AVAPRO) 150 MG tablet TAKE 1 TABLET BY MOUTH EVERY DAY        -While in the hospital, will manage blood pressure as follows; Adjust home antihypertensive regimen as follows- hold Lasix and clonidine for now, restart irbesartan, Coreg, hydralazine. Do not allow for permissive HTN with aneurysm, initial BP control immediately per neurology  -PRN anti-hypertensive medication if patient's BP > 160/100   -optimize pain management

## 2023-08-06 NOTE — DISCHARGE SUMMARY
[facility] - Telemetry ([facility] Medicine  Discharge Summary      Patient Name: Rose Milligan  MRN: [id number]  JOHN: [id number]  Patient Class: IP- Inpatient  Admission Date: 8/4/2023  Hospital Length of Stay: 2 days  Discharge Date and Time: 8/6/2023  1:53 PM  Attending Physician: Ignacio Red MD  Discharging Provider: Felipa Hayes NP  Primary Care Provider: Zora Cope MD    Primary Care Team: Networked reference to record PCT     HPI:   69 y/o AAF with a PMHx of  H/O CVA with residual finding , CKD stage 3 HTN , H/P polysubstance drug aust ,Tobacco abuse  , HLD, Bipolar , Depression and HCV presented to the ER with a c/o Fatigue and left lower extremity weakness for the last days which has gotten worse .Pt was referred to the ED from Dr. Klein (Neurology) after brain MRI done this morning showed punctate acute left thalamic infarct and right MCA bifurcation aneurysm. Symptoms are constant and moderate in severity. No mitigating or exacerbating factors reported. Associated sxs include diaphoresis and fatigue. Patient denies any fever, chills, n/v/d, SOB, CP, weakness, numbness, and all other sxs at this time. She also complaining of walking difficulties.    ER COURSE: MRI brain show Punctate acute left thalamic infarct. MRA brain :5.2 x 5.7 mm proximal right posterior M2 aneurysm  ER VS:   BP Pulse Resp Temp SpO2   (!) 198/113 89 19 98.2 °F (36.8 °C) 97 %     Pt will be admitted to inpt with a dx of acute CVA    * No surgery found *      Hospital Course:   69 y/o female admitted with c/o worsening left sided weakness for the past couple of days. She has a h/o stroke ~ 2 years ago and is non-compliant with her ASA, has been out for a couple of months. MRI in ER shows punctate acute left thalamic infarct and right MCA bifurcation aneurysm. Neurology consulted, started on ASA/Plavix (x 21 days)/statin. She was found to have hypertensive urgency on admission, will not allow for permissive  HTN d/t aneurysm. Patient was NPO until ST evaluated her and recommended diet. ST evaluated and swallowing was intact, recommended a regular diet.   TTE negative bubble study, grade I diastolic dysfunction. Continue telemetry monitoring.   PT/OT evaluated patient and recommended OP therapy on discharge.   Lipid panel - .4 (goal < 70 after CVA), continue statin.   Restarted home anti-hypertensives and blood pressure improved prior to discharge.   Patient was out of all her medication, will send prescriptions on discharge.   Will need aspirin and Plavix (x 21 days) then just continue aspirin indefinitely.   Will send prescription for nicotine patch and send a referral to smoking cessation program.     Follow up with PCP in 3-5 days on discharge.   Referral sent to neurology for OP follow up after stroke.   Referral was sent for Nsg for continued management and monitoring of her aneurysm. Instructed patient if she gets a bad headache, she needs to go back to the ER for evaluation.     Patient seen and examined on the day of discharge.  All questions and concerns were addressed prior to discharge.    Face to face encounter with patient: 36 minutes       Goals of Care Treatment Preferences:  Code Status: Full Code      Consults:   Consults (From admission, onward)          Status Ordering Provider     Inpatient consult to Social Work  Once        Provider:  (Not yet assigned)    Completed SHAREE CARNEY     Inpatient consult to Telemedicine-General Neurology  Once        Provider:  Guerline Ambrosio MD    Completed MORGAN IVEY     Inpatient consult to Registered Dietitian/Nutritionist  Once        Provider:  (Not yet assigned)    Completed MORGAN IVEY     IP consult to case management/social work  Once        Provider:  (Not yet assigned)    Completed MORGAN IVEY            Neuro  * Acute CVA (cerebrovascular accident)    Antithrombotics for secondary stroke prevention:  Antiplatelets: Aspirin: 81 mg daily  Clopidogrel: 75 mg daily (x 21 days, then only aspirin indefinitely)    Statins for secondary stroke prevention and hyperlipidemia, if present:   Statins: Atorvastatin- 40 mg daily    Aggressive risk factor modification: HTN, Smoking, HLD     Rehab efforts: The patient has been evaluated by a stroke team provider and the therapy needs have been fully considered based off the presenting complaints and exam findings. The following therapy evaluations are needed: PT evaluate and treat, OT evaluate and treat, SLP evaluate and treat    Diagnostics ordered/pending: MRA head to assess vasculature, MRA neck/arch to assess vasculature, MRI head without contrast to assess brain parenchyma, TTE to assess cardiac function/status     VTE prophylaxis: Enoxaparin 40 mg SQ every 24 hours    BP parameters: Infarct: No intervention, SBP <220    Brain aneurysm  -Neurology following, appreciate  -Need better BP control  -hydralazine/labetol PRN SBP > 160  -referral sent to ns on discharge    Cardiac/Vascular  Other hyperlipidemia   -Patient is not chronically on statin. will start this hospitalization with new CVA.   -Last LDL was   Lab Results   Component Value Date    LDLCALC 101.4 08/04/2023      -follow up with PCP/cardiology OP    (HFpEF) heart failure with preserved ejection fraction  Patient is identified as having Diastolic (HFpEF) heart failure that is Acute on Chronic. CHF is currently controlled. Latest ECHO performed and demonstrates- Results for orders placed during the hospital encounter of 08/04/23    Echo    Interpretation Summary    Left Ventricle: The left ventricle is normal in size. Moderately increased wall thickness. There is moderate concentrict hypertrophy. Normal wall motion. There is normal systolic function with a visually estimated ejection fraction of 55 - 70%. Grade I diastolic dysfunction.    Left Atrium: Left atrium is mildly dilated.    Right Ventricle: Normal right  "ventricular cavity size. Wall thickness is normal. Right ventricle wall motion  is normal. Systolic function is normal.    Mitral Valve: There is mild regurgitation.    Tricuspid Valve: There is mild regurgitation.    IVC/SVC: Intermediate venous pressure at 8 mmHg.    Bubble study negative  Monitor clinical status closely. Monitor on telemetry. Patient is off CHF pathway.  Monitor strict Is&Os and daily weights.  Place on fluid restriction of 1.5 L. Continue to stress to patient importance of self efficacy and  on diet for CHF. Last BNP reviewed- and noted below No results for input(s): "BNP", "BNPTRIAGEBLO" in the last 168 hours.     Uncontrolled hypertension  Chronic, uncontrolled  -Latest blood pressure and vitals reviewed-   Temp:  [97.7 °F (36.5 °C)-98.1 °F (36.7 °C)]   Pulse:  []   Resp:  [16-20]   BP: (135-212)/()   SpO2:  [96 %-99 %] .   -Home meds for hypertension were reviewed and noted below.   Hypertension Medications               carvediloL (COREG) 6.25 MG tablet TAKE 1 TABLET BY MOUTH TWICE A DAY    cloNIDine (CATAPRES) 0.1 MG tablet Take 1 tablet (0.1 mg total) by mouth 3 (three) times daily.    furosemide (LASIX) 40 MG tablet TAKE 1 TABLET ONE TIME DAILY. OVERDUE FOR LAB AND ANNUAL/LAST REFILL.    hydrALAZINE (APRESOLINE) 50 MG tablet Take 1 tablet (50 mg total) by mouth every 8 (eight) hours.    irbesartan (AVAPRO) 150 MG tablet TAKE 1 TABLET BY MOUTH EVERY DAY          -While in the hospital, will manage blood pressure as follows; Adjust home antihypertensive regimen as follows- hold Lasix and clonidine for now, restart irbesartan, Coreg, hydralazine. Do not allow for permissive HTN with aneurysm, initial BP control immediately per neurology  -PRN anti-hypertensive medication if patient's BP > 160/100   -optimize pain management    Renal/  CKD (chronic kidney disease) stage 4, GFR 15-29 ml/min  -Creatine stable for now  -BMP reviewed- noted Estimated Creatinine Clearance: " 19.7 mL/min (A) (based on SCr of 2.2 mg/dL (H)). according to latest data  -Monitor UOP and serial BMP and adjust therapy as needed  -Renally dose meds and avoid nephrotoxins    GI  Chronic hepatitis C virus infection  -unknown treatment status  -will need hepatology follow up on discharge    Other  Tobacco use  Assistance with smoking cessation was offered, including:  [x]  Medications  [x]  Counseling  []  Printed Information on Smoking Cessation  []  Referral to a Smoking Cessation Program    Patient was counseled regarding smoking for >10 minutes.  Ordered Nicotine patch today per patient request    Bipolar 1 disorder  -Stable, continue to monitor  -cont home Geodon BID  -Denies any SI/VH/AH       Final Active Diagnoses:    Diagnosis Date Noted POA    PRINCIPAL PROBLEM:  Acute CVA (cerebrovascular accident) [I63.9] 08/04/2023 Yes    Brain aneurysm [I67.1] 08/04/2023 Yes    Tobacco use [Z72.0] 05/11/2021 Yes    (HFpEF) heart failure with preserved ejection fraction [I50.30] 08/15/2019 Yes    Other hyperlipidemia [E78.49] 08/15/2019 Yes    Bipolar 1 disorder [F31.9] 11/04/2018 Yes    CKD (chronic kidney disease) stage 4, GFR 15-29 ml/min [N18.4] 08/14/2018 Yes    Chronic hepatitis C virus infection [B18.2] 08/09/2018 Yes    Uncontrolled hypertension [I10] 08/09/2018 Yes      Problems Resolved During this Admission:       Discharged Condition: good    Disposition: Home or Self Care    Follow Up:   Follow-up Information       Zora Cope MD. Schedule an appointment as soon as possible for a visit in 3 day(s).    Specialty: Family Medicine  Why: call office and schedule a hospital follow up in 3-5 days  Contact information:  [address] [place] [place] [address]  [phone]               Cullen Hannon MD. Schedule an appointment as soon as possible for a visit in 1 week(s).    Specialties: Cardiology, Internal Medicine  Why: call office and schedule a hospital follow up in 1-2 weeks  Contact  "information:  [address] [facility]  [place] [address]  [phone]                           Patient Instructions:      BATH/SHOWER CHAIR FOR HOME USE   Order Comments: Inform patient and family: Not covered by insurance. Out of pocket cost.     Order Specific Question Answer Comments   Height: 5' 3" (1.6 m)    Weight: 61.6 kg (135 lb 12.9 oz)    Does patient have medical equipment at home? none has rollator but states she no longer uses it   Length of need (1-99 months): 99    Type: With back      Ambulatory referral/consult to Smoking Cessation Program   Standing Status: Future   Referral Priority: Routine Referral Type: Consultation   Referral Reason: Specialty Services Required   Requested Specialty: CTTS   Number of Visits Requested: 1     Ambulatory referral/consult to Neurology   Standing Status: Future   Referral Priority: Routine Referral Type: Consultation   Referral Reason: Specialty Services Required   Requested Specialty: Neurology   Number of Visits Requested: 1     Diet Cardiac   Order Comments: See Stroke Patient Education Guide Booklet for details.     Call 911 for any of the following:   Order Comments: Call 911  right away if any of the following warning signs come on suddenly, even if the symptoms only last for a few minutes. With stroke, timing is very important.   - Warning Signs of Stroke:  - Weakness: You may feel a sudden weakness, tingling or loss of feeling on one side of your face or body.  - Vision Problems: You may have sudden double vision or trouble seeing in one or both eyes.  - Speech Problems: You may have sudden trouble talking, slured speech, or problems understanding others.  - Headache: You may have sudden, severe headache.  - Movement Problems: You may experience dizziness, a feeling of spinning, a loss of balance, a feeling of falling or blackouts.     Activity as tolerated       Significant Diagnostic Studies: Labs: CMP   Recent Labs   Lab 08/05/23  0420 08/06/23  0607 "    141   K 4.6 5.3*    108   CO2 24 23   GLU 94 97   BUN 41* 40*   CREATININE 2.2* 2.2*   CALCIUM 9.4 9.4   PROT 7.3 7.6   ALBUMIN 3.8 3.9   BILITOT 0.5 0.5   ALKPHOS 103 105   AST 13 15   ALT 10 12   ANIONGAP 9 10    and CBC   Recent Labs   Lab 08/05/23  0420 08/06/23  0425   WBC 4.83 4.77   HGB 13.2 14.6   HCT 40.9 44.6    268       Pending Diagnostic Studies:       None           Medications:  Reconciled Home Medications:      Medication List        START taking these medications      clopidogreL 75 mg tablet  Commonly known as: PLAVIX  Take 1 tablet (75 mg total) by mouth once daily. for 20 days  Start taking on: August 7, 2023     furosemide 40 MG tablet  Commonly known as: LASIX  Take 0.5 tablets (20 mg total) by mouth once daily.     nicotine 21 mg/24 hr  Commonly known as: NICODERM CQ  Place 1 patch onto the skin once daily.  Start taking on: August 7, 2023            CHANGE how you take these medications      aspirin 81 MG EC tablet  Commonly known as: ECOTRIN  Take 1 tablet (81 mg total) by mouth once daily.  What changed: when to take this     hydrOXYzine 50 MG tablet  Commonly known as: ATARAX  Take 1 tablet (50 mg total) by mouth 2 (two) times daily as needed for Anxiety.  What changed: reasons to take this     irbesartan 150 MG tablet  Commonly known as: AVAPRO  Take 1 tablet (150 mg total) by mouth once daily.  What changed: when to take this            CONTINUE taking these medications      carvediloL 6.25 MG tablet  Commonly known as: COREG  Take 1 tablet (6.25 mg total) by mouth 2 (two) times daily.     cloNIDine 0.1 MG tablet  Commonly known as: CATAPRES  Take 1 tablet (0.1 mg total) by mouth 3 (three) times daily.     hydrALAZINE 50 MG tablet  Commonly known as: APRESOLINE  Take 1 tablet (50 mg total) by mouth every 8 (eight) hours.     pravastatin 40 MG tablet  Commonly known as: PRAVACHOL  Take 1 tablet (40 mg total) by mouth every evening.     ziprasidone 20 MG Cap  Commonly  known as: GEODON  Take 1 capsule (20 mg total) by mouth 2 (two) times daily.              Indwelling Lines/Drains at time of discharge:   Lines/Drains/Airways       None                   Time spent on the discharge of patient: 46 minutes         Felipa Hayes NP  Department of Hospital Medicine  [facility] - Telemetry ([facility])

## 2023-08-06 NOTE — ASSESSMENT & PLAN NOTE
-Creatine stable for now  -BMP reviewed- noted Estimated Creatinine Clearance: 19.7 mL/min (A) (based on SCr of 2.2 mg/dL (H)). according to latest data  -Monitor UOP and serial BMP and adjust therapy as needed  -Renally dose meds and avoid nephrotoxins

## 2023-08-06 NOTE — PROGRESS NOTES
08/06/23 1053   Post-Acute Status   Post-Acute Authorization Home Health   Home Health Status Referrals Sent   Discharge Delays None known at this time   Discharge Plan   Discharge Plan A Home Health   Discharge Plan B Home     SW intern met with pt to inform pt that HH orders were sent for [facility].

## 2023-08-06 NOTE — ASSESSMENT & PLAN NOTE
-Neurology following, appreciate  -Need better BP control  -hydralazine/labetol PRN SBP > 160  -referral sent to nsg on discharge

## 2023-08-07 ENCOUNTER — TELEPHONE (OUTPATIENT)
Dept: PHYSICAL MEDICINE AND REHAB | Facility: CLINIC | Age: 68
End: 2023-08-07
Payer: MEDICARE

## 2023-08-08 ENCOUNTER — TELEPHONE (OUTPATIENT)
Dept: FAMILY MEDICINE | Facility: CLINIC | Age: 68
End: 2023-08-08
Payer: MEDICARE

## 2023-08-08 ENCOUNTER — PATIENT OUTREACH (OUTPATIENT)
Dept: ADMINISTRATIVE | Facility: CLINIC | Age: 68
End: 2023-08-08
Payer: MEDICARE

## 2023-08-08 NOTE — PROGRESS NOTES
2nd Attempt made to reach patient for TCC call. Left voicemail please call [phone] leave first name, last name, and .  I will return your call.

## 2023-08-08 NOTE — PROGRESS NOTES
C3 nurse attempted to contact Rose Milligan and daughter, Veronica,  for a TCC post hospital discharge follow up call. No answer. Left voicemail with callback information. The patient does not have a scheduled HOSFU appointment. Message sent to PCP staff for assistance with scheduling visit with patient.

## 2023-08-09 NOTE — PROGRESS NOTES
C3 nurse spoke with Rose Milligan  for a TCC post hospital discharge follow up call. The patient has a scheduled [facility] appointment with Ricki Manjarrez NP on 8/14/2023 @ 8:40 AM.

## 2023-08-10 ENCOUNTER — TELEPHONE (OUTPATIENT)
Dept: FAMILY MEDICINE | Facility: CLINIC | Age: 68
End: 2023-08-10
Payer: MEDICARE

## 2023-08-10 DIAGNOSIS — I63.9 CEREBROVASCULAR ACCIDENT (CVA), UNSPECIFIED MECHANISM: Primary | ICD-10-CM

## 2023-08-10 NOTE — PHYSICIAN QUERY
"PT Name: Rose Milligan  MR #: [id number]     DOCUMENTATION CLARIFICATION      CDS/: Karolina Saleem RN           Contact information:  [email or web address]    This form is a permanent document in the medical record.     Query Date: August 10, 2023    Dear Provider,  By submitting this query, we are merely seeking further clarification of documentation.  Please utilize your independent clinical judgment when addressing the question(s) below.     The Medical Record contains the following:    Supporting Clinical Findings Location in Medical Record   (HFpEF) heart failure with preserved ejection fraction  Patient is identified as having Diastolic (HFpEF) heart failure that is Acute on Chronic. CHF is currently controlled. Latest ECHO performed and demonstrates- Results for orders placed during the hospital encounter of 08/04/23     Echo     Interpretation Summary    Left Ventricle: The left ventricle is normal in size. Moderately increased wall thickness. There is moderate concentrict hypertrophy. Normal wall motion. There is normal systolic function with a visually estimated ejection fraction of 55 - 70%. Grade I diastolic dysfunction.    Left Atrium: Left atrium is mildly dilated.    Right Ventricle: Normal right ventricular cavity size. Wall thickness is normal. Right ventricle wall motion  is normal. Systolic function is normal.    Mitral Valve: There is mild regurgitation.    Tricuspid Valve: There is mild regurgitation.    IVC/SVC: Intermediate venous pressure at 8 mmHg.    Bubble study negative  Monitor clinical status closely. Monitor on telemetry. Patient is off CHF pathway.  Monitor strict Is&Os and daily weights.  Place on fluid restriction of 1.5 L. Continue to stress to patient importance of self efficacy and  on diet for CHF. Last BNP reviewed- and noted below No results for input(s): "BNP", "BNPTRIAGEBLO" in the last 168 hours    Uncontrolled hypertension  Chronic, uncontrolled  -Latest " blood pressure and vitals reviewed-   Temp:  [97.7 °F (36.5 °C)-98.1 °F (36.7 °C)]   Pulse:  []   Resp:  [16-20]   BP: (135-212)/()   SpO2:  [96 %-99 %] .   -Home meds for hypertension were reviewed and noted below.   Hypertension Medications      PMHx of  H/O CVA with residual finding , CKD stage 3 HTN , H/P polysubstance drug aust ,Tobacco abuse  , HLD, Bipolar , Depression and HCV presented to the ER with a c/o Fatigue and left lower extremity weakness for the last days which has gotten worse .Pt was referred to the ED from Dr. Klein (Neurology) after brain MRI done this morning showed punctate acute left thalamic infarct and right MCA bifurcation aneurysm.    Patient denies any fever, chills, n/v/d, SOB, CP, weakness, numbness, and all other sxs at this time.    D/C summary (Freddy/Teofilo) 8/6                  Please clarify if the _Diastolic (HFpEF) heart failure that is Acute on Chronic._____ diagnosis has been:    [  ] Ruled In   [  x] Ruled In, Now Resolved   [  ] Ruled Out   [  ] Other/Clarification of findings (please specify): _______________    [   ] Clinically undetermined         Please document in your progress notes daily for the duration of treatment, until resolved, and include in your discharge summary.    Form No. [id number]

## 2023-08-10 NOTE — TELEPHONE ENCOUNTER
----- Message from Sarai Hobson sent at 8/10/2023 10:30 AM CDT -----  Contact: Eduard/[facility]  Eduard is calling in regard to wanting to see if he could add a speech therapy eval.  Please call him back at [phone] [email or web address]/[email or web address]

## 2023-08-11 ENCOUNTER — PATIENT OUTREACH (OUTPATIENT)
Dept: ADMINISTRATIVE | Facility: HOSPITAL | Age: 68
End: 2023-08-11
Payer: MEDICARE

## 2023-08-14 ENCOUNTER — LAB VISIT (OUTPATIENT)
Dept: LAB | Facility: HOSPITAL | Age: 68
End: 2023-08-14
Attending: INTERNAL MEDICINE
Payer: MEDICARE

## 2023-08-14 ENCOUNTER — OFFICE VISIT (OUTPATIENT)
Dept: FAMILY MEDICINE | Facility: CLINIC | Age: 68
End: 2023-08-14
Payer: MEDICARE

## 2023-08-14 VITALS
RESPIRATION RATE: 18 BRPM | HEART RATE: 93 BPM | HEIGHT: 63 IN | DIASTOLIC BLOOD PRESSURE: 78 MMHG | SYSTOLIC BLOOD PRESSURE: 126 MMHG | OXYGEN SATURATION: 98 % | TEMPERATURE: 97 F | WEIGHT: 142.63 LBS | BODY MASS INDEX: 25.27 KG/M2

## 2023-08-14 DIAGNOSIS — F14.21: ICD-10-CM

## 2023-08-14 DIAGNOSIS — E87.5 HYPERKALEMIA: ICD-10-CM

## 2023-08-14 DIAGNOSIS — I10 UNCONTROLLED HYPERTENSION: ICD-10-CM

## 2023-08-14 DIAGNOSIS — I63.9 ACUTE CVA (CEREBROVASCULAR ACCIDENT): ICD-10-CM

## 2023-08-14 DIAGNOSIS — E87.5 HYPERKALEMIA: Primary | ICD-10-CM

## 2023-08-14 DIAGNOSIS — I67.1 CEREBRAL ANEURYSM, NONRUPTURED: Primary | ICD-10-CM

## 2023-08-14 DIAGNOSIS — E87.5 HYPERPOTASSEMIA: ICD-10-CM

## 2023-08-14 LAB
ANION GAP SERPL CALC-SCNC: 8 MMOL/L (ref 8–16)
BUN SERPL-MCNC: 63 MG/DL (ref 8–23)
CALCIUM SERPL-MCNC: 8.6 MG/DL (ref 8.7–10.5)
CHLORIDE SERPL-SCNC: 108 MMOL/L (ref 95–110)
CO2 SERPL-SCNC: 26 MMOL/L (ref 23–29)
CREAT SERPL-MCNC: 3.2 MG/DL (ref 0.5–1.4)
EST. GFR  (NO RACE VARIABLE): 15 ML/MIN/1.73 M^2
GLUCOSE SERPL-MCNC: 90 MG/DL (ref 70–110)
POTASSIUM SERPL-SCNC: 5.5 MMOL/L (ref 3.5–5.1)
SODIUM SERPL-SCNC: 142 MMOL/L (ref 136–145)

## 2023-08-14 PROCEDURE — 3062F POS MACROALBUMINURIA REV: CPT | Mod: HCNC,CPTII,S$GLB, | Performed by: INTERNAL MEDICINE

## 2023-08-14 PROCEDURE — 3078F PR MOST RECENT DIASTOLIC BLOOD PRESSURE < 80 MM HG: ICD-10-PCS | Mod: HCNC,CPTII,S$GLB, | Performed by: INTERNAL MEDICINE

## 2023-08-14 PROCEDURE — 3074F PR MOST RECENT SYSTOLIC BLOOD PRESSURE < 130 MM HG: ICD-10-PCS | Mod: HCNC,CPTII,S$GLB, | Performed by: INTERNAL MEDICINE

## 2023-08-14 PROCEDURE — 1111F PR DISCHARGE MEDS RECONCILED W/ CURRENT OUTPATIENT MED LIST: ICD-10-PCS | Mod: HCNC,CPTII,S$GLB, | Performed by: INTERNAL MEDICINE

## 2023-08-14 PROCEDURE — 3288F PR FALLS RISK ASSESSMENT DOCUMENTED: ICD-10-PCS | Mod: HCNC,CPTII,S$GLB, | Performed by: INTERNAL MEDICINE

## 2023-08-14 PROCEDURE — 99214 OFFICE O/P EST MOD 30 MIN: CPT | Mod: HCNC,S$GLB,, | Performed by: INTERNAL MEDICINE

## 2023-08-14 PROCEDURE — 3078F DIAST BP <80 MM HG: CPT | Mod: HCNC,CPTII,S$GLB, | Performed by: INTERNAL MEDICINE

## 2023-08-14 PROCEDURE — 99999 PR PBB SHADOW E&M-EST. PATIENT-LVL IV: CPT | Mod: PBBFAC,HCNC,, | Performed by: INTERNAL MEDICINE

## 2023-08-14 PROCEDURE — 3044F HG A1C LEVEL LT 7.0%: CPT | Mod: HCNC,CPTII,S$GLB, | Performed by: INTERNAL MEDICINE

## 2023-08-14 PROCEDURE — 3062F PR POS MACROALBUMINURIA RESULT DOCUMENTED/REVIEW: ICD-10-PCS | Mod: HCNC,CPTII,S$GLB, | Performed by: INTERNAL MEDICINE

## 2023-08-14 PROCEDURE — 99999 PR PBB SHADOW E&M-EST. PATIENT-LVL IV: ICD-10-PCS | Mod: PBBFAC,HCNC,, | Performed by: INTERNAL MEDICINE

## 2023-08-14 PROCEDURE — 1126F AMNT PAIN NOTED NONE PRSNT: CPT | Mod: HCNC,CPTII,S$GLB, | Performed by: INTERNAL MEDICINE

## 2023-08-14 PROCEDURE — 1160F RVW MEDS BY RX/DR IN RCRD: CPT | Mod: HCNC,CPTII,S$GLB, | Performed by: INTERNAL MEDICINE

## 2023-08-14 PROCEDURE — 1159F MED LIST DOCD IN RCRD: CPT | Mod: HCNC,CPTII,S$GLB, | Performed by: INTERNAL MEDICINE

## 2023-08-14 PROCEDURE — 1126F PR PAIN SEVERITY QUANTIFIED, NO PAIN PRESENT: ICD-10-PCS | Mod: HCNC,CPTII,S$GLB, | Performed by: INTERNAL MEDICINE

## 2023-08-14 PROCEDURE — 1101F PT FALLS ASSESS-DOCD LE1/YR: CPT | Mod: HCNC,CPTII,S$GLB, | Performed by: INTERNAL MEDICINE

## 2023-08-14 PROCEDURE — 3008F PR BODY MASS INDEX (BMI) DOCUMENTED: ICD-10-PCS | Mod: HCNC,CPTII,S$GLB, | Performed by: INTERNAL MEDICINE

## 2023-08-14 PROCEDURE — 80048 BASIC METABOLIC PNL TOTAL CA: CPT | Mod: HCNC | Performed by: INTERNAL MEDICINE

## 2023-08-14 PROCEDURE — 3044F PR MOST RECENT HEMOGLOBIN A1C LEVEL <7.0%: ICD-10-PCS | Mod: HCNC,CPTII,S$GLB, | Performed by: INTERNAL MEDICINE

## 2023-08-14 PROCEDURE — 3074F SYST BP LT 130 MM HG: CPT | Mod: HCNC,CPTII,S$GLB, | Performed by: INTERNAL MEDICINE

## 2023-08-14 PROCEDURE — 4010F PR ACE/ARB THEARPY RXD/TAKEN: ICD-10-PCS | Mod: HCNC,CPTII,S$GLB, | Performed by: INTERNAL MEDICINE

## 2023-08-14 PROCEDURE — 3066F NEPHROPATHY DOC TX: CPT | Mod: HCNC,CPTII,S$GLB, | Performed by: INTERNAL MEDICINE

## 2023-08-14 PROCEDURE — 1101F PR PT FALLS ASSESS DOC 0-1 FALLS W/OUT INJ PAST YR: ICD-10-PCS | Mod: HCNC,CPTII,S$GLB, | Performed by: INTERNAL MEDICINE

## 2023-08-14 PROCEDURE — 4010F ACE/ARB THERAPY RXD/TAKEN: CPT | Mod: HCNC,CPTII,S$GLB, | Performed by: INTERNAL MEDICINE

## 2023-08-14 PROCEDURE — 3066F PR DOCUMENTATION OF TREATMENT FOR NEPHROPATHY: ICD-10-PCS | Mod: HCNC,CPTII,S$GLB, | Performed by: INTERNAL MEDICINE

## 2023-08-14 PROCEDURE — 3008F BODY MASS INDEX DOCD: CPT | Mod: HCNC,CPTII,S$GLB, | Performed by: INTERNAL MEDICINE

## 2023-08-14 PROCEDURE — 99214 PR OFFICE/OUTPT VISIT, EST, LEVL IV, 30-39 MIN: ICD-10-PCS | Mod: HCNC,S$GLB,, | Performed by: INTERNAL MEDICINE

## 2023-08-14 PROCEDURE — 1111F DSCHRG MED/CURRENT MED MERGE: CPT | Mod: HCNC,CPTII,S$GLB, | Performed by: INTERNAL MEDICINE

## 2023-08-14 PROCEDURE — 3288F FALL RISK ASSESSMENT DOCD: CPT | Mod: HCNC,CPTII,S$GLB, | Performed by: INTERNAL MEDICINE

## 2023-08-14 PROCEDURE — 1160F PR REVIEW ALL MEDS BY PRESCRIBER/CLIN PHARMACIST DOCUMENTED: ICD-10-PCS | Mod: HCNC,CPTII,S$GLB, | Performed by: INTERNAL MEDICINE

## 2023-08-14 PROCEDURE — 36415 COLL VENOUS BLD VENIPUNCTURE: CPT | Mod: HCNC,PO | Performed by: INTERNAL MEDICINE

## 2023-08-14 PROCEDURE — 1159F PR MEDICATION LIST DOCUMENTED IN MEDICAL RECORD: ICD-10-PCS | Mod: HCNC,CPTII,S$GLB, | Performed by: INTERNAL MEDICINE

## 2023-08-14 NOTE — PROGRESS NOTES
Subjective     Patient ID: Rose Milligan is a 68 y.o. female.    Chief Complaint: Hospital Follow Up      HPI  Patient presents as a hospital follow up. She was hopitalized in early August due to a CVA in the left thalamus with right MCA bifurcation aneurysm. She has been placed on Aspirin and Plavix. She reports she continues to have weakness of the left side but it is improving.  Review of Systems   Constitutional:  Negative for chills and fatigue.   Respiratory:  Negative for chest tightness and shortness of breath.    Cardiovascular:  Negative for chest pain and palpitations.   Gastrointestinal:  Negative for abdominal pain, constipation, diarrhea, nausea and vomiting.   Genitourinary:  Negative for frequency and urgency.   Neurological:  Positive for weakness (left sided). Negative for dizziness, numbness and headaches.          Objective     Physical Exam  Constitutional:       General: She is not in acute distress.     Appearance: Normal appearance. She is normal weight.   HENT:      Head: Normocephalic and atraumatic.      Comments: Left sided facial droop    Cardiovascular:      Rate and Rhythm: Normal rate and regular rhythm.      Heart sounds: Normal heart sounds. No murmur heard.     No friction rub. No gallop.   Pulmonary:      Effort: Pulmonary effort is normal.      Breath sounds: Normal breath sounds. No wheezing, rhonchi or rales.   Abdominal:      General: Bowel sounds are normal. There is no distension.      Palpations: Abdomen is soft.      Tenderness: There is no abdominal tenderness. There is no rebound.   Skin:     General: Skin is warm and dry.      Coloration: Skin is not jaundiced.   Neurological:      Mental Status: She is alert and oriented to person, place, and time. Mental status is at baseline.      Motor: Weakness (left upper and lower 4/5) present.   Psychiatric:         Mood and Affect: Mood normal.         Behavior: Behavior normal.            Assessment and Plan     1.  Hyperkalemia  Comments:  Potassium level 5.3 at discharge. Will recheck level today.  Orders:  -     Basic Metabolic Panel; Future; Expected date: 08/14/2023    2. Acute CVA (cerebrovascular accident)  Comments:  Continue Aspirin and plavix. Follow up with neurology and physical therapy    3. Uncontrolled hypertension  Comments:  Patient has been restarted on home medications of Irbisartan, Clonidine, Coreg    4. Cocaine use disorder, moderate, in early remission, dependence  Comments:  Patient advised to discontinue use of cocaine               Follow up if symptoms worsen or fail to improve.

## 2023-08-15 ENCOUNTER — DOCUMENT SCAN (OUTPATIENT)
Dept: HOME HEALTH SERVICES | Facility: HOSPITAL | Age: 68
End: 2023-08-15
Payer: MEDICARE

## 2023-08-15 ENCOUNTER — PATIENT MESSAGE (OUTPATIENT)
Dept: FAMILY MEDICINE | Facility: CLINIC | Age: 68
End: 2023-08-15
Payer: MEDICARE

## 2023-08-15 DIAGNOSIS — E87.5 HYPERKALEMIA: Primary | ICD-10-CM

## 2023-08-16 ENCOUNTER — TELEPHONE (OUTPATIENT)
Dept: FAMILY MEDICINE | Facility: CLINIC | Age: 68
End: 2023-08-16

## 2023-08-16 ENCOUNTER — LAB VISIT (OUTPATIENT)
Dept: LAB | Facility: HOSPITAL | Age: 68
End: 2023-08-16
Payer: MEDICARE

## 2023-08-16 DIAGNOSIS — I63.9 IMPENDING CEREBROVASCULAR ACCIDENT: Primary | ICD-10-CM

## 2023-08-16 DIAGNOSIS — I67.1 CEREBRAL ANEURYSM, NONRUPTURED: ICD-10-CM

## 2023-08-16 LAB
ANION GAP SERPL CALC-SCNC: 10 MMOL/L (ref 8–16)
BUN SERPL-MCNC: 58 MG/DL (ref 8–23)
CALCIUM SERPL-MCNC: 9.1 MG/DL (ref 8.7–10.5)
CHLORIDE SERPL-SCNC: 110 MMOL/L (ref 95–110)
CO2 SERPL-SCNC: 21 MMOL/L (ref 23–29)
CREAT SERPL-MCNC: 2.6 MG/DL (ref 0.5–1.4)
EST. GFR  (NO RACE VARIABLE): 19 ML/MIN/1.73 M^2
GLUCOSE SERPL-MCNC: 59 MG/DL (ref 70–110)
POTASSIUM SERPL-SCNC: 4.5 MMOL/L (ref 3.5–5.1)
SODIUM SERPL-SCNC: 141 MMOL/L (ref 136–145)

## 2023-08-16 PROCEDURE — 36415 COLL VENOUS BLD VENIPUNCTURE: CPT | Mod: HCNC | Performed by: INTERNAL MEDICINE

## 2023-08-16 PROCEDURE — 80048 BASIC METABOLIC PNL TOTAL CA: CPT | Mod: HCNC | Performed by: INTERNAL MEDICINE

## 2023-08-16 NOTE — TELEPHONE ENCOUNTER
----- Message from Nadya Sevilla sent at 8/16/2023 10:17 AM CDT -----  Contact: Karis/KELSIE Carlson with [facility] is calling to speak with a nurse regarding lab draw . Please give a call back at [phone]

## 2023-08-16 NOTE — TELEPHONE ENCOUNTER
[facility] lab was ordered as a referral and needed to be ordered as a lab draw. Because of this they did not see this until just now. She states she is trying to see if they have someone available to draw it today. If not are you ok with someone drawing it tomorrow?

## 2023-08-17 ENCOUNTER — PATIENT MESSAGE (OUTPATIENT)
Dept: CARDIOLOGY | Facility: CLINIC | Age: 68
End: 2023-08-17
Payer: MEDICARE

## 2023-08-21 ENCOUNTER — TELEPHONE (OUTPATIENT)
Dept: NEUROLOGY | Facility: CLINIC | Age: 68
End: 2023-08-21
Payer: MEDICARE

## 2023-08-21 ENCOUNTER — TELEPHONE (OUTPATIENT)
Dept: FAMILY MEDICINE | Facility: CLINIC | Age: 68
End: 2023-08-21
Payer: MEDICARE

## 2023-08-21 ENCOUNTER — LAB VISIT (OUTPATIENT)
Dept: LAB | Facility: HOSPITAL | Age: 68
End: 2023-08-21
Payer: MEDICARE

## 2023-08-21 DIAGNOSIS — E87.5 HYPERPOTASSEMIA: ICD-10-CM

## 2023-08-21 DIAGNOSIS — E87.5 HYPERPOTASSEMIA: Primary | ICD-10-CM

## 2023-08-21 DIAGNOSIS — I67.1 CEREBRAL ANEURYSM, NONRUPTURED: ICD-10-CM

## 2023-08-21 DIAGNOSIS — I63.9 ACUTE CVA (CEREBROVASCULAR ACCIDENT): Primary | ICD-10-CM

## 2023-08-21 LAB
ANION GAP SERPL CALC-SCNC: 8 MMOL/L (ref 8–16)
BASOPHILS # BLD AUTO: 0.04 K/UL (ref 0–0.2)
BASOPHILS NFR BLD: 0.8 % (ref 0–1.9)
BUN SERPL-MCNC: 49 MG/DL (ref 8–23)
CALCIUM SERPL-MCNC: 8.8 MG/DL (ref 8.7–10.5)
CHLORIDE SERPL-SCNC: 112 MMOL/L (ref 95–110)
CO2 SERPL-SCNC: 20 MMOL/L (ref 23–29)
CREAT SERPL-MCNC: 2.4 MG/DL (ref 0.5–1.4)
DIFFERENTIAL METHOD: ABNORMAL
EOSINOPHIL # BLD AUTO: 0.3 K/UL (ref 0–0.5)
EOSINOPHIL NFR BLD: 5.9 % (ref 0–8)
ERYTHROCYTE [DISTWIDTH] IN BLOOD BY AUTOMATED COUNT: 12.9 % (ref 11.5–14.5)
EST. GFR  (NO RACE VARIABLE): 21.2 ML/MIN/1.73 M^2
GLUCOSE SERPL-MCNC: 82 MG/DL (ref 70–110)
HCT VFR BLD AUTO: 37.9 % (ref 37–48.5)
HGB BLD-MCNC: 12.4 G/DL (ref 12–16)
IMM GRANULOCYTES # BLD AUTO: 0.02 K/UL (ref 0–0.04)
IMM GRANULOCYTES NFR BLD AUTO: 0.4 % (ref 0–0.5)
LYMPHOCYTES # BLD AUTO: 2.2 K/UL (ref 1–4.8)
LYMPHOCYTES NFR BLD: 44.8 % (ref 18–48)
MCH RBC QN AUTO: 27.6 PG (ref 27–31)
MCHC RBC AUTO-ENTMCNC: 32.7 G/DL (ref 32–36)
MCV RBC AUTO: 84 FL (ref 82–98)
MONOCYTES # BLD AUTO: 0.6 K/UL (ref 0.3–1)
MONOCYTES NFR BLD: 11.1 % (ref 4–15)
NEUTROPHILS # BLD AUTO: 1.8 K/UL (ref 1.8–7.7)
NEUTROPHILS NFR BLD: 37 % (ref 38–73)
NRBC BLD-RTO: 0 /100 WBC
PLATELET # BLD AUTO: 269 K/UL (ref 150–450)
PMV BLD AUTO: 10.7 FL (ref 9.2–12.9)
POTASSIUM SERPL-SCNC: 4.8 MMOL/L (ref 3.5–5.1)
RBC # BLD AUTO: 4.5 M/UL (ref 4–5.4)
SODIUM SERPL-SCNC: 140 MMOL/L (ref 136–145)
WBC # BLD AUTO: 4.95 K/UL (ref 3.9–12.7)

## 2023-08-21 PROCEDURE — 85025 COMPLETE CBC W/AUTO DIFF WBC: CPT | Mod: HCNC | Performed by: INTERNAL MEDICINE

## 2023-08-21 PROCEDURE — 80048 BASIC METABOLIC PNL TOTAL CA: CPT | Mod: HCNC | Performed by: INTERNAL MEDICINE

## 2023-08-21 NOTE — TELEPHONE ENCOUNTER
Spoke with patient in regards to scheduling appointment. I advised patient that  current next available will be around 6 months out. Patient stated she would keep her current neurologist that she has now. I did verbalized understanding.

## 2023-08-21 NOTE — TELEPHONE ENCOUNTER
----- Message from Sarai Crouchniiraegan sent at 8/21/2023  2:32 PM CDT -----  Contact: Malu/[facility]  Malu is calling in regard to needing a referral for a neurology for the pt to [facility].  If any questions, please call Malu back at [phone] thanks/luba

## 2023-08-21 NOTE — TELEPHONE ENCOUNTER
----- Message from Chasity Gale sent at 8/21/2023  2:24 PM CDT -----  Contact: Patient, [phone]  Calling to schedule an appointment for  Cerebrovascular accident (CVA), unspecified mechanism, referral in hr chart. Please call her. Thanks.

## 2023-08-28 ENCOUNTER — OFFICE VISIT (OUTPATIENT)
Dept: NEPHROLOGY | Facility: CLINIC | Age: 68
End: 2023-08-28
Payer: MEDICARE

## 2023-08-28 ENCOUNTER — OFFICE VISIT (OUTPATIENT)
Dept: PSYCHIATRY | Facility: CLINIC | Age: 68
End: 2023-08-28
Payer: MEDICARE

## 2023-08-28 VITALS
HEIGHT: 63 IN | DIASTOLIC BLOOD PRESSURE: 90 MMHG | WEIGHT: 146.81 LBS | BODY MASS INDEX: 26.01 KG/M2 | HEART RATE: 64 BPM | SYSTOLIC BLOOD PRESSURE: 148 MMHG

## 2023-08-28 VITALS
BODY MASS INDEX: 26.09 KG/M2 | DIASTOLIC BLOOD PRESSURE: 93 MMHG | SYSTOLIC BLOOD PRESSURE: 156 MMHG | HEART RATE: 66 BPM | WEIGHT: 147.25 LBS

## 2023-08-28 DIAGNOSIS — I10 PRIMARY HYPERTENSION: ICD-10-CM

## 2023-08-28 DIAGNOSIS — F31.62 BIPOLAR AFFECTIVE DISORDER, MIXED, MODERATE: ICD-10-CM

## 2023-08-28 DIAGNOSIS — F19.90 SUBSTANCE USE DISORDER: ICD-10-CM

## 2023-08-28 DIAGNOSIS — F43.9 STRESS: ICD-10-CM

## 2023-08-28 DIAGNOSIS — N18.4 CKD (CHRONIC KIDNEY DISEASE) STAGE 4, GFR 15-29 ML/MIN: Primary | ICD-10-CM

## 2023-08-28 DIAGNOSIS — R80.9 PROTEINURIA, UNSPECIFIED TYPE: ICD-10-CM

## 2023-08-28 PROCEDURE — 3077F SYST BP >= 140 MM HG: CPT | Mod: HCNC,CPTII,S$GLB, | Performed by: INTERNAL MEDICINE

## 2023-08-28 PROCEDURE — 4010F ACE/ARB THERAPY RXD/TAKEN: CPT | Mod: HCNC,CPTII,S$GLB, | Performed by: PSYCHIATRY & NEUROLOGY

## 2023-08-28 PROCEDURE — 1126F AMNT PAIN NOTED NONE PRSNT: CPT | Mod: HCNC,CPTII,S$GLB, | Performed by: INTERNAL MEDICINE

## 2023-08-28 PROCEDURE — 99204 OFFICE O/P NEW MOD 45 MIN: CPT | Mod: HCNC,S$GLB,, | Performed by: INTERNAL MEDICINE

## 2023-08-28 PROCEDURE — 3062F POS MACROALBUMINURIA REV: CPT | Mod: HCNC,CPTII,S$GLB, | Performed by: PSYCHIATRY & NEUROLOGY

## 2023-08-28 PROCEDURE — 3062F POS MACROALBUMINURIA REV: CPT | Mod: HCNC,CPTII,S$GLB, | Performed by: INTERNAL MEDICINE

## 2023-08-28 PROCEDURE — 3288F FALL RISK ASSESSMENT DOCD: CPT | Mod: HCNC,CPTII,S$GLB, | Performed by: INTERNAL MEDICINE

## 2023-08-28 PROCEDURE — 3066F NEPHROPATHY DOC TX: CPT | Mod: HCNC,CPTII,S$GLB, | Performed by: PSYCHIATRY & NEUROLOGY

## 2023-08-28 PROCEDURE — 3288F PR FALLS RISK ASSESSMENT DOCUMENTED: ICD-10-PCS | Mod: HCNC,CPTII,S$GLB, | Performed by: INTERNAL MEDICINE

## 2023-08-28 PROCEDURE — 4010F PR ACE/ARB THEARPY RXD/TAKEN: ICD-10-PCS | Mod: HCNC,CPTII,S$GLB, | Performed by: INTERNAL MEDICINE

## 2023-08-28 PROCEDURE — 4010F ACE/ARB THERAPY RXD/TAKEN: CPT | Mod: HCNC,CPTII,S$GLB, | Performed by: INTERNAL MEDICINE

## 2023-08-28 PROCEDURE — 3062F PR POS MACROALBUMINURIA RESULT DOCUMENTED/REVIEW: ICD-10-PCS | Mod: HCNC,CPTII,S$GLB, | Performed by: PSYCHIATRY & NEUROLOGY

## 2023-08-28 PROCEDURE — 1111F DSCHRG MED/CURRENT MED MERGE: CPT | Mod: HCNC,CPTII,S$GLB, | Performed by: INTERNAL MEDICINE

## 2023-08-28 PROCEDURE — 3077F PR MOST RECENT SYSTOLIC BLOOD PRESSURE >= 140 MM HG: ICD-10-PCS | Mod: HCNC,CPTII,S$GLB, | Performed by: INTERNAL MEDICINE

## 2023-08-28 PROCEDURE — 3062F PR POS MACROALBUMINURIA RESULT DOCUMENTED/REVIEW: ICD-10-PCS | Mod: HCNC,CPTII,S$GLB, | Performed by: INTERNAL MEDICINE

## 2023-08-28 PROCEDURE — 1101F PR PT FALLS ASSESS DOC 0-1 FALLS W/OUT INJ PAST YR: ICD-10-PCS | Mod: HCNC,CPTII,S$GLB, | Performed by: INTERNAL MEDICINE

## 2023-08-28 PROCEDURE — 3066F PR DOCUMENTATION OF TREATMENT FOR NEPHROPATHY: ICD-10-PCS | Mod: HCNC,CPTII,S$GLB, | Performed by: INTERNAL MEDICINE

## 2023-08-28 PROCEDURE — 99999 PR PBB SHADOW E&M-EST. PATIENT-LVL III: CPT | Mod: PBBFAC,HCNC,, | Performed by: INTERNAL MEDICINE

## 2023-08-28 PROCEDURE — 1126F PR PAIN SEVERITY QUANTIFIED, NO PAIN PRESENT: ICD-10-PCS | Mod: HCNC,CPTII,S$GLB, | Performed by: INTERNAL MEDICINE

## 2023-08-28 PROCEDURE — 3044F PR MOST RECENT HEMOGLOBIN A1C LEVEL <7.0%: ICD-10-PCS | Mod: HCNC,CPTII,S$GLB, | Performed by: INTERNAL MEDICINE

## 2023-08-28 PROCEDURE — 90792 PSYCH DIAG EVAL W/MED SRVCS: CPT | Mod: HCNC,S$GLB,, | Performed by: PSYCHIATRY & NEUROLOGY

## 2023-08-28 PROCEDURE — 4010F PR ACE/ARB THEARPY RXD/TAKEN: ICD-10-PCS | Mod: HCNC,CPTII,S$GLB, | Performed by: PSYCHIATRY & NEUROLOGY

## 2023-08-28 PROCEDURE — 3044F PR MOST RECENT HEMOGLOBIN A1C LEVEL <7.0%: ICD-10-PCS | Mod: HCNC,CPTII,S$GLB, | Performed by: PSYCHIATRY & NEUROLOGY

## 2023-08-28 PROCEDURE — 3066F NEPHROPATHY DOC TX: CPT | Mod: HCNC,CPTII,S$GLB, | Performed by: INTERNAL MEDICINE

## 2023-08-28 PROCEDURE — 3008F BODY MASS INDEX DOCD: CPT | Mod: HCNC,CPTII,S$GLB, | Performed by: INTERNAL MEDICINE

## 2023-08-28 PROCEDURE — 3080F PR MOST RECENT DIASTOLIC BLOOD PRESSURE >= 90 MM HG: ICD-10-PCS | Mod: HCNC,CPTII,S$GLB, | Performed by: PSYCHIATRY & NEUROLOGY

## 2023-08-28 PROCEDURE — 1160F RVW MEDS BY RX/DR IN RCRD: CPT | Mod: HCNC,CPTII,S$GLB, | Performed by: INTERNAL MEDICINE

## 2023-08-28 PROCEDURE — 3080F DIAST BP >= 90 MM HG: CPT | Mod: HCNC,CPTII,S$GLB, | Performed by: INTERNAL MEDICINE

## 2023-08-28 PROCEDURE — 99999 PR PBB SHADOW E&M-EST. PATIENT-LVL II: CPT | Mod: PBBFAC,HCNC,, | Performed by: PSYCHIATRY & NEUROLOGY

## 2023-08-28 PROCEDURE — 3077F SYST BP >= 140 MM HG: CPT | Mod: HCNC,CPTII,S$GLB, | Performed by: PSYCHIATRY & NEUROLOGY

## 2023-08-28 PROCEDURE — 90792 PR PSYCHIATRIC DIAGNOSTIC EVALUATION W/MEDICAL SERVICES: ICD-10-PCS | Mod: HCNC,S$GLB,, | Performed by: PSYCHIATRY & NEUROLOGY

## 2023-08-28 PROCEDURE — 3044F HG A1C LEVEL LT 7.0%: CPT | Mod: HCNC,CPTII,S$GLB, | Performed by: INTERNAL MEDICINE

## 2023-08-28 PROCEDURE — 1101F PT FALLS ASSESS-DOCD LE1/YR: CPT | Mod: HCNC,CPTII,S$GLB, | Performed by: INTERNAL MEDICINE

## 2023-08-28 PROCEDURE — 3080F DIAST BP >= 90 MM HG: CPT | Mod: HCNC,CPTII,S$GLB, | Performed by: PSYCHIATRY & NEUROLOGY

## 2023-08-28 PROCEDURE — 1159F MED LIST DOCD IN RCRD: CPT | Mod: HCNC,CPTII,S$GLB, | Performed by: INTERNAL MEDICINE

## 2023-08-28 PROCEDURE — 3066F PR DOCUMENTATION OF TREATMENT FOR NEPHROPATHY: ICD-10-PCS | Mod: HCNC,CPTII,S$GLB, | Performed by: PSYCHIATRY & NEUROLOGY

## 2023-08-28 PROCEDURE — 99999 PR PBB SHADOW E&M-EST. PATIENT-LVL III: ICD-10-PCS | Mod: PBBFAC,HCNC,, | Performed by: INTERNAL MEDICINE

## 2023-08-28 PROCEDURE — 1111F PR DISCHARGE MEDS RECONCILED W/ CURRENT OUTPATIENT MED LIST: ICD-10-PCS | Mod: HCNC,CPTII,S$GLB, | Performed by: INTERNAL MEDICINE

## 2023-08-28 PROCEDURE — 1160F PR REVIEW ALL MEDS BY PRESCRIBER/CLIN PHARMACIST DOCUMENTED: ICD-10-PCS | Mod: HCNC,CPTII,S$GLB, | Performed by: INTERNAL MEDICINE

## 2023-08-28 PROCEDURE — 3008F PR BODY MASS INDEX (BMI) DOCUMENTED: ICD-10-PCS | Mod: HCNC,CPTII,S$GLB, | Performed by: INTERNAL MEDICINE

## 2023-08-28 PROCEDURE — 99999 PR PBB SHADOW E&M-EST. PATIENT-LVL II: ICD-10-PCS | Mod: PBBFAC,HCNC,, | Performed by: PSYCHIATRY & NEUROLOGY

## 2023-08-28 PROCEDURE — 3077F PR MOST RECENT SYSTOLIC BLOOD PRESSURE >= 140 MM HG: ICD-10-PCS | Mod: HCNC,CPTII,S$GLB, | Performed by: PSYCHIATRY & NEUROLOGY

## 2023-08-28 PROCEDURE — 3044F HG A1C LEVEL LT 7.0%: CPT | Mod: HCNC,CPTII,S$GLB, | Performed by: PSYCHIATRY & NEUROLOGY

## 2023-08-28 PROCEDURE — 99204 PR OFFICE/OUTPT VISIT, NEW, LEVL IV, 45-59 MIN: ICD-10-PCS | Mod: HCNC,S$GLB,, | Performed by: INTERNAL MEDICINE

## 2023-08-28 PROCEDURE — 3080F PR MOST RECENT DIASTOLIC BLOOD PRESSURE >= 90 MM HG: ICD-10-PCS | Mod: HCNC,CPTII,S$GLB, | Performed by: INTERNAL MEDICINE

## 2023-08-28 PROCEDURE — 1159F PR MEDICATION LIST DOCUMENTED IN MEDICAL RECORD: ICD-10-PCS | Mod: HCNC,CPTII,S$GLB, | Performed by: INTERNAL MEDICINE

## 2023-08-28 RX ORDER — IRBESARTAN 150 MG/1
300 TABLET ORAL NIGHTLY
Qty: 180 TABLET | Refills: 3 | Status: SHIPPED | OUTPATIENT
Start: 2023-08-28 | End: 2024-01-16 | Stop reason: SDUPTHER

## 2023-08-28 RX ORDER — ZIPRASIDONE HYDROCHLORIDE 20 MG/1
20 CAPSULE ORAL 2 TIMES DAILY
Qty: 60 CAPSULE | Refills: 3 | Status: SHIPPED | OUTPATIENT
Start: 2023-08-28 | End: 2023-12-26
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
## 2023-08-28 NOTE — PROGRESS NOTES
Rose Milligan is a 68 y.o. female      HPI:    Who has history of chronic kidney disease.  She was last seen in Nephrology Clinic over 3 years ago.  She is been lost to follow-up.  She presents to clinic today to reestablish care.  In the clinic today she is doing well and has no specific complaints.  She relates that she was hospitalized about 3 weeks ago with a CVA.  She states since being discharged home she is doing better.  She was having some speech difficulties but that has improved.  Her laboratory studies medications were reviewed.  All Nephrology related questions were answered to her satisfaction.  She relates that she does take quite a bit of naproxen.  We discussed the importance of avoiding nonsteroidal anti-inflammatory agents as well as Cevallos 2 inhibitors.  We also discussed that Tylenol arthritis or extra-strength is the safest over-the-counter pain medication.  She verbalized understanding.    PAST MEDICAL HISTORY:  She  has a past medical history of Acute CVA (cerebrovascular accident) (8/4/2023), Bipolar 1 disorder, CHF (congestive heart failure), Depression, Hepatitis C, Hypertension, and Other hyperlipidemia (8/15/2019).    PAST SURGICAL HISTORY:  She  has a past surgical history that includes Hysterectomy.    SOCIAL HISTORY:  She  reports that she has been smoking cigarettes. She started smoking about 7 years ago. She has a 1.9 pack-year smoking history. She has never used smokeless tobacco. She reports current alcohol use. She reports current drug use. Drug: Cocaine.      FAMILY MEDICAL HISTORY:  Her family history includes Heart attack in her maternal grandmother; Hypertension in her mother.    Review of patient's allergies indicates:   Allergen Reactions    Tramadol Itching    Risperdal [risperidone] Other (See Comments)     Did not like way felt           Prior to Admission medications    Medication Sig Start Date End Date Taking? Authorizing Provider   aspirin (ECOTRIN) 81 MG EC tablet Take  1 tablet (81 mg total) by mouth once daily. 8/6/23  Yes Felipa Hayes, JOSE   carvediloL (COREG) 6.25 MG tablet Take 1 tablet (6.25 mg total) by mouth 2 (two) times daily. 8/6/23  Yes Felipa Hayes NP   cloNIDine (CATAPRES) 0.1 MG tablet Take 1 tablet (0.1 mg total) by mouth 3 (three) times daily. 8/6/23  Yes Felipa Hayes, NP   clopidogreL (PLAVIX) 75 mg tablet Take 1 tablet (75 mg total) by mouth once daily. for 20 days 8/7/23 8/28/23 Yes Felipa Hayes NP   furosemide (LASIX) 40 MG tablet Take 0.5 tablets (20 mg total) by mouth once daily. 8/6/23  Yes Felipa Hayes NP   hydrALAZINE (APRESOLINE) 50 MG tablet Take 1 tablet (50 mg total) by mouth every 8 (eight) hours. 8/6/23  Yes Felipa Hayes NP   hydrOXYzine (ATARAX) 50 MG tablet Take 1 tablet (50 mg total) by mouth 2 (two) times daily as needed for Anxiety. 8/6/23  Yes Felipa Hayes NP   nicotine (NICODERM CQ) 21 mg/24 hr Place 1 patch onto the skin once daily. 8/7/23  Yes Felipa Hayes NP   pravastatin (PRAVACHOL) 40 MG tablet Take 1 tablet (40 mg total) by mouth every evening. 8/6/23 8/5/24 Yes Felipa Hayes NP   ziprasidone (GEODON) 20 MG Cap Take 1 capsule (20 mg total) by mouth 2 (two) times daily. 8/28/23 12/26/23 Yes Oliver Dela Cruz MD   irbesartan (AVAPRO) 150 MG tablet Take 1 tablet (150 mg total) by mouth once daily. 8/6/23 8/28/23 Yes Felipa Hayes NP   ziprasidone (GEODON) 20 MG Cap Take 1 capsule (20 mg total) by mouth 2 (two) times daily. 8/6/23 8/28/23  Felipa Hayes, NP      Sodium   Date Value Ref Range Status   08/21/2023 140 136 - 145 mmol/L Final   08/16/2023 141 136 - 145 mmol/L Final   08/14/2023 142 136 - 145 mmol/L Final     Potassium   Date Value Ref Range Status   08/21/2023 4.8 3.5 - 5.1 mmol/L Final   08/16/2023 4.5 3.5 - 5.1 mmol/L Final   08/14/2023 5.5 (H) 3.5 - 5.1 mmol/L Final     Comment:     *No Visible Hemolysis     Chloride   Date Value Ref Range  Status   08/21/2023 112 (H) 95 - 110 mmol/L Final   08/16/2023 110 95 - 110 mmol/L Final   08/14/2023 108 95 - 110 mmol/L Final     CO2   Date Value Ref Range Status   08/21/2023 20 (L) 23 - 29 mmol/L Final   08/16/2023 21 (L) 23 - 29 mmol/L Final   08/14/2023 26 23 - 29 mmol/L Final     Glucose   Date Value Ref Range Status   08/21/2023 82 70 - 110 mg/dL Final   08/16/2023 59 (L) 70 - 110 mg/dL Final   08/14/2023 90 70 - 110 mg/dL Final     BUN   Date Value Ref Range Status   08/21/2023 49 (H) 8 - 23 mg/dL Final   08/16/2023 58 (H) 8 - 23 mg/dL Final   08/14/2023 63 (H) 8 - 23 mg/dL Final     Creatinine   Date Value Ref Range Status   08/21/2023 2.4 (H) 0.5 - 1.4 mg/dL Final   08/16/2023 2.6 (H) 0.5 - 1.4 mg/dL Final   08/14/2023 3.2 (H) 0.5 - 1.4 mg/dL Final     Calcium   Date Value Ref Range Status   08/21/2023 8.8 8.7 - 10.5 mg/dL Final   08/16/2023 9.1 8.7 - 10.5 mg/dL Final   08/14/2023 8.6 (L) 8.7 - 10.5 mg/dL Final     Total Protein   Date Value Ref Range Status   08/06/2023 7.6 6.0 - 8.4 g/dL Final   08/05/2023 7.3 6.0 - 8.4 g/dL Final   08/04/2023 7.7 6.0 - 8.4 g/dL Final     Albumin   Date Value Ref Range Status   08/06/2023 3.9 3.5 - 5.2 g/dL Final   08/05/2023 3.8 3.5 - 5.2 g/dL Final   08/04/2023 4.0 3.5 - 5.2 g/dL Final     Total Bilirubin   Date Value Ref Range Status   08/06/2023 0.5 0.1 - 1.0 mg/dL Final     Comment:     For infants and newborns, interpretation of results should be based  on gestational age, weight and in agreement with clinical  observations.    Premature Infant recommended reference ranges:  Up to 24 hours.............<8.0 mg/dL  Up to 48 hours............<12.0 mg/dL  3-5 days..................<15.0 mg/dL  6-29 days.................<15.0 mg/dL     08/05/2023 0.5 0.1 - 1.0 mg/dL Final     Comment:     For infants and newborns, interpretation of results should be based  on gestational age, weight and in agreement with clinical  observations.    Premature Infant recommended  reference ranges:  Up to 24 hours.............<8.0 mg/dL  Up to 48 hours............<12.0 mg/dL  3-5 days..................<15.0 mg/dL  6-29 days.................<15.0 mg/dL     08/04/2023 0.3 0.1 - 1.0 mg/dL Final     Comment:     For infants and newborns, interpretation of results should be based  on gestational age, weight and in agreement with clinical  observations.    Premature Infant recommended reference ranges:  Up to 24 hours.............<8.0 mg/dL  Up to 48 hours............<12.0 mg/dL  3-5 days..................<15.0 mg/dL  6-29 days.................<15.0 mg/dL       Alkaline Phosphatase   Date Value Ref Range Status   08/06/2023 105 55 - 135 U/L Final   08/05/2023 103 55 - 135 U/L Final   08/04/2023 124 55 - 135 U/L Final     AST   Date Value Ref Range Status   08/06/2023 15 10 - 40 U/L Final   08/05/2023 13 10 - 40 U/L Final   08/04/2023 16 10 - 40 U/L Final     ALT   Date Value Ref Range Status   08/06/2023 12 10 - 44 U/L Final   08/05/2023 10 10 - 44 U/L Final   08/04/2023 11 10 - 44 U/L Final     Anion Gap   Date Value Ref Range Status   08/21/2023 8 8 - 16 mmol/L Final   08/16/2023 10 8 - 16 mmol/L Final   08/14/2023 8 8 - 16 mmol/L Final     eGFR if    Date Value Ref Range Status   07/30/2021 17.7 (A) >60 mL/min/1.73 m^2 Final   08/15/2019 29.3 (A) >60 mL/min/1.73 m^2 Final   08/12/2019 27.7 (A) >60 mL/min/1.73 m^2 Final     eGFR if non    Date Value Ref Range Status   07/30/2021 15.4 (A) >60 mL/min/1.73 m^2 Final     Comment:     Calculation used to obtain the estimated glomerular filtration  rate (eGFR) is the CKD-EPI equation.      08/15/2019 25.5 (A) >60 mL/min/1.73 m^2 Final     Comment:     Calculation used to obtain the estimated glomerular filtration  rate (eGFR) is the CKD-EPI equation.      08/12/2019 24.0 (A) >60 mL/min/1.73 m^2 Final     Comment:     Calculation used to obtain the estimated glomerular filtration  rate (eGFR) is the CKD-EPI equation.         RBC, UA   Date Value Ref Range Status   08/12/2019 1 0 - 4 /hpf Final   09/08/2018 1 0 - 4 /hpf Final   08/25/2011 <1 0 - 4 /hpf Final     WBC, UA   Date Value Ref Range Status   08/12/2019 1 0 - 5 /hpf Final   09/08/2018 3 0 - 5 /hpf Final   08/25/2011 3 0 - 5 /hpf Final     Bacteria   Date Value Ref Range Status   08/12/2019 Rare None-Occ /hpf Final   09/08/2018 Occasional None-Occ /hpf Final     Hyaline Casts, UA   Date Value Ref Range Status   08/12/2019 0 0-1/lpf /lpf Final     Microscopic Comment   Date Value Ref Range Status   08/12/2019 SEE COMMENT  Final     Comment:     Other formed elements not mentioned in the report are not   present in the microscopic examination.      09/08/2018 SEE COMMENT  Final     Comment:     Other formed elements not mentioned in the report are not   present in the microscopic examination.        Protein, Urine Random   Date Value Ref Range Status   12/01/2018 17 (H) 0 - 15 mg/dL Final     Comment:     The random urine reference ranges provided were established   for 24 hour urine collections.  No reference ranges exist for  random urine specimens.  Correlate clinically.     09/08/2018 26 (H) 0 - 15 mg/dL Final     Comment:     The random urine reference ranges provided were established   for 24 hour urine collections.  No reference ranges exist for  random urine specimens.  Correlate clinically.       Creatinine, Urine   Date Value Ref Range Status   03/29/2023 18.0 15.0 - 325.0 mg/dL Final   01/27/2022 26.98 mg/dL Final   12/01/2018 168.0 15.0 - 325.0 mg/dL Final     Comment:     The random urine reference ranges provided were established   for 24 hour urine collections.  No reference ranges exist for  random urine specimens.  Correlate clinically.     09/08/2018 167.0 15.0 - 325.0 mg/dL Final     Comment:     The random urine reference ranges provided were established   for 24 hour urine collections.  No reference ranges exist for  random urine specimens.  Correlate  "clinically.       Prot/Creat Ratio, Urine   Date Value Ref Range Status   12/01/2018 0.10 0.00 - 0.20 Final   09/08/2018 0.16 0.00 - 0.20 Final         REVIEW OF SYSTEMS:  Patient has no fever, fatigue, visual changes, chest pain, edema, cough, dyspnea, nausea, vomiting, constipation, diarrhea, arthralgias, pruritis, dizziness, weakness, depression, confusion.        PHYSICAL EXAM:   height is 5' 3" (1.6 m) and weight is 66.6 kg (146 lb 13.2 oz). Her blood pressure is 148/90 (abnormal) and her pulse is 64.   Gen: WDWN female in no apparent distress  Psych: Normal mood and affect  Skin: No rashes or ulcers  Eyes: Normal conjunctiva and lids, PERRLA  ENT: Normal hearing with no oropharyngeal lesions  Neck: No JVD  Chest: Clear with no rales, rhonchi, wheezing with normal effort  CV: Regular with no murmurs, gallops or rubs  Abd: Soft, nontender, no distension, positive bowel sounds  Ext: No cyanosis, clubbing or edema          IMPRESSION AND RECOMMENDATIONS:    1. CKD 4:  Creatinine tends to run between about 2.0 and 2.5 and has done so for the past 6-8 months.  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  She has a longstanding history of NSAID exposure.  As per HPI we discussed the importance of avoiding these medications.    2. Hypertension:  Her blood pressure was mildly elevated in the clinic today.  We increased her irbesartan dose to 300 mg a day which is more renal protective and will have better blood pressure control.    3. Proteinuria:  She has evidence of microalbuminuria on least 1 laboratory study.  Will check a protein creatinine ratio and urinalysis prior to her next office visit.  Additionally we will increase her RAAS inhibition.      "

## 2023-08-30 ENCOUNTER — TELEPHONE (OUTPATIENT)
Dept: NEPHROLOGY | Facility: CLINIC | Age: 68
End: 2023-08-30
Payer: MEDICARE

## 2023-08-30 ENCOUNTER — LAB VISIT (OUTPATIENT)
Dept: LAB | Facility: HOSPITAL | Age: 68
End: 2023-08-30
Attending: INTERNAL MEDICINE
Payer: MEDICARE

## 2023-08-30 DIAGNOSIS — R42 VERTIGO AS LATE EFFECT OF STROKE: ICD-10-CM

## 2023-08-30 DIAGNOSIS — I69.398 VERTIGO AS LATE EFFECT OF STROKE: ICD-10-CM

## 2023-08-30 LAB
ANION GAP SERPL CALC-SCNC: 15 MMOL/L (ref 8–16)
BASOPHILS # BLD AUTO: 0.04 K/UL (ref 0–0.2)
BASOPHILS NFR BLD: 0.8 % (ref 0–1.9)
BUN SERPL-MCNC: 43 MG/DL (ref 8–23)
CALCIUM SERPL-MCNC: 9.2 MG/DL (ref 8.7–10.5)
CHLORIDE SERPL-SCNC: 110 MMOL/L (ref 95–110)
CO2 SERPL-SCNC: 18 MMOL/L (ref 23–29)
CREAT SERPL-MCNC: 2.4 MG/DL (ref 0.5–1.4)
DIFFERENTIAL METHOD: ABNORMAL
EOSINOPHIL # BLD AUTO: 0.4 K/UL (ref 0–0.5)
EOSINOPHIL NFR BLD: 7.4 % (ref 0–8)
ERYTHROCYTE [DISTWIDTH] IN BLOOD BY AUTOMATED COUNT: 13.6 % (ref 11.5–14.5)
EST. GFR  (NO RACE VARIABLE): 21.2 ML/MIN/1.73 M^2
GLUCOSE SERPL-MCNC: 93 MG/DL (ref 70–110)
HCT VFR BLD AUTO: 40.7 % (ref 37–48.5)
HGB BLD-MCNC: 12.9 G/DL (ref 12–16)
IMM GRANULOCYTES # BLD AUTO: 0.01 K/UL (ref 0–0.04)
IMM GRANULOCYTES NFR BLD AUTO: 0.2 % (ref 0–0.5)
LYMPHOCYTES # BLD AUTO: 2.1 K/UL (ref 1–4.8)
LYMPHOCYTES NFR BLD: 43.3 % (ref 18–48)
MCH RBC QN AUTO: 27.9 PG (ref 27–31)
MCHC RBC AUTO-ENTMCNC: 31.7 G/DL (ref 32–36)
MCV RBC AUTO: 88 FL (ref 82–98)
MONOCYTES # BLD AUTO: 0.5 K/UL (ref 0.3–1)
MONOCYTES NFR BLD: 10.9 % (ref 4–15)
NEUTROPHILS # BLD AUTO: 1.8 K/UL (ref 1.8–7.7)
NEUTROPHILS NFR BLD: 37.4 % (ref 38–73)
NRBC BLD-RTO: 0 /100 WBC
PLATELET # BLD AUTO: 297 K/UL (ref 150–450)
PMV BLD AUTO: 10.7 FL (ref 9.2–12.9)
POTASSIUM SERPL-SCNC: 5 MMOL/L (ref 3.5–5.1)
RBC # BLD AUTO: 4.62 M/UL (ref 4–5.4)
SODIUM SERPL-SCNC: 143 MMOL/L (ref 136–145)
WBC # BLD AUTO: 4.87 K/UL (ref 3.9–12.7)

## 2023-08-30 PROCEDURE — 80048 BASIC METABOLIC PNL TOTAL CA: CPT | Mod: HCNC | Performed by: NURSE PRACTITIONER

## 2023-08-30 PROCEDURE — 85025 COMPLETE CBC W/AUTO DIFF WBC: CPT | Mod: HCNC | Performed by: NURSE PRACTITIONER

## 2023-08-30 NOTE — TELEPHONE ENCOUNTER
----- Message from Flavia Bryson sent at 8/30/2023  2:14 PM CDT -----  Regarding: Urinalyss  Contact: Gely @ [phone]  Gely from [facility] is calling to see if Provider wanted them to do a urinalysis on pt. Asking for a call back

## 2023-08-30 NOTE — TELEPHONE ENCOUNTER
Returned call to Gely. Informed that labs are not needed until October. She states that if patient is still with them we can fax the orders.

## 2023-08-31 ENCOUNTER — TELEPHONE (OUTPATIENT)
Dept: FAMILY MEDICINE | Facility: CLINIC | Age: 68
End: 2023-08-31
Payer: MEDICARE

## 2023-08-31 NOTE — TELEPHONE ENCOUNTER
Called and recommended pt. Go to emergency room. Home health nurse stated he would contact patient and let them to know.

## 2023-08-31 NOTE — TELEPHONE ENCOUNTER
----- Message from Araceli Paredes sent at 8/31/2023 10:13 AM CDT -----  Contact: Jewel/[facility]  Jewel is calling in regards to pt BP is 194/112 and other arm 210/118. Pt took med at 6am and 2nd dose of clonidine, pt is fatigue, and has had 2 strokes in the past, asking is there anything DR wants them to do. Please call back at [phone]        Thanks  SW

## 2023-09-06 ENCOUNTER — TELEPHONE (OUTPATIENT)
Dept: FAMILY MEDICINE | Facility: CLINIC | Age: 68
End: 2023-09-06
Payer: MEDICARE

## 2023-09-06 DIAGNOSIS — G45.9 TIA (TRANSIENT ISCHEMIC ATTACK): Primary | ICD-10-CM

## 2023-09-06 NOTE — TELEPHONE ENCOUNTER
----- Message from Cori Cho sent at 9/6/2023  2:45 PM CDT -----  Contact: Christy Harrison said she was unable to draw the pts lab work. Please call her back at [phone].    Thanks  TS

## 2023-09-06 NOTE — TELEPHONE ENCOUNTER
----- Message from Arabella Rodriges sent at 9/6/2023 10:15 AM CDT -----  Contact: [phone]  Pt had a referral entered in the hospital for her to see neurology for history of TIA. When her appt was cancelled the referral was not unlinked and now is unable to be scheduled. Can you please enter another referral?

## 2023-09-07 ENCOUNTER — OFFICE VISIT (OUTPATIENT)
Dept: CARDIOLOGY | Facility: CLINIC | Age: 68
End: 2023-09-07
Payer: MEDICARE

## 2023-09-07 VITALS
HEART RATE: 77 BPM | BODY MASS INDEX: 26.68 KG/M2 | SYSTOLIC BLOOD PRESSURE: 140 MMHG | HEIGHT: 63 IN | OXYGEN SATURATION: 97 % | DIASTOLIC BLOOD PRESSURE: 85 MMHG | WEIGHT: 150.56 LBS

## 2023-09-07 DIAGNOSIS — Z86.73 HISTORY OF CVA (CEREBROVASCULAR ACCIDENT): ICD-10-CM

## 2023-09-07 DIAGNOSIS — I50.32 CHRONIC HEART FAILURE WITH PRESERVED EJECTION FRACTION: Primary | ICD-10-CM

## 2023-09-07 DIAGNOSIS — R94.31 ABNORMAL ECG: ICD-10-CM

## 2023-09-07 DIAGNOSIS — N18.4 CKD (CHRONIC KIDNEY DISEASE) STAGE 4, GFR 15-29 ML/MIN: ICD-10-CM

## 2023-09-07 DIAGNOSIS — I10 UNCONTROLLED HYPERTENSION: ICD-10-CM

## 2023-09-07 DIAGNOSIS — R00.2 PALPITATIONS: ICD-10-CM

## 2023-09-07 DIAGNOSIS — E78.49 OTHER HYPERLIPIDEMIA: ICD-10-CM

## 2023-09-07 DIAGNOSIS — R06.09 DOE (DYSPNEA ON EXERTION): ICD-10-CM

## 2023-09-07 DIAGNOSIS — Z86.73 HISTORY OF TIA (TRANSIENT ISCHEMIC ATTACK): ICD-10-CM

## 2023-09-07 DIAGNOSIS — E78.5 HYPERLIPIDEMIA, UNSPECIFIED HYPERLIPIDEMIA TYPE: ICD-10-CM

## 2023-09-07 DIAGNOSIS — B18.2 CHRONIC HEPATITIS C WITHOUT HEPATIC COMA: ICD-10-CM

## 2023-09-07 PROCEDURE — 3008F PR BODY MASS INDEX (BMI) DOCUMENTED: ICD-10-PCS | Mod: HCNC,CPTII,S$GLB, | Performed by: INTERNAL MEDICINE

## 2023-09-07 PROCEDURE — 3062F PR POS MACROALBUMINURIA RESULT DOCUMENTED/REVIEW: ICD-10-PCS | Mod: HCNC,CPTII,S$GLB, | Performed by: INTERNAL MEDICINE

## 2023-09-07 PROCEDURE — 3008F BODY MASS INDEX DOCD: CPT | Mod: HCNC,CPTII,S$GLB, | Performed by: INTERNAL MEDICINE

## 2023-09-07 PROCEDURE — 1101F PR PT FALLS ASSESS DOC 0-1 FALLS W/OUT INJ PAST YR: ICD-10-PCS | Mod: HCNC,CPTII,S$GLB, | Performed by: INTERNAL MEDICINE

## 2023-09-07 PROCEDURE — 3079F PR MOST RECENT DIASTOLIC BLOOD PRESSURE 80-89 MM HG: ICD-10-PCS | Mod: HCNC,CPTII,S$GLB, | Performed by: INTERNAL MEDICINE

## 2023-09-07 PROCEDURE — 3288F PR FALLS RISK ASSESSMENT DOCUMENTED: ICD-10-PCS | Mod: HCNC,CPTII,S$GLB, | Performed by: INTERNAL MEDICINE

## 2023-09-07 PROCEDURE — 99999 PR PBB SHADOW E&M-EST. PATIENT-LVL III: CPT | Mod: PBBFAC,HCNC,, | Performed by: INTERNAL MEDICINE

## 2023-09-07 PROCEDURE — 4010F ACE/ARB THERAPY RXD/TAKEN: CPT | Mod: HCNC,CPTII,S$GLB, | Performed by: INTERNAL MEDICINE

## 2023-09-07 PROCEDURE — 3066F PR DOCUMENTATION OF TREATMENT FOR NEPHROPATHY: ICD-10-PCS | Mod: HCNC,CPTII,S$GLB, | Performed by: INTERNAL MEDICINE

## 2023-09-07 PROCEDURE — 1101F PT FALLS ASSESS-DOCD LE1/YR: CPT | Mod: HCNC,CPTII,S$GLB, | Performed by: INTERNAL MEDICINE

## 2023-09-07 PROCEDURE — 3044F HG A1C LEVEL LT 7.0%: CPT | Mod: HCNC,CPTII,S$GLB, | Performed by: INTERNAL MEDICINE

## 2023-09-07 PROCEDURE — 3079F DIAST BP 80-89 MM HG: CPT | Mod: HCNC,CPTII,S$GLB, | Performed by: INTERNAL MEDICINE

## 2023-09-07 PROCEDURE — 1160F RVW MEDS BY RX/DR IN RCRD: CPT | Mod: HCNC,CPTII,S$GLB, | Performed by: INTERNAL MEDICINE

## 2023-09-07 PROCEDURE — 1126F PR PAIN SEVERITY QUANTIFIED, NO PAIN PRESENT: ICD-10-PCS | Mod: HCNC,CPTII,S$GLB, | Performed by: INTERNAL MEDICINE

## 2023-09-07 PROCEDURE — 3066F NEPHROPATHY DOC TX: CPT | Mod: HCNC,CPTII,S$GLB, | Performed by: INTERNAL MEDICINE

## 2023-09-07 PROCEDURE — 1160F PR REVIEW ALL MEDS BY PRESCRIBER/CLIN PHARMACIST DOCUMENTED: ICD-10-PCS | Mod: HCNC,CPTII,S$GLB, | Performed by: INTERNAL MEDICINE

## 2023-09-07 PROCEDURE — 3288F FALL RISK ASSESSMENT DOCD: CPT | Mod: HCNC,CPTII,S$GLB, | Performed by: INTERNAL MEDICINE

## 2023-09-07 PROCEDURE — 4010F PR ACE/ARB THEARPY RXD/TAKEN: ICD-10-PCS | Mod: HCNC,CPTII,S$GLB, | Performed by: INTERNAL MEDICINE

## 2023-09-07 PROCEDURE — 3044F PR MOST RECENT HEMOGLOBIN A1C LEVEL <7.0%: ICD-10-PCS | Mod: HCNC,CPTII,S$GLB, | Performed by: INTERNAL MEDICINE

## 2023-09-07 PROCEDURE — 1126F AMNT PAIN NOTED NONE PRSNT: CPT | Mod: HCNC,CPTII,S$GLB, | Performed by: INTERNAL MEDICINE

## 2023-09-07 PROCEDURE — 99214 OFFICE O/P EST MOD 30 MIN: CPT | Mod: HCNC,S$GLB,, | Performed by: INTERNAL MEDICINE

## 2023-09-07 PROCEDURE — 3062F POS MACROALBUMINURIA REV: CPT | Mod: HCNC,CPTII,S$GLB, | Performed by: INTERNAL MEDICINE

## 2023-09-07 PROCEDURE — 3077F PR MOST RECENT SYSTOLIC BLOOD PRESSURE >= 140 MM HG: ICD-10-PCS | Mod: HCNC,CPTII,S$GLB, | Performed by: INTERNAL MEDICINE

## 2023-09-07 PROCEDURE — 99999 PR PBB SHADOW E&M-EST. PATIENT-LVL III: ICD-10-PCS | Mod: PBBFAC,HCNC,, | Performed by: INTERNAL MEDICINE

## 2023-09-07 PROCEDURE — 1159F MED LIST DOCD IN RCRD: CPT | Mod: HCNC,CPTII,S$GLB, | Performed by: INTERNAL MEDICINE

## 2023-09-07 PROCEDURE — 1159F PR MEDICATION LIST DOCUMENTED IN MEDICAL RECORD: ICD-10-PCS | Mod: HCNC,CPTII,S$GLB, | Performed by: INTERNAL MEDICINE

## 2023-09-07 PROCEDURE — 3077F SYST BP >= 140 MM HG: CPT | Mod: HCNC,CPTII,S$GLB, | Performed by: INTERNAL MEDICINE

## 2023-09-07 PROCEDURE — 99214 PR OFFICE/OUTPT VISIT, EST, LEVL IV, 30-39 MIN: ICD-10-PCS | Mod: HCNC,S$GLB,, | Performed by: INTERNAL MEDICINE

## 2023-09-07 RX ORDER — PRAVASTATIN SODIUM 40 MG/1
40 TABLET ORAL NIGHTLY
Qty: 90 TABLET | Refills: 0 | Status: SHIPPED | OUTPATIENT
Start: 2023-09-07 | End: 2023-10-24 | Stop reason: SDUPTHER

## 2023-09-07 RX ORDER — CARVEDILOL 12.5 MG/1
12.5 TABLET ORAL 2 TIMES DAILY
Qty: 180 TABLET | Refills: 1 | Status: ON HOLD | OUTPATIENT
Start: 2023-09-07 | End: 2023-10-19 | Stop reason: HOSPADM

## 2023-09-07 NOTE — PROGRESS NOTES
Subjective:   Patient ID:  Rose Milligan is a 68 y.o. female who presents for cardiac consult of No chief complaint on file.      The patient came in today for cardiac consult of No chief complaint on file.      Rose Milligan is a 68 y.o. female  with HFpEF, HTN, pulm HTN, h/o TIA/CVA, tobacco abuse, h/o drug abuse, bipolar, hep C, CKD4 presents for follow up CV eval.      11/21/18  Pt went to [facility] yesterday. She went for chest pain and L shoulder pain. Chest pain started about 3 days ago, and was off medicines for 2 weeks. She had bloodwork, ECG and was told to follow up hereShe called her PCP and only picked up bipolar meds. Chest pain feels like tightness, for past few days been constant. Mild headache and nausea. Now she feels lightheaded. PT also gets palpitations frequenltly.     8/15/19 - hosp follow up   She was recently admitted to [facility] for CHF exac, presented to Dr. Cope's office with HTN urgency, unsure of her meds, she was sent back to ER at [facility]. She started to have a cough last and then had worsening orthopnea. She was diuresed 3.5L. She still has AUGUSTE. She doesn't have med list again, talked to her friend on phone has only 4 bottles at home, called pharmacy only hydralazine was being taken.     10/15/19  BP uncontrolled this AM. She has gained about 10-15 lbs lately, has been drinking more sodas and eating more. No AUGUSTE.     11/5/19  Overall has been doing well lately. No SOB/AUGUSTE. No chest pain. Has been compliant with meds, lost a few pounds lately.     5/1/20  Last week at [facility] - was discharged Thurs  Reason for Hospitalization   Chief Complaint  Left-sided weakness     History of Present Illness  Patient is 64-year-old black female with past medical history of substance abuse with frequent crack cocaine usage, CHF, hypertension, bipolar disorder, comes in complaining of left-sided weakness, she reports has been present for 2 weeks at which time it caused her to fall, she has had persistent problems  with left leg since that time with difficulty walking, having to limp or drag along her left leg.  has been asking her to go to get checked out but she failed to do so until today when she went to her PCP and was found to have elevated blood pressure of approximately 200/120. She denies missing blood pressure medications, reports she is taking hydralazine and clonidine, she reports clonidine is supposed to be 3 times per day but sometimes she misses the middle dose. Does not have any of her medication bottles with her. She denies any headache, positive for blurry vision, had some speech issues at time of onset of symptoms 2 weeks ago but none since. Denies any chest pain, denies any lower extremity edema or other symptoms. Blood pressure elevations are severe, unknown aggravating factors, no alleviating factors with 0 response to IV hydralazine in the emergency room.    * Hypertensive emergency  Patient CT scan from admit shows chronic small vessel ischemia but no acute CVA. If stroke, strokelike symptoms were really 2 weeks ago it would not explain her persistent elevation in blood pressure at this time.Given dose of labetalol, with hypertensive emergency would like to reduce systolic blood pressures by about 25% currently at 2 30-2 40 systolic would like to reduce to about 170-180 systolic, likewise diastolics running 120s to 130s should be reduced to about 90s, we are allowing for some permissive hypertension secondary to chronically elevated blood pressures and possible acute CVA. transition off of Cardene, will place patient on hydralazine which she was previously on at home, will also start amlodipine 5 and Toprol-XL 50 mg daily.   Hemiparesis affecting nondominant side as late effect of cerebrovascular accident (CVA) (HCC)  No acute CVA, these are likely secondary to extreme elevations in blood pressure and crack cocaine use, appears to be resolving upon discharge    Discharge Summary   Patient CT scan  from admit shows chronic small vessel ischemia but no acute CVA. If stroke, strokelike symptoms were really 2 weeks ago it would not explain her persistent elevation in blood pressure at this time.Given dose of labetalol, with hypertensive emergency would like to reduce systolic blood pressures by about 25% currently at 2 30-2 40 systolic would like to reduce to about 170-180 systolic, likewise diastolics running 120s to 130s should be reduced to about 90s, we are allowing for some permissive hypertension secondary to chronically elevated blood pressures and possible acute CVA. transition off of Cardene, will place patient on hydralazine which she was previously on at home, will also start amlodipine 5 and Toprol-XL 50 mg daily.   Follow-up Patient continues to have very elevated blood pressures, will need echocardiogram for continued work-up, does have history of CHF but no evidence of decompensation on examination at this time. WillGoal map today less than 120 if possible, will need continued slow lowering due to extreme elevations.      Now she has been doing ok, she has a limp in left left, has improved. BP remains elevated 160-170/80-90. Discussed will increase norvasc but restart Coreg instead of Toprol.     5/22/20  She has been drinking some energy drinks which caused her to pass out, her BP was 200/140. She fell on L shoulder and has improved with strength. BP improved but remains elevated, will adjust meds again.     4/4/23  Follow up with me since 5/2020. She saw Dr. Jenkins Dec 2022. BP and Hr stable. BMI 24 - 136 lbs. ECHO 1/2023 with normal bi V function, grade 3 DD, mild TR, trivial peric effusion. The estimated PA systolic pressure is greater than 26 mmHg.  She has L arm pain, feels like she has a pinched nerve in neck also. She went to [facility] recently - unsure workup/treatment. She had another TIA.     7/10/23  Vital monitor 6/2023 with brief SVT/NSVT, rare PVCS, PACs noted; AVG HR 76 bpm. Nuclear stress  6/2023 neg for ischemia, basal, mid lateral wall scar noted.   Carotids 6/2023 overall normal.   BNP was 305, improved from prior, continued on lasix.     BP and HR well controlled. BMI 25 - 143 lbs. She is still very active. Wants to go back to work.     Aug 2023  Hospital Course:   69 y/o female admitted with c/o worsening left sided weakness for the past couple of days. She has a h/o stroke ~ 2 years ago and is non-compliant with her ASA, has been out for a couple of months. MRI in ER shows punctate acute left thalamic infarct and right MCA bifurcation aneurysm. Neurology consulted, started on ASA/Plavix (x 21 days)/statin. She was found to have hypertensive urgency on admission, will not allow for permissive HTN d/t aneurysm. Patient was NPO until ST evaluated her and recommended diet. ST evaluated and swallowing was intact, recommended a regular diet.   TTE negative bubble study, grade I diastolic dysfunction. Continue telemetry monitoring.   PT/OT evaluated patient and recommended OP therapy on discharge.   Lipid panel - .4 (goal < 70 after CVA), continue statin.   Restarted home anti-hypertensives and blood pressure improved prior to discharge.   Patient was out of all her medication, will send prescriptions on discharge.   Will need aspirin and Plavix (x 21 days) then just continue aspirin indefinitely.   Will send prescription for nicotine patch and send a referral to smoking cessation program.     9/7/23   Hosp follow up from recent CVA -  punctate acute left thalamic infarct and right MCA bifurcation aneurysm. Is on asa and plavix now.     BP elevated today 150/100. HR 70s. BMI 26 - 150 lbs  ECHO 8/2023 with normal bi V function and valves, grade 1 DD. Neg bubble study.     Patient feels  no PND , no CNS symptoms. She is a cook at [employer].     Patient is not compliant with medications.      Results for orders placed during the hospital encounter of 08/04/23    Echo    Interpretation  Summary    Left Ventricle: The left ventricle is normal in size. Moderately increased wall thickness. There is moderate concentrict hypertrophy. Normal wall motion. There is normal systolic function with a visually estimated ejection fraction of 55 - 70%. Grade I diastolic dysfunction.    Left Atrium: Left atrium is mildly dilated.    Right Ventricle: Normal right ventricular cavity size. Wall thickness is normal. Right ventricle wall motion  is normal. Systolic function is normal.    Mitral Valve: There is mild regurgitation.    Tricuspid Valve: There is mild regurgitation.    IVC/SVC: Intermediate venous pressure at 8 mmHg.    Bubble study negative          Conclusion 6/2023     There is 0-19% right Internal Carotid Stenosis.  There is 0-19% left Internal Carotid Stenosis.    Conclusion 6/2023    The patient was monitored for a total of 12d 16h, underlying rhythm is Sinus.  The minimum heart rate was 55 bpm; the maximum 116 bpm; the average 76 bpm.  0 % of Atrial fibrillation/Atrial flutter with longest episode of 0 ms.  -- AV block with 0 %  There were 0 pauses, the longest pause was 0 ms at --.  7 episodes of VT were found with Longest VT at 10 beats .  21 supraventricular episodes were found. Longest SVT Episode 15 beats, Fastest  bpm  There were a total of 1346 PVCs with 2 morphologies and 7 couplets. Overall PVC Gomer at 0.1 %  There were a total of 1132 PSVCs with 1 morphologies and 0 couplets. Overall PSVC Gomer at 0.08 %  There is a total of 1 patient events    Results for orders placed during the hospital encounter of 06/06/23    Nuclear Stress - Cardiology Interpreted    Interpretation Summary    Abnormal myocardial perfusion scan.    There is a moderate to severe intensity, moderate sized, fixed perfusion abnormality consistent with scar in the basal to mid lateral wall(s).    There are no other significant perfusion abnormalities.    The gated perfusion images showed an ejection fraction of  50% at rest. The gated perfusion images showed an ejection fraction of 53% post stress.    The ECG portion of the study is uninterpretable due to abnormal baseline.    The patient reported no chest pain during the stress test.    There were no arrhythmias during stress.      Results for orders placed during the hospital encounter of 01/05/23    Echo    Interpretation Summary  · The left ventricle is normal in size with concentric hypertrophy and normal systolic function.  · Moderate left atrial enlargement.  · Grade III left ventricular diastolic dysfunction.  · The estimated PA systolic pressure is 29 mmHg.  · Normal right ventricular size with normal right ventricular systolic function.  · Normal central venous pressure (3 mmHg).  · The estimated ejection fraction is 60%.  · Mild tricuspid regurgitation.  · Trivial pericardial effusion.      Past Medical History:   Diagnosis Date    Acute CVA (cerebrovascular accident) 8/4/2023    Bipolar 1 disorder     CHF (congestive heart failure)     Depression     Hepatitis C     Hypertension     Other hyperlipidemia 8/15/2019       Past Surgical History:   Procedure Laterality Date    HYSTERECTOMY         Social History     Tobacco Use    Smoking status: Some Days     Current packs/day: 0.25     Average packs/day: 0.3 packs/day for 7.7 years (1.9 ttl pk-yrs)     Types: Cigarettes     Start date: 1/11/2016    Smokeless tobacco: Never    Tobacco comments:     1 pack every 3 days   Substance Use Topics    Alcohol use: Yes    Drug use: Yes     Types: Cocaine       Family History   Problem Relation Age of Onset    Heart attack Maternal Grandmother     Hypertension Mother        Patient's Medications   New Prescriptions    No medications on file   Previous Medications    ASPIRIN (ECOTRIN) 81 MG EC TABLET    Take 1 tablet (81 mg total) by mouth once daily.    CLONIDINE (CATAPRES) 0.1 MG TABLET    Take 1 tablet (0.1 mg total) by mouth 3 (three) times daily.    CLOPIDOGREL (PLAVIX)  75 MG TABLET    Take 1 tablet (75 mg total) by mouth once daily. for 20 days    FUROSEMIDE (LASIX) 40 MG TABLET    Take 0.5 tablets (20 mg total) by mouth once daily.    HYDRALAZINE (APRESOLINE) 50 MG TABLET    Take 1 tablet (50 mg total) by mouth every 8 (eight) hours.    HYDROXYZINE (ATARAX) 50 MG TABLET    Take 1 tablet (50 mg total) by mouth 2 (two) times daily as needed for Anxiety.    IRBESARTAN (AVAPRO) 150 MG TABLET    Take 2 tablets (300 mg total) by mouth every evening.    NICOTINE (NICODERM CQ) 21 MG/24 HR    Place 1 patch onto the skin once daily.    PRAVASTATIN (PRAVACHOL) 40 MG TABLET    Take 1 tablet (40 mg total) by mouth every evening.    ZIPRASIDONE (GEODON) 20 MG CAP    Take 1 capsule (20 mg total) by mouth 2 (two) times daily.   Modified Medications    Modified Medication Previous Medication    CARVEDILOL (COREG) 12.5 MG TABLET carvediloL (COREG) 6.25 MG tablet       Take 1 tablet (12.5 mg total) by mouth 2 (two) times daily.    Take 1 tablet (6.25 mg total) by mouth 2 (two) times daily.   Discontinued Medications    No medications on file       Review of Systems   Constitutional: Negative.    HENT: Negative.     Eyes: Negative.    Respiratory:  Positive for shortness of breath.    Cardiovascular:  Positive for palpitations. Negative for chest pain.   Gastrointestinal: Negative.    Genitourinary: Negative.    Musculoskeletal: Negative.    Skin: Negative.    Neurological:  Positive for dizziness.   Endo/Heme/Allergies: Negative.    Psychiatric/Behavioral:  The patient is not nervous/anxious.    All 12 systems otherwise negative.      Wt Readings from Last 3 Encounters:   09/07/23 68.3 kg (150 lb 9.2 oz)   08/28/23 66.6 kg (146 lb 13.2 oz)   08/14/23 64.7 kg (142 lb 10.2 oz)     Temp Readings from Last 3 Encounters:   08/14/23 97.3 °F (36.3 °C)   08/06/23 97.7 °F (36.5 °C) (Oral)   07/06/23 97.7 °F (36.5 °C)     BP Readings from Last 3 Encounters:   09/07/23 (!) 140/85   08/28/23 (!) 148/90  "  08/14/23 126/78     Pulse Readings from Last 3 Encounters:   09/07/23 77   08/28/23 64   08/14/23 93       BP (!) 140/85   Pulse 77   Ht 5' 3" (1.6 m)   Wt 68.3 kg (150 lb 9.2 oz)   SpO2 97%   BMI 26.67 kg/m²     Objective:   Physical Exam  Vitals and nursing note reviewed.   Constitutional:       General: She is not in acute distress.     Appearance: She is well-developed. She is not diaphoretic.   HENT:      Head: Normocephalic and atraumatic.      Nose: Nose normal.   Eyes:      General: No scleral icterus.     Conjunctiva/sclera: Conjunctivae normal.   Neck:      Thyroid: No thyromegaly.      Vascular: No JVD.   Cardiovascular:      Rate and Rhythm: Normal rate and regular rhythm.      Heart sounds: S1 normal and S2 normal. No murmur heard.     No friction rub. No gallop. No S3 or S4 sounds.   Pulmonary:      Effort: Pulmonary effort is normal. No respiratory distress.      Breath sounds: Normal breath sounds. No stridor. No wheezing or rales.   Chest:      Chest wall: No tenderness.   Abdominal:      General: Bowel sounds are normal. There is no distension.      Palpations: Abdomen is soft. There is no mass.      Tenderness: There is no abdominal tenderness. There is no rebound.   Genitourinary:     Comments: Deferred  Musculoskeletal:         General: No tenderness or deformity. Normal range of motion.      Cervical back: Normal range of motion and neck supple.   Lymphadenopathy:      Cervical: No cervical adenopathy.   Skin:     General: Skin is warm and dry.      Coloration: Skin is not pale.      Findings: No erythema or rash.   Neurological:      Mental Status: She is alert and oriented to person, place, and time.      Motor: No abnormal muscle tone.      Coordination: Coordination normal.   Psychiatric:         Behavior: Behavior normal.         Thought Content: Thought content normal.         Judgment: Judgment normal.         Lab Results   Component Value Date     08/30/2023    K 5.0 " 08/30/2023     08/30/2023    CO2 18 (L) 08/30/2023    BUN 43 (H) 08/30/2023    CREATININE 2.4 (H) 08/30/2023    GLU 93 08/30/2023    HGBA1C 5.1 07/19/2023    MG 2.1 08/05/2023    AST 15 08/06/2023    ALT 12 08/06/2023    ALBUMIN 3.9 08/06/2023    PROT 7.6 08/06/2023    BILITOT 0.5 08/06/2023    WBC 4.87 08/30/2023    HGB 12.9 08/30/2023    HCT 40.7 08/30/2023    MCV 88 08/30/2023     08/30/2023    INR 1.0 08/05/2023    TSH 0.761 08/04/2023    CHOL 155 08/04/2023    HDL 44 08/04/2023    LDLCALC 101.4 08/04/2023    TRIG 48 08/04/2023     (H) 04/04/2023     Assessment:      1. Chronic heart failure with preserved ejection fraction    2. Uncontrolled hypertension    3. Chronic hepatitis C without hepatic coma    4. CKD (chronic kidney disease) stage 4, GFR 15-29 ml/min    5. Abnormal ECG    6. History of TIA (transient ischemic attack)    7. History of CVA (cerebrovascular accident)    8. Palpitations    9. Other hyperlipidemia    10. AUGUSTE (dyspnea on exertion)    11. Hyperlipidemia, unspecified hyperlipidemia type              Plan:   1. HFPEF, with occ CP/SOB; last   - Nuclear stress 6/2023 neg for ischemia, basal, mid lateral wall scar note  - titrate meds  - cont lasix  - ECHO 8/2023 with normal bi V function and valves, grade 1 DD. Neg bubble study.     2. HTN - elevated/uncontrolled  - cont meds - increase Coreg dose   - low salt diet    3. CKD4  - f/u with nephro/PCP    4. Hep C  - cont tx per PCP    5. HLD  - cont statin - changed to pravastatin 40mg  - unsure if she was taking livalo     6. SOB ? COPD with smoking abuse  - refer to smoking cessation  - refer to pulm     7. Recurrent H/O TIA/CVA - L sided weakness? -again 3/2023, 8/2023  - cont meds - asa and plavix  - ECHO 8/2023 with normal bi V function and valves, grade 1 DD. Neg bubble study.   - Vital monitor 6/2023 with brief SVT/NSVT, rare PVCS, PACs noted; AVG HR 76 bpm.  - Carotids 6/2023 overall normal.    - f/u  Neuro    Thank you for allowing me to participate in this patient's care. Please do not hesitate to contact me with any questions or concerns. Consult note has been forwarded to the referral physician.

## 2023-09-11 ENCOUNTER — LAB VISIT (OUTPATIENT)
Dept: LAB | Facility: HOSPITAL | Age: 68
End: 2023-09-11
Payer: MEDICARE

## 2023-09-11 DIAGNOSIS — R42 VERTIGO AS LATE EFFECT OF STROKE: ICD-10-CM

## 2023-09-11 DIAGNOSIS — R29.898 DEFICIENCIES OF LIMBS: ICD-10-CM

## 2023-09-11 DIAGNOSIS — I69.398 VERTIGO AS LATE EFFECT OF STROKE: ICD-10-CM

## 2023-09-11 LAB
ANION GAP SERPL CALC-SCNC: 13 MMOL/L (ref 8–16)
BASOPHILS # BLD AUTO: 0.04 K/UL (ref 0–0.2)
BASOPHILS NFR BLD: 0.8 % (ref 0–1.9)
BUN SERPL-MCNC: 56 MG/DL (ref 8–23)
CALCIUM SERPL-MCNC: 8.8 MG/DL (ref 8.7–10.5)
CHLORIDE SERPL-SCNC: 109 MMOL/L (ref 95–110)
CO2 SERPL-SCNC: 19 MMOL/L (ref 23–29)
CREAT SERPL-MCNC: 2.9 MG/DL (ref 0.5–1.4)
DIFFERENTIAL METHOD: ABNORMAL
EOSINOPHIL # BLD AUTO: 0.3 K/UL (ref 0–0.5)
EOSINOPHIL NFR BLD: 6.5 % (ref 0–8)
ERYTHROCYTE [DISTWIDTH] IN BLOOD BY AUTOMATED COUNT: 13.2 % (ref 11.5–14.5)
EST. GFR  (NO RACE VARIABLE): 16.9 ML/MIN/1.73 M^2
GLUCOSE SERPL-MCNC: 118 MG/DL (ref 70–110)
HCT VFR BLD AUTO: 38.4 % (ref 37–48.5)
HGB BLD-MCNC: 12.7 G/DL (ref 12–16)
IMM GRANULOCYTES # BLD AUTO: 0.01 K/UL (ref 0–0.04)
IMM GRANULOCYTES NFR BLD AUTO: 0.2 % (ref 0–0.5)
LYMPHOCYTES # BLD AUTO: 2.2 K/UL (ref 1–4.8)
LYMPHOCYTES NFR BLD: 45.6 % (ref 18–48)
MCH RBC QN AUTO: 27.5 PG (ref 27–31)
MCHC RBC AUTO-ENTMCNC: 33.1 G/DL (ref 32–36)
MCV RBC AUTO: 83 FL (ref 82–98)
MONOCYTES # BLD AUTO: 0.6 K/UL (ref 0.3–1)
MONOCYTES NFR BLD: 12 % (ref 4–15)
NEUTROPHILS # BLD AUTO: 1.7 K/UL (ref 1.8–7.7)
NEUTROPHILS NFR BLD: 34.9 % (ref 38–73)
NRBC BLD-RTO: 0 /100 WBC
PLATELET # BLD AUTO: 275 K/UL (ref 150–450)
PMV BLD AUTO: 10.2 FL (ref 9.2–12.9)
POTASSIUM SERPL-SCNC: 5.1 MMOL/L (ref 3.5–5.1)
RBC # BLD AUTO: 4.61 M/UL (ref 4–5.4)
SODIUM SERPL-SCNC: 141 MMOL/L (ref 136–145)
WBC # BLD AUTO: 4.74 K/UL (ref 3.9–12.7)

## 2023-09-11 PROCEDURE — 80048 BASIC METABOLIC PNL TOTAL CA: CPT | Mod: HCNC | Performed by: NURSE PRACTITIONER

## 2023-09-11 PROCEDURE — 36415 COLL VENOUS BLD VENIPUNCTURE: CPT | Mod: HCNC | Performed by: NURSE PRACTITIONER

## 2023-09-11 PROCEDURE — 85025 COMPLETE CBC W/AUTO DIFF WBC: CPT | Mod: HCNC | Performed by: NURSE PRACTITIONER

## 2023-09-12 ENCOUNTER — EXTERNAL HOME HEALTH (OUTPATIENT)
Dept: HOME HEALTH SERVICES | Facility: HOSPITAL | Age: 68
End: 2023-09-12
Payer: MEDICARE

## 2023-09-12 ENCOUNTER — DOCUMENT SCAN (OUTPATIENT)
Dept: HOME HEALTH SERVICES | Facility: HOSPITAL | Age: 68
End: 2023-09-12
Payer: MEDICARE

## 2023-09-13 ENCOUNTER — HOSPITAL ENCOUNTER (OUTPATIENT)
Dept: RADIOLOGY | Facility: HOSPITAL | Age: 68
Discharge: HOME | End: 2023-09-13
Attending: FAMILY MEDICINE
Payer: MEDICARE

## 2023-09-13 DIAGNOSIS — Z12.31 ENCOUNTER FOR SCREENING MAMMOGRAM FOR MALIGNANT NEOPLASM OF BREAST: ICD-10-CM

## 2023-09-13 PROCEDURE — 77063 MAMMO DIGITAL SCREENING BILAT WITH TOMO: ICD-10-PCS | Mod: 26,HCNC,, | Performed by: RADIOLOGY

## 2023-09-13 PROCEDURE — 77067 SCR MAMMO BI INCL CAD: CPT | Mod: TC,HCNC

## 2023-09-13 PROCEDURE — 77067 MAMMO DIGITAL SCREENING BILAT WITH TOMO: ICD-10-PCS | Mod: 26,HCNC,, | Performed by: RADIOLOGY

## 2023-09-13 PROCEDURE — 77067 SCR MAMMO BI INCL CAD: CPT | Mod: 26,HCNC,, | Performed by: RADIOLOGY

## 2023-09-13 PROCEDURE — 77063 BREAST TOMOSYNTHESIS BI: CPT | Mod: 26,HCNC,, | Performed by: RADIOLOGY

## 2023-09-14 ENCOUNTER — OFFICE VISIT (OUTPATIENT)
Dept: DERMATOLOGY | Facility: CLINIC | Age: 68
End: 2023-09-14
Payer: MEDICARE

## 2023-09-14 DIAGNOSIS — L81.9 POST-INFLAMMATORY PIGMENTARY CHANGES: ICD-10-CM

## 2023-09-14 DIAGNOSIS — L72.0 MILIA: Primary | ICD-10-CM

## 2023-09-14 DIAGNOSIS — N90.89 VULVAR LUMP: ICD-10-CM

## 2023-09-14 PROCEDURE — 1160F PR REVIEW ALL MEDS BY PRESCRIBER/CLIN PHARMACIST DOCUMENTED: ICD-10-PCS | Mod: HCNC,CPTII,S$GLB, | Performed by: DERMATOLOGY

## 2023-09-14 PROCEDURE — 3066F PR DOCUMENTATION OF TREATMENT FOR NEPHROPATHY: ICD-10-PCS | Mod: HCNC,CPTII,S$GLB, | Performed by: DERMATOLOGY

## 2023-09-14 PROCEDURE — 3066F NEPHROPATHY DOC TX: CPT | Mod: HCNC,CPTII,S$GLB, | Performed by: DERMATOLOGY

## 2023-09-14 PROCEDURE — 1159F MED LIST DOCD IN RCRD: CPT | Mod: HCNC,CPTII,S$GLB, | Performed by: DERMATOLOGY

## 2023-09-14 PROCEDURE — 3288F FALL RISK ASSESSMENT DOCD: CPT | Mod: HCNC,CPTII,S$GLB, | Performed by: DERMATOLOGY

## 2023-09-14 PROCEDURE — 3062F PR POS MACROALBUMINURIA RESULT DOCUMENTED/REVIEW: ICD-10-PCS | Mod: HCNC,CPTII,S$GLB, | Performed by: DERMATOLOGY

## 2023-09-14 PROCEDURE — 1126F PR PAIN SEVERITY QUANTIFIED, NO PAIN PRESENT: ICD-10-PCS | Mod: HCNC,CPTII,S$GLB, | Performed by: DERMATOLOGY

## 2023-09-14 PROCEDURE — 1101F PR PT FALLS ASSESS DOC 0-1 FALLS W/OUT INJ PAST YR: ICD-10-PCS | Mod: HCNC,CPTII,S$GLB, | Performed by: DERMATOLOGY

## 2023-09-14 PROCEDURE — 4010F PR ACE/ARB THEARPY RXD/TAKEN: ICD-10-PCS | Mod: HCNC,CPTII,S$GLB, | Performed by: DERMATOLOGY

## 2023-09-14 PROCEDURE — 99203 OFFICE O/P NEW LOW 30 MIN: CPT | Mod: HCNC,S$GLB,, | Performed by: DERMATOLOGY

## 2023-09-14 PROCEDURE — 1159F PR MEDICATION LIST DOCUMENTED IN MEDICAL RECORD: ICD-10-PCS | Mod: HCNC,CPTII,S$GLB, | Performed by: DERMATOLOGY

## 2023-09-14 PROCEDURE — 3044F PR MOST RECENT HEMOGLOBIN A1C LEVEL <7.0%: ICD-10-PCS | Mod: HCNC,CPTII,S$GLB, | Performed by: DERMATOLOGY

## 2023-09-14 PROCEDURE — 99999 PR PBB SHADOW E&M-EST. PATIENT-LVL III: ICD-10-PCS | Mod: PBBFAC,HCNC,, | Performed by: DERMATOLOGY

## 2023-09-14 PROCEDURE — 99999 PR PBB SHADOW E&M-EST. PATIENT-LVL III: CPT | Mod: PBBFAC,HCNC,, | Performed by: DERMATOLOGY

## 2023-09-14 PROCEDURE — 3288F PR FALLS RISK ASSESSMENT DOCUMENTED: ICD-10-PCS | Mod: HCNC,CPTII,S$GLB, | Performed by: DERMATOLOGY

## 2023-09-14 PROCEDURE — 1126F AMNT PAIN NOTED NONE PRSNT: CPT | Mod: HCNC,CPTII,S$GLB, | Performed by: DERMATOLOGY

## 2023-09-14 PROCEDURE — 1160F RVW MEDS BY RX/DR IN RCRD: CPT | Mod: HCNC,CPTII,S$GLB, | Performed by: DERMATOLOGY

## 2023-09-14 PROCEDURE — 99203 PR OFFICE/OUTPT VISIT, NEW, LEVL III, 30-44 MIN: ICD-10-PCS | Mod: HCNC,S$GLB,, | Performed by: DERMATOLOGY

## 2023-09-14 PROCEDURE — 1101F PT FALLS ASSESS-DOCD LE1/YR: CPT | Mod: HCNC,CPTII,S$GLB, | Performed by: DERMATOLOGY

## 2023-09-14 PROCEDURE — 3062F POS MACROALBUMINURIA REV: CPT | Mod: HCNC,CPTII,S$GLB, | Performed by: DERMATOLOGY

## 2023-09-14 PROCEDURE — 3044F HG A1C LEVEL LT 7.0%: CPT | Mod: HCNC,CPTII,S$GLB, | Performed by: DERMATOLOGY

## 2023-09-14 PROCEDURE — 4010F ACE/ARB THERAPY RXD/TAKEN: CPT | Mod: HCNC,CPTII,S$GLB, | Performed by: DERMATOLOGY

## 2023-09-14 RX ORDER — TRETINOIN 0.25 MG/G
CREAM TOPICAL NIGHTLY
Qty: 20 G | Refills: 0 | Status: SHIPPED | OUTPATIENT
Start: 2023-09-14

## 2023-09-14 NOTE — PATIENT INSTRUCTIONS

## 2023-09-14 NOTE — PROGRESS NOTES
Subjective:      Patient ID:  Rose Milligan is a 68 y.o. female who presents for     Referred from OB/GYN for vulvar lump (molluscum or genital wart). Pt notes she sometimes gets them around her eyes/on cheeks.  Pt reports she also sometimes gets bumps on the inside of of her mouth and lips, usually on the mucosal surface of the upper lip or buccal cheek, multiple bumps that itch, not painful, no blisters/drainage.  They typically last about a week, the longest they have lasted is 1.5 months.  Has tried neosporin OTC without much change. . Reports none are present today except for a tiny asymptomatic bump or two in the vulva.      Used to take Aleve but stopped about a month ago and has not had any further flares of the ones inside her mouth.      Review of Systems   Skin:  Negative for itching and rash.       Objective:   Physical Exam   Constitutional: She appears well-developed and well-nourished. No distress.   Genitourinary:         Neurological: She is alert and oriented to person, place, and time. She is not disoriented.   Psychiatric: She has a normal mood and affect.   Skin:   Areas Examined (abnormalities noted in diagram):   Head / Face Inspection Performed  Genitals / Buttocks / Groin Inspection Performed            Diagram Legend     Erythematous scaling macule/papule c/w actinic keratosis       Vascular papule c/w angioma      Pigmented verrucoid papule/plaque c/w seborrheic keratosis      Yellow umbilicated papule c/w sebaceous hyperplasia      Irregularly shaped tan macule c/w lentigo     1-2 mm smooth white papules consistent with Milia      Movable subcutaneous cyst with punctum c/w epidermal inclusion cyst      Subcutaneous movable cyst c/w pilar cyst      Firm pink to brown papule c/w dermatofibroma      Pedunculated fleshy papule(s) c/w skin tag(s)      Evenly pigmented macule c/w junctional nevus     Mildly variegated pigmented, slightly irregular-bordered macule c/w mildly atypical nevus      " Flesh colored to evenly pigmented papule c/w intradermal nevus       Pink pearly papule/plaque c/w basal cell carcinoma      Erythematous hyperkeratotic cursted plaque c/w SCC      Surgical scar with no sign of skin cancer recurrence      Open and closed comedones      Inflammatory papules and pustules      Verrucoid papule consistent consistent with wart     Erythematous eczematous patches and plaques     Dystrophic onycholytic nail with subungual debris c/w onychomycosis     Umbilicated papule    Erythematous-base heme-crusted tan verrucoid plaque consistent with inflamed seborrheic keratosis     Erythematous Silvery Scaling Plaque c/w Psoriasis     See annotation      Assessment / Plan:        Milia  - lesions on vulva appear c/w milia, and history of lesions to the skin on face sound potentially c/w milia (not present today.)  Reassurance provided.  If lesions to vulva grow/swell/become painful, recommend consideration of excision with GYN, given location.  For facial lesions, if they recur, we discussed extraction (not covered by insurance but can give cosmetic pricing info for "acne surgery").  She can also start tretinoin to face as below to attempt to prevent recurrence (discussed that this is not covered by insurance, but sent to [facility] with [employer] card)  -     tretinoin (RETIN-A) 0.025 % cream; Apply topically every evening. Pea sized amount to entire face at night. Start out using every other night for 2 weeks, then increase to every night as tolerated  Dispense: 20 g; Refill: 0    - topical retinoid: we reviewed that a pea sized amount of the topical retinoid is to be applied to the entire face at night and that it is not spot treatment. Patient to use every other night or 3 nights a week and gradually increase to goal of nightly use (as tolerated). Side effects reviewed to include but not limited to redness, dryness, flaking, burning/tingling sensation, skin irritation, and feeling of tightness. We " reviewed that this is not to be used if pregnant.  Recommended discontinuing use for one week prior to waxing.      Post-inflammatory pigmentary changes  - regarding her history of lesions on the mucosal surface of her lips and buccal mucosa that are not present today, possibly bite fibromas vs mucoceles vs aphthous ulcers vs possibly FDE 2/2 NSAIDS?  She will message if they flare again and we can overbook for appt to visualize.  If on buccal mucosa, recommend ENT eval           Follow up if symptoms worsen or fail to improve.  Next time flare occurs on lips, she will message and we will work in for appt          LOS NUMBER AND COMPLEXITY OF PROBLEMS    COMPLEXITY OF DATA RISK TOTAL TIME (m)   [id number]  [id number] [] 1 self-limited or minor problem [x] Minimal to none [] No treatment recommended or patient to monitor. Reassurance.  15-29  10-19   71690  [id number] Low  [x] 2 or more self limited or minor problems  [] 1 stable chronic illness  [] 1 acute, uncomplicated illness or injury Limited (2)  [] Prior external notes from each unique source  [] Review result of each unique test  [] Order each unique test  OR [] Independent historian Low  []  OTC medications   []  Discussed/Decision for minor skin surgery (no risk factors) 30-44  20-29   [id number]  [id number] Moderate  []  1 or more chronic unstable illness (not at goal or progression or exacerbation) or SE of treatment  []  2 or more stable chronic illnesses  []  1 acute illness with systemic symptoms  []  1 acute complicated injury  []  1 undiagnosed new problem with uncertain prognosis Moderate (1/3 below)  []  3 or more data items        *Now includes independent historian  []  Independent interpretation of a test  []  Discuss management/test with another provider Moderate  [x]  Prescription drug mgmt  []  Discussed/Decision for Minor surgery with risk factors  []  Mgmt limited by social determinates 45-59  30-39   78298  29314 High  []  1 or more chronic illness with severe  exacerbation, progression or SE of treatment  []  1 acute or chronic illness/injury that poses a threat to life or bodily function Extensive (2/3 below)  []  3 or more data items        *Now includes independent historian.  []  Independent interpretation of a test  []  Discuss management/test with another provider High  []  Major surgery with risk discussed  []  Drug therapy requiring intensive monitoring for toxicity  []  Hospitalization  []  Decision for DNR 60-74  40-54

## 2023-09-19 ENCOUNTER — LAB VISIT (OUTPATIENT)
Dept: LAB | Facility: HOSPITAL | Age: 68
End: 2023-09-19
Payer: MEDICARE

## 2023-09-19 DIAGNOSIS — Z79.01 LONG TERM (CURRENT) USE OF ANTICOAGULANTS: ICD-10-CM

## 2023-09-19 DIAGNOSIS — N18.4 CHRONIC KIDNEY DISEASE, STAGE IV (SEVERE): Primary | ICD-10-CM

## 2023-09-19 LAB
BASOPHILS # BLD AUTO: 0.03 K/UL (ref 0–0.2)
BASOPHILS NFR BLD: 0.6 % (ref 0–1.9)
DIFFERENTIAL METHOD: ABNORMAL
EOSINOPHIL # BLD AUTO: 0.4 K/UL (ref 0–0.5)
EOSINOPHIL NFR BLD: 7.2 % (ref 0–8)
ERYTHROCYTE [DISTWIDTH] IN BLOOD BY AUTOMATED COUNT: 13.2 % (ref 11.5–14.5)
HCT VFR BLD AUTO: 39.8 % (ref 37–48.5)
HGB BLD-MCNC: 12.9 G/DL (ref 12–16)
IMM GRANULOCYTES # BLD AUTO: 0.01 K/UL (ref 0–0.04)
IMM GRANULOCYTES NFR BLD AUTO: 0.2 % (ref 0–0.5)
LYMPHOCYTES # BLD AUTO: 2.4 K/UL (ref 1–4.8)
LYMPHOCYTES NFR BLD: 49.4 % (ref 18–48)
MCH RBC QN AUTO: 27.9 PG (ref 27–31)
MCHC RBC AUTO-ENTMCNC: 32.4 G/DL (ref 32–36)
MCV RBC AUTO: 86 FL (ref 82–98)
MONOCYTES # BLD AUTO: 0.6 K/UL (ref 0.3–1)
MONOCYTES NFR BLD: 11.3 % (ref 4–15)
NEUTROPHILS # BLD AUTO: 1.5 K/UL (ref 1.8–7.7)
NEUTROPHILS NFR BLD: 31.3 % (ref 38–73)
NRBC BLD-RTO: 0 /100 WBC
PLATELET # BLD AUTO: 263 K/UL (ref 150–450)
PMV BLD AUTO: 10.9 FL (ref 9.2–12.9)
RBC # BLD AUTO: 4.62 M/UL (ref 4–5.4)
WBC # BLD AUTO: 4.86 K/UL (ref 3.9–12.7)

## 2023-09-19 PROCEDURE — 85025 COMPLETE CBC W/AUTO DIFF WBC: CPT | Mod: HCNC | Performed by: NURSE PRACTITIONER

## 2023-09-19 PROCEDURE — 80048 BASIC METABOLIC PNL TOTAL CA: CPT | Mod: HCNC | Performed by: NURSE PRACTITIONER

## 2023-09-19 PROCEDURE — 36415 COLL VENOUS BLD VENIPUNCTURE: CPT | Mod: HCNC | Performed by: NURSE PRACTITIONER

## 2023-09-20 LAB
ANION GAP SERPL CALC-SCNC: 8 MMOL/L (ref 8–16)
BUN SERPL-MCNC: 45 MG/DL (ref 8–23)
CALCIUM SERPL-MCNC: 9 MG/DL (ref 8.7–10.5)
CHLORIDE SERPL-SCNC: 109 MMOL/L (ref 95–110)
CO2 SERPL-SCNC: 23 MMOL/L (ref 23–29)
CREAT SERPL-MCNC: 2.4 MG/DL (ref 0.5–1.4)
EST. GFR  (NO RACE VARIABLE): 21.2 ML/MIN/1.73 M^2
GLUCOSE SERPL-MCNC: 76 MG/DL (ref 70–110)
POTASSIUM SERPL-SCNC: 4.9 MMOL/L (ref 3.5–5.1)
SODIUM SERPL-SCNC: 140 MMOL/L (ref 136–145)

## 2023-09-25 ENCOUNTER — DOCUMENT SCAN (OUTPATIENT)
Dept: HOME HEALTH SERVICES | Facility: HOSPITAL | Age: 68
End: 2023-09-25
Payer: MEDICARE

## 2023-09-27 NOTE — ASSESSMENT & PLAN NOTE
Antithrombotics for secondary stroke prevention: Antiplatelets: Aspirin: 81 mg daily  Clopidogrel: 75 mg daily (x 21 days, then only aspirin indefinitely)    Statins for secondary stroke prevention and hyperlipidemia, if present:   Statins: Atorvastatin- 40 mg daily    Aggressive risk factor modification: HTN, Smoking, HLD     Rehab efforts: The patient has been evaluated by a stroke team provider and the therapy needs have been fully considered based off the presenting complaints and exam findings. The following therapy evaluations are needed: PT evaluate and treat, OT evaluate and treat, SLP evaluate and treat    Diagnostics ordered/pending: MRA head to assess vasculature, MRA neck/arch to assess vasculature, MRI head without contrast to assess brain parenchyma, TTE to assess cardiac function/status     VTE prophylaxis: Enoxaparin 40 mg SQ every 24 hours    BP parameters: Infarct: No intervention, SBP <220   Helical Rim Advancement Flap Text: Given the location of the defect, inherent tension at the surgical site, and the proximity to free margins a helical rim advancement flap was deemed most appropriate for wound reconstruction. The risks, benefits, and possible outcomes of this procedure were discussed along with the risks, benefits, and possible outcomes of other options for wound repair (including but not limited to: complex closure, other flaps, grafts, and second intention). The patient verbalized understanding and consent to the outlined procedure. The patient verbalized understanding that intraoperative conditions may necessitate a change in the outlined procedure resulting in modification of the original surgical plan. This decision may be made at the discretion of the surgeon, and is due to factors subject to change or that are difficult to predict preoperatively. Using a sterile surgical marker, an appropriate helical rim advancement flap was drawn incorporating the defect and placing the expected incisions within the relaxed skin tension lines where possible. The surgical site and surrounding skin were prepped and draped in sterile fashion. Anesthesia was checked and supplemented as necessary. A single standing cone was then excised posterior to the defect. A linear arc-like incision was removed at the anterior aspect of the defect and carried to a point of relative auricular skin laxity. A second dog ear was then removed at the end of this incision. The flap was undermined widely and bilaterally in the appropriate surgical plane. Hemostasis was obtained and then the flap was sutured together in layered fashion.

## 2023-10-12 ENCOUNTER — OFFICE VISIT (OUTPATIENT)
Dept: CARDIOLOGY | Facility: CLINIC | Age: 68
End: 2023-10-12
Payer: MEDICARE

## 2023-10-12 VITALS
OXYGEN SATURATION: 100 % | SYSTOLIC BLOOD PRESSURE: 175 MMHG | HEIGHT: 63 IN | WEIGHT: 155.19 LBS | RESPIRATION RATE: 16 BRPM | HEART RATE: 71 BPM | BODY MASS INDEX: 27.5 KG/M2 | DIASTOLIC BLOOD PRESSURE: 100 MMHG

## 2023-10-12 DIAGNOSIS — B18.2 CHRONIC HEPATITIS C WITHOUT HEPATIC COMA: ICD-10-CM

## 2023-10-12 DIAGNOSIS — Z86.73 HISTORY OF CVA (CEREBROVASCULAR ACCIDENT): ICD-10-CM

## 2023-10-12 DIAGNOSIS — I10 CHRONIC HYPERTENSION: ICD-10-CM

## 2023-10-12 DIAGNOSIS — F31.62 BIPOLAR AFFECTIVE DISORDER, MIXED, MODERATE: ICD-10-CM

## 2023-10-12 DIAGNOSIS — R06.09 DOE (DYSPNEA ON EXERTION): ICD-10-CM

## 2023-10-12 DIAGNOSIS — I50.32 CHRONIC HEART FAILURE WITH PRESERVED EJECTION FRACTION: ICD-10-CM

## 2023-10-12 DIAGNOSIS — I10 UNCONTROLLED HYPERTENSION: ICD-10-CM

## 2023-10-12 DIAGNOSIS — R94.31 ABNORMAL ECG: ICD-10-CM

## 2023-10-12 DIAGNOSIS — R00.2 PALPITATIONS: ICD-10-CM

## 2023-10-12 DIAGNOSIS — N18.4 CKD (CHRONIC KIDNEY DISEASE) STAGE 4, GFR 15-29 ML/MIN: ICD-10-CM

## 2023-10-12 DIAGNOSIS — I16.0 HYPERTENSIVE URGENCY: Primary | ICD-10-CM

## 2023-10-12 DIAGNOSIS — E78.49 OTHER HYPERLIPIDEMIA: ICD-10-CM

## 2023-10-12 DIAGNOSIS — Z86.73 HISTORY OF TIA (TRANSIENT ISCHEMIC ATTACK): ICD-10-CM

## 2023-10-12 PROCEDURE — 3077F PR MOST RECENT SYSTOLIC BLOOD PRESSURE >= 140 MM HG: ICD-10-PCS | Mod: HCNC,CPTII,S$GLB, | Performed by: INTERNAL MEDICINE

## 2023-10-12 PROCEDURE — 3008F PR BODY MASS INDEX (BMI) DOCUMENTED: ICD-10-PCS | Mod: HCNC,CPTII,S$GLB, | Performed by: INTERNAL MEDICINE

## 2023-10-12 PROCEDURE — 3080F PR MOST RECENT DIASTOLIC BLOOD PRESSURE >= 90 MM HG: ICD-10-PCS | Mod: HCNC,CPTII,S$GLB, | Performed by: INTERNAL MEDICINE

## 2023-10-12 PROCEDURE — 99215 PR OFFICE/OUTPT VISIT, EST, LEVL V, 40-54 MIN: ICD-10-PCS | Mod: HCNC,S$GLB,, | Performed by: INTERNAL MEDICINE

## 2023-10-12 PROCEDURE — 4010F ACE/ARB THERAPY RXD/TAKEN: CPT | Mod: HCNC,CPTII,S$GLB, | Performed by: INTERNAL MEDICINE

## 2023-10-12 PROCEDURE — 3008F BODY MASS INDEX DOCD: CPT | Mod: HCNC,CPTII,S$GLB, | Performed by: INTERNAL MEDICINE

## 2023-10-12 PROCEDURE — 99999 PR PBB SHADOW E&M-EST. PATIENT-LVL IV: ICD-10-PCS | Mod: PBBFAC,HCNC,, | Performed by: INTERNAL MEDICINE

## 2023-10-12 PROCEDURE — 99215 OFFICE O/P EST HI 40 MIN: CPT | Mod: HCNC,S$GLB,, | Performed by: INTERNAL MEDICINE

## 2023-10-12 PROCEDURE — 3062F POS MACROALBUMINURIA REV: CPT | Mod: HCNC,CPTII,S$GLB, | Performed by: INTERNAL MEDICINE

## 2023-10-12 PROCEDURE — 1159F PR MEDICATION LIST DOCUMENTED IN MEDICAL RECORD: ICD-10-PCS | Mod: HCNC,CPTII,S$GLB, | Performed by: INTERNAL MEDICINE

## 2023-10-12 PROCEDURE — 3077F SYST BP >= 140 MM HG: CPT | Mod: HCNC,CPTII,S$GLB, | Performed by: INTERNAL MEDICINE

## 2023-10-12 PROCEDURE — 1160F RVW MEDS BY RX/DR IN RCRD: CPT | Mod: HCNC,CPTII,S$GLB, | Performed by: INTERNAL MEDICINE

## 2023-10-12 PROCEDURE — 3066F PR DOCUMENTATION OF TREATMENT FOR NEPHROPATHY: ICD-10-PCS | Mod: HCNC,CPTII,S$GLB, | Performed by: INTERNAL MEDICINE

## 2023-10-12 PROCEDURE — 4010F PR ACE/ARB THEARPY RXD/TAKEN: ICD-10-PCS | Mod: HCNC,CPTII,S$GLB, | Performed by: INTERNAL MEDICINE

## 2023-10-12 PROCEDURE — 3062F PR POS MACROALBUMINURIA RESULT DOCUMENTED/REVIEW: ICD-10-PCS | Mod: HCNC,CPTII,S$GLB, | Performed by: INTERNAL MEDICINE

## 2023-10-12 PROCEDURE — 3066F NEPHROPATHY DOC TX: CPT | Mod: HCNC,CPTII,S$GLB, | Performed by: INTERNAL MEDICINE

## 2023-10-12 PROCEDURE — 1160F PR REVIEW ALL MEDS BY PRESCRIBER/CLIN PHARMACIST DOCUMENTED: ICD-10-PCS | Mod: HCNC,CPTII,S$GLB, | Performed by: INTERNAL MEDICINE

## 2023-10-12 PROCEDURE — 3080F DIAST BP >= 90 MM HG: CPT | Mod: HCNC,CPTII,S$GLB, | Performed by: INTERNAL MEDICINE

## 2023-10-12 PROCEDURE — 3044F PR MOST RECENT HEMOGLOBIN A1C LEVEL <7.0%: ICD-10-PCS | Mod: HCNC,CPTII,S$GLB, | Performed by: INTERNAL MEDICINE

## 2023-10-12 PROCEDURE — 99999 PR PBB SHADOW E&M-EST. PATIENT-LVL IV: CPT | Mod: PBBFAC,HCNC,, | Performed by: INTERNAL MEDICINE

## 2023-10-12 PROCEDURE — 3044F HG A1C LEVEL LT 7.0%: CPT | Mod: HCNC,CPTII,S$GLB, | Performed by: INTERNAL MEDICINE

## 2023-10-12 PROCEDURE — 1159F MED LIST DOCD IN RCRD: CPT | Mod: HCNC,CPTII,S$GLB, | Performed by: INTERNAL MEDICINE

## 2023-10-12 RX ORDER — CLONIDINE 0.2 MG/24H
1 PATCH, EXTENDED RELEASE TRANSDERMAL
Qty: 4 PATCH | Refills: 11 | Status: SHIPPED | OUTPATIENT
Start: 2023-10-12 | End: 2024-01-16 | Stop reason: SDUPTHER

## 2023-10-12 RX ORDER — HYDRALAZINE HYDROCHLORIDE 100 MG/1
100 TABLET, FILM COATED ORAL EVERY 8 HOURS
Qty: 180 TABLET | Refills: 1 | Status: SHIPPED | OUTPATIENT
Start: 2023-10-12 | End: 2024-01-16 | Stop reason: SDUPTHER

## 2023-10-12 NOTE — PROGRESS NOTES
Subjective:   Patient ID:  Rose Milligan is a 68 y.o. female who presents for cardiac consult of Follow-up      The patient came in today for cardiac consult of Follow-up      Rose Milligan is a 68 y.o. female  with HFpEF, HTN, pulm HTN, h/o TIA/CVA, tobacco abuse, h/o drug abuse, bipolar, hep C, CKD4 presents for follow up CV eval.      11/21/18  Pt went to [facility] yesterday. She went for chest pain and L shoulder pain. Chest pain started about 3 days ago, and was off medicines for 2 weeks. She had bloodwork, ECG and was told to follow up hereShe called her PCP and only picked up bipolar meds. Chest pain feels like tightness, for past few days been constant. Mild headache and nausea. Now she feels lightheaded. PT also gets palpitations frequenltly.     9/7/23   Hosp follow up from recent CVA -  punctate acute left thalamic infarct and right MCA bifurcation aneurysm. Is on asa and plavix now.   BP elevated today 150/100. HR 70s. BMI 26 - 150 lbs  ECHO 8/2023 with normal bi V function and valves, grade 1 DD. Neg bubble study.     10/12/23  BP - elevated 180s/117. HR 70s. BMI 27 - 155 lbs   Pt had 2 cups of coffee, had some grits and mcconnell last night.     Patient feels  no PND , no CNS symptoms. She is a cook at [employer].     Patient is not compliant with medications.      Results for orders placed during the hospital encounter of 08/04/23    Echo    Interpretation Summary    Left Ventricle: The left ventricle is normal in size. Moderately increased wall thickness. There is moderate concentrict hypertrophy. Normal wall motion. There is normal systolic function with a visually estimated ejection fraction of 55 - 70%. Grade I diastolic dysfunction.    Left Atrium: Left atrium is mildly dilated.    Right Ventricle: Normal right ventricular cavity size. Wall thickness is normal. Right ventricle wall motion  is normal. Systolic function is normal.    Mitral Valve: There is mild regurgitation.    Tricuspid Valve: There  is mild regurgitation.    IVC/SVC: Intermediate venous pressure at 8 mmHg.    Bubble study negative          Conclusion 6/2023     There is 0-19% right Internal Carotid Stenosis.  There is 0-19% left Internal Carotid Stenosis.    Conclusion 6/2023    The patient was monitored for a total of 12d 16h, underlying rhythm is Sinus.  The minimum heart rate was 55 bpm; the maximum 116 bpm; the average 76 bpm.  0 % of Atrial fibrillation/Atrial flutter with longest episode of 0 ms.  -- AV block with 0 %  There were 0 pauses, the longest pause was 0 ms at --.  7 episodes of VT were found with Longest VT at 10 beats .  21 supraventricular episodes were found. Longest SVT Episode 15 beats, Fastest  bpm  There were a total of 1346 PVCs with 2 morphologies and 7 couplets. Overall PVC [place] at 0.1 %  There were a total of 1132 PSVCs with 1 morphologies and 0 couplets. Overall PSVC [place] at 0.08 %  There is a total of 1 patient events    Results for orders placed during the hospital encounter of 06/06/23    Nuclear Stress - Cardiology Interpreted    Interpretation Summary    Abnormal myocardial perfusion scan.    There is a moderate to severe intensity, moderate sized, fixed perfusion abnormality consistent with scar in the basal to mid lateral wall(s).    There are no other significant perfusion abnormalities.    The gated perfusion images showed an ejection fraction of 50% at rest. The gated perfusion images showed an ejection fraction of 53% post stress.    The ECG portion of the study is uninterpretable due to abnormal baseline.    The patient reported no chest pain during the stress test.    There were no arrhythmias during stress.          Past Medical History:   Diagnosis Date    Acute CVA (cerebrovascular accident) 8/4/2023    Bipolar 1 disorder     CHF (congestive heart failure)     Depression     Hepatitis C     Hypertension     Other hyperlipidemia 8/15/2019       Past Surgical History:   Procedure Laterality  Date    HYSTERECTOMY         Social History     Tobacco Use    Smoking status: Some Days     Current packs/day: 0.25     Average packs/day: 0.3 packs/day for 7.8 years (1.9 ttl pk-yrs)     Types: Cigarettes     Start date: 1/11/2016    Smokeless tobacco: Never    Tobacco comments:     1 pack every 3 days   Substance Use Topics    Alcohol use: Yes    Drug use: Yes     Types: Cocaine       Family History   Problem Relation Age of Onset    Heart attack Maternal Grandmother     Hypertension Mother        Patient's Medications   New Prescriptions    CLONIDINE 0.2 MG/24 HR TD PTWK (CATAPRES) 0.2 MG/24 HR    Place 1 patch onto the skin every 7 days.   Previous Medications    ASPIRIN (ECOTRIN) 81 MG EC TABLET    Take 1 tablet (81 mg total) by mouth once daily.    CARVEDILOL (COREG) 12.5 MG TABLET    Take 1 tablet (12.5 mg total) by mouth 2 (two) times daily.    CLOPIDOGREL (PLAVIX) 75 MG TABLET    Take 1 tablet (75 mg total) by mouth once daily. for 20 days    FUROSEMIDE (LASIX) 40 MG TABLET    Take 0.5 tablets (20 mg total) by mouth once daily.    HYDROXYZINE (ATARAX) 50 MG TABLET    Take 1 tablet (50 mg total) by mouth 2 (two) times daily as needed for Anxiety.    IRBESARTAN (AVAPRO) 150 MG TABLET    Take 2 tablets (300 mg total) by mouth every evening.    NICOTINE (NICODERM CQ) 21 MG/24 HR    Place 1 patch onto the skin once daily.    PRAVASTATIN (PRAVACHOL) 40 MG TABLET    Take 1 tablet (40 mg total) by mouth every evening.    TRETINOIN (RETIN-A) 0.025 % CREAM    Apply topically every evening. Pea sized amount to entire face at night. Start out using every other night for 2 weeks, then increase to every night as tolerated    ZIPRASIDONE (GEODON) 20 MG CAP    Take 1 capsule (20 mg total) by mouth 2 (two) times daily.   Modified Medications    Modified Medication Previous Medication    HYDRALAZINE (APRESOLINE) 100 MG TABLET hydrALAZINE (APRESOLINE) 50 MG tablet       Take 1 tablet (100 mg total) by mouth every 8 (eight)  "hours.    Take 1 tablet (50 mg total) by mouth every 8 (eight) hours.   Discontinued Medications    CLONIDINE (CATAPRES) 0.1 MG TABLET    Take 1 tablet (0.1 mg total) by mouth 3 (three) times daily.       Review of Systems   Constitutional: Negative.    HENT: Negative.     Eyes: Negative.    Respiratory:  Positive for shortness of breath.    Cardiovascular:  Positive for palpitations. Negative for chest pain.   Gastrointestinal: Negative.    Genitourinary: Negative.    Musculoskeletal: Negative.    Skin: Negative.    Neurological:  Positive for dizziness.   Endo/Heme/Allergies: Negative.    Psychiatric/Behavioral:  The patient is not nervous/anxious.    All 12 systems otherwise negative.      Wt Readings from Last 3 Encounters:   10/12/23 70.4 kg (155 lb 3.3 oz)   09/07/23 68.3 kg (150 lb 9.2 oz)   08/28/23 66.6 kg (146 lb 13.2 oz)     Temp Readings from Last 3 Encounters:   08/14/23 97.3 °F (36.3 °C)   08/06/23 97.7 °F (36.5 °C) (Oral)   07/06/23 97.7 °F (36.5 °C)     BP Readings from Last 3 Encounters:   10/12/23 (!) 175/100   09/07/23 (!) 140/85   08/28/23 (!) 148/90     Pulse Readings from Last 3 Encounters:   10/12/23 71   09/07/23 77   08/28/23 64       BP (!) 175/100   Pulse 71   Resp 16   Ht 5' 3" (1.6 m)   Wt 70.4 kg (155 lb 3.3 oz)   SpO2 100%   BMI 27.49 kg/m²     Objective:   Physical Exam  Vitals and nursing note reviewed.   Constitutional:       General: She is not in acute distress.     Appearance: She is well-developed. She is not diaphoretic.   HENT:      Head: Normocephalic and atraumatic.      Nose: Nose normal.   Eyes:      General: No scleral icterus.     Conjunctiva/sclera: Conjunctivae normal.   Neck:      Thyroid: No thyromegaly.      Vascular: No JVD.   Cardiovascular:      Rate and Rhythm: Normal rate and regular rhythm.      Heart sounds: S1 normal and S2 normal. No murmur heard.     No friction rub. No gallop. No S3 or S4 sounds.   Pulmonary:      Effort: Pulmonary effort is normal. " No respiratory distress.      Breath sounds: Normal breath sounds. No stridor. No wheezing or rales.   Chest:      Chest wall: No tenderness.   Abdominal:      General: Bowel sounds are normal. There is no distension.      Palpations: Abdomen is soft. There is no mass.      Tenderness: There is no abdominal tenderness. There is no rebound.   Genitourinary:     Comments: Deferred  Musculoskeletal:         General: No tenderness or deformity. Normal range of motion.      Cervical back: Normal range of motion and neck supple.   Lymphadenopathy:      Cervical: No cervical adenopathy.   Skin:     General: Skin is warm and dry.      Coloration: Skin is not pale.      Findings: No erythema or rash.   Neurological:      Mental Status: She is alert and oriented to person, place, and time.      Motor: No abnormal muscle tone.      Coordination: Coordination normal.   Psychiatric:         Behavior: Behavior normal.         Thought Content: Thought content normal.         Judgment: Judgment normal.         Lab Results   Component Value Date     09/19/2023    K 4.9 09/19/2023     09/19/2023    CO2 23 09/19/2023    BUN 45 (H) 09/19/2023    CREATININE 2.4 (H) 09/19/2023    GLU 76 09/19/2023    HGBA1C 5.1 07/19/2023    MG 2.1 08/05/2023    AST 15 08/06/2023    ALT 12 08/06/2023    ALBUMIN 3.9 08/06/2023    PROT 7.6 08/06/2023    BILITOT 0.5 08/06/2023    WBC 4.86 09/19/2023    HGB 12.9 09/19/2023    HCT 39.8 09/19/2023    MCV 86 09/19/2023     09/19/2023    INR 1.0 08/05/2023    TSH 0.761 08/04/2023    CHOL 155 08/04/2023    HDL 44 08/04/2023    LDLCALC 101.4 08/04/2023    TRIG 48 08/04/2023     (H) 04/04/2023     Assessment:      1. CKD (chronic kidney disease) stage 4, GFR 15-29 ml/min    2. Chronic heart failure with preserved ejection fraction    3. Uncontrolled hypertension    4. Chronic hepatitis C without hepatic coma    5. Abnormal ECG    6. History of TIA (transient ischemic attack)    7. History  of CVA (cerebrovascular accident)    8. Palpitations    9. Other hyperlipidemia    10. AUGUSTE (dyspnea on exertion)    11. Chronic hypertension    12. Bipolar affective disorder, mixed, moderate                Plan:   1. HFPEF, with occ CP/SOB; last   - Nuclear stress 6/2023 neg for ischemia, basal, mid lateral wall scar note  - titrate meds  - cont lasix  - ECHO 8/2023 with normal bi V function and valves, grade 1 DD. Neg bubble study.     2. HTN - elevated/uncontrolled  - cont meds - increase Coreg dose   - low salt diet  - chance clonidine to patch  - inc Hydralazine to 100mg   - if remains severely elevated needs ER eval     3. CKD4  - f/u with nephro/PCP    4. Hep C  - cont tx per PCP    5. HLD  - cont statin - changed to pravastatin 40mg  - unsure if she was taking livalo     6. SOB ? COPD with smoking abuse  - refer to smoking cessation  - f/u pulm     7. Recurrent H/O TIA/CVA - L sided weakness? -again 3/2023, 8/2023  - cont meds - asa and plavix  - ECHO 8/2023 with normal bi V function and valves, grade 1 DD. Neg bubble study.   - Vital monitor 6/2023 with brief SVT/NSVT, rare PVCS, PACs noted; AVG HR 76 bpm.  - Carotids 6/2023 overall normal.    - f/u Neuro    Thank you for allowing me to participate in this patient's care. Please do not hesitate to contact me with any questions or concerns. Consult note has been forwarded to the referral physician.

## 2023-10-17 ENCOUNTER — HOSPITAL ENCOUNTER (INPATIENT)
Facility: HOSPITAL | Age: 68
LOS: 2 days | Discharge: HOME | DRG: 305 | End: 2023-10-19
Attending: EMERGENCY MEDICINE | Admitting: SPECIALIST
Payer: MEDICARE

## 2023-10-17 DIAGNOSIS — N17.9 ACUTE RENAL FAILURE, UNSPECIFIED ACUTE RENAL FAILURE TYPE: ICD-10-CM

## 2023-10-17 DIAGNOSIS — I10 MALIGNANT HYPERTENSION: ICD-10-CM

## 2023-10-17 DIAGNOSIS — I16.1 HYPERTENSIVE EMERGENCY: Primary | ICD-10-CM

## 2023-10-17 DIAGNOSIS — R07.9 CHEST PAIN: ICD-10-CM

## 2023-10-17 PROBLEM — F31.9 BIPOLAR DISORDER: Status: ACTIVE | Noted: 2023-10-17

## 2023-10-17 PROBLEM — I50.32 CHRONIC HEART FAILURE WITH PRESERVED EJECTION FRACTION: Status: ACTIVE | Noted: 2023-10-17

## 2023-10-17 PROBLEM — Z71.6 TOBACCO ABUSE COUNSELING: Status: ACTIVE | Noted: 2023-10-17

## 2023-10-17 PROBLEM — I63.81 LEFT THALAMIC INFARCTION: Status: ACTIVE | Noted: 2023-10-17

## 2023-10-17 PROBLEM — N18.4 ACUTE RENAL FAILURE SUPERIMPOSED ON STAGE 4 CHRONIC KIDNEY DISEASE: Status: ACTIVE | Noted: 2023-10-17

## 2023-10-17 PROBLEM — I67.1 CEREBRAL ANEURYSM, NONRUPTURED: Status: ACTIVE | Noted: 2023-10-17

## 2023-10-17 LAB
ALBUMIN SERPL BCP-MCNC: 4.1 G/DL (ref 3.5–5.2)
ALP SERPL-CCNC: 129 U/L (ref 55–135)
ALT SERPL W/O P-5'-P-CCNC: 10 U/L (ref 10–44)
ANION GAP SERPL CALC-SCNC: 12 MMOL/L (ref 8–16)
AST SERPL-CCNC: 17 U/L (ref 10–40)
BASOPHILS # BLD AUTO: 0.03 K/UL (ref 0–0.2)
BASOPHILS NFR BLD: 0.4 % (ref 0–1.9)
BILIRUB SERPL-MCNC: 0.4 MG/DL (ref 0.1–1)
BNP SERPL-MCNC: 135 PG/ML (ref 0–99)
BUN SERPL-MCNC: 40 MG/DL (ref 8–23)
CALCIUM SERPL-MCNC: 9.1 MG/DL (ref 8.7–10.5)
CHLORIDE SERPL-SCNC: 112 MMOL/L (ref 95–110)
CO2 SERPL-SCNC: 22 MMOL/L (ref 23–29)
CREAT SERPL-MCNC: 3 MG/DL (ref 0.5–1.4)
DIFFERENTIAL METHOD: ABNORMAL
EOSINOPHIL # BLD AUTO: 0.4 K/UL (ref 0–0.5)
EOSINOPHIL NFR BLD: 5.3 % (ref 0–8)
ERYTHROCYTE [DISTWIDTH] IN BLOOD BY AUTOMATED COUNT: 13.1 % (ref 11.5–14.5)
EST. GFR  (NO RACE VARIABLE): 16 ML/MIN/1.73 M^2
GLUCOSE SERPL-MCNC: 89 MG/DL (ref 70–110)
HCT VFR BLD AUTO: 43.5 % (ref 37–48.5)
HGB BLD-MCNC: 13.9 G/DL (ref 12–16)
IMM GRANULOCYTES # BLD AUTO: 0.02 K/UL (ref 0–0.04)
IMM GRANULOCYTES NFR BLD AUTO: 0.3 % (ref 0–0.5)
LYMPHOCYTES # BLD AUTO: 3.1 K/UL (ref 1–4.8)
LYMPHOCYTES NFR BLD: 46.1 % (ref 18–48)
MCH RBC QN AUTO: 27.8 PG (ref 27–31)
MCHC RBC AUTO-ENTMCNC: 32 G/DL (ref 32–36)
MCV RBC AUTO: 87 FL (ref 82–98)
MONOCYTES # BLD AUTO: 0.9 K/UL (ref 0.3–1)
MONOCYTES NFR BLD: 13.9 % (ref 4–15)
NEUTROPHILS # BLD AUTO: 2.3 K/UL (ref 1.8–7.7)
NEUTROPHILS NFR BLD: 34 % (ref 38–73)
NRBC BLD-RTO: 0 /100 WBC
PLATELET # BLD AUTO: 286 K/UL (ref 150–450)
PMV BLD AUTO: 9.6 FL (ref 9.2–12.9)
POTASSIUM SERPL-SCNC: 4.9 MMOL/L (ref 3.5–5.1)
PROT SERPL-MCNC: 7.9 G/DL (ref 6–8.4)
RBC # BLD AUTO: 5 M/UL (ref 4–5.4)
SODIUM SERPL-SCNC: 146 MMOL/L (ref 136–145)
TROPONIN I SERPL DL<=0.01 NG/ML-MCNC: 0.02 NG/ML (ref 0–0.03)
TROPONIN I SERPL DL<=0.01 NG/ML-MCNC: 0.03 NG/ML (ref 0–0.03)
WBC # BLD AUTO: 6.74 K/UL (ref 3.9–12.7)

## 2023-10-17 PROCEDURE — 63600175 PHARM REV CODE 636 W HCPCS: Mod: HCNC | Performed by: INTERNAL MEDICINE

## 2023-10-17 PROCEDURE — 93010 ELECTROCARDIOGRAM REPORT: CPT | Mod: HCNC,,, | Performed by: STUDENT IN AN ORGANIZED HEALTH CARE EDUCATION/TRAINING PROGRAM

## 2023-10-17 PROCEDURE — 63600175 PHARM REV CODE 636 W HCPCS: Mod: HCNC | Performed by: EMERGENCY MEDICINE

## 2023-10-17 PROCEDURE — 84484 ASSAY OF TROPONIN QUANT: CPT | Mod: 91,HCNC | Performed by: EMERGENCY MEDICINE

## 2023-10-17 PROCEDURE — 20000000 HC ICU ROOM: Mod: HCNC

## 2023-10-17 PROCEDURE — 80053 COMPREHEN METABOLIC PANEL: CPT | Mod: HCNC | Performed by: EMERGENCY MEDICINE

## 2023-10-17 PROCEDURE — 96376 TX/PRO/DX INJ SAME DRUG ADON: CPT | Mod: HCNC

## 2023-10-17 PROCEDURE — 83880 ASSAY OF NATRIURETIC PEPTIDE: CPT | Mod: HCNC | Performed by: EMERGENCY MEDICINE

## 2023-10-17 PROCEDURE — 63600175 PHARM REV CODE 636 W HCPCS: Mod: HCNC | Performed by: NURSE PRACTITIONER

## 2023-10-17 PROCEDURE — 93010 EKG 12-LEAD: ICD-10-PCS | Mod: HCNC,,, | Performed by: STUDENT IN AN ORGANIZED HEALTH CARE EDUCATION/TRAINING PROGRAM

## 2023-10-17 PROCEDURE — 93005 ELECTROCARDIOGRAM TRACING: CPT | Mod: HCNC

## 2023-10-17 PROCEDURE — 25000003 PHARM REV CODE 250: Mod: HCNC | Performed by: NURSE PRACTITIONER

## 2023-10-17 PROCEDURE — 96374 THER/PROPH/DIAG INJ IV PUSH: CPT | Mod: HCNC

## 2023-10-17 PROCEDURE — 96375 TX/PRO/DX INJ NEW DRUG ADDON: CPT | Mod: HCNC

## 2023-10-17 PROCEDURE — 85025 COMPLETE CBC W/AUTO DIFF WBC: CPT | Mod: HCNC | Performed by: EMERGENCY MEDICINE

## 2023-10-17 PROCEDURE — 99285 EMERGENCY DEPT VISIT HI MDM: CPT | Mod: 25,HCNC

## 2023-10-17 PROCEDURE — 36415 COLL VENOUS BLD VENIPUNCTURE: CPT | Mod: HCNC | Performed by: EMERGENCY MEDICINE

## 2023-10-17 RX ORDER — CLOPIDOGREL BISULFATE 75 MG/1
75 TABLET ORAL DAILY
Status: DISCONTINUED | OUTPATIENT
Start: 2023-10-18 | End: 2023-10-19 | Stop reason: HOSPADM

## 2023-10-17 RX ORDER — ZIPRASIDONE HYDROCHLORIDE 20 MG/1
20 CAPSULE ORAL 2 TIMES DAILY
Status: DISCONTINUED | OUTPATIENT
Start: 2023-10-17 | End: 2023-10-19 | Stop reason: HOSPADM

## 2023-10-17 RX ORDER — LOSARTAN POTASSIUM 50 MG/1
100 TABLET ORAL EVERY EVENING
Status: DISCONTINUED | OUTPATIENT
Start: 2023-10-17 | End: 2023-10-19 | Stop reason: HOSPADM

## 2023-10-17 RX ORDER — LORAZEPAM 2 MG/ML
1 INJECTION INTRAMUSCULAR
Status: COMPLETED | OUTPATIENT
Start: 2023-10-17 | End: 2023-10-17

## 2023-10-17 RX ORDER — HYDRALAZINE HYDROCHLORIDE 20 MG/ML
10 INJECTION INTRAMUSCULAR; INTRAVENOUS
Status: COMPLETED | OUTPATIENT
Start: 2023-10-17 | End: 2023-10-17

## 2023-10-17 RX ORDER — FUROSEMIDE 20 MG/1
20 TABLET ORAL DAILY
Status: DISCONTINUED | OUTPATIENT
Start: 2023-10-18 | End: 2023-10-19 | Stop reason: HOSPADM

## 2023-10-17 RX ORDER — SODIUM CHLORIDE 0.9 % (FLUSH) 0.9 %
10 SYRINGE (ML) INJECTION
Status: DISCONTINUED | OUTPATIENT
Start: 2023-10-17 | End: 2023-10-19 | Stop reason: HOSPADM

## 2023-10-17 RX ORDER — ASPIRIN 81 MG/1
81 TABLET ORAL DAILY
Status: DISCONTINUED | OUTPATIENT
Start: 2023-10-18 | End: 2023-10-19 | Stop reason: HOSPADM

## 2023-10-17 RX ORDER — ACETAMINOPHEN 325 MG/1
650 TABLET ORAL EVERY 4 HOURS PRN
Status: DISCONTINUED | OUTPATIENT
Start: 2023-10-17 | End: 2023-10-19 | Stop reason: HOSPADM

## 2023-10-17 RX ORDER — PRAVASTATIN SODIUM 20 MG/1
40 TABLET ORAL NIGHTLY
Status: DISCONTINUED | OUTPATIENT
Start: 2023-10-17 | End: 2023-10-19 | Stop reason: HOSPADM

## 2023-10-17 RX ORDER — CARVEDILOL 12.5 MG/1
12.5 TABLET ORAL 2 TIMES DAILY WITH MEALS
Status: DISCONTINUED | OUTPATIENT
Start: 2023-10-17 | End: 2023-10-18

## 2023-10-17 RX ORDER — HEPARIN SODIUM 5000 [USP'U]/ML
5000 INJECTION, SOLUTION INTRAVENOUS; SUBCUTANEOUS EVERY 8 HOURS
Status: DISCONTINUED | OUTPATIENT
Start: 2023-10-17 | End: 2023-10-19 | Stop reason: HOSPADM

## 2023-10-17 RX ORDER — ONDANSETRON 2 MG/ML
4 INJECTION INTRAMUSCULAR; INTRAVENOUS EVERY 8 HOURS PRN
Status: DISCONTINUED | OUTPATIENT
Start: 2023-10-17 | End: 2023-10-19 | Stop reason: HOSPADM

## 2023-10-17 RX ORDER — LABETALOL HYDROCHLORIDE 5 MG/ML
10 INJECTION, SOLUTION INTRAVENOUS ONCE
Status: COMPLETED | OUTPATIENT
Start: 2023-10-17 | End: 2023-10-17

## 2023-10-17 RX ORDER — NICARDIPINE HYDROCHLORIDE 0.2 MG/ML
0-15 INJECTION INTRAVENOUS CONTINUOUS
Status: DISCONTINUED | OUTPATIENT
Start: 2023-10-17 | End: 2023-10-18

## 2023-10-17 RX ORDER — HYDRALAZINE HYDROCHLORIDE 50 MG/1
100 TABLET, FILM COATED ORAL EVERY 8 HOURS
Status: DISCONTINUED | OUTPATIENT
Start: 2023-10-17 | End: 2023-10-19 | Stop reason: HOSPADM

## 2023-10-17 RX ADMIN — HYDRALAZINE HYDROCHLORIDE 100 MG: 50 TABLET ORAL at 09:10

## 2023-10-17 RX ADMIN — PRAVASTATIN SODIUM 40 MG: 20 TABLET ORAL at 09:10

## 2023-10-17 RX ADMIN — CARVEDILOL 12.5 MG: 12.5 TABLET, FILM COATED ORAL at 05:10

## 2023-10-17 RX ADMIN — HYDRALAZINE HYDROCHLORIDE 10 MG: 20 INJECTION, SOLUTION INTRAMUSCULAR; INTRAVENOUS at 03:10

## 2023-10-17 RX ADMIN — HYDRALAZINE HYDROCHLORIDE 10 MG: 20 INJECTION, SOLUTION INTRAMUSCULAR; INTRAVENOUS at 04:10

## 2023-10-17 RX ADMIN — ZIPRASIDONE HYDROCHLORIDE 20 MG: 20 CAPSULE ORAL at 09:10

## 2023-10-17 RX ADMIN — LORAZEPAM 1 MG: 2 INJECTION INTRAMUSCULAR; INTRAVENOUS at 03:10

## 2023-10-17 RX ADMIN — ACETAMINOPHEN 650 MG: 325 TABLET ORAL at 07:10

## 2023-10-17 RX ADMIN — NICARDIPINE HYDROCHLORIDE 5 MG/HR: 0.2 INJECTION, SOLUTION INTRAVENOUS at 05:10

## 2023-10-17 RX ADMIN — LABETALOL HYDROCHLORIDE 10 MG: 5 INJECTION INTRAVENOUS at 04:10

## 2023-10-17 RX ADMIN — HEPARIN SODIUM 5000 UNITS: 5000 INJECTION INTRAVENOUS; SUBCUTANEOUS at 09:10

## 2023-10-17 RX ADMIN — LOSARTAN POTASSIUM 100 MG: 50 TABLET, FILM COATED ORAL at 05:10

## 2023-10-17 NOTE — Clinical Note
Diagnosis: Hypertensive emergency [908437]   Admitting Provider:: MYLES CARLIN [[id number]]   Future Attending Provider: MYLES CARLIN [[id number]]   Reason for IP Medical Treatment  (Clinical interventions that can only be accomplished in the IP setting? ) :: IV anti-hypertensive infusion   I certify that Inpatient services for greater than or equal to 2 midnights are medically necessary:: Yes   Plans for Post-Acute care--if anticipated (pick the single best option):: A. No post acute care anticipated at this time

## 2023-10-17 NOTE — PHARMACY MED REC
"Admission Medication History     The home medication history was taken by Og Hoyos.    You may go to "Admission" then "Reconcile Home Medications" tabs to review and/or act upon these items.     The home medication list has been updated by the Pharmacy department.   Please read ALL comments highlighted in yellow.   Please address this information as you see fit.    Feel free to contact us if you have any questions or require assistance.      Medications listed below were obtained from: Patient/family and Analytic software- [employer]  (Not in a hospital admission)    Og Hoyos  [id number]    Current Outpatient Medications on File Prior to Encounter   Medication Sig Dispense Refill Last Dose    aspirin (ECOTRIN) 81 MG EC tablet Take 1 tablet (81 mg total) by mouth once daily. 90 tablet 0 10/17/2023    carvediloL (COREG) 12.5 MG tablet Take 1 tablet (12.5 mg total) by mouth 2 (two) times daily. 180 tablet 1 10/17/2023    furosemide (LASIX) 40 MG tablet Take 0.5 tablets (20 mg total) by mouth once daily. 45 tablet 0 10/17/2023    hydrALAZINE (APRESOLINE) 100 MG tablet Take 1 tablet (100 mg total) by mouth every 8 (eight) hours. 180 tablet 1 10/17/2023    hydrOXYzine (ATARAX) 50 MG tablet Take 1 tablet (50 mg total) by mouth 2 (two) times daily as needed for Anxiety. 20 tablet 0 10/17/2023    irbesartan (AVAPRO) 150 MG tablet Take 2 tablets (300 mg total) by mouth every evening. 180 tablet 3 10/17/2023    nicotine (NICODERM CQ) 21 mg/24 hr Place 1 patch onto the skin once daily. 14 patch 0 10/16/2023    pravastatin (PRAVACHOL) 40 MG tablet Take 1 tablet (40 mg total) by mouth every evening. 90 tablet 0 10/16/2023    ziprasidone (GEODON) 20 MG Cap Take 1 capsule (20 mg total) by mouth 2 (two) times daily. 60 capsule 3 10/17/2023    cloNIDine 0.2 mg/24 hr td ptwk (CATAPRES) 0.2 mg/24 hr Place 1 patch onto the skin every 7 days. (Patient taking differently: Place 1 patch onto the skin every 7 days. " (Saturdays)) 4 patch 11 10/7/2023    clopidogreL (PLAVIX) 75 mg tablet Take 1 tablet (75 mg total) by mouth once daily. for 20 days 20 tablet 0     tretinoin (RETIN-A) 0.025 % cream Apply topically every evening. Pea sized amount to entire face at night. Start out using every other night for 2 weeks, then increase to every night as tolerated 20 g 0                            .

## 2023-10-17 NOTE — ASSESSMENT & PLAN NOTE
Cardene infusion to keep SBP < 180mmHg.  Continue home medications.   Careful hydration  Serial labs    Patient needs IV infusion for BP control.  Hx of unsecured MCA aneurysm. Hx of CVA  She will need careful hydration for acute on CKD  Her oral medications will be adjusted after 24 hrs of IV infusion to optimize her regimen for home.    I anticipate she will stay 2 midnights for reasons mentioned above.

## 2023-10-17 NOTE — ASSESSMENT & PLAN NOTE
Recent left thalamic infarct  No new change in neuro status per patient.   GCS 15. No focal deficits on exam

## 2023-10-17 NOTE — H&P
[facility] - Emergency Dept.  Critical Care Medicine  History & Physical    Patient Name: Rose Milligan  MRN: [id number]  Admission Date: 10/17/2023  Hospital Length of Stay: 0 days  Code Status: Full Code  Attending Physician: Fletcher Walls MD   Primary Care Provider: Leeann Parham DO   Principal Problem: <principal problem not specified>    Subjective:     HPI:  Patient with known hx of uncontrolled HTN presents with left shoulder and arm pain. Worse on movement. She woke up with it. She uses several pillows to sleep and had these issues and neck pain before.   She lives alone and has psych issues. She claims that she took all her medications this am. She doesn't appear in distress and is able to answer questions. No SOB, N/V, Visual changes ot abdominal pain. No headache or palpitations.   Her SBP was 220 on arrival and she has received hydralazine times 2, labetalol and catapress. Her SBP is now in 190s and she was just started on cardene.      Hospital/ICU Course:  No notes on file     Past Medical History:   Diagnosis Date    Acute CVA (cerebrovascular accident) 8/4/2023    Bipolar 1 disorder     CHF (congestive heart failure)     Depression     Hepatitis C     Hypertension     Other hyperlipidemia 8/15/2019       Past Surgical History:   Procedure Laterality Date    HYSTERECTOMY         Review of patient's allergies indicates:   Allergen Reactions    Tramadol Itching    Risperdal [risperidone] Other (See Comments)     Did not like way felt       Family History       Problem Relation (Age of Onset)    Heart attack Maternal Grandmother    Hypertension Mother          Tobacco Use    Smoking status: Some Days     Current packs/day: 0.25     Average packs/day: 0.3 packs/day for 7.8 years (1.9 ttl pk-yrs)     Types: Cigarettes     Start date: 1/11/2016    Smokeless tobacco: Never    Tobacco comments:     1 pack every 3 days   Substance and Sexual Activity    Alcohol use: Yes    Drug use: Yes      Types: Cocaine    Sexual activity: Not Currently         Review of Systems   All other systems reviewed and are negative.    Objective:     Vital Signs (Most Recent):  Temp: 99 °F (37.2 °C) (10/17/23 1434)  Pulse: 80 (10/17/23 1717)  Resp: 18 (10/17/23 1717)  BP: (!) 195/114 (10/17/23 1737)  SpO2: 100 % (10/17/23 1717) Vital Signs (24h Range):  Temp:  [99 °F (37.2 °C)] 99 °F (37.2 °C)  Pulse:  [76-87] 80  Resp:  [17-22] 18  SpO2:  [96 %-100 %] 100 %  BP: (183-230)/() 195/114     Weight: 70.3 kg (155 lb)  Body mass index is 27.46 kg/m².    No intake or output data in the 24 hours ending 10/17/23 1745     Physical Exam  Constitutional:       General: She is not in acute distress.     Appearance: She is normal weight. She is not ill-appearing, toxic-appearing or diaphoretic.   HENT:      Head: Normocephalic.      Mouth/Throat:      Mouth: Mucous membranes are moist.   Eyes:      Pupils: Pupils are equal, round, and reactive to light.   Cardiovascular:      Rate and Rhythm: Normal rate.      Pulses: Normal pulses.   Pulmonary:      Effort: Pulmonary effort is normal.   Abdominal:      General: Abdomen is flat.   Skin:     General: Skin is warm.      Capillary Refill: Capillary refill takes less than 2 seconds.   Neurological:      General: No focal deficit present.      Mental Status: She is alert.          Vents:       Lines/Drains/Airways       Peripheral Intravenous Line  Duration                  Peripheral IV - Single Lumen 10/17/23 1507 20 G Right Antecubital <1 day                    Significant Labs:    CBC/Anemia Profile:  Recent Labs   Lab 10/17/23  1517   WBC 6.74   HGB 13.9   HCT 43.5      MCV 87   RDW 13.1        Chemistries:  Recent Labs   Lab 10/17/23  1517   *   K 4.9   *   CO2 22*   BUN 40*   CREATININE 3.0*   CALCIUM 9.1   ALBUMIN 4.1   PROT 7.9   BILITOT 0.4   ALKPHOS 129   ALT 10   AST 17       All pertinent labs within the past 24 hours have been  reviewed.    Significant Imaging:   I have reviewed all pertinent imaging results/findings within the past 24 hours.    Assessment/Plan:     Neuro  Left thalamic infarction  Recent left thalamic infarct  No new change in neuro status per patient.   GCS 15. No focal deficits on exam    Cerebral aneurysm, nonruptured  Known MCA Bifurcation aneurysm  BP control    Psychiatric  Bipolar disorder  Chronic  Continue home medications  ? Compliance      Cardiac/Vascular  Chronic heart failure with preserved ejection fraction  BP control  Careful hydration.  CxR is clear.    Malignant hypertension  Cardene infusion to keep SBP < 180mmHg.  Continue home medications.   Careful hydration  Serial labs    Patient needs IV infusion for BP control.  Hx of unsecured MCA aneurysm. Hx of CVA  She will need careful hydration for acute on CKD  Her oral medications will be adjusted after 24 hrs of IV infusion to optimize her regimen for home.    I anticipate she will stay 2 midnights for reasons mentioned above.    Other hyperlipidemia  continue home medications      Renal/  Acute renal failure superimposed on stage 4 chronic kidney disease  Careful hydration  Serial labs  Nephrology consult and follow up in am     GI  Chronic hepatitis C virus infection  Chronic issue     Critical Care Time: 36 minutes  Critical secondary to Patient has a condition that poses threat to life and bodily function: malignant HTN     Critical care was time spent personally by me on the following activities: development of treatment plan with patient or surrogate and bedside caregivers, discussions with consultants, evaluation of patient's response to treatment, examination of patient, ordering and performing treatments and interventions, ordering and review of laboratory studies, ordering and review of radiographic studies, pulse oximetry, re-evaluation of patient's condition. This critical care time did not overlap with that of any other provider or  involve time for any procedures.     Fletcher Walls MD  Critical Care Medicine  [facility] - Emergency Dept.

## 2023-10-17 NOTE — ED PROVIDER NOTES
SCRIBE #1 NOTE: IBranden, am scribing for, and in the presence of, Lionel Celaya Jr., MD. I have scribed the HPI, ROS, and PEx.     SCRIBE #2 NOTE: I, Skye Chisholm, am scribing for, and in the presence of,  Lionel Celaya Jr., MD. I have scribed the remaining portions of the note not scribed by Scribe #1.      History     No chief complaint on file.    Review of patient's allergies indicates:   Allergen Reactions    Tramadol Itching    Risperdal [risperidone] Other (See Comments)     Did not like way felt         History of Present Illness     HPI    10/17/2023, 3:05 PM  History obtained from the patient      History of Present Illness: Rose Milligan is a 68 y.o. female patient with a PMHx of HTN, CHF, CVA who presents to the Emergency Department for evaluation of chest pain which onset this morning. Symptoms are constant and moderate in severity. No mitigating or exacerbating factors reported. No associated sxs reported. Patient denies any fever, chills, n/v/d, diaphoresis, SOB, numbness, weakness, dizziness and all other sxs at this time. Pt states that she is compliant with her home medication regimen, and had taken ASA PTA. No further complaints or concerns at this time.     Arrival mode: Personal vehicle    PCP: Leeann Parham DO        Past Medical History:  Past Medical History:   Diagnosis Date    Acute CVA (cerebrovascular accident) 8/4/2023    Bipolar 1 disorder     CHF (congestive heart failure)     Depression     Hepatitis C     Hypertension     Other hyperlipidemia 8/15/2019       Past Surgical History:  Past Surgical History:   Procedure Laterality Date    HYSTERECTOMY           Family History:  Family History   Problem Relation Age of Onset    Heart attack Maternal Grandmother     Hypertension Mother        Social History:  Social History     Tobacco Use    Smoking status: Some Days     Current packs/day: 0.25     Average packs/day: 0.3 packs/day for 7.8 years (2.0 ttl pk-yrs)      Types: Cigarettes     Start date: 1/11/2016    Smokeless tobacco: Never    Tobacco comments:     1 pack every 3 days   Substance and Sexual Activity    Alcohol use: Yes    Drug use: Yes     Types: Cocaine    Sexual activity: Not Currently        Review of Systems     Review of Systems   Constitutional:  Negative for chills and fever.   HENT:  Negative for sore throat.    Respiratory:  Negative for shortness of breath.    Cardiovascular:  Positive for chest pain.   Gastrointestinal:  Negative for diarrhea, nausea and vomiting.   Genitourinary:  Negative for dysuria.   Musculoskeletal:  Negative for back pain.   Skin:  Negative for rash.   Neurological:  Negative for dizziness, weakness and numbness.   Hematological:  Does not bruise/bleed easily.   All other systems reviewed and are negative.     Physical Exam     Initial Vitals [10/17/23 1434]   BP Pulse Resp Temp SpO2   (!) 213/113 87 20 99 °F (37.2 °C) 96 %      MAP       --          Physical Exam  Nursing Notes and Vital Signs Reviewed.  Constitutional: Patient is in no acute distress. Well-developed and well-nourished.  Head: Atraumatic. Normocephalic.  Eyes:  EOM intact.  No scleral icterus.  ENT: Mucous membranes are moist.  Nares clear   Neck:  Full ROM. No JVD.  Cardiovascular: Regular rate. Regular rhythm No murmurs, rubs, or gallops. Distal pulses are 2+ and symmetric  Pulmonary/Chest: No respiratory distress. Clear to auscultation bilaterally. No wheezing or rales.  Equal chest wall rise bilaterally  Abdominal: Soft and non-distended.  There is no tenderness.  No rebound, guarding, or rigidity. Good bowel sounds.  Genitourinary: No CVA tenderness.  No suprapubic tenderness  Musculoskeletal: Moves all extremities. No obvious deformities.  5 x 5 strength in all extremities   Skin: Warm and dry.  Neurological:  Alert, awake, and appropriate.  Normal speech.  No acute focal neurological deficits are appreciated.  Two through 12 intact  bilaterally.  Psychiatric: Normal affect. Good eye contact. Appropriate in content. .     ED Course   Critical Care    Date/Time: 11/6/2023 3:52 PM    Performed by: Lionel Celaya Jr., MD  Authorized by: Lionel Celaya Jr., MD  Direct patient critical care time: 12 minutes  Additional history critical care time: 8 minutes  Ordering / reviewing critical care time: 5 minutes  Documentation critical care time: 5 minutes  Consulting other physicians critical care time: 6 minutes  Consult with family critical care time: 4 minutes  Total critical care time (exclusive of procedural time) : 40 minutes  Critical care time was exclusive of separately billable procedures and treating other patients and teaching time.  Critical care was necessary to treat or prevent imminent or life-threatening deterioration of the following conditions: circulatory failure.  Critical care was time spent personally by me on the following activities: development of treatment plan with patient or surrogate, discussions with consultants, interpretation of cardiac output measurements, evaluation of patient's response to treatment, examination of patient, obtaining history from patient or surrogate, ordering and performing treatments and interventions, ordering and review of laboratory studies, ordering and review of radiographic studies, pulse oximetry, re-evaluation of patient's condition and review of old charts.        ED Vital Signs:  Vitals:    10/18/23 1349 10/18/23 1500 10/18/23 1553 10/18/23 1559   BP:   (!) 176/92 (!) 173/90   Pulse: 85 87 83    Resp:   18    Temp:   97.9 °F (36.6 °C)    TempSrc:   Oral    SpO2:   96%    Weight:       Height:        10/18/23 1652 10/18/23 1722 10/18/23 1927 10/19/23 0017   BP: (!) 164/85  (!) 173/86 132/82   Pulse:  80 76 82   Resp:   18 17   Temp:   97.9 °F (36.6 °C) 97.5 °F (36.4 °C)   TempSrc:    Oral   SpO2:   98% 97%   Weight:       Height:        10/19/23 0718 10/19/23 0732 10/19/23 0815  10/19/23 0915   BP: (!) 163/91 (!) 162/90     Pulse: 77  82 87   Resp: 18      Temp: 97.6 °F (36.4 °C)      TempSrc: Oral      SpO2: 99%      Weight:       Height:        10/19/23 1100 10/19/23 1114 10/19/23 1332   BP:  (!) 154/86    Pulse: 74 79 91   Resp:  18    Temp:  98.2 °F (36.8 °C)    TempSrc:  Oral    SpO2:      Weight:      Height:          Abnormal Lab Results:  Labs Reviewed   CBC W/ AUTO DIFFERENTIAL - Abnormal; Notable for the following components:       Result Value    Gran % 34.0 (*)     All other components within normal limits   COMPREHENSIVE METABOLIC PANEL - Abnormal; Notable for the following components:    Sodium 146 (*)     Chloride 112 (*)     CO2 22 (*)     BUN 40 (*)     Creatinine 3.0 (*)     eGFR 16 (*)     All other components within normal limits   B-TYPE NATRIURETIC PEPTIDE - Abnormal; Notable for the following components:     (*)     All other components within normal limits   TROPONIN I        All Lab Results:  Results for orders placed or performed during the hospital encounter of 10/17/23   CBC auto differential   Result Value Ref Range    WBC 6.74 3.90 - 12.70 K/uL    RBC 5.00 4.00 - 5.40 M/uL    Hemoglobin 13.9 12.0 - 16.0 g/dL    Hematocrit 43.5 37.0 - 48.5 %    MCV 87 82 - 98 fL    MCH 27.8 27.0 - 31.0 pg    MCHC 32.0 32.0 - 36.0 g/dL    RDW 13.1 11.5 - 14.5 %    Platelets 286 150 - 450 K/uL    MPV 9.6 9.2 - 12.9 fL    Immature Granulocytes 0.3 0.0 - 0.5 %    Gran # (ANC) 2.3 1.8 - 7.7 K/uL    Immature Grans (Abs) 0.02 0.00 - 0.04 K/uL    Lymph # 3.1 1.0 - 4.8 K/uL    Mono # 0.9 0.3 - 1.0 K/uL    Eos # 0.4 0.0 - 0.5 K/uL    Baso # 0.03 0.00 - 0.20 K/uL    nRBC 0 0 /100 WBC    Gran % 34.0 (L) 38.0 - 73.0 %    Lymph % 46.1 18.0 - 48.0 %    Mono % 13.9 4.0 - 15.0 %    Eosinophil % 5.3 0.0 - 8.0 %    Basophil % 0.4 0.0 - 1.9 %    Differential Method Automated    Comprehensive metabolic panel   Result Value Ref Range    Sodium 146 (H) 136 - 145 mmol/L    Potassium 4.9 3.5 -  5.1 mmol/L    Chloride 112 (H) 95 - 110 mmol/L    CO2 22 (L) 23 - 29 mmol/L    Glucose 89 70 - 110 mg/dL    BUN 40 (H) 8 - 23 mg/dL    Creatinine 3.0 (H) 0.5 - 1.4 mg/dL    Calcium 9.1 8.7 - 10.5 mg/dL    Total Protein 7.9 6.0 - 8.4 g/dL    Albumin 4.1 3.5 - 5.2 g/dL    Total Bilirubin 0.4 0.1 - 1.0 mg/dL    Alkaline Phosphatase 129 55 - 135 U/L    AST 17 10 - 40 U/L    ALT 10 10 - 44 U/L    eGFR 16 (A) >60 mL/min/1.73 m^2    Anion Gap 12 8 - 16 mmol/L   Troponin I #1   Result Value Ref Range    Troponin I 0.022 0.000 - 0.026 ng/mL   BNP   Result Value Ref Range     (H) 0 - 99 pg/mL   Troponin I #2   Result Value Ref Range    Troponin I 0.026 0.000 - 0.026 ng/mL   Drug screen panel, in-house   Result Value Ref Range    Benzodiazepines Negative Negative    Methadone metabolites Negative Negative    Cocaine (Metab.) Negative Negative    Opiate Scrn, Ur Negative Negative    Barbiturate Screen, Ur Negative Negative    Amphetamine Screen, Ur Negative Negative    THC Negative Negative    Phencyclidine Negative Negative    Creatinine, Urine 124.8 15.0 - 325.0 mg/dL    Toxicology Information SEE COMMENT    Sodium, urine, random   Result Value Ref Range    Sodium, Urine 83 20 - 250 mmol/L   Creatinine, urine, random   Result Value Ref Range    Creatinine, Urine 124.8 15.0 - 325.0 mg/dL   Protein / creatinine ratio, urine   Result Value Ref Range    Protein, Urine Random 31 (H) 0 - 15 mg/dL    Creatinine, Urine 124.8 15.0 - 325.0 mg/dL    Prot/Creat Ratio, Urine 0.25 (H) 0.00 - 0.20   CBC auto differential   Result Value Ref Range    WBC 5.54 3.90 - 12.70 K/uL    RBC 4.68 4.00 - 5.40 M/uL    Hemoglobin 13.0 12.0 - 16.0 g/dL    Hematocrit 40.8 37.0 - 48.5 %    MCV 87 82 - 98 fL    MCH 27.8 27.0 - 31.0 pg    MCHC 31.9 (L) 32.0 - 36.0 g/dL    RDW 13.1 11.5 - 14.5 %    Platelets 258 150 - 450 K/uL    MPV 9.6 9.2 - 12.9 fL    Immature Granulocytes 0.4 0.0 - 0.5 %    Gran # (ANC) 2.1 1.8 - 7.7 K/uL    Immature Grans (Abs)  0.02 0.00 - 0.04 K/uL    Lymph # 2.4 1.0 - 4.8 K/uL    Mono # 0.6 0.3 - 1.0 K/uL    Eos # 0.4 0.0 - 0.5 K/uL    Baso # 0.03 0.00 - 0.20 K/uL    nRBC 0 0 /100 WBC    Gran % 37.0 (L) 38.0 - 73.0 %    Lymph % 43.7 18.0 - 48.0 %    Mono % 11.4 4.0 - 15.0 %    Eosinophil % 7.0 0.0 - 8.0 %    Basophil % 0.5 0.0 - 1.9 %    Differential Method Automated    Renal function panel   Result Value Ref Range    Glucose 117 (H) 70 - 110 mg/dL    Sodium 142 136 - 145 mmol/L    Potassium 4.6 3.5 - 5.1 mmol/L    Chloride 112 (H) 95 - 110 mmol/L    CO2 19 (L) 23 - 29 mmol/L    BUN 35 (H) 8 - 23 mg/dL    Calcium 8.7 8.7 - 10.5 mg/dL    Creatinine 2.4 (H) 0.5 - 1.4 mg/dL    Albumin 3.5 3.5 - 5.2 g/dL    Phosphorus 3.2 2.7 - 4.5 mg/dL    eGFR 21 (A) >60 mL/min/1.73 m^2    Anion Gap 11 8 - 16 mmol/L   CBC auto differential   Result Value Ref Range    WBC 5.22 3.90 - 12.70 K/uL    RBC 4.74 4.00 - 5.40 M/uL    Hemoglobin 12.8 12.0 - 16.0 g/dL    Hematocrit 40.9 37.0 - 48.5 %    MCV 86 82 - 98 fL    MCH 27.0 27.0 - 31.0 pg    MCHC 31.3 (L) 32.0 - 36.0 g/dL    RDW 13.0 11.5 - 14.5 %    Platelets 273 150 - 450 K/uL    MPV 10.1 9.2 - 12.9 fL    Immature Granulocytes 0.4 0.0 - 0.5 %    Gran # (ANC) 1.8 1.8 - 7.7 K/uL    Immature Grans (Abs) 0.02 0.00 - 0.04 K/uL    Lymph # 2.4 1.0 - 4.8 K/uL    Mono # 0.6 0.3 - 1.0 K/uL    Eos # 0.4 0.0 - 0.5 K/uL    Baso # 0.03 0.00 - 0.20 K/uL    nRBC 0 0 /100 WBC    Gran % 34.0 (L) 38.0 - 73.0 %    Lymph % 45.2 18.0 - 48.0 %    Mono % 12.1 4.0 - 15.0 %    Eosinophil % 7.7 0.0 - 8.0 %    Basophil % 0.6 0.0 - 1.9 %    Differential Method Automated    Renal function panel   Result Value Ref Range    Glucose 101 70 - 110 mg/dL    Sodium 142 136 - 145 mmol/L    Potassium 4.7 3.5 - 5.1 mmol/L    Chloride 112 (H) 95 - 110 mmol/L    CO2 18 (L) 23 - 29 mmol/L    BUN 45 (H) 8 - 23 mg/dL    Calcium 8.5 (L) 8.7 - 10.5 mg/dL    Creatinine 2.7 (H) 0.5 - 1.4 mg/dL    Albumin 3.6 3.5 - 5.2 g/dL    Phosphorus 4.2 2.7 - 4.5  mg/dL    eGFR 18 (A) >60 mL/min/1.73 m^2    Anion Gap 12 8 - 16 mmol/L     Imaging Results:  Imaging Results              X-Ray Chest AP Portable (Final result)  Result time 10/17/23 15:37:22      Final result by Kaity Stone MD (10/17/23 15:37:22)                   Impression:      No active finding      Electronically signed by: Kaity Stone  Date:    10/17/2023  Time:    15:37               Narrative:    EXAMINATION:  XR CHEST AP PORTABLE    CLINICAL HISTORY:  Chest Pain;    TECHNIQUE:  Single frontal portable view of the chest was performed.    COMPARISON:  08/15/2019    FINDINGS:  Lungs well aerated.  No pleural effusion or convincing pneumothorax.  No shift of the mediastinum                                       The EKG was ordered, reviewed, and independently interpreted by the ED provider.  Interpretation time: 14:39  Rate: 81 BPM  Rhythm: normal sinus rhythm  Interpretation: Left axis deviation. LVH with repolarization abnormality. Cannot rule out septal infarct, age undetermined. No STEMI.           The Emergency Provider reviewed the vital signs and test results, which are outlined above.     ED Discussion       5:13 PM: Discussed pt's case with Marcela Bowen NP (Hospital Medicine) who recommends admitting the pt to the ICU.    5:14 PM: Discussed case with Yeyo Landa NP (Critical Care Medicine). Dr. Walls agrees with current care and management of pt and accepts admission.   Admitting Service: Critical Care Medicine  Admitting Physician: Dr. Walls  Admit to: ICU    5:16 PM: Re-evaluated pt. I have discussed test results, shared treatment plan, and the need for admission with patient and family at bedside. Pt and family express understanding at this time and agree with all information. All questions answered. Pt and family have no further questions or concerns at this time. Pt is ready for admit.         Medical Decision Making  Differential diagnosis:  Hypertension,  hypertensive urgency, hypertensive emergency, STEMI, NSTEMI, ASCVD    Amount and/or Complexity of Data Reviewed  Labs: ordered. Decision-making details documented in ED Course.  Radiology: ordered. Decision-making details documented in ED Course.  ECG/medicine tests: ordered and independent interpretation performed. Decision-making details documented in ED Course.  Discussion of management or test interpretation with external provider(s): Initially consulted Hospital Medicine patient subsequently required IV drip of antihypertensives necessitating admission to critical care team.  Critical care team graciously accepted    Risk  Prescription drug management.  Drug therapy requiring intensive monitoring for toxicity.  Decision regarding hospitalization.    Critical Care  Total time providing critical care: 40 minutes                ED Medication(s):  Medications   LORazepam injection 1 mg (1 mg Intravenous Given 10/17/23 1517)   hydrALAZINE injection 10 mg (10 mg Intravenous Given 10/17/23 1517)   hydrALAZINE injection 10 mg (10 mg Intravenous Given 10/17/23 1603)   labetaloL injection 10 mg (10 mg Intravenous Given 10/17/23 1649)   carvediloL tablet 25 mg (25 mg Oral Given 10/19/23 1016)       Discharge Medication List as of 10/19/2023  2:28 PM           Follow-up Information       Leeann Parham DO Follow up in 1 week(s).    Specialty: Internal Medicine  Contact information:  [address] [place] [place] [address] [phone]               Cullen Hannon MD Follow up in 1 week(s).    Specialties: Cardiology, Internal Medicine  Contact information:  [address] [facility]  [place] [address]  [phone]               Agapito Isaac MD Follow up.    Specialty: Nephrology  Contact information:  [address] [facility] [address]  [phone]                                 Scribe Attestation:   Scribe #1: I performed the above scribed service and the documentation accurately describes the services  I performed. I attest to the accuracy of the note.     Attending:   Physician Attestation Statement for Scribe #1: I, Lionel Celaya Jr., MD, personally performed the services described in this documentation, as scribed by Branden Mckoy, in my presence, and it is both accurate and complete.       Scribe Attestation:   Scribe #2: I performed the above scribed service and the documentation accurately describes the services I performed. I attest to the accuracy of the note.    Attending Attestation:           Physician Attestation for Scribe:    Physician Attestation Statement for Scribe #2: I, Lionel Celaya Jr., MD, reviewed documentation, as scribed by Skye Chisholm in my presence, and it is both accurate and complete. I also acknowledge and confirm the content of the note done by Scribe #1.           Clinical Impression       ICD-10-CM ICD-9-CM   1. Hypertensive emergency  I16.1 401.9   2. Chest pain  R07.9 786.50   3. Acute renal failure, unspecified acute renal failure type  N17.9 584.9   4. Malignant hypertension  I10 401.0       Disposition:   Disposition: Admitted  Condition: Fair        Lionel Celaya Jr., MD  10/18/23 1155       Lionel Celaya Jr., MD  11/06/23 1553

## 2023-10-17 NOTE — HPI
Patient with known hx of uncontrolled HTN presents with left shoulder and arm pain. Worse on movement. She woke up with it. She uses several pillows to sleep and had these issues and neck pain before.   She lives alone and has psych issues. She claims that she took all her medications this am. She doesn't appear in distress and is able to answer questions. No SOB, N/V, Visual changes ot abdominal pain. No headache or palpitations.   Her SBP was 220 on arrival and she has received hydralazine times 2, labetalol and catapress. Her SBP is now in 190s and she was just started on cardene.

## 2023-10-17 NOTE — SUBJECTIVE & OBJECTIVE
Past Medical History:   Diagnosis Date    Acute CVA (cerebrovascular accident) 8/4/2023    Bipolar 1 disorder     CHF (congestive heart failure)     Depression     Hepatitis C     Hypertension     Other hyperlipidemia 8/15/2019       Past Surgical History:   Procedure Laterality Date    HYSTERECTOMY         Review of patient's allergies indicates:   Allergen Reactions    Tramadol Itching    Risperdal [risperidone] Other (See Comments)     Did not like way felt       Family History       Problem Relation (Age of Onset)    Heart attack Maternal Grandmother    Hypertension Mother          Tobacco Use    Smoking status: Some Days     Current packs/day: 0.25     Average packs/day: 0.3 packs/day for 7.8 years (1.9 ttl pk-yrs)     Types: Cigarettes     Start date: 1/11/2016    Smokeless tobacco: Never    Tobacco comments:     1 pack every 3 days   Substance and Sexual Activity    Alcohol use: Yes    Drug use: Yes     Types: Cocaine    Sexual activity: Not Currently         Review of Systems   All other systems reviewed and are negative.    Objective:     Vital Signs (Most Recent):  Temp: 99 °F (37.2 °C) (10/17/23 1434)  Pulse: 80 (10/17/23 1717)  Resp: 18 (10/17/23 1717)  BP: (!) 195/114 (10/17/23 1737)  SpO2: 100 % (10/17/23 1717) Vital Signs (24h Range):  Temp:  [99 °F (37.2 °C)] 99 °F (37.2 °C)  Pulse:  [76-87] 80  Resp:  [17-22] 18  SpO2:  [96 %-100 %] 100 %  BP: (183-230)/() 195/114     Weight: 70.3 kg (155 lb)  Body mass index is 27.46 kg/m².    No intake or output data in the 24 hours ending 10/17/23 1745     Physical Exam  Constitutional:       General: She is not in acute distress.     Appearance: She is normal weight. She is not ill-appearing, toxic-appearing or diaphoretic.   HENT:      Head: Normocephalic.      Mouth/Throat:      Mouth: Mucous membranes are moist.   Eyes:      Pupils: Pupils are equal, round, and reactive to light.   Cardiovascular:      Rate and Rhythm: Normal rate.      Pulses: Normal  pulses.   Pulmonary:      Effort: Pulmonary effort is normal.   Abdominal:      General: Abdomen is flat.   Skin:     General: Skin is warm.      Capillary Refill: Capillary refill takes less than 2 seconds.   Neurological:      General: No focal deficit present.      Mental Status: She is alert.          Vents:       Lines/Drains/Airways       Peripheral Intravenous Line  Duration                  Peripheral IV - Single Lumen 10/17/23 1507 20 G Right Antecubital <1 day                    Significant Labs:    CBC/Anemia Profile:  Recent Labs   Lab 10/17/23  1517   WBC 6.74   HGB 13.9   HCT 43.5      MCV 87   RDW 13.1        Chemistries:  Recent Labs   Lab 10/17/23  1517   *   K 4.9   *   CO2 22*   BUN 40*   CREATININE 3.0*   CALCIUM 9.1   ALBUMIN 4.1   PROT 7.9   BILITOT 0.4   ALKPHOS 129   ALT 10   AST 17       All pertinent labs within the past 24 hours have been reviewed.    Significant Imaging:   I have reviewed all pertinent imaging results/findings within the past 24 hours.

## 2023-10-18 PROBLEM — R53.1 LEFT-SIDED WEAKNESS: Status: ACTIVE | Noted: 2023-10-18

## 2023-10-18 LAB
ALBUMIN SERPL BCP-MCNC: 3.5 G/DL (ref 3.5–5.2)
AMPHET+METHAMPHET UR QL: NEGATIVE
ANION GAP SERPL CALC-SCNC: 11 MMOL/L (ref 8–16)
BARBITURATES UR QL SCN>200 NG/ML: NEGATIVE
BASOPHILS # BLD AUTO: 0.03 K/UL (ref 0–0.2)
BASOPHILS NFR BLD: 0.5 % (ref 0–1.9)
BENZODIAZ UR QL SCN>200 NG/ML: NEGATIVE
BUN SERPL-MCNC: 35 MG/DL (ref 8–23)
BZE UR QL SCN: NEGATIVE
CALCIUM SERPL-MCNC: 8.7 MG/DL (ref 8.7–10.5)
CANNABINOIDS UR QL SCN: NEGATIVE
CHLORIDE SERPL-SCNC: 112 MMOL/L (ref 95–110)
CO2 SERPL-SCNC: 19 MMOL/L (ref 23–29)
CREAT SERPL-MCNC: 2.4 MG/DL (ref 0.5–1.4)
CREAT UR-MCNC: 124.8 MG/DL (ref 15–325)
DIFFERENTIAL METHOD: ABNORMAL
EOSINOPHIL # BLD AUTO: 0.4 K/UL (ref 0–0.5)
EOSINOPHIL NFR BLD: 7 % (ref 0–8)
ERYTHROCYTE [DISTWIDTH] IN BLOOD BY AUTOMATED COUNT: 13.1 % (ref 11.5–14.5)
EST. GFR  (NO RACE VARIABLE): 21 ML/MIN/1.73 M^2
GLUCOSE SERPL-MCNC: 117 MG/DL (ref 70–110)
HCT VFR BLD AUTO: 40.8 % (ref 37–48.5)
HGB BLD-MCNC: 13 G/DL (ref 12–16)
IMM GRANULOCYTES # BLD AUTO: 0.02 K/UL (ref 0–0.04)
IMM GRANULOCYTES NFR BLD AUTO: 0.4 % (ref 0–0.5)
LYMPHOCYTES # BLD AUTO: 2.4 K/UL (ref 1–4.8)
LYMPHOCYTES NFR BLD: 43.7 % (ref 18–48)
MCH RBC QN AUTO: 27.8 PG (ref 27–31)
MCHC RBC AUTO-ENTMCNC: 31.9 G/DL (ref 32–36)
MCV RBC AUTO: 87 FL (ref 82–98)
METHADONE UR QL SCN>300 NG/ML: NEGATIVE
MONOCYTES # BLD AUTO: 0.6 K/UL (ref 0.3–1)
MONOCYTES NFR BLD: 11.4 % (ref 4–15)
NEUTROPHILS # BLD AUTO: 2.1 K/UL (ref 1.8–7.7)
NEUTROPHILS NFR BLD: 37 % (ref 38–73)
NRBC BLD-RTO: 0 /100 WBC
OPIATES UR QL SCN: NEGATIVE
PCP UR QL SCN>25 NG/ML: NEGATIVE
PHOSPHATE SERPL-MCNC: 3.2 MG/DL (ref 2.7–4.5)
PLATELET # BLD AUTO: 258 K/UL (ref 150–450)
PMV BLD AUTO: 9.6 FL (ref 9.2–12.9)
POTASSIUM SERPL-SCNC: 4.6 MMOL/L (ref 3.5–5.1)
PROT UR-MCNC: 31 MG/DL (ref 0–15)
PROT/CREAT UR: 0.25 MG/G{CREAT} (ref 0–0.2)
RBC # BLD AUTO: 4.68 M/UL (ref 4–5.4)
SODIUM SERPL-SCNC: 142 MMOL/L (ref 136–145)
SODIUM UR-SCNC: 83 MMOL/L (ref 20–250)
TOXICOLOGY INFORMATION: NORMAL
WBC # BLD AUTO: 5.54 K/UL (ref 3.9–12.7)

## 2023-10-18 PROCEDURE — 63600175 PHARM REV CODE 636 W HCPCS: Mod: HCNC | Performed by: NURSE PRACTITIONER

## 2023-10-18 PROCEDURE — 36415 COLL VENOUS BLD VENIPUNCTURE: CPT | Mod: HCNC | Performed by: NURSE PRACTITIONER

## 2023-10-18 PROCEDURE — 25000003 PHARM REV CODE 250: Mod: HCNC | Performed by: SPECIALIST

## 2023-10-18 PROCEDURE — 80069 RENAL FUNCTION PANEL: CPT | Mod: HCNC | Performed by: NURSE PRACTITIONER

## 2023-10-18 PROCEDURE — 84300 ASSAY OF URINE SODIUM: CPT | Mod: HCNC | Performed by: NURSE PRACTITIONER

## 2023-10-18 PROCEDURE — 25000003 PHARM REV CODE 250: Mod: HCNC | Performed by: NURSE PRACTITIONER

## 2023-10-18 PROCEDURE — 85025 COMPLETE CBC W/AUTO DIFF WBC: CPT | Mod: HCNC | Performed by: NURSE PRACTITIONER

## 2023-10-18 PROCEDURE — 99223 PR INITIAL HOSPITAL CARE,LEVL III: ICD-10-PCS | Mod: HCNC,,, | Performed by: PSYCHIATRY & NEUROLOGY

## 2023-10-18 PROCEDURE — 84156 ASSAY OF PROTEIN URINE: CPT | Mod: HCNC | Performed by: NURSE PRACTITIONER

## 2023-10-18 PROCEDURE — 80307 DRUG TEST PRSMV CHEM ANLYZR: CPT | Mod: HCNC | Performed by: NURSE PRACTITIONER

## 2023-10-18 PROCEDURE — 99223 1ST HOSP IP/OBS HIGH 75: CPT | Mod: HCNC,,, | Performed by: PSYCHIATRY & NEUROLOGY

## 2023-10-18 PROCEDURE — 21400001 HC TELEMETRY ROOM: Mod: HCNC

## 2023-10-18 RX ORDER — HYDRALAZINE HYDROCHLORIDE 20 MG/ML
10 INJECTION INTRAMUSCULAR; INTRAVENOUS EVERY 6 HOURS PRN
Status: DISCONTINUED | OUTPATIENT
Start: 2023-10-18 | End: 2023-10-19 | Stop reason: HOSPADM

## 2023-10-18 RX ORDER — CARVEDILOL 12.5 MG/1
25 TABLET ORAL 2 TIMES DAILY WITH MEALS
Status: DISCONTINUED | OUTPATIENT
Start: 2023-10-18 | End: 2023-10-19

## 2023-10-18 RX ORDER — LABETALOL HYDROCHLORIDE 5 MG/ML
10 INJECTION, SOLUTION INTRAVENOUS EVERY 6 HOURS PRN
Status: DISCONTINUED | OUTPATIENT
Start: 2023-10-18 | End: 2023-10-19 | Stop reason: HOSPADM

## 2023-10-18 RX ORDER — MUPIROCIN 20 MG/G
OINTMENT TOPICAL 2 TIMES DAILY
Status: DISCONTINUED | OUTPATIENT
Start: 2023-10-18 | End: 2023-10-19 | Stop reason: HOSPADM

## 2023-10-18 RX ADMIN — HEPARIN SODIUM 5000 UNITS: 5000 INJECTION INTRAVENOUS; SUBCUTANEOUS at 09:10

## 2023-10-18 RX ADMIN — HYDRALAZINE HYDROCHLORIDE 10 MG: 20 INJECTION, SOLUTION INTRAMUSCULAR; INTRAVENOUS at 04:10

## 2023-10-18 RX ADMIN — HEPARIN SODIUM 5000 UNITS: 5000 INJECTION INTRAVENOUS; SUBCUTANEOUS at 06:10

## 2023-10-18 RX ADMIN — HYDRALAZINE HYDROCHLORIDE 100 MG: 50 TABLET ORAL at 02:10

## 2023-10-18 RX ADMIN — HEPARIN SODIUM 5000 UNITS: 5000 INJECTION INTRAVENOUS; SUBCUTANEOUS at 02:10

## 2023-10-18 RX ADMIN — CARVEDILOL 25 MG: 12.5 TABLET, FILM COATED ORAL at 05:10

## 2023-10-18 RX ADMIN — LABETALOL HYDROCHLORIDE 10 MG: 5 INJECTION INTRAVENOUS at 11:10

## 2023-10-18 RX ADMIN — HYDRALAZINE HYDROCHLORIDE 100 MG: 50 TABLET ORAL at 06:10

## 2023-10-18 RX ADMIN — ZIPRASIDONE HYDROCHLORIDE 20 MG: 20 CAPSULE ORAL at 08:10

## 2023-10-18 RX ADMIN — CARVEDILOL 12.5 MG: 12.5 TABLET, FILM COATED ORAL at 07:10

## 2023-10-18 RX ADMIN — LOSARTAN POTASSIUM 100 MG: 50 TABLET, FILM COATED ORAL at 05:10

## 2023-10-18 RX ADMIN — FUROSEMIDE 20 MG: 20 TABLET ORAL at 08:10

## 2023-10-18 RX ADMIN — ASPIRIN 81 MG: 81 TABLET, COATED ORAL at 08:10

## 2023-10-18 RX ADMIN — MUPIROCIN: 20 OINTMENT TOPICAL at 08:10

## 2023-10-18 RX ADMIN — MUPIROCIN: 20 OINTMENT TOPICAL at 09:10

## 2023-10-18 RX ADMIN — ZIPRASIDONE HYDROCHLORIDE 20 MG: 20 CAPSULE ORAL at 09:10

## 2023-10-18 RX ADMIN — HYDRALAZINE HYDROCHLORIDE 100 MG: 50 TABLET ORAL at 09:10

## 2023-10-18 RX ADMIN — PRAVASTATIN SODIUM 40 MG: 20 TABLET ORAL at 09:10

## 2023-10-18 RX ADMIN — HYDRALAZINE HYDROCHLORIDE 10 MG: 20 INJECTION, SOLUTION INTRAMUSCULAR; INTRAVENOUS at 08:10

## 2023-10-18 RX ADMIN — ACETAMINOPHEN 650 MG: 325 TABLET ORAL at 06:10

## 2023-10-18 RX ADMIN — CLOPIDOGREL BISULFATE 75 MG: 75 TABLET ORAL at 08:10

## 2023-10-18 NOTE — ASSESSMENT & PLAN NOTE
Echo as of 8/23-   Left Ventricle: The left ventricle is normal in size. Moderately increased wall thickness. There is moderate concentrict hypertrophy. Normal wall motion. There is normal systolic function with a visually estimated ejection fraction of 55 - 70%. Grade I diastolic dysfunction.    Left Atrium: Left atrium is mildly dilated.    Right Ventricle: Normal right ventricular cavity size. Wall thickness is normal. Right ventricle wall motion  is normal. Systolic function is normal.    Mitral Valve: There is mild regurgitation.    Tricuspid Valve: There is mild regurgitation.    IVC/SVC: Intermediate venous pressure at 8 mmHg.    Bubble study negative  Continue home fluid pill, monitor oxygen saturation;

## 2023-10-18 NOTE — PROGRESS NOTES
Pt transferred to [place] [id number] at this time via wheelchair accompanied by RN. VSS. Ambulated from wheelchair to bed independently.

## 2023-10-18 NOTE — SUBJECTIVE & OBJECTIVE
ROS complete and negative unless stated in the interval HPI    Objective:     Vital Signs (Most Recent):  Temp: 97.9 °F (36.6 °C) (10/18/23 0705)  Pulse: 93 (10/18/23 0908)  Resp: (!) 38 (10/18/23 0908)  BP: (!) 181/128 (10/18/23 0905)  SpO2: 99 % (10/18/23 0908) Vital Signs (24h Range):  Temp:  [97.5 °F (36.4 °C)-99 °F (37.2 °C)] 97.9 °F (36.6 °C)  Pulse:  [72-93] 93  Resp:  [14-38] 38  SpO2:  [96 %-100 %] 99 %  BP: (127-230)/() 181/128     Weight: 70.2 kg (154 lb 12.2 oz)  Body mass index is 27.42 kg/m².      Intake/Output Summary (Last 24 hours) at 10/18/2023 1001  Last data filed at 10/18/2023 0800  Gross per 24 hour   Intake 709.92 ml   Output 300 ml   Net 409.92 ml        Physical Exam  Vitals reviewed.   Constitutional:       General: She is awake. She is not in acute distress.     Appearance: She is well-developed.   Cardiovascular:      Rate and Rhythm: Normal rate.      Pulses:           Radial pulses are 2+ on the right side and 2+ on the left side.   Pulmonary:      Effort: Pulmonary effort is normal.      Breath sounds: Normal breath sounds.      Comments: On RA  Abdominal:      General: There is no distension.      Palpations: Abdomen is soft.   Musculoskeletal:      Right lower leg: No edema.      Left lower leg: No edema.   Skin:     General: Skin is warm and dry.   Neurological:      Mental Status: She is alert and oriented to person, place, and time.      Comments: After initial exam this AM with with some left facial numbness/droop and tongue movements concerning for possible TD symptoms    Psychiatric:         Behavior: Behavior is cooperative.                    Lines/Drains/Airways       Peripheral Intravenous Line  Duration                  Peripheral IV - Single Lumen 10/17/23 20 G Anterior;Left;Proximal Forearm 1 day         Peripheral IV - Single Lumen 10/17/23 1507 20 G Right Antecubital <1 day                    Significant Labs:    CBC/Anemia Profile:  Recent Labs   Lab  10/17/23  1517 10/18/23  0343   WBC 6.74 5.54   HGB 13.9 13.0   HCT 43.5 40.8    258   MCV 87 87   RDW 13.1 13.1        Chemistries:  Recent Labs   Lab 10/17/23  1517 10/18/23  0343   * 142   K 4.9 4.6   * 112*   CO2 22* 19*   BUN 40* 35*   CREATININE 3.0* 2.4*   CALCIUM 9.1 8.7   ALBUMIN 4.1 3.5   PROT 7.9  --    BILITOT 0.4  --    ALKPHOS 129  --    ALT 10  --    AST 17  --    PHOS  --  3.2       All pertinent labs within the past 24 hours have been reviewed.    Significant Imaging:  I have reviewed all pertinent imaging results/findings within the past 24 hours.

## 2023-10-18 NOTE — ASSESSMENT & PLAN NOTE
- BP control as outlined elsewhere  - resuming home lasix  - monitor fluid status   - on RA  - echo with EF 55-70% and grade I diastolic failure

## 2023-10-18 NOTE — HOSPITAL COURSE
10/18: off cardene since 2230 10/17, SBP 180s this AM, with some left facial numbness this AM and tongue movements concerting for TD, getting stat CT head as pt with hx strokes

## 2023-10-18 NOTE — ASSESSMENT & PLAN NOTE
Her SBP was 220 on arrival and she has received hydralazine times 2, labetalol and catapress.  Eventually patient has been started on Cardene drip, initially admitted to ICU.      On 10/18- patient off of Cardene drip, home blood pressure medications resumed,;

## 2023-10-18 NOTE — PROGRESS NOTES
[facility] - Intensive Care ([facility])  Critical Care Medicine  Progress Note    Patient Name: Rose Milligan  MRN: [id number]  Admission Date: 10/17/2023  Hospital Length of Stay: 1 days  Code Status: Full Code  Attending Provider: Fletcher Walls MD  Primary Care Provider: Leeann Parham DO   Principal Problem: Malignant hypertension    Subjective:     HPI:  Patient with known hx of uncontrolled HTN presents with left shoulder and arm pain. Worse on movement. She woke up with it. She uses several pillows to sleep and had these issues and neck pain before.   She lives alone and has psych issues. She claims that she took all her medications this am. She doesn't appear in distress and is able to answer questions. No SOB, N/V, Visual changes ot abdominal pain. No headache or palpitations.   Her SBP was 220 on arrival and she has received hydralazine times 2, labetalol and catapress. Her SBP is now in 190s and she was just started on cardene.      Hospital/ICU Course:  10/18: off cardene since 2230 10/17, SBP 180s this AM, with some left facial numbness this AM and tongue movements concerting for TD, getting stat CT head as pt with hx strokes      ROS complete and negative unless stated in the interval HPI    Objective:     Vital Signs (Most Recent):  Temp: 97.9 °F (36.6 °C) (10/18/23 0705)  Pulse: 93 (10/18/23 0908)  Resp: (!) 38 (10/18/23 0908)  BP: (!) 181/128 (10/18/23 0905)  SpO2: 99 % (10/18/23 0908) Vital Signs (24h Range):  Temp:  [97.5 °F (36.4 °C)-99 °F (37.2 °C)] 97.9 °F (36.6 °C)  Pulse:  [72-93] 93  Resp:  [14-38] 38  SpO2:  [96 %-100 %] 99 %  BP: (127-230)/() 181/128     Weight: 70.2 kg (154 lb 12.2 oz)  Body mass index is 27.42 kg/m².      Intake/Output Summary (Last 24 hours) at 10/18/2023 1001  Last data filed at 10/18/2023 0800  Gross per 24 hour   Intake 709.92 ml   Output 300 ml   Net 409.92 ml        Physical Exam  Vitals reviewed.   Constitutional:       General: She is awake. She is  "not in acute distress.     Appearance: She is well-developed.   Cardiovascular:      Rate and Rhythm: Normal rate.      Pulses:           Radial pulses are 2+ on the right side and 2+ on the left side.   Pulmonary:      Effort: Pulmonary effort is normal.      Breath sounds: Normal breath sounds.      Comments: On RA  Abdominal:      General: There is no distension.      Palpations: Abdomen is soft.   Musculoskeletal:      Right lower leg: No edema.      Left lower leg: No edema.   Skin:     General: Skin is warm and dry.   Neurological:      Mental Status: She is alert and oriented to person, place, and time.      Comments: After initial exam this AM with with some left facial numbness/droop and tongue movements concerning for possible TD symptoms    Psychiatric:         Behavior: Behavior is cooperative.                    Lines/Drains/Airways       Peripheral Intravenous Line  Duration                  Peripheral IV - Single Lumen 10/17/23 20 G Anterior;Left;Proximal Forearm 1 day         Peripheral IV - Single Lumen 10/17/23 1507 20 G Right Antecubital <1 day                    Significant Labs:    CBC/Anemia Profile:  Recent Labs   Lab 10/17/23  1517 10/18/23  0343   WBC 6.74 5.54   HGB 13.9 13.0   HCT 43.5 40.8    258   MCV 87 87   RDW 13.1 13.1        Chemistries:  Recent Labs   Lab 10/17/23  1517 10/18/23  0343   * 142   K 4.9 4.6   * 112*   CO2 22* 19*   BUN 40* 35*   CREATININE 3.0* 2.4*   CALCIUM 9.1 8.7   ALBUMIN 4.1 3.5   PROT 7.9  --    BILITOT 0.4  --    ALKPHOS 129  --    ALT 10  --    AST 17  --    PHOS  --  3.2       All pertinent labs within the past 24 hours have been reviewed.    Significant Imaging:  I have reviewed all pertinent imaging results/findings within the past 24 hours.      ABG  No results for input(s): "PH", "PO2", "PCO2", "HCO3", "BE" in the last 168 hours.  Assessment/Plan:     Neuro  Left thalamic infarction  - recent left thalamic infarct in August 2023  - " with left facial numbness/droop this AM that is intermittent per pt and daughter  - CT head unremarkable  - consult neuro for eval and recs  - continue home ASA, plavix, statin, and BP meds  - serial neuro checks    Cerebral aneurysm, nonruptured  - known MCA Bifurcation aneurysm  - BP control    Psychiatric  Bipolar disorder  - chronic issue, continue home Geodon  - ? compliance  - ? s/s TD      Cardiac/Vascular  * Malignant hypertension  - off cardene infusion since last night  - resumed home meds with increased dose of coreg  - PRN hydralazine and labetalol available  - keep SBP < 180mmHg, with gradual reduction   - reports med compliance, UDS this admit negative     Chronic heart failure with preserved ejection fraction  - BP control as outlined elsewhere  - resuming home lasix  - monitor fluid status   - on RA  - echo with EF 55-70% and grade I diastolic failure     Other hyperlipidemia  - statin     Renal/  Acute renal failure superimposed on stage 4 chronic kidney disease  - creatinine better with good UOP  - monitor UOP, RFP, and lytes  - renally dose meds as able     GI  Chronic hepatitis C virus infection  - chronic issue    Other  Tobacco abuse counseling  - affecting medical decision making and complicating the above   - cessation education     Prophylaxis Measures:  GI ppx: Oral Diet  VTE ppx: Heparin  Glucose control: Monitor blood glucose    Code Status: Full Code    Patient stable for care outside of the ICU setting. [facility] [place] Critical Care team will sign off at this time as patient's critical care issues have resolved and patient is appropriate for ongoing management outside of the ICU setting. Please call if the patient's condition should change and warrant reevaluation.      Ricki Covington NP  Critical Care Medicine  [facility] - Intensive Care ([facility])

## 2023-10-18 NOTE — PLAN OF CARE
Problem: Adult Inpatient Plan of Care  Goal: Plan of Care Review  Outcome: Ongoing, Progressing  Goal: Patient-Specific Goal (Individualized)  Outcome: Ongoing, Progressing  Goal: Absence of Hospital-Acquired Illness or Injury  Outcome: Ongoing, Progressing  Goal: Optimal Comfort and Wellbeing  Outcome: Ongoing, Progressing  Goal: Readiness for Transition of Care  Outcome: Ongoing, Progressing     Problem: Fluid and Electrolyte Imbalance (Acute Kidney Injury/Impairment)  Goal: Fluid and Electrolyte Balance  Outcome: Ongoing, Progressing     Problem: Oral Intake Inadequate (Acute Kidney Injury/Impairment)  Goal: Optimal Nutrition Intake  Outcome: Ongoing, Progressing     Problem: Renal Function Impairment (Acute Kidney Injury/Impairment)  Goal: Effective Renal Function  Outcome: Ongoing, Progressing     Problem: Skin Injury Risk Increased  Goal: Skin Health and Integrity  Outcome: Ongoing, Progressing     Patient remained free of falls/injury/trauma throughout shift. Patient AAOx4. Neuro status unchanged. VSS. Cardene gtt D/C'd at 2216. No complaints of chest pain or SOB. Patient continues to ambulate to the toilet with assistance. Urine sample collected and sent to lab. Fall risk precautions reviewed and maintained with patient. POC reviewed with patient. Patient verbalized understanding.

## 2023-10-18 NOTE — ASSESSMENT & PLAN NOTE
Likely secondary to Malignant hypertension with transient neurological symptoms  CT head as of 10/18/2020 3-   Neurology on board, recommended to monitor, recommended outpatient follow up with Neurosurgery, blood pressure control, dual antiplatelet therapy, statin

## 2023-10-18 NOTE — CONSULTS
[facility] [facility]  [facility] Medicine  Consult Note    Patient Name: Rose Milligan  MRN: [id number]  Admission Date: 10/17/2023  Hospital Length of Stay: 1 days  Attending Physician: Ignacio Red,*   Primary Care Provider: Leeann Parham DO           Patient information was obtained from patient and ER records.     Consults  Subjective:     Principal Problem: Malignant hypertension    Chief Complaint: No chief complaint on file.       HPI: Patient with known hx of uncontrolled HTN presents with left shoulder and arm pain. Worse on movement. She woke up with it. She uses several pillows to sleep and had these issues and neck pain before.   She lives alone and has psych issues. She claims that she took all her medications this am. She doesn't appear in distress and is able to answer questions. No SOB, N/V, Visual changes ot abdominal pain. No headache or palpitations.   Her SBP was 220 on arrival and she has received hydralazine times 2, labetalol and catapress. Her SBP is now in 190s and she was just started on cardene.    Hospital course      Patient with known hx of uncontrolled HTN presents with left shoulder and arm pain. Worse on movement. She woke up with it. She uses several pillows to sleep and had these issues and neck pain before.   She lives alone and has psych issues. She claims that she took all her medications this am. She doesn't appear in distress and is able to answer questions. No SOB, N/V, Visual changes ot abdominal pain. No headache or palpitations.   Her SBP was 220 on arrival and she has received hydralazine times 2, labetalol and catapress.  Eventually patient has been started on Cardene drip, initially admitted to ICU.      On 10/18- patient off of Cardene drip, home blood pressure medications resumed,; complaints of left-sided facial numbness,- CT head No acute intracranial abnormality.  Neurology evaluated-  MRI/MRA brain done 07/19/2023 was abnormal showing a  punctate left thalamic infarct, small vessel vascular disease, and a 0.5 cm right MCA bifurcation aneurysm.  She has scheduled neurosurgery appointment later this month. Pt denied any headache, dizziness, vision changes at this time; current symptoms likely - Malignant hypertension with transient neurological symptoms; recommended to current medications, dual antiplatelets.  Monitor blood pressure.  Patient deemed stable for downgrade per ICU team on 10/18, hospital medicine consulted to assume care  Past Medical History:   Diagnosis Date    Acute CVA (cerebrovascular accident) 8/4/2023    Bipolar 1 disorder     CHF (congestive heart failure)     Depression     Hepatitis C     Hypertension     Other hyperlipidemia 8/15/2019       Past Surgical History:   Procedure Laterality Date    HYSTERECTOMY         Review of patient's allergies indicates:   Allergen Reactions    Tramadol Itching    Risperdal [risperidone] Other (See Comments)     Did not like way felt       No current facility-administered medications on file prior to encounter.     Current Outpatient Medications on File Prior to Encounter   Medication Sig    aspirin (ECOTRIN) 81 MG EC tablet Take 1 tablet (81 mg total) by mouth once daily.    carvediloL (COREG) 12.5 MG tablet Take 1 tablet (12.5 mg total) by mouth 2 (two) times daily.    furosemide (LASIX) 40 MG tablet Take 0.5 tablets (20 mg total) by mouth once daily.    hydrALAZINE (APRESOLINE) 100 MG tablet Take 1 tablet (100 mg total) by mouth every 8 (eight) hours.    hydrOXYzine (ATARAX) 50 MG tablet Take 1 tablet (50 mg total) by mouth 2 (two) times daily as needed for Anxiety.    irbesartan (AVAPRO) 150 MG tablet Take 2 tablets (300 mg total) by mouth every evening.    nicotine (NICODERM CQ) 21 mg/24 hr Place 1 patch onto the skin once daily.    pravastatin (PRAVACHOL) 40 MG tablet Take 1 tablet (40 mg total) by mouth every evening.    ziprasidone (GEODON) 20 MG Cap Take 1 capsule  (20 mg total) by mouth 2 (two) times daily.    cloNIDine 0.2 mg/24 hr td ptwk (CATAPRES) 0.2 mg/24 hr Place 1 patch onto the skin every 7 days. (Patient taking differently: Place 1 patch onto the skin every 7 days. (Saturdays))    clopidogreL (PLAVIX) 75 mg tablet Take 1 tablet (75 mg total) by mouth once daily. for 20 days    tretinoin (RETIN-A) 0.025 % cream Apply topically every evening. Pea sized amount to entire face at night. Start out using every other night for 2 weeks, then increase to every night as tolerated     Family History       Problem Relation (Age of Onset)    Heart attack Maternal Grandmother    Hypertension Mother          Tobacco Use    Smoking status: Some Days     Current packs/day: 0.25     Average packs/day: 0.3 packs/day for 7.8 years (1.9 ttl pk-yrs)     Types: Cigarettes     Start date: 1/11/2016    Smokeless tobacco: Never    Tobacco comments:     1 pack every 3 days   Substance and Sexual Activity    Alcohol use: Yes    Drug use: Yes     Types: Cocaine    Sexual activity: Not Currently     Review of Systems  Constitutional:  Negative for chills and fever.   HENT:  Negative for sore throat.    Respiratory:  Negative for shortness of breath.    Cardiovascular:  denied chest pain   Gastrointestinal:  Negative for diarrhea, nausea and vomiting.   Genitourinary:  Negative for dysuria.   Musculoskeletal:  Negative for back pain.   Skin:  Negative for rash.   Neurological:  left sided weakness   Hematological:  Does not bruise/bleed easily.     Objective:     Vital Signs (Most Recent):  Temp: 97.5 °F (36.4 °C) (10/18/23 1200)  Pulse: 85 (10/18/23 1349)  Resp: (!) 24 (10/18/23 1200)  BP: (!) 162/91 (10/18/23 1230)  SpO2: 99 % (10/18/23 1200) Vital Signs (24h Range):  Temp:  [97.5 °F (36.4 °C)-99 °F (37.2 °C)] 97.5 °F (36.4 °C)  Pulse:  [72-93] 85  Resp:  [14-45] 24  SpO2:  [96 %-100 %] 99 %  BP: (127-230)/() 162/91     Weight: 70.2 kg (154 lb 12.2 oz)  Body mass index is 27.42  kg/m².     Physical Exam       Constitutional: Patient is in no acute distress. Well-developed and well-nourished.  Head: Atraumatic. Normocephalic.  Eyes:  EOM intact.  No scleral icterus.  ENT: Mucous membranes are moist.  Nares clear   Neck:  Full ROM. No JVD.  Cardiovascular: Regular rate. Regular rhythm No murmurs, rubs, or gallops. Distal pulses are 2+ and symmetric  Pulmonary/Chest: No respiratory distress. Clear to auscultation bilaterally. No wheezing or rales.  Equal chest wall rise bilaterally  Abdominal: Soft and non-distended.  There is no tenderness.  No rebound, guarding, or rigidity. Good bowel sounds.  Genitourinary: No CVA tenderness.  No suprapubic tenderness  Musculoskeletal: Moves all extremities. No obvious deformities.  5 x 5 strength in all extremities   Skin: Warm and dry.  Neurological:  Alert, awake, and appropriate.  Normal speech.  No acute focal neurological deficits are appreciated.  Two through 12 intact bilaterally.  Psychiatric: Normal affect. Good eye contact. Appropriate in content. .  Significant Labs: All pertinent labs within the past 24 hours have been reviewed.  CBC:   Recent Labs   Lab 10/17/23  1517 10/18/23  0343   WBC 6.74 5.54   HGB 13.9 13.0   HCT 43.5 40.8    258     CMP:   Recent Labs   Lab 10/17/23  1517 10/18/23  0343   * 142   K 4.9 4.6   * 112*   CO2 22* 19*   GLU 89 117*   BUN 40* 35*   CREATININE 3.0* 2.4*   CALCIUM 9.1 8.7   PROT 7.9  --    ALBUMIN 4.1 3.5   BILITOT 0.4  --    ALKPHOS 129  --    AST 17  --    ALT 10  --    ANIONGAP 12 11       Significant Imaging:     Imaging Results              X-Ray Chest AP Portable (Final result)  Result time 10/17/23 15:37:22      Final result by Kaity Stone MD (10/17/23 15:37:22)                   Impression:      No active finding      Electronically signed by: Kaity Stone  Date:    10/17/2023  Time:    15:37               Narrative:    EXAMINATION:  XR CHEST AP PORTABLE    CLINICAL  HISTORY:  Chest Pain;    TECHNIQUE:  Single frontal portable view of the chest was performed.    COMPARISON:  08/15/2019    FINDINGS:  Lungs well aerated.  No pleural effusion or convincing pneumothorax.  No shift of the mediastinum                                       Assessment/Plan:     * Malignant hypertension  Her SBP was 220 on arrival and she has received hydralazine times 2, labetalol and catapress.  Eventually patient has been started on Cardene drip, initially admitted to ICU.      On 10/18- patient off of Cardene drip, home blood pressure medications resumed,;          Left-sided weakness  Likely secondary to Malignant hypertension with transient neurological symptoms  CT head as of 10/18/2020 3-   Neurology on board, recommended to monitor, recommended outpatient follow up with Neurosurgery, blood pressure control, dual antiplatelet therapy, statin      Tobacco abuse counseling  Emphasized on tobacco cessation      Bipolar disorder  Resume home medication      Acute renal failure superimposed on stage 4 chronic kidney disease  Creatinine gradually trending down   Avoid nephrotoxins  Renal dose medication    Cerebral aneurysm, nonruptured  Recommended outpatient follow up with Neurosurgery as per Neurology  Monitor blood pressure      Chronic heart failure with preserved ejection fraction  Echo as of 8/23-   Left Ventricle: The left ventricle is normal in size. Moderately increased wall thickness. There is moderate concentrict hypertrophy. Normal wall motion. There is normal systolic function with a visually estimated ejection fraction of 55 - 70%. Grade I diastolic dysfunction.    Left Atrium: Left atrium is mildly dilated.    Right Ventricle: Normal right ventricular cavity size. Wall thickness is normal. Right ventricle wall motion  is normal. Systolic function is normal.    Mitral Valve: There is mild regurgitation.    Tricuspid Valve: There is mild regurgitation.    IVC/SVC: Intermediate  venous pressure at 8 mmHg.    Bubble study negative  Continue home fluid pill, monitor oxygen saturation;       Other hyperlipidemia  Continue home medication      Chronic hepatitis C virus infection  Recommend outpatient follow up        VTE Risk Mitigation (From admission, onward)         Ordered     heparin (porcine) injection 5,000 Units  Every 8 hours         10/17/23 1724     IP VTE HIGH RISK PATIENT  Once         10/17/23 1724     Place sequential compression device  Until discontinued         10/17/23 1724                    Thank you for your consult. I will follow-up with patient. Please contact us if you have any additional questions.    Ignacio Red MD  Department of Hospital Medicine   [facility] - Tele[facility] ([facility])

## 2023-10-18 NOTE — CONSULTS
[facility] - Tele[facility] ([facility])  Neurology  Consult Note    Patient Name: Rose Milligan  MRN: [id number]  Admission Date: 10/17/2023  Hospital Length of Stay: 1 days  Code Status: Full Code   Attending Provider: Fletcher Walls MD   Consulting Provider: Dario Vazquez MD  Primary Care Physician: Leeann Parham DO  Principal Problem:Malignant hypertension    Inpatient consult to Neurology  Consult performed by: Dario Vazquez MD  Consult ordered by: Ricki Covington NP        Subjective:     Chief Complaint:  Left shoulder and arm pain, uncontrolled HTN     HPI: The patient is right handed.  She apparently had a transient complaint of left arm and hand numbness the morning after admission to the ICU for management of her HTN.  CT scan brain  today shows only evidence of small vessel vascular disease without acute change or hemorrhage.  At the time of this consult, the patient is asymptomatic.    Review of past medical records indicates that MRI/MRA brain done 07/19/2023 was abnormal showing a punctate left thalamic infarct, small vessel vascular disease, and a 0.5 cm right MCA bifurcation aneurysm.  She has scheduled neurosurgery appointment later this month.    She denies any headache, dizziness, vision changes at this time.  Her blood pressure has returned to normal levels.    Note labs which reveal evidence of chronic renal disease.    Past Medical History:   Diagnosis Date    Acute CVA (cerebrovascular accident) 8/4/2023    Bipolar 1 disorder     CHF (congestive heart failure)     Depression     Hepatitis C     Hypertension     Other hyperlipidemia 8/15/2019       Past Surgical History:   Procedure Laterality Date    HYSTERECTOMY         Review of patient's allergies indicates:   Allergen Reactions    Tramadol Itching    Risperdal [risperidone] Other (See Comments)     Did not like way felt       Current Neurological Medications: see list below    No current facility-administered medications on  file prior to encounter.     Current Outpatient Medications on File Prior to Encounter   Medication Sig    aspirin (ECOTRIN) 81 MG EC tablet Take 1 tablet (81 mg total) by mouth once daily.    carvediloL (COREG) 12.5 MG tablet Take 1 tablet (12.5 mg total) by mouth 2 (two) times daily.    furosemide (LASIX) 40 MG tablet Take 0.5 tablets (20 mg total) by mouth once daily.    hydrALAZINE (APRESOLINE) 100 MG tablet Take 1 tablet (100 mg total) by mouth every 8 (eight) hours.    hydrOXYzine (ATARAX) 50 MG tablet Take 1 tablet (50 mg total) by mouth 2 (two) times daily as needed for Anxiety.    irbesartan (AVAPRO) 150 MG tablet Take 2 tablets (300 mg total) by mouth every evening.    nicotine (NICODERM CQ) 21 mg/24 hr Place 1 patch onto the skin once daily.    pravastatin (PRAVACHOL) 40 MG tablet Take 1 tablet (40 mg total) by mouth every evening.    ziprasidone (GEODON) 20 MG Cap Take 1 capsule (20 mg total) by mouth 2 (two) times daily.    cloNIDine 0.2 mg/24 hr td ptwk (CATAPRES) 0.2 mg/24 hr Place 1 patch onto the skin every 7 days. (Patient taking differently: Place 1 patch onto the skin every 7 days. (Saturdays))    clopidogreL (PLAVIX) 75 mg tablet Take 1 tablet (75 mg total) by mouth once daily. for 20 days    tretinoin (RETIN-A) 0.025 % cream Apply topically every evening. Pea sized amount to entire face at night. Start out using every other night for 2 weeks, then increase to every night as tolerated      Family History       Problem Relation (Age of Onset)    Heart attack Maternal Grandmother    Hypertension Mother          Tobacco Use    Smoking status: Some Days     Current packs/day: 0.25     Average packs/day: 0.3 packs/day for 7.8 years (1.9 ttl pk-yrs)     Types: Cigarettes     Start date: 1/11/2016    Smokeless tobacco: Never    Tobacco comments:     1 pack every 3 days   Substance and Sexual Activity    Alcohol use: Yes    Drug use: Yes     Types: Cocaine    Sexual activity: Not Currently     Review of  Systems    ROS:  GENERAL: No fever, chills, fatigability or weight loss.  SKIN: No rashes, itching or changes in color or texture of skin.  HEAD: See comments in present illness  EYES: Visual acuity fine. No photophobia, ocular pain or diplopia.  EARS: Denies ear pain, discharge or vertigo.  NOSE: No loss of smell, no epistaxis or postnasal drip.  MOUTH & THROAT: No hoarseness or change in voice. No excessive gum bleeding.  NODES: Denies swollen glands.  CHEST: Denies AUGUSTE, cyanosis, wheezing, cough and sputum production.  CARDIOVASCULAR: Denies chest pain, PND, orthopnea or reduced exercise tolerance.  ABDOMEN: Appetite fine. No weight loss. Denies diarrhea, abdominal pain, hematemesis or blood in stool.  URINARY: No flank pain, dysuria or hematuria.  PERIPHERAL VASCULAR: No claudication or cyanosis.  MUSCULOSKELETAL: No joint stiffness or swelling. Denies back pain.  NEUROLOGIC: No history of seizures, paralysis, alteration of gait or coordination.     Objective:     Vital Signs (Most Recent):  Temp: 98.9 °F (37.2 °C) (10/18/23 1105)  Pulse: 78 (10/18/23 1121)  Resp: (!) 28 (10/18/23 1121)  BP: (!) 174/89 (10/18/23 1113)  SpO2: 99 % (10/18/23 1121) Vital Signs (24h Range):  Temp:  [97.5 °F (36.4 °C)-99 °F (37.2 °C)] 98.9 °F (37.2 °C)  Pulse:  [72-93] 78  Resp:  [14-45] 28  SpO2:  [96 %-100 %] 99 %  BP: (127-230)/() 174/89     Weight: 70.2 kg (154 lb 12.2 oz)  Body mass index is 27.42 kg/m².    Physical Exam  APPEARANCE: Well nourished, well developed, in no acute distress as she sits in chair by side of hospital bed, talking on her cell phone.  HEAD: Normocephalic, atraumatic.  EYES: PERRL. EOMI.  Non-icteric sclerae.     NOSE: Mucosa pink. Airway clear.  MOUTH & THROAT: Mucous membranes moist.    NECK: Supple. No bruits.  CHEST: Lungs clear to auscultation.  CARDIOVASCULAR: Regular rhythm without significant murmurs.  MUSCULOSKELETAL:  No bony deformity seen.   NEUROLOGIC:   Mental Status:  The patient is  well oriented to person, time, place, and situation.  The patient is attentive to the environment and cooperative for the exam.  Cranial Nerves: II-XII grossly intact. Fundoscopic exam is normal.  No hemorrhage, exudate or papilledema is present. The extraocular muscles are intact in the cardinal directions of gaze.  No ptosis is present. Facial features are symmetrical.  Speech is normal in fluency, diction, and phrasing.  Tongue protrudes in the midline.    Gait and Station:   Good alternate armswing with normal gait.  Motor:  No downdrift of either arm when held at shoulder level.  Manual muscle testing of proximal and distal muscles of both upper and lower extremities is normal. Muscle mass and muscle tone are normal both upper and both lower extremities.  Sensory:  Intact both upper and lower extremities to pin prick, touch, and vibration.  Cerebellar:  Finger to nose done well.  Alternating movements intact.  No involuntary movements or tremor seen.  Reflexes:  Stretch reflexes are 2+ both upper and lower extremities.  Plantar stimulation is flexor bilaterally and no pathological reflexes are seen           Significant Labs: CBC:   Recent Labs   Lab 10/17/23  1517 10/18/23  0343   WBC 6.74 5.54   HGB 13.9 13.0   HCT 43.5 40.8    258     CMP:   Recent Labs   Lab 10/17/23  1517 10/18/23  0343   GLU 89 117*   * 142   K 4.9 4.6   * 112*   CO2 22* 19*   BUN 40* 35*   CREATININE 3.0* 2.4*   CALCIUM 9.1 8.7   PROT 7.9  --    ALBUMIN 4.1 3.5   BILITOT 0.4  --    ALKPHOS 129  --    AST 17  --    ALT 10  --    ANIONGAP 12 11     All pertinent lab results from the past 24 hours have been reviewed.    Significant Imaging: I have reviewed all pertinent imaging results/findings within the past 24 hours.    Assessment and Plan:     ASSESSMENT   Malignant hypertension with transient neurological symptoms  Right MCA aneurysm  Diffuse atherosclerotic cerebrovascular disease    RECOMMENDATIONS   Keep  appointment, already scheduled, with neurosurgery  Monitor blood pressure closely.  Patient advised and voiced understanding of need to keep blood pressure down.  Continue home medications    Active Diagnoses:    Diagnosis Date Noted POA    PRINCIPAL PROBLEM:  Malignant hypertension [I10] 10/17/2023 Yes    Chronic heart failure with preserved ejection fraction [I50.32] 10/17/2023 Yes    Cerebral aneurysm, nonruptured [I67.1] 10/17/2023 Yes    Acute renal failure superimposed on stage 4 chronic kidney disease [N17.9, N18.4] 10/17/2023 Yes    Left thalamic infarction [I63.81] 10/17/2023 Yes    Bipolar disorder [F31.9] 10/17/2023 Yes    Tobacco abuse counseling [Z71.6] 10/17/2023 Not Applicable    Other hyperlipidemia [E78.49] 08/15/2019 Yes    Chronic hepatitis C virus infection [B18.2] 08/09/2018 Yes      Problems Resolved During this Admission:       VTE Risk Mitigation (From admission, onward)           Ordered     heparin (porcine) injection 5,000 Units  Every 8 hours         10/17/23 1724     IP VTE HIGH RISK PATIENT  Once         10/17/23 1724     Place sequential compression device  Until discontinued         10/17/23 1724                    Thank you for your consult. I will sign off. Please contact us if you have any additional questions.    Dario Vazquez MD  Neurology  [facility] - Telemetry ([facility])

## 2023-10-18 NOTE — ASSESSMENT & PLAN NOTE
- off cardene infusion since last night  - resumed home meds with increased dose of coreg  - PRN hydralazine and labetalol available  - keep SBP < 180mmHg, with gradual reduction   - reports med compliance, UDS this admit negative

## 2023-10-18 NOTE — ASSESSMENT & PLAN NOTE
- recent left thalamic infarct in August 2023  - with left facial numbness/droop this AM that is intermittent per pt and daughter  - CT head unremarkable  - consult neuro for eval and recs  - continue home ASA, plavix, statin, and BP meds  - serial neuro checks

## 2023-10-18 NOTE — PLAN OF CARE
[facility] - Intensive Care (Hospital)  Initial Discharge Assessment       Primary Care Provider: Leeann Parham DO    Admission Diagnosis: Malignant hypertension [I10]  Chest pain [R07.9]  Hypertensive emergency [I16.1]  Acute renal failure, unspecified acute renal failure type [N17.9]    Admission Date: 10/17/2023  Expected Discharge Date:     Transition of Care Barriers: Mental illness    Payor: [employer] MANAGED MEDICARE / Plan: [employer] MEDICARE HMO / Product Type: Capitation /     Extended Emergency Contact Information  Primary Emergency Contact: Marysol Samuel  Address: [address] [place] [address] [facility]  Home Phone: [phone]  Mobile Phone: [phone]  Relation: Grandchild  Secondary Emergency Contact: dixie dye  Mobile Phone: [phone]  Relation: Sister   needed? No    Discharge Plan A: Home with [facility] - [place], [place] - [address]  [address] [address]  Phone: [phone] Fax: [phone]    [facility] #[id number] - [place], [place] - [address] [facility]  [address] [place] [address]  Phone: [phone] Fax: [phone]    [id number] - [place] - [address]  [address]  [place] [address]  Phone: [phone] Fax: [phone]      Initial Assessment (most recent)       Adult Discharge Assessment - 10/18/23 1108          Discharge Assessment    Assessment Type Discharge Planning Assessment     Confirmed/corrected address, phone number and insurance Yes     Confirmed Demographics Correct on Facesheet     Source of Information patient     Communicated MIKI with patient/caregiver Date not available/Unable to determine     Reason For Admission Malignant hypertension     People in Home alone     Facility Arrived From: home     Do you expect to return to your current living situation? Yes     Do you have help at home or  someone to help you manage your care at home? Yes     Who are your caregiver(s) and their phone number(s)? sister, grandchild     Prior to hospitilization cognitive status: Alert/Oriented     Current cognitive status: Alert/Oriented     Home Accessibility wheelchair accessible     Home Layout Able to live on 1st floor     Equipment Currently Used at Home none     Readmission within 30 days? No     Patient currently being followed by outpatient case management? No     Do you currently have service(s) that help you manage your care at home? No     Do you take prescription medications? Yes     Do you have prescription coverage? Yes     Coverage MCR     Do you have any problems affording any of your prescribed medications? No     Is the patient taking medications as prescribed? yes     Who is going to help you get home at discharge? Family     How do you get to doctors appointments? car, drives self     Are you on dialysis? No     Do you take coumadin? No     DME Needed Upon Discharge  none     Discharge Plan discussed with: Patient     Transition of Care Barriers Mental illness     Discharge Plan A Home with family                   Anticipated DC dispo: home with family   Prior Level of Function: independent with ADLs  People in home: lives alone     Comments:  CM met with patient and family at bedside to introduce role and discuss discharge planning. Family will be help at home and can provide transport at time of discharge. Confirmed demographics, insurance, and emergency contacts. CM discharge needs depends on hospital progress. CM will continue following to assist with other needs.

## 2023-10-18 NOTE — PLAN OF CARE
Admitted to room 214. Prn antihypertensive meds given. Updated patient on plan of care. Instructed patient to use call light for assistance, call light in reach. Hourly rounding performed. Vitals q4 hours. Education provided, questions answered/encouraged. Chart check complete. Sinus rhythm on tele box #[id number]  Problem: Adult Inpatient Plan of Care  Goal: Plan of Care Review  Outcome: Ongoing, Progressing

## 2023-10-18 NOTE — SUBJECTIVE & OBJECTIVE
Past Medical History:   Diagnosis Date    Acute CVA (cerebrovascular accident) 8/4/2023    Bipolar 1 disorder     CHF (congestive heart failure)     Depression     Hepatitis C     Hypertension     Other hyperlipidemia 8/15/2019       Past Surgical History:   Procedure Laterality Date    HYSTERECTOMY         Review of patient's allergies indicates:   Allergen Reactions    Tramadol Itching    Risperdal [risperidone] Other (See Comments)     Did not like way felt       No current facility-administered medications on file prior to encounter.     Current Outpatient Medications on File Prior to Encounter   Medication Sig    aspirin (ECOTRIN) 81 MG EC tablet Take 1 tablet (81 mg total) by mouth once daily.    carvediloL (COREG) 12.5 MG tablet Take 1 tablet (12.5 mg total) by mouth 2 (two) times daily.    furosemide (LASIX) 40 MG tablet Take 0.5 tablets (20 mg total) by mouth once daily.    hydrALAZINE (APRESOLINE) 100 MG tablet Take 1 tablet (100 mg total) by mouth every 8 (eight) hours.    hydrOXYzine (ATARAX) 50 MG tablet Take 1 tablet (50 mg total) by mouth 2 (two) times daily as needed for Anxiety.    irbesartan (AVAPRO) 150 MG tablet Take 2 tablets (300 mg total) by mouth every evening.    nicotine (NICODERM CQ) 21 mg/24 hr Place 1 patch onto the skin once daily.    pravastatin (PRAVACHOL) 40 MG tablet Take 1 tablet (40 mg total) by mouth every evening.    ziprasidone (GEODON) 20 MG Cap Take 1 capsule (20 mg total) by mouth 2 (two) times daily.    cloNIDine 0.2 mg/24 hr td ptwk (CATAPRES) 0.2 mg/24 hr Place 1 patch onto the skin every 7 days. (Patient taking differently: Place 1 patch onto the skin every 7 days. (Saturdays))    clopidogreL (PLAVIX) 75 mg tablet Take 1 tablet (75 mg total) by mouth once daily. for 20 days    tretinoin (RETIN-A) 0.025 % cream Apply topically every evening. Pea sized amount to entire face at night. Start out using every other night for 2 weeks, then increase to every night as tolerated      Family History       Problem Relation (Age of Onset)    Heart attack Maternal Grandmother    Hypertension Mother          Tobacco Use    Smoking status: Some Days     Current packs/day: 0.25     Average packs/day: 0.3 packs/day for 7.8 years (1.9 ttl pk-yrs)     Types: Cigarettes     Start date: 1/11/2016    Smokeless tobacco: Never    Tobacco comments:     1 pack every 3 days   Substance and Sexual Activity    Alcohol use: Yes    Drug use: Yes     Types: Cocaine    Sexual activity: Not Currently     Review of Systems  Constitutional:  Negative for chills and fever.   HENT:  Negative for sore throat.    Respiratory:  Negative for shortness of breath.    Cardiovascular:  denied chest pain   Gastrointestinal:  Negative for diarrhea, nausea and vomiting.   Genitourinary:  Negative for dysuria.   Musculoskeletal:  Negative for back pain.   Skin:  Negative for rash.   Neurological:  left sided weakness   Hematological:  Does not bruise/bleed easily.     Objective:     Vital Signs (Most Recent):  Temp: 97.5 °F (36.4 °C) (10/18/23 1200)  Pulse: 85 (10/18/23 1349)  Resp: (!) 24 (10/18/23 1200)  BP: (!) 162/91 (10/18/23 1230)  SpO2: 99 % (10/18/23 1200) Vital Signs (24h Range):  Temp:  [97.5 °F (36.4 °C)-99 °F (37.2 °C)] 97.5 °F (36.4 °C)  Pulse:  [72-93] 85  Resp:  [14-45] 24  SpO2:  [96 %-100 %] 99 %  BP: (127-230)/() 162/91     Weight: 70.2 kg (154 lb 12.2 oz)  Body mass index is 27.42 kg/m².     Physical Exam       Constitutional: Patient is in no acute distress. Well-developed and well-nourished.  Head: Atraumatic. Normocephalic.  Eyes:  EOM intact.  No scleral icterus.  ENT: Mucous membranes are moist.  Nares clear   Neck:  Full ROM. No JVD.  Cardiovascular: Regular rate. Regular rhythm No murmurs, rubs, or gallops. Distal pulses are 2+ and symmetric  Pulmonary/Chest: No respiratory distress. Clear to auscultation bilaterally. No wheezing or rales.  Equal chest wall rise bilaterally  Abdominal: Soft and  non-distended.  There is no tenderness.  No rebound, guarding, or rigidity. Good bowel sounds.  Genitourinary: No CVA tenderness.  No suprapubic tenderness  Musculoskeletal: Moves all extremities. No obvious deformities.  5 x 5 strength in all extremities   Skin: Warm and dry.  Neurological:  Alert, awake, and appropriate.  Normal speech.  No acute focal neurological deficits are appreciated.  Two through 12 intact bilaterally.  Psychiatric: Normal affect. Good eye contact. Appropriate in content. .  Significant Labs: All pertinent labs within the past 24 hours have been reviewed.  CBC:   Recent Labs   Lab 10/17/23  1517 10/18/23  0343   WBC 6.74 5.54   HGB 13.9 13.0   HCT 43.5 40.8    258     CMP:   Recent Labs   Lab 10/17/23  1517 10/18/23  0343   * 142   K 4.9 4.6   * 112*   CO2 22* 19*   GLU 89 117*   BUN 40* 35*   CREATININE 3.0* 2.4*   CALCIUM 9.1 8.7   PROT 7.9  --    ALBUMIN 4.1 3.5   BILITOT 0.4  --    ALKPHOS 129  --    AST 17  --    ALT 10  --    ANIONGAP 12 11       Significant Imaging:     Imaging Results              X-Ray Chest AP Portable (Final result)  Result time 10/17/23 15:37:22      Final result by Kaity Stone MD (10/17/23 15:37:22)                   Impression:      No active finding      Electronically signed by: Kaity Stone  Date:    10/17/2023  Time:    15:37               Narrative:    EXAMINATION:  XR CHEST AP PORTABLE    CLINICAL HISTORY:  Chest Pain;    TECHNIQUE:  Single frontal portable view of the chest was performed.    COMPARISON:  08/15/2019    FINDINGS:  Lungs well aerated.  No pleural effusion or convincing pneumothorax.  No shift of the mediastinum

## 2023-10-19 VITALS
RESPIRATION RATE: 18 BRPM | HEIGHT: 63 IN | BODY MASS INDEX: 27.42 KG/M2 | SYSTOLIC BLOOD PRESSURE: 154 MMHG | OXYGEN SATURATION: 99 % | HEART RATE: 91 BPM | DIASTOLIC BLOOD PRESSURE: 86 MMHG | TEMPERATURE: 98 F | WEIGHT: 154.75 LBS

## 2023-10-19 LAB
ALBUMIN SERPL BCP-MCNC: 3.6 G/DL (ref 3.5–5.2)
ANION GAP SERPL CALC-SCNC: 12 MMOL/L (ref 8–16)
BASOPHILS # BLD AUTO: 0.03 K/UL (ref 0–0.2)
BASOPHILS NFR BLD: 0.6 % (ref 0–1.9)
BUN SERPL-MCNC: 45 MG/DL (ref 8–23)
CALCIUM SERPL-MCNC: 8.5 MG/DL (ref 8.7–10.5)
CHLORIDE SERPL-SCNC: 112 MMOL/L (ref 95–110)
CO2 SERPL-SCNC: 18 MMOL/L (ref 23–29)
CREAT SERPL-MCNC: 2.7 MG/DL (ref 0.5–1.4)
DIFFERENTIAL METHOD: ABNORMAL
EOSINOPHIL # BLD AUTO: 0.4 K/UL (ref 0–0.5)
EOSINOPHIL NFR BLD: 7.7 % (ref 0–8)
ERYTHROCYTE [DISTWIDTH] IN BLOOD BY AUTOMATED COUNT: 13 % (ref 11.5–14.5)
EST. GFR  (NO RACE VARIABLE): 18 ML/MIN/1.73 M^2
GLUCOSE SERPL-MCNC: 101 MG/DL (ref 70–110)
HCT VFR BLD AUTO: 40.9 % (ref 37–48.5)
HGB BLD-MCNC: 12.8 G/DL (ref 12–16)
IMM GRANULOCYTES # BLD AUTO: 0.02 K/UL (ref 0–0.04)
IMM GRANULOCYTES NFR BLD AUTO: 0.4 % (ref 0–0.5)
LYMPHOCYTES # BLD AUTO: 2.4 K/UL (ref 1–4.8)
LYMPHOCYTES NFR BLD: 45.2 % (ref 18–48)
MCH RBC QN AUTO: 27 PG (ref 27–31)
MCHC RBC AUTO-ENTMCNC: 31.3 G/DL (ref 32–36)
MCV RBC AUTO: 86 FL (ref 82–98)
MONOCYTES # BLD AUTO: 0.6 K/UL (ref 0.3–1)
MONOCYTES NFR BLD: 12.1 % (ref 4–15)
NEUTROPHILS # BLD AUTO: 1.8 K/UL (ref 1.8–7.7)
NEUTROPHILS NFR BLD: 34 % (ref 38–73)
NRBC BLD-RTO: 0 /100 WBC
PHOSPHATE SERPL-MCNC: 4.2 MG/DL (ref 2.7–4.5)
PLATELET # BLD AUTO: 273 K/UL (ref 150–450)
PMV BLD AUTO: 10.1 FL (ref 9.2–12.9)
POTASSIUM SERPL-SCNC: 4.7 MMOL/L (ref 3.5–5.1)
RBC # BLD AUTO: 4.74 M/UL (ref 4–5.4)
SODIUM SERPL-SCNC: 142 MMOL/L (ref 136–145)
WBC # BLD AUTO: 5.22 K/UL (ref 3.9–12.7)

## 2023-10-19 PROCEDURE — 25000003 PHARM REV CODE 250: Mod: HCNC | Performed by: NURSE PRACTITIONER

## 2023-10-19 PROCEDURE — 36415 COLL VENOUS BLD VENIPUNCTURE: CPT | Mod: HCNC | Performed by: NURSE PRACTITIONER

## 2023-10-19 PROCEDURE — 80069 RENAL FUNCTION PANEL: CPT | Mod: HCNC | Performed by: NURSE PRACTITIONER

## 2023-10-19 PROCEDURE — 63600175 PHARM REV CODE 636 W HCPCS: Mod: HCNC | Performed by: NURSE PRACTITIONER

## 2023-10-19 PROCEDURE — 25000003 PHARM REV CODE 250: Mod: HCNC | Performed by: STUDENT IN AN ORGANIZED HEALTH CARE EDUCATION/TRAINING PROGRAM

## 2023-10-19 PROCEDURE — 85025 COMPLETE CBC W/AUTO DIFF WBC: CPT | Mod: HCNC | Performed by: NURSE PRACTITIONER

## 2023-10-19 RX ORDER — CARVEDILOL 12.5 MG/1
25 TABLET ORAL ONCE
Status: COMPLETED | OUTPATIENT
Start: 2023-10-19 | End: 2023-10-19

## 2023-10-19 RX ORDER — CARVEDILOL 12.5 MG/1
50 TABLET ORAL 2 TIMES DAILY WITH MEALS
Status: DISCONTINUED | OUTPATIENT
Start: 2023-10-19 | End: 2023-10-19 | Stop reason: HOSPADM

## 2023-10-19 RX ORDER — CARVEDILOL 25 MG/1
50 TABLET ORAL 2 TIMES DAILY
Qty: 120 TABLET | Refills: 0 | Status: SHIPPED | OUTPATIENT
Start: 2023-10-19 | End: 2023-10-24 | Stop reason: SDUPTHER

## 2023-10-19 RX ADMIN — MUPIROCIN: 20 OINTMENT TOPICAL at 08:10

## 2023-10-19 RX ADMIN — ASPIRIN 81 MG: 81 TABLET, COATED ORAL at 08:10

## 2023-10-19 RX ADMIN — CLOPIDOGREL BISULFATE 75 MG: 75 TABLET ORAL at 08:10

## 2023-10-19 RX ADMIN — FUROSEMIDE 20 MG: 20 TABLET ORAL at 08:10

## 2023-10-19 RX ADMIN — ZIPRASIDONE HYDROCHLORIDE 20 MG: 20 CAPSULE ORAL at 08:10

## 2023-10-19 RX ADMIN — HYDRALAZINE HYDROCHLORIDE 100 MG: 50 TABLET ORAL at 05:10

## 2023-10-19 RX ADMIN — HEPARIN SODIUM 5000 UNITS: 5000 INJECTION INTRAVENOUS; SUBCUTANEOUS at 05:10

## 2023-10-19 RX ADMIN — HYDRALAZINE HYDROCHLORIDE 100 MG: 50 TABLET ORAL at 01:10

## 2023-10-19 RX ADMIN — CARVEDILOL 25 MG: 12.5 TABLET, FILM COATED ORAL at 10:10

## 2023-10-19 RX ADMIN — CARVEDILOL 25 MG: 12.5 TABLET, FILM COATED ORAL at 08:10

## 2023-10-19 NOTE — PLAN OF CARE
[facility] - Telemetry (Hospital)  Discharge Final Note    Primary Care Provider: Leeann Parham DO    Expected Discharge Date: 10/19/2023    Final Discharge Note (most recent)       Final Note - 10/19/23 1316          Final Note    Assessment Type Final Discharge Note (P)      Anticipated Discharge Disposition Home or Self Care (P)      Hospital Resources/Appts/Education Provided Appointments scheduled and added to AVS (P)         Post-Acute Status    Discharge Delays None known at this time (P)                      Important Message from Medicare             Contact Info       Leeann Parham DO   Specialty: Internal Medicine   Relationship: PCP - General    [address] [place] [address]   Phone: [phone]       Next Steps: Follow up in 1 week(s)    Cullen Hannon MD   Specialty: Cardiology, Internal Medicine   Relationship: Consulting Physician    [address]  [place] [address]   Phone: [phone]       Next Steps: Follow up in 1 week(s)

## 2023-10-19 NOTE — DISCHARGE SUMMARY
[facility] - Telemetry ([facility]  [facility] Medicine  Discharge Summary      Patient Name: Rose Milligan  MRN: [id number]  JOHN: [id number]  Patient Class: IP- Inpatient  Admission Date: 10/17/2023  Hospital Length of Stay: 2 days  Discharge Date and Time: 10/19/2023  Attending Physician: Ignacio Red,*   Discharging Provider: Ignacio Red MD  Primary Care Provider: Leeann Parham DO    Primary Care Team: Networked reference to record PCT     HPI:   Patient with known hx of uncontrolled HTN presents with left shoulder and arm pain. Worse on movement. She woke up with it. She uses several pillows to sleep and had these issues and neck pain before.   She lives alone and has psych issues. She claims that she took all her medications this am. She doesn't appear in distress and is able to answer questions. No SOB, N/V, Visual changes ot abdominal pain. No headache or palpitations.   Her SBP was 220 on arrival and she has received hydralazine times 2, labetalol and catapress. Her SBP is now in 190s and she was just started on cardene.      * No surgery found *      Hospital Course:   Patient with known hx of uncontrolled HTN presents with left shoulder and arm pain. Worse on movement. She woke up with it. She uses several pillows to sleep and had these issues and neck pain before.   She lives alone and has psych issues. She claims that she took all her medications this am. She doesn't appear in distress and is able to answer questions. No SOB, N/V, Visual changes ot abdominal pain. No headache or palpitations.   Her SBP was 220 on arrival and she has received hydralazine times 2, labetalol and catapress.  Eventually patient has been started on Cardene drip, initially admitted to ICU.      On 10/18- patient off of Cardene drip, home blood pressure medications resumed,; complaints of left-sided facial numbness,- CT head No acute intracranial abnormality.  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  She has scheduled neurosurgery appointment later this month. Pt denied any headache, dizziness, vision changes at this time; current symptoms likely - Malignant hypertension with transient neurological symptoms; recommended to current medications, dual antiplatelets.  Monitor blood pressure.  Patient deemed stable for downgrade per ICU team on 10/18, hospital medicine consulted to assume care         10/19   Examination of patient done at bedside, patient appeared alert and oriented x3, denied acute issues overnight, denied headache, dizziness, chest pain, shortness O breath, nausea, vomiting, bowel or bladder issues.    Hemodynamically stable, blood pressure stabilized on current regimen, heart rate stable.    Denied extremity weakness, numbness.    Labs reviewed, creatinine slightly trended up, however close to pts baseline, denied UTI symptoms, decreased urine output.  Recommended outpatient follow up with PCP within 1 week upon discharge to undergo repeat labs including BMP/creatinine, patient agreed.    Considering clinical and hemodynamic stability, planning to discharge patient today, recommended compliance with medications, low-salt diet, outpatient follow up with PCP, Cardiology, to monitor blood pressure regularly, patient agreed to the plan.    Medications to be delivered bedside        Review of Systems  Constitutional:  Negative for chills and fever.   HENT:  Negative for sore throat.    Respiratory:  Negative for shortness of breath.    Cardiovascular:  denied chest pain   Gastrointestinal:  Negative for diarrhea, nausea and vomiting.   Genitourinary:  Negative for dysuria.   Musculoskeletal:  Negative for back pain.   Skin:  Negative for rash.   Neurological:  left sided weakness   Hematological:  Does not bruise/bleed easily.     Physical  Exam     Constitutional: Patient is in no acute distress. Well-developed and well-nourished.  Head: Atraumatic. Normocephalic.  Eyes:  EOM intact.  No scleral icterus.  ENT: Mucous membranes are moist.  Nares clear   Neck:  Full ROM. No JVD.  Cardiovascular: Regular rate. Regular rhythm No murmurs, rubs, or gallops. Distal pulses are 2+ and symmetric  Pulmonary/Chest: No respiratory distress. Clear to auscultation bilaterally. No wheezing or rales.  Equal chest wall rise bilaterally  Abdominal: Soft and non-distended.  There is no tenderness.  No rebound, guarding, or rigidity. Good bowel sounds.  Genitourinary: No CVA tenderness.  No suprapubic tenderness  Musculoskeletal: Moves all extremities. No obvious deformities.  5 x 5 strength in all extremities   Skin: Warm and dry.  Neurological:  Alert, awake, and appropriate.  Normal speech.  No acute focal neurological deficits are appreciated.  Two through 12 intact bilaterally.  Psychiatric: Normal affect. Good eye contact. Appropriate in content. .          Goals of Care Treatment Preferences:  Code Status: Full Code      Consults:   Consults (From admission, onward)        Status Ordering Provider     Inpatient consult to Hospitalist  Once        Provider:  (Not yet assigned)    Acknowledged JAUN MARROQUIN     Inpatient consult to Neurology  Once        Provider:  Dario Vazquez MD    Completed JAUN MARROQUIN          No new Assessment & Plan notes have been filed under this hospital service since the last note was generated.  Service: Hospital Medicine    Final Active Diagnoses:    Diagnosis Date Noted POA    PRINCIPAL PROBLEM:  Malignant hypertension [I10] 10/17/2023 Yes    Left-sided weakness [R53.1] 10/18/2023 Unknown    Chronic heart failure with preserved ejection fraction [I50.32] 10/17/2023 Yes    Cerebral aneurysm, nonruptured [I67.1] 10/17/2023 Yes    Acute renal failure superimposed on stage 4 chronic kidney disease [N17.9, N18.4]  10/17/2023 Yes    Left thalamic infarction [I63.81] 10/17/2023 Yes    Bipolar disorder [F31.9] 10/17/2023 Yes    Tobacco abuse counseling [Z71.6] 10/17/2023 Not Applicable    Other hyperlipidemia [E78.49] 08/15/2019 Yes    Chronic hepatitis C virus infection [B18.2] 08/09/2018 Yes      Problems Resolved During this Admission:       Discharged Condition: stable    Disposition: Home or Self Care    Follow Up:   Follow-up Information     Leeann Parham, DO Follow up in 1 week(s).    Specialty: Internal Medicine  Contact information:  [address]  [place] [address]  [phone]             Cullen Hannon MD Follow up in 1 week(s).    Specialties: Cardiology, Internal Medicine  Contact information:  [address]  [place] [address]  [phone]                       Patient Instructions:      Ambulatory referral/consult to Outpatient Case Management   Referral Priority: Routine Referral Type: Consultation   Referral Reason: Specialty Services Required   Number of Visits Requested: 1       Significant Diagnostic Studies:    Results for orders placed or performed during the hospital encounter of 10/17/23   CBC auto differential   Result Value Ref Range    WBC 6.74 3.90 - 12.70 K/uL    RBC 5.00 4.00 - 5.40 M/uL    Hemoglobin 13.9 12.0 - 16.0 g/dL    Hematocrit 43.5 37.0 - 48.5 %    MCV 87 82 - 98 fL    MCH 27.8 27.0 - 31.0 pg    MCHC 32.0 32.0 - 36.0 g/dL    RDW 13.1 11.5 - 14.5 %    Platelets 286 150 - 450 K/uL    MPV 9.6 9.2 - 12.9 fL    Immature Granulocytes 0.3 0.0 - 0.5 %    Gran # (ANC) 2.3 1.8 - 7.7 K/uL    Immature Grans (Abs) 0.02 0.00 - 0.04 K/uL    Lymph # 3.1 1.0 - 4.8 K/uL    Mono # 0.9 0.3 - 1.0 K/uL    Eos # 0.4 0.0 - 0.5 K/uL    Baso # 0.03 0.00 - 0.20 K/uL    nRBC 0 0 /100 WBC    Gran % 34.0 (L) 38.0 - 73.0 %    Lymph % 46.1 18.0 - 48.0 %    Mono % 13.9 4.0 - 15.0 %    Eosinophil % 5.3 0.0 - 8.0 %    Basophil % 0.4 0.0 - 1.9 %    Differential Method Automated    Comprehensive  metabolic panel   Result Value Ref Range    Sodium 146 (H) 136 - 145 mmol/L    Potassium 4.9 3.5 - 5.1 mmol/L    Chloride 112 (H) 95 - 110 mmol/L    CO2 22 (L) 23 - 29 mmol/L    Glucose 89 70 - 110 mg/dL    BUN 40 (H) 8 - 23 mg/dL    Creatinine 3.0 (H) 0.5 - 1.4 mg/dL    Calcium 9.1 8.7 - 10.5 mg/dL    Total Protein 7.9 6.0 - 8.4 g/dL    Albumin 4.1 3.5 - 5.2 g/dL    Total Bilirubin 0.4 0.1 - 1.0 mg/dL    Alkaline Phosphatase 129 55 - 135 U/L    AST 17 10 - 40 U/L    ALT 10 10 - 44 U/L    eGFR 16 (A) >60 mL/min/1.73 m^2    Anion Gap 12 8 - 16 mmol/L   Troponin I #1   Result Value Ref Range    Troponin I 0.022 0.000 - 0.026 ng/mL   BNP   Result Value Ref Range     (H) 0 - 99 pg/mL   Troponin I #2   Result Value Ref Range    Troponin I 0.026 0.000 - 0.026 ng/mL   Drug screen panel, in-house   Result Value Ref Range    Benzodiazepines Negative Negative    Methadone metabolites Negative Negative    Cocaine (Metab.) Negative Negative    Opiate Scrn, Ur Negative Negative    Barbiturate Screen, Ur Negative Negative    Amphetamine Screen, Ur Negative Negative    THC Negative Negative    Phencyclidine Negative Negative    Creatinine, Urine 124.8 15.0 - 325.0 mg/dL    Toxicology Information SEE COMMENT    Sodium, urine, random   Result Value Ref Range    Sodium, Urine 83 20 - 250 mmol/L   Creatinine, urine, random   Result Value Ref Range    Creatinine, Urine 124.8 15.0 - 325.0 mg/dL   Protein / creatinine ratio, urine   Result Value Ref Range    Protein, Urine Random 31 (H) 0 - 15 mg/dL    Creatinine, Urine 124.8 15.0 - 325.0 mg/dL    Prot/Creat Ratio, Urine 0.25 (H) 0.00 - 0.20   CBC auto differential   Result Value Ref Range    WBC 5.54 3.90 - 12.70 K/uL    RBC 4.68 4.00 - 5.40 M/uL    Hemoglobin 13.0 12.0 - 16.0 g/dL    Hematocrit 40.8 37.0 - 48.5 %    MCV 87 82 - 98 fL    MCH 27.8 27.0 - 31.0 pg    MCHC 31.9 (L) 32.0 - 36.0 g/dL    RDW 13.1 11.5 - 14.5 %    Platelets 258 150 - 450 K/uL    MPV 9.6 9.2 - 12.9 fL     Immature Granulocytes 0.4 0.0 - 0.5 %    Gran # (ANC) 2.1 1.8 - 7.7 K/uL    Immature Grans (Abs) 0.02 0.00 - 0.04 K/uL    Lymph # 2.4 1.0 - 4.8 K/uL    Mono # 0.6 0.3 - 1.0 K/uL    Eos # 0.4 0.0 - 0.5 K/uL    Baso # 0.03 0.00 - 0.20 K/uL    nRBC 0 0 /100 WBC    Gran % 37.0 (L) 38.0 - 73.0 %    Lymph % 43.7 18.0 - 48.0 %    Mono % 11.4 4.0 - 15.0 %    Eosinophil % 7.0 0.0 - 8.0 %    Basophil % 0.5 0.0 - 1.9 %    Differential Method Automated    Renal function panel   Result Value Ref Range    Glucose 117 (H) 70 - 110 mg/dL    Sodium 142 136 - 145 mmol/L    Potassium 4.6 3.5 - 5.1 mmol/L    Chloride 112 (H) 95 - 110 mmol/L    CO2 19 (L) 23 - 29 mmol/L    BUN 35 (H) 8 - 23 mg/dL    Calcium 8.7 8.7 - 10.5 mg/dL    Creatinine 2.4 (H) 0.5 - 1.4 mg/dL    Albumin 3.5 3.5 - 5.2 g/dL    Phosphorus 3.2 2.7 - 4.5 mg/dL    eGFR 21 (A) >60 mL/min/1.73 m^2    Anion Gap 11 8 - 16 mmol/L   CBC auto differential   Result Value Ref Range    WBC 5.22 3.90 - 12.70 K/uL    RBC 4.74 4.00 - 5.40 M/uL    Hemoglobin 12.8 12.0 - 16.0 g/dL    Hematocrit 40.9 37.0 - 48.5 %    MCV 86 82 - 98 fL    MCH 27.0 27.0 - 31.0 pg    MCHC 31.3 (L) 32.0 - 36.0 g/dL    RDW 13.0 11.5 - 14.5 %    Platelets 273 150 - 450 K/uL    MPV 10.1 9.2 - 12.9 fL    Immature Granulocytes 0.4 0.0 - 0.5 %    Gran # (ANC) 1.8 1.8 - 7.7 K/uL    Immature Grans (Abs) 0.02 0.00 - 0.04 K/uL    Lymph # 2.4 1.0 - 4.8 K/uL    Mono # 0.6 0.3 - 1.0 K/uL    Eos # 0.4 0.0 - 0.5 K/uL    Baso # 0.03 0.00 - 0.20 K/uL    nRBC 0 0 /100 WBC    Gran % 34.0 (L) 38.0 - 73.0 %    Lymph % 45.2 18.0 - 48.0 %    Mono % 12.1 4.0 - 15.0 %    Eosinophil % 7.7 0.0 - 8.0 %    Basophil % 0.6 0.0 - 1.9 %    Differential Method Automated    Renal function panel   Result Value Ref Range    Glucose 101 70 - 110 mg/dL    Sodium 142 136 - 145 mmol/L    Potassium 4.7 3.5 - 5.1 mmol/L    Chloride 112 (H) 95 - 110 mmol/L    CO2 18 (L) 23 - 29 mmol/L    BUN 45 (H) 8 - 23 mg/dL    Calcium 8.5 (L) 8.7 - 10.5 mg/dL     Creatinine 2.7 (H) 0.5 - 1.4 mg/dL    Albumin 3.6 3.5 - 5.2 g/dL    Phosphorus 4.2 2.7 - 4.5 mg/dL    eGFR 18 (A) >60 mL/min/1.73 m^2    Anion Gap 12 8 - 16 mmol/L       Imaging Results          X-Ray Chest AP Portable (Final result)  Result time 10/17/23 15:37:22    Final result by Kaity Stone MD (10/17/23 15:37:22)                 Impression:      No active finding      Electronically signed by: Kaity Stone  Date:    10/17/2023  Time:    15:37             Narrative:    EXAMINATION:  XR CHEST AP PORTABLE    CLINICAL HISTORY:  Chest Pain;    TECHNIQUE:  Single frontal portable view of the chest was performed.    COMPARISON:  08/15/2019    FINDINGS:  Lungs well aerated.  No pleural effusion or convincing pneumothorax.  No shift of the mediastinum                                Pending Diagnostic Studies:     None         Medications:         Medication List      CHANGE how you take these medications    carvediloL 25 MG tablet  Commonly known as: COREG  Take 2 tablets (50 mg total) by mouth 2 (two) times daily.  What changed:   · medication strength  · how much to take        CONTINUE taking these medications    aspirin 81 MG EC tablet  Commonly known as: ECOTRIN  Take 1 tablet (81 mg total) by mouth once daily.     cloNIDine 0.2 mg/24 hr td ptwk 0.2 mg/24 hr  Commonly known as: CATAPRES  Place 1 patch onto the skin every 7 days.     clopidogreL 75 mg tablet  Commonly known as: PLAVIX  Take 1 tablet (75 mg total) by mouth once daily. for 20 days     furosemide 40 MG tablet  Commonly known as: LASIX  Take 0.5 tablets (20 mg total) by mouth once daily.     hydrALAZINE 100 MG tablet  Commonly known as: APRESOLINE  Take 1 tablet (100 mg total) by mouth every 8 (eight) hours.     hydrOXYzine 50 MG tablet  Commonly known as: ATARAX  Take 1 tablet (50 mg total) by mouth 2 (two) times daily as needed for Anxiety.     irbesartan 150 MG tablet  Commonly known as: AVAPRO  Take 2 tablets (300 mg total) by mouth  every evening.     nicotine 21 mg/24 hr  Commonly known as: NICODERM CQ  Place 1 patch onto the skin once daily.     pravastatin 40 MG tablet  Commonly known as: PRAVACHOL  Take 1 tablet (40 mg total) by mouth every evening.     tretinoin 0.025 % cream  Commonly known as: RETIN-A  Apply topically every evening. Pea sized amount to entire face at night. Start out using every other night for 2 weeks, then increase to every night as tolerated     ziprasidone 20 MG Cap  Commonly known as: GEODON  Take 1 capsule (20 mg total) by mouth 2 (two) times daily.           Where to Get Your Medications      These medications were [facility] [address] [place] [place] [place] [address]    Hours: Mon-Fri, 8a-5:30p Phone: [phone]   · carvediloL 25 MG tablet         Indwelling Lines/Drains at time of discharge:   Lines/Drains/Airways     None                 Time spent on the discharge of patient: 88 minutes         Ignacio Red MD  Department of Hospital Medicine  [facility] - Telemetry ([facility])

## 2023-10-19 NOTE — DISCHARGE INSTRUCTIONS
Recommend compliance with medications, follow up with sick with PCP, Cardiology within 1 week upon discharge   Recommend to undergo repeat labs including CBC/BMP within 1 week upon discharge   Recommended low-salt diet

## 2023-10-19 NOTE — PLAN OF CARE
Patient discharged with personal belongings. Discharge education and medications reviewed with patient. LDA and telemetry box removed per MD order. Discharge medications delivered to beside.    Problem: Adult Inpatient Plan of Care  Goal: Plan of Care Review  Outcome: Met  Goal: Patient-Specific Goal (Individualized)  Outcome: Met  Goal: Absence of Hospital-Acquired Illness or Injury  Outcome: Met  Goal: Optimal Comfort and Wellbeing  Outcome: Met  Goal: Readiness for Transition of Care  Outcome: Met     Problem: Fluid and Electrolyte Imbalance (Acute Kidney Injury/Impairment)  Goal: Fluid and Electrolyte Balance  Outcome: Met     Problem: Oral Intake Inadequate (Acute Kidney Injury/Impairment)  Goal: Optimal Nutrition Intake  Outcome: Met     Problem: Renal Function Impairment (Acute Kidney Injury/Impairment)  Goal: Effective Renal Function  Outcome: Met     Problem: Skin Injury Risk Increased  Goal: Skin Health and Integrity  Outcome: Met

## 2023-10-20 ENCOUNTER — PATIENT OUTREACH (OUTPATIENT)
Dept: ADMINISTRATIVE | Facility: CLINIC | Age: 68
End: 2023-10-20
Payer: MEDICARE

## 2023-10-20 NOTE — PROGRESS NOTES
C3 nurse attempted to contact Rose Milligan for a TCC post hospital discharge follow up call. No answer. No voicemail. The patient has a scheduled HOSFU appointment with Leeann Parham DO on 10/23/23 @ 0800.

## 2023-10-20 NOTE — PROGRESS NOTES
Subjective     Patient ID: Rose Milligan is a 68 y.o. female.    Chief Complaint: Hospital follow up      HPI  Patient presents for hospital follow up. She reports she was hospitalized due to elevated blood pressure. She was found to have significantly abnormal kidney function. She was briefly in the ICU until blood pressure could be controlled. She was discharged on home medications.  Review of Systems   Constitutional:  Negative for chills and fatigue.   Respiratory:  Negative for chest tightness and shortness of breath.    Cardiovascular:  Negative for chest pain and palpitations.   Gastrointestinal:  Negative for abdominal pain, constipation, diarrhea, nausea and vomiting.   Genitourinary:  Negative for frequency and urgency.   Neurological:  Negative for dizziness, numbness and headaches.          Objective     Physical Exam  Constitutional:       General: She is not in acute distress.     Appearance: Normal appearance. She is normal weight.   HENT:      Head: Normocephalic and atraumatic.   Cardiovascular:      Rate and Rhythm: Normal rate and regular rhythm.      Heart sounds: Normal heart sounds. No murmur heard.     No friction rub. No gallop.   Pulmonary:      Effort: Pulmonary effort is normal.      Breath sounds: Normal breath sounds. No wheezing, rhonchi or rales.   Abdominal:      General: Bowel sounds are normal. There is no distension.      Palpations: Abdomen is soft.      Tenderness: There is no abdominal tenderness. There is no rebound.   Skin:     General: Skin is warm and dry.      Coloration: Skin is not jaundiced.   Neurological:      General: No focal deficit present.      Mental Status: She is alert and oriented to person, place, and time. Mental status is at baseline.   Psychiatric:         Mood and Affect: Mood normal.         Behavior: Behavior normal.            Assessment and Plan     1. CKD (chronic kidney disease) stage 4, GFR 15-29 ml/min  -     Ambulatory referral/consult to  Nephrology; Future; Expected date: 10/30/2023  -     Basic Metabolic Panel; Future; Expected date: 10/23/2023  Will refer to Nephro as kidney function has steadily worsened and may play a part in hypertension. Last GFR 18. Will repeat BMP today.   2. Uncontrolled hypertension  Continue Clonidine, Coreg, Lasix, Hydralazine and Irbesartan. Follow up with Cardiology  3. Other hyperlipidemia    Continue Prabastatin           No follow-ups on file.

## 2023-10-23 ENCOUNTER — LAB VISIT (OUTPATIENT)
Dept: LAB | Facility: HOSPITAL | Age: 68
End: 2023-10-23
Payer: MEDICARE

## 2023-10-23 ENCOUNTER — OFFICE VISIT (OUTPATIENT)
Dept: FAMILY MEDICINE | Facility: CLINIC | Age: 68
End: 2023-10-23
Payer: MEDICARE

## 2023-10-23 VITALS
HEIGHT: 63 IN | RESPIRATION RATE: 18 BRPM | SYSTOLIC BLOOD PRESSURE: 138 MMHG | TEMPERATURE: 97 F | WEIGHT: 152.13 LBS | BODY MASS INDEX: 26.95 KG/M2 | DIASTOLIC BLOOD PRESSURE: 84 MMHG | OXYGEN SATURATION: 99 % | HEART RATE: 78 BPM

## 2023-10-23 DIAGNOSIS — N18.4 CKD (CHRONIC KIDNEY DISEASE) STAGE 4, GFR 15-29 ML/MIN: Primary | ICD-10-CM

## 2023-10-23 DIAGNOSIS — I10 UNCONTROLLED HYPERTENSION: ICD-10-CM

## 2023-10-23 DIAGNOSIS — E78.49 OTHER HYPERLIPIDEMIA: ICD-10-CM

## 2023-10-23 DIAGNOSIS — N18.4 CKD (CHRONIC KIDNEY DISEASE) STAGE 4, GFR 15-29 ML/MIN: ICD-10-CM

## 2023-10-23 PROBLEM — E66.09 CLASS 1 OBESITY DUE TO EXCESS CALORIES WITH SERIOUS COMORBIDITY AND BODY MASS INDEX (BMI) OF 30.0 TO 30.9 IN ADULT: Status: RESOLVED | Noted: 2019-10-15 | Resolved: 2023-10-23

## 2023-10-23 PROBLEM — E66.811 CLASS 1 OBESITY DUE TO EXCESS CALORIES WITH SERIOUS COMORBIDITY AND BODY MASS INDEX (BMI) OF 30.0 TO 30.9 IN ADULT: Status: RESOLVED | Noted: 2019-10-15 | Resolved: 2023-10-23

## 2023-10-23 PROCEDURE — 99999 PR PBB SHADOW E&M-EST. PATIENT-LVL IV: ICD-10-PCS | Mod: PBBFAC,HCNC,, | Performed by: INTERNAL MEDICINE

## 2023-10-23 PROCEDURE — 3075F PR MOST RECENT SYSTOLIC BLOOD PRESS GE 130-139MM HG: ICD-10-PCS | Mod: HCNC,CPTII,S$GLB, | Performed by: INTERNAL MEDICINE

## 2023-10-23 PROCEDURE — 4010F PR ACE/ARB THEARPY RXD/TAKEN: ICD-10-PCS | Mod: HCNC,CPTII,S$GLB, | Performed by: INTERNAL MEDICINE

## 2023-10-23 PROCEDURE — 3008F PR BODY MASS INDEX (BMI) DOCUMENTED: ICD-10-PCS | Mod: HCNC,CPTII,S$GLB, | Performed by: INTERNAL MEDICINE

## 2023-10-23 PROCEDURE — 3288F FALL RISK ASSESSMENT DOCD: CPT | Mod: HCNC,CPTII,S$GLB, | Performed by: INTERNAL MEDICINE

## 2023-10-23 PROCEDURE — 3044F PR MOST RECENT HEMOGLOBIN A1C LEVEL <7.0%: ICD-10-PCS | Mod: HCNC,CPTII,S$GLB, | Performed by: INTERNAL MEDICINE

## 2023-10-23 PROCEDURE — 3075F SYST BP GE 130 - 139MM HG: CPT | Mod: HCNC,CPTII,S$GLB, | Performed by: INTERNAL MEDICINE

## 2023-10-23 PROCEDURE — 36415 COLL VENOUS BLD VENIPUNCTURE: CPT | Mod: HCNC,PO | Performed by: INTERNAL MEDICINE

## 2023-10-23 PROCEDURE — 3044F HG A1C LEVEL LT 7.0%: CPT | Mod: HCNC,CPTII,S$GLB, | Performed by: INTERNAL MEDICINE

## 2023-10-23 PROCEDURE — 1101F PT FALLS ASSESS-DOCD LE1/YR: CPT | Mod: HCNC,CPTII,S$GLB, | Performed by: INTERNAL MEDICINE

## 2023-10-23 PROCEDURE — 3066F PR DOCUMENTATION OF TREATMENT FOR NEPHROPATHY: ICD-10-PCS | Mod: HCNC,CPTII,S$GLB, | Performed by: INTERNAL MEDICINE

## 2023-10-23 PROCEDURE — 99213 OFFICE O/P EST LOW 20 MIN: CPT | Mod: HCNC,S$GLB,, | Performed by: INTERNAL MEDICINE

## 2023-10-23 PROCEDURE — 1101F PR PT FALLS ASSESS DOC 0-1 FALLS W/OUT INJ PAST YR: ICD-10-PCS | Mod: HCNC,CPTII,S$GLB, | Performed by: INTERNAL MEDICINE

## 2023-10-23 PROCEDURE — 4010F ACE/ARB THERAPY RXD/TAKEN: CPT | Mod: HCNC,CPTII,S$GLB, | Performed by: INTERNAL MEDICINE

## 2023-10-23 PROCEDURE — 3062F POS MACROALBUMINURIA REV: CPT | Mod: HCNC,CPTII,S$GLB, | Performed by: INTERNAL MEDICINE

## 2023-10-23 PROCEDURE — 3066F NEPHROPATHY DOC TX: CPT | Mod: HCNC,CPTII,S$GLB, | Performed by: INTERNAL MEDICINE

## 2023-10-23 PROCEDURE — 99213 PR OFFICE/OUTPT VISIT, EST, LEVL III, 20-29 MIN: ICD-10-PCS | Mod: HCNC,S$GLB,, | Performed by: INTERNAL MEDICINE

## 2023-10-23 PROCEDURE — 3008F BODY MASS INDEX DOCD: CPT | Mod: HCNC,CPTII,S$GLB, | Performed by: INTERNAL MEDICINE

## 2023-10-23 PROCEDURE — 3079F PR MOST RECENT DIASTOLIC BLOOD PRESSURE 80-89 MM HG: ICD-10-PCS | Mod: HCNC,CPTII,S$GLB, | Performed by: INTERNAL MEDICINE

## 2023-10-23 PROCEDURE — 3079F DIAST BP 80-89 MM HG: CPT | Mod: HCNC,CPTII,S$GLB, | Performed by: INTERNAL MEDICINE

## 2023-10-23 PROCEDURE — 99999 PR PBB SHADOW E&M-EST. PATIENT-LVL IV: CPT | Mod: PBBFAC,HCNC,, | Performed by: INTERNAL MEDICINE

## 2023-10-23 PROCEDURE — 1111F PR DISCHARGE MEDS RECONCILED W/ CURRENT OUTPATIENT MED LIST: ICD-10-PCS | Mod: HCNC,CPTII,S$GLB, | Performed by: INTERNAL MEDICINE

## 2023-10-23 PROCEDURE — 1111F DSCHRG MED/CURRENT MED MERGE: CPT | Mod: HCNC,CPTII,S$GLB, | Performed by: INTERNAL MEDICINE

## 2023-10-23 PROCEDURE — 3288F PR FALLS RISK ASSESSMENT DOCUMENTED: ICD-10-PCS | Mod: HCNC,CPTII,S$GLB, | Performed by: INTERNAL MEDICINE

## 2023-10-23 PROCEDURE — 1126F AMNT PAIN NOTED NONE PRSNT: CPT | Mod: HCNC,CPTII,S$GLB, | Performed by: INTERNAL MEDICINE

## 2023-10-23 PROCEDURE — 1159F PR MEDICATION LIST DOCUMENTED IN MEDICAL RECORD: ICD-10-PCS | Mod: HCNC,CPTII,S$GLB, | Performed by: INTERNAL MEDICINE

## 2023-10-23 PROCEDURE — 1159F MED LIST DOCD IN RCRD: CPT | Mod: HCNC,CPTII,S$GLB, | Performed by: INTERNAL MEDICINE

## 2023-10-23 PROCEDURE — 80048 BASIC METABOLIC PNL TOTAL CA: CPT | Mod: HCNC | Performed by: INTERNAL MEDICINE

## 2023-10-23 PROCEDURE — 3062F PR POS MACROALBUMINURIA RESULT DOCUMENTED/REVIEW: ICD-10-PCS | Mod: HCNC,CPTII,S$GLB, | Performed by: INTERNAL MEDICINE

## 2023-10-23 PROCEDURE — 1126F PR PAIN SEVERITY QUANTIFIED, NO PAIN PRESENT: ICD-10-PCS | Mod: HCNC,CPTII,S$GLB, | Performed by: INTERNAL MEDICINE

## 2023-10-24 ENCOUNTER — OFFICE VISIT (OUTPATIENT)
Dept: CARDIOLOGY | Facility: CLINIC | Age: 68
End: 2023-10-24
Payer: MEDICARE

## 2023-10-24 VITALS
OXYGEN SATURATION: 97 % | HEIGHT: 63 IN | WEIGHT: 154.75 LBS | SYSTOLIC BLOOD PRESSURE: 138 MMHG | HEART RATE: 76 BPM | DIASTOLIC BLOOD PRESSURE: 82 MMHG | BODY MASS INDEX: 27.42 KG/M2

## 2023-10-24 DIAGNOSIS — R06.09 DOE (DYSPNEA ON EXERTION): ICD-10-CM

## 2023-10-24 DIAGNOSIS — E78.49 OTHER HYPERLIPIDEMIA: ICD-10-CM

## 2023-10-24 DIAGNOSIS — Z86.73 HISTORY OF CVA (CEREBROVASCULAR ACCIDENT): ICD-10-CM

## 2023-10-24 DIAGNOSIS — N18.4 CKD (CHRONIC KIDNEY DISEASE) STAGE 4, GFR 15-29 ML/MIN: ICD-10-CM

## 2023-10-24 DIAGNOSIS — R00.2 PALPITATIONS: ICD-10-CM

## 2023-10-24 DIAGNOSIS — I50.32 CHRONIC HEART FAILURE WITH PRESERVED EJECTION FRACTION: ICD-10-CM

## 2023-10-24 DIAGNOSIS — I10 MALIGNANT HYPERTENSION: Primary | ICD-10-CM

## 2023-10-24 DIAGNOSIS — E78.5 HYPERLIPIDEMIA, UNSPECIFIED HYPERLIPIDEMIA TYPE: ICD-10-CM

## 2023-10-24 DIAGNOSIS — I16.1 HYPERTENSIVE EMERGENCY: ICD-10-CM

## 2023-10-24 DIAGNOSIS — Z86.73 HISTORY OF TIA (TRANSIENT ISCHEMIC ATTACK): ICD-10-CM

## 2023-10-24 DIAGNOSIS — I10 UNCONTROLLED HYPERTENSION: ICD-10-CM

## 2023-10-24 DIAGNOSIS — B18.2 CHRONIC HEPATITIS C WITHOUT HEPATIC COMA: ICD-10-CM

## 2023-10-24 LAB
ANION GAP SERPL CALC-SCNC: 14 MMOL/L (ref 8–16)
BUN SERPL-MCNC: 80 MG/DL (ref 8–23)
CALCIUM SERPL-MCNC: 9.6 MG/DL (ref 8.7–10.5)
CHLORIDE SERPL-SCNC: 108 MMOL/L (ref 95–110)
CO2 SERPL-SCNC: 19 MMOL/L (ref 23–29)
CREAT SERPL-MCNC: 3.6 MG/DL (ref 0.5–1.4)
EST. GFR  (NO RACE VARIABLE): 13 ML/MIN/1.73 M^2
GLUCOSE SERPL-MCNC: 94 MG/DL (ref 70–110)
POTASSIUM SERPL-SCNC: 5.4 MMOL/L (ref 3.5–5.1)
SODIUM SERPL-SCNC: 141 MMOL/L (ref 136–145)

## 2023-10-24 PROCEDURE — 1101F PT FALLS ASSESS-DOCD LE1/YR: CPT | Mod: HCNC,CPTII,S$GLB, | Performed by: INTERNAL MEDICINE

## 2023-10-24 PROCEDURE — 1160F RVW MEDS BY RX/DR IN RCRD: CPT | Mod: HCNC,CPTII,S$GLB, | Performed by: INTERNAL MEDICINE

## 2023-10-24 PROCEDURE — 4010F ACE/ARB THERAPY RXD/TAKEN: CPT | Mod: HCNC,CPTII,S$GLB, | Performed by: INTERNAL MEDICINE

## 2023-10-24 PROCEDURE — 99999 PR PBB SHADOW E&M-EST. PATIENT-LVL III: ICD-10-PCS | Mod: PBBFAC,HCNC,, | Performed by: INTERNAL MEDICINE

## 2023-10-24 PROCEDURE — 1159F MED LIST DOCD IN RCRD: CPT | Mod: HCNC,CPTII,S$GLB, | Performed by: INTERNAL MEDICINE

## 2023-10-24 PROCEDURE — 4010F PR ACE/ARB THEARPY RXD/TAKEN: ICD-10-PCS | Mod: HCNC,CPTII,S$GLB, | Performed by: INTERNAL MEDICINE

## 2023-10-24 PROCEDURE — 3079F PR MOST RECENT DIASTOLIC BLOOD PRESSURE 80-89 MM HG: ICD-10-PCS | Mod: HCNC,CPTII,S$GLB, | Performed by: INTERNAL MEDICINE

## 2023-10-24 PROCEDURE — 3008F BODY MASS INDEX DOCD: CPT | Mod: HCNC,CPTII,S$GLB, | Performed by: INTERNAL MEDICINE

## 2023-10-24 PROCEDURE — 3079F DIAST BP 80-89 MM HG: CPT | Mod: HCNC,CPTII,S$GLB, | Performed by: INTERNAL MEDICINE

## 2023-10-24 PROCEDURE — 3062F PR POS MACROALBUMINURIA RESULT DOCUMENTED/REVIEW: ICD-10-PCS | Mod: HCNC,CPTII,S$GLB, | Performed by: INTERNAL MEDICINE

## 2023-10-24 PROCEDURE — 1126F PR PAIN SEVERITY QUANTIFIED, NO PAIN PRESENT: ICD-10-PCS | Mod: HCNC,CPTII,S$GLB, | Performed by: INTERNAL MEDICINE

## 2023-10-24 PROCEDURE — 1111F DSCHRG MED/CURRENT MED MERGE: CPT | Mod: HCNC,CPTII,S$GLB, | Performed by: INTERNAL MEDICINE

## 2023-10-24 PROCEDURE — 99214 OFFICE O/P EST MOD 30 MIN: CPT | Mod: HCNC,S$GLB,, | Performed by: INTERNAL MEDICINE

## 2023-10-24 PROCEDURE — 99999 PR PBB SHADOW E&M-EST. PATIENT-LVL III: CPT | Mod: PBBFAC,HCNC,, | Performed by: INTERNAL MEDICINE

## 2023-10-24 PROCEDURE — 99214 PR OFFICE/OUTPT VISIT, EST, LEVL IV, 30-39 MIN: ICD-10-PCS | Mod: HCNC,S$GLB,, | Performed by: INTERNAL MEDICINE

## 2023-10-24 PROCEDURE — 1159F PR MEDICATION LIST DOCUMENTED IN MEDICAL RECORD: ICD-10-PCS | Mod: HCNC,CPTII,S$GLB, | Performed by: INTERNAL MEDICINE

## 2023-10-24 PROCEDURE — 3044F PR MOST RECENT HEMOGLOBIN A1C LEVEL <7.0%: ICD-10-PCS | Mod: HCNC,CPTII,S$GLB, | Performed by: INTERNAL MEDICINE

## 2023-10-24 PROCEDURE — 1111F PR DISCHARGE MEDS RECONCILED W/ CURRENT OUTPATIENT MED LIST: ICD-10-PCS | Mod: HCNC,CPTII,S$GLB, | Performed by: INTERNAL MEDICINE

## 2023-10-24 PROCEDURE — 3008F PR BODY MASS INDEX (BMI) DOCUMENTED: ICD-10-PCS | Mod: HCNC,CPTII,S$GLB, | Performed by: INTERNAL MEDICINE

## 2023-10-24 PROCEDURE — 3288F FALL RISK ASSESSMENT DOCD: CPT | Mod: HCNC,CPTII,S$GLB, | Performed by: INTERNAL MEDICINE

## 2023-10-24 PROCEDURE — 3075F SYST BP GE 130 - 139MM HG: CPT | Mod: HCNC,CPTII,S$GLB, | Performed by: INTERNAL MEDICINE

## 2023-10-24 PROCEDURE — 1126F AMNT PAIN NOTED NONE PRSNT: CPT | Mod: HCNC,CPTII,S$GLB, | Performed by: INTERNAL MEDICINE

## 2023-10-24 PROCEDURE — 3288F PR FALLS RISK ASSESSMENT DOCUMENTED: ICD-10-PCS | Mod: HCNC,CPTII,S$GLB, | Performed by: INTERNAL MEDICINE

## 2023-10-24 PROCEDURE — 3062F POS MACROALBUMINURIA REV: CPT | Mod: HCNC,CPTII,S$GLB, | Performed by: INTERNAL MEDICINE

## 2023-10-24 PROCEDURE — 3066F NEPHROPATHY DOC TX: CPT | Mod: HCNC,CPTII,S$GLB, | Performed by: INTERNAL MEDICINE

## 2023-10-24 PROCEDURE — 3075F PR MOST RECENT SYSTOLIC BLOOD PRESS GE 130-139MM HG: ICD-10-PCS | Mod: HCNC,CPTII,S$GLB, | Performed by: INTERNAL MEDICINE

## 2023-10-24 PROCEDURE — 1160F PR REVIEW ALL MEDS BY PRESCRIBER/CLIN PHARMACIST DOCUMENTED: ICD-10-PCS | Mod: HCNC,CPTII,S$GLB, | Performed by: INTERNAL MEDICINE

## 2023-10-24 PROCEDURE — 1101F PR PT FALLS ASSESS DOC 0-1 FALLS W/OUT INJ PAST YR: ICD-10-PCS | Mod: HCNC,CPTII,S$GLB, | Performed by: INTERNAL MEDICINE

## 2023-10-24 PROCEDURE — 3044F HG A1C LEVEL LT 7.0%: CPT | Mod: HCNC,CPTII,S$GLB, | Performed by: INTERNAL MEDICINE

## 2023-10-24 PROCEDURE — 3066F PR DOCUMENTATION OF TREATMENT FOR NEPHROPATHY: ICD-10-PCS | Mod: HCNC,CPTII,S$GLB, | Performed by: INTERNAL MEDICINE

## 2023-10-24 RX ORDER — ASPIRIN 81 MG/1
81 TABLET ORAL DAILY
Qty: 90 TABLET | Refills: 0 | Status: SHIPPED | OUTPATIENT
Start: 2023-10-24 | End: 2024-01-16 | Stop reason: SDUPTHER

## 2023-10-24 RX ORDER — CARVEDILOL 25 MG/1
50 TABLET ORAL 2 TIMES DAILY
Qty: 120 TABLET | Refills: 0 | Status: SHIPPED | OUTPATIENT
Start: 2023-10-24 | End: 2024-01-16 | Stop reason: SDUPTHER

## 2023-10-24 RX ORDER — PRAVASTATIN SODIUM 40 MG/1
40 TABLET ORAL NIGHTLY
Qty: 90 TABLET | Refills: 0 | Status: SHIPPED | OUTPATIENT
Start: 2023-10-24 | End: 2023-10-27

## 2023-10-24 NOTE — PROGRESS NOTES
Subjective:   Patient ID:  Rose Milligan is a 68 y.o. female who presents for cardiac consult of No chief complaint on file.      The patient came in today for cardiac consult of No chief complaint on file.      Rose Milligan is a 68 y.o. female  with HFpEF, HTN, pulm HTN, h/o TIA/CVA, tobacco abuse, h/o drug abuse, bipolar, hep C, CKD4 presents for follow up CV eval.      11/21/18  Pt went to [facility] yesterday. She went for chest pain and L shoulder pain. Chest pain started about 3 days ago, and was off medicines for 2 weeks. She had bloodwork, ECG and was told to follow up hereShe called her PCP and only picked up bipolar meds. Chest pain feels like tightness, for past few days been constant. Mild headache and nausea. Now she feels lightheaded. PT also gets palpitations frequenltly.     9/7/23   Hosp follow up from recent CVA -  punctate acute left thalamic infarct and right MCA bifurcation aneurysm. Is on asa and plavix now.   BP elevated today 150/100. HR 70s. BMI 26 - 150 lbs  ECHO 8/2023 with normal bi V function and valves, grade 1 DD. Neg bubble study.     10/12/23  BP - elevated 180s/117. HR 70s. BMI 27 - 155 lbs   Pt had 2 cups of coffee, had some grits and mcconnell last night.       Oct 2023 Hospital Course:   Patient with known hx of uncontrolled HTN presents with left shoulder and arm pain. Worse on movement. She woke up with it. She uses several pillows to sleep and had these issues and neck pain before.   She lives alone and has psych issues. She claims that she took all her medications this am. She doesn't appear in distress and is able to answer questions. No SOB, N/V, Visual changes ot abdominal pain. No headache or palpitations.   Her SBP was 220 on arrival and she has received hydralazine times 2, labetalol and catapress.  Eventually patient has been started on Cardene drip, initially admitted to ICU.       On 10/18- patient off of Cardene drip, home blood pressure medications resumed,; complaints of  left-sided facial numbness,- CT head No acute intracranial abnormality.  Neurology evaluated-  MRI/MRA brain done 07/19/2023 was abnormal showing a punctate left thalamic infarct, small vessel vascular disease, and a 0.5 cm right MCA bifurcation aneurysm.  She has scheduled neurosurgery appointment later this month. Pt denied any headache, dizziness, vision changes at this time; current symptoms likely - Malignant hypertension with transient neurological symptoms; recommended to current medications, dual antiplatelets.  Monitor blood pressure.  Patient deemed stable for downgrade per ICU team on 10/18, hospital medicine consulted to assume care  10/19   Examination of patient done at bedside, patient appeared alert and oriented x3, denied acute issues overnight, denied headache, dizziness, chest pain, shortness O breath, nausea, vomiting, bowel or bladder issues.    Hemodynamically stable, blood pressure stabilized on current regimen, heart rate stable.    Denied extremity weakness, numbness.    Labs reviewed, creatinine slightly trended up, however close to pts baseline, denied UTI symptoms, decreased urine output.  Recommended outpatient follow up with PCP within 1 week upon discharge to undergo repeat labs including BMP/creatinine, patient agreed.    Considering clinical and hemodynamic stability, planning to discharge patient today, recommended compliance with medications, low-salt diet, outpatient follow up with PCP, Cardiology, to monitor blood pressure regularly, patient agreed to the plan.    Medications to be delivered bedside     10/24/23  Pt presents after recent hosp stay for HTN emergency - BP was 220s needed cardene drip/ICU stay.     BP and HR well controlled today. BMI 27 - 154 lbs.   BP cuff at home is showing higher BPs.     She is a cook at [employer].       Results for orders placed during the hospital encounter of 08/04/23    Echo    Interpretation Summary    Left Ventricle: The left  ventricle is normal in size. Moderately increased wall thickness. There is moderate concentrict hypertrophy. Normal wall motion. There is normal systolic function with a visually estimated ejection fraction of 55 - 70%. Grade I diastolic dysfunction.    Left Atrium: Left atrium is mildly dilated.    Right Ventricle: Normal right ventricular cavity size. Wall thickness is normal. Right ventricle wall motion  is normal. Systolic function is normal.    Mitral Valve: There is mild regurgitation.    Tricuspid Valve: There is mild regurgitation.    IVC/SVC: Intermediate venous pressure at 8 mmHg.    Bubble study negative          Conclusion 6/2023     There is 0-19% right Internal Carotid Stenosis.  There is 0-19% left Internal Carotid Stenosis.    Conclusion 6/2023    The patient was monitored for a total of 12d 16h, underlying rhythm is Sinus.  The minimum heart rate was 55 bpm; the maximum 116 bpm; the average 76 bpm.  0 % of Atrial fibrillation/Atrial flutter with longest episode of 0 ms.  -- AV block with 0 %  There were 0 pauses, the longest pause was 0 ms at --.  7 episodes of VT were found with Longest VT at 10 beats .  21 supraventricular episodes were found. Longest SVT Episode 15 beats, Fastest  bpm  There were a total of 1346 PVCs with 2 morphologies and 7 couplets. Overall PVC [place] at 0.1 %  There were a total of 1132 PSVCs with 1 morphologies and 0 couplets. Overall PSVC [place] at 0.08 %  There is a total of 1 patient events    Results for orders placed during the hospital encounter of 06/06/23    Nuclear Stress - Cardiology Interpreted    Interpretation Summary    Abnormal myocardial perfusion scan.    There is a moderate to severe intensity, moderate sized, fixed perfusion abnormality consistent with scar in the basal to mid lateral wall(s).    There are no other significant perfusion abnormalities.    The gated perfusion images showed an ejection fraction of 50% at rest. The gated perfusion  images showed an ejection fraction of 53% post stress.    The ECG portion of the study is uninterpretable due to abnormal baseline.    The patient reported no chest pain during the stress test.    There were no arrhythmias during stress.          Past Medical History:   Diagnosis Date    Acute CVA (cerebrovascular accident) 8/4/2023    Bipolar 1 disorder     CHF (congestive heart failure)     Depression     Hepatitis C     Hypertension     Other hyperlipidemia 8/15/2019       Past Surgical History:   Procedure Laterality Date    HYSTERECTOMY         Social History     Tobacco Use    Smoking status: Some Days     Current packs/day: 0.25     Average packs/day: 0.3 packs/day for 7.8 years (1.9 ttl pk-yrs)     Types: Cigarettes     Start date: 1/11/2016    Smokeless tobacco: Never    Tobacco comments:     1 pack every 3 days   Substance Use Topics    Alcohol use: Yes    Drug use: Yes     Types: Cocaine       Family History   Problem Relation Age of Onset    Heart attack Maternal Grandmother     Hypertension Mother        Patient's Medications   New Prescriptions    No medications on file   Previous Medications    ASPIRIN (ECOTRIN) 81 MG EC TABLET    Take 1 tablet (81 mg total) by mouth once daily.    CARVEDILOL (COREG) 25 MG TABLET    Take 2 tablets (50 mg total) by mouth 2 (two) times daily.    CLONIDINE 0.2 MG/24 HR TD PTWK (CATAPRES) 0.2 MG/24 HR    Place 1 patch onto the skin every 7 days.    CLOPIDOGREL (PLAVIX) 75 MG TABLET    Take 1 tablet (75 mg total) by mouth once daily. for 20 days    FUROSEMIDE (LASIX) 40 MG TABLET    Take 0.5 tablets (20 mg total) by mouth once daily.    HYDRALAZINE (APRESOLINE) 100 MG TABLET    Take 1 tablet (100 mg total) by mouth every 8 (eight) hours.    HYDROXYZINE (ATARAX) 50 MG TABLET    Take 1 tablet (50 mg total) by mouth 2 (two) times daily as needed for Anxiety.    IRBESARTAN (AVAPRO) 150 MG TABLET    Take 2 tablets (300 mg total) by mouth every evening.    NICOTINE (NICODERM CQ)  21 MG/24 HR    Place 1 patch onto the skin once daily.    PRAVASTATIN (PRAVACHOL) 40 MG TABLET    Take 1 tablet (40 mg total) by mouth every evening.    TRETINOIN (RETIN-A) 0.025 % CREAM    Apply topically every evening. Pea sized amount to entire face at night. Start out using every other night for 2 weeks, then increase to every night as tolerated    ZIPRASIDONE (GEODON) 20 MG CAP    Take 1 capsule (20 mg total) by mouth 2 (two) times daily.   Modified Medications    No medications on file   Discontinued Medications    No medications on file       Review of Systems   Constitutional: Negative.    HENT: Negative.     Eyes: Negative.    Respiratory:  Positive for shortness of breath.    Cardiovascular:  Positive for palpitations. Negative for chest pain.   Gastrointestinal: Negative.    Genitourinary: Negative.    Musculoskeletal: Negative.    Skin: Negative.    Neurological:  Positive for dizziness.   Endo/Heme/Allergies: Negative.    Psychiatric/Behavioral:  The patient is not nervous/anxious.    All 12 systems otherwise negative.      Wt Readings from Last 3 Encounters:   10/23/23 69 kg (152 lb 1.9 oz)   10/18/23 70.2 kg (154 lb 12.2 oz)   10/12/23 70.4 kg (155 lb 3.3 oz)     Temp Readings from Last 3 Encounters:   10/23/23 97 °F (36.1 °C) (Tympanic)   10/19/23 98.2 °F (36.8 °C) (Oral)   08/14/23 97.3 °F (36.3 °C)     BP Readings from Last 3 Encounters:   10/23/23 138/84   10/19/23 (!) 154/86   10/12/23 (!) 175/100     Pulse Readings from Last 3 Encounters:   10/23/23 78   10/19/23 91   10/12/23 71       There were no vitals taken for this visit.    Objective:   Physical Exam  Vitals and nursing note reviewed.   Constitutional:       General: She is not in acute distress.     Appearance: She is well-developed. She is not diaphoretic.   HENT:      Head: Normocephalic and atraumatic.      Nose: Nose normal.   Eyes:      General: No scleral icterus.     Conjunctiva/sclera: Conjunctivae normal.   Neck:      Thyroid:  No thyromegaly.      Vascular: No JVD.   Cardiovascular:      Rate and Rhythm: Normal rate and regular rhythm.      Heart sounds: S1 normal and S2 normal. No murmur heard.     No friction rub. No gallop. No S3 or S4 sounds.   Pulmonary:      Effort: Pulmonary effort is normal. No respiratory distress.      Breath sounds: Normal breath sounds. No stridor. No wheezing or rales.   Chest:      Chest wall: No tenderness.   Abdominal:      General: Bowel sounds are normal. There is no distension.      Palpations: Abdomen is soft. There is no mass.      Tenderness: There is no abdominal tenderness. There is no rebound.   Genitourinary:     Comments: Deferred  Musculoskeletal:         General: No tenderness or deformity. Normal range of motion.      Cervical back: Normal range of motion and neck supple.   Lymphadenopathy:      Cervical: No cervical adenopathy.   Skin:     General: Skin is warm and dry.      Coloration: Skin is not pale.      Findings: No erythema or rash.   Neurological:      Mental Status: She is alert and oriented to person, place, and time.      Motor: No abnormal muscle tone.      Coordination: Coordination normal.   Psychiatric:         Behavior: Behavior normal.         Thought Content: Thought content normal.         Judgment: Judgment normal.         Lab Results   Component Value Date     10/23/2023    K 5.4 (H) 10/23/2023     10/23/2023    CO2 19 (L) 10/23/2023    BUN 80 (H) 10/23/2023    CREATININE 3.6 (H) 10/23/2023    GLU 94 10/23/2023    HGBA1C 5.1 07/19/2023    MG 2.1 08/05/2023    AST 17 10/17/2023    ALT 10 10/17/2023    ALBUMIN 3.6 10/19/2023    PROT 7.9 10/17/2023    BILITOT 0.4 10/17/2023    WBC 5.22 10/19/2023    HGB 12.8 10/19/2023    HCT 40.9 10/19/2023    MCV 86 10/19/2023     10/19/2023    INR 1.0 08/05/2023    TSH 0.761 08/04/2023    CHOL 155 08/04/2023    HDL 44 08/04/2023    LDLCALC 101.4 08/04/2023    TRIG 48 08/04/2023     (H) 10/17/2023     Assessment:       No diagnosis found.              Plan:   1. HFPEF, with occ CP/SOB; last   - Nuclear stress 6/2023 neg for ischemia, basal, mid lateral wall scar note  - titrate meds  - cont lasix  - ECHO 8/2023 with normal bi V function and valves, grade 1 DD. Neg bubble study.     2. HTN - elevated/uncontrolled, recent HTN emergency 10/2023 - improving now   - cont meds - increase Coreg dose   - low salt diet  - chance clonidine to patch  - inc Hydralazine to 100mg   - if remains severely elevated needs ER eval     3. CKD4  - f/u with nephro/PCP    4. Hep C  - cont tx per PCP    5. HLD  - cont statin - changed to pravastatin 40mg  - unsure if she was taking livalo     6. SOB ? COPD with smoking abuse  - refer to smoking cessation  - f/u pulm     7. Recurrent H/O TIA/CVA - L sided weakness? -again 3/2023, 8/2023  - cont meds - asa and plavix; 0.5 cm right MCA bifurcation aneurysm.   - ECHO 8/2023 with normal bi V function and valves, grade 1 DD. Neg bubble study.   - Vital monitor 6/2023 with brief SVT/NSVT, rare PVCS, PACs noted; AVG HR 76 bpm.  - Carotids 6/2023 overall normal.    - f/u Neuro    Thank you for allowing me to participate in this patient's care. Please do not hesitate to contact me with any questions or concerns. Consult note has been forwarded to the referral physician.

## 2023-10-25 ENCOUNTER — LAB VISIT (OUTPATIENT)
Dept: LAB | Facility: HOSPITAL | Age: 68
End: 2023-10-25
Attending: INTERNAL MEDICINE
Payer: MEDICARE

## 2023-10-25 ENCOUNTER — OUTPATIENT CASE MANAGEMENT (OUTPATIENT)
Dept: ADMINISTRATIVE | Facility: OTHER | Age: 68
End: 2023-10-25
Payer: MEDICARE

## 2023-10-25 DIAGNOSIS — R80.9 PROTEINURIA, UNSPECIFIED TYPE: ICD-10-CM

## 2023-10-25 DIAGNOSIS — N18.4 CKD (CHRONIC KIDNEY DISEASE) STAGE 4, GFR 15-29 ML/MIN: ICD-10-CM

## 2023-10-25 DIAGNOSIS — I10 PRIMARY HYPERTENSION: ICD-10-CM

## 2023-10-25 LAB
ALBUMIN SERPL BCP-MCNC: 4 G/DL (ref 3.5–5.2)
ANION GAP SERPL CALC-SCNC: 10 MMOL/L (ref 8–16)
BUN SERPL-MCNC: 73 MG/DL (ref 8–23)
CALCIUM SERPL-MCNC: 9.5 MG/DL (ref 8.7–10.5)
CHLORIDE SERPL-SCNC: 107 MMOL/L (ref 95–110)
CO2 SERPL-SCNC: 19 MMOL/L (ref 23–29)
CREAT SERPL-MCNC: 2.9 MG/DL (ref 0.5–1.4)
EST. GFR  (NO RACE VARIABLE): 16.9 ML/MIN/1.73 M^2
GLUCOSE SERPL-MCNC: 88 MG/DL (ref 70–110)
PHOSPHATE SERPL-MCNC: 3.4 MG/DL (ref 2.7–4.5)
POTASSIUM SERPL-SCNC: 5.7 MMOL/L (ref 3.5–5.1)
PTH-INTACT SERPL-MCNC: 98.8 PG/ML (ref 9–77)
SODIUM SERPL-SCNC: 136 MMOL/L (ref 136–145)

## 2023-10-25 PROCEDURE — 80069 RENAL FUNCTION PANEL: CPT | Mod: HCNC | Performed by: INTERNAL MEDICINE

## 2023-10-25 PROCEDURE — 83970 ASSAY OF PARATHORMONE: CPT | Mod: HCNC | Performed by: INTERNAL MEDICINE

## 2023-10-25 PROCEDURE — 36415 COLL VENOUS BLD VENIPUNCTURE: CPT | Mod: HCNC | Performed by: INTERNAL MEDICINE

## 2023-10-26 ENCOUNTER — PATIENT MESSAGE (OUTPATIENT)
Dept: NEUROLOGY | Facility: CLINIC | Age: 68
End: 2023-10-26
Payer: MEDICARE

## 2023-10-26 NOTE — PHYSICIAN QUERY
PT Name: Rose Milligan  MR #: [id number]     DOCUMENTATION CLARIFICATION     CDS/: Pam Reed RN              Contact information:[email or web address]  This form is a permanent document in the medical record.    Query Date: October 26, 2023    By submitting this query, we are merely seeking further clarification of documentation.  Please utilize your independent clinical judgment when addressing the question(s) below.    The Medical Record contains the following:   Indicator Supporting Clinical Findings Location in Medical Record    Kidney (Renal) Insufficiency     x Kidney (Renal) Failure/Injury Renal/  Acute renal failure superimposed on stage 4 chronic kidney disease  Careful hydration  Serial labs  Nephrology consult and follow up in am  H&P 10/17       Critical Care    Nephrotoxic Agents     x BUN/Creatinine           GFR  10/17/23 15:17 10/18/23 03:43 10/19/23 05:41   BUN 40 (H) 35 (H) 45 (H)   Creatinine 3.0 (H) 2.4 (H) 2.7 (H)   eGFR 16 ! 21 ! 18 !     Lab    Urine: Casts         Eosinophils      Urine Output      Dehydration      Nausea/Vomiting      Dialysis/CRRT     x Treatment Acute renal failure superimposed on stage 4 chronic kidney disease  Creatinine gradually trending down   Avoid nephrotoxins  Renal dose medication    Consult 10/18       Hosp Med    Other         [facility] approved diagnostic criteria for acute kidney injury is based on KDIGO criteria:    An increase in serum creatinine > 0.3mg/dl within 48 hours  OR  Increase in serum creatinine to > 1.5x baseline, which is known or presumed to have occurred within the prior 7 days  OR  Urine volume <0.5 ml/kg/hr for 6 hours       The clinical guidelines noted above are only a system guideline. It does not replace the providers clinical judgment.     Patient with CKD stage 4, please confirm/ clarify the MORENITA diagnosis associated with above clinical findings.     [    ] MORENITA is not confirmed and has been ruled out,   CKD stage 4 -  (Severely decreased eGFR 15-29) only is confirmed.     [   x ] MORENITA on CKD stage 4  (Severely decreased eGFR 15-29) is confirmed and clinical signs & symptoms  to support the diagnosis include: ( baseline Cr) ________________     [    ] Other (please specify): _______________________________         Please document in your progress notes daily for the duration of treatment until resolved and include in your discharge summary.    References:   KDIGO Clinical Practice Guideline for Acute Kidney Injury. (2012, March). Retrieved October 21, 2020, from https://kdigo.org/wp-content/uploads/2016/10/DJOSV-5628-ZIN-Guideline-English.pdf    ERICK Ngo MD, CHRIS Coy MD, & MAYURI Galvez MD. (1960). Renal medullary necrosis [Abstract]. The American Journal of Medicine, 29(1), 132-156. Doi:https://www.sciencedirect.com/science/article/abs/pii/6947593269220908    MAYURI Garcia MD, & DRAKE Puga MD, MS. (2020, June 18). Definition and staging of chronic kidney disease in adults ([id number] CHRIS Leon MD, ScD & [id number] ELSY Tyler MD, MSc, Eds.). Retrieved October 21, 2020, from https://www.[employer]/contents/definition-and-staging-of-chronic-kidney-disease-in-adults?search=ckd%20staging&source=search_result&selectedTitle=1~150&usage_type=default&display_rank=1     ERIKA Rodgers MD, FACP. (2015, Heather 15). Acute kidney injury revisited. Retrieved October 21, 2020, from https://acphospitalist.org/archives/2015/06/coding-acute-kidney-injury.htm    GHAZALA Coronado MD. (2019, July). Renal Cortical Necrosis. Retrieved October 21, 2020, from https://www.[employer]/professional/genitourinary-disorders/renovascular-disorders/renal-cortical-necrosis    Form No. 08207

## 2023-10-27 ENCOUNTER — OFFICE VISIT (OUTPATIENT)
Dept: NEUROLOGY | Facility: CLINIC | Age: 68
End: 2023-10-27
Payer: MEDICARE

## 2023-10-27 VITALS
DIASTOLIC BLOOD PRESSURE: 97 MMHG | SYSTOLIC BLOOD PRESSURE: 168 MMHG | HEIGHT: 63 IN | HEART RATE: 71 BPM | WEIGHT: 154.31 LBS | BODY MASS INDEX: 27.34 KG/M2

## 2023-10-27 DIAGNOSIS — G45.9 TIA (TRANSIENT ISCHEMIC ATTACK): ICD-10-CM

## 2023-10-27 DIAGNOSIS — G95.9 MYELOPATHY: ICD-10-CM

## 2023-10-27 DIAGNOSIS — I67.1 BRAIN ANEURYSM: ICD-10-CM

## 2023-10-27 DIAGNOSIS — E78.5 HYPERLIPIDEMIA LDL GOAL <70: ICD-10-CM

## 2023-10-27 DIAGNOSIS — I10 UNCONTROLLED HYPERTENSION: ICD-10-CM

## 2023-10-27 DIAGNOSIS — I63.332 THROMBOTIC STROKE INVOLVING LEFT POSTERIOR CEREBRAL ARTERY: Primary | ICD-10-CM

## 2023-10-27 PROCEDURE — 1126F AMNT PAIN NOTED NONE PRSNT: CPT | Mod: HCNC,CPTII,S$GLB, | Performed by: STUDENT IN AN ORGANIZED HEALTH CARE EDUCATION/TRAINING PROGRAM

## 2023-10-27 PROCEDURE — 3080F DIAST BP >= 90 MM HG: CPT | Mod: HCNC,CPTII,S$GLB, | Performed by: STUDENT IN AN ORGANIZED HEALTH CARE EDUCATION/TRAINING PROGRAM

## 2023-10-27 PROCEDURE — 3288F FALL RISK ASSESSMENT DOCD: CPT | Mod: HCNC,CPTII,S$GLB, | Performed by: STUDENT IN AN ORGANIZED HEALTH CARE EDUCATION/TRAINING PROGRAM

## 2023-10-27 PROCEDURE — 1111F DSCHRG MED/CURRENT MED MERGE: CPT | Mod: HCNC,CPTII,S$GLB, | Performed by: STUDENT IN AN ORGANIZED HEALTH CARE EDUCATION/TRAINING PROGRAM

## 2023-10-27 PROCEDURE — 3044F HG A1C LEVEL LT 7.0%: CPT | Mod: HCNC,CPTII,S$GLB, | Performed by: STUDENT IN AN ORGANIZED HEALTH CARE EDUCATION/TRAINING PROGRAM

## 2023-10-27 PROCEDURE — 1101F PR PT FALLS ASSESS DOC 0-1 FALLS W/OUT INJ PAST YR: ICD-10-PCS | Mod: HCNC,CPTII,S$GLB, | Performed by: STUDENT IN AN ORGANIZED HEALTH CARE EDUCATION/TRAINING PROGRAM

## 2023-10-27 PROCEDURE — 3077F PR MOST RECENT SYSTOLIC BLOOD PRESSURE >= 140 MM HG: ICD-10-PCS | Mod: HCNC,CPTII,S$GLB, | Performed by: STUDENT IN AN ORGANIZED HEALTH CARE EDUCATION/TRAINING PROGRAM

## 2023-10-27 PROCEDURE — 1159F PR MEDICATION LIST DOCUMENTED IN MEDICAL RECORD: ICD-10-PCS | Mod: HCNC,CPTII,S$GLB, | Performed by: STUDENT IN AN ORGANIZED HEALTH CARE EDUCATION/TRAINING PROGRAM

## 2023-10-27 PROCEDURE — 3077F SYST BP >= 140 MM HG: CPT | Mod: HCNC,CPTII,S$GLB, | Performed by: STUDENT IN AN ORGANIZED HEALTH CARE EDUCATION/TRAINING PROGRAM

## 2023-10-27 PROCEDURE — 3066F PR DOCUMENTATION OF TREATMENT FOR NEPHROPATHY: ICD-10-PCS | Mod: HCNC,CPTII,S$GLB, | Performed by: STUDENT IN AN ORGANIZED HEALTH CARE EDUCATION/TRAINING PROGRAM

## 2023-10-27 PROCEDURE — 99215 OFFICE O/P EST HI 40 MIN: CPT | Mod: HCNC,S$GLB,, | Performed by: STUDENT IN AN ORGANIZED HEALTH CARE EDUCATION/TRAINING PROGRAM

## 2023-10-27 PROCEDURE — 99215 PR OFFICE/OUTPT VISIT, EST, LEVL V, 40-54 MIN: ICD-10-PCS | Mod: HCNC,S$GLB,, | Performed by: STUDENT IN AN ORGANIZED HEALTH CARE EDUCATION/TRAINING PROGRAM

## 2023-10-27 PROCEDURE — 3062F PR POS MACROALBUMINURIA RESULT DOCUMENTED/REVIEW: ICD-10-PCS | Mod: HCNC,CPTII,S$GLB, | Performed by: STUDENT IN AN ORGANIZED HEALTH CARE EDUCATION/TRAINING PROGRAM

## 2023-10-27 PROCEDURE — 3066F NEPHROPATHY DOC TX: CPT | Mod: HCNC,CPTII,S$GLB, | Performed by: STUDENT IN AN ORGANIZED HEALTH CARE EDUCATION/TRAINING PROGRAM

## 2023-10-27 PROCEDURE — 1159F MED LIST DOCD IN RCRD: CPT | Mod: HCNC,CPTII,S$GLB, | Performed by: STUDENT IN AN ORGANIZED HEALTH CARE EDUCATION/TRAINING PROGRAM

## 2023-10-27 PROCEDURE — 3288F PR FALLS RISK ASSESSMENT DOCUMENTED: ICD-10-PCS | Mod: HCNC,CPTII,S$GLB, | Performed by: STUDENT IN AN ORGANIZED HEALTH CARE EDUCATION/TRAINING PROGRAM

## 2023-10-27 PROCEDURE — 3008F PR BODY MASS INDEX (BMI) DOCUMENTED: ICD-10-PCS | Mod: HCNC,CPTII,S$GLB, | Performed by: STUDENT IN AN ORGANIZED HEALTH CARE EDUCATION/TRAINING PROGRAM

## 2023-10-27 PROCEDURE — 99999 PR PBB SHADOW E&M-EST. PATIENT-LVL V: ICD-10-PCS | Mod: PBBFAC,HCNC,, | Performed by: STUDENT IN AN ORGANIZED HEALTH CARE EDUCATION/TRAINING PROGRAM

## 2023-10-27 PROCEDURE — 4010F PR ACE/ARB THEARPY RXD/TAKEN: ICD-10-PCS | Mod: HCNC,CPTII,S$GLB, | Performed by: STUDENT IN AN ORGANIZED HEALTH CARE EDUCATION/TRAINING PROGRAM

## 2023-10-27 PROCEDURE — 3008F BODY MASS INDEX DOCD: CPT | Mod: HCNC,CPTII,S$GLB, | Performed by: STUDENT IN AN ORGANIZED HEALTH CARE EDUCATION/TRAINING PROGRAM

## 2023-10-27 PROCEDURE — 1126F PR PAIN SEVERITY QUANTIFIED, NO PAIN PRESENT: ICD-10-PCS | Mod: HCNC,CPTII,S$GLB, | Performed by: STUDENT IN AN ORGANIZED HEALTH CARE EDUCATION/TRAINING PROGRAM

## 2023-10-27 PROCEDURE — 1111F PR DISCHARGE MEDS RECONCILED W/ CURRENT OUTPATIENT MED LIST: ICD-10-PCS | Mod: HCNC,CPTII,S$GLB, | Performed by: STUDENT IN AN ORGANIZED HEALTH CARE EDUCATION/TRAINING PROGRAM

## 2023-10-27 PROCEDURE — 1101F PT FALLS ASSESS-DOCD LE1/YR: CPT | Mod: HCNC,CPTII,S$GLB, | Performed by: STUDENT IN AN ORGANIZED HEALTH CARE EDUCATION/TRAINING PROGRAM

## 2023-10-27 PROCEDURE — 3080F PR MOST RECENT DIASTOLIC BLOOD PRESSURE >= 90 MM HG: ICD-10-PCS | Mod: HCNC,CPTII,S$GLB, | Performed by: STUDENT IN AN ORGANIZED HEALTH CARE EDUCATION/TRAINING PROGRAM

## 2023-10-27 PROCEDURE — 3044F PR MOST RECENT HEMOGLOBIN A1C LEVEL <7.0%: ICD-10-PCS | Mod: HCNC,CPTII,S$GLB, | Performed by: STUDENT IN AN ORGANIZED HEALTH CARE EDUCATION/TRAINING PROGRAM

## 2023-10-27 PROCEDURE — 4010F ACE/ARB THERAPY RXD/TAKEN: CPT | Mod: HCNC,CPTII,S$GLB, | Performed by: STUDENT IN AN ORGANIZED HEALTH CARE EDUCATION/TRAINING PROGRAM

## 2023-10-27 PROCEDURE — 3062F POS MACROALBUMINURIA REV: CPT | Mod: HCNC,CPTII,S$GLB, | Performed by: STUDENT IN AN ORGANIZED HEALTH CARE EDUCATION/TRAINING PROGRAM

## 2023-10-27 PROCEDURE — 99999 PR PBB SHADOW E&M-EST. PATIENT-LVL V: CPT | Mod: PBBFAC,HCNC,, | Performed by: STUDENT IN AN ORGANIZED HEALTH CARE EDUCATION/TRAINING PROGRAM

## 2023-10-27 RX ORDER — ROSUVASTATIN CALCIUM 40 MG/1
40 TABLET, COATED ORAL DAILY
Qty: 90 TABLET | Refills: 9 | Status: SHIPPED | OUTPATIENT
Start: 2023-10-27 | End: 2024-01-16 | Stop reason: SDUPTHER

## 2023-10-27 NOTE — PROGRESS NOTES
Neurology Clinic Note      Date: 10/27/23  Patient Name: Rose Milligan   MRN: [id number]   PCP: Leeann Parham  Referring Provider: Leeann Parham, DO    Assessment and Plan:   Rose Milligan is a 68 y.o. female presenting for a stroke follow up.    Was seen at [facility] in July for an acute infarct in the left thalamus. Her symptoms were left sided weakness that had been going on for atleast a week. Her BP was also elevated. Suspect her symptoms were recrudescence from her chronic right thalamic stroke and the left thalamic infarct was incidental.    Etiology - small vessel disease, from poorly controlled hypertension, tobacco and cocaine use.  -- Continue ASA 81mg daily. No reason from my end to stay on Plavix. Will get in touch with her Cardiologist and confirm why it was restarted.  -- Started on Rosuvastatin 40mg daily. Stop Pravastatin. Target LDL<70. If she fails to meet her target LDL, she may be a candidate for PCSK9 inhibitors.  -- Target BP <130/80. Follow up with PCP and Cardiology to achieve target.  -- Long discussion regarding importance of vascular risk factor control and the fact that she is at a high risk for recurrent strokes.     Discussed Mediterranean Diet recommendations (Adopted from Johnny et al, NEJ, 2018.)  -- Abundant use of Olive Oil for cooking and dressing dishes. Use herbs and spices instead to salt flavored foods.  -- Consumption of ?2 daily servings of vegetables (at least 1 of them as fresh vegetables in a salad), discounting side dishes.  -- ?2-3 daily servings of fresh fruits, ?3 weekly servings of legumes  -- ?3 weekly servings of fish or seafood (at least 1 serving of fatty fish).  Limit red meat and processed meats to no more than few times a month.  -- ?1 weekly serving of nuts or seeds  -- Limit consumption of cream, butter, margarine, processed foods, refined carbohydrates, sugary sodas and sweets      -- Referral placed to Neurosurgery for management of right MCA  aneurysm.  -- Her exam is highly concerning for cervical myelopathy. MRI of the cervical spine ordered.            Problem List Items Addressed This Visit          Neuro    Brain aneurysm    Relevant Orders    Ambulatory referral/consult to Neurosurgery    Thrombotic stroke involving left posterior cerebral artery - Primary    Myelopathy    Relevant Orders    MRI Cervical Spine Without Contrast       Cardiac/Vascular    Uncontrolled hypertension    Hyperlipidemia LDL goal <70    Relevant Medications    rosuvastatin (CRESTOR) 40 MG Tab     Other Visit Diagnoses       TIA (transient ischemic attack)                  Subjective:          HPI:   Ms. Rose Milligan is a 68 y.o. female with a history of HTN, polysubstance abuse (crack cocaine), tobacco use, CHF, bipolar disorder, presenting for a stroke follow-up.    Was seen in Neurology clinic at [place] in July 2023 for left sided weakness that had been present for atleast a week. She had earlier seen her PCP and her BP was elevated at 200/120. CT head showed no acute changes. MRI brain showed punctate acute infarct in the left thalamus along with chronic microvascular ischemic changes in the subcortical white and periventricular regions along with a chronic lacunar infarct in the right thalamus and perivascular spaces in both BG. MRA showed an aneurysm in the right proximal M2. No evidence of high grade stenosis or occlusion was seen.    Reportedly had a stroke around 2 yrs ago. No records. Had left sided weakness at the time. She recovered fully and had no residual deficits. Was only on ASA 81mg. After her last stroke in July 2023, Plavix 75mg was added. She ran out of her Plavix shortly after discharge and it was restarted by her Cardiologist.   No history of recent stents or MI.    Seen at [facility] on 10/18 for chest pain. She also reported some left sided weakness at the time. BP was 213/113.    History of tobacco and crack cocaine use. Last cigarette was a  week ago. Last used cocaine 4 months ago.   BP is poorly controlled per chart review. She doesn't check her BP at home. Her BP is elevated today in clinic. She is on multiple antihypertensives.  , A1c 5.1    She ambulated without assistive devices. No falls.   She is trying to eat healthier - more vegetables and white meat in her diet.        PAST MEDICAL HISTORY:  Past Medical History:   Diagnosis Date    Acute CVA (cerebrovascular accident) 8/4/2023    Bipolar 1 disorder     CHF (congestive heart failure)     Depression     Hepatitis C     Hypertension     Other hyperlipidemia 8/15/2019       PAST SURGICAL HISTORY:  Past Surgical History:   Procedure Laterality Date    HYSTERECTOMY         CURRENT MEDS:  Current Outpatient Medications   Medication Sig Dispense Refill    aspirin (ECOTRIN) 81 MG EC tablet Take 1 tablet (81 mg total) by mouth once daily. 90 tablet 0    carvediloL (COREG) 25 MG tablet Take 2 tablets (50 mg total) by mouth 2 (two) times daily. 120 tablet 0    cloNIDine 0.2 mg/24 hr td ptwk (CATAPRES) 0.2 mg/24 hr Place 1 patch onto the skin every 7 days. (Patient taking differently: Place 1 patch onto the skin every 7 days. (Saturdays)) 4 patch 11    clopidogreL (PLAVIX) 75 mg tablet Take 1 tablet (75 mg total) by mouth once daily. for 20 days 20 tablet 0    furosemide (LASIX) 40 MG tablet Take 0.5 tablets (20 mg total) by mouth once daily. 45 tablet 0    hydrALAZINE (APRESOLINE) 100 MG tablet Take 1 tablet (100 mg total) by mouth every 8 (eight) hours. 180 tablet 1    hydrOXYzine (ATARAX) 50 MG tablet Take 1 tablet (50 mg total) by mouth 2 (two) times daily as needed for Anxiety. 20 tablet 0    irbesartan (AVAPRO) 150 MG tablet Take 2 tablets (300 mg total) by mouth every evening. 180 tablet 3    nicotine (NICODERM CQ) 21 mg/24 hr Place 1 patch onto the skin once daily. 14 patch 0    rosuvastatin (CRESTOR) 40 MG Tab Take 1 tablet (40 mg total) by mouth once daily. 90 tablet 9    tretinoin  "(RETIN-A) 0.025 % cream Apply topically every evening. Pea sized amount to entire face at night. Start out using every other night for 2 weeks, then increase to every night as tolerated 20 g 0    ziprasidone (GEODON) 20 MG Cap Take 1 capsule (20 mg total) by mouth 2 (two) times daily. 60 capsule 3     No current facility-administered medications for this visit.       ALLERGIES:  Review of patient's allergies indicates:   Allergen Reactions    Tramadol Itching    Risperdal [risperidone] Other (See Comments)     Did not like way felt       FAMILY HISTORY:  Family History   Problem Relation Age of Onset    Heart attack Maternal Grandmother     Hypertension Mother        SOCIAL HISTORY:  Social History     Tobacco Use    Smoking status: Some Days     Current packs/day: 0.25     Average packs/day: 0.3 packs/day for 7.8 years (1.9 ttl pk-yrs)     Types: Cigarettes     Start date: 1/11/2016    Smokeless tobacco: Never    Tobacco comments:     1 pack every 3 days   Substance Use Topics    Alcohol use: Yes    Drug use: Yes     Types: Cocaine       Review of Systems:  12 system review of systems is negative except for the symptoms mentioned in HPI.      Objective:     Vitals:    10/27/23 1041   BP: (!) 168/97   Pulse: 71   Weight: 70 kg (154 lb 5.2 oz)   Height: 5' 3" (1.6 m)     General: NAD, well nourished   Eyes: no tearing, discharge, no erythema   Neck: Supple, full range of motion  Cardiovascular: Warm and well perfused  Lungs: Normal work of breathing  Skin: No rash, lesions, or breakdown on exposed skin  Psychiatry: Mood and affect are appropriate       NEUROLOGICAL EXAMINATION:     MENTAL STATUS   Oriented to person, place, and time.   Follows 2 step commands.   Speech: speech is normal   Level of consciousness: alert    CRANIAL NERVES     CN II   Visual fields full to confrontation.     CN III, IV, VI   Extraocular motions are normal.     CN V   Facial sensation intact.     CN XI   CN XI normal.     CN XII   CN XII " normal.        Subtle left nasolabial flattening     MOTOR EXAM   Right arm pronator drift: absent  Left arm pronator drift: absent    Strength   Right deltoid: 5/5  Left deltoid: 5/5  Right biceps: 5/5  Left biceps: 5/5  Right triceps: 5/5  Left triceps: 5/5  Right wrist flexion: 5/5  Left wrist flexion: 5/5  Right wrist extension: 5/5  Left wrist extension: 5/5  Right interossei: 5/5  Left interossei: 5/5  Right iliopsoas: 5/5  Left iliopsoas: 5/5  Right quadriceps: 5/5  Left quadriceps: 5/5  Right hamstrin/5  Left hamstrin/5  Right glutei: 5/5  Left glutei: 5/5  Right anterior tibial: 5/5  Left anterior tibial: 5/5  Right gastroc: 5/5  Left gastroc: 5/5    REFLEXES     Reflexes   Right brachioradialis: 3+  Left brachioradialis: 3+  Right biceps: 3+  Left biceps: 3+  Right triceps: 3+  Left triceps: 3+  Right patellar: 3+  Left patellar: 2+  Right achilles: 3+  Left achilles: 3+  Right plantar: equivocal  Left plantar: upgoing  Right Aragon: present  Left Aragon: present  Right ankle clonus: present (2-3 beats)  Left ankle clonus: present (3-4 beats)    SENSORY EXAM   Light touch normal.     GAIT AND COORDINATION     Gait  Gait: normal     Coordination   Finger to nose coordination: normal        Images:    MRI brain (2023):       Punctate acute infarct in the left thalamus.  Chronic microvascular ischemic changes in the subcortical white matter and basal ganglia seen   along with a few perivascular spaces in the basal ganglia.    TTE (2023):     Left Ventricle: Normal wall motion. There is normal systolic function with a visually estimated ejection fraction of 55 - 70%. There is normal diastolic function. Normal left ventricular filling pressure.    Left Atrium: Normal left atrial size.    Right Ventricle: Normal right ventricular cavity size.    Aortic Valve: The aortic valve is structurally normal. There is mild aortic valve sclerosis.    Mitral Valve: There is no stenosis.    Tricuspid  Valve: There is mild transvalvular regurgitation with a centrally directed jet.    IVC/SVC: Normal venous pressure at 3 mmHg.    Pericardium: Trivial circumferential pericardial effusion present. Pericardial effusion is echolucent. No indication of cardiac tamponade.       Ky Huitron MD  Department of Neurology  [facility]

## 2023-10-27 NOTE — PATIENT INSTRUCTIONS
Continue ASA 81mg. Stop Plavix.   Switch to Crestor 40mg daily  Target LDL<70, BP<130/80  Discussed Mediterranean Diet recommendations (Adopted from Johnny et al, [facility], 2018.)  -- Abundant use of Olive Oil for cooking and dressing dishes. Use herbs and spices instead to salt flavored foods.  -- Consumption of ?2 daily servings of vegetables (at least 1 of them as fresh vegetables in a salad), discounting side dishes.  -- ?2-3 daily servings of fresh fruits, ?3 weekly servings of legumes  -- ?3 weekly servings of fish or seafood (at least 1 serving of fatty fish).  Limit red meat and processed meats to no more than few times a month.  -- ?1 weekly serving of nuts or seeds  -- Limit consumption of cream, butter, margarine, processed foods, refined carbohydrates, sugary sodas and sweets    Referral to Neurosurgery    MRI cervical spine

## 2023-10-28 PROBLEM — E78.5 HYPERLIPIDEMIA LDL GOAL <70: Status: ACTIVE | Noted: 2019-08-15

## 2023-10-28 PROBLEM — I63.332 THROMBOTIC STROKE INVOLVING LEFT POSTERIOR CEREBRAL ARTERY: Status: ACTIVE | Noted: 2023-10-28

## 2023-10-28 PROBLEM — G95.9 MYELOPATHY: Status: ACTIVE | Noted: 2023-10-28

## 2023-10-30 ENCOUNTER — OUTPATIENT CASE MANAGEMENT (OUTPATIENT)
Dept: ADMINISTRATIVE | Facility: OTHER | Age: 68
End: 2023-10-30
Payer: MEDICARE

## 2023-10-30 ENCOUNTER — OFFICE VISIT (OUTPATIENT)
Dept: FAMILY MEDICINE | Facility: CLINIC | Age: 68
End: 2023-10-30
Payer: MEDICARE

## 2023-10-30 ENCOUNTER — PATIENT MESSAGE (OUTPATIENT)
Dept: NEUROSURGERY | Facility: CLINIC | Age: 68
End: 2023-10-30
Payer: MEDICARE

## 2023-10-30 ENCOUNTER — HOSPITAL ENCOUNTER (OUTPATIENT)
Dept: RADIOLOGY | Facility: HOSPITAL | Age: 68
Discharge: HOME | End: 2023-10-30
Attending: INTERNAL MEDICINE
Payer: MEDICARE

## 2023-10-30 VITALS
SYSTOLIC BLOOD PRESSURE: 152 MMHG | HEIGHT: 63 IN | DIASTOLIC BLOOD PRESSURE: 86 MMHG | OXYGEN SATURATION: 99 % | RESPIRATION RATE: 18 BRPM | BODY MASS INDEX: 27.21 KG/M2 | HEART RATE: 64 BPM | WEIGHT: 153.56 LBS | TEMPERATURE: 98 F

## 2023-10-30 DIAGNOSIS — M25.512 ACUTE PAIN OF LEFT SHOULDER: ICD-10-CM

## 2023-10-30 DIAGNOSIS — M25.512 ACUTE PAIN OF LEFT SHOULDER: Primary | ICD-10-CM

## 2023-10-30 PROCEDURE — 3008F PR BODY MASS INDEX (BMI) DOCUMENTED: ICD-10-PCS | Mod: HCNC,CPTII,S$GLB, | Performed by: INTERNAL MEDICINE

## 2023-10-30 PROCEDURE — 3066F NEPHROPATHY DOC TX: CPT | Mod: HCNC,CPTII,S$GLB, | Performed by: INTERNAL MEDICINE

## 2023-10-30 PROCEDURE — 99212 OFFICE O/P EST SF 10 MIN: CPT | Mod: HCNC,S$GLB,, | Performed by: INTERNAL MEDICINE

## 2023-10-30 PROCEDURE — 73030 XR SHOULDER COMPLETE 2 OR MORE VIEWS LEFT: ICD-10-PCS | Mod: 26,HCNC,LT, | Performed by: RADIOLOGY

## 2023-10-30 PROCEDURE — 3066F PR DOCUMENTATION OF TREATMENT FOR NEPHROPATHY: ICD-10-PCS | Mod: HCNC,CPTII,S$GLB, | Performed by: INTERNAL MEDICINE

## 2023-10-30 PROCEDURE — 3044F PR MOST RECENT HEMOGLOBIN A1C LEVEL <7.0%: ICD-10-PCS | Mod: HCNC,CPTII,S$GLB, | Performed by: INTERNAL MEDICINE

## 2023-10-30 PROCEDURE — 1125F PR PAIN SEVERITY QUANTIFIED, PAIN PRESENT: ICD-10-PCS | Mod: HCNC,CPTII,S$GLB, | Performed by: INTERNAL MEDICINE

## 2023-10-30 PROCEDURE — 99212 PR OFFICE/OUTPT VISIT, EST, LEVL II, 10-19 MIN: ICD-10-PCS | Mod: HCNC,S$GLB,, | Performed by: INTERNAL MEDICINE

## 2023-10-30 PROCEDURE — 1101F PR PT FALLS ASSESS DOC 0-1 FALLS W/OUT INJ PAST YR: ICD-10-PCS | Mod: HCNC,CPTII,S$GLB, | Performed by: INTERNAL MEDICINE

## 2023-10-30 PROCEDURE — 4010F ACE/ARB THERAPY RXD/TAKEN: CPT | Mod: HCNC,CPTII,S$GLB, | Performed by: INTERNAL MEDICINE

## 2023-10-30 PROCEDURE — 1101F PT FALLS ASSESS-DOCD LE1/YR: CPT | Mod: HCNC,CPTII,S$GLB, | Performed by: INTERNAL MEDICINE

## 2023-10-30 PROCEDURE — 3062F POS MACROALBUMINURIA REV: CPT | Mod: HCNC,CPTII,S$GLB, | Performed by: INTERNAL MEDICINE

## 2023-10-30 PROCEDURE — 3008F BODY MASS INDEX DOCD: CPT | Mod: HCNC,CPTII,S$GLB, | Performed by: INTERNAL MEDICINE

## 2023-10-30 PROCEDURE — 1111F PR DISCHARGE MEDS RECONCILED W/ CURRENT OUTPATIENT MED LIST: ICD-10-PCS | Mod: HCNC,CPTII,S$GLB, | Performed by: INTERNAL MEDICINE

## 2023-10-30 PROCEDURE — 99999 PR PBB SHADOW E&M-EST. PATIENT-LVL III: ICD-10-PCS | Mod: PBBFAC,HCNC,, | Performed by: INTERNAL MEDICINE

## 2023-10-30 PROCEDURE — 4010F PR ACE/ARB THEARPY RXD/TAKEN: ICD-10-PCS | Mod: HCNC,CPTII,S$GLB, | Performed by: INTERNAL MEDICINE

## 2023-10-30 PROCEDURE — 3288F FALL RISK ASSESSMENT DOCD: CPT | Mod: HCNC,CPTII,S$GLB, | Performed by: INTERNAL MEDICINE

## 2023-10-30 PROCEDURE — 3077F SYST BP >= 140 MM HG: CPT | Mod: HCNC,CPTII,S$GLB, | Performed by: INTERNAL MEDICINE

## 2023-10-30 PROCEDURE — 1111F DSCHRG MED/CURRENT MED MERGE: CPT | Mod: HCNC,CPTII,S$GLB, | Performed by: INTERNAL MEDICINE

## 2023-10-30 PROCEDURE — 1159F MED LIST DOCD IN RCRD: CPT | Mod: HCNC,CPTII,S$GLB, | Performed by: INTERNAL MEDICINE

## 2023-10-30 PROCEDURE — 1159F PR MEDICATION LIST DOCUMENTED IN MEDICAL RECORD: ICD-10-PCS | Mod: HCNC,CPTII,S$GLB, | Performed by: INTERNAL MEDICINE

## 2023-10-30 PROCEDURE — 99999 PR PBB SHADOW E&M-EST. PATIENT-LVL III: CPT | Mod: PBBFAC,HCNC,, | Performed by: INTERNAL MEDICINE

## 2023-10-30 PROCEDURE — 3079F PR MOST RECENT DIASTOLIC BLOOD PRESSURE 80-89 MM HG: ICD-10-PCS | Mod: HCNC,CPTII,S$GLB, | Performed by: INTERNAL MEDICINE

## 2023-10-30 PROCEDURE — 3288F PR FALLS RISK ASSESSMENT DOCUMENTED: ICD-10-PCS | Mod: HCNC,CPTII,S$GLB, | Performed by: INTERNAL MEDICINE

## 2023-10-30 PROCEDURE — 73030 X-RAY EXAM OF SHOULDER: CPT | Mod: TC,HCNC,FY,PO,LT

## 2023-10-30 PROCEDURE — 73030 X-RAY EXAM OF SHOULDER: CPT | Mod: 26,HCNC,LT, | Performed by: RADIOLOGY

## 2023-10-30 PROCEDURE — 3079F DIAST BP 80-89 MM HG: CPT | Mod: HCNC,CPTII,S$GLB, | Performed by: INTERNAL MEDICINE

## 2023-10-30 PROCEDURE — 1125F AMNT PAIN NOTED PAIN PRSNT: CPT | Mod: HCNC,CPTII,S$GLB, | Performed by: INTERNAL MEDICINE

## 2023-10-30 PROCEDURE — 3077F PR MOST RECENT SYSTOLIC BLOOD PRESSURE >= 140 MM HG: ICD-10-PCS | Mod: HCNC,CPTII,S$GLB, | Performed by: INTERNAL MEDICINE

## 2023-10-30 PROCEDURE — 3044F HG A1C LEVEL LT 7.0%: CPT | Mod: HCNC,CPTII,S$GLB, | Performed by: INTERNAL MEDICINE

## 2023-10-30 PROCEDURE — 3062F PR POS MACROALBUMINURIA RESULT DOCUMENTED/REVIEW: ICD-10-PCS | Mod: HCNC,CPTII,S$GLB, | Performed by: INTERNAL MEDICINE

## 2023-10-30 NOTE — LETTER
Rose Milligan  [address]  [place] [address]    Dear Rose Milligan,     Welcome to [facility] Out[facility]. We are here to assist patients with multiple long-term (chronic) conditions who often need more personalized healthcare.    It was a pleasure talking with you today. My name is Sarai Andersen RN. I look forward to working with you as your Care Manager. I will be contacting you by telephone routinely to help coordinate care and resolve issues.    My goal is to help you function at the healthiest and highest level possible. You can contact me directly at [phone].    As an [facility] patient with [employer], some of the services we provide, at no cost to you, include:     Development of an individualized care plan with a Registered Nurse   Connection with a   Assistance from a Community Health Worker  Connection with available resources and services    Coordinate communication among your care team members   Provide coaching and education  Help you understand your doctor's treatment plan  Help you obtain information about your insurance coverage.    All services provided by [facility] Outpatient Care Managers and other care team members are coordinated with and communicated to your primary care team.      As part of your enrollment, you will be receiving education materials and more information about these services in your [facility] account, by phone, or through the mail. If you do not wish to participate or receive information, you can Opt Out by contacting our office at [phone].      Sincerely,        Sarai Andersen RN  [facility]   Outpatient Care Management

## 2023-10-30 NOTE — PROGRESS NOTES
"Subjective     Patient ID: Rose Milligan is a 68 y.o. female.    Chief Complaint: left side pain      HPI  Patient presents with pain in the left side. She states the pain begins in the left arm and radiates down the left side of her body. She has had this pain since Saturday. She reports she has taken "multiple medications" with no help. She reports there was no injury.    Review of Systems   Constitutional:  Negative for chills and fatigue.   Respiratory:  Negative for chest tightness and shortness of breath.    Cardiovascular:  Negative for chest pain and palpitations.   Gastrointestinal:  Negative for abdominal pain, constipation, diarrhea, nausea and vomiting.   Genitourinary:  Negative for frequency and urgency.   Musculoskeletal:         Left side pain   Neurological:  Negative for dizziness, numbness and headaches.          Objective   Current Outpatient Medications   Medication Instructions    aspirin (ECOTRIN) 81 mg, Oral, Daily    carvediloL (COREG) 50 mg, Oral, 2 times daily    cloNIDine 0.2 mg/24 hr td ptwk (CATAPRES) 0.2 mg/24 hr 1 patch, Transdermal, Every 7 days    clopidogreL (PLAVIX) 75 mg, Oral, Daily    furosemide (LASIX) 20 mg, Oral, Daily    hydrALAZINE (APRESOLINE) 100 mg, Oral, Every 8 hours    hydrOXYzine (ATARAX) 50 mg, Oral, 2 times daily PRN    irbesartan (AVAPRO) 300 mg, Oral, Nightly    nicotine (NICODERM CQ) 21 mg/24 hr 1 patch, Transdermal, Daily    rosuvastatin (CRESTOR) 40 mg, Oral, Daily    tretinoin (RETIN-A) 0.025 % cream Topical (Top), Nightly, Pea sized amount to entire face at night. Start out using every other night for 2 weeks, then increase to every night as tolerated    ziprasidone (GEODON) 20 mg, Oral, 2 times daily      Physical Exam  Constitutional:       General: She is not in acute distress.     Appearance: Normal appearance. She is normal weight.   HENT:      Head: Normocephalic and atraumatic.   Cardiovascular:      Rate and Rhythm: Normal rate and regular rhythm. "      Heart sounds: Normal heart sounds. No murmur heard.     No friction rub. No gallop.   Pulmonary:      Effort: Pulmonary effort is normal.      Breath sounds: Normal breath sounds. No wheezing, rhonchi or rales.   Abdominal:      General: Bowel sounds are normal. There is no distension.      Palpations: Abdomen is soft.      Tenderness: There is no abdominal tenderness. There is no rebound.   Skin:     General: Skin is warm and dry.      Coloration: Skin is not jaundiced.   Neurological:      General: No focal deficit present.      Mental Status: She is alert and oriented to person, place, and time. Mental status is at baseline.   Psychiatric:         Mood and Affect: Mood normal.         Behavior: Behavior normal.            Assessment and Plan     1. Acute pain of left shoulder  -     X-Ray Shoulder 2 or More Views Left; Future; Expected date: 10/30/2023    Will obtain xray of left shoulder today. Advised patient I cannot prescribe pain medication due to her kidney function and advised she go to the ER for further work up           No follow-ups on file.

## 2023-10-30 NOTE — PROGRESS NOTES
Outpatient Care Management  Initial Patient Assessment    Patient: Rose Milligan  MRN: [id number]  Date of Service: 10/30/2023  Completed by: Sarai Andersen RN  Referral Date: 10/18/2023  Date of Eligibility: 10/18/2023  Program: High Risk  Status: Ongoing  Effective Dates: 10/30/2023 - present  Responsible Staff: Sarai Andersen RN        Reason for Visit   Patient presents with    OPCM Enrollment Call     3rd Attempt.        Brief Summary:  Rose Milligan was referred by Dr OLGA Walls for HTN. Patient qualifies for program based on risk score > 60% at time of referral.   Active problem list, medical, surgical and social history reviewed. Active comorbidities include hx of CVA, cerebral aneurism, bipolar disorder, HTN, HFpEF, CKD4 and current some day tobacco use. Areas of need identified by patient include help with getting a new BP cuff and cane and education about HTN and CKD4.   Next steps: Mail educational literature about CKD and HTN along with a [employer] OTC booklet and follow up in two weeks to begin to discuss both disease processes and measures to help manage them. Educate about the [facility]N program and the [employer] OTC program. Sarai Andersen RN    Disability Status  Is the patient alert and oriented (person, place, time, and situation)?: Alert and oriented x 4  Hearing Difficulty or Deaf: no  Visual Difficulty or Blind: yes  Visual and Hearing Needs Conclusion: -- (Reports some vision issues since being diagnosed with cerebral anuerism, but is followed by ophthal provider.)  Difficulty Concentrating, Remembering or Making Decisions: no  Communication Difficulty: no  Eating/Swallowing Difficulty: no  Walking or Climbing Stairs Difficulty: yes  Walking or Climbing Stairs: ambulation difficulty, requires equipment  Mobility Management: -- (Reports she wants to get a cane because she occasionally has balance issues.)  Dressing/Bathing Difficulty: no  Toileting : Independent  Continence :  Continence - Not a problem  Difficulty Managing Errands Independently: no  Equipment Currently Used at Home: blood pressure machine  ADL Conclusion Statement: -- (Reports she is independent with all ADL's at this time, but feels she could benefit from getting a cane that she sometimes feels she needs.)  Change in Functional Status Since Onset of Current Illness/Injury: no        Spiritual Beliefs  Spiritual, Cultural Beliefs, Sabianism Practices, Values that Affect Care: no      Social History     Socioeconomic History    Marital status:    Tobacco Use    Smoking status: Some Days     Current packs/day: 0.25     Average packs/day: 0.3 packs/day for 7.8 years (2.0 ttl pk-yrs)     Types: Cigarettes     Start date: 1/11/2016    Smokeless tobacco: Never    Tobacco comments:     1 pack every 3 days   Substance and Sexual Activity    Alcohol use: Yes    Drug use: Yes     Types: Cocaine    Sexual activity: Not Currently     Social Determinants of Health     Financial Resource Strain: Medium Risk (8/5/2023)    Overall Financial Resource Strain (CARDIA)     Difficulty of Paying Living Expenses: Somewhat hard   Food Insecurity: Food Insecurity Present (8/5/2023)    Hunger Vital Sign     Worried About Running Out of Food in the Last Year: Sometimes true   Transportation Needs: No Transportation Needs (8/5/2023)    PRAPARE - Transportation     Lack of Transportation (Medical): No     Lack of Transportation (Non-Medical): No   Physical Activity: Inactive (8/5/2023)    Exercise Vital Sign     Days of Exercise per Week: 0 days     Minutes of Exercise per Session: 0 min   Stress: Stress Concern Present (8/5/2023)    [employer] [place] - Occupational Stress Questionnaire     Feeling of Stress : To some extent   Social Connections: Moderately Isolated (8/5/2023)    Social Connection and Isolation Panel [NHANES]     Frequency of Communication with Friends and Family: Twice a week     Frequency of Social  Gatherings with Friends and Family: Once a week     Attends Mandaen Services: More than 4 times per year     Active Member of Clubs or Organizations: No     Attends Club or Organization Meetings: Never     Marital Status:    Housing Stability: Unknown (8/5/2023)    Housing Stability Vital Sign     Unable to Pay for Housing in the Last Year: No     Unstable Housing in the Last Year: No       Roles and Relationships  Primary Source of Support/Comfort: child(darell); sibling(s)  Name of Support/Comfort Primary Source: -- (Jeanette)  Primary Roles/Responsibilities: retired  Secondary Source of Support/Comfort: child(darell)  Name of Support/Comfort Secondary Source: -- (Tonocha)      Advance Directives (For Healthcare)  Advance Directive  (If Adv Dir status is received, view document under Adv Dir in header or Chart Review Media tab): Patient does not have Advance Directive, declines information.        Patient Reported Insurance  Verified current insurance plan:: [employer] Advantage  [employer] benefits discussed:: OTC Prescription Discounts; Well Dine            10/30/2023     1:47 PM 10/27/2023    10:40 AM 12/2/2022     2:21 PM 7/18/2022     9:36 AM 5/10/2021     3:12 PM   Depression Patient Health Questionnaire   Over the last two weeks how often have you been bothered by little interest or pleasure in doing things Not at all Not at all Several days  Not at all   Over the last two weeks how often have you been bothered by feeling down, depressed or hopeless Not at all Not at all Not at all  Not at all   PHQ-2 Total Score 0 0 1  0   Over the last two weeks how often have you been bothered by trouble falling or staying asleep, or sleeping too much        Over the last two weeks how often have you been bothered by feeling tired or having little energy        Over the last two weeks how often have you been bothered by a poor appetite or overeating        Over the last two weeks how often have you been bothered by  feeling bad about yourself - or that you are a failure or have let yourself or your family down        Over the last two weeks how often have you been bothered by trouble concentrating on things, such as reading the newspaper or watching television        Over the last two weeks how often have you been bothered by moving or speaking so slowly that other people could have noticed. Or the opposite - being so fidgety or restless that you have been moving around a lot more than usual.        Over the last two weeks how often have you been bothered by thoughts that you would be better off dead, or of hurting yourself        If you checked off any problems, how difficult have these problems made it for you to do your work, take care of things at home or get along with other people?        PHQ-9 Score        PHQ-9 Interpretation            Information is confidential and restricted. Go to Review Flowsheets to unlock data.       Learning Assessment       10/30/2023 1458 [facility] (10/30/2023 - Present)   Created by Sarai Andersen, RN - RN (Nurse) Status: Complete                 PRIMARY LEARNER     Primary Learner Name:  Ngoc  - 10/30/2023 1458    Relationship:  Patient  - 10/30/2023 1458    Does the primary learner have any barriers to learning?:  No Barriers  - 10/30/2023 1458    What is the preferred language of the primary learner?:  English [facility] 10/30/2023 1458    Is an  required?:  No [facility] 10/30/2023 1458    How does the primary learner prefer to learn new concepts?:  Listening, Reading [facility] 10/30/2023 1458    How often do you need to have someone help you read instructions, pamphlets, or written material from your doctor or pharmacy?:  Never [facility] 10/30/2023 1458        CO-LEARNER #1     No question answered        CO-LEARNER #2     No question answered        SPECIAL TOPICS     No question answered        ANSWERED BY:     No question answered        Edit History       Sarai Andersen  DENISHA, RN - RN (Nurse)   10/30/2023 3258

## 2023-11-01 ENCOUNTER — TELEPHONE (OUTPATIENT)
Dept: NEPHROLOGY | Facility: CLINIC | Age: 68
End: 2023-11-01

## 2023-11-01 ENCOUNTER — PATIENT MESSAGE (OUTPATIENT)
Dept: NEUROLOGY | Facility: CLINIC | Age: 68
End: 2023-11-01
Payer: MEDICARE

## 2023-11-01 ENCOUNTER — OFFICE VISIT (OUTPATIENT)
Dept: NEPHROLOGY | Facility: CLINIC | Age: 68
End: 2023-11-01
Payer: MEDICARE

## 2023-11-01 VITALS
DIASTOLIC BLOOD PRESSURE: 94 MMHG | WEIGHT: 155 LBS | HEIGHT: 63 IN | BODY MASS INDEX: 27.46 KG/M2 | HEART RATE: 82 BPM | SYSTOLIC BLOOD PRESSURE: 150 MMHG

## 2023-11-01 DIAGNOSIS — N18.4 CKD (CHRONIC KIDNEY DISEASE) STAGE 4, GFR 15-29 ML/MIN: Primary | ICD-10-CM

## 2023-11-01 DIAGNOSIS — N25.81 SECONDARY HYPERPARATHYROIDISM OF RENAL ORIGIN: ICD-10-CM

## 2023-11-01 DIAGNOSIS — E87.5 HYPERKALEMIA: ICD-10-CM

## 2023-11-01 DIAGNOSIS — I10 PRIMARY HYPERTENSION: ICD-10-CM

## 2023-11-01 PROCEDURE — 3008F BODY MASS INDEX DOCD: CPT | Mod: HCNC,CPTII,S$GLB, | Performed by: INTERNAL MEDICINE

## 2023-11-01 PROCEDURE — 3044F PR MOST RECENT HEMOGLOBIN A1C LEVEL <7.0%: ICD-10-PCS | Mod: HCNC,CPTII,S$GLB, | Performed by: INTERNAL MEDICINE

## 2023-11-01 PROCEDURE — 3066F PR DOCUMENTATION OF TREATMENT FOR NEPHROPATHY: ICD-10-PCS | Mod: HCNC,CPTII,S$GLB, | Performed by: INTERNAL MEDICINE

## 2023-11-01 PROCEDURE — 3077F PR MOST RECENT SYSTOLIC BLOOD PRESSURE >= 140 MM HG: ICD-10-PCS | Mod: HCNC,CPTII,S$GLB, | Performed by: INTERNAL MEDICINE

## 2023-11-01 PROCEDURE — 99215 OFFICE O/P EST HI 40 MIN: CPT | Mod: HCNC,S$GLB,, | Performed by: INTERNAL MEDICINE

## 2023-11-01 PROCEDURE — 1160F RVW MEDS BY RX/DR IN RCRD: CPT | Mod: HCNC,CPTII,S$GLB, | Performed by: INTERNAL MEDICINE

## 2023-11-01 PROCEDURE — 99999 PR PBB SHADOW E&M-EST. PATIENT-LVL III: CPT | Mod: PBBFAC,HCNC,, | Performed by: INTERNAL MEDICINE

## 2023-11-01 PROCEDURE — 1101F PT FALLS ASSESS-DOCD LE1/YR: CPT | Mod: HCNC,CPTII,S$GLB, | Performed by: INTERNAL MEDICINE

## 2023-11-01 PROCEDURE — 3080F DIAST BP >= 90 MM HG: CPT | Mod: HCNC,CPTII,S$GLB, | Performed by: INTERNAL MEDICINE

## 2023-11-01 PROCEDURE — 1159F MED LIST DOCD IN RCRD: CPT | Mod: HCNC,CPTII,S$GLB, | Performed by: INTERNAL MEDICINE

## 2023-11-01 PROCEDURE — 1125F AMNT PAIN NOTED PAIN PRSNT: CPT | Mod: HCNC,CPTII,S$GLB, | Performed by: INTERNAL MEDICINE

## 2023-11-01 PROCEDURE — 99215 PR OFFICE/OUTPT VISIT, EST, LEVL V, 40-54 MIN: ICD-10-PCS | Mod: HCNC,S$GLB,, | Performed by: INTERNAL MEDICINE

## 2023-11-01 PROCEDURE — 3080F PR MOST RECENT DIASTOLIC BLOOD PRESSURE >= 90 MM HG: ICD-10-PCS | Mod: HCNC,CPTII,S$GLB, | Performed by: INTERNAL MEDICINE

## 2023-11-01 PROCEDURE — 1160F PR REVIEW ALL MEDS BY PRESCRIBER/CLIN PHARMACIST DOCUMENTED: ICD-10-PCS | Mod: HCNC,CPTII,S$GLB, | Performed by: INTERNAL MEDICINE

## 2023-11-01 PROCEDURE — 1111F DSCHRG MED/CURRENT MED MERGE: CPT | Mod: HCNC,CPTII,S$GLB, | Performed by: INTERNAL MEDICINE

## 2023-11-01 PROCEDURE — 3044F HG A1C LEVEL LT 7.0%: CPT | Mod: HCNC,CPTII,S$GLB, | Performed by: INTERNAL MEDICINE

## 2023-11-01 PROCEDURE — 4010F PR ACE/ARB THEARPY RXD/TAKEN: ICD-10-PCS | Mod: HCNC,CPTII,S$GLB, | Performed by: INTERNAL MEDICINE

## 2023-11-01 PROCEDURE — 3062F PR POS MACROALBUMINURIA RESULT DOCUMENTED/REVIEW: ICD-10-PCS | Mod: HCNC,CPTII,S$GLB, | Performed by: INTERNAL MEDICINE

## 2023-11-01 PROCEDURE — 1111F PR DISCHARGE MEDS RECONCILED W/ CURRENT OUTPATIENT MED LIST: ICD-10-PCS | Mod: HCNC,CPTII,S$GLB, | Performed by: INTERNAL MEDICINE

## 2023-11-01 PROCEDURE — 3066F NEPHROPATHY DOC TX: CPT | Mod: HCNC,CPTII,S$GLB, | Performed by: INTERNAL MEDICINE

## 2023-11-01 PROCEDURE — 3288F FALL RISK ASSESSMENT DOCD: CPT | Mod: HCNC,CPTII,S$GLB, | Performed by: INTERNAL MEDICINE

## 2023-11-01 PROCEDURE — 1159F PR MEDICATION LIST DOCUMENTED IN MEDICAL RECORD: ICD-10-PCS | Mod: HCNC,CPTII,S$GLB, | Performed by: INTERNAL MEDICINE

## 2023-11-01 PROCEDURE — 3077F SYST BP >= 140 MM HG: CPT | Mod: HCNC,CPTII,S$GLB, | Performed by: INTERNAL MEDICINE

## 2023-11-01 PROCEDURE — 3008F PR BODY MASS INDEX (BMI) DOCUMENTED: ICD-10-PCS | Mod: HCNC,CPTII,S$GLB, | Performed by: INTERNAL MEDICINE

## 2023-11-01 PROCEDURE — 1101F PR PT FALLS ASSESS DOC 0-1 FALLS W/OUT INJ PAST YR: ICD-10-PCS | Mod: HCNC,CPTII,S$GLB, | Performed by: INTERNAL MEDICINE

## 2023-11-01 PROCEDURE — 4010F ACE/ARB THERAPY RXD/TAKEN: CPT | Mod: HCNC,CPTII,S$GLB, | Performed by: INTERNAL MEDICINE

## 2023-11-01 PROCEDURE — 99999 PR PBB SHADOW E&M-EST. PATIENT-LVL III: ICD-10-PCS | Mod: PBBFAC,HCNC,, | Performed by: INTERNAL MEDICINE

## 2023-11-01 PROCEDURE — 3288F PR FALLS RISK ASSESSMENT DOCUMENTED: ICD-10-PCS | Mod: HCNC,CPTII,S$GLB, | Performed by: INTERNAL MEDICINE

## 2023-11-01 PROCEDURE — 1125F PR PAIN SEVERITY QUANTIFIED, PAIN PRESENT: ICD-10-PCS | Mod: HCNC,CPTII,S$GLB, | Performed by: INTERNAL MEDICINE

## 2023-11-01 PROCEDURE — 3062F POS MACROALBUMINURIA REV: CPT | Mod: HCNC,CPTII,S$GLB, | Performed by: INTERNAL MEDICINE

## 2023-11-01 NOTE — TELEPHONE ENCOUNTER
Called and provided patient with a low potassium diet and appointments. She expressed understanding.

## 2023-11-01 NOTE — PROGRESS NOTES
"Subjective:       Patient ID: Rose Milligan is a 68 y.o. female.    Chief Complaint: Chronic Kidney Disease    HPI    She presents to clinic today for follow-up of recent hospitalization for hypertensive urgency and acute kidney injury.  In the clinic today her primary complaint is around osteoarthritis.  We reviewed the importance of avoiding NSAIDs and Cevallos 2 inhibitors.  We discussed that Tylenol arthritis strength be the safest over-the-counter medication that she could use.  Her laboratory studies medications were reviewed.  All Nephrology related questions were answered to her satisfaction.    Review of Systems   Constitutional: Negative.    HENT: Negative.     Respiratory: Negative.     Cardiovascular: Negative.    Gastrointestinal: Negative.    Genitourinary: Negative.    Musculoskeletal:  Positive for arthralgias.   Skin: Negative.        BP (!) 150/94   Pulse 82   Ht 5' 3" (1.6 m)   Wt 70.3 kg (154 lb 15.7 oz)   BMI 27.45 kg/m²     Lab Results   Component Value Date    WBC 5.22 10/19/2023    HGB 12.8 10/19/2023    HCT 40.9 10/19/2023    MCV 86 10/19/2023     10/19/2023      BMP  Lab Results   Component Value Date     10/25/2023    K 5.7 (H) 10/25/2023     10/25/2023    CO2 19 (L) 10/25/2023    BUN 73 (H) 10/25/2023    CREATININE 2.9 (H) 10/25/2023    CALCIUM 9.5 10/25/2023    ANIONGAP 10 10/25/2023    ESTGFRAFRICA 17.7 (A) 07/30/2021    EGFRNONAA 15.4 (A) 07/30/2021     CMP  Sodium   Date Value Ref Range Status   10/25/2023 136 136 - 145 mmol/L Final     Potassium   Date Value Ref Range Status   10/25/2023 5.7 (H) 3.5 - 5.1 mmol/L Final     Comment:     *No Visible Hemolysis     Chloride   Date Value Ref Range Status   10/25/2023 107 95 - 110 mmol/L Final     CO2   Date Value Ref Range Status   10/25/2023 19 (L) 23 - 29 mmol/L Final     Glucose   Date Value Ref Range Status   10/25/2023 88 70 - 110 mg/dL Final     BUN   Date Value Ref Range Status   10/25/2023 73 (H) 8 - 23 mg/dL " Final     Creatinine   Date Value Ref Range Status   10/25/2023 2.9 (H) 0.5 - 1.4 mg/dL Final     Calcium   Date Value Ref Range Status   10/25/2023 9.5 8.7 - 10.5 mg/dL Final     Total Protein   Date Value Ref Range Status   10/17/2023 7.9 6.0 - 8.4 g/dL Final     Albumin   Date Value Ref Range Status   10/25/2023 4.0 3.5 - 5.2 g/dL Final     Total Bilirubin   Date Value Ref Range Status   10/17/2023 0.4 0.1 - 1.0 mg/dL Final     Comment:     For infants and newborns, interpretation of results should be based  on gestational age, weight and in agreement with clinical  observations.    Premature Infant recommended reference ranges:  Up to 24 hours.............<8.0 mg/dL  Up to 48 hours............<12.0 mg/dL  3-5 days..................<15.0 mg/dL  6-29 days.................<15.0 mg/dL       Alkaline Phosphatase   Date Value Ref Range Status   10/17/2023 129 55 - 135 U/L Final     AST   Date Value Ref Range Status   10/17/2023 17 10 - 40 U/L Final     ALT   Date Value Ref Range Status   10/17/2023 10 10 - 44 U/L Final     Anion Gap   Date Value Ref Range Status   10/25/2023 10 8 - 16 mmol/L Final     eGFR if    Date Value Ref Range Status   07/30/2021 17.7 (A) >60 mL/min/1.73 m^2 Final     eGFR if non    Date Value Ref Range Status   07/30/2021 15.4 (A) >60 mL/min/1.73 m^2 Final     Comment:     Calculation used to obtain the estimated glomerular filtration  rate (eGFR) is the CKD-EPI equation.        Current Outpatient Medications on File Prior to Visit   Medication Sig Dispense Refill    clopidogreL (PLAVIX) 75 mg tablet Take 1 tablet (75 mg total) by mouth once daily. for 20 days 20 tablet 0    aspirin (ECOTRIN) 81 MG EC tablet Take 1 tablet (81 mg total) by mouth once daily. 90 tablet 0    carvediloL (COREG) 25 MG tablet Take 2 tablets (50 mg total) by mouth 2 (two) times daily. 120 tablet 0    cloNIDine 0.2 mg/24 hr td ptwk (CATAPRES) 0.2 mg/24 hr Place 1 patch onto the skin every  7 days. (Patient taking differently: Place 1 patch onto the skin every 7 days. (Saturdays)) 4 patch 11    furosemide (LASIX) 40 MG tablet Take 0.5 tablets (20 mg total) by mouth once daily. 45 tablet 0    hydrALAZINE (APRESOLINE) 100 MG tablet Take 1 tablet (100 mg total) by mouth every 8 (eight) hours. 180 tablet 1    hydrOXYzine (ATARAX) 50 MG tablet Take 1 tablet (50 mg total) by mouth 2 (two) times daily as needed for Anxiety. 20 tablet 0    irbesartan (AVAPRO) 150 MG tablet Take 2 tablets (300 mg total) by mouth every evening. 180 tablet 3    nicotine (NICODERM CQ) 21 mg/24 hr Place 1 patch onto the skin once daily. 14 patch 0    rosuvastatin (CRESTOR) 40 MG Tab Take 1 tablet (40 mg total) by mouth once daily. 90 tablet 9    tretinoin (RETIN-A) 0.025 % cream Apply topically every evening. Pea sized amount to entire face at night. Start out using every other night for 2 weeks, then increase to every night as tolerated 20 g 0    ziprasidone (GEODON) 20 MG Cap Take 1 capsule (20 mg total) by mouth 2 (two) times daily. 60 capsule 3     No current facility-administered medications on file prior to visit.            Objective:            Physical Exam  Constitutional:       Appearance: Normal appearance.   HENT:      Head: Normocephalic and atraumatic.   Eyes:      General: No scleral icterus.     Extraocular Movements: Extraocular movements intact.      Pupils: Pupils are equal, round, and reactive to light.   Cardiovascular:      Rate and Rhythm: Normal rate and regular rhythm.   Pulmonary:      Effort: Pulmonary effort is normal.      Breath sounds: Normal breath sounds.   Musculoskeletal:      Right lower leg: No edema.      Left lower leg: No edema.   Skin:     General: Skin is warm and dry.   Neurological:      General: No focal deficit present.      Mental Status: She is alert and oriented to person, place, and time.   Psychiatric:         Mood and Affect: Mood normal.         Behavior: Behavior normal.        Assessment:       1. CKD (chronic kidney disease) stage 4, GFR 15-29 ml/min    2. Primary hypertension    3. Secondary hyperparathyroidism of renal origin      4. Hyperkalemia  Plan:       1. Creatinine has run between about 2.5 and 3 for the past several years.  He had a episode of acute kidney injury few weeks ago her creatinine peaked at around 3.6.  This was secondary to hypertensive crisis.  She is on a RAAS inhibitor.    Potassium was mildly elevated at 5.7.  Will continue to monitor.  If her potassium remains elevated we may have to consider stopping her RAAS inhibitor.  Will plan to recheck a renal panel in 1 week to monitor potassium.    2. Blood pressure was mildly elevated in the clinic today.  This is likely secondary to arthritic pain.  See discussion HPI.    3. Intact PTH is mildly elevated at 99 consistent with her stage renal function.  Will check a vitamin-D level prior to her next office visit.  Calcium is stable at 9.5 with an albumin of 4.0.  Phosphorus was normal at 3.4.      4. Potassium was mildly elevated at 5.7.  Will recheck a renal panel in a week.  Additionally I will ask her to follow a low-potassium diet.    Approximately 40 minutes was spent in face-to-face conversation and chart review.      Ian Estes MD

## 2023-11-01 NOTE — TELEPHONE ENCOUNTER
----- Message from Ian Estes MD sent at 11/1/2023  8:34 AM CDT -----  I forgot to ask Ms. Milligan to check a renal panel in a week.  A put the order in.  Would you please call her and ask her where she would like to have the labs done?  Also would you please ask her to try to follow a low-potassium diet.  We can send her a potassium list if she would like.    Thanks

## 2023-11-02 ENCOUNTER — PATIENT OUTREACH (OUTPATIENT)
Dept: ADMINISTRATIVE | Facility: OTHER | Age: 68
End: 2023-11-02
Payer: MEDICARE

## 2023-11-06 PROBLEM — I63.9 ACUTE CVA (CEREBROVASCULAR ACCIDENT): Status: RESOLVED | Noted: 2023-08-04 | Resolved: 2023-11-06

## 2023-11-08 ENCOUNTER — LAB VISIT (OUTPATIENT)
Dept: LAB | Facility: HOSPITAL | Age: 68
End: 2023-11-08
Attending: INTERNAL MEDICINE
Payer: MEDICARE

## 2023-11-08 DIAGNOSIS — E87.5 HYPERKALEMIA: ICD-10-CM

## 2023-11-08 LAB
ALBUMIN SERPL BCP-MCNC: 4.1 G/DL (ref 3.5–5.2)
ANION GAP SERPL CALC-SCNC: 11 MMOL/L (ref 8–16)
BUN SERPL-MCNC: 31 MG/DL (ref 8–23)
CALCIUM SERPL-MCNC: 9.7 MG/DL (ref 8.7–10.5)
CHLORIDE SERPL-SCNC: 108 MMOL/L (ref 95–110)
CO2 SERPL-SCNC: 23 MMOL/L (ref 23–29)
CREAT SERPL-MCNC: 2.6 MG/DL (ref 0.5–1.4)
EST. GFR  (NO RACE VARIABLE): 19.3 ML/MIN/1.73 M^2
GLUCOSE SERPL-MCNC: 123 MG/DL (ref 70–110)
PHOSPHATE SERPL-MCNC: 3.6 MG/DL (ref 2.7–4.5)
POTASSIUM SERPL-SCNC: 4.8 MMOL/L (ref 3.5–5.1)
SODIUM SERPL-SCNC: 142 MMOL/L (ref 136–145)

## 2023-11-08 PROCEDURE — 80069 RENAL FUNCTION PANEL: CPT | Mod: HCNC | Performed by: INTERNAL MEDICINE

## 2023-11-08 PROCEDURE — 36415 COLL VENOUS BLD VENIPUNCTURE: CPT | Mod: HCNC | Performed by: INTERNAL MEDICINE

## 2023-11-09 ENCOUNTER — TELEPHONE (OUTPATIENT)
Dept: NEUROSURGERY | Facility: CLINIC | Age: 68
End: 2023-11-09
Payer: MEDICARE

## 2023-11-09 NOTE — TELEPHONE ENCOUNTER
Called and spoke with pt. Pt requested to be seen by a provider in [place]. Pt stated that she cannot leave work to come here for the appointment. Offered a virtual visit. Pt refused. Explained that I do nor know any provider treat aneurysm in [place].Stated that will ask Keerthi and get back to her. Pt verbalized understanding.

## 2023-11-10 ENCOUNTER — TELEPHONE (OUTPATIENT)
Dept: FAMILY MEDICINE | Facility: CLINIC | Age: 68
End: 2023-11-10
Payer: MEDICARE

## 2023-11-10 ENCOUNTER — HOSPITAL ENCOUNTER (EMERGENCY)
Facility: HOSPITAL | Age: 68
Discharge: HOME | End: 2023-11-11
Attending: EMERGENCY MEDICINE
Payer: MEDICARE

## 2023-11-10 ENCOUNTER — PATIENT MESSAGE (OUTPATIENT)
Dept: NEUROSURGERY | Facility: CLINIC | Age: 68
End: 2023-11-10
Payer: MEDICARE

## 2023-11-10 ENCOUNTER — TELEPHONE (OUTPATIENT)
Dept: NEUROSURGERY | Facility: CLINIC | Age: 68
End: 2023-11-10
Payer: MEDICARE

## 2023-11-10 DIAGNOSIS — I10 HYPERTENSION, UNSPECIFIED TYPE: ICD-10-CM

## 2023-11-10 DIAGNOSIS — M79.672 LEFT FOOT PAIN: Primary | ICD-10-CM

## 2023-11-10 PROCEDURE — 25000003 PHARM REV CODE 250: Mod: HCNC | Performed by: NURSE PRACTITIONER

## 2023-11-10 PROCEDURE — 99284 EMERGENCY DEPT VISIT MOD MDM: CPT | Mod: HCNC

## 2023-11-10 PROCEDURE — 96372 THER/PROPH/DIAG INJ SC/IM: CPT | Performed by: PHYSICIAN ASSISTANT

## 2023-11-10 PROCEDURE — 25000003 PHARM REV CODE 250: Mod: HCNC | Performed by: PHYSICIAN ASSISTANT

## 2023-11-10 PROCEDURE — 63600175 PHARM REV CODE 636 W HCPCS: Mod: HCNC | Performed by: PHYSICIAN ASSISTANT

## 2023-11-10 RX ORDER — ONDANSETRON 2 MG/ML
4 INJECTION INTRAMUSCULAR; INTRAVENOUS
Status: COMPLETED | OUTPATIENT
Start: 2023-11-10 | End: 2023-11-10

## 2023-11-10 RX ORDER — PREDNISONE 20 MG/1
40 TABLET ORAL
Status: COMPLETED | OUTPATIENT
Start: 2023-11-10 | End: 2023-11-10

## 2023-11-10 RX ORDER — CLONIDINE HYDROCHLORIDE 0.2 MG/1
0.2 TABLET ORAL
Status: COMPLETED | OUTPATIENT
Start: 2023-11-10 | End: 2023-11-10

## 2023-11-10 RX ORDER — ACETAMINOPHEN 500 MG
1000 TABLET ORAL
Status: COMPLETED | OUTPATIENT
Start: 2023-11-10 | End: 2023-11-10

## 2023-11-10 RX ORDER — DIPHENHYDRAMINE HCL 25 MG
25 CAPSULE ORAL EVERY 6 HOURS PRN
Qty: 30 CAPSULE | Refills: 0 | Status: SHIPPED | OUTPATIENT
Start: 2023-11-10

## 2023-11-10 RX ORDER — COLCHICINE 0.6 MG/1
TABLET ORAL
Qty: 15 TABLET | Refills: 0 | Status: SHIPPED | OUTPATIENT
Start: 2023-11-10

## 2023-11-10 RX ORDER — DIPHENHYDRAMINE HYDROCHLORIDE 50 MG/ML
25 INJECTION INTRAMUSCULAR; INTRAVENOUS
Status: COMPLETED | OUTPATIENT
Start: 2023-11-10 | End: 2023-11-10

## 2023-11-10 RX ORDER — PREDNISONE 20 MG/1
20 TABLET ORAL DAILY
Qty: 3 TABLET | Refills: 0 | Status: SHIPPED | OUTPATIENT
Start: 2023-11-10 | End: 2023-11-13

## 2023-11-10 RX ORDER — MORPHINE SULFATE 4 MG/ML
4 INJECTION, SOLUTION INTRAMUSCULAR; INTRAVENOUS
Status: COMPLETED | OUTPATIENT
Start: 2023-11-10 | End: 2023-11-10

## 2023-11-10 RX ORDER — HYDROCODONE BITARTRATE AND ACETAMINOPHEN 5; 325 MG/1; MG/1
1 TABLET ORAL EVERY 4 HOURS PRN
Qty: 10 TABLET | Refills: 0 | Status: SHIPPED | OUTPATIENT
Start: 2023-11-10 | End: 2024-01-12

## 2023-11-10 RX ADMIN — CLONIDINE HYDROCHLORIDE 0.2 MG: 0.2 TABLET ORAL at 10:11

## 2023-11-10 RX ADMIN — ACETAMINOPHEN 1000 MG: 500 TABLET ORAL at 09:11

## 2023-11-10 RX ADMIN — ONDANSETRON 4 MG: 2 INJECTION INTRAMUSCULAR; INTRAVENOUS at 09:11

## 2023-11-10 RX ADMIN — PREDNISONE 40 MG: 20 TABLET ORAL at 09:11

## 2023-11-10 RX ADMIN — MORPHINE SULFATE 4 MG: 4 INJECTION INTRAVENOUS at 09:11

## 2023-11-10 RX ADMIN — DIPHENHYDRAMINE HYDROCHLORIDE 25 MG: 50 INJECTION, SOLUTION INTRAMUSCULAR; INTRAVENOUS at 09:11

## 2023-11-10 NOTE — TELEPHONE ENCOUNTER
PT. States kidney  Suggested tylenol arthiritis for pain. Pt. States she has been taking medication and it is not helping pain. Wants to know if you can prescribe or recommend something else.

## 2023-11-10 NOTE — TELEPHONE ENCOUNTER
"Called pt. And explained that pain med tolerance would have to be evaluated by nephrologist d/t poor kidney function per . Pt. Stated"Why do I have these Dr.'s if they won't help me." Then pt. Ended the call.   "

## 2023-11-10 NOTE — TELEPHONE ENCOUNTER
----- Message from Rosi Marte sent at 11/10/2023  9:15 AM CST -----  Contact: Ngoc [place] is calling in regards to the medication is not working and needing something else called into the Pharmacy.please call back at .[phone]         .  [facility]/pharmacy #[id number] - [place] - [address] AT [facility]  [address] [address]  Phone: [phone] Fax: [phone]      Thanks  VICTOR HUGO

## 2023-11-10 NOTE — TELEPHONE ENCOUNTER
Tried to call pt to discuss appt on 11/21 and her options for provider in [place]. Not able to leave a vm (not set up). Sent message through portal even though does not look like she uses portal. Left pt scheduled as is.

## 2023-11-11 VITALS
OXYGEN SATURATION: 96 % | HEART RATE: 89 BPM | DIASTOLIC BLOOD PRESSURE: 119 MMHG | TEMPERATURE: 99 F | WEIGHT: 155 LBS | SYSTOLIC BLOOD PRESSURE: 197 MMHG | HEIGHT: 63 IN | RESPIRATION RATE: 18 BRPM | BODY MASS INDEX: 27.46 KG/M2

## 2023-11-11 NOTE — FIRST PROVIDER EVALUATION
Medical screening examination initiated.  I have conducted a focused provider triage encounter, findings are as follows:    Brief history of present illness:  Patient presents with a foot pain x1 week.  Denies injury.    There were no vitals filed for this visit.    Pertinent physical exam:  In wheelchair, pain with ambulation.    Brief workup plan:  X-ray    Preliminary workup initiated; this workup will be continued and followed by the physician or advanced practice provider that is assigned to the patient when roomed.

## 2023-11-11 NOTE — ED PROVIDER NOTES
History      Chief Complaint   Patient presents with    Foot Pain     Left, x 1 week denies trauma       Review of patient's allergies indicates:   Allergen Reactions    Tramadol Itching    Risperdal [risperidone] Other (See Comments)     Did not like way felt        HPI   HPI    11/10/2023, 9:40 PM   History obtained from the patient      History of Present Illness: Rose Milligan is a 68 y.o. female patient who presents to the Emergency Department for left lateral foot pain for a week.  Denies injury, fever.    No further complaints or concerns at this time.     Blood pressure is elevated.  Pt denies cp, sob, ha, dizziness, vision change.          PCP: Leeann Parham DO       Past Medical History:  Past Medical History:   Diagnosis Date    Acute CVA (cerebrovascular accident) 8/4/2023    Bipolar 1 disorder     CHF (congestive heart failure)     Depression     Hepatitis C     Hypertension     Other hyperlipidemia 8/15/2019         Past Surgical History:  Past Surgical History:   Procedure Laterality Date    HYSTERECTOMY             Family History:  Family History   Problem Relation Age of Onset    Heart attack Maternal Grandmother     Hypertension Mother            Social History:  Social History     Tobacco Use    Smoking status: Some Days     Current packs/day: 0.25     Average packs/day: 0.3 packs/day for 7.8 years (2.0 ttl pk-yrs)     Types: Cigarettes     Start date: 1/11/2016    Smokeless tobacco: Never    Tobacco comments:     1 pack every 3 days   Substance and Sexual Activity    Alcohol use: Yes    Drug use: Yes     Types: Cocaine    Sexual activity: Not Currently       ROS     Review of Systems   Constitutional:  Negative for fever.   Respiratory:  Negative for chest tightness.        Physical Exam      Initial Vitals [11/10/23 2003]   BP Pulse Resp Temp SpO2   (!) 183/120 104 18 98.8 °F (37.1 °C) 97 %      MAP       --         Physical Exam  Vital signs and nursing notes  "reviewed.  Constitutional: Patient is in NAD. Awake and alert. Well-developed and well-nourished.  Head: Atraumatic. Normocephalic.  Eyes:  EOM intact. Conjunctivae nl. No scleral icterus.  ENT: Mucous membranes are moist.   Neck: Supple.  No meningismus  Cardiovascular: Regular rate and rhythm. No murmurs, rubs, or gallops.   Pulmonary/Chest: No respiratory distress. Clear to auscultation bilaterally. No wheezing, rales, or rhonchi.  Abdominal: Soft. Non-distended. No TTP. No rebound, guarding, or rigidity. Good bowel sounds.  Genitourinary: No CVA tenderness  Musculoskeletal: Moves all extremities. No edema. Left lateral foot with warmth, no wound.  Skin: Warm and dry.  Neurological: Awake and alert. No acute focal neurological deficits are appreciated.  Psychiatric: Normal affect. Good eye contact. Appropriate in content.      ED Course          Procedures  ED Vital Signs:  Vitals:    11/10/23 2003   BP: (!) 183/120   Pulse: 104   Resp: 18   Temp: 98.8 °F (37.1 °C)   TempSrc: Oral   SpO2: 97%   Weight: 70.3 kg (155 lb)   Height: 5' 3" (1.6 m)                 Imaging Results:  Imaging Results              X-Ray Foot Complete Left (Final result)  Result time 11/10/23 20:42:12      Final result by Bartolo Nieto MD (11/10/23 20:42:12)                   Impression:      As above      Electronically signed by: Bartolo Nieto  Date:    11/10/2023  Time:    20:42               Narrative:    EXAMINATION:  XR FOOT COMPLETE 3 VIEW LEFT    CLINICAL HISTORY:  .  Pain in left foot    TECHNIQUE:  AP, lateral and oblique views of the left foot were performed.    COMPARISON:  None    FINDINGS:  No acute fracture or dislocation.  Calcaneal plantar spurring.  Achilles insertional tendinopathy spurring.  Mild degenerative joint disease                                         The Emergency Provider reviewed the vital signs and test results, which are outlined above.    ED Discussion             Medication(s) given in the " ER:  Medications   morphine injection 4 mg (has no administration in time range)   diphenhydrAMINE injection 25 mg (has no administration in time range)   ondansetron injection 4 mg (has no administration in time range)   acetaminophen tablet 1,000 mg (has no administration in time range)   predniSONE tablet 40 mg (has no administration in time range)            Follow-up Information       Leeann Parham, DO In 2 days.    Specialty: Internal Medicine  Contact information:  [address] [place] [address] [phone]                                    Medication List        START taking these medications      colchicine (gout) 0.6 mg tablet  Commonly known as: COLCRYS  Take every 8 hours on day 1; take every 12 hours on day 2; then once per day thereafter     diphenhydrAMINE 25 mg capsule  Commonly known as: BENADRYL  Take 1 capsule (25 mg total) by mouth every 6 (six) hours as needed for Itching.     HYDROcodone-acetaminophen 5-325 mg per tablet  Commonly known as: NORCO  Take 1 tablet by mouth every 4 (four) hours as needed for Pain.     predniSONE 20 MG tablet  Commonly known as: DELTASONE  Take 1 tablet (20 mg total) by mouth once daily. for 3 days            ASK your doctor about these medications      aspirin 81 MG EC tablet  Commonly known as: ECOTRIN  Take 1 tablet (81 mg total) by mouth once daily.     carvediloL 25 MG tablet  Commonly known as: COREG  Take 2 tablets (50 mg total) by mouth 2 (two) times daily.     cloNIDine 0.2 mg/24 hr td ptwk 0.2 mg/24 hr  Commonly known as: CATAPRES  Place 1 patch onto the skin every 7 days.     clopidogreL 75 mg tablet  Commonly known as: PLAVIX  Take 1 tablet (75 mg total) by mouth once daily. for 20 days     furosemide 40 MG tablet  Commonly known as: LASIX  Take 0.5 tablets (20 mg total) by mouth once daily.     hydrALAZINE 100 MG tablet  Commonly known as: APRESOLINE  Take 1 tablet (100 mg total) by mouth every 8 (eight) hours.     hydrOXYzine 50 MG  tablet  Commonly known as: ATARAX  Take 1 tablet (50 mg total) by mouth 2 (two) times daily as needed for Anxiety.     irbesartan 150 MG tablet  Commonly known as: AVAPRO  Take 2 tablets (300 mg total) by mouth every evening.     nicotine 21 mg/24 hr  Commonly known as: NICODERM CQ  Place 1 patch onto the skin once daily.     rosuvastatin 40 MG Tab  Commonly known as: CRESTOR  Take 1 tablet (40 mg total) by mouth once daily.     tretinoin 0.025 % cream  Commonly known as: RETIN-A  Apply topically every evening. Pea sized amount to entire face at night. Start out using every other night for 2 weeks, then increase to every night as tolerated     ziprasidone 20 MG Cap  Commonly known as: GEODON  Take 1 capsule (20 mg total) by mouth 2 (two) times daily.               Where to Get Your Medications        These medications were sent to [facility]/pharmacy #[id number] - [place] - [address] AT [facility]  [address] [address]      Phone: [phone]   colchicine (gout) 0.6 mg tablet  diphenhydrAMINE 25 mg capsule  HYDROcodone-acetaminophen 5-325 mg per tablet  predniSONE 20 MG tablet             Medical Decision Making      Ddx:  Gout, contusion, cellulitis, other arthritis      All findings were reviewed with the patient/family in detail.   All remaining questions and concerns were addressed at that time.  Patient/family has been counseled regarding the need for follow-up as well as the indication to return to the emergency room should new or worrisome developments occur.        MDM                 Clinical Impression:        ICD-10-CM ICD-9-CM   1. Left foot pain  M79.672 729.5   2. Hypertension, unspecified type  I10 401.9               Carolyn Parisi, PARAJIV  11/10/23 2140

## 2023-11-13 ENCOUNTER — TELEPHONE (OUTPATIENT)
Dept: NEUROSURGERY | Facility: CLINIC | Age: 68
End: 2023-11-13
Payer: MEDICARE

## 2023-11-13 ENCOUNTER — OUTPATIENT CASE MANAGEMENT (OUTPATIENT)
Dept: ADMINISTRATIVE | Facility: OTHER | Age: 68
End: 2023-11-13
Payer: MEDICARE

## 2023-11-13 NOTE — TELEPHONE ENCOUNTER
----- Message from Sarai Andersen RN sent at 11/13/2023  3:00 PM CST -----  Dr Patrick/Staff,     Mrs Grossman has a hard time getting to [facility] from her home in [place]. Is it possible that her upcoming appt on 11/21/23 could be done virtually?     Thank you,     Saria Andersen RN, [facility]  Outpatient Case Management  [phone]  Ext [phone]  [email or web address]

## 2023-11-15 ENCOUNTER — PATIENT OUTREACH (OUTPATIENT)
Dept: ADMINISTRATIVE | Facility: OTHER | Age: 68
End: 2023-11-15
Payer: MEDICARE

## 2023-11-15 NOTE — PROGRESS NOTES
This Community Health Worker's  assistance requested from BÁRBARA JEFFERSON RN, to assist patient with ordering digital BP cuff with [facility] benefits [phone].  Patient's benefits for this quarter have been used.  We will call back to order BP cuff in January.

## 2023-11-20 ENCOUNTER — OUTPATIENT CASE MANAGEMENT (OUTPATIENT)
Dept: ADMINISTRATIVE | Facility: OTHER | Age: 68
End: 2023-11-20
Payer: MEDICARE

## 2023-11-21 ENCOUNTER — OFFICE VISIT (OUTPATIENT)
Dept: NEUROSURGERY | Facility: CLINIC | Age: 68
End: 2023-11-21
Payer: MEDICARE

## 2023-11-21 VITALS — SYSTOLIC BLOOD PRESSURE: 157 MMHG | DIASTOLIC BLOOD PRESSURE: 96 MMHG | HEART RATE: 86 BPM

## 2023-11-21 DIAGNOSIS — I67.1 BRAIN ANEURYSM: ICD-10-CM

## 2023-11-21 PROCEDURE — 3080F DIAST BP >= 90 MM HG: CPT | Mod: CPTII,S$GLB,, | Performed by: NEUROLOGICAL SURGERY

## 2023-11-21 PROCEDURE — 1126F AMNT PAIN NOTED NONE PRSNT: CPT | Mod: CPTII,S$GLB,, | Performed by: NEUROLOGICAL SURGERY

## 2023-11-21 PROCEDURE — 3077F SYST BP >= 140 MM HG: CPT | Mod: CPTII,S$GLB,, | Performed by: NEUROLOGICAL SURGERY

## 2023-11-21 PROCEDURE — 4010F ACE/ARB THERAPY RXD/TAKEN: CPT | Mod: CPTII,S$GLB,, | Performed by: NEUROLOGICAL SURGERY

## 2023-11-21 PROCEDURE — 3288F FALL RISK ASSESSMENT DOCD: CPT | Mod: CPTII,S$GLB,, | Performed by: NEUROLOGICAL SURGERY

## 2023-11-21 PROCEDURE — 1101F PT FALLS ASSESS-DOCD LE1/YR: CPT | Mod: CPTII,S$GLB,, | Performed by: NEUROLOGICAL SURGERY

## 2023-11-21 PROCEDURE — 99212 OFFICE O/P EST SF 10 MIN: CPT | Mod: S$GLB,,, | Performed by: NEUROLOGICAL SURGERY

## 2023-11-21 PROCEDURE — 99999 PR PBB SHADOW E&M-EST. PATIENT-LVL III: CPT | Mod: PBBFAC,,, | Performed by: NEUROLOGICAL SURGERY

## 2023-11-21 NOTE — PROGRESS NOTES
Neurosurgery  History & Physical    SUBJECTIVE:   IJennifer, scribed for, and in the presence of, Rey Patrick MD. I performed the scribed service and the documentation accurately describes the services I performed. I attest to the accuracy of the note.      Chief Complaint: Referral Dr. Huitron, Aneurysm/AVM    History of Present Illness:  Rose Milligan is a 68 year-old female with a past medical history of chronic kidney disease and hypertension who presents today for evaluation of aneurysm/AVM.  She denies any new headache, nausea, vomiting.  Recently admitted for hypertensive emergency/urgency.    Review of patient's allergies indicates:   Allergen Reactions    Tramadol Itching    Risperdal [risperidone] Other (See Comments)     Did not like way felt       Current Outpatient Medications   Medication Sig Dispense Refill    aspirin (ECOTRIN) 81 MG EC tablet Take 1 tablet (81 mg total) by mouth once daily. 90 tablet 0    carvediloL (COREG) 25 MG tablet Take 2 tablets (50 mg total) by mouth 2 (two) times daily. 120 tablet 0    cloNIDine 0.2 mg/24 hr td ptwk (CATAPRES) 0.2 mg/24 hr Place 1 patch onto the skin every 7 days. (Patient taking differently: Place 1 patch onto the skin every 7 days. (Saturdays)) 4 patch 11    colchicine, gout, (COLCRYS) 0.6 mg tablet Take every 8 hours on day 1; take every 12 hours on day 2; then once per day thereafter 15 tablet 0    diphenhydrAMINE (BENADRYL) 25 mg capsule Take 1 capsule (25 mg total) by mouth every 6 (six) hours as needed for Itching. 30 capsule 0    furosemide (LASIX) 40 MG tablet Take 0.5 tablets (20 mg total) by mouth once daily. 45 tablet 0    hydrALAZINE (APRESOLINE) 100 MG tablet Take 1 tablet (100 mg total) by mouth every 8 (eight) hours. 180 tablet 1    HYDROcodone-acetaminophen (NORCO) 5-325 mg per tablet Take 1 tablet by mouth every 4 (four) hours as needed for Pain. 10 tablet 0    hydrOXYzine (ATARAX) 50 MG tablet Take 1 tablet (50 mg total) by mouth  2 (two) times daily as needed for Anxiety. 20 tablet 0    irbesartan (AVAPRO) 150 MG tablet Take 2 tablets (300 mg total) by mouth every evening. 180 tablet 3    nicotine (NICODERM CQ) 21 mg/24 hr Place 1 patch onto the skin once daily. 14 patch 0    rosuvastatin (CRESTOR) 40 MG Tab Take 1 tablet (40 mg total) by mouth once daily. 90 tablet 9    tretinoin (RETIN-A) 0.025 % cream Apply topically every evening. Pea sized amount to entire face at night. Start out using every other night for 2 weeks, then increase to every night as tolerated 20 g 0    ziprasidone (GEODON) 20 MG Cap Take 1 capsule (20 mg total) by mouth 2 (two) times daily. 60 capsule 3    clopidogreL (PLAVIX) 75 mg tablet Take 1 tablet (75 mg total) by mouth once daily. for 20 days 20 tablet 0     No current facility-administered medications for this visit.       Past Medical History:   Diagnosis Date    Acute CVA (cerebrovascular accident) 8/4/2023    Bipolar 1 disorder     CHF (congestive heart failure)     Depression     Hepatitis C     Hypertension     Other hyperlipidemia 8/15/2019     Past Surgical History:   Procedure Laterality Date    HYSTERECTOMY       Family History       Problem Relation (Age of Onset)    Heart attack Maternal Grandmother    Hypertension Mother          Social History     Socioeconomic History    Marital status:    Tobacco Use    Smoking status: Some Days     Current packs/day: 0.25     Average packs/day: 0.3 packs/day for 7.9 years (2.0 ttl pk-yrs)     Types: Cigarettes     Start date: 1/11/2016    Smokeless tobacco: Never    Tobacco comments:     1 pack every 3 days   Substance and Sexual Activity    Alcohol use: Yes    Drug use: Yes     Types: Cocaine    Sexual activity: Not Currently     Social Determinants of Health     Financial Resource Strain: Medium Risk (11/2/2023)    Overall Financial Resource Strain (CARDIA)     Difficulty of Paying Living Expenses: Somewhat hard   Food Insecurity: No Food Insecurity  (11/2/2023)    Hunger Vital Sign     Worried About Running Out of Food in the Last Year: Never true     Ran Out of Food in the Last Year: Never true   Recent Concern: Food Insecurity - Food Insecurity Present (8/5/2023)    Hunger Vital Sign     Worried About Running Out of Food in the Last Year: Sometimes true   Transportation Needs: No Transportation Needs (11/2/2023)    PRAPARE - Transportation     Lack of Transportation (Medical): No     Lack of Transportation (Non-Medical): No   Physical Activity: Inactive (8/5/2023)    Exercise Vital Sign     Days of Exercise per Week: 0 days     Minutes of Exercise per Session: 0 min   Stress: No Stress Concern Present (11/2/2023)    [employer] - Occupational Stress Questionnaire     Feeling of Stress : Only a little   Recent Concern: Stress - Stress Concern Present (8/5/2023)    [employer] [place] - Occupational Stress Questionnaire     Feeling of Stress : To some extent   Social Connections: Moderately Isolated (11/2/2023)    Social Connection and Isolation Panel [NHANES]     Frequency of Communication with Friends and Family: More than three times a week     Frequency of Social Gatherings with Friends and Family: More than three times a week     Attends Yazidism Services: More than 4 times per year     Active Member of Clubs or Organizations: No     Attends Club or Organization Meetings: Never     Marital Status:    Housing Stability: Low Risk  (11/2/2023)    Housing Stability Vital Sign     Unable to Pay for Housing in the Last Year: No     Number of Places Lived in the Last Year: 1     Unstable Housing in the Last Year: No       Review of Systems    OBJECTIVE:     Vital Signs  Pulse: 86  BP: (!) 157/96  Pain Score: 0-No pain  There is no height or weight on file to calculate BMI.      Neurosurgery Physical Exam  General: no acute distress  Head: Non-traumatic, normocephalic  Eyes: Pupils equal, EOMI  Neck:  Supple, normal ROM, no tenderness to palpation  CVS: Normal rate and rhythm, distal pulses present  Pulm: Symmetric expansion, no respiratory distress  GI: Abdomen nondistended, nontender    MSK: Moves all extremities without restriction, atraumatic  Skin: Dry, intact  Psych: Normal thought content and cognition    Neuro:  Alert, awake, oriented, to self, place, time  Language:  Speech is fluent, goal directed without any noted dysarthria or aphasia    Cranial nerves:    CNII-XII: Intact on fine exam,   Pupils equal round react to light,   Extraocular muscles are intact  V1 to V3 is intact to light touch,   no facial asymmetry,   Hearing is intact to finger rub and voice  tongue/uvula/palate midline,   shoulder shrug equal,     No pronator drift    Extremities:  Motor:  Upper Extremity    Deltoid Triceps Biceps Wrist  Extension Wrist  Flexion Interosseous   R 5/5 5/5 5/5 5/5 5/5 5/5   L 5/5 5/5 5/5 5/5 5/5 5/5       Thumb   Abduction Thumb  ADDuction Finger  Flexion Finger  Extension     R 5/5 5/5 5/5 5/5     L 5/5 5/5 5/5 5/5        Lower Extremity     Iliopsoas Quadriceps Hamstring Plantarflexion Dorsiflexion EHL   R 5/5 5/5 5/5 5/5 5/5 5/5   L 5/5 5/5 5/5 5/5 5/5 5/5       Reflexes:     DTR: 2+ biceps    2+ triceps   2+ brachioradialis   2+ patellar  2+ Achilles     Aragon's: Negative     Babinski's: Negative     Clonus: Negative     Sensory:      Sensation intact to light touch, temperature sensation, vibration, pinprick     Coordination:      Coordination intact throughout, no dysmetria, normal rapid alternating movements, no dysdiadochokinesia  Cerebellar:  Normal finger-to-nose, normal heel-to-shin      Diagnostic Results:  I personally reviewed patient's Diagnostic Imaging.  There appears to be a small cerebral aneurysms.    ASSESSMENT/PLAN:     MRA w/ vessel wall imaging    Thank you for allowing me to participate in the care of this patient.  Please feel free to call any questions, comments,  concerns.    Rey Partick MD,MSc  Department of Neurosurgery   Department of Radiology  Department of Neurology  [facility]    [facility] [employer]   [employer] [facility] [employer]    Note dictated with voice recognition software, please excuse any grammatical errors.     Physician Attestation for Scribe: I, Rey Patrick MD,MSc, reviewed documentation as scribed in my presence, which is both accurate and complete.

## 2023-11-22 ENCOUNTER — TELEPHONE (OUTPATIENT)
Dept: NEUROSURGERY | Facility: CLINIC | Age: 68
End: 2023-11-22
Payer: MEDICARE

## 2023-11-22 DIAGNOSIS — I67.1 CEREBRAL ANEURYSM, NONRUPTURED: Primary | ICD-10-CM

## 2023-11-24 ENCOUNTER — PATIENT MESSAGE (OUTPATIENT)
Dept: NEUROSURGERY | Facility: CLINIC | Age: 68
End: 2023-11-24
Payer: MEDICARE

## 2023-11-27 ENCOUNTER — TELEPHONE (OUTPATIENT)
Dept: NEUROSURGERY | Facility: CLINIC | Age: 68
End: 2023-11-27
Payer: MEDICARE

## 2023-11-27 DIAGNOSIS — I67.1 CEREBRAL ANEURYSM, NONRUPTURED: Primary | ICD-10-CM

## 2023-11-28 ENCOUNTER — TELEPHONE (OUTPATIENT)
Dept: PSYCHIATRY | Facility: CLINIC | Age: 68
End: 2023-11-28
Payer: MEDICARE

## 2023-11-28 NOTE — TELEPHONE ENCOUNTER
----- Message from Mohinder Mckinley sent at 11/28/2023 10:45 AM CST -----  Contact: [phone]  Patient needs first available appointment due to rescheduling. Please call patient at [phone]. Thanks KB

## 2023-11-29 ENCOUNTER — TELEPHONE (OUTPATIENT)
Dept: PSYCHIATRY | Facility: CLINIC | Age: 68
End: 2023-11-29
Payer: MEDICARE

## 2023-11-29 ENCOUNTER — PATIENT MESSAGE (OUTPATIENT)
Dept: NEUROSURGERY | Facility: CLINIC | Age: 68
End: 2023-11-29
Payer: MEDICARE

## 2023-11-29 NOTE — TELEPHONE ENCOUNTER
----- Message from Mayra Zepeda sent at 11/29/2023  9:29 AM CST -----  Regarding: soon Appt  Contact: Ngoc  Type:  Sooner Apoointment Request    Caller is requesting a sooner appointment.  Caller declined first available appointment listed below.  Caller will not accept being placed on the waitlist and is requesting a message be sent to doctor.  Name of Caller: Ngoc  When is the first available appointment?  Symptoms:  Would the patient rather a call back or a response via [facility]? call  Best Call Back Number: [phone] (home)    Additional Information:  Ngoc  is requesting a callback from the nurse in regards to be rescheduled.

## 2023-12-04 ENCOUNTER — OUTPATIENT CASE MANAGEMENT (OUTPATIENT)
Dept: ADMINISTRATIVE | Facility: OTHER | Age: 68
End: 2023-12-04
Payer: MEDICARE

## 2023-12-15 ENCOUNTER — LAB VISIT (OUTPATIENT)
Dept: LAB | Facility: HOSPITAL | Age: 68
End: 2023-12-15
Attending: INTERNAL MEDICINE
Payer: MEDICARE

## 2023-12-15 DIAGNOSIS — I10 PRIMARY HYPERTENSION: ICD-10-CM

## 2023-12-15 DIAGNOSIS — N25.81 SECONDARY HYPERPARATHYROIDISM OF RENAL ORIGIN: ICD-10-CM

## 2023-12-15 DIAGNOSIS — N18.4 CKD (CHRONIC KIDNEY DISEASE) STAGE 4, GFR 15-29 ML/MIN: ICD-10-CM

## 2023-12-15 LAB
25(OH)D3+25(OH)D2 SERPL-MCNC: 18 NG/ML (ref 30–96)
ALBUMIN SERPL BCP-MCNC: 4.1 G/DL (ref 3.5–5.2)
ANION GAP SERPL CALC-SCNC: 16 MMOL/L (ref 8–16)
BUN SERPL-MCNC: 51 MG/DL (ref 8–23)
CALCIUM SERPL-MCNC: 9.2 MG/DL (ref 8.7–10.5)
CHLORIDE SERPL-SCNC: 107 MMOL/L (ref 95–110)
CO2 SERPL-SCNC: 21 MMOL/L (ref 23–29)
CREAT SERPL-MCNC: 2.7 MG/DL (ref 0.5–1.4)
EST. GFR  (NO RACE VARIABLE): 18.4 ML/MIN/1.73 M^2
GLUCOSE SERPL-MCNC: 106 MG/DL (ref 70–110)
PHOSPHATE SERPL-MCNC: 4 MG/DL (ref 2.7–4.5)
POTASSIUM SERPL-SCNC: 4.6 MMOL/L (ref 3.5–5.1)
PTH-INTACT SERPL-MCNC: 152.3 PG/ML (ref 9–77)
SODIUM SERPL-SCNC: 144 MMOL/L (ref 136–145)

## 2023-12-15 PROCEDURE — 80069 RENAL FUNCTION PANEL: CPT | Mod: HCNC | Performed by: INTERNAL MEDICINE

## 2023-12-15 PROCEDURE — 83970 ASSAY OF PARATHORMONE: CPT | Mod: HCNC | Performed by: INTERNAL MEDICINE

## 2023-12-15 PROCEDURE — 36415 COLL VENOUS BLD VENIPUNCTURE: CPT | Mod: HCNC | Performed by: INTERNAL MEDICINE

## 2023-12-15 PROCEDURE — 82306 VITAMIN D 25 HYDROXY: CPT | Mod: HCNC | Performed by: INTERNAL MEDICINE

## 2023-12-22 ENCOUNTER — OFFICE VISIT (OUTPATIENT)
Dept: NEPHROLOGY | Facility: CLINIC | Age: 68
End: 2023-12-22
Payer: MEDICARE

## 2023-12-22 VITALS
HEART RATE: 80 BPM | RESPIRATION RATE: 18 BRPM | WEIGHT: 154.31 LBS | BODY MASS INDEX: 27.34 KG/M2 | HEIGHT: 63 IN | SYSTOLIC BLOOD PRESSURE: 144 MMHG | DIASTOLIC BLOOD PRESSURE: 94 MMHG

## 2023-12-22 DIAGNOSIS — E87.5 HYPERKALEMIA: ICD-10-CM

## 2023-12-22 DIAGNOSIS — N25.81 SECONDARY HYPERPARATHYROIDISM OF RENAL ORIGIN: ICD-10-CM

## 2023-12-22 DIAGNOSIS — I10 PRIMARY HYPERTENSION: ICD-10-CM

## 2023-12-22 DIAGNOSIS — N18.4 CKD (CHRONIC KIDNEY DISEASE) STAGE 4, GFR 15-29 ML/MIN: Primary | ICD-10-CM

## 2023-12-22 PROCEDURE — 3066F NEPHROPATHY DOC TX: CPT | Mod: HCNC,CPTII,S$GLB, | Performed by: INTERNAL MEDICINE

## 2023-12-22 PROCEDURE — 3077F SYST BP >= 140 MM HG: CPT | Mod: HCNC,CPTII,S$GLB, | Performed by: INTERNAL MEDICINE

## 2023-12-22 PROCEDURE — 3288F PR FALLS RISK ASSESSMENT DOCUMENTED: ICD-10-PCS | Mod: HCNC,CPTII,S$GLB, | Performed by: INTERNAL MEDICINE

## 2023-12-22 PROCEDURE — 3008F PR BODY MASS INDEX (BMI) DOCUMENTED: ICD-10-PCS | Mod: HCNC,CPTII,S$GLB, | Performed by: INTERNAL MEDICINE

## 2023-12-22 PROCEDURE — 3062F PR POS MACROALBUMINURIA RESULT DOCUMENTED/REVIEW: ICD-10-PCS | Mod: HCNC,CPTII,S$GLB, | Performed by: INTERNAL MEDICINE

## 2023-12-22 PROCEDURE — 1101F PT FALLS ASSESS-DOCD LE1/YR: CPT | Mod: HCNC,CPTII,S$GLB, | Performed by: INTERNAL MEDICINE

## 2023-12-22 PROCEDURE — 1160F PR REVIEW ALL MEDS BY PRESCRIBER/CLIN PHARMACIST DOCUMENTED: ICD-10-PCS | Mod: HCNC,CPTII,S$GLB, | Performed by: INTERNAL MEDICINE

## 2023-12-22 PROCEDURE — 99999 PR PBB SHADOW E&M-EST. PATIENT-LVL III: ICD-10-PCS | Mod: PBBFAC,HCNC,, | Performed by: INTERNAL MEDICINE

## 2023-12-22 PROCEDURE — 1101F PR PT FALLS ASSESS DOC 0-1 FALLS W/OUT INJ PAST YR: ICD-10-PCS | Mod: HCNC,CPTII,S$GLB, | Performed by: INTERNAL MEDICINE

## 2023-12-22 PROCEDURE — 3066F PR DOCUMENTATION OF TREATMENT FOR NEPHROPATHY: ICD-10-PCS | Mod: HCNC,CPTII,S$GLB, | Performed by: INTERNAL MEDICINE

## 2023-12-22 PROCEDURE — 3062F POS MACROALBUMINURIA REV: CPT | Mod: HCNC,CPTII,S$GLB, | Performed by: INTERNAL MEDICINE

## 2023-12-22 PROCEDURE — 1159F PR MEDICATION LIST DOCUMENTED IN MEDICAL RECORD: ICD-10-PCS | Mod: HCNC,CPTII,S$GLB, | Performed by: INTERNAL MEDICINE

## 2023-12-22 PROCEDURE — 99999 PR PBB SHADOW E&M-EST. PATIENT-LVL III: CPT | Mod: PBBFAC,HCNC,, | Performed by: INTERNAL MEDICINE

## 2023-12-22 PROCEDURE — 99214 PR OFFICE/OUTPT VISIT, EST, LEVL IV, 30-39 MIN: ICD-10-PCS | Mod: HCNC,S$GLB,, | Performed by: INTERNAL MEDICINE

## 2023-12-22 PROCEDURE — 4010F ACE/ARB THERAPY RXD/TAKEN: CPT | Mod: HCNC,CPTII,S$GLB, | Performed by: INTERNAL MEDICINE

## 2023-12-22 PROCEDURE — 1159F MED LIST DOCD IN RCRD: CPT | Mod: HCNC,CPTII,S$GLB, | Performed by: INTERNAL MEDICINE

## 2023-12-22 PROCEDURE — 3008F BODY MASS INDEX DOCD: CPT | Mod: HCNC,CPTII,S$GLB, | Performed by: INTERNAL MEDICINE

## 2023-12-22 PROCEDURE — 3044F HG A1C LEVEL LT 7.0%: CPT | Mod: HCNC,CPTII,S$GLB, | Performed by: INTERNAL MEDICINE

## 2023-12-22 PROCEDURE — 99214 OFFICE O/P EST MOD 30 MIN: CPT | Mod: HCNC,S$GLB,, | Performed by: INTERNAL MEDICINE

## 2023-12-22 PROCEDURE — 3080F DIAST BP >= 90 MM HG: CPT | Mod: HCNC,CPTII,S$GLB, | Performed by: INTERNAL MEDICINE

## 2023-12-22 PROCEDURE — 3077F PR MOST RECENT SYSTOLIC BLOOD PRESSURE >= 140 MM HG: ICD-10-PCS | Mod: HCNC,CPTII,S$GLB, | Performed by: INTERNAL MEDICINE

## 2023-12-22 PROCEDURE — 1160F RVW MEDS BY RX/DR IN RCRD: CPT | Mod: HCNC,CPTII,S$GLB, | Performed by: INTERNAL MEDICINE

## 2023-12-22 PROCEDURE — 1126F AMNT PAIN NOTED NONE PRSNT: CPT | Mod: HCNC,CPTII,S$GLB, | Performed by: INTERNAL MEDICINE

## 2023-12-22 PROCEDURE — 3080F PR MOST RECENT DIASTOLIC BLOOD PRESSURE >= 90 MM HG: ICD-10-PCS | Mod: HCNC,CPTII,S$GLB, | Performed by: INTERNAL MEDICINE

## 2023-12-22 PROCEDURE — 1126F PR PAIN SEVERITY QUANTIFIED, NO PAIN PRESENT: ICD-10-PCS | Mod: HCNC,CPTII,S$GLB, | Performed by: INTERNAL MEDICINE

## 2023-12-22 PROCEDURE — 3288F FALL RISK ASSESSMENT DOCD: CPT | Mod: HCNC,CPTII,S$GLB, | Performed by: INTERNAL MEDICINE

## 2023-12-22 PROCEDURE — 3044F PR MOST RECENT HEMOGLOBIN A1C LEVEL <7.0%: ICD-10-PCS | Mod: HCNC,CPTII,S$GLB, | Performed by: INTERNAL MEDICINE

## 2023-12-22 PROCEDURE — 4010F PR ACE/ARB THEARPY RXD/TAKEN: ICD-10-PCS | Mod: HCNC,CPTII,S$GLB, | Performed by: INTERNAL MEDICINE

## 2023-12-22 RX ORDER — ERGOCALCIFEROL 1.25 MG/1
50000 CAPSULE ORAL
Qty: 12 CAPSULE | Refills: 3 | Status: SHIPPED | OUTPATIENT
Start: 2023-12-22

## 2023-12-22 NOTE — PROGRESS NOTES
"Subjective:       Patient ID: Rose Milligan is a 68 y.o. female.    Chief Complaint:  CKD, hypertension    HPI    She presents to clinic today for routine follow-up.  Since her last office visit she has been doing well and has no specific or new complaints.  Her laboratory studies and medications were reviewed.  All Nephrology related questions were answered to her satisfaction.    Review of Systems   Constitutional: Negative.    HENT: Negative.     Respiratory: Negative.     Cardiovascular: Negative.    Gastrointestinal: Negative.    Genitourinary: Negative.    Musculoskeletal: Negative.    Skin: Negative.        BP (!) 144/94   Pulse 80   Resp 18   Ht 5' 3" (1.6 m)   Wt 70 kg (154 lb 5.2 oz)   BMI 27.34 kg/m²     Lab Results   Component Value Date    WBC 5.22 10/19/2023    HGB 12.8 10/19/2023    HCT 40.9 10/19/2023    MCV 86 10/19/2023     10/19/2023      BMP  Lab Results   Component Value Date     12/15/2023    K 4.6 12/15/2023     12/15/2023    CO2 21 (L) 12/15/2023    BUN 51 (H) 12/15/2023    CREATININE 2.7 (H) 12/15/2023    CALCIUM 9.2 12/15/2023    ANIONGAP 16 12/15/2023    ESTGFRAFRICA 17.7 (A) 07/30/2021    EGFRNONAA 15.4 (A) 07/30/2021     CMP  Sodium   Date Value Ref Range Status   12/15/2023 144 136 - 145 mmol/L Final     Potassium   Date Value Ref Range Status   12/15/2023 4.6 3.5 - 5.1 mmol/L Final     Chloride   Date Value Ref Range Status   12/15/2023 107 95 - 110 mmol/L Final     CO2   Date Value Ref Range Status   12/15/2023 21 (L) 23 - 29 mmol/L Final     Glucose   Date Value Ref Range Status   12/15/2023 106 70 - 110 mg/dL Final     BUN   Date Value Ref Range Status   12/15/2023 51 (H) 8 - 23 mg/dL Final     Creatinine   Date Value Ref Range Status   12/15/2023 2.7 (H) 0.5 - 1.4 mg/dL Final     Calcium   Date Value Ref Range Status   12/15/2023 9.2 8.7 - 10.5 mg/dL Final     Total Protein   Date Value Ref Range Status   10/17/2023 7.9 6.0 - 8.4 g/dL Final     Albumin "   Date Value Ref Range Status   12/15/2023 4.1 3.5 - 5.2 g/dL Final     Total Bilirubin   Date Value Ref Range Status   10/17/2023 0.4 0.1 - 1.0 mg/dL Final     Comment:     For infants and newborns, interpretation of results should be based  on gestational age, weight and in agreement with clinical  observations.    Premature Infant recommended reference ranges:  Up to 24 hours.............<8.0 mg/dL  Up to 48 hours............<12.0 mg/dL  3-5 days..................<15.0 mg/dL  6-29 days.................<15.0 mg/dL       Alkaline Phosphatase   Date Value Ref Range Status   10/17/2023 129 55 - 135 U/L Final     AST   Date Value Ref Range Status   10/17/2023 17 10 - 40 U/L Final     ALT   Date Value Ref Range Status   10/17/2023 10 10 - 44 U/L Final     Anion Gap   Date Value Ref Range Status   12/15/2023 16 8 - 16 mmol/L Final     eGFR if    Date Value Ref Range Status   07/30/2021 17.7 (A) >60 mL/min/1.73 m^2 Final     eGFR if non    Date Value Ref Range Status   07/30/2021 15.4 (A) >60 mL/min/1.73 m^2 Final     Comment:     Calculation used to obtain the estimated glomerular filtration  rate (eGFR) is the CKD-EPI equation.        Current Outpatient Medications on File Prior to Visit   Medication Sig Dispense Refill    aspirin (ECOTRIN) 81 MG EC tablet Take 1 tablet (81 mg total) by mouth once daily. 90 tablet 0    carvediloL (COREG) 25 MG tablet Take 2 tablets (50 mg total) by mouth 2 (two) times daily. 120 tablet 0    cloNIDine 0.2 mg/24 hr td ptwk (CATAPRES) 0.2 mg/24 hr Place 1 patch onto the skin every 7 days. (Patient taking differently: Place 1 patch onto the skin every 7 days. (Saturdays)) 4 patch 11    clopidogreL (PLAVIX) 75 mg tablet Take 1 tablet (75 mg total) by mouth once daily. for 20 days 20 tablet 0    colchicine, gout, (COLCRYS) 0.6 mg tablet Take every 8 hours on day 1; take every 12 hours on day 2; then once per day thereafter 15 tablet 0    diphenhydrAMINE  (BENADRYL) 25 mg capsule Take 1 capsule (25 mg total) by mouth every 6 (six) hours as needed for Itching. 30 capsule 0    furosemide (LASIX) 40 MG tablet Take 0.5 tablets (20 mg total) by mouth once daily. 45 tablet 0    hydrALAZINE (APRESOLINE) 100 MG tablet Take 1 tablet (100 mg total) by mouth every 8 (eight) hours. 180 tablet 1    HYDROcodone-acetaminophen (NORCO) 5-325 mg per tablet Take 1 tablet by mouth every 4 (four) hours as needed for Pain. 10 tablet 0    hydrOXYzine (ATARAX) 50 MG tablet Take 1 tablet (50 mg total) by mouth 2 (two) times daily as needed for Anxiety. 20 tablet 0    irbesartan (AVAPRO) 150 MG tablet Take 2 tablets (300 mg total) by mouth every evening. 180 tablet 3    nicotine (NICODERM CQ) 21 mg/24 hr Place 1 patch onto the skin once daily. 14 patch 0    rosuvastatin (CRESTOR) 40 MG Tab Take 1 tablet (40 mg total) by mouth once daily. 90 tablet 9    tretinoin (RETIN-A) 0.025 % cream Apply topically every evening. Pea sized amount to entire face at night. Start out using every other night for 2 weeks, then increase to every night as tolerated 20 g 0    ziprasidone (GEODON) 20 MG Cap Take 1 capsule (20 mg total) by mouth 2 (two) times daily. 60 capsule 3     No current facility-administered medications on file prior to visit.            Objective:            Physical Exam  Constitutional:       Appearance: Normal appearance.   HENT:      Head: Normocephalic and atraumatic.   Eyes:      General: No scleral icterus.     Extraocular Movements: Extraocular movements intact.      Pupils: Pupils are equal, round, and reactive to light.   Pulmonary:      Effort: Pulmonary effort is normal.      Breath sounds: No stridor.   Musculoskeletal:      Right lower leg: No edema.      Left lower leg: No edema.   Skin:     General: Skin is warm and dry.   Neurological:      General: No focal deficit present.      Mental Status: She is alert and oriented to person, place, and time.   Psychiatric:         Mood  and Affect: Mood normal.         Behavior: Behavior normal.       Assessment:       1. CKD (chronic kidney disease) stage 4, GFR 15-29 ml/min    2. Primary hypertension    3. Secondary hyperparathyroidism of renal origin    4. Hyperkalemia        Plan:       1. Creatinine has remained relatively stable ranging between 2.4 and 3.0 for the past year.  Urinalysis is bland without evidence of proteinuria.  Loss of renal function is consistent with hypertensive nephrosclerosis.  She is on a RAAS inhibitor.    2. Blood pressure is adequately controlled on current regimen.    3. Vitamin-D was low at 18.  Will start ergocalciferol 69002 units weekly.  Calcium is stable at 9.2 with an albumin of 4.1.  Phosphorus is stable 4.0.  Intact PTH is elevated 152.  Will continue to monitor.    4. Potassium has improved to 4.6.        Ian Estes MD

## 2023-12-23 ENCOUNTER — HOSPITAL ENCOUNTER (OUTPATIENT)
Dept: RADIOLOGY | Facility: HOSPITAL | Age: 68
Discharge: HOME | End: 2023-12-23
Attending: NEUROLOGICAL SURGERY
Payer: MEDICARE

## 2023-12-23 DIAGNOSIS — I67.1 CEREBRAL ANEURYSM, NONRUPTURED: ICD-10-CM

## 2023-12-23 PROCEDURE — 70544 MR ANGIOGRAPHY HEAD W/O DYE: CPT | Mod: 26,HCNC,, | Performed by: RADIOLOGY

## 2023-12-23 PROCEDURE — 70544 MRA BRAIN WITHOUT CONTRAST: ICD-10-PCS | Mod: 26,HCNC,, | Performed by: RADIOLOGY

## 2023-12-23 PROCEDURE — 70544 MR ANGIOGRAPHY HEAD W/O DYE: CPT | Mod: TC,HCNC

## 2023-12-26 ENCOUNTER — OUTPATIENT CASE MANAGEMENT (OUTPATIENT)
Dept: ADMINISTRATIVE | Facility: OTHER | Age: 68
End: 2023-12-26
Payer: MEDICARE

## 2023-12-26 NOTE — PROGRESS NOTES
Outpatient Care Management  Plan of Care Follow Up Visit    Patient: Rose Milligan  MRN: [id number]  Date of Service: 12/26/2023  Completed by: Sarai Andersen RN  Referral Date: 10/18/2023    No chief complaint on file.      Brief Summary: Phone contact today for follow up and d/c from OPCM. Encouraged her to seek treatment from the appropriate provider for any symptoms causing concern and she voiced understanding. She also voiced agreement with plan for d/c from OPCM.   Next Steps: Close current OPCM episode today. Sarai Andersen RN

## 2024-01-02 ENCOUNTER — TELEPHONE (OUTPATIENT)
Dept: NEUROSURGERY | Facility: CLINIC | Age: 69
End: 2024-01-02
Payer: MEDICARE

## 2024-01-02 DIAGNOSIS — I67.1 CEREBRAL ANEURYSM, NONRUPTURED: Primary | ICD-10-CM

## 2024-01-02 NOTE — TELEPHONE ENCOUNTER
----- Message from Radha Godwin sent at 1/2/2024 11:57 AM CST -----  Pt granddaughter calling in regards to test results       Confirmed patient's contact info below:  Contact Name: Rose Milligan  Phone Number: [phone] Marysol Dobbs

## 2024-01-02 NOTE — TELEPHONE ENCOUNTER
Returned call to Mrs. Dobbs. Explained the Vessel Wall Imaging was not done unfortunately. New order placed and rescheduled. Mrs. Dobbs was very understanding and confirmed new appt.

## 2024-01-09 ENCOUNTER — DOCUMENT SCAN (OUTPATIENT)
Dept: HOME HEALTH SERVICES | Facility: HOSPITAL | Age: 69
End: 2024-01-09
Payer: MEDICARE

## 2024-01-10 ENCOUNTER — OFFICE VISIT (OUTPATIENT)
Dept: FAMILY MEDICINE | Facility: CLINIC | Age: 69
End: 2024-01-10
Payer: MEDICARE

## 2024-01-10 VITALS
TEMPERATURE: 97 F | HEIGHT: 63 IN | BODY MASS INDEX: 27.73 KG/M2 | SYSTOLIC BLOOD PRESSURE: 165 MMHG | RESPIRATION RATE: 18 BRPM | WEIGHT: 156.5 LBS | DIASTOLIC BLOOD PRESSURE: 92 MMHG | HEART RATE: 78 BPM

## 2024-01-10 DIAGNOSIS — N25.81 SECONDARY HYPERPARATHYROIDISM OF RENAL ORIGIN: ICD-10-CM

## 2024-01-10 DIAGNOSIS — I63.30: ICD-10-CM

## 2024-01-10 DIAGNOSIS — F14.21: ICD-10-CM

## 2024-01-10 DIAGNOSIS — I50.32 CHRONIC HEART FAILURE WITH PRESERVED EJECTION FRACTION: ICD-10-CM

## 2024-01-10 DIAGNOSIS — I27.9 CHRONIC PULMONARY HEART DISEASE: ICD-10-CM

## 2024-01-10 DIAGNOSIS — N18.4 CKD (CHRONIC KIDNEY DISEASE) STAGE 4, GFR 15-29 ML/MIN: ICD-10-CM

## 2024-01-10 DIAGNOSIS — Z78.0 ASYMPTOMATIC MENOPAUSE: ICD-10-CM

## 2024-01-10 DIAGNOSIS — E78.5 HYPERLIPIDEMIA LDL GOAL <70: ICD-10-CM

## 2024-01-10 DIAGNOSIS — H53.8 BLURRED VISION, BILATERAL: ICD-10-CM

## 2024-01-10 DIAGNOSIS — F41.9 ANXIETY DISORDER, UNSPECIFIED TYPE: ICD-10-CM

## 2024-01-10 DIAGNOSIS — I10 MALIGNANT HYPERTENSION: ICD-10-CM

## 2024-01-10 DIAGNOSIS — Z00.00 ENCOUNTER FOR PREVENTIVE HEALTH EXAMINATION: Primary | ICD-10-CM

## 2024-01-10 DIAGNOSIS — I67.1 CEREBRAL ANEURYSM, NONRUPTURED: ICD-10-CM

## 2024-01-10 DIAGNOSIS — F31.9 BIPOLAR AFFECTIVE DISORDER, REMISSION STATUS UNSPECIFIED: ICD-10-CM

## 2024-01-10 DIAGNOSIS — Z72.0 TOBACCO USE: ICD-10-CM

## 2024-01-10 PROCEDURE — G0439 PPPS, SUBSEQ VISIT: HCPCS | Mod: S$GLB,,, | Performed by: NURSE PRACTITIONER

## 2024-01-10 PROCEDURE — 99999 PR PBB SHADOW E&M-EST. PATIENT-LVL V: CPT | Mod: PBBFAC,,, | Performed by: NURSE PRACTITIONER

## 2024-01-10 NOTE — PATIENT INSTRUCTIONS
Counseling and Referral of Other Preventative  (Italic type indicates deductible and co-insurance are waived)    Patient Name: Rose Milligan  Today's Date: 1/10/2024    Health Maintenance       Date Due Completion Date    TETANUS VACCINE Never done ---    RSV Vaccine (Age 60+ and Pregnant patients) (1 - 1-dose 60+ series) Never done ---    Shingles Vaccine (2 of 2) 10/30/2019 9/4/2019    DEXA Scan 08/22/2021 8/22/2018    COVID-19 Vaccine (4 - 2023-24 season) 09/01/2023 1/20/2022    Mammogram 09/13/2024 9/13/2023    High Dose Statin 12/22/2024 12/22/2023    Hemoglobin A1c (Diabetic Prevention Screening) 07/19/2026 7/19/2023    Colorectal Cancer Screening 10/04/2026 10/4/2016    Lipid Panel 08/04/2028 8/4/2023        No orders of the defined types were placed in this encounter.    The following information is provided to all patients.  This information is to help you find resources for any of the problems found today that may be affecting your health:                  Living healthy guide: www.[facility].[place].gov      Understanding Diabetes: www.diabetes.org      Eating healthy: www.cdc.gov/healthyweight      CDC home safety checklist: www.cdc.gov/steadi/patient.html      Agency on Aging: www.goea.[place].[facility]      Alcoholics anonymous (AA): www.aa.org      Physical Activity: www.chi.nih.gov/hp5uziy      Tobacco use: www.quitwithusla.org

## 2024-01-10 NOTE — PROGRESS NOTES
"  Rose Milligan presented for a  Medicare AWV and comprehensive Health Risk Assessment today. The following components were reviewed and updated:    Medical history  Family History  Social history  Allergies and Current Medications  Health Risk Assessment  Health Maintenance  Care Team         ** See Completed Assessments for Annual Wellness Visit within the encounter summary.**         The following assessments were completed:  Living Situation  CAGE  Depression Screening  Timed Get Up and Go  Whisper Test  Cognitive Function Screening  Nutrition Screening  ADL Screening  PAQ Screening        Vitals:    01/10/24 0906 01/10/24 0918   BP: (!) 150/94 (!) 165/92   BP Location: Right arm Left arm   Patient Position: Sitting Sitting   BP Method: Large (Automatic) Large (Automatic)   Pulse: 80 78   Resp: 18    Temp: 97 °F (36.1 °C)    Weight: 71 kg (156 lb 8.4 oz)    Height: 5' 3" (1.6 m)      Body mass index is 27.73 kg/m².  Physical Exam  Vitals and nursing note reviewed.   Constitutional:       Appearance: Normal appearance. She is well-developed.   HENT:      Head: Normocephalic and atraumatic.   Eyes:      Pupils: Pupils are equal, round, and reactive to light.   Neck:      Vascular: No carotid bruit.   Cardiovascular:      Rate and Rhythm: Normal rate and regular rhythm.      Pulses: Normal pulses.      Heart sounds: Normal heart sounds. No murmur heard.     No gallop.   Pulmonary:      Effort: Pulmonary effort is normal.      Breath sounds: Normal breath sounds.   Abdominal:      General: Bowel sounds are normal. There is no distension.      Palpations: Abdomen is soft.      Tenderness: There is no abdominal tenderness.   Musculoskeletal:         General: No tenderness. Normal range of motion.   Skin:     General: Skin is warm and dry.   Neurological:      Mental Status: She is alert.      Motor: No abnormal muscle tone.      Gait: Gait normal.   Psychiatric:         Speech: Speech normal.         Behavior: " Behavior normal.         Thought Content: Thought content normal.         Judgment: Judgment normal.     Current Outpatient Medications   Medication Instructions    aspirin (ECOTRIN) 81 mg, Oral, Daily    carvediloL (COREG) 50 mg, Oral, 2 times daily    cloNIDine 0.2 mg/24 hr td ptwk (CATAPRES) 0.2 mg/24 hr 1 patch, Transdermal, Every 7 days    clopidogreL (PLAVIX) 75 mg, Oral, Daily    colchicine, gout, (COLCRYS) 0.6 mg tablet Take every 8 hours on day 1; take every 12 hours on day 2; then once per day thereafter    diphenhydrAMINE (BENADRYL) 25 mg, Oral, Every 6 hours PRN    ergocalciferol (ERGOCALCIFEROL) 50,000 Units, Oral, Every 7 days    furosemide (LASIX) 20 mg, Oral, Daily    hydrALAZINE (APRESOLINE) 100 mg, Oral, Every 8 hours    HYDROcodone-acetaminophen (NORCO) 5-325 mg per tablet 1 tablet, Oral, Every 4 hours PRN    hydrOXYzine (ATARAX) 50 mg, Oral, 2 times daily PRN    irbesartan (AVAPRO) 300 mg, Oral, Nightly    nicotine (NICODERM CQ) 21 mg/24 hr 1 patch, Transdermal, Daily    rosuvastatin (CRESTOR) 40 mg, Oral, Daily    tretinoin (RETIN-A) 0.025 % cream Topical (Top), Nightly, Pea sized amount to entire face at night. Start out using every other night for 2 weeks, then increase to every night as tolerated    ziprasidone (GEODON) 20 mg, Oral, 2 times daily   '        Diagnoses and health risks identified today and associated recommendations/orders:    1. Encounter for preventive health examination  Review for Opioid Screening: Patient does not have rx for Opioids.    Reviewed /discussed all vaccines    2. Malignant hypertension  Chronic and Ongoing.  Elevated today- states compliance on all meds  See meds - followed by neuro, nephrologist and  card Md     3. Secondary hyperparathyroidism of renal origin  Chronic and stable Continue current treatment plan as previously prescribed with your nephrologist    4. Thrombotic stroke involving cerebral artery  July - 2023 for an acute infarct in the left  thalamus /left sided weakness   Chronic and Ongoing due to elevated BP  -see meds list  Pt scheduled to see MRI 1/18/24. Continue current treatment plan as previously prescribed with your NEURO SURGEON    5. Cerebral aneurysm, non ruptured   SEE #  4    6. CCK (chronic kidney disease) stage 4, GFR 15-29 ml/min  Chronic/ Monitored/Stable on  as directed.  Followed by nephrologist    7. Chronic pulmonary heart disease  Left Ventricle: The left ventricle is normal in size. Moderately increased wall thickness. There is moderate concentrict hypertrophy. Normal wall motion. There is normal systolic function with a visually estimated ejection fraction of 55 - 70%. Grade I diastolic dysfunction.    Left Atrium: Left atrium is mildly dilated.    Right Ventricle: Normal right ventricular cavity size. Wall thickness is normal. Right ventricle wall motion  is normal. Systolic function is normal.    Mitral Valve: There is mild regurgitation.    Tricuspid Valve: There is mild regurgitation.    IVC/SVC: Intermediate venous pressure at 8 mmHg.    Bubble study negative   followed by card     8. Chronic heart failure with preserved ejection fraction  Chronic/ Monitored/Stable on  as directed.  Echo 8/ 2023. There is normal systolic function with a visually estimated ejection fraction of 55 - 70%. Grade I diastolic dysfunction.  Followed by card    9. Anxiety disorder, unspecified type  Chronic/ Monitored/Stable -see meds   Followed by psych Md     10. Bipolar affective disorder, remission status unspecified  Chronic/ Monitored/Stable on  Geodon  as directed.  Followed by psych    11. Hyperlipidemia LDL goal <70  Chronic/ Monitored/Stable on   Crestor./diet as directed.  Followed by neuro/card    12. Blurred vision, bilateral  Ambulatory referral/consult to Ophthalmology  States blurred vision referral sent     13. Menopause  DXA Bone Density Axial Skeleton 1 or more sites; scheduled    14. Cocaine use disorder, moderate, in early  remission, dependence   Stable-  pt states she has not used since 10/23/23?    15. Tobacco use  Chronic and Ongoing.  Still smokes 5 cig per day- tried smoking cessation = didn't work- decline TX at this time      Provided Ngoc with a 5-10 year written screening schedule and personal prevention plan. Recommendations were developed using the USPSTF age appropriate recommendations. Education, counseling, and referrals were provided as needed. After Visit Summary printed and given to patient which includes a list of additional screenings\tests needed.    Follow up in about 1 year (around 1/10/2025) for schedule DEXA.    Melanie Hoyos NP

## 2024-01-12 PROBLEM — N17.9 ACUTE RENAL FAILURE SUPERIMPOSED ON STAGE 4 CHRONIC KIDNEY DISEASE: Status: RESOLVED | Noted: 2023-10-17 | Resolved: 2024-01-12

## 2024-01-12 PROBLEM — N18.4 ACUTE RENAL FAILURE SUPERIMPOSED ON STAGE 4 CHRONIC KIDNEY DISEASE: Status: RESOLVED | Noted: 2023-10-17 | Resolved: 2024-01-12

## 2024-01-16 ENCOUNTER — OFFICE VISIT (OUTPATIENT)
Dept: CARDIOLOGY | Facility: CLINIC | Age: 69
End: 2024-01-16
Payer: MEDICARE

## 2024-01-16 VITALS
BODY MASS INDEX: 28.98 KG/M2 | SYSTOLIC BLOOD PRESSURE: 178 MMHG | HEART RATE: 87 BPM | OXYGEN SATURATION: 99 % | RESPIRATION RATE: 16 BRPM | DIASTOLIC BLOOD PRESSURE: 98 MMHG | HEIGHT: 63 IN | WEIGHT: 163.56 LBS

## 2024-01-16 DIAGNOSIS — E78.5 HYPERLIPIDEMIA LDL GOAL <70: ICD-10-CM

## 2024-01-16 DIAGNOSIS — I10 HYPERTENSION, UNSPECIFIED TYPE: Primary | ICD-10-CM

## 2024-01-16 DIAGNOSIS — E78.5 HYPERLIPIDEMIA, UNSPECIFIED HYPERLIPIDEMIA TYPE: ICD-10-CM

## 2024-01-16 DIAGNOSIS — R00.2 PALPITATIONS: ICD-10-CM

## 2024-01-16 DIAGNOSIS — I50.32 CHRONIC HEART FAILURE WITH PRESERVED EJECTION FRACTION: ICD-10-CM

## 2024-01-16 DIAGNOSIS — I10 CHRONIC HYPERTENSION: ICD-10-CM

## 2024-01-16 DIAGNOSIS — I16.1 HYPERTENSIVE EMERGENCY: Primary | ICD-10-CM

## 2024-01-16 DIAGNOSIS — N18.4 STAGE 4 CHRONIC KIDNEY DISEASE: ICD-10-CM

## 2024-01-16 DIAGNOSIS — E78.49 OTHER HYPERLIPIDEMIA: ICD-10-CM

## 2024-01-16 DIAGNOSIS — I10 PRIMARY HYPERTENSION: ICD-10-CM

## 2024-01-16 DIAGNOSIS — Z86.73 HISTORY OF TIA (TRANSIENT ISCHEMIC ATTACK): ICD-10-CM

## 2024-01-16 DIAGNOSIS — R06.09 DOE (DYSPNEA ON EXERTION): ICD-10-CM

## 2024-01-16 DIAGNOSIS — Z86.73 HISTORY OF CVA (CEREBROVASCULAR ACCIDENT): ICD-10-CM

## 2024-01-16 PROCEDURE — 99215 OFFICE O/P EST HI 40 MIN: CPT | Mod: S$GLB,,, | Performed by: INTERNAL MEDICINE

## 2024-01-16 PROCEDURE — 99999 PR PBB SHADOW E&M-EST. PATIENT-LVL V: CPT | Mod: PBBFAC,,, | Performed by: INTERNAL MEDICINE

## 2024-01-16 RX ORDER — CLONIDINE 0.3 MG/24H
1 PATCH, EXTENDED RELEASE TRANSDERMAL
Qty: 4 PATCH | Refills: 11 | Status: SHIPPED | OUTPATIENT
Start: 2024-01-16 | End: 2025-01-15

## 2024-01-16 RX ORDER — FUROSEMIDE 40 MG/1
20 TABLET ORAL DAILY
Qty: 45 TABLET | Refills: 0 | Status: SHIPPED | OUTPATIENT
Start: 2024-01-16 | End: 2024-01-16 | Stop reason: SDUPTHER

## 2024-01-16 RX ORDER — CLONIDINE HYDROCHLORIDE 0.1 MG/1
0.1 TABLET ORAL
Status: CANCELLED | OUTPATIENT
Start: 2024-01-16 | End: 2024-01-16

## 2024-01-16 RX ORDER — CARVEDILOL 25 MG/1
50 TABLET ORAL 2 TIMES DAILY
Qty: 120 TABLET | Refills: 0 | Status: SHIPPED | OUTPATIENT
Start: 2024-01-16 | End: 2024-02-09

## 2024-01-16 RX ORDER — FUROSEMIDE 40 MG/1
40 TABLET ORAL DAILY
Qty: 90 TABLET | Refills: 1 | Status: SHIPPED | OUTPATIENT
Start: 2024-01-16 | End: 2024-06-17 | Stop reason: SDUPTHER

## 2024-01-16 RX ORDER — ROSUVASTATIN CALCIUM 40 MG/1
40 TABLET, COATED ORAL NIGHTLY
Qty: 90 TABLET | Refills: 1 | Status: SHIPPED | OUTPATIENT
Start: 2024-01-16 | End: 2025-01-15

## 2024-01-16 RX ORDER — IBUPROFEN 200 MG
1 TABLET ORAL DAILY
Qty: 14 PATCH | Refills: 0 | Status: SHIPPED | OUTPATIENT
Start: 2024-01-16 | End: 2024-04-10

## 2024-01-16 RX ORDER — HYDRALAZINE HYDROCHLORIDE 100 MG/1
100 TABLET, FILM COATED ORAL EVERY 8 HOURS
Qty: 180 TABLET | Refills: 1 | Status: SHIPPED | OUTPATIENT
Start: 2024-01-16 | End: 2024-06-17 | Stop reason: SDUPTHER

## 2024-01-16 RX ORDER — ASPIRIN 81 MG/1
81 TABLET ORAL DAILY
Qty: 90 TABLET | Refills: 0 | Status: SHIPPED | OUTPATIENT
Start: 2024-01-16 | End: 2024-06-17 | Stop reason: SDUPTHER

## 2024-01-16 RX ORDER — CLONIDINE HYDROCHLORIDE 0.2 MG/1
0.2 TABLET ORAL ONCE
Status: COMPLETED | OUTPATIENT
Start: 2024-01-16 | End: 2024-01-16

## 2024-01-16 RX ORDER — CLONIDINE HYDROCHLORIDE 0.2 MG/1
0.2 TABLET ORAL ONCE
Status: DISCONTINUED | OUTPATIENT
Start: 2024-01-16 | End: 2024-01-16

## 2024-01-16 RX ORDER — IRBESARTAN 150 MG/1
300 TABLET ORAL NIGHTLY
Qty: 180 TABLET | Refills: 3 | Status: SHIPPED | OUTPATIENT
Start: 2024-01-16 | End: 2025-01-15

## 2024-01-16 RX ORDER — CLONIDINE 0.2 MG/24H
1 PATCH, EXTENDED RELEASE TRANSDERMAL
Qty: 4 PATCH | Refills: 11 | Status: SHIPPED | OUTPATIENT
Start: 2024-01-16 | End: 2024-01-16

## 2024-01-16 RX ADMIN — CLONIDINE HYDROCHLORIDE 0.2 MG: 0.2 TABLET ORAL at 10:01

## 2024-01-16 RX ADMIN — CLONIDINE HYDROCHLORIDE 0.2 MG: 0.2 TABLET ORAL at 09:01

## 2024-01-16 NOTE — PROGRESS NOTES
Subjective:   Patient ID:  Rose Milligan is a 68 y.o. female who presents for cardiac consult of No chief complaint on file.      The patient came in today for cardiac consult of No chief complaint on file.      Rose Milligan is a 68 y.o. female  with HFpEF, HTN, pulm HTN, h/o TIA/CVA, tobacco abuse, h/o drug abuse, bipolar, hep C, CKD4 presents for follow up CV eval.      11/21/18  Pt went to [facility] yesterday. She went for chest pain and L shoulder pain. Chest pain started about 3 days ago, and was off medicines for 2 weeks. She had bloodwork, ECG and was told to follow up hereShe called her PCP and only picked up bipolar meds. Chest pain feels like tightness, for past few days been constant. Mild headache and nausea. Now she feels lightheaded. PT also gets palpitations frequenltly.     9/7/23   Hosp follow up from recent CVA -  punctate acute left thalamic infarct and right MCA bifurcation aneurysm. Is on asa and plavix now.   BP elevated today 150/100. HR 70s. BMI 26 - 150 lbs  ECHO 8/2023 with normal bi V function and valves, grade 1 DD. Neg bubble study.     10/12/23  BP - elevated 180s/117. HR 70s. BMI 27 - 155 lbs   Pt had 2 cups of coffee, had some grits and mcconnell last night.       Oct 2023 Hospital Course:   Patient with known hx of uncontrolled HTN presents with left shoulder and arm pain. Worse on movement. She woke up with it. She uses several pillows to sleep and had these issues and neck pain before.   She lives alone and has psych issues. She claims that she took all her medications this am. She doesn't appear in distress and is able to answer questions. No SOB, N/V, Visual changes ot abdominal pain. No headache or palpitations.   Her SBP was 220 on arrival and she has received hydralazine times 2, labetalol and catapress.  Eventually patient has been started on Cardene drip, initially admitted to ICU.       On 10/18- patient off of Cardene drip, home blood pressure medications resumed,; complaints of  left-sided facial numbness,- CT head No acute intracranial abnormality.  Neurology evaluated-  MRI/MRA brain done 07/19/2023 was abnormal showing a punctate left thalamic infarct, small vessel vascular disease, and a 0.5 cm right MCA bifurcation aneurysm.  She has scheduled neurosurgery appointment later this month. Pt denied any headache, dizziness, vision changes at this time; current symptoms likely - Malignant hypertension with transient neurological symptoms; recommended to current medications, dual antiplatelets.  Monitor blood pressure.  Patient deemed stable for downgrade per ICU team on 10/18, hospital medicine consulted to assume care  10/19   Examination of patient done at bedside, patient appeared alert and oriented x3, denied acute issues overnight, denied headache, dizziness, chest pain, shortness O breath, nausea, vomiting, bowel or bladder issues.    Hemodynamically stable, blood pressure stabilized on current regimen, heart rate stable.    Denied extremity weakness, numbness.    Labs reviewed, creatinine slightly trended up, however close to pts baseline, denied UTI symptoms, decreased urine output.  Recommended outpatient follow up with PCP within 1 week upon discharge to undergo repeat labs including BMP/creatinine, patient agreed.    Considering clinical and hemodynamic stability, planning to discharge patient today, recommended compliance with medications, low-salt diet, outpatient follow up with PCP, Cardiology, to monitor blood pressure regularly, patient agreed to the plan.    Medications to be delivered bedside     10/24/23  Pt presents after recent hosp stay for HTN emergency - BP was 220s needed cardene drip/ICU stay.     BP and HR well controlled today. BMI 27 - 154 lbs.   BP cuff at home is showing higher BPs.     She is a cook at [employer].     1/16/24  BP elevated 214/116, she needs meds refills has more stress this AM due to walking and needing to de-ice the car.   She has more  SOB today. She had more salty food lately.     BP has improved to 170s/90s with CLonidine 0.2 mg x2 in office now.       Results for orders placed during the hospital encounter of 08/04/23    Echo    Interpretation Summary    Left Ventricle: The left ventricle is normal in size. Moderately increased wall thickness. There is moderate concentrict hypertrophy. Normal wall motion. There is normal systolic function with a visually estimated ejection fraction of 55 - 70%. Grade I diastolic dysfunction.    Left Atrium: Left atrium is mildly dilated.    Right Ventricle: Normal right ventricular cavity size. Wall thickness is normal. Right ventricle wall motion  is normal. Systolic function is normal.    Mitral Valve: There is mild regurgitation.    Tricuspid Valve: There is mild regurgitation.    IVC/SVC: Intermediate venous pressure at 8 mmHg.    Bubble study negative          Conclusion 6/2023     There is 0-19% right Internal Carotid Stenosis.  There is 0-19% left Internal Carotid Stenosis.    Conclusion 6/2023    The patient was monitored for a total of 12d 16h, underlying rhythm is Sinus.  The minimum heart rate was 55 bpm; the maximum 116 bpm; the average 76 bpm.  0 % of Atrial fibrillation/Atrial flutter with longest episode of 0 ms.  -- AV block with 0 %  There were 0 pauses, the longest pause was 0 ms at --.  7 episodes of VT were found with Longest VT at 10 beats .  21 supraventricular episodes were found. Longest SVT Episode 15 beats, Fastest  bpm  There were a total of 1346 PVCs with 2 morphologies and 7 couplets. Overall PVC [place] at 0.1 %  There were a total of 1132 PSVCs with 1 morphologies and 0 couplets. Overall PSVC [place] at 0.08 %  There is a total of 1 patient events    Results for orders placed during the hospital encounter of 06/06/23    Nuclear Stress - Cardiology Interpreted    Interpretation Summary    Abnormal myocardial perfusion scan.    There is a moderate to severe intensity, moderate  sized, fixed perfusion abnormality consistent with scar in the basal to mid lateral wall(s).    There are no other significant perfusion abnormalities.    The gated perfusion images showed an ejection fraction of 50% at rest. The gated perfusion images showed an ejection fraction of 53% post stress.    The ECG portion of the study is uninterpretable due to abnormal baseline.    The patient reported no chest pain during the stress test.    There were no arrhythmias during stress.          Past Medical History:   Diagnosis Date    Acute CVA (cerebrovascular accident) 08/04/2023    Acute renal failure superimposed on stage 4 chronic kidney disease 10/17/2023    Careful hydration  Serial labs  Nephrology consult and follow up in am     Bipolar 1 disorder     CHF (congestive heart failure)     Depression     Hepatitis C     Hypertension     Other hyperlipidemia 08/15/2019       Past Surgical History:   Procedure Laterality Date    HYSTERECTOMY         Social History     Tobacco Use    Smoking status: Some Days     Current packs/day: 0.25     Average packs/day: 0.3 packs/day for 8.0 years (2.0 ttl pk-yrs)     Types: Cigarettes     Start date: 1/11/2016    Smokeless tobacco: Never    Tobacco comments:     1 pack every 3 days   Substance Use Topics    Alcohol use: Yes    Drug use: Yes     Types: Cocaine     Comment: states last use 10/2023       Family History   Problem Relation Age of Onset    Heart attack Maternal Grandmother     Hypertension Mother        Patient's Medications   New Prescriptions    CLONIDINE 0.3 MG/24 HR TD PTWK (CATAPRES) 0.3 MG/24 HR    Place 1 patch onto the skin every 7 days.   Previous Medications    CLOPIDOGREL (PLAVIX) 75 MG TABLET    Take 1 tablet (75 mg total) by mouth once daily. for 20 days    COLCHICINE, GOUT, (COLCRYS) 0.6 MG TABLET    Take every 8 hours on day 1; take every 12 hours on day 2; then once per day thereafter    DIPHENHYDRAMINE (BENADRYL) 25 MG CAPSULE    Take 1 capsule (25 mg  total) by mouth every 6 (six) hours as needed for Itching.    ERGOCALCIFEROL (ERGOCALCIFEROL) 50,000 UNIT CAP    Take 1 capsule (50,000 Units total) by mouth every 7 days.    HYDROXYZINE (ATARAX) 50 MG TABLET    Take 1 tablet (50 mg total) by mouth 2 (two) times daily as needed for Anxiety.    TRETINOIN (RETIN-A) 0.025 % CREAM    Apply topically every evening. Pea sized amount to entire face at night. Start out using every other night for 2 weeks, then increase to every night as tolerated    ZIPRASIDONE (GEODON) 20 MG CAP    Take 1 capsule (20 mg total) by mouth 2 (two) times daily.   Modified Medications    Modified Medication Previous Medication    ASPIRIN (ECOTRIN) 81 MG EC TABLET aspirin (ECOTRIN) 81 MG EC tablet       Take 1 tablet (81 mg total) by mouth once daily.    Take 1 tablet (81 mg total) by mouth once daily.    CARVEDILOL (COREG) 25 MG TABLET carvediloL (COREG) 25 MG tablet       Take 2 tablets (50 mg total) by mouth 2 (two) times daily.    Take 2 tablets (50 mg total) by mouth 2 (two) times daily.    FUROSEMIDE (LASIX) 40 MG TABLET furosemide (LASIX) 40 MG tablet       Take 1 tablet (40 mg total) by mouth once daily. Do not take if BP is low - under 110/70 or feeling dry/dizzy/lightheaded due to dehydration    Take 0.5 tablets (20 mg total) by mouth once daily.    HYDRALAZINE (APRESOLINE) 100 MG TABLET hydrALAZINE (APRESOLINE) 100 MG tablet       Take 1 tablet (100 mg total) by mouth every 8 (eight) hours.    Take 1 tablet (100 mg total) by mouth every 8 (eight) hours.    IRBESARTAN (AVAPRO) 150 MG TABLET irbesartan (AVAPRO) 150 MG tablet       Take 2 tablets (300 mg total) by mouth every evening.    Take 2 tablets (300 mg total) by mouth every evening.    NICOTINE (NICODERM CQ) 21 MG/24 HR nicotine (NICODERM CQ) 21 mg/24 hr       Place 1 patch onto the skin once daily.    Place 1 patch onto the skin once daily.    ROSUVASTATIN (CRESTOR) 40 MG TAB rosuvastatin (CRESTOR) 40 MG Tab       Take 1 tablet  "(40 mg total) by mouth every evening.    Take 1 tablet (40 mg total) by mouth once daily.   Discontinued Medications    CLONIDINE 0.2 MG/24 HR TD PTWK (CATAPRES) 0.2 MG/24 HR    Place 1 patch onto the skin every 7 days.       Review of Systems   Constitutional: Negative.    HENT: Negative.     Eyes: Negative.    Respiratory:  Positive for shortness of breath.    Cardiovascular:  Positive for palpitations. Negative for chest pain.   Gastrointestinal: Negative.    Genitourinary: Negative.    Musculoskeletal: Negative.    Skin: Negative.    Neurological:  Positive for dizziness.   Endo/Heme/Allergies: Negative.    Psychiatric/Behavioral:  The patient is not nervous/anxious.    All 12 systems otherwise negative.      Wt Readings from Last 3 Encounters:   01/16/24 74.2 kg (163 lb 9.3 oz)   01/10/24 71 kg (156 lb 8.4 oz)   12/22/23 70 kg (154 lb 5.2 oz)     Temp Readings from Last 3 Encounters:   01/10/24 97 °F (36.1 °C)   11/10/23 98.8 °F (37.1 °C) (Oral)   10/30/23 98 °F (36.7 °C)     BP Readings from Last 3 Encounters:   01/16/24 (!) 178/98   01/10/24 (!) 165/92   12/22/23 (!) 144/94     Pulse Readings from Last 3 Encounters:   01/16/24 87   01/10/24 78   12/22/23 80       BP (!) 178/98 (BP Location: Left arm, Patient Position: Sitting, BP Method: Medium (Manual))   Pulse 87   Resp 16   Ht 5' 3" (1.6 m)   Wt 74.2 kg (163 lb 9.3 oz)   SpO2 99%   BMI 28.98 kg/m²     Objective:   Physical Exam  Vitals and nursing note reviewed.   Constitutional:       General: She is not in acute distress.     Appearance: She is well-developed. She is not diaphoretic.   HENT:      Head: Normocephalic and atraumatic.      Nose: Nose normal.   Eyes:      General: No scleral icterus.     Conjunctiva/sclera: Conjunctivae normal.   Neck:      Thyroid: No thyromegaly.      Vascular: No JVD.   Cardiovascular:      Rate and Rhythm: Normal rate and regular rhythm.      Heart sounds: S1 normal and S2 normal. No murmur heard.     No friction " rub. No gallop. No S3 or S4 sounds.   Pulmonary:      Effort: Pulmonary effort is normal. No respiratory distress.      Breath sounds: Normal breath sounds. No stridor. No wheezing or rales.   Chest:      Chest wall: No tenderness.   Abdominal:      General: Bowel sounds are normal. There is no distension.      Palpations: Abdomen is soft. There is no mass.      Tenderness: There is no abdominal tenderness. There is no rebound.   Genitourinary:     Comments: Deferred  Musculoskeletal:         General: No tenderness or deformity. Normal range of motion.      Cervical back: Normal range of motion and neck supple.   Lymphadenopathy:      Cervical: No cervical adenopathy.   Skin:     General: Skin is warm and dry.      Coloration: Skin is not pale.      Findings: No erythema or rash.   Neurological:      Mental Status: She is alert and oriented to person, place, and time.      Motor: No abnormal muscle tone.      Coordination: Coordination normal.   Psychiatric:         Behavior: Behavior normal.         Thought Content: Thought content normal.         Judgment: Judgment normal.         Lab Results   Component Value Date     12/15/2023    K 4.6 12/15/2023     12/15/2023    CO2 21 (L) 12/15/2023    BUN 51 (H) 12/15/2023    CREATININE 2.7 (H) 12/15/2023     12/15/2023    HGBA1C 5.1 07/19/2023    MG 2.1 08/05/2023    AST 17 10/17/2023    ALT 10 10/17/2023    ALBUMIN 4.1 12/15/2023    PROT 7.9 10/17/2023    BILITOT 0.4 10/17/2023    WBC 5.22 10/19/2023    HGB 12.8 10/19/2023    HCT 40.9 10/19/2023    MCV 86 10/19/2023     10/19/2023    INR 1.0 08/05/2023    TSH 0.761 08/04/2023    CHOL 155 08/04/2023    HDL 44 08/04/2023    LDLCALC 101.4 08/04/2023    TRIG 48 08/04/2023     (H) 10/17/2023     Assessment:      1. Hypertensive emergency    2. Chronic heart failure with preserved ejection fraction    3. Hyperlipidemia LDL goal <70    4. Primary hypertension    5. History of CVA (cerebrovascular  accident)    6. History of TIA (transient ischemic attack)    7. Other hyperlipidemia    8. AUGUSTE (dyspnea on exertion)    9. Palpitations    10. Hyperlipidemia, unspecified hyperlipidemia type    11. Chronic hypertension    12. Stage 4 chronic kidney disease                  Plan:   1. HFPEF, with occ CP/SOB; last   - Nuclear stress 6/2023 neg for ischemia, basal, mid lateral wall scar note  - titrate meds  - cont lasix - increase to 40mg   - ECHO 8/2023 with normal bi V function and valves, grade 1 DD. Neg bubble study.     2. HTN - elevated/uncontrolled, recent HTN emergency 10/2023   - cont meds - increase Coreg dose   - low salt diet  - chance clonidine to patch - increase to Clonidine 0.3 mg patch  - inc Hydralazine to 100mg   - if remains severely elevated needs ER eval     3. CKD4  - f/u with nephro/PCP  - saw Dr. Sexton in past    4. Hep C  - cont tx per PCP    5. HLD  - cont statin - changed to pravastatin 40mg  - unsure if she was taking livalo     6. SOB ? COPD with smoking abuse  - refer to smoking cessation  - f/u pulm     7. Recurrent H/O TIA/CVA - L sided weakness? -again 3/2023, 8/2023  - cont meds - asa and plavix; 0.5 cm right MCA bifurcation aneurysm.   - ECHO 8/2023 with normal bi V function and valves, grade 1 DD. Neg bubble study.   - Vital monitor 6/2023 with brief SVT/NSVT, rare PVCS, PACs noted; AVG HR 76 bpm.  - Carotids 6/2023 overall normal.    - f/u Neuro    Thank you for allowing me to participate in this patient's care. Please do not hesitate to contact me with any questions or concerns. Consult note has been forwarded to the referral physician.

## 2024-01-18 ENCOUNTER — PATIENT MESSAGE (OUTPATIENT)
Dept: NEUROSURGERY | Facility: CLINIC | Age: 69
End: 2024-01-18
Payer: MEDICARE

## 2024-01-29 ENCOUNTER — HOSPITAL ENCOUNTER (EMERGENCY)
Facility: HOSPITAL | Age: 69
Discharge: HOME | End: 2024-01-29
Attending: EMERGENCY MEDICINE
Payer: MEDICARE

## 2024-01-29 VITALS
TEMPERATURE: 98 F | HEART RATE: 62 BPM | BODY MASS INDEX: 28.06 KG/M2 | SYSTOLIC BLOOD PRESSURE: 167 MMHG | DIASTOLIC BLOOD PRESSURE: 96 MMHG | HEIGHT: 63 IN | RESPIRATION RATE: 18 BRPM | WEIGHT: 158.38 LBS | OXYGEN SATURATION: 98 %

## 2024-01-29 DIAGNOSIS — L03.113 CELLULITIS OF RIGHT HAND: Primary | ICD-10-CM

## 2024-01-29 PROBLEM — I63.332 THROMBOTIC STROKE INVOLVING LEFT POSTERIOR CEREBRAL ARTERY: Status: RESOLVED | Noted: 2023-10-28 | Resolved: 2024-01-29

## 2024-01-29 PROCEDURE — 25000003 PHARM REV CODE 250: Performed by: EMERGENCY MEDICINE

## 2024-01-29 PROCEDURE — 99284 EMERGENCY DEPT VISIT MOD MDM: CPT

## 2024-01-29 RX ORDER — HYDROCODONE BITARTRATE AND ACETAMINOPHEN 5; 325 MG/1; MG/1
1 TABLET ORAL EVERY 8 HOURS PRN
Qty: 18 TABLET | Refills: 0 | OUTPATIENT
Start: 2024-01-29 | End: 2024-03-09

## 2024-01-29 RX ORDER — INDOMETHACIN 25 MG/1
25 CAPSULE ORAL 2 TIMES DAILY WITH MEALS
Qty: 14 CAPSULE | Refills: 0 | Status: SHIPPED | OUTPATIENT
Start: 2024-01-29 | End: 2024-02-05

## 2024-01-29 RX ORDER — IBUPROFEN 800 MG/1
800 TABLET ORAL
Status: COMPLETED | OUTPATIENT
Start: 2024-01-29 | End: 2024-01-29

## 2024-01-29 RX ORDER — ONDANSETRON 4 MG/1
4 TABLET, ORALLY DISINTEGRATING ORAL
Status: COMPLETED | OUTPATIENT
Start: 2024-01-29 | End: 2024-01-29

## 2024-01-29 RX ORDER — HYDROCODONE BITARTRATE AND ACETAMINOPHEN 5; 325 MG/1; MG/1
1 TABLET ORAL
Status: COMPLETED | OUTPATIENT
Start: 2024-01-29 | End: 2024-01-29

## 2024-01-29 RX ORDER — CLINDAMYCIN HYDROCHLORIDE 300 MG/1
300 CAPSULE ORAL 3 TIMES DAILY
Qty: 21 CAPSULE | Refills: 0 | Status: SHIPPED | OUTPATIENT
Start: 2024-01-29 | End: 2024-02-05

## 2024-01-29 RX ORDER — CLINDAMYCIN HYDROCHLORIDE 150 MG/1
300 CAPSULE ORAL
Status: COMPLETED | OUTPATIENT
Start: 2024-01-29 | End: 2024-01-29

## 2024-01-29 RX ADMIN — ONDANSETRON 4 MG: 4 TABLET, ORALLY DISINTEGRATING ORAL at 08:01

## 2024-01-29 RX ADMIN — HYDROCODONE BITARTRATE AND ACETAMINOPHEN 1 TABLET: 5; 325 TABLET ORAL at 08:01

## 2024-01-29 RX ADMIN — IBUPROFEN 800 MG: 800 TABLET, FILM COATED ORAL at 08:01

## 2024-01-29 RX ADMIN — CLINDAMYCIN HYDROCHLORIDE 300 MG: 150 CAPSULE ORAL at 08:01

## 2024-01-29 NOTE — ED PROVIDER NOTES
SCRIBE #1 NOTE: I, aMriana Corral, am scribing for, and in the presence of, Shiv Dominguez MD. I have scribed the entire note.       History     Chief Complaint   Patient presents with    Hand Pain     Right hand pain, swelling, onset 2pm yesterday, no known injury. Hx of gout in foot     Review of patient's allergies indicates:   Allergen Reactions    Tramadol Itching    Risperdal [risperidone] Other (See Comments)     Did not like way felt         History of Present Illness     HPI    1/29/2024, 8:23 AM  History obtained from the patient      History of Present Illness: Rose Milligan is a 68 y.o. female patient with a PMHx of gout, HTN, CHF, acute CVD, acute renal failure who presents to the Emergency Department for evaluation of right hand pain which onset yesterday. Symptoms are constant and moderate in severity. No mitigating or exacerbating factors reported. Patient denies any fever, chills, N/V, dysuria, and all other sxs at this time. No prior tx reported. No further complaints or concerns at this time.       Arrival mode: Personal vehicle     PCP: Leeann Parham DO        Past Medical History:  Past Medical History:   Diagnosis Date    Acute CVA (cerebrovascular accident) 08/04/2023    Acute renal failure superimposed on stage 4 chronic kidney disease 10/17/2023    Careful hydration  Serial labs  Nephrology consult and follow up in am     Bipolar 1 disorder     CHF (congestive heart failure)     Depression     Hepatitis C     Hypertension     Other hyperlipidemia 08/15/2019       Past Surgical History:  Past Surgical History:   Procedure Laterality Date    HYSTERECTOMY           Family History:  Family History   Problem Relation Age of Onset    Heart attack Maternal Grandmother     Hypertension Mother        Social History:  Social History     Tobacco Use    Smoking status: Some Days     Current packs/day: 0.25     Average packs/day: 0.3 packs/day for 8.0 years (2.0 ttl pk-yrs)     Types:  Cigarettes     Start date: 1/11/2016    Smokeless tobacco: Never    Tobacco comments:     1 pack every 3 days   Substance and Sexual Activity    Alcohol use: Yes    Drug use: Yes     Types: Cocaine     Comment: states last use 10/2023    Sexual activity: Not Currently        Review of Systems     Review of Systems   Constitutional:  Negative for chills and fever.   HENT:  Negative for sore throat.    Respiratory:  Negative for shortness of breath.    Cardiovascular:  Negative for chest pain.   Gastrointestinal:  Negative for nausea and vomiting.   Genitourinary:  Negative for dysuria.   Musculoskeletal:  Negative for back pain.        (+) right hand pain     Skin:  Negative for rash.   Neurological:  Negative for weakness.   Hematological:  Does not bruise/bleed easily.   All other systems reviewed and are negative.       Physical Exam     Initial Vitals [01/29/24 0757]   BP Pulse Resp Temp SpO2   (!) 189/98 74 16 98.2 °F (36.8 °C) 99 %      MAP       --          Physical Exam  Nursing Notes and Vital Signs Reviewed.  Constitutional: Patient is in no acute distress. Well-developed and well-nourished.  Head: Atraumatic. Normocephalic.  Eyes: PERRL. EOM intact. Conjunctivae are not pale. No scleral icterus.  ENT: Mucous membranes are moist. Oropharynx is clear and symmetric.    Neck: Supple. Full ROM. No lymphadenopathy.  Cardiovascular: Regular rate. Regular rhythm. No murmurs, rubs, or gallops.   Pulmonary/Chest: No respiratory distress. Clear to auscultation bilaterally. No wheezing or rales.  Abdominal: Soft and non-distended.  There is no tenderness.  No rebound, guarding, or rigidity. Good bowel sounds.  Genitourinary: No CVA tenderness  Musculoskeletal:  Swelling noted to the dorsal aspect of the hand over the metacarpal joint space extending towards the wrist.  There is some very fine erythema.  It is mildly tender to touch there is no crepitus.  No necrotic areas.  There is no overt tenderness over the  "metacarpal joint space.  She also has a tenderness on the volar aspect but there is not any swelling or redness or erythema. Warm to touch. Digits are not tender.  Moves all extremities. No obvious deformities.  Median radial ulnar nerves  fully intact.  There are no streaks up the own.  Her digits are not tender to touch she does have full range of motion she is able to make a fist.  Skin: Warm and dry.  Neurological:  Alert, awake, and appropriate.  Normal speech.  No acute focal neurological deficits are appreciated.  Psychiatric: Normal affect. Good eye contact. Appropriate in content.       ED Course   Procedures  ED Vital Signs:  Vitals:    01/29/24 0757 01/29/24 0835 01/29/24 0845 01/29/24 0900   BP: (!) 189/98 (!) 163/88 (!) 168/94 (!) 166/84   Pulse: 74 68 67 63   Resp: 16 18 18 18   Temp: 98.2 °F (36.8 °C)      TempSrc: Oral      SpO2: 99% 98% 98% 97%   Weight: 71.9 kg (158 lb 6.4 oz)      Height: 5' 3" (1.6 m)          Abnormal Lab Results:  Labs Reviewed - No data to display                 Imaging Results:  Imaging Results    None                   The Emergency Provider reviewed the vital signs and test results, which are outlined above.     ED Discussion       9:56 AM: Reassessed pt at this time.  Patient is stable given clindamycin in the emergency room and will be discharged home with clindamycin and Norco for pain.  Indomethacin also given to her.  She has a history of gout but it was in her right great toe in the past.  Pt states her condition has improved at this time. Discussed with pt all pertinent ED information and results. Discussed pt dx and plan of tx. Gave pt all f/u and return to the ED instructions. All questions and concerns were addressed at this time. Pt expresses understanding of information and instructions, and is comfortable with plan to discharge. Pt is stable for discharge.    I discussed with patient and/or family/caretaker that evaluation in the ED does not suggest any " emergent or life threatening medical conditions requiring immediate intervention beyond what was provided in the ED, and I believe patient is safe for discharge.  Regardless, an unremarkable evaluation in the ED does not preclude the development or presence of a serious of life threatening condition. As such, patient was instructed to return immediately for any worsening or change in current symptoms.         Medical Decision Making  Risk  Prescription drug management.                ED Medication(s):  Medications   HYDROcodone-acetaminophen 5-325 mg per tablet 1 tablet (1 tablet Oral Given 1/29/24 0835)   ibuprofen tablet 800 mg (800 mg Oral Given 1/29/24 0834)   ondansetron disintegrating tablet 4 mg (4 mg Oral Given 1/29/24 0834)   clindamycin capsule 300 mg (300 mg Oral Given 1/29/24 0835)       New Prescriptions    No medications on file               Scribe Attestation:   Scribe #1: I performed the above scribed service and the documentation accurately describes the services I performed. I attest to the accuracy of the note.     Attending:   Physician Attestation Statement for Scribe #1: I, Shiv Dominguez MD, personally performed the services described in this documentation, as scribed by Mariana Corral, in my presence, and it is both accurate and complete.           Clinical Impression       ICD-10-CM ICD-9-CM   1. Cellulitis of right hand  L03.113 682.4       Disposition:   Disposition: Discharged  Condition: Stable        Shiv Dominguez MD  01/29/24 2403

## 2024-02-09 RX ORDER — CARVEDILOL 25 MG/1
TABLET ORAL
Qty: 180 TABLET | Refills: 1 | Status: SHIPPED | OUTPATIENT
Start: 2024-02-09 | End: 2024-05-31 | Stop reason: SDUPTHER

## 2024-03-09 ENCOUNTER — HOSPITAL ENCOUNTER (EMERGENCY)
Facility: HOSPITAL | Age: 69
Discharge: HOME | End: 2024-03-09
Attending: EMERGENCY MEDICINE
Payer: MEDICARE

## 2024-03-09 VITALS
OXYGEN SATURATION: 100 % | BODY MASS INDEX: 25.77 KG/M2 | RESPIRATION RATE: 18 BRPM | WEIGHT: 145.5 LBS | HEART RATE: 63 BPM | SYSTOLIC BLOOD PRESSURE: 189 MMHG | DIASTOLIC BLOOD PRESSURE: 95 MMHG | TEMPERATURE: 98 F

## 2024-03-09 DIAGNOSIS — M75.82 ROTATOR CUFF TENDINITIS, LEFT: ICD-10-CM

## 2024-03-09 DIAGNOSIS — M25.512 LEFT SHOULDER PAIN: ICD-10-CM

## 2024-03-09 DIAGNOSIS — I10 HYPERTENSION: ICD-10-CM

## 2024-03-09 DIAGNOSIS — M54.12 CERVICAL RADICULOPATHY: Primary | ICD-10-CM

## 2024-03-09 DIAGNOSIS — I10 CHRONIC HYPERTENSION: ICD-10-CM

## 2024-03-09 PROCEDURE — 96372 THER/PROPH/DIAG INJ SC/IM: CPT | Performed by: EMERGENCY MEDICINE

## 2024-03-09 PROCEDURE — 25000003 PHARM REV CODE 250: Performed by: EMERGENCY MEDICINE

## 2024-03-09 PROCEDURE — 63600175 PHARM REV CODE 636 W HCPCS: Performed by: EMERGENCY MEDICINE

## 2024-03-09 PROCEDURE — 93010 ELECTROCARDIOGRAM REPORT: CPT | Mod: ,,, | Performed by: INTERNAL MEDICINE

## 2024-03-09 PROCEDURE — 99284 EMERGENCY DEPT VISIT MOD MDM: CPT | Mod: 25

## 2024-03-09 PROCEDURE — 93005 ELECTROCARDIOGRAM TRACING: CPT

## 2024-03-09 RX ORDER — HYDROCODONE BITARTRATE AND ACETAMINOPHEN 5; 325 MG/1; MG/1
1 TABLET ORAL EVERY 6 HOURS PRN
Qty: 12 TABLET | Refills: 0 | OUTPATIENT
Start: 2024-03-09 | End: 2024-05-29

## 2024-03-09 RX ORDER — METHOCARBAMOL 500 MG/1
1000 TABLET, FILM COATED ORAL 3 TIMES DAILY
Qty: 30 TABLET | Refills: 0 | Status: SHIPPED | OUTPATIENT
Start: 2024-03-09 | End: 2024-03-14

## 2024-03-09 RX ORDER — METHOCARBAMOL 500 MG/1
500 TABLET, FILM COATED ORAL
Status: COMPLETED | OUTPATIENT
Start: 2024-03-09 | End: 2024-03-09

## 2024-03-09 RX ORDER — METHYLPREDNISOLONE SOD SUCC 125 MG
125 VIAL (EA) INJECTION
Status: COMPLETED | OUTPATIENT
Start: 2024-03-09 | End: 2024-03-09

## 2024-03-09 RX ADMIN — METHOCARBAMOL 500 MG: 500 TABLET ORAL at 12:03

## 2024-03-09 RX ADMIN — METHYLPREDNISOLONE SODIUM SUCCINATE 125 MG: 125 INJECTION, POWDER, FOR SOLUTION INTRAMUSCULAR; INTRAVENOUS at 12:03

## 2024-03-09 NOTE — ED PROVIDER NOTES
SCRIBE #1 NOTE: I, Sofya Hardy, am scribing for, and in the presence of, Nelly John MD. I have scribed the entire note.       History     Chief Complaint   Patient presents with    Neck Pain     EMS reports neck pain that goes into her shoulder and into her left arm. Pain started yesterday, denies trauma. OTC meds nit working.      Review of patient's allergies indicates:   Allergen Reactions    Tramadol Itching    Risperdal [risperidone] Other (See Comments)     Did not like way felt         History of Present Illness     HPI    3/9/2024, 12:17 PM  History obtained from the patient      History of Present Illness: Rose Milligan is a 68 y.o. female patient with a PMHx of HTN, CHF, acute CVA and acute renal failure superimposed on stage 4 CKD who presents to the Emergency Department for evaluation of left shoulder pain radiating down arm and causing numbness to left hand and fingers which onset gradually yesterday. Pt states the pain worsened last night and this morning. Pt denies any history of arthritis or injury to left shoulder.  Pt states she took Tylenol for arthritis and aleve with no improvement of sxs. Pt denies any recent trauma or strenuous work which could have caused injury to the shoulder.  Symptoms are constant and moderate in severity. No mitigating or exacerbating factors reported. Associated sxs include . No further complaints or concerns at this time.       Arrival mode: EMS     PCP: Leeann Parham DO        Past Medical History:  Past Medical History:   Diagnosis Date    Acute CVA (cerebrovascular accident) 08/04/2023    Acute renal failure superimposed on stage 4 chronic kidney disease 10/17/2023    Careful hydration  Serial labs  Nephrology consult and follow up in am     Bipolar 1 disorder     CHF (congestive heart failure)     Depression     Hepatitis C     Hypertension     Other hyperlipidemia 08/15/2019       Past Surgical History:  Past Surgical History:   Procedure Laterality  Date    HYSTERECTOMY           Family History:  Family History   Problem Relation Age of Onset    Heart attack Maternal Grandmother     Hypertension Mother        Social History:  Social History     Tobacco Use    Smoking status: Some Days     Current packs/day: 0.25     Average packs/day: 0.3 packs/day for 8.2 years (2.0 ttl pk-yrs)     Types: Cigarettes     Start date: 1/11/2016    Smokeless tobacco: Never    Tobacco comments:     1 pack every 3 days   Substance and Sexual Activity    Alcohol use: Yes    Drug use: Yes     Types: Cocaine     Comment: states last use 10/2023    Sexual activity: Not Currently        Review of Systems     Review of Systems   Musculoskeletal:  Positive for arthralgias (L shoulder).   Neurological:  Positive for numbness (left hand and fingers).      Physical Exam     Initial Vitals [03/09/24 1204]   BP Pulse Resp Temp SpO2   (!) 156/104 70 16 97.8 °F (36.6 °C) 98 %      MAP       --          Physical Exam  Nursing Notes and Vital Signs Reviewed.  Constitutional: Patient is in no obvious distress. Well-developed and well-nourished.  Head: Atraumatic. Normocephalic.  Eyes: PERRL. EOM intact. Conjunctivae are not pale. No scleral icterus.  ENT: Mucous membranes are moist. Oropharynx is clear and symmetric.    Neck: Supple. Full ROM. No lymphadenopathy.  Cardiovascular: Regular rate. Regular rhythm. No murmurs, rubs, or gallops. Distal pulses are 2+ and symmetric.  Pulmonary/Chest: No respiratory distress. Clear to auscultation bilaterally. No wheezing or rales.  Abdominal: Soft and non-distended.  There is no tenderness.  No rebound, guarding, or rigidity. Good bowel sounds.  Genitourinary: No CVA tenderness  Musculoskeletal: Moves all extremities. No obvious deformities. No edema. No calf tenderness.Normal shoulder shrug. Complains of pain with elevation of left shoulder.  Skin: Warm and dry.  Neurological:  Alert, awake, and appropriate.  Normal speech.  No acute focal neurological  deficits are appreciated. No midline cervical tenderness.   Psychiatric: Normal affect. Good eye contact. Appropriate in content.     ED Course   Procedures  ED Vital Signs:  Vitals:    03/09/24 1204   BP: (!) 156/104   Pulse: 70   Resp: 16   Temp: 97.8 °F (36.6 °C)   TempSrc: Oral   SpO2: 98%       Abnormal Lab Results:  Labs Reviewed - No data to display     All Lab Results:  None     Imaging Results:  Imaging Results              X-Ray Shoulder Trauma Left (Final result)  Result time 03/09/24 12:59:10      Final result by Anshul Aguirre MD (03/09/24 12:59:10)                   Impression:      No acute injury.  Osteopenia and degenerative changes with suggestion of rotator cuff tendinopathy and possible intra-articular loose bodies.      Electronically signed by: Anshul Aguirre  Date:    03/09/2024  Time:    12:59               Narrative:    EXAMINATION:  XR SHOULDER TRAUMA 3 VIEW LEFT    CLINICAL HISTORY:  Pain in left shoulder    TECHNIQUE:  Three views of the left shoulder were performed.    COMPARISON  X-ray dated 10/30/2023    FINDINGS:  No acute fracture.  Bones are demineralized.  No aggressive lytic or blastic lesion.  No osseous erosion or aggressive periosteal reaction.  Mild acromioclavicular and glenohumeral degenerative spurring.  Superior subluxation of the humeral head.  Joint spaces otherwise preserved with normal alignment.  Small calcifications anterior to the glenoid again noted.  Soft tissues are unremarkable.                                       X-Ray Cervical Spine AP And Lateral (Final result)  Result time 03/09/24 13:02:01      Final result by Sage Hernandez MD (03/09/24 13:02:01)                   Impression:      See above      Electronically signed by: Sage Hernandez MD  Date:    03/09/2024  Time:    13:02               Narrative:    EXAMINATION:  XR CERVICAL SPINE AP LATERAL    CLINICAL HISTORY:  Radiculopathy, cervical region    TECHNIQUE:  AP, lateral and open mouth views  of the cervical spine were performed.    COMPARISON:  None.    FINDINGS:  Degenerative disc disease at C3-C4, C4-C5, C5-C6, and C6-C7.  Uncovertebral joint osteoarthritis at C6-C7 bilaterally.  No fracture or dislocation.  Normal prevertebral soft tissues.                                       The EKG was ordered, reviewed, and independently interpreted by the ED provider.  Interpretation time: 12:30  Rate: 61 BPM  Rhythm: normal sinus rhythm  Interpretation: Possible left atrial enlargement. Left axis deviation. Septal infarct, age undetermined. ST and T wave abnormality, consider lateral ischemia. No STEMI.  When compared to EKG performed 10/17/23, there are no significant changes.           The Emergency Provider reviewed the vital signs and test results, which are outlined above.     ED Discussion     1:15 PM: Pt states she had an appointment with an orthopedist one month ago, but she did not go to the appointment.     1:17 PM: Reassessed pt at this time.  Discussed with pt all pertinent ED information and results. Discussed pt dx and plan of tx. Gave pt all f/u and return to the ED instructions. All questions and concerns were addressed at this time. Pt expresses understanding of information and instructions, and is comfortable with plan to discharge. Pt is stable for discharge.    I discussed with patient and/or family/caretaker that evaluation in the ED does not suggest any emergent or life threatening medical conditions requiring immediate intervention beyond what was provided in the ED, and I believe patient is safe for discharge.  Regardless, an unremarkable evaluation in the ED does not preclude the development or presence of a serious of life threatening condition. As such, patient was instructed to return immediately for any worsening or change in current symptoms.         Medical Decision Making  DDX:  1. Cervical radiculopathy  2. Arthritis of shoulder    ECG reviewed and no acute ischemic changes, BP  noted and chronic and not symptomatic, xray of shoulder consistent with rotator cuff tendonitis, xray of cervical spine reviewed and significant djd noted, neuro exam otherwise normal, overall patient is stable for discharge with outpatient ortho follow up.     Amount and/or Complexity of Data Reviewed  Radiology: ordered. Decision-making details documented in ED Course.  ECG/medicine tests: ordered and independent interpretation performed. Decision-making details documented in ED Course.    Risk  Prescription drug management.                ED Medication(s):  Medications   methylPREDNISolone sodium succinate injection 125 mg (125 mg Intramuscular Given 3/9/24 1234)   methocarbamoL tablet 500 mg (500 mg Oral Given 3/9/24 1233)       New Prescriptions    No medications on file               Scribe Attestation:   Scribe #1: I performed the above scribed service and the documentation accurately describes the services I performed. I attest to the accuracy of the note.     Attending:   Physician Attestation Statement for Scribe #1: I, Nelly John MD, personally performed the services described in this documentation, as scribed by Sofya Hardy, in my presence, and it is both accurate and complete.           Clinical Impression       ICD-10-CM ICD-9-CM   1. Cervical radiculopathy  M54.12 723.4   2. Left shoulder pain  M25.512 719.41   3. Hypertension  I10 401.9       Disposition:   Disposition: Discharged  Condition: Stable         Nelly John MD  03/11/24 5180

## 2024-03-11 ENCOUNTER — PATIENT OUTREACH (OUTPATIENT)
Dept: EMERGENCY MEDICINE | Facility: HOSPITAL | Age: 69
End: 2024-03-11
Payer: MEDICARE

## 2024-03-11 LAB
OHS QRS DURATION: 96 MS
OHS QTC CALCULATION: 471 MS

## 2024-03-12 NOTE — PROGRESS NOTES
Pt visited the ED on 3/9/24. I made 2 attempts to reach patient to assist with scheduling a post ED 7-day follow up with PCP. Unable to reach. No further attempts scheduled.  ED Navigator closed encounter.    Dominique Sebastian

## 2024-03-13 ENCOUNTER — APPOINTMENT (OUTPATIENT)
Dept: RADIOLOGY | Facility: HOSPITAL | Age: 69
End: 2024-03-13
Attending: NURSE PRACTITIONER
Payer: MEDICARE

## 2024-03-13 DIAGNOSIS — Z78.0 ASYMPTOMATIC MENOPAUSE: ICD-10-CM

## 2024-03-13 PROCEDURE — 77080 DXA BONE DENSITY AXIAL: CPT | Mod: 26,,, | Performed by: RADIOLOGY

## 2024-03-13 PROCEDURE — 77080 DXA BONE DENSITY AXIAL: CPT | Mod: TC

## 2024-03-15 ENCOUNTER — TELEPHONE (OUTPATIENT)
Dept: FAMILY MEDICINE | Facility: CLINIC | Age: 69
End: 2024-03-15
Payer: MEDICARE

## 2024-03-15 NOTE — TELEPHONE ENCOUNTER
----- Message from Nandini Bryson sent at 3/15/2024 12:08 PM CDT -----  Contact: self  Pt is asking for an return call in reference to needing to know the name of the medication she was called about on this morning, please call back at .[phone] Thx CJ

## 2024-03-15 NOTE — TELEPHONE ENCOUNTER
----- Message from Chioma Yee sent at 3/15/2024 11:44 AM CDT -----  Contact: Ngoc Grossman is needing a call back in regards to a call she received earlier pertaining to medications please call her back at [phone]

## 2024-03-20 ENCOUNTER — TELEPHONE (OUTPATIENT)
Dept: NEUROSURGERY | Facility: CLINIC | Age: 69
End: 2024-03-20
Payer: MEDICARE

## 2024-03-20 NOTE — TELEPHONE ENCOUNTER
----- Message from Lotus Katz sent at 3/20/2024 11:59 AM CDT -----  Regarding: Advise  Contact: [phone]  ROSE MILLIGAN calling regarding Patient Advice (message) for # pt daughter Marysol is calling asking to speak with provider or nurse about pt Angiogram and what is the estimate with that procedure please call to advise     [phone]

## 2024-03-20 NOTE — TELEPHONE ENCOUNTER
Returned call to Marysol. Explained will be May some time. Will let her know once we get the date.

## 2024-04-02 ENCOUNTER — TELEPHONE (OUTPATIENT)
Dept: NEUROSURGERY | Facility: CLINIC | Age: 69
End: 2024-04-02
Payer: MEDICARE

## 2024-04-02 NOTE — TELEPHONE ENCOUNTER
Pts granddaughter returned call. Confirmed angiogram appt for 05/02/24. Scheduled for 7am and will need to arrive for 530am. Labs will be scheduled at the [facility]. Mrs. Samuel confirmed and ankur

## 2024-04-02 NOTE — TELEPHONE ENCOUNTER
Called Marysol Samuel pts grandchild to schedule angiogram on 05/02/24. Left  with clinic number to return call.

## 2024-04-03 ENCOUNTER — PATIENT MESSAGE (OUTPATIENT)
Dept: PULMONOLOGY | Facility: CLINIC | Age: 69
End: 2024-04-03
Payer: MEDICARE

## 2024-04-04 ENCOUNTER — HOSPITAL ENCOUNTER (EMERGENCY)
Facility: HOSPITAL | Age: 69
Discharge: HOME | End: 2024-04-04
Attending: EMERGENCY MEDICINE
Payer: MEDICARE

## 2024-04-04 VITALS
HEART RATE: 64 BPM | RESPIRATION RATE: 20 BRPM | TEMPERATURE: 98 F | SYSTOLIC BLOOD PRESSURE: 167 MMHG | OXYGEN SATURATION: 97 % | DIASTOLIC BLOOD PRESSURE: 87 MMHG

## 2024-04-04 DIAGNOSIS — F41.9 ANXIETY: Primary | ICD-10-CM

## 2024-04-04 DIAGNOSIS — I10 CHRONIC HYPERTENSION: ICD-10-CM

## 2024-04-04 DIAGNOSIS — I10 HTN (HYPERTENSION): ICD-10-CM

## 2024-04-04 DIAGNOSIS — N18.4 CKD (CHRONIC KIDNEY DISEASE) STAGE 4, GFR 15-29 ML/MIN: ICD-10-CM

## 2024-04-04 LAB
ALBUMIN SERPL BCP-MCNC: 4.4 G/DL (ref 3.5–5.2)
ALP SERPL-CCNC: 84 U/L (ref 55–135)
ALT SERPL W/O P-5'-P-CCNC: 12 U/L (ref 10–44)
ANION GAP SERPL CALC-SCNC: 13 MMOL/L (ref 8–16)
ANION GAP SERPL CALC-SCNC: 18 MMOL/L (ref 8–16)
AST SERPL-CCNC: 24 U/L (ref 10–40)
BACTERIA #/AREA URNS HPF: ABNORMAL /HPF
BASOPHILS # BLD AUTO: 0.03 K/UL (ref 0–0.2)
BASOPHILS NFR BLD: 0.4 % (ref 0–1.9)
BILIRUB SERPL-MCNC: 0.6 MG/DL (ref 0.1–1)
BILIRUB UR QL STRIP: NEGATIVE
BNP SERPL-MCNC: 236 PG/ML (ref 0–99)
BUN SERPL-MCNC: 54 MG/DL (ref 8–23)
BUN SERPL-MCNC: 55 MG/DL (ref 8–23)
CALCIUM SERPL-MCNC: 9.5 MG/DL (ref 8.7–10.5)
CALCIUM SERPL-MCNC: 9.9 MG/DL (ref 8.7–10.5)
CHLORIDE SERPL-SCNC: 106 MMOL/L (ref 95–110)
CHLORIDE SERPL-SCNC: 106 MMOL/L (ref 95–110)
CLARITY UR: CLEAR
CO2 SERPL-SCNC: 15 MMOL/L (ref 23–29)
CO2 SERPL-SCNC: 21 MMOL/L (ref 23–29)
COLOR UR: YELLOW
CREAT SERPL-MCNC: 3.1 MG/DL (ref 0.5–1.4)
CREAT SERPL-MCNC: 3.3 MG/DL (ref 0.5–1.4)
DIFFERENTIAL METHOD BLD: ABNORMAL
EOSINOPHIL # BLD AUTO: 0.2 K/UL (ref 0–0.5)
EOSINOPHIL NFR BLD: 2.7 % (ref 0–8)
ERYTHROCYTE [DISTWIDTH] IN BLOOD BY AUTOMATED COUNT: 13.4 % (ref 11.5–14.5)
EST. GFR  (NO RACE VARIABLE): 14 ML/MIN/1.73 M^2
EST. GFR  (NO RACE VARIABLE): 16 ML/MIN/1.73 M^2
GLUCOSE SERPL-MCNC: 87 MG/DL (ref 70–110)
GLUCOSE SERPL-MCNC: 87 MG/DL (ref 70–110)
GLUCOSE UR QL STRIP: NEGATIVE
HCT VFR BLD AUTO: 43.1 % (ref 37–48.5)
HGB BLD-MCNC: 14.1 G/DL (ref 12–16)
HGB UR QL STRIP: NEGATIVE
HYALINE CASTS #/AREA URNS LPF: 1 /LPF
IMM GRANULOCYTES # BLD AUTO: 0.02 K/UL (ref 0–0.04)
IMM GRANULOCYTES NFR BLD AUTO: 0.3 % (ref 0–0.5)
KETONES UR QL STRIP: NEGATIVE
LEUKOCYTE ESTERASE UR QL STRIP: ABNORMAL
LYMPHOCYTES # BLD AUTO: 2.3 K/UL (ref 1–4.8)
LYMPHOCYTES NFR BLD: 34.3 % (ref 18–48)
MCH RBC QN AUTO: 28.2 PG (ref 27–31)
MCHC RBC AUTO-ENTMCNC: 32.7 G/DL (ref 32–36)
MCV RBC AUTO: 86 FL (ref 82–98)
MICROSCOPIC COMMENT: ABNORMAL
MONOCYTES # BLD AUTO: 0.8 K/UL (ref 0.3–1)
MONOCYTES NFR BLD: 12.3 % (ref 4–15)
NEUTROPHILS # BLD AUTO: 3.4 K/UL (ref 1.8–7.7)
NEUTROPHILS NFR BLD: 50 % (ref 38–73)
NITRITE UR QL STRIP: NEGATIVE
NRBC BLD-RTO: 0 /100 WBC
OHS QRS DURATION: 98 MS
OHS QTC CALCULATION: 435 MS
PH UR STRIP: 5 [PH] (ref 5–8)
PLATELET # BLD AUTO: 226 K/UL (ref 150–450)
PLATELET BLD QL SMEAR: ABNORMAL
PMV BLD AUTO: 11 FL (ref 9.2–12.9)
POTASSIUM SERPL-SCNC: 3.8 MMOL/L (ref 3.5–5.1)
POTASSIUM SERPL-SCNC: 4.4 MMOL/L (ref 3.5–5.1)
PROT SERPL-MCNC: 8 G/DL (ref 6–8.4)
PROT UR QL STRIP: ABNORMAL
RBC # BLD AUTO: 5 M/UL (ref 4–5.4)
SODIUM SERPL-SCNC: 139 MMOL/L (ref 136–145)
SODIUM SERPL-SCNC: 140 MMOL/L (ref 136–145)
SP GR UR STRIP: 1.01 (ref 1–1.03)
SQUAMOUS #/AREA URNS HPF: 2 /HPF
TROPONIN I SERPL DL<=0.01 NG/ML-MCNC: 0.04 NG/ML (ref 0–0.03)
TROPONIN I SERPL DL<=0.01 NG/ML-MCNC: 0.05 NG/ML (ref 0–0.03)
URN SPEC COLLECT METH UR: ABNORMAL
UROBILINOGEN UR STRIP-ACNC: NEGATIVE EU/DL
WBC # BLD AUTO: 6.73 K/UL (ref 3.9–12.7)
WBC #/AREA URNS HPF: 10 /HPF (ref 0–5)
WBC CLUMPS URNS QL MICRO: ABNORMAL

## 2024-04-04 PROCEDURE — 25000003 PHARM REV CODE 250: Performed by: EMERGENCY MEDICINE

## 2024-04-04 PROCEDURE — 80048 BASIC METABOLIC PNL TOTAL CA: CPT | Mod: XB | Performed by: EMERGENCY MEDICINE

## 2024-04-04 PROCEDURE — 83880 ASSAY OF NATRIURETIC PEPTIDE: CPT | Performed by: EMERGENCY MEDICINE

## 2024-04-04 PROCEDURE — 84484 ASSAY OF TROPONIN QUANT: CPT | Performed by: EMERGENCY MEDICINE

## 2024-04-04 PROCEDURE — 99285 EMERGENCY DEPT VISIT HI MDM: CPT | Mod: 25

## 2024-04-04 PROCEDURE — 80053 COMPREHEN METABOLIC PANEL: CPT | Performed by: EMERGENCY MEDICINE

## 2024-04-04 PROCEDURE — 93010 ELECTROCARDIOGRAM REPORT: CPT | Mod: ,,, | Performed by: INTERNAL MEDICINE

## 2024-04-04 PROCEDURE — 93005 ELECTROCARDIOGRAM TRACING: CPT

## 2024-04-04 PROCEDURE — 81000 URINALYSIS NONAUTO W/SCOPE: CPT | Performed by: EMERGENCY MEDICINE

## 2024-04-04 PROCEDURE — 85025 COMPLETE CBC W/AUTO DIFF WBC: CPT | Performed by: EMERGENCY MEDICINE

## 2024-04-04 PROCEDURE — 96374 THER/PROPH/DIAG INJ IV PUSH: CPT

## 2024-04-04 PROCEDURE — 63600175 PHARM REV CODE 636 W HCPCS: Performed by: EMERGENCY MEDICINE

## 2024-04-04 RX ORDER — CARVEDILOL 12.5 MG/1
25 TABLET ORAL ONCE
Status: COMPLETED | OUTPATIENT
Start: 2024-04-04 | End: 2024-04-04

## 2024-04-04 RX ORDER — HYDRALAZINE HYDROCHLORIDE 25 MG/1
100 TABLET, FILM COATED ORAL ONCE
Status: COMPLETED | OUTPATIENT
Start: 2024-04-04 | End: 2024-04-04

## 2024-04-04 RX ORDER — LABETALOL HYDROCHLORIDE 5 MG/ML
20 INJECTION, SOLUTION INTRAVENOUS
Status: COMPLETED | OUTPATIENT
Start: 2024-04-04 | End: 2024-04-04

## 2024-04-04 RX ORDER — LORAZEPAM 1 MG/1
1 TABLET ORAL
Status: COMPLETED | OUTPATIENT
Start: 2024-04-04 | End: 2024-04-04

## 2024-04-04 RX ADMIN — CARVEDILOL 25 MG: 12.5 TABLET, FILM COATED ORAL at 07:04

## 2024-04-04 RX ADMIN — LABETALOL HYDROCHLORIDE 20 MG: 5 INJECTION INTRAVENOUS at 04:04

## 2024-04-04 RX ADMIN — HYDRALAZINE HYDROCHLORIDE 100 MG: 25 TABLET, FILM COATED ORAL at 07:04

## 2024-04-04 RX ADMIN — LORAZEPAM 1 MG: 1 TABLET ORAL at 05:04

## 2024-04-04 NOTE — ED PROVIDER NOTES
SCRIBE #1 NOTE: I, Jarvis Francis, am scribing for, and in the presence of, Jennifer Zimmer MD. I have scribed the HPI, ROS, and PEx.    SCRIBE #2 NOTE: I, Branden Mckoy, am scribing for, and in the presence of,  Nelly John MD. I have scribed the remaining portions of the note not scribed by Scribe #1.      History     Chief Complaint   Patient presents with    Other     Neck and belly swelling, left hand numbness; onset 1 hr ago; anxiety     Review of patient's allergies indicates:   Allergen Reactions    Tramadol Itching    Risperdal [risperidone] Other (See Comments)     Did not like way felt         History of Present Illness     HPI    4/4/2024, 3:34 AM  History obtained from the patient      History of Present Illness: Rose Milligan is a 68 y.o. female patient with a PMHx of stage 4 CKD, acute CVA, CHF, bipolar disorder, CHF, HTN  (notes compliance with HTN medication) who presents to the Emergency Department for evaluation of neck and abdominal swelling which onset gradually 9 PM last night. Patient states she was lying down at home watching TV during onset. Symptoms are constant and moderate in severity. No mitigating or exacerbating factors reported. Associated sxs include congestion, anxious, and LUE swelling. Patient denies any cough, wheezing, N/V, SOB, fever, HA, and all other sxs at this time. No further complaints or concerns at this time.       Arrival mode: Personal vehicle    PCP: Leeann Parham DO        Past Medical History:  Past Medical History:   Diagnosis Date    Acute CVA (cerebrovascular accident) 08/04/2023    Acute renal failure superimposed on stage 4 chronic kidney disease 10/17/2023    Careful hydration  Serial labs  Nephrology consult and follow up in am     Bipolar 1 disorder     CHF (congestive heart failure)     Depression     Hepatitis C     Hypertension     Other hyperlipidemia 08/15/2019       Past Surgical History:  Past Surgical History:   Procedure Laterality Date     HYSTERECTOMY           Family History:  Family History   Problem Relation Age of Onset    Heart attack Maternal Grandmother     Hypertension Mother        Social History:  Social History     Tobacco Use    Smoking status: Some Days     Current packs/day: 0.25     Average packs/day: 0.3 packs/day for 8.2 years (2.1 ttl pk-yrs)     Types: Cigarettes     Start date: 1/11/2016    Smokeless tobacco: Never    Tobacco comments:     1 pack every 3 days   Substance and Sexual Activity    Alcohol use: Yes    Drug use: Yes     Types: Cocaine     Comment: states last use 10/2023    Sexual activity: Not Currently        Review of Systems     Review of Systems   Constitutional:  Negative for fever.   HENT:  Positive for congestion. Negative for sore throat.         (+) Neck swelling   Respiratory:  Negative for shortness of breath.    Cardiovascular:  Negative for chest pain.   Gastrointestinal:  Positive for abdominal distention. Negative for abdominal pain and nausea.   Genitourinary:  Negative for dysuria.   Musculoskeletal:  Negative for back pain.   Skin:  Negative for rash.   Neurological:  Positive for numbness (LUE). Negative for weakness and headaches.   Hematological:  Does not bruise/bleed easily.   Psychiatric/Behavioral:  The patient is nervous/anxious.    All other systems reviewed and are negative.       Physical Exam     Initial Vitals [04/04/24 0057]   BP Pulse Resp Temp SpO2   (!) 194/95 81 20 97.8 °F (36.6 °C) 98 %      MAP       --          Physical Exam   Nursing Notes and Vital Signs Reviewed.  Constitutional: Patient is in no acute distress. Well-developed and well-nourished.  Head: Atraumatic. Normocephalic.  Eyes: PERRL. EOM intact. Conjunctivae are not pale. No scleral icterus.  ENT: Mucous membranes are moist. Oropharynx is clear and symmetric.    Neck: Supple. Full ROM. No lymphadenopathy.  Cardiovascular: Regular rate. Regular rhythm. No murmurs, rubs, or gallops. Distal pulses are 2+ and  symmetric.  Pulmonary/Chest: No respiratory distress. Clear to auscultation bilaterally. No wheezing or rales.  Abdominal: Soft and non-distended.  There is no tenderness.  No rebound, guarding, or rigidity. Good bowel sounds.  Genitourinary: No CVA tenderness  Musculoskeletal: Moves all extremities. No obvious deformities. No edema. No calf tenderness.  Skin: Warm and dry.  Neurological:  Alert, awake, and appropriate.  Normal speech.  No acute focal neurological deficits are appreciated.  Psychiatric: Normal affect. Good eye contact. Appropriate in content.     ED Course   Critical Care    Date/Time: 4/4/2024 8:51 AM    Performed by: Nelly John MD  Authorized by: Nelly John MD  Direct patient critical care time: 35 minutes  Additional history critical care time: 6 minutes  Ordering / reviewing critical care time: 5 minutes  Documentation critical care time: 5 minutes  Total critical care time (exclusive of procedural time) : 51 minutes  Critical care was necessary to treat or prevent imminent or life-threatening deterioration of the following conditions: metabolic crisis, cardiac failure and renal failure (HTN crisis).  Critical care was time spent personally by me on the following activities: blood draw for specimens, development of treatment plan with patient or surrogate, interpretation of cardiac output measurements, evaluation of patient's response to treatment, obtaining history from patient or surrogate, examination of patient, ordering and performing treatments and interventions, ordering and review of laboratory studies, ordering and review of radiographic studies, pulse oximetry, re-evaluation of patient's condition and review of old charts.        ED Vital Signs:  Vitals:    04/04/24 0057 04/04/24 0334 04/04/24 0438 04/04/24 0503   BP: (!) 194/95 (!) 193/103 (!) 207/99 (!) 176/86   Pulse: 81 71  61   Resp: 20      Temp: 97.8 °F (36.6 °C)      TempSrc: Oral      SpO2: 98% 100%  99%     04/04/24 0633 04/04/24 0714 04/04/24 0733 04/04/24 0802   BP: (!) 193/102 (!) 166/98 (!) 194/90 (!) 167/87   Pulse: 64 66 61 64   Resp:       Temp:       TempSrc:       SpO2: 98%  98% 97%       Abnormal Lab Results:  Labs Reviewed   CBC W/ AUTO DIFFERENTIAL - Abnormal; Notable for the following components:       Result Value    Platelet Estimate Clumped (*)     All other components within normal limits   COMPREHENSIVE METABOLIC PANEL - Abnormal; Notable for the following components:    CO2 15 (*)     BUN 54 (*)     Creatinine 3.3 (*)     eGFR 14 (*)     Anion Gap 18 (*)     All other components within normal limits   TROPONIN I - Abnormal; Notable for the following components:    Troponin I 0.048 (*)     All other components within normal limits   URINALYSIS, REFLEX TO URINE CULTURE - Abnormal; Notable for the following components:    Protein, UA Trace (*)     Leukocytes, UA 1+ (*)     All other components within normal limits    Narrative:     Specimen Source->Urine   B-TYPE NATRIURETIC PEPTIDE - Abnormal; Notable for the following components:     (*)     All other components within normal limits   TROPONIN I - Abnormal; Notable for the following components:    Troponin I 0.037 (*)     All other components within normal limits   BASIC METABOLIC PANEL - Abnormal; Notable for the following components:    CO2 21 (*)     BUN 55 (*)     Creatinine 3.1 (*)     eGFR 16 (*)     All other components within normal limits   URINALYSIS MICROSCOPIC - Abnormal; Notable for the following components:    WBC, UA 10 (*)     WBC Clumps, UA Few (*)     All other components within normal limits    Narrative:     Specimen Source->Urine        All Lab Results:  Results for orders placed or performed during the hospital encounter of 04/04/24   CBC auto differential   Result Value Ref Range    WBC 6.73 3.90 - 12.70 K/uL    RBC 5.00 4.00 - 5.40 M/uL    Hemoglobin 14.1 12.0 - 16.0 g/dL    Hematocrit 43.1 37.0 - 48.5 %    MCV 86 82 -  98 fL    MCH 28.2 27.0 - 31.0 pg    MCHC 32.7 32.0 - 36.0 g/dL    RDW 13.4 11.5 - 14.5 %    Platelets 226 150 - 450 K/uL    MPV 11.0 9.2 - 12.9 fL    Immature Granulocytes 0.3 0.0 - 0.5 %    Gran # (ANC) 3.4 1.8 - 7.7 K/uL    Immature Grans (Abs) 0.02 0.00 - 0.04 K/uL    Lymph # 2.3 1.0 - 4.8 K/uL    Mono # 0.8 0.3 - 1.0 K/uL    Eos # 0.2 0.0 - 0.5 K/uL    Baso # 0.03 0.00 - 0.20 K/uL    nRBC 0 0 /100 WBC    Gran % 50.0 38.0 - 73.0 %    Lymph % 34.3 18.0 - 48.0 %    Mono % 12.3 4.0 - 15.0 %    Eosinophil % 2.7 0.0 - 8.0 %    Basophil % 0.4 0.0 - 1.9 %    Platelet Estimate Clumped (A)     Differential Method Automated    Comprehensive metabolic panel   Result Value Ref Range    Sodium 139 136 - 145 mmol/L    Potassium 4.4 3.5 - 5.1 mmol/L    Chloride 106 95 - 110 mmol/L    CO2 15 (L) 23 - 29 mmol/L    Glucose 87 70 - 110 mg/dL    BUN 54 (H) 8 - 23 mg/dL    Creatinine 3.3 (H) 0.5 - 1.4 mg/dL    Calcium 9.9 8.7 - 10.5 mg/dL    Total Protein 8.0 6.0 - 8.4 g/dL    Albumin 4.4 3.5 - 5.2 g/dL    Total Bilirubin 0.6 0.1 - 1.0 mg/dL    Alkaline Phosphatase 84 55 - 135 U/L    AST 24 10 - 40 U/L    ALT 12 10 - 44 U/L    eGFR 14 (A) >60 mL/min/1.73 m^2    Anion Gap 18 (H) 8 - 16 mmol/L   Troponin I   Result Value Ref Range    Troponin I 0.048 (H) 0.000 - 0.026 ng/mL   Urinalysis, Reflex to Urine Culture Urine, Clean Catch    Specimen: Urine   Result Value Ref Range    Specimen UA Urine, Clean Catch     Color, UA Yellow Yellow, Straw, [place]    Appearance, UA Clear Clear    pH, UA 5.0 5.0 - 8.0    Specific Gravity, UA 1.010 1.005 - 1.030    Protein, UA Trace (A) Negative    Glucose, UA Negative Negative    Ketones, UA Negative Negative    Bilirubin (UA) Negative Negative    Occult Blood UA Negative Negative    Nitrite, UA Negative Negative    Urobilinogen, UA Negative <2.0 EU/dL    Leukocytes, UA 1+ (A) Negative   Brain natriuretic peptide   Result Value Ref Range     (H) 0 - 99 pg/mL   Troponin I   Result Value Ref Range     Troponin I 0.037 (H) 0.000 - 0.026 ng/mL   Basic metabolic panel   Result Value Ref Range    Sodium 140 136 - 145 mmol/L    Potassium 3.8 3.5 - 5.1 mmol/L    Chloride 106 95 - 110 mmol/L    CO2 21 (L) 23 - 29 mmol/L    Glucose 87 70 - 110 mg/dL    BUN 55 (H) 8 - 23 mg/dL    Creatinine 3.1 (H) 0.5 - 1.4 mg/dL    Calcium 9.5 8.7 - 10.5 mg/dL    Anion Gap 13 8 - 16 mmol/L    eGFR 16 (A) >60 mL/min/1.73 m^2   Urinalysis Microscopic   Result Value Ref Range    WBC, UA 10 (H) 0 - 5 /hpf    WBC Clumps, UA Few (A) None-Rare    Bacteria Occasional None-Occ /hpf    Squam Epithel, UA 2 /hpf    Hyaline Casts, UA 1 0-1/lpf /lpf    Microscopic Comment SEE COMMENT    EKG 12-lead   Result Value Ref Range    QRS Duration 98 ms    [facility] QTC Calculation 435 ms         Imaging Results:  Imaging Results              X-Ray Chest AP Portable (Final result)  Result time 04/04/24 07:08:14      Final result by Lenny Mccarthy MD (04/04/24 07:08:14)                   Impression:      No acute process seen.      Electronically signed by: Lenny Mccarthy MD  Date:    04/04/2024  Time:    07:08               Narrative:    EXAMINATION:  XR CHEST AP PORTABLE    CLINICAL HISTORY:  Essential (primary) hypertension    FINDINGS:  Single view of the chest.  Comparison 10/17/2023    Cardiac silhouette is mildly enlarged but stable.  The lungs demonstrate no evidence of active disease.  No evidence of pleural effusion or pneumothorax.  Bones appear intact.  Moderate degenerative changes and moderate atherosclerotic disease.                                     Type of Interpretation: ED Physician (Independently Interpreted).  Radiology Procedure Done: Portable CXR.  Interpretation: Cardiomegaly noted.          The EKG was ordered, reviewed, and independently interpreted by the ED provider.  Interpretation time: 04:26  Rate: 69 BPM  Rhythm: normal sinus rhythm  Interpretation: Left anterior fascicular block. Moderate voltage criteria for LVH, may be  normal variant. ST and T wave abnormality, consider lateral ischemia. No STEMI.    The Emergency Provider reviewed the vital signs and test results, which are outlined above.     ED Discussion     6:00 AM: Dr. Zimmer transfers care of patient to Dr. John pending repeat troponin results.    8:24 AM: Reassessed pt at this time. Discussed with pt all pertinent ED information and results. Discussed pt dx and plan of tx. Gave pt all f/u and return to the ED instructions. All questions and concerns were addressed at this time. Pt expresses understanding of information and instructions, and is comfortable with plan to discharge. Pt is stable for discharge.    I discussed with patient and/or family/caretaker that evaluation in the ED does not suggest any emergent or life threatening medical conditions requiring immediate intervention beyond what was provided in the ED, and I believe patient is safe for discharge.  Regardless, an unremarkable evaluation in the ED does not preclude the development or presence of a serious of life threatening condition. As such, patient was instructed to return immediately for any worsening or change in current symptoms.       Medical Decision Making  DDX: 1. Anxiety 2. ACS 3. HTN crisis    Symtpoms appear more related to anxiety, ECG reviewed and no acute ischemic changes noted, CXR negative, WBC normal, kidney function slightly declined from baseline, bicarb 15, troponin mildly elevated but in respect to kidney function this is minimal and patient does not have any CP or dyspnea, troponin repeated and trended down, CMP repeated and bicarb now 21, bnp mildly elevated suspect chronic, given bp meds in the ER with improvement in BP. Admission initially considered but feel patient at this time is stable for discharge.     Amount and/or Complexity of Data Reviewed  Labs: ordered. Decision-making details documented in ED Course.  Radiology: ordered and independent interpretation performed.  Decision-making details documented in ED Course.  ECG/medicine tests: ordered and independent interpretation performed. Decision-making details documented in ED Course.    Risk  Prescription drug management.  Decision regarding hospitalization.           Additional MDM:   Smoking Cessation: The patient is a smoker. The patient was counseled on smoking cessation for: 4 minutes. The patient was counseled on tobacco related  health complications.        ED Medication(s):  Medications   labetaloL injection 20 mg (20 mg Intravenous Given 4/4/24 0438)   LORazepam tablet 1 mg (1 mg Oral Given 4/4/24 0533)   carvediloL tablet 25 mg (25 mg Oral Given 4/4/24 0714)   hydrALAZINE tablet 100 mg (100 mg Oral Given 4/4/24 0714)       New Prescriptions    No medications on file        Follow-up Information       Leeann Parham DO. Schedule an appointment as soon as possible for a visit in 2 days.    Specialty: Internal Medicine  Why: Return to the Emergency Room, If symptoms worsen  Contact information:  [address]  [place] [address]  [phone]                                 Scribe Attestation:   Scribe #1: I performed the above scribed service and the documentation accurately describes the services I performed. I attest to the accuracy of the note.     Attending:   Physician Attestation Statement for Scribe #1: I, Jennifer Zimmer MD, personally performed the services described in this documentation, as scribed by Jarvis Francis, in my presence, and it is both accurate and complete.       Scribe Attestation:   Scribe #2: I performed the above scribed service and the documentation accurately describes the services I performed. I attest to the accuracy of the note.    Attending Attestation:           Physician Attestation for Scribe:    Physician Attestation Statement for Scribe #2: I, Nelly John MD, reviewed documentation, as scribed by Branden Mckoy in my presence, and it is both accurate and complete. I also  acknowledge and confirm the content of the note done by Pradeepibe #1.           Clinical Impression       ICD-10-CM ICD-9-CM   1. Anxiety  F41.9 300.00   2. HTN (hypertension)  I10 401.9   3. Chronic hypertension  I10 401.9   4. CKD (chronic kidney disease) stage 4, GFR 15-29 ml/min  N18.4 585.4       Disposition:   Disposition: Discharged  Condition: Stable         Nelly John MD  04/04/24 0880

## 2024-04-05 ENCOUNTER — PATIENT OUTREACH (OUTPATIENT)
Dept: EMERGENCY MEDICINE | Facility: HOSPITAL | Age: 69
End: 2024-04-05
Payer: MEDICARE

## 2024-04-05 ENCOUNTER — TELEPHONE (OUTPATIENT)
Dept: FAMILY MEDICINE | Facility: CLINIC | Age: 69
End: 2024-04-05
Payer: MEDICARE

## 2024-04-05 NOTE — TELEPHONE ENCOUNTER
----- Message from Dominique Sebastian sent at 4/5/2024 10:51 AM CDT -----  Regarding: Post ED visit follow up appt within 7 days of d/c date 4/4/24  Good morning: Pt was seen in the ED on 4/4/24 for HTN (hypertension); Anxiety; Chronic hypertension; CKD (chronic kidney disease) stage 4, GFR 15-29 ml/min. Pt requires a Post ED visit follow up appt within 7 days of d/c date. Please contact pt to schedule a follow up appt either in-office or virtual with any available provider by 4/11/24 if possible.    Thank you and have a great weekend.  Dominique Sebastian

## 2024-04-08 NOTE — PROGRESS NOTES
Pt visited the ED on 4/4/24. I made 2 attempts to reach patient to assist with scheduling a post ED 7-day follow up with PCP. Unable to reach. Closing encounter.    Dominique Sebastian

## 2024-04-09 NOTE — PROGRESS NOTES
Subjective     Patient ID: Rose Milligan is a 68 y.o. female.    Chief Complaint: Weight Loss and Cough      HPI  Patient presents due to cough. She reports she has been coughing for 1.5 weeks. She has not had any other cold symptoms  Review of Systems   Constitutional:  Negative for chills and fatigue.   Respiratory:  Positive for cough. Negative for chest tightness and shortness of breath.    Cardiovascular:  Negative for chest pain and palpitations.   Gastrointestinal:  Negative for abdominal pain, constipation, diarrhea, nausea and vomiting.   Genitourinary:  Negative for frequency and urgency.   Neurological:  Negative for dizziness, numbness and headaches.          Objective       Current Outpatient Medications:     aspirin (ECOTRIN) 81 MG EC tablet, Take 1 tablet (81 mg total) by mouth once daily., Disp: 90 tablet, Rfl: 0    carvediloL (COREG) 25 MG tablet, TAKE 2 TABLETS BY MOUTH 2 TIMES DAILY., Disp: 180 tablet, Rfl: 1    cloNIDine 0.3 mg/24 hr td ptwk (CATAPRES) 0.3 mg/24 hr, Place 1 patch onto the skin every 7 days., Disp: 4 patch, Rfl: 11    clopidogreL (PLAVIX) 75 mg tablet, Take 1 tablet (75 mg total) by mouth once daily. for 20 days, Disp: 20 tablet, Rfl: 0    colchicine, gout, (COLCRYS) 0.6 mg tablet, Take every 8 hours on day 1; take every 12 hours on day 2; then once per day thereafter, Disp: 15 tablet, Rfl: 0    diphenhydrAMINE (BENADRYL) 25 mg capsule, Take 1 capsule (25 mg total) by mouth every 6 (six) hours as needed for Itching., Disp: 30 capsule, Rfl: 0    ergocalciferol (ERGOCALCIFEROL) 50,000 unit Cap, Take 1 capsule (50,000 Units total) by mouth every 7 days., Disp: 12 capsule, Rfl: 3    furosemide (LASIX) 40 MG tablet, Take 1 tablet (40 mg total) by mouth once daily. Do not take if BP is low - under 110/70 or feeling dry/dizzy/lightheaded due to dehydration, Disp: 90 tablet, Rfl: 1    hydrALAZINE (APRESOLINE) 100 MG tablet, Take 1 tablet (100 mg total) by mouth every 8 (eight) hours.,  Disp: 180 tablet, Rfl: 1    HYDROcodone-acetaminophen (NORCO) 5-325 mg per tablet, Take 1 tablet by mouth every 6 (six) hours as needed for Pain., Disp: 12 tablet, Rfl: 0    hydrOXYzine (ATARAX) 50 MG tablet, Take 1 tablet (50 mg total) by mouth 2 (two) times daily as needed for Anxiety., Disp: 20 tablet, Rfl: 0    irbesartan (AVAPRO) 150 MG tablet, Take 2 tablets (300 mg total) by mouth every evening., Disp: 180 tablet, Rfl: 3    rosuvastatin (CRESTOR) 40 MG Tab, Take 1 tablet (40 mg total) by mouth every evening., Disp: 90 tablet, Rfl: 1    tretinoin (RETIN-A) 0.025 % cream, Apply topically every evening. Pea sized amount to entire face at night. Start out using every other night for 2 weeks, then increase to every night as tolerated, Disp: 20 g, Rfl: 0    promethazine-dextromethorphan (PROMETHAZINE-DM) 6.25-15 mg/5 mL Syrp, Take 5 mLs by mouth every 4 (four) hours as needed., Disp: 118 mL, Rfl: 0    ziprasidone (GEODON) 20 MG Cap, Take 1 capsule (20 mg total) by mouth 2 (two) times daily., Disp: 60 capsule, Rfl: 3     Physical Exam  Constitutional:       General: She is not in acute distress.     Appearance: Normal appearance. She is normal weight.   HENT:      Head: Normocephalic and atraumatic.   Cardiovascular:      Rate and Rhythm: Normal rate and regular rhythm.      Heart sounds: Normal heart sounds. No murmur heard.     No friction rub. No gallop.   Pulmonary:      Effort: Pulmonary effort is normal.      Breath sounds: Normal breath sounds. No wheezing, rhonchi or rales.   Abdominal:      General: Bowel sounds are normal. There is no distension.      Palpations: Abdomen is soft.      Tenderness: There is no abdominal tenderness. There is no rebound.   Skin:     General: Skin is warm and dry.      Coloration: Skin is not jaundiced.   Neurological:      General: No focal deficit present.      Mental Status: She is alert and oriented to person, place, and time. Mental status is at baseline.   Psychiatric:          Mood and Affect: Mood normal.         Behavior: Behavior normal.            Assessment and Plan     1. Acute cough  -     promethazine-dextromethorphan (PROMETHAZINE-DM) 6.25-15 mg/5 mL Syrp; Take 5 mLs by mouth every 4 (four) hours as needed.  Dispense: 118 mL; Refill: 0    Will prescribe Promethazine-DM           No follow-ups on file.

## 2024-04-10 ENCOUNTER — OFFICE VISIT (OUTPATIENT)
Dept: FAMILY MEDICINE | Facility: CLINIC | Age: 69
End: 2024-04-10
Payer: MEDICARE

## 2024-04-10 VITALS
OXYGEN SATURATION: 97 % | HEART RATE: 78 BPM | RESPIRATION RATE: 18 BRPM | DIASTOLIC BLOOD PRESSURE: 84 MMHG | SYSTOLIC BLOOD PRESSURE: 130 MMHG | BODY MASS INDEX: 26.83 KG/M2 | WEIGHT: 151.44 LBS | TEMPERATURE: 98 F

## 2024-04-10 DIAGNOSIS — R05.1 ACUTE COUGH: Primary | ICD-10-CM

## 2024-04-10 PROCEDURE — 1126F AMNT PAIN NOTED NONE PRSNT: CPT | Mod: CPTII,S$GLB,, | Performed by: INTERNAL MEDICINE

## 2024-04-10 PROCEDURE — 1101F PT FALLS ASSESS-DOCD LE1/YR: CPT | Mod: CPTII,S$GLB,, | Performed by: INTERNAL MEDICINE

## 2024-04-10 PROCEDURE — 3288F FALL RISK ASSESSMENT DOCD: CPT | Mod: CPTII,S$GLB,, | Performed by: INTERNAL MEDICINE

## 2024-04-10 PROCEDURE — 1159F MED LIST DOCD IN RCRD: CPT | Mod: CPTII,S$GLB,, | Performed by: INTERNAL MEDICINE

## 2024-04-10 PROCEDURE — 3075F SYST BP GE 130 - 139MM HG: CPT | Mod: CPTII,S$GLB,, | Performed by: INTERNAL MEDICINE

## 2024-04-10 PROCEDURE — 99999 PR PBB SHADOW E&M-EST. PATIENT-LVL III: CPT | Mod: PBBFAC,,, | Performed by: INTERNAL MEDICINE

## 2024-04-10 PROCEDURE — 3079F DIAST BP 80-89 MM HG: CPT | Mod: CPTII,S$GLB,, | Performed by: INTERNAL MEDICINE

## 2024-04-10 PROCEDURE — 99213 OFFICE O/P EST LOW 20 MIN: CPT | Mod: S$GLB,,, | Performed by: INTERNAL MEDICINE

## 2024-04-10 PROCEDURE — 3008F BODY MASS INDEX DOCD: CPT | Mod: CPTII,S$GLB,, | Performed by: INTERNAL MEDICINE

## 2024-04-10 PROCEDURE — 4010F ACE/ARB THERAPY RXD/TAKEN: CPT | Mod: CPTII,S$GLB,, | Performed by: INTERNAL MEDICINE

## 2024-04-10 RX ORDER — PROMETHAZINE HYDROCHLORIDE AND DEXTROMETHORPHAN HYDROBROMIDE 6.25; 15 MG/5ML; MG/5ML
5 SYRUP ORAL EVERY 4 HOURS PRN
Qty: 118 ML | Refills: 0 | Status: SHIPPED | OUTPATIENT
Start: 2024-04-10 | End: 2024-04-20

## 2024-04-15 ENCOUNTER — TELEPHONE (OUTPATIENT)
Dept: NEUROSURGERY | Facility: CLINIC | Age: 69
End: 2024-04-15
Payer: MEDICARE

## 2024-04-15 NOTE — TELEPHONE ENCOUNTER
Called and spoke with Marysol Samuel pts granddaughter. Angiogram changed from 05/02/24 to 05/16/24. Ms Jimmie rene.

## 2024-04-18 ENCOUNTER — OFFICE VISIT (OUTPATIENT)
Dept: CARDIOLOGY | Facility: CLINIC | Age: 69
End: 2024-04-18
Payer: MEDICARE

## 2024-04-18 ENCOUNTER — TELEPHONE (OUTPATIENT)
Dept: PSYCHIATRY | Facility: CLINIC | Age: 69
End: 2024-04-18
Payer: MEDICARE

## 2024-04-18 VITALS
SYSTOLIC BLOOD PRESSURE: 137 MMHG | BODY MASS INDEX: 27.62 KG/M2 | OXYGEN SATURATION: 98 % | WEIGHT: 155.88 LBS | HEART RATE: 83 BPM | HEIGHT: 63 IN | DIASTOLIC BLOOD PRESSURE: 88 MMHG

## 2024-04-18 DIAGNOSIS — E78.5 HYPERLIPIDEMIA, UNSPECIFIED HYPERLIPIDEMIA TYPE: ICD-10-CM

## 2024-04-18 DIAGNOSIS — B18.2 CHRONIC HEPATITIS C WITHOUT HEPATIC COMA: ICD-10-CM

## 2024-04-18 DIAGNOSIS — R06.09 DOE (DYSPNEA ON EXERTION): ICD-10-CM

## 2024-04-18 DIAGNOSIS — E78.49 OTHER HYPERLIPIDEMIA: ICD-10-CM

## 2024-04-18 DIAGNOSIS — I10 HYPERTENSION, UNSPECIFIED TYPE: Primary | ICD-10-CM

## 2024-04-18 DIAGNOSIS — Z86.73 HISTORY OF TIA (TRANSIENT ISCHEMIC ATTACK): ICD-10-CM

## 2024-04-18 DIAGNOSIS — I50.32 CHRONIC HEART FAILURE WITH PRESERVED EJECTION FRACTION: ICD-10-CM

## 2024-04-18 DIAGNOSIS — E78.5 HYPERLIPIDEMIA LDL GOAL <70: ICD-10-CM

## 2024-04-18 DIAGNOSIS — N18.4 STAGE 4 CHRONIC KIDNEY DISEASE: ICD-10-CM

## 2024-04-18 DIAGNOSIS — N18.4 CKD (CHRONIC KIDNEY DISEASE) STAGE 4, GFR 15-29 ML/MIN: ICD-10-CM

## 2024-04-18 DIAGNOSIS — I16.1 HYPERTENSIVE EMERGENCY: ICD-10-CM

## 2024-04-18 DIAGNOSIS — I10 PRIMARY HYPERTENSION: ICD-10-CM

## 2024-04-18 DIAGNOSIS — R94.31 ABNORMAL ECG: ICD-10-CM

## 2024-04-18 DIAGNOSIS — I10 UNCONTROLLED HYPERTENSION: ICD-10-CM

## 2024-04-18 DIAGNOSIS — I16.0 HYPERTENSIVE URGENCY: ICD-10-CM

## 2024-04-18 DIAGNOSIS — I10 MALIGNANT HYPERTENSION: ICD-10-CM

## 2024-04-18 DIAGNOSIS — Z86.73 HISTORY OF CVA (CEREBROVASCULAR ACCIDENT): ICD-10-CM

## 2024-04-18 DIAGNOSIS — R00.2 PALPITATIONS: ICD-10-CM

## 2024-04-18 DIAGNOSIS — I10 CHRONIC HYPERTENSION: ICD-10-CM

## 2024-04-18 PROCEDURE — 1101F PT FALLS ASSESS-DOCD LE1/YR: CPT | Mod: CPTII,S$GLB,, | Performed by: INTERNAL MEDICINE

## 2024-04-18 PROCEDURE — 99999 PR PBB SHADOW E&M-EST. PATIENT-LVL IV: CPT | Mod: PBBFAC,,, | Performed by: INTERNAL MEDICINE

## 2024-04-18 PROCEDURE — 3079F DIAST BP 80-89 MM HG: CPT | Mod: CPTII,S$GLB,, | Performed by: INTERNAL MEDICINE

## 2024-04-18 PROCEDURE — 4010F ACE/ARB THERAPY RXD/TAKEN: CPT | Mod: CPTII,S$GLB,, | Performed by: INTERNAL MEDICINE

## 2024-04-18 PROCEDURE — 3008F BODY MASS INDEX DOCD: CPT | Mod: CPTII,S$GLB,, | Performed by: INTERNAL MEDICINE

## 2024-04-18 PROCEDURE — 1159F MED LIST DOCD IN RCRD: CPT | Mod: CPTII,S$GLB,, | Performed by: INTERNAL MEDICINE

## 2024-04-18 PROCEDURE — 3075F SYST BP GE 130 - 139MM HG: CPT | Mod: CPTII,S$GLB,, | Performed by: INTERNAL MEDICINE

## 2024-04-18 PROCEDURE — G2211 COMPLEX E/M VISIT ADD ON: HCPCS | Mod: S$GLB,,, | Performed by: INTERNAL MEDICINE

## 2024-04-18 PROCEDURE — 1126F AMNT PAIN NOTED NONE PRSNT: CPT | Mod: CPTII,S$GLB,, | Performed by: INTERNAL MEDICINE

## 2024-04-18 PROCEDURE — 3288F FALL RISK ASSESSMENT DOCD: CPT | Mod: CPTII,S$GLB,, | Performed by: INTERNAL MEDICINE

## 2024-04-18 PROCEDURE — 99214 OFFICE O/P EST MOD 30 MIN: CPT | Mod: S$GLB,,, | Performed by: INTERNAL MEDICINE

## 2024-04-18 PROCEDURE — 1160F RVW MEDS BY RX/DR IN RCRD: CPT | Mod: CPTII,S$GLB,, | Performed by: INTERNAL MEDICINE

## 2024-04-18 NOTE — PROGRESS NOTES
Subjective:   Patient ID:  Rose Milligan is a 68 y.o. female who presents for cardiac consult of No chief complaint on file.      The patient came in today for cardiac consult of No chief complaint on file.      Rose Milligan is a 68 y.o. female  with HFpEF, HTN, pulm HTN, h/o TIA/CVA, tobacco abuse, h/o drug abuse, bipolar, hep C, CKD4 presents for follow up CV eval.      1/16/24  BP elevated 214/116, she needs meds refills has more stress this AM due to walking and needing to de-ice the car.   She has more SOB today. She had more salty food lately.   BP has improved to 170s/90s with CLonidine 0.2 mg x2 in office now.     4/18/24  BP and HR well controlled. BMI 27 - 155 lbs       ED Vital Signs:         Vitals:     04/04/24 0057 04/04/24 0334 04/04/24 0438 04/04/24 0503   BP: (!) 194/95 (!) 193/103 (!) 207/99 (!) 176/86   Pulse: 81 71   61   Resp: 20         Temp: 97.8 °F (36.6 °C)         TempSrc: Oral         SpO2: 98% 100%   99%     04/04/24 0633 04/04/24 0714 04/04/24 0733 04/04/24 0802   BP: (!) 193/102 (!) 166/98 (!) 194/90 (!) 167/87   Pulse: 64 66 61 64   Resp:           Temp:           TempSrc:           SpO2: 98%   98% 97%         Recent ER eval for abd pain/ stress.     Improved BP now  She will have  Angiogram changed from 05/02/24 to 05/16/24     Results for orders placed during the hospital encounter of 08/04/23    Echo    Interpretation Summary    Left Ventricle: The left ventricle is normal in size. Moderately increased wall thickness. There is moderate concentrict hypertrophy. Normal wall motion. There is normal systolic function with a visually estimated ejection fraction of 55 - 70%. Grade I diastolic dysfunction.    Left Atrium: Left atrium is mildly dilated.    Right Ventricle: Normal right ventricular cavity size. Wall thickness is normal. Right ventricle wall motion  is normal. Systolic function is normal.    Mitral Valve: There is mild regurgitation.    Tricuspid Valve: There is mild  regurgitation.    IVC/SVC: Intermediate venous pressure at 8 mmHg.    Bubble study negative          Conclusion 6/2023     There is 0-19% right Internal Carotid Stenosis.  There is 0-19% left Internal Carotid Stenosis.    Conclusion 6/2023    The patient was monitored for a total of 12d 16h, underlying rhythm is Sinus.  The minimum heart rate was 55 bpm; the maximum 116 bpm; the average 76 bpm.  0 % of Atrial fibrillation/Atrial flutter with longest episode of 0 ms.  -- AV block with 0 %  There were 0 pauses, the longest pause was 0 ms at --.  7 episodes of VT were found with Longest VT at 10 beats .  21 supraventricular episodes were found. Longest SVT Episode 15 beats, Fastest  bpm  There were a total of 1346 PVCs with 2 morphologies and 7 couplets. Overall PVC Goodman at 0.1 %  There were a total of 1132 PSVCs with 1 morphologies and 0 couplets. Overall PSVC Goodman at 0.08 %  There is a total of 1 patient events    Results for orders placed during the hospital encounter of 06/06/23    Nuclear Stress - Cardiology Interpreted    Interpretation Summary    Abnormal myocardial perfusion scan.    There is a moderate to severe intensity, moderate sized, fixed perfusion abnormality consistent with scar in the basal to mid lateral wall(s).    There are no other significant perfusion abnormalities.    The gated perfusion images showed an ejection fraction of 50% at rest. The gated perfusion images showed an ejection fraction of 53% post stress.    The ECG portion of the study is uninterpretable due to abnormal baseline.    The patient reported no chest pain during the stress test.    There were no arrhythmias during stress.          Past Medical History:   Diagnosis Date    Acute CVA (cerebrovascular accident) 08/04/2023    Acute renal failure superimposed on stage 4 chronic kidney disease 10/17/2023    Careful hydration  Serial labs  Nephrology consult and follow up in am     Bipolar 1 disorder     CHF (congestive  heart failure)     Depression     Hepatitis C     Hypertension     Other hyperlipidemia 08/15/2019       Past Surgical History:   Procedure Laterality Date    HYSTERECTOMY         Social History     Tobacco Use    Smoking status: Some Days     Current packs/day: 0.25     Average packs/day: 0.3 packs/day for 8.3 years (2.1 ttl pk-yrs)     Types: Cigarettes     Start date: 1/11/2016    Smokeless tobacco: Never    Tobacco comments:     1 pack every 3 days   Substance Use Topics    Alcohol use: Yes    Drug use: Yes     Types: Cocaine     Comment: states last use 10/2023       Family History   Problem Relation Name Age of Onset    Heart attack Maternal Grandmother      Hypertension Mother         Patient's Medications   New Prescriptions    No medications on file   Previous Medications    ASPIRIN (ECOTRIN) 81 MG EC TABLET    Take 1 tablet (81 mg total) by mouth once daily.    CARVEDILOL (COREG) 25 MG TABLET    TAKE 2 TABLETS BY MOUTH 2 TIMES DAILY.    CLONIDINE 0.3 MG/24 HR TD PTWK (CATAPRES) 0.3 MG/24 HR    Place 1 patch onto the skin every 7 days.    CLOPIDOGREL (PLAVIX) 75 MG TABLET    Take 1 tablet (75 mg total) by mouth once daily. for 20 days    COLCHICINE, GOUT, (COLCRYS) 0.6 MG TABLET    Take every 8 hours on day 1; take every 12 hours on day 2; then once per day thereafter    DIPHENHYDRAMINE (BENADRYL) 25 MG CAPSULE    Take 1 capsule (25 mg total) by mouth every 6 (six) hours as needed for Itching.    ERGOCALCIFEROL (ERGOCALCIFEROL) 50,000 UNIT CAP    Take 1 capsule (50,000 Units total) by mouth every 7 days.    FUROSEMIDE (LASIX) 40 MG TABLET    Take 1 tablet (40 mg total) by mouth once daily. Do not take if BP is low - under 110/70 or feeling dry/dizzy/lightheaded due to dehydration    HYDRALAZINE (APRESOLINE) 100 MG TABLET    Take 1 tablet (100 mg total) by mouth every 8 (eight) hours.    HYDROCODONE-ACETAMINOPHEN (NORCO) 5-325 MG PER TABLET    Take 1 tablet by mouth every 6 (six) hours as needed for Pain.     "HYDROXYZINE (ATARAX) 50 MG TABLET    Take 1 tablet (50 mg total) by mouth 2 (two) times daily as needed for Anxiety.    IRBESARTAN (AVAPRO) 150 MG TABLET    Take 2 tablets (300 mg total) by mouth every evening.    PROMETHAZINE-DEXTROMETHORPHAN (PROMETHAZINE-DM) 6.25-15 MG/5 ML SYRP    Take 5 mLs by mouth every 4 (four) hours as needed.    ROSUVASTATIN (CRESTOR) 40 MG TAB    Take 1 tablet (40 mg total) by mouth every evening.    TRETINOIN (RETIN-A) 0.025 % CREAM    Apply topically every evening. Pea sized amount to entire face at night. Start out using every other night for 2 weeks, then increase to every night as tolerated    ZIPRASIDONE (GEODON) 20 MG CAP    Take 1 capsule (20 mg total) by mouth 2 (two) times daily.   Modified Medications    No medications on file   Discontinued Medications    No medications on file       Review of Systems   Constitutional: Negative.    HENT: Negative.     Eyes: Negative.    Respiratory:  Positive for shortness of breath.    Cardiovascular:  Positive for palpitations. Negative for chest pain.   Gastrointestinal: Negative.    Genitourinary: Negative.    Musculoskeletal: Negative.    Skin: Negative.    Neurological:  Positive for dizziness.   Endo/Heme/Allergies: Negative.    Psychiatric/Behavioral:  The patient is not nervous/anxious.    All 12 systems otherwise negative.      Wt Readings from Last 3 Encounters:   04/18/24 70.7 kg (155 lb 13.8 oz)   04/10/24 68.7 kg (151 lb 7.3 oz)   03/09/24 66 kg (145 lb 8.1 oz)     Temp Readings from Last 3 Encounters:   04/10/24 97.9 °F (36.6 °C)   04/04/24 97.8 °F (36.6 °C) (Oral)   03/09/24 97.8 °F (36.6 °C) (Oral)     BP Readings from Last 3 Encounters:   04/18/24 137/88   04/10/24 130/84   04/04/24 (!) 167/87     Pulse Readings from Last 3 Encounters:   04/18/24 83   04/10/24 78   04/04/24 64       /88 (BP Location: Left arm, Patient Position: Sitting, BP Method: Medium (Automatic))   Pulse 83   Ht 5' 3" (1.6 m)   Wt 70.7 kg (155 lb " 13.8 oz)   SpO2 98%   BMI 27.61 kg/m²     Objective:   Physical Exam  Vitals and nursing note reviewed.   Constitutional:       General: She is not in acute distress.     Appearance: She is well-developed. She is not diaphoretic.   HENT:      Head: Normocephalic and atraumatic.      Nose: Nose normal.   Eyes:      General: No scleral icterus.     Conjunctiva/sclera: Conjunctivae normal.   Neck:      Thyroid: No thyromegaly.      Vascular: No JVD.   Cardiovascular:      Rate and Rhythm: Normal rate and regular rhythm.      Heart sounds: S1 normal and S2 normal. No murmur heard.     No friction rub. No gallop. No S3 or S4 sounds.   Pulmonary:      Effort: Pulmonary effort is normal. No respiratory distress.      Breath sounds: Normal breath sounds. No stridor. No wheezing or rales.   Chest:      Chest wall: No tenderness.   Abdominal:      General: Bowel sounds are normal. There is no distension.      Palpations: Abdomen is soft. There is no mass.      Tenderness: There is no abdominal tenderness. There is no rebound.   Genitourinary:     Comments: Deferred  Musculoskeletal:         General: No tenderness or deformity. Normal range of motion.      Cervical back: Normal range of motion and neck supple.   Lymphadenopathy:      Cervical: No cervical adenopathy.   Skin:     General: Skin is warm and dry.      Coloration: Skin is not pale.      Findings: No erythema or rash.   Neurological:      Mental Status: She is alert and oriented to person, place, and time.      Motor: No abnormal muscle tone.      Coordination: Coordination normal.   Psychiatric:         Behavior: Behavior normal.         Thought Content: Thought content normal.         Judgment: Judgment normal.         Lab Results   Component Value Date     04/04/2024    K 3.8 04/04/2024     04/04/2024    CO2 21 (L) 04/04/2024    BUN 55 (H) 04/04/2024    CREATININE 3.1 (H) 04/04/2024    GLU 87 04/04/2024    HGBA1C 5.1 07/19/2023    MG 2.1 08/05/2023     AST 24 04/04/2024    ALT 12 04/04/2024    ALBUMIN 4.4 04/04/2024    PROT 8.0 04/04/2024    BILITOT 0.6 04/04/2024    WBC 6.73 04/04/2024    HGB 14.1 04/04/2024    HCT 43.1 04/04/2024    MCV 86 04/04/2024     04/04/2024    INR 1.0 08/05/2023    TSH 0.761 08/04/2023    CHOL 155 08/04/2023    HDL 44 08/04/2023    LDLCALC 101.4 08/04/2023    TRIG 48 08/04/2023     (H) 04/04/2024     Assessment:      1. Hypertension, unspecified type    2. Chronic heart failure with preserved ejection fraction    3. Hyperlipidemia LDL goal <70    4. Primary hypertension    5. History of CVA (cerebrovascular accident)    6. History of TIA (transient ischemic attack)    7. Hypertensive emergency    8. Other hyperlipidemia    9. AUGUSTE (dyspnea on exertion)    10. Palpitations    11. Hyperlipidemia, unspecified hyperlipidemia type    12. Chronic hypertension    13. Stage 4 chronic kidney disease    14. Malignant hypertension    15. CKD (chronic kidney disease) stage 4, GFR 15-29 ml/min    16. Uncontrolled hypertension    17. Chronic hepatitis C without hepatic coma    18. Abnormal ECG    19. Hypertensive urgency                    Plan:   1. HFPEF, with occ CP/SOB; last  --> 236   - Nuclear stress 6/2023 neg for ischemia, basal, mid lateral wall scar note  - titrate meds  - cont lasix - increase to 40mg   - ECHO 8/2023 with normal bi V function and valves, grade 1 DD. Neg bubble study.     2. HTN - elevated/uncontrolled, recent HTN emergency 10/2023 and 4/2024 - improved   - cont meds - increase Coreg dose   - low salt diet  - chance clonidine to patch - increase to Clonidine 0.3 mg patch  - inc Hydralazine to 100mg   - if remains severely elevated needs ER eval     3. CKD4  - f/u with nephro/PCP  - saw Dr. Sexton in past    4. Hep C  - cont tx per PCP    5. HLD  - cont statin - changed to pravastatin 40mg  - unsure if she was taking livalo     6. SOB ? COPD with smoking abuse  - refer to smoking cessation  - f/u pulm      7. Recurrent H/O TIA/CVA - L sided weakness? -again 3/2023, 8/2023  - cont meds - asa and plavix; 0.5 cm right MCA bifurcation aneurysm.   - ECHO 8/2023 with normal bi V function and valves, grade 1 DD. Neg bubble study.   - Vital monitor 6/2023 with brief SVT/NSVT, rare PVCS, PACs noted; AVG HR 76 bpm.  - Carotids 6/2023 overall normal.    - f/u Neuro - has upcoming -  Angiogram changed from 05/02/24 to 05/16/24     Visit today included increased complexity associated with the care of the episodic problem dyspnea addressed and managing the longitudinal care of the patient due to the serious and/or complex managed problem(s) .      Thank you for allowing me to participate in this patient's care. Please do not hesitate to contact me with any questions or concerns. Consult note has been forwarded to the referral physician.

## 2024-04-22 ENCOUNTER — OFFICE VISIT (OUTPATIENT)
Dept: PSYCHIATRY | Facility: CLINIC | Age: 69
End: 2024-04-22
Payer: COMMERCIAL

## 2024-04-22 VITALS — DIASTOLIC BLOOD PRESSURE: 109 MMHG | HEART RATE: 71 BPM | SYSTOLIC BLOOD PRESSURE: 177 MMHG

## 2024-04-22 DIAGNOSIS — F31.62 BIPOLAR AFFECTIVE DISORDER, MIXED, MODERATE: ICD-10-CM

## 2024-04-22 PROCEDURE — 4010F ACE/ARB THERAPY RXD/TAKEN: CPT | Mod: CPTII,S$GLB,, | Performed by: PSYCHIATRY & NEUROLOGY

## 2024-04-22 PROCEDURE — 99999 PR PBB SHADOW E&M-EST. PATIENT-LVL II: CPT | Mod: PBBFAC,,, | Performed by: PSYCHIATRY & NEUROLOGY

## 2024-04-22 PROCEDURE — 99214 OFFICE O/P EST MOD 30 MIN: CPT | Mod: S$GLB,,, | Performed by: PSYCHIATRY & NEUROLOGY

## 2024-04-22 PROCEDURE — 3077F SYST BP >= 140 MM HG: CPT | Mod: CPTII,S$GLB,, | Performed by: PSYCHIATRY & NEUROLOGY

## 2024-04-22 PROCEDURE — 3080F DIAST BP >= 90 MM HG: CPT | Mod: CPTII,S$GLB,, | Performed by: PSYCHIATRY & NEUROLOGY

## 2024-04-22 RX ORDER — ZIPRASIDONE HYDROCHLORIDE 20 MG/1
20 CAPSULE ORAL 2 TIMES DAILY
Qty: 60 CAPSULE | Refills: 3 | Status: SHIPPED | OUTPATIENT
Start: 2024-04-22 | End: 2024-08-20
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
## 2024-04-29 ENCOUNTER — LAB VISIT (OUTPATIENT)
Dept: LAB | Facility: HOSPITAL | Age: 69
End: 2024-04-29
Attending: NEUROLOGICAL SURGERY
Payer: MEDICARE

## 2024-04-29 DIAGNOSIS — I67.1 CEREBRAL ANEURYSM, NONRUPTURED: ICD-10-CM

## 2024-04-29 LAB
ANION GAP SERPL CALC-SCNC: 11 MMOL/L (ref 8–16)
BUN SERPL-MCNC: 46 MG/DL (ref 8–23)
CALCIUM SERPL-MCNC: 9 MG/DL (ref 8.7–10.5)
CHLORIDE SERPL-SCNC: 114 MMOL/L (ref 95–110)
CO2 SERPL-SCNC: 17 MMOL/L (ref 23–29)
CREAT SERPL-MCNC: 2.3 MG/DL (ref 0.5–1.4)
EST. GFR  (NO RACE VARIABLE): 22.3 ML/MIN/1.73 M^2
GLUCOSE SERPL-MCNC: 91 MG/DL (ref 70–110)
POTASSIUM SERPL-SCNC: 4.7 MMOL/L (ref 3.5–5.1)
SODIUM SERPL-SCNC: 142 MMOL/L (ref 136–145)

## 2024-04-29 PROCEDURE — 80048 BASIC METABOLIC PNL TOTAL CA: CPT | Performed by: NEUROLOGICAL SURGERY

## 2024-04-29 PROCEDURE — 36415 COLL VENOUS BLD VENIPUNCTURE: CPT | Performed by: NEUROLOGICAL SURGERY

## 2024-05-07 NOTE — HOSPITAL COURSE
Patient with known hx of uncontrolled HTN presents with left shoulder and arm pain. Worse on movement. She woke up with it. She uses several pillows to sleep and had these issues and neck pain before.   She lives alone and has psych issues. She claims that she took all her medications this am. She doesn't appear in distress and is able to answer questions. No SOB, N/V, Visual changes ot abdominal pain. No headache or palpitations.   Her SBP was 220 on arrival and she has received hydralazine times 2, labetalol and catapress.  Eventually patient has been started on Cardene drip, initially admitted to ICU.      On 10/18- patient off of Cardene drip, home blood pressure medications resumed,; complaints of left-sided facial numbness,- CT head No acute intracranial abnormality.  Neurology evaluated-  MRI/MRA brain done 07/19/2023 was abnormal showing a punctate left thalamic infarct, small vessel vascular disease, and a 0.5 cm right MCA bifurcation aneurysm.  She has scheduled neurosurgery appointment later this month. Pt denied any headache, dizziness, vision changes at this time; current symptoms likely - Malignant hypertension with transient neurological symptoms; recommended to current medications, dual antiplatelets.  Monitor blood pressure.  Patient deemed stable for downgrade per ICU team on 10/18, hospital medicine consulted to assume care         10/19   Examination of patient done at bedside, patient appeared alert and oriented x3, denied acute issues overnight, denied headache, dizziness, chest pain, shortness O breath, nausea, vomiting, bowel or bladder issues.    Hemodynamically stable, blood pressure stabilized on current regimen, heart rate stable.    Denied extremity weakness, numbness.    Labs reviewed, creatinine slightly trended up, however close to pts baseline, denied UTI symptoms, decreased urine output.  Recommended outpatient follow up with PCP within 1 week upon discharge to undergo repeat labs  including BMP/creatinine, patient agreed.    Considering clinical and hemodynamic stability, planning to discharge patient today, recommended compliance with medications, low-salt diet, outpatient follow up with PCP, Cardiology, to monitor blood pressure regularly, patient agreed to the plan.    Medications to be delivered bedside      Detail Level: Detailed Depth Of Biopsy: dermis Was A Bandage Applied: Yes Size Of Lesion In Cm: 2 X Size Of Lesion In Cm: 1 Biopsy Type: H and E Biopsy Method: Dermablade Anesthesia Type: 1% lidocaine with epinephrine and a 1:10 solution of 8.4% sodium bicarbonate Anesthesia Volume In Cc: 0.5 Additional Anesthesia Volume In Cc (Will Not Render If 0): 0 Hemostasis: Aluminum Chloride and Electrocautery Wound Care: Petrolatum Dressing: bandage Destruction After The Procedure: No Type Of Destruction Used: Curettage Curettage Text: The wound bed was treated with curettage after the biopsy was performed. Cryotherapy Text: The wound bed was treated with cryotherapy after the biopsy was performed. Electrodesiccation Text: The wound bed was treated with electrodesiccation after the biopsy was performed. Electrodesiccation And Curettage Text: The wound bed was treated with electrodesiccation and curettage after the biopsy was performed. Silver Nitrate Text: The wound bed was treated with silver nitrate after the biopsy was performed. Lab: [id number] Lab Facility: [id number] Consent: Written consent was obtained and risks were reviewed including but not limited to scarring, infection, bleeding, scabbing, incomplete removal, nerve damage and allergy to anesthesia. Post-Care Instructions: Keep the biopsy site dry overnight, and then apply aquaphor or Vaseline daily. Keep clean with mild soap and water. Notification Instructions: Patient will be notified of biopsy results. However, patient instructed to call the office if not contacted within 2 weeks. Billing Type: Third-Party Bill Information: Selecting Yes will display possible errors in your note based on the variables you have selected. This validation is only offered as a suggestion for you. PLEASE NOTE THAT THE VALIDATION TEXT WILL BE REMOVED WHEN YOU FINALIZE YOUR NOTE. IF YOU WANT TO FAX A PRELIMINARY NOTE YOU WILL NEED TO TOGGLE THIS TO 'NO' IF YOU DO NOT WANT IT IN YOUR FAXED NOTE.

## 2024-05-14 ENCOUNTER — PATIENT MESSAGE (OUTPATIENT)
Dept: INTERVENTIONAL RADIOLOGY/VASCULAR | Facility: HOSPITAL | Age: 69
End: 2024-05-14
Payer: MEDICARE

## 2024-05-14 ENCOUNTER — TELEPHONE (OUTPATIENT)
Dept: NEUROSURGERY | Facility: CLINIC | Age: 69
End: 2024-05-14
Payer: MEDICARE

## 2024-05-14 ENCOUNTER — DOCUMENTATION ONLY (OUTPATIENT)
Dept: PREADMISSION TESTING | Facility: HOSPITAL | Age: 69
End: 2024-05-14
Payer: MEDICARE

## 2024-05-14 NOTE — PRE-PROCEDURE INSTRUCTIONS
PRE-OP INSTRUCTIONS:  Instructed patient to have no food,milk or milk products after midnight 5/15/2024  It is ok to take AM medications with a few sips of water   Medication instructions for pm prior to and am of surgery reviewed.  Instructed patient to avoid taking vitamins,supplements or ibuprofen the am of surgery.  Shower instructions provided    Patient denies any side effects or issues with anesthesia or sedation.

## 2024-05-14 NOTE — TELEPHONE ENCOUNTER
Returned call to pt's [facility]. Informed the arrival time (7:30am) for the angiogram. Granddaughter v/u.

## 2024-05-15 ENCOUNTER — ANESTHESIA EVENT (OUTPATIENT)
Dept: INTERVENTIONAL RADIOLOGY/VASCULAR | Facility: HOSPITAL | Age: 69
End: 2024-05-15
Payer: MEDICARE

## 2024-05-16 ENCOUNTER — HOSPITAL ENCOUNTER (OUTPATIENT)
Dept: INTERVENTIONAL RADIOLOGY/VASCULAR | Facility: HOSPITAL | Age: 69
Discharge: HOME | End: 2024-05-16
Attending: NEUROLOGICAL SURGERY | Admitting: NEUROLOGICAL SURGERY
Payer: MEDICARE

## 2024-05-16 ENCOUNTER — ANESTHESIA (OUTPATIENT)
Dept: INTERVENTIONAL RADIOLOGY/VASCULAR | Facility: HOSPITAL | Age: 69
End: 2024-05-16
Payer: MEDICARE

## 2024-05-16 VITALS
HEART RATE: 64 BPM | RESPIRATION RATE: 18 BRPM | OXYGEN SATURATION: 99 % | TEMPERATURE: 98 F | DIASTOLIC BLOOD PRESSURE: 71 MMHG | SYSTOLIC BLOOD PRESSURE: 129 MMHG

## 2024-05-16 DIAGNOSIS — I67.1 CEREBRAL ANEURYSM, NONRUPTURED: ICD-10-CM

## 2024-05-16 PROCEDURE — 37000008 HC ANESTHESIA 1ST 15 MINUTES

## 2024-05-16 PROCEDURE — 25000003 PHARM REV CODE 250: Performed by: NEUROLOGICAL SURGERY

## 2024-05-16 PROCEDURE — 37000009 HC ANESTHESIA EA ADD 15 MINS

## 2024-05-16 PROCEDURE — C1769 GUIDE WIRE: HCPCS

## 2024-05-16 PROCEDURE — 63600175 PHARM REV CODE 636 W HCPCS: Performed by: NEUROLOGICAL SURGERY

## 2024-05-16 PROCEDURE — 63600175 PHARM REV CODE 636 W HCPCS: Performed by: NURSE ANESTHETIST, CERTIFIED REGISTERED

## 2024-05-16 PROCEDURE — 36224 PLACE CATH CAROTD ART: CPT | Mod: 50 | Performed by: NEUROLOGICAL SURGERY

## 2024-05-16 PROCEDURE — 76377 3D RENDER W/INTRP POSTPROCES: CPT | Mod: 26,,, | Performed by: NEUROLOGICAL SURGERY

## 2024-05-16 PROCEDURE — 36224 PLACE CATH CAROTD ART: CPT | Mod: 50,,, | Performed by: NEUROLOGICAL SURGERY

## 2024-05-16 PROCEDURE — 25000003 PHARM REV CODE 250

## 2024-05-16 PROCEDURE — 36226 PLACE CATH VERTEBRAL ART: CPT | Mod: 51,50,, | Performed by: NEUROLOGICAL SURGERY

## 2024-05-16 PROCEDURE — 25500020 PHARM REV CODE 255: Performed by: NEUROLOGICAL SURGERY

## 2024-05-16 PROCEDURE — 36226 PLACE CATH VERTEBRAL ART: CPT | Mod: LT | Performed by: NEUROLOGICAL SURGERY

## 2024-05-16 PROCEDURE — A4550 SURGICAL TRAYS: HCPCS

## 2024-05-16 PROCEDURE — D9220A PRA ANESTHESIA: Mod: CRNA,,, | Performed by: NURSE ANESTHETIST, CERTIFIED REGISTERED

## 2024-05-16 PROCEDURE — D9220A PRA ANESTHESIA: Mod: ANES,,, | Performed by: ANESTHESIOLOGY

## 2024-05-16 PROCEDURE — 25000003 PHARM REV CODE 250: Performed by: NURSE ANESTHETIST, CERTIFIED REGISTERED

## 2024-05-16 PROCEDURE — 76377 3D RENDER W/INTRP POSTPROCES: CPT | Mod: TC | Performed by: NEUROLOGICAL SURGERY

## 2024-05-16 RX ORDER — SODIUM CHLORIDE 0.9 % (FLUSH) 0.9 %
10 SYRINGE (ML) INJECTION
Status: DISCONTINUED | OUTPATIENT
Start: 2024-05-16 | End: 2024-05-17 | Stop reason: HOSPADM

## 2024-05-16 RX ORDER — LIDOCAINE HYDROCHLORIDE 10 MG/ML
INJECTION INFILTRATION; PERINEURAL
Status: COMPLETED | OUTPATIENT
Start: 2024-05-16 | End: 2024-05-16

## 2024-05-16 RX ORDER — DIPHENHYDRAMINE HYDROCHLORIDE 50 MG/ML
INJECTION INTRAMUSCULAR; INTRAVENOUS
Status: DISCONTINUED | OUTPATIENT
Start: 2024-05-16 | End: 2024-05-16

## 2024-05-16 RX ORDER — LIDOCAINE HYDROCHLORIDE 10 MG/ML
1 INJECTION, SOLUTION EPIDURAL; INFILTRATION; INTRACAUDAL; PERINEURAL ONCE
Status: DISCONTINUED | OUTPATIENT
Start: 2024-05-16 | End: 2024-05-17 | Stop reason: HOSPADM

## 2024-05-16 RX ORDER — DEXMEDETOMIDINE HYDROCHLORIDE 100 UG/ML
INJECTION, SOLUTION INTRAVENOUS
Status: DISCONTINUED | OUTPATIENT
Start: 2024-05-16 | End: 2024-05-16

## 2024-05-16 RX ORDER — SODIUM CHLORIDE 9 MG/ML
INJECTION, SOLUTION INTRAVENOUS CONTINUOUS
Status: DISCONTINUED | OUTPATIENT
Start: 2024-05-16 | End: 2024-05-17 | Stop reason: HOSPADM

## 2024-05-16 RX ORDER — FENTANYL CITRATE 50 UG/ML
INJECTION, SOLUTION INTRAMUSCULAR; INTRAVENOUS
Status: DISCONTINUED | OUTPATIENT
Start: 2024-05-16 | End: 2024-05-16

## 2024-05-16 RX ORDER — HEPARIN SODIUM 1000 [USP'U]/ML
INJECTION, SOLUTION INTRAVENOUS; SUBCUTANEOUS
Status: DISCONTINUED | OUTPATIENT
Start: 2024-05-16 | End: 2024-05-16

## 2024-05-16 RX ORDER — LIDOCAINE AND PRILOCAINE 25; 25 MG/G; MG/G
CREAM TOPICAL
Qty: 2 G | Refills: 0 | Status: SHIPPED | OUTPATIENT
Start: 2024-05-16

## 2024-05-16 RX ORDER — ONDANSETRON HYDROCHLORIDE 2 MG/ML
4 INJECTION, SOLUTION INTRAVENOUS DAILY PRN
Status: DISCONTINUED | OUTPATIENT
Start: 2024-05-16 | End: 2024-05-17 | Stop reason: HOSPADM

## 2024-05-16 RX ADMIN — FENTANYL CITRATE 25 MCG: 50 INJECTION INTRAMUSCULAR; INTRAVENOUS at 10:05

## 2024-05-16 RX ADMIN — DEXMEDETOMIDINE 4 MCG: 200 INJECTION, SOLUTION INTRAVENOUS at 10:05

## 2024-05-16 RX ADMIN — IOHEXOL 60 ML: 300 INJECTION, SOLUTION INTRAVENOUS at 11:05

## 2024-05-16 RX ADMIN — LIDOCAINE HYDROCHLORIDE 3 ML: 10 INJECTION, SOLUTION INFILTRATION; PERINEURAL at 10:05

## 2024-05-16 RX ADMIN — HEPARIN SODIUM 2000 ML: 1000 INJECTION, SOLUTION INTRAVENOUS; SUBCUTANEOUS at 10:05

## 2024-05-16 RX ADMIN — SODIUM CHLORIDE: 0.9 INJECTION, SOLUTION INTRAVENOUS at 09:05

## 2024-05-16 RX ADMIN — FENTANYL CITRATE 25 MCG: 50 INJECTION INTRAMUSCULAR; INTRAVENOUS at 11:05

## 2024-05-16 RX ADMIN — HEPARIN SODIUM 2000 UNITS: 1000 INJECTION, SOLUTION INTRAVENOUS; SUBCUTANEOUS at 10:05

## 2024-05-16 RX ADMIN — DIPHENHYDRAMINE HYDROCHLORIDE 25 MG: 50 INJECTION, SOLUTION INTRAMUSCULAR; INTRAVENOUS at 10:05

## 2024-05-16 NOTE — TRANSFER OF CARE
Anesthesia Transfer of Care Note    Patient: Rose Milligan    Procedure(s) Performed: * No procedures listed *    Patient location: PACU    Anesthesia Type: MAC    Transport from OR: Transported from OR on room air with adequate spontaneous ventilation    Post pain: adequate analgesia    Post assessment: no apparent anesthetic complications    Post vital signs: stable    Level of consciousness: awake, alert and oriented    Nausea/Vomiting: no nausea/vomiting    Complications: none    Transfer of care protocol was followed    Last vitals: Visit Vitals  BP (!) 156/89 (BP Location: Right arm, Patient Position: Lying)   Pulse 67   Temp 37.1 °C (98.8 °F) (Temporal)   Resp 20   SpO2 100%   Breastfeeding No

## 2024-05-16 NOTE — PLAN OF CARE
Patient arrived to room. PIV placed. Admit assessment completed. Plan of care discussed with patient. Granddaughter at bedside. Nurse call bell within reach. Will monitor

## 2024-05-16 NOTE — PROCEDURES
Interventional Neuroradiology Post-Procedure Note    Pre Op Diagnosis: Right MCA bifurcation Aneurysm    Post Op Diagnosis: Same    Procedure: Diagnostic cerebral angiogram    Procedure performed by: Rey Munroe MD; Tres ROBLES, Jam    Written Informed Consent Obtained: Yes    Specimen Removed: NO    Estimated Blood Loss: Minimal    Procedure report:     A 5F sheath was placed into the right femoral artery and a 5F Kingsley catheter was advanced into the aortic arch.  The R vertebral, R ICA, left vertebral and L ICA were subselected and angiography of the brain was performed after injection into each of these vessels.    Preliminary interpretation: Fusiform R MCA bifurcation aneurysm with dimensions of 5.77 mm width x 3.50 mm length X 5.18 mm neck.  Please see Imaging report for full details.    A right femoral artery angiogram was performed, the sheath removed and hemostasis achieved using 5F Mynx.  No hematoma was present at the time of hemostasis.    The patient tolerated the procedure well.     Plan:  -Bed rest for 2h  -Groin check and pulse check q2h   -Avoid carrying heavy weights > 10 lbs x 24 hrs   -Remove groin dressing tomorrow                        Jam Joshua MD, A  Fellow, NeuroEndovascular Surgery, [facility]  Neurologist, [facility] [place], [place]

## 2024-05-16 NOTE — DISCHARGE INSTRUCTIONS
Please call with any questions or concerns.      Monday thru Friday 8:00 am - 4:30 pm    Interventional Radiology   [phone]    After Hours    Ask for the Radiology Resident on call  [phone]      Post-Angiogram instructions:    Dont drive for 1 day.  Avoid unnecessary walking, bending, and taking stairs for 24 hours.  No heavy lifting anything 10 lbs or heavier (gallon of milk) or strenuous exercise for 10 days.  Keep your dressing clean and dry for 24 hours. Do not submerge insertion site in water (bath tub, swimming pool) until fully healed.  Be sure to follow any other instructions from your doctor.      Call your doctor if you have any of the following:    Fever of 100.4 (38C) or higher lasting for 24 to 48 hours  Bleeding, swelling, or a large lump at the insertion site  Sharp or increasing pain at the insertion site  Leg pain, numbness, or a cold leg or foot  Any other symptoms your provider instructed you to report based on your medical condition.    Contact information:    For immediate concerns that are not emergent, you may call our interventional radiology clinic at [phone] or [phone]    ** After hours and weekends: Call the paging  at [phone] and ask for the Radiology Resident on call**

## 2024-05-16 NOTE — PLAN OF CARE
Dc instructions reviewed with pt and grand daughter, verbalized understanding. VSS, IV removed intact with no distress noted. Pt sitting up and ambulating without difficulty. Groin dressing c/d/i

## 2024-05-16 NOTE — PLAN OF CARE
Cerebral angiogram procedure completed. Patient tolerated well. Patient AAOx3.  Mynx closure device deployed in right femoral artery; hemostasis achieved at 1135. Patient to lay flat for 2 hours until 1335 time on 5/16 date per MD. Right groin site clean, dry, and intact; no bleeding, with small hematoma noted. MD is aware of small hematoma no right groin site. Patient to be transferred to Phase 2 for post-procedural recovery per MD. Report to be given at bedside to RN.

## 2024-05-16 NOTE — ANESTHESIA POSTPROCEDURE EVALUATION
Anesthesia Post Evaluation    Patient: Rose Milligan    Procedure(s) Performed: * No procedures listed *    Final Anesthesia Type: MAC      Patient location during evaluation: PACU  Patient participation: Yes- Able to Participate  Level of consciousness: awake and alert and oriented  Post-procedure vital signs: reviewed and stable  Pain management: adequate  Airway patency: patent    PONV status at discharge: No PONV  Anesthetic complications: no      Cardiovascular status: blood pressure returned to baseline  Respiratory status: unassisted, room air and spontaneous ventilation  Hydration status: euvolemic  Follow-up not needed.              Vitals Value Taken Time   /65 05/16/24 1201   Temp 36.7 °C (98.1 °F) 05/16/24 1150   Pulse 62 05/16/24 1203   Resp 14 05/16/24 1203   SpO2 94 % 05/16/24 1203   Vitals shown include unfiled device data.      No case tracking events are documented in the log.      Pain/Steve Score: Steve Score: 10 (5/16/2024 11:50 AM)

## 2024-05-16 NOTE — PLAN OF CARE
Pt arrived to IR room 200  for cerebral angiogram. Pt oriented to unit and staff. Plan of care reviewed with patient, patient verbalizes understanding. Comfort measures utilized. Pt safely transferred from stretcher to procedural table. Fall risk reviewed with patient, fall risk interventions maintained. Positioner pillows utilized to minimize pressure points. Blankets applied. Pt prepped and draped utilizing standard sterile technique. Timeouts completed utilizing standard universal time-out, per department and facility policy.  Anesthesia at bedside; Refer to anesthesia record regarding sedation and vital signs.

## 2024-05-16 NOTE — ANESTHESIA PREPROCEDURE EVALUATION
[facility]  Anesthesia Pre-Operative Evaluation        Patient Name: Rose Milligan  YOB: 1955  MRN: [id number]    SUBJECTIVE:     Pre-operative Evaluation for IR ANGIOGRAM CAROTID INTERNAL INC ARCH AND CEREBRAL BILAT     05/16/2024    Rose Milligan is a 68 y.o. female with a PMHx significant for HTN, HFpEF (grade 1 diastolic dysfunction), CKD4, chronic hep C infection, drug use (hx of cocaine use), anxiety, bipolar disorder, history of CVA (3/2023, 8/2023), right MCA bifurcation aneurysm.    The patient now presents for the above procedure(s).    Previous Airway: None documented.    Patient Active Problem List   Diagnosis    Chronic hepatitis C virus infection    CKD (chronic kidney disease) stage 4, GFR 15-29 ml/min    Hyperlipidemia LDL goal <70    Chronic pulmonary heart disease    Tobacco use    Anxiety disorder    Abnormal ECG    Cocaine use disorder, moderate, in early remission, dependence    Malignant hypertension    Chronic heart failure with preserved ejection fraction    Cerebral aneurysm, nonruptured    Left thalamic infarction    Bipolar disorder    Tobacco abuse counseling    Left-sided weakness    Myelopathy    Secondary hyperparathyroidism of renal origin       Past Medical History:   Diagnosis Date    Acute CVA (cerebrovascular accident) 08/04/2023    Acute renal failure superimposed on stage 4 chronic kidney disease 10/17/2023    Careful hydration  Serial labs  Nephrology consult and follow up in am     Bipolar 1 disorder     CHF (congestive heart failure)     Depression     Hepatitis C     Hypertension     Other hyperlipidemia 08/15/2019       Review of patient's allergies indicates:   Allergen Reactions    Tramadol Itching    Risperdal [risperidone] Other (See Comments)     Did not like way felt       Current Outpatient Medications   Medication Instructions    aspirin (ECOTRIN) 81 mg, Oral, Daily    carvediloL (COREG) 25 MG tablet TAKE 2 TABLETS BY MOUTH 2 TIMES  DAILY.    cloNIDine 0.3 mg/24 hr td ptwk (CATAPRES) 0.3 mg/24 hr 1 patch, Transdermal, Every 7 days    clopidogreL (PLAVIX) 75 mg, Oral, Daily    colchicine, gout, (COLCRYS) 0.6 mg tablet Take every 8 hours on day 1; take every 12 hours on day 2; then once per day thereafter    diphenhydrAMINE (BENADRYL) 25 mg, Oral, Every 6 hours PRN    ergocalciferol (ERGOCALCIFEROL) 50,000 Units, Oral, Every 7 days    furosemide (LASIX) 40 mg, Oral, Daily, Do not take if BP is low - under 110/70 or feeling dry/dizzy/lightheaded due to dehydration    hydrALAZINE (APRESOLINE) 100 mg, Oral, Every 8 hours    HYDROcodone-acetaminophen (NORCO) 5-325 mg per tablet 1 tablet, Oral, Every 6 hours PRN    hydrOXYzine (ATARAX) 50 mg, Oral, 2 times daily PRN    irbesartan (AVAPRO) 300 mg, Oral, Nightly    rosuvastatin (CRESTOR) 40 mg, Oral, Nightly    tretinoin (RETIN-A) 0.025 % cream Topical (Top), Nightly, Pea sized amount to entire face at night. Start out using every other night for 2 weeks, then increase to every night as tolerated    ziprasidone (GEODON) 20 mg, Oral, 2 times daily       Past Surgical History:   Procedure Laterality Date    HYSTERECTOMY         Social History     Substance and Sexual Activity   Drug Use Yes    Types: Cocaine    Comment: states last use 10/2023     Alcohol Use: Not At Risk (1/10/2024)    AUDIT-C     Frequency of Alcohol Consumption: Never     Average Number of Drinks: 1 or 2     Frequency of Binge Drinking: Never     Tobacco Use: High Risk (4/22/2024)    Patient History     Smoking Tobacco Use: Some Days     Smokeless Tobacco Use: Never     Passive Exposure: Not on file       OBJECTIVE:     Vital Signs Range (Last 24H):         Significant Labs    Heme Profile  Lab Results   Component Value Date    WBC 6.73 04/04/2024    HGB 14.1 04/04/2024    HCT 43.1 04/04/2024     04/04/2024       Coagulation Studies  Lab Results   Component Value Date    LABPROT 11.0 08/05/2023    INR 1.0 08/05/2023    APTT 27.3  08/05/2023       BMP  Lab Results   Component Value Date     04/29/2024    K 4.7 04/29/2024     (H) 04/29/2024    CO2 17 (L) 04/29/2024    BUN 46 (H) 04/29/2024    CREATININE 2.3 (H) 04/29/2024    MG 2.1 08/05/2023    PHOS 4.0 12/15/2023       Liver Function Tests  Lab Results   Component Value Date    AST 24 04/04/2024    ALT 12 04/04/2024    ALKPHOS 84 04/04/2024    BILITOT 0.6 04/04/2024    PROT 8.0 04/04/2024    ALBUMIN 4.4 04/04/2024       Lipid Profile  Lab Results   Component Value Date    CHOL 155 08/04/2023    HDL 44 08/04/2023    TRIG 48 08/04/2023       Endocrine Profile  Lab Results   Component Value Date    HGBA1C 5.1 07/19/2023    TSH 0.761 08/04/2023         Cardiac Studies    EKG:   Results for orders placed or performed during the hospital encounter of 04/04/24   EKG 12-lead    Collection Time: 04/04/24  4:26 AM   Result Value Ref Range    QRS Duration 98 ms    [facility] QTC Calculation 435 ms    Narrative    Test Reason : I10,    Vent. Rate : 069 BPM     Atrial Rate : 069 BPM     P-R Int : 140 ms          QRS Dur : 098 ms      QT Int : 406 ms       P-R-T Axes : 047 -57 120 degrees     QTc Int : 435 ms    Normal sinus rhythm  Left anterior fascicular block  Moderate voltage criteria for LVH, may be normal variant  ST and T wave abnormality, consider lateral ischemia  Abnormal ECG  When compared with ECG of 09-MAR-2024 12:30,  Criteria for Septal infarct are no longer Present  T wave inversion no longer evident in Inferior leads  T wave inversion less evident in Anterior-lateral leads  Confirmed by SAIMA DAVENPORT MD ([id number]) on 4/4/2024 5:01:37 PM    Referred By: DORITA   SELF           Confirmed By:SAIMA DAVENPORT MD       SUZAN  No results found for this or any previous visit.      TTE  Results for orders placed during the hospital encounter of 08/04/23    Echo    Interpretation Summary    Left Ventricle: The left ventricle is normal in size. Moderately increased wall thickness. There is  moderate concentrict hypertrophy. Normal wall motion. There is normal systolic function with a visually estimated ejection fraction of 55 - 70%. Grade I diastolic dysfunction.    Left Atrium: Left atrium is mildly dilated.    Right Ventricle: Normal right ventricular cavity size. Wall thickness is normal. Right ventricle wall motion  is normal. Systolic function is normal.    Mitral Valve: There is mild regurgitation.    Tricuspid Valve: There is mild regurgitation.    IVC/SVC: Intermediate venous pressure at 8 mmHg.    Bubble study negative      Nuclear Stress Echo  Results for orders placed during the hospital encounter of 06/06/23    Nuclear Stress - Cardiology Interpreted    Interpretation Summary    Abnormal myocardial perfusion scan.    There is a moderate to severe intensity, moderate sized, fixed perfusion abnormality consistent with scar in the basal to mid lateral wall(s).    There are no other significant perfusion abnormalities.    The gated perfusion images showed an ejection fraction of 50% at rest. The gated perfusion images showed an ejection fraction of 53% post stress.    The ECG portion of the study is uninterpretable due to abnormal baseline.    The patient reported no chest pain during the stress test.    There were no arrhythmias during stress.      ASSESSMENT/PLAN:         Pre-op Assessment    I have reviewed the Patient Summary Reports.     I have reviewed the Nursing Notes. I have reviewed the NPO Status.   I have reviewed the Medications.     Review of Systems  Anesthesia Hx:               Denies Personal Hx of Anesthesia complications.                    Social:  Smoker       Hematology/Oncology:  Hematology Normal   Oncology Normal                                   EENT/Dental:  EENT/Dental Normal           Cardiovascular:     Hypertension       CHF                                 Pulmonary:  Pulmonary Normal                       Renal/:  Chronic Renal Disease, CKD                 Hepatic/GI:       Hepatitis, C           Musculoskeletal:  Musculoskeletal Normal                Neurological:   CVA                                    Endocrine:  Endocrine Normal            Psych:  Psychiatric History anxiety                 Physical Exam  General: Well nourished    Airway:  Mallampati: III / II  Mouth Opening: Normal  TM Distance: Normal  Neck ROM: Normal ROM        Anesthesia Plan  Type of Anesthesia, risks & benefits discussed:    Anesthesia Type: Gen ETT, Gen Natural Airway, MAC  Intra-op Monitoring Plan: Standard ASA Monitors  Post Op Pain Control Plan: multimodal analgesia and IV/PO Opioids PRN  Induction:  IV  Airway Plan: Direct, Post-Induction  Informed Consent: Informed consent signed with the Patient and all parties understand the risks and agree with anesthesia plan.  All questions answered.   ASA Score: 3  Day of Surgery Review of History & Physical: H&P Update referred to the surgeon/provider.    Ready For Surgery From Anesthesia Perspective.     .

## 2024-05-16 NOTE — H&P
Interventional Neuroradiology Pre-procedure Note    Procedure: Diagnostic cerebral angiogram    History of Present Illness:  Rose Milligan is a 68 y.o. female with a PMHx significant for HTN, HFpEF (grade 1 diastolic dysfunction), CKD4, chronic hep C infection, drug use (hx of cocaine use), anxiety, bipolar disorder, history of CVA (3/2023, 8/2023), right MCA bifurcation aneurysm who presents for DSA to further characterize the right MCA aneurysm.    ROS:   Hematological: no known coagulopathies  Respiratory: no shortness of breath  Cardiovascular: no chest pain  Gastrointestinal: no abdominal pain  Genito-Urinary: no dysuria  Musculoskeletal: negative  Neurological: no TIA or stroke symptoms     Scheduled Meds:   Current Meds:   Current Outpatient Medications:     aspirin (ECOTRIN) 81 MG EC tablet, Take 1 tablet (81 mg total) by mouth once daily., Disp: 90 tablet, Rfl: 0    carvediloL (COREG) 25 MG tablet, TAKE 2 TABLETS BY MOUTH 2 TIMES DAILY., Disp: 180 tablet, Rfl: 1    cloNIDine 0.3 mg/24 hr td ptwk (CATAPRES) 0.3 mg/24 hr, Place 1 patch onto the skin every 7 days., Disp: 4 patch, Rfl: 11    clopidogreL (PLAVIX) 75 mg tablet, Take 1 tablet (75 mg total) by mouth once daily. for 20 days, Disp: 20 tablet, Rfl: 0    colchicine, gout, (COLCRYS) 0.6 mg tablet, Take every 8 hours on day 1; take every 12 hours on day 2; then once per day thereafter, Disp: 15 tablet, Rfl: 0    diphenhydrAMINE (BENADRYL) 25 mg capsule, Take 1 capsule (25 mg total) by mouth every 6 (six) hours as needed for Itching., Disp: 30 capsule, Rfl: 0    ergocalciferol (ERGOCALCIFEROL) 50,000 unit Cap, Take 1 capsule (50,000 Units total) by mouth every 7 days., Disp: 12 capsule, Rfl: 3    furosemide (LASIX) 40 MG tablet, Take 1 tablet (40 mg total) by mouth once daily. Do not take if BP is low - under 110/70 or feeling dry/dizzy/lightheaded due to dehydration, Disp: 90 tablet, Rfl: 1    hydrALAZINE (APRESOLINE) 100 MG tablet, Take 1 tablet (100 mg  total) by mouth every 8 (eight) hours., Disp: 180 tablet, Rfl: 1    HYDROcodone-acetaminophen (NORCO) 5-325 mg per tablet, Take 1 tablet by mouth every 6 (six) hours as needed for Pain., Disp: 12 tablet, Rfl: 0    hydrOXYzine (ATARAX) 50 MG tablet, Take 1 tablet (50 mg total) by mouth 2 (two) times daily as needed for Anxiety., Disp: 20 tablet, Rfl: 0    irbesartan (AVAPRO) 150 MG tablet, Take 2 tablets (300 mg total) by mouth every evening., Disp: 180 tablet, Rfl: 3    rosuvastatin (CRESTOR) 40 MG Tab, Take 1 tablet (40 mg total) by mouth every evening., Disp: 90 tablet, Rfl: 1    tretinoin (RETIN-A) 0.025 % cream, Apply topically every evening. Pea sized amount to entire face at night. Start out using every other night for 2 weeks, then increase to every night as tolerated, Disp: 20 g, Rfl: 0    ziprasidone (GEODON) 20 MG Cap, Take 1 capsule (20 mg total) by mouth 2 (two) times daily., Disp: 60 capsule, Rfl: 3   Continuous Infusions:   PRN Meds:    Allergies:   Review of patient's allergies indicates:   Allergen Reactions    Tramadol Itching    Risperdal [risperidone] Other (See Comments)     Did not like way felt     Sedation Hx: No adverse events.    Labs:              Objective:  Vitals: There were no vitals taken for this visit.     Physical Exam:  General: well developed, well nourished, no distress.   Head: normocephalic, atraumatic  Neck: No tracheal deviation. No palpable masses. Full ROM.   Neurologic: Alert and oriented. Thought content appropriate.  GCS: E4 V5 M6; Total: 15  Language: No aphasia  Speech: No dysarthria  Cranial nerves: face symmetric, tongue midline, CN II-XII grossly intact.   Eyes: pupils equal, round, reactive to light with accomodation, EOMI.   Pulmonary: normal respirations, no signs of respiratory distress  Abdomen: soft, non-distended, not tender to palpation  Vascular: Pulses 2+ and symmetric radial and dorsalis pedis. No LE edema.   Skin: Skin is warm, dry and intact.  Sensory:  intact to light touch throughout  Motor Strength: Moves all extremities spontaneously with good tone.  Full strength upper and lower extremities. No abnormal movements seen.     ASA: 2  MAL: 2    Plan:  -Plan for cerebral angiogram   -Sedation Plan: General anesthesia  -All diagnostics and imaging reviewed  -Patient NPO since MN  -Risks & benefits of procedure explained in detail; patient consented and all questions answered  -Further reccs to follow procedure          Jam Joshua MD, MHA  Fellow, NeuroEndovascular Surgery, [facility] [place] Rob Nascmiento  Neurologist, [facility], [place]

## 2024-05-29 ENCOUNTER — CLINICAL SUPPORT (OUTPATIENT)
Dept: NEUROSURGERY | Facility: CLINIC | Age: 69
End: 2024-05-29
Payer: MEDICARE

## 2024-05-29 ENCOUNTER — HOSPITAL ENCOUNTER (EMERGENCY)
Facility: HOSPITAL | Age: 69
Discharge: HOME | End: 2024-05-29
Attending: EMERGENCY MEDICINE
Payer: MEDICARE

## 2024-05-29 VITALS
OXYGEN SATURATION: 99 % | WEIGHT: 155.63 LBS | RESPIRATION RATE: 18 BRPM | TEMPERATURE: 98 F | SYSTOLIC BLOOD PRESSURE: 149 MMHG | DIASTOLIC BLOOD PRESSURE: 80 MMHG | BODY MASS INDEX: 27.57 KG/M2 | HEART RATE: 87 BPM | HEIGHT: 63 IN

## 2024-05-29 DIAGNOSIS — I67.1 CEREBRAL ANEURYSM, NONRUPTURED: Primary | ICD-10-CM

## 2024-05-29 DIAGNOSIS — M79.671 RIGHT FOOT PAIN: ICD-10-CM

## 2024-05-29 DIAGNOSIS — M10.9 ACUTE GOUT OF RIGHT FOOT, UNSPECIFIED CAUSE: Primary | ICD-10-CM

## 2024-05-29 LAB
ALBUMIN SERPL BCP-MCNC: 4 G/DL (ref 3.5–5.2)
ALP SERPL-CCNC: 100 U/L (ref 55–135)
ALT SERPL W/O P-5'-P-CCNC: 9 U/L (ref 10–44)
AMORPH CRY URNS QL MICRO: ABNORMAL
ANION GAP SERPL CALC-SCNC: 14 MMOL/L (ref 8–16)
AST SERPL-CCNC: 15 U/L (ref 10–40)
BACTERIA #/AREA URNS HPF: ABNORMAL /HPF
BASOPHILS # BLD AUTO: 0.05 K/UL (ref 0–0.2)
BASOPHILS NFR BLD: 0.7 % (ref 0–1.9)
BILIRUB SERPL-MCNC: 0.3 MG/DL (ref 0.1–1)
BILIRUB UR QL STRIP: NEGATIVE
BUN SERPL-MCNC: 64 MG/DL (ref 8–23)
CALCIUM SERPL-MCNC: 9 MG/DL (ref 8.7–10.5)
CHLORIDE SERPL-SCNC: 102 MMOL/L (ref 95–110)
CLARITY UR: CLEAR
CO2 SERPL-SCNC: 22 MMOL/L (ref 23–29)
COLOR UR: YELLOW
CREAT SERPL-MCNC: 3.7 MG/DL (ref 0.5–1.4)
DIFFERENTIAL METHOD BLD: ABNORMAL
EOSINOPHIL # BLD AUTO: 0.3 K/UL (ref 0–0.5)
EOSINOPHIL NFR BLD: 4 % (ref 0–8)
ERYTHROCYTE [DISTWIDTH] IN BLOOD BY AUTOMATED COUNT: 13 % (ref 11.5–14.5)
EST. GFR  (NO RACE VARIABLE): 13 ML/MIN/1.73 M^2
GLUCOSE SERPL-MCNC: 151 MG/DL (ref 70–110)
GLUCOSE UR QL STRIP: NEGATIVE
HCT VFR BLD AUTO: 38 % (ref 37–48.5)
HGB BLD-MCNC: 12.1 G/DL (ref 12–16)
HGB UR QL STRIP: NEGATIVE
IMM GRANULOCYTES # BLD AUTO: 0.03 K/UL (ref 0–0.04)
IMM GRANULOCYTES NFR BLD AUTO: 0.4 % (ref 0–0.5)
KETONES UR QL STRIP: NEGATIVE
LEUKOCYTE ESTERASE UR QL STRIP: ABNORMAL
LYMPHOCYTES # BLD AUTO: 2.6 K/UL (ref 1–4.8)
LYMPHOCYTES NFR BLD: 36 % (ref 18–48)
MCH RBC QN AUTO: 27.6 PG (ref 27–31)
MCHC RBC AUTO-ENTMCNC: 31.8 G/DL (ref 32–36)
MCV RBC AUTO: 87 FL (ref 82–98)
MICROSCOPIC COMMENT: ABNORMAL
MONOCYTES # BLD AUTO: 0.7 K/UL (ref 0.3–1)
MONOCYTES NFR BLD: 10.2 % (ref 4–15)
NEUTROPHILS # BLD AUTO: 3.5 K/UL (ref 1.8–7.7)
NEUTROPHILS NFR BLD: 48.7 % (ref 38–73)
NITRITE UR QL STRIP: NEGATIVE
NRBC BLD-RTO: 0 /100 WBC
PH UR STRIP: 7 [PH] (ref 5–8)
PLATELET # BLD AUTO: 257 K/UL (ref 150–450)
PMV BLD AUTO: 10 FL (ref 9.2–12.9)
POTASSIUM SERPL-SCNC: 4.4 MMOL/L (ref 3.5–5.1)
PROT SERPL-MCNC: 8 G/DL (ref 6–8.4)
PROT UR QL STRIP: NEGATIVE
RBC # BLD AUTO: 4.39 M/UL (ref 4–5.4)
SODIUM SERPL-SCNC: 138 MMOL/L (ref 136–145)
SP GR UR STRIP: 1.01 (ref 1–1.03)
SQUAMOUS #/AREA URNS HPF: 2 /HPF
URATE SERPL-MCNC: 9.1 MG/DL (ref 2.4–5.7)
URN SPEC COLLECT METH UR: ABNORMAL
UROBILINOGEN UR STRIP-ACNC: NEGATIVE EU/DL
WBC # BLD AUTO: 7.22 K/UL (ref 3.9–12.7)
WBC #/AREA URNS HPF: 9 /HPF (ref 0–5)

## 2024-05-29 PROCEDURE — 99284 EMERGENCY DEPT VISIT MOD MDM: CPT | Mod: 25

## 2024-05-29 PROCEDURE — 84550 ASSAY OF BLOOD/URIC ACID: CPT | Performed by: NURSE PRACTITIONER

## 2024-05-29 PROCEDURE — 25000003 PHARM REV CODE 250: Performed by: NURSE PRACTITIONER

## 2024-05-29 PROCEDURE — 85025 COMPLETE CBC W/AUTO DIFF WBC: CPT | Performed by: NURSE PRACTITIONER

## 2024-05-29 PROCEDURE — 81000 URINALYSIS NONAUTO W/SCOPE: CPT | Performed by: EMERGENCY MEDICINE

## 2024-05-29 PROCEDURE — 80053 COMPREHEN METABOLIC PANEL: CPT | Performed by: NURSE PRACTITIONER

## 2024-05-29 RX ORDER — HYDROCODONE BITARTRATE AND ACETAMINOPHEN 5; 325 MG/1; MG/1
1 TABLET ORAL
Status: COMPLETED | OUTPATIENT
Start: 2024-05-29 | End: 2024-05-29

## 2024-05-29 RX ORDER — HYDROCODONE BITARTRATE AND ACETAMINOPHEN 5; 325 MG/1; MG/1
1 TABLET ORAL EVERY 4 HOURS PRN
Qty: 18 TABLET | Refills: 0 | Status: SHIPPED | OUTPATIENT
Start: 2024-05-29

## 2024-05-29 RX ADMIN — HYDROCODONE BITARTRATE AND ACETAMINOPHEN 1 TABLET: 5; 325 TABLET ORAL at 10:05

## 2024-05-29 NOTE — PROGRESS NOTES
Established Patient - Audio Only Telehealth Visit     Rose Milliganis a 68 y.o. female for 2 week post op wound check.    Surgery: Pt is POD 14 FROM angiogram on 05/16/24 by Dr. Patrick.    DME: none    Brace in Use: none    SUBJECTIVE    New Symptoms: none      PAIN MANAGEMENT     0,       INCISION (S)    Location: right groin    Incision Status:  puncture site healed according to pt.    PICTURE    INTERVENTION    Incision: None    Medication Refills Requested: None     EDUCATION    Reviewed Post Op instructions with patient/caregiver.  Keep incision CLAY, no lotions, creams or bandages.  Ok to shower without direct water pressure to incision. Pat dry after shower.  Patient encouraged to walk as much as possible but advised to walk with someone and rest as necessary.  Take over the counter stool softner/laxative for constipation.  Call your doctor or report to the Emergency Room for any signs of infection, including: increased redness, drainage, pain or fever (temperature greater than or equal to 101.5 for 24 hours). Call doctor or go to the Emergency Room if there are any localized neurological changes; problems with speech, vision, numbness, tingling, weakness, or severe headache; or for other concerns.      All questions answered. Pt/caregiver encouraged to call clinic or send message through portal with any future concerns. Patient/caregiver verbalized understanding.     FOLLOW UP    Future Appointments   Date Time Provider Department Center   6/25/2024  1:00 PM Rey Patrick MD [address]   7/18/2024  8:40 AM Cullen Hannon MD ON CARDIO  Medical C   8/23/2024 11:30 AM Oliver Dela Cruz MD [facility]

## 2024-05-30 ENCOUNTER — TELEPHONE (OUTPATIENT)
Dept: FAMILY MEDICINE | Facility: CLINIC | Age: 69
End: 2024-05-30
Payer: MEDICARE

## 2024-05-30 ENCOUNTER — PATIENT OUTREACH (OUTPATIENT)
Dept: EMERGENCY MEDICINE | Facility: HOSPITAL | Age: 69
End: 2024-05-30
Payer: MEDICARE

## 2024-05-30 NOTE — PROGRESS NOTES
Dominique Sebastian  ED Navigator  Emergency Department    Project: [facility] ED Navigator  Role: Community Health Worker    Date: 05/30/2024  Patient Name: Rose Milligan  MRN: [id number]  PCP: Leeann Parham,     Assessment:     Rose Milligan is a 68 y.o. female who has presented to ED for Right foot pain; Acute gout of right foot, unspecified cause. Patient has visited the ED 3 times in the past 3 months. Patient did not contact PCP.     ED Navigator Initial Assessment    ED Navigator Enrollment Documentation  Consent to Services  Does patient consent to completing the assessment?: Yes  Contact  Method of Initial Contact: Phone  Transportation  Does the patient have issues with Transportation?: Yes  Does the patient have transportation to and from healthcare appointments?: Yes  Can family, friends, or others help?: Yes (Comment: Sometimes)  Lack of transportation to appts, pharmacy, etc.?: Yes  What type of assistance is needed?: Standard (RTA/MITS)  What is available in their region?: C, Family, friends - Provided [employer] Transportation number  Insurance Coverage  Do you have coverage/adequate coverage?: Yes  Type/kind of coverage: UHC  Is patient able to afford co-pays/deductibles?: Yes  Is patient able to afford HME or supplies?: Yes  Does patient have an established [facility] PCP?: Yes  Able to access?: Yes  Does the patient have a lack of adequate coverage?: No  Specialist Appointment  Did the patient come to the ED to see a specialist?: No  Does the patient have a pending specialist referral?: No  Does the patient have a specialist appointment made?: No  PCP Follow Up Appointment  Has the patient had an appointment with a primary care provider in the past year?: Yes  Approximate date: 4/10/24  Provider: Leeann Parham, DO  Does the patient have a follow up appontment with a PCP?: No  When was the last time you saw your PCP?: 4/10/24  Why does the patient not have a follow up scheduled?: Other (see comments)  (Comment: Pt had not contacted pcp and accepted scheduling assistance)  Medications  Is patient able to afford medication?: Yes  Is patient unable to get medication due to lack of transportation?: Yes  Psychological  Does the patient have psycho-social concerns?: Yes  What concerns does the patient have?: Other (see comments), Anxiety and/or Depression, Substance abuse/Substance abuse disorder (Comment: Hx of substance abuse, bipolar, and anxiety charted)  What substances did the patient identify as issues?: Drugs  Food  Does the patient have concerns about food?: No  Communication/Education  Does the patient have limited English proficiency/English not primary language?: No  Does patient have low literacy and/or low health literacy?: Yes  Does patient have concerns with care?: No  Does patient have dissatisfaction with care?: No  Other Financial Concerns  Does the patient have immediate financial distress?: No  Does the patient have general financial concerns?: No  Other Social Barriers/Concerns  Does the patient have any additional barriers or concerns?: Other (see comments) (Comment: transportation)  Primary Barrier  Barriers identified: Cognitive barrier (health literacy, language and communication, etc.), Psychological barrier (mistrust, anxiety, etc.)  Root Cause of ED Utilization: Chronic Conditions  Plan to address Chronic Conditions: Schedule appointment for patient with their PCP/specialist per ED discharge instructions  Next steps: Provided Education  Additional Documentation: Pt was seen in the ED on 5/29/24 for Right foot pain; Acute gout of right foot, unspecified cause. I spoke with pt for Post ED visit follow up navigation to assist with scheduling a 7-day Post ED visit follow up appt. Pt states that she had not yet contacted pcp to schedule a follow up appt and accepted scheduling assistance. I was unable to schedule appt for pt and sent a request for assistance to pcp.  Pt will be contacted  directly for scheduling as pt has transportation issues and needs to schedule as transportation is available. Pt has no additional needs at this time.     Dmoinique Sebastian           Social History     Socioeconomic History    Marital status:    Tobacco Use    Smoking status: Some Days     Current packs/day: 0.25     Average packs/day: 0.3 packs/day for 8.4 years (2.1 ttl pk-yrs)     Types: Cigarettes     Start date: 1/11/2016    Smokeless tobacco: Never    Tobacco comments:     1 pack every 3 days   Substance and Sexual Activity    Alcohol use: Yes    Drug use: Not Currently     Types: Cocaine     Comment: states last use 10/2023    Sexual activity: Not Currently     Social Determinants of Health     Financial Resource Strain: Low Risk  (5/30/2024)    Overall Financial Resource Strain (CARDIA)     Difficulty of Paying Living Expenses: Not very hard   Food Insecurity: No Food Insecurity (5/30/2024)    Hunger Vital Sign     Worried About Running Out of Food in the Last Year: Never true     Ran Out of Food in the Last Year: Never true   Transportation Needs: Unmet Transportation Needs (5/30/2024)    PRAPARE - Transportation     Lack of Transportation (Medical): Yes     Lack of Transportation (Non-Medical): Yes   Physical Activity: Inactive (1/10/2024)    Exercise Vital Sign     Days of Exercise per Week: 0 days     Minutes of Exercise per Session: 0 min   Stress: No Stress Concern Present (1/10/2024)    [employer] [place] [place] [employer] - Occupational Stress Questionnaire     Feeling of Stress : Only a little   Housing Stability: Low Risk  (5/30/2024)    Housing Stability Vital Sign     Unable to Pay for Housing in the Last Year: No     Homeless in the Last Year: No       Plan:   Pt was seen in the ED on 5/29/24 for Right foot pain; Acute gout of right foot, unspecified cause. I spoke with pt for Post ED visit follow up navigation to assist with scheduling a 7-day Post ED visit follow up appt. Pt states  that she had not yet contacted pcp to schedule a follow up appt and accepted scheduling assistance. I was unable to schedule appt for pt and sent a request for assistance to pcp.  Pt will be contacted directly for scheduling as pt has transportation issues and needs to schedule as transportation is available. Pt was contacted and scheduled a 7-day Post ED visit follow up appt with Ricki Manjarrez NP, on 6/4/24 @ 10:00 a.m. Pt has no additional needs at this time.     Dominique Sebastian      Transportation Insecurity: Provided contact information for [facility] transportation      Appointment made with: Leeann Parham DO

## 2024-05-30 NOTE — TELEPHONE ENCOUNTER
----- Message from Dominique Sebastian sent at 5/30/2024  1:15 PM CDT -----  Regarding: Post ED visit follow up appt - D/C date 5/29/24  Good afternoon,    Pt was seen in the ED on 5/29/24 for Right foot pain; Acute gout of right foot, unspecified cause       and requires a Post ED visit follow up appt within 7 days of d/c date. I am requesting scheduling assistance, as I am unable to schedule pt. Please contact pt to schedule either an in-office or virtual follow up appt with any available provider by 6/5/24 if possible.    Thank you for your assistance,  Dominique Sebastian

## 2024-05-30 NOTE — ED PROVIDER NOTES
Encounter Date: 5/29/2024       History     Chief Complaint   Patient presents with    Foot Pain     Pt reports right foot pain and difficulty walking since yesterday. Hx of gout and reports noncompliance with gout medication      Patient is a 68-year-old female who presents with right foot pain.  Onset of symptoms yesterday.  Patient reports history of gout but denies taking medications to help relieve the gout.  She denies any injury to the area.  Patient shows no signs of distress at this time.      Review of patient's allergies indicates:   Allergen Reactions    Tramadol Itching    Risperdal [risperidone] Other (See Comments)     Did not like way felt     Past Medical History:   Diagnosis Date    Acute CVA (cerebrovascular accident) 08/04/2023    Acute renal failure superimposed on stage 4 chronic kidney disease 10/17/2023    Careful hydration  Serial labs  Nephrology consult and follow up in am     Bipolar 1 disorder     CHF (congestive heart failure)     Depression     Hepatitis C     Hypertension     Other hyperlipidemia 08/15/2019     Past Surgical History:   Procedure Laterality Date    HYSTERECTOMY       Family History   Problem Relation Name Age of Onset    Heart attack Maternal Grandmother      Hypertension Mother       Social History     Tobacco Use    Smoking status: Some Days     Current packs/day: 0.25     Average packs/day: 0.3 packs/day for 8.4 years (2.1 ttl pk-yrs)     Types: Cigarettes     Start date: 1/11/2016    Smokeless tobacco: Never    Tobacco comments:     1 pack every 3 days   Substance Use Topics    Alcohol use: Yes    Drug use: Not Currently     Types: Cocaine     Comment: states last use 10/2023     Review of Systems   Constitutional:  Negative for fever.   HENT:  Negative for sore throat.    Respiratory:  Negative for shortness of breath.    Cardiovascular:  Negative for chest pain.   Gastrointestinal:  Negative for nausea.   Genitourinary:  Negative for dysuria.   Musculoskeletal:   Positive for arthralgias (Right foot). Negative for back pain.   Skin:  Negative for rash.   Neurological:  Negative for weakness.   Hematological:  Does not bruise/bleed easily.       Physical Exam     Initial Vitals [05/29/24 1903]   BP Pulse Resp Temp SpO2   (!) 176/86 80 18 97.6 °F (36.4 °C) 96 %      MAP       --         Physical Exam    Nursing note and vitals reviewed.  Constitutional: She appears well-developed and well-nourished.   HENT:   Head: Normocephalic and atraumatic.   Eyes: EOM are normal. Pupils are equal, round, and reactive to light.   Neck: Neck supple.   Normal range of motion.  Cardiovascular:  Normal rate, regular rhythm, normal heart sounds and intact distal pulses.           Pulmonary/Chest: Breath sounds normal.   Abdominal: Abdomen is soft. Bowel sounds are normal.   Musculoskeletal:         General: Normal range of motion.      Cervical back: Normal range of motion and neck supple.        Feet:      Neurological: She is alert and oriented to person, place, and time. She has normal strength and normal reflexes.   Skin: Skin is warm and dry.         ED Course   Procedures  Labs Reviewed   URINALYSIS, REFLEX TO URINE CULTURE - Abnormal; Notable for the following components:       Result Value    Leukocytes, UA 1+ (*)     All other components within normal limits    Narrative:     Specimen Source->Urine   URINALYSIS MICROSCOPIC - Abnormal; Notable for the following components:    WBC, UA 9 (*)     Bacteria Many (*)     All other components within normal limits    Narrative:     Specimen Source->Urine   CBC W/ AUTO DIFFERENTIAL - Abnormal; Notable for the following components:    MCHC 31.8 (*)     All other components within normal limits   COMPREHENSIVE METABOLIC PANEL - Abnormal; Notable for the following components:    CO2 22 (*)     Glucose 151 (*)     BUN 64 (*)     Creatinine 3.7 (*)     ALT 9 (*)     eGFR 13 (*)     All other components within normal limits   URIC ACID - Abnormal;  Notable for the following components:    Uric Acid 9.1 (*)     All other components within normal limits          Imaging Results              X-Ray Foot Complete Right (Final result)  Result time 05/29/24 22:04:54      Final result by Kaity Stone MD (05/29/24 22:04:54)                   Impression:      NO ACUTE OR ADVERSE FINDING UNDERLYING CHRONIC CHANGES REDEMONSTRATED      Electronically signed by: Kaity Stone  Date:    05/29/2024  Time:    22:04               Narrative:    EXAMINATION:  XR FOOT COMPLETE 3 VIEW RIGHT    CLINICAL HISTORY:  . Pain in right foot    TECHNIQUE:  AP, lateral, and oblique views of the right foot were performed.    COMPARISON:  None    FINDINGS:  No acute fracture dislocation or destructive bony findings.  Chronic findings similar.                                       Medications   HYDROcodone-acetaminophen 5-325 mg per tablet 1 tablet (1 tablet Oral Given 5/29/24 2208)     Medical Decision Making  Amount and/or Complexity of Data Reviewed  Labs: ordered.  Radiology: ordered.    Risk  Prescription drug management.                                      Clinical Impression:  Final diagnoses:  [M79.671] Right foot pain  [M10.9] Acute gout of right foot, unspecified cause (Primary)          ED Disposition Condition    Discharge Stable          ED Prescriptions       Medication Sig Dispense Start Date End Date Auth. Provider    HYDROcodone-acetaminophen (NORCO) 5-325 mg per tablet Take 1 tablet by mouth every 4 (four) hours as needed. 18 tablet 5/29/2024 -- Sunil Solorzano NP          Follow-up Information       Follow up With Specialties Details Why Contact Info    Leeann Parham, DO Internal Medicine  As needed [address] [facility] [id number] [phone]               Sunil Solorzano NP  05/29/24 5818

## 2024-05-31 RX ORDER — CARVEDILOL 25 MG/1
25 TABLET ORAL 2 TIMES DAILY
Qty: 180 TABLET | Refills: 1 | Status: SHIPPED | OUTPATIENT
Start: 2024-05-31 | End: 2024-06-17 | Stop reason: SDUPTHER

## 2024-06-03 ENCOUNTER — PATIENT OUTREACH (OUTPATIENT)
Dept: EMERGENCY MEDICINE | Facility: HOSPITAL | Age: 69
End: 2024-06-03
Payer: MEDICARE

## 2024-06-03 NOTE — PROGRESS NOTES
Pt was seen in the ED on 5/26/24. Pt was scheduled a Post ED visit follow up appt and was contacted to provide a reminder for the upcoming appt scheduled with Dr. Nano Miranda on 6/4/24 at 3:20 p.m.Closing Encounter.     Dominique Sebastian

## 2024-06-17 DIAGNOSIS — I50.32 CHRONIC HEART FAILURE WITH PRESERVED EJECTION FRACTION: ICD-10-CM

## 2024-06-17 RX ORDER — CARVEDILOL 25 MG/1
25 TABLET ORAL 2 TIMES DAILY
Qty: 180 TABLET | Refills: 1 | Status: SHIPPED | OUTPATIENT
Start: 2024-06-17

## 2024-06-17 RX ORDER — ASPIRIN 81 MG/1
81 TABLET ORAL DAILY
Qty: 90 TABLET | Refills: 0 | Status: SHIPPED | OUTPATIENT
Start: 2024-06-17

## 2024-06-17 RX ORDER — FUROSEMIDE 40 MG/1
40 TABLET ORAL DAILY
Qty: 90 TABLET | Refills: 1 | Status: SHIPPED | OUTPATIENT
Start: 2024-06-17

## 2024-06-17 RX ORDER — HYDRALAZINE HYDROCHLORIDE 100 MG/1
100 TABLET, FILM COATED ORAL EVERY 8 HOURS
Qty: 180 TABLET | Refills: 1 | Status: SHIPPED | OUTPATIENT
Start: 2024-06-17

## 2024-06-17 NOTE — TELEPHONE ENCOUNTER
LM that refills were in process      ----- Message from Mona Stephenson sent at 6/17/2024  4:17 PM CDT -----  Contact: self   Patient is returning a phone call.    Who left a message for the patient: nurse    Does patient know what this is regarding:  Rx refill message     Would you like a call back, or a response through your [facility] portal?:   call back     Comments:

## 2024-06-21 NOTE — TELEPHONE ENCOUNTER
Reached out to patient from Stroke [facility] to offer services of PMR/Dr. Youssef.  No answer, lvm  and provided information as well as a contact number if ever needed.    Maday Genao RN    
hour(s)

## 2024-06-25 ENCOUNTER — TELEPHONE (OUTPATIENT)
Dept: NEUROSURGERY | Facility: CLINIC | Age: 69
End: 2024-06-25
Payer: MEDICARE

## 2024-06-25 ENCOUNTER — OFFICE VISIT (OUTPATIENT)
Dept: NEUROSURGERY | Facility: CLINIC | Age: 69
End: 2024-06-25
Payer: MEDICARE

## 2024-06-25 ENCOUNTER — PATIENT MESSAGE (OUTPATIENT)
Dept: NEUROSURGERY | Facility: CLINIC | Age: 69
End: 2024-06-25

## 2024-06-25 DIAGNOSIS — I67.1 CEREBRAL ANEURYSM, NONRUPTURED: Primary | ICD-10-CM

## 2024-06-25 DIAGNOSIS — I67.1 BRAIN ANEURYSM: Primary | ICD-10-CM

## 2024-06-25 PROCEDURE — 99215 OFFICE O/P EST HI 40 MIN: CPT | Mod: 95,,, | Performed by: NEUROLOGICAL SURGERY

## 2024-06-25 PROCEDURE — 4010F ACE/ARB THERAPY RXD/TAKEN: CPT | Mod: CPTII,95,, | Performed by: NEUROLOGICAL SURGERY

## 2024-06-25 NOTE — PROGRESS NOTES
Neurosurgery  Established Patient    Virtual visit Attestation   The patient location is: Home  The chief complaint leading to consultation is: 4-6 wk angiogram f/u   Visit type: audiovisual  Total time spent with patient: 15 min     Each patient to whom he or she provides medical services by telemedicine is:  (1) informed of the relationship between the physician and patient and the respective role of any other health care provider with respect to management of the patient; and (2) notified that he or she may decline to receive medical services by telemedicine and may withdraw from such care at any time.     Nico Attestation  DMITRIY, Leyla Doty, attest that this documentation has been prepared under the direction and in the presence of Rey Patrick MD.    SUBJECTIVE:     History of Present Illness:  Rose Milligan is a 68 year-old female with a past medical history of chronic kidney disease and hypertension who presents today for evaluation of aneurysm/AVM.  She denies any new headache, nausea, vomiting.  Recently admitted for hypertensive emergency/urgency.     Interval History 06/25/24:   Patient returns to clinic for 4-6 week angiogram f/u. She was last seen by referral from Dr. Huitron for evaluation of aneurysm. 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 Today she reports feeling overall well. She had a low intensity headache yesterday but it did not last long. No nausea or vomiting. Positive smoking history (2 years, 1 pack/week). Family history positive for aneurysm (aunt). She is accompanied by her daughter. This was a virtual audio visual visit.    Review of patient's allergies indicates:   Allergen Reactions    Tramadol Itching    Risperdal [risperidone] Other (See Comments)     Did not like way felt       Current Outpatient Medications   Medication Sig Dispense Refill    aspirin (ECOTRIN) 81 MG EC tablet Take 1 tablet (81 mg  total) by mouth once daily. 90 tablet 0    carvediloL (COREG) 25 MG tablet Take 1 tablet (25 mg total) by mouth 2 (two) times daily. 180 tablet 1    cloNIDine 0.3 mg/24 hr td ptwk (CATAPRES) 0.3 mg/24 hr Place 1 patch onto the skin every 7 days. 4 patch 11    clopidogreL (PLAVIX) 75 mg tablet Take 1 tablet (75 mg total) by mouth once daily. for 20 days 20 tablet 0    colchicine, gout, (COLCRYS) 0.6 mg tablet Take every 8 hours on day 1; take every 12 hours on day 2; then once per day thereafter 15 tablet 0    diphenhydrAMINE (BENADRYL) 25 mg capsule Take 1 capsule (25 mg total) by mouth every 6 (six) hours as needed for Itching. 30 capsule 0    ergocalciferol (ERGOCALCIFEROL) 50,000 unit Cap Take 1 capsule (50,000 Units total) by mouth every 7 days. 12 capsule 3    furosemide (LASIX) 40 MG tablet Take 1 tablet (40 mg total) by mouth once daily. Do not take if BP is low - under 110/70 or feeling dry/dizzy/lightheaded due to dehydration 90 tablet 1    hydrALAZINE (APRESOLINE) 100 MG tablet Take 1 tablet (100 mg total) by mouth every 8 (eight) hours. 180 tablet 1    HYDROcodone-acetaminophen (NORCO) 5-325 mg per tablet Take 1 tablet by mouth every 4 (four) hours as needed. 18 tablet 0    hydrOXYzine (ATARAX) 50 MG tablet Take 1 tablet (50 mg total) by mouth 2 (two) times daily as needed for Anxiety. 20 tablet 0    irbesartan (AVAPRO) 150 MG tablet Take 2 tablets (300 mg total) by mouth every evening. 180 tablet 3    LIDOcaine-prilocaine (EMLA) cream Apply topically as needed (on right wrist). 2 g 0    rosuvastatin (CRESTOR) 40 MG Tab Take 1 tablet (40 mg total) by mouth every evening. 90 tablet 1    tretinoin (RETIN-A) 0.025 % cream Apply topically every evening. Pea sized amount to entire face at night. Start out using every other night for 2 weeks, then increase to every night as tolerated 20 g 0    ziprasidone (GEODON) 20 MG Cap Take 1 capsule (20 mg total) by mouth 2 (two) times daily. 60 capsule 3     No current  facility-administered medications for this visit.       Past Medical History:   Diagnosis Date    Acute CVA (cerebrovascular accident) 08/04/2023    Acute renal failure superimposed on stage 4 chronic kidney disease 10/17/2023    Careful hydration  Serial labs  Nephrology consult and follow up in am     Bipolar 1 disorder     CHF (congestive heart failure)     Depression     Hepatitis C     Hypertension     Other hyperlipidemia 08/15/2019     Past Surgical History:   Procedure Laterality Date    HYSTERECTOMY       Family History       Problem Relation (Age of Onset)    Heart attack Maternal Grandmother    Hypertension Mother          Social History     Socioeconomic History    Marital status:    Tobacco Use    Smoking status: Some Days     Current packs/day: 0.25     Average packs/day: 0.3 packs/day for 8.5 years (2.1 ttl pk-yrs)     Types: Cigarettes     Start date: 1/11/2016    Smokeless tobacco: Never    Tobacco comments:     1 pack every 3 days   Substance and Sexual Activity    Alcohol use: Yes    Drug use: Not Currently     Types: Cocaine     Comment: states last use 10/2023    Sexual activity: Not Currently     Social Determinants of Health     Financial Resource Strain: Low Risk  (5/30/2024)    Overall Financial Resource Strain (CARDIA)     Difficulty of Paying Living Expenses: Not very hard   Food Insecurity: No Food Insecurity (5/30/2024)    Hunger Vital Sign     Worried About Running Out of Food in the Last Year: Never true     Ran Out of Food in the Last Year: Never true   Transportation Needs: Unmet Transportation Needs (5/30/2024)    PRAPARE - Transportation     Lack of Transportation (Medical): Yes     Lack of Transportation (Non-Medical): Yes   Physical Activity: Inactive (1/10/2024)    Exercise Vital Sign     Days of Exercise per Week: 0 days     Minutes of Exercise per Session: 0 min   Stress: No Stress Concern Present (1/10/2024)    [place] [place] [employer] - Occupational  Stress Questionnaire     Feeling of Stress : Only a little   Housing Stability: Low Risk  (5/30/2024)    Housing Stability Vital Sign     Unable to Pay for Housing in the Last Year: No     Homeless in the Last Year: No       Review of Systems   All other systems reviewed and are negative.    OBJECTIVE:     Vital Signs     There is no height or weight on file to calculate BMI.    Neurosurgery Physical Exam  On virtual audio visual visit   Head is normocephalic atraumatic   Neck is grossly supple  No appreciable labored breathing   Admitting appears to be nontender   Patient is able to move extremities     On virtual audio visual visit the patient's speech is fluent, goal directed without any noted dysarthria or aphasia   Unable to evaluate pupils on virtual audio visual visit   Face is symmetric   Tongue is midline  Unable to evaluate palate   Hearing appears to be intact on virtual audio visual visit to voice   Patient able to move both upper and lower extremities without any gross motor asymmetry on virtual audio visual visit     Diagnostic Results:  I personally reviewed the patient's Diagnostic Imaging.     IR Angiogram Carotid Cerebral Bilateral 05/16/24  Wide necked, saccular aneurysm originating and the bifurcation of the right middle cerebral artery neck 4.9 mm, body 5.76 mm, height 3.5 mm.    MRA Brain WO Cont 12/23/23  Stable subcentimeter posterior projection right MCA bifurcation aneurysm.    ASSESSMENT/PLAN:   69 yo female with posterior projection right MCA bifurcation aneurysm and hx of CKD and positive smoking history and family history for aneurysm (aunt).    Patient presents with no focal neurological deficits here in clinic.  Discussed imaging results from angiogram and MRA Brain revealing wide necked, saccular aneurysm originating at the bifurcation of the right middle cerebral artery.  After discussion with the patient, I recommend obtaining another MRA Brain with vessel wall imaging in 3 months  to determine the best approach to treat pt's aneurysm. I have answered all of their questions and patient wishes to proceed with this plan. We will schedule patient.      Thank you so very much for allowing me to participate in the care of this patient.  Please feel free to call any questions, comments, or concerns.     Rey Patrick MD,MSc  Department of Neurosurgery   Department of Radiology  Department of Neurology  [facility]    [facility]     Total time spent in counseling and discussion about further management options including relevant lab work, treatment,  prognosis, medications and intended side effects was more than 60 minutes. More than 50 % of the time was spent in counseling and coordination of care.  I spent a total of 60 minutes on the day of the visit.This includes face to face time and non-face to face time preparing to see the patient (eg, review of tests), Obtaining and/or reviewing separately obtained history, Documenting clinical information in the electronic or other health record, Independently interpreting resultsand communicating results to the patient/family/caregiver, or Care coordination      Scribe Attestation  I, Dr.Vernard Patrick personally performed the services described in this documentation. All medical record entries made by the scribe, Leyla Doty, were at my direction and in my presence.  I have reviewed the chart and agree that the record reflects my personal performance and is accurate and complete.

## 2024-07-17 NOTE — PROGRESS NOTES
Subjective:   Patient ID:  Rose Milligan is a 69 y.o. female who presents for cardiac consult of No chief complaint on file.      The patient came in today for cardiac consult of No chief complaint on file.      Rose Milligan is a 69 y.o. female  with HFpEF, HTN, pulm HTN, h/o TIA/CVA, tobacco abuse, h/o drug abuse, bipolar, hep C, CKD4 presents for follow up CV eval.      1/16/24  BP elevated 214/116, she needs meds refills has more stress this AM due to walking and needing to de-ice the car.   She has more SOB today. She had more salty food lately.   BP has improved to 170s/90s with CLonidine 0.2 mg x2 in office now.     4/18/24  BP and HR well controlled. BMI 27 - 155 lbs       ED Vital Signs:         Vitals:     04/04/24 0057 04/04/24 0334 04/04/24 0438 04/04/24 0503   BP: (!) 194/95 (!) 193/103 (!) 207/99 (!) 176/86   Pulse: 81 71   61   Resp: 20         Temp: 97.8 °F (36.6 °C)         TempSrc: Oral         SpO2: 98% 100%   99%     04/04/24 0633 04/04/24 0714 04/04/24 0733 04/04/24 0802   BP: (!) 193/102 (!) 166/98 (!) 194/90 (!) 167/87   Pulse: 64 66 61 64   Resp:           Temp:           TempSrc:           SpO2: 98%   98% 97%         Recent ER eval for abd pain/ stress.     Improved BP now  She will have  Angiogram changed from 05/02/24 to 05/16/24 7/18/24  BP elevated 162/93. HR 75   She has been smoking.   Dr. Patrick  9/24/24 at 3:00 pm. You will need an MRA Vessel Wall Imaging done prior to this appointment so I have it scheduled at [facility] on 9/24/24 at 1:30 pm.   Has been eating more salty food at times.     Results for orders placed during the hospital encounter of 08/04/23    Echo    Interpretation Summary    Left Ventricle: The left ventricle is normal in size. Moderately increased wall thickness. There is moderate concentrict hypertrophy. Normal wall motion. There is normal systolic function with a visually estimated ejection fraction of 55 - 70%. Grade I diastolic dysfunction.     Left Atrium: Left atrium is mildly dilated.    Right Ventricle: Normal right ventricular cavity size. Wall thickness is normal. Right ventricle wall motion  is normal. Systolic function is normal.    Mitral Valve: There is mild regurgitation.    Tricuspid Valve: There is mild regurgitation.    IVC/SVC: Intermediate venous pressure at 8 mmHg.    Bubble study negative          Conclusion 6/2023     There is 0-19% right Internal Carotid Stenosis.  There is 0-19% left Internal Carotid Stenosis.    Conclusion 6/2023    The patient was monitored for a total of 12d 16h, underlying rhythm is Sinus.  The minimum heart rate was 55 bpm; the maximum 116 bpm; the average 76 bpm.  0 % of Atrial fibrillation/Atrial flutter with longest episode of 0 ms.  -- AV block with 0 %  There were 0 pauses, the longest pause was 0 ms at --.  7 episodes of VT were found with Longest VT at 10 beats .  21 supraventricular episodes were found. Longest SVT Episode 15 beats, Fastest  bpm  There were a total of 1346 PVCs with 2 morphologies and 7 couplets. Overall PVC [place] at 0.1 %  There were a total of 1132 PSVCs with 1 morphologies and 0 couplets. Overall PSVC [place] at 0.08 %  There is a total of 1 patient events    Results for orders placed during the hospital encounter of 06/06/23    Nuclear Stress - Cardiology Interpreted    Interpretation Summary    Abnormal myocardial perfusion scan.    There is a moderate to severe intensity, moderate sized, fixed perfusion abnormality consistent with scar in the basal to mid lateral wall(s).    There are no other significant perfusion abnormalities.    The gated perfusion images showed an ejection fraction of 50% at rest. The gated perfusion images showed an ejection fraction of 53% post stress.    The ECG portion of the study is uninterpretable due to abnormal baseline.    The patient reported no chest pain during the stress test.    There were no arrhythmias during stress.          Past Medical  History:   Diagnosis Date    Acute CVA (cerebrovascular accident) 08/04/2023    Acute renal failure superimposed on stage 4 chronic kidney disease 10/17/2023    Careful hydration  Serial labs  Nephrology consult and follow up in am     Bipolar 1 disorder     CHF (congestive heart failure)     Depression     Hepatitis C     Hypertension     Other hyperlipidemia 08/15/2019       Past Surgical History:   Procedure Laterality Date    HYSTERECTOMY         Social History     Tobacco Use    Smoking status: Some Days     Current packs/day: 0.25     Average packs/day: 0.3 packs/day for 8.5 years (2.1 ttl pk-yrs)     Types: Cigarettes     Start date: 1/11/2016    Smokeless tobacco: Never    Tobacco comments:     1 pack every 3 days   Substance Use Topics    Alcohol use: Yes    Drug use: Not Currently     Types: Cocaine     Comment: states last use 10/2023       Family History   Problem Relation Name Age of Onset    Heart attack Maternal Grandmother      Hypertension Mother         Patient's Medications   New Prescriptions    No medications on file   Previous Medications    ASPIRIN (ECOTRIN) 81 MG EC TABLET    Take 1 tablet (81 mg total) by mouth once daily.    CARVEDILOL (COREG) 25 MG TABLET    Take 1 tablet (25 mg total) by mouth 2 (two) times daily.    CLONIDINE 0.3 MG/24 HR TD PTWK (CATAPRES) 0.3 MG/24 HR    Place 1 patch onto the skin every 7 days.    CLOPIDOGREL (PLAVIX) 75 MG TABLET    Take 1 tablet (75 mg total) by mouth once daily. for 20 days    COLCHICINE, GOUT, (COLCRYS) 0.6 MG TABLET    Take every 8 hours on day 1; take every 12 hours on day 2; then once per day thereafter    DIPHENHYDRAMINE (BENADRYL) 25 MG CAPSULE    Take 1 capsule (25 mg total) by mouth every 6 (six) hours as needed for Itching.    ERGOCALCIFEROL (ERGOCALCIFEROL) 50,000 UNIT CAP    Take 1 capsule (50,000 Units total) by mouth every 7 days.    FUROSEMIDE (LASIX) 40 MG TABLET    Take 1 tablet (40 mg total) by mouth once daily. Do not take if BP  is low - under 110/70 or feeling dry/dizzy/lightheaded due to dehydration    HYDRALAZINE (APRESOLINE) 100 MG TABLET    Take 1 tablet (100 mg total) by mouth every 8 (eight) hours.    HYDROCODONE-ACETAMINOPHEN (NORCO) 5-325 MG PER TABLET    Take 1 tablet by mouth every 4 (four) hours as needed.    HYDROXYZINE (ATARAX) 50 MG TABLET    Take 1 tablet (50 mg total) by mouth 2 (two) times daily as needed for Anxiety.    IRBESARTAN (AVAPRO) 150 MG TABLET    Take 2 tablets (300 mg total) by mouth every evening.    LIDOCAINE-PRILOCAINE (EMLA) CREAM    Apply topically as needed (on right wrist).    ROSUVASTATIN (CRESTOR) 40 MG TAB    Take 1 tablet (40 mg total) by mouth every evening.    TRETINOIN (RETIN-A) 0.025 % CREAM    Apply topically every evening. Pea sized amount to entire face at night. Start out using every other night for 2 weeks, then increase to every night as tolerated    ZIPRASIDONE (GEODON) 20 MG CAP    Take 1 capsule (20 mg total) by mouth 2 (two) times daily.   Modified Medications    No medications on file   Discontinued Medications    No medications on file       Review of Systems   Constitutional: Negative.    HENT: Negative.     Eyes: Negative.    Respiratory:  Positive for shortness of breath.    Cardiovascular:  Positive for palpitations. Negative for chest pain.   Gastrointestinal: Negative.    Genitourinary: Negative.    Musculoskeletal: Negative.    Skin: Negative.    Neurological:  Positive for dizziness.   Endo/Heme/Allergies: Negative.    Psychiatric/Behavioral:  The patient is not nervous/anxious.    All 12 systems otherwise negative.      Wt Readings from Last 3 Encounters:   07/18/24 71.5 kg (157 lb 10.1 oz)   05/29/24 70.6 kg (155 lb 10.3 oz)   04/18/24 70.7 kg (155 lb 13.8 oz)     Temp Readings from Last 3 Encounters:   05/29/24 98 °F (36.7 °C) (Oral)   05/16/24 98.1 °F (36.7 °C)   04/10/24 97.9 °F (36.6 °C)     BP Readings from Last 3 Encounters:   07/18/24 (!) 162/93   05/29/24 (!) 149/80  "  05/16/24 129/71     Pulse Readings from Last 3 Encounters:   07/18/24 75   05/29/24 87   05/16/24 64       BP (!) 162/93 (BP Location: Left arm, Patient Position: Sitting, BP Method: Medium (Automatic))   Pulse 75   Ht 5' 2.99" (1.6 m)   Wt 71.5 kg (157 lb 10.1 oz)   SpO2 99%   BMI 27.93 kg/m²     Objective:   Physical Exam  Vitals and nursing note reviewed.   Constitutional:       General: She is not in acute distress.     Appearance: She is well-developed. She is not diaphoretic.   HENT:      Head: Normocephalic and atraumatic.      Nose: Nose normal.   Eyes:      General: No scleral icterus.     Conjunctiva/sclera: Conjunctivae normal.   Neck:      Thyroid: No thyromegaly.      Vascular: No JVD.   Cardiovascular:      Rate and Rhythm: Normal rate and regular rhythm.      Heart sounds: S1 normal and S2 normal. No murmur heard.     No friction rub. No gallop. No S3 or S4 sounds.   Pulmonary:      Effort: Pulmonary effort is normal. No respiratory distress.      Breath sounds: Normal breath sounds. No stridor. No wheezing or rales.   Chest:      Chest wall: No tenderness.   Abdominal:      General: Bowel sounds are normal. There is no distension.      Palpations: Abdomen is soft. There is no mass.      Tenderness: There is no abdominal tenderness. There is no rebound.   Genitourinary:     Comments: Deferred  Musculoskeletal:         General: No tenderness or deformity. Normal range of motion.      Cervical back: Normal range of motion and neck supple.   Lymphadenopathy:      Cervical: No cervical adenopathy.   Skin:     General: Skin is warm and dry.      Coloration: Skin is not pale.      Findings: No erythema or rash.   Neurological:      Mental Status: She is alert and oriented to person, place, and time.      Motor: No abnormal muscle tone.      Coordination: Coordination normal.   Psychiatric:         Behavior: Behavior normal.         Thought Content: Thought content normal.         Judgment: Judgment " normal.         Lab Results   Component Value Date     05/29/2024    K 4.4 05/29/2024     05/29/2024    CO2 22 (L) 05/29/2024    BUN 64 (H) 05/29/2024    CREATININE 3.7 (H) 05/29/2024     (H) 05/29/2024    HGBA1C 5.1 07/19/2023    MG 2.1 08/05/2023    AST 15 05/29/2024    ALT 9 (L) 05/29/2024    ALBUMIN 4.0 05/29/2024    PROT 8.0 05/29/2024    BILITOT 0.3 05/29/2024    WBC 7.22 05/29/2024    HGB 12.1 05/29/2024    HCT 38.0 05/29/2024    MCV 87 05/29/2024     05/29/2024    INR 1.0 08/05/2023    TSH 0.761 08/04/2023    CHOL 155 08/04/2023    HDL 44 08/04/2023    LDLCALC 101.4 08/04/2023    TRIG 48 08/04/2023     (H) 04/04/2024     Assessment:      1. Chronic heart failure with preserved ejection fraction    2. Hyperlipidemia LDL goal <70    3. Primary hypertension    4. History of CVA (cerebrovascular accident)    5. History of TIA (transient ischemic attack)    6. Other hyperlipidemia    7. AUGUSTE (dyspnea on exertion)    8. Hyperlipidemia, unspecified hyperlipidemia type    9. Palpitations        Plan:   1. HFPEF, with occ CP/SOB; last  --> 236   - Nuclear stress 6/2023 neg for ischemia, basal, mid lateral wall scar note  - titrate meds  - cont lasix - increase to 40mg   - ECHO 8/2023 with normal bi V function and valves, grade 1 DD. Neg bubble study.     2. HTN - elevated/uncontrolled, recent HTN emergency 10/2023 and 4/2024 - elevated today    - cont meds - increased Coreg dose   - low salt diet  - chance clonidine to patch - increased to Clonidine 0.3 mg patch  - inc Hydralazine to 100mg   - if remains severely elevated needs ER eval   - add Imdur 30mg QHS     3. CKD4  - f/u with nephro/PCP  - saw Dr. Kamyar in past    4. Hep C  - cont tx per PCP    5. HLD  - cont statin - changed to pravastatin 40mg  - unsure if she was taking livalo     6. SOB ? COPD with smoking abuse  - refer to smoking cessation  - f/u pulm  - rec nicotine patch - cannot smoke while on it     7. Recurrent  H/O TIA/CVA - L sided weakness? -again 3/2023, 8/2023  - cont meds - asa and plavix; 0.5 cm right MCA bifurcation aneurysm.   - ECHO 8/2023 with normal bi V function and valves, grade 1 DD. Neg bubble study.   - Vital monitor 6/2023 with brief SVT/NSVT, rare PVCS, PACs noted; AVG HR 76 bpm.  - Carotids 6/2023 overall normal.    - f/u Neuro - has upcoming -  Angiogram changed from 05/02/24 to 05/16/24     Visit today included increased complexity associated with the care of the episodic problem dyspnea addressed and managing the longitudinal care of the patient due to the serious and/or complex managed problem(s) .      Thank you for allowing me to participate in this patient's care. Please do not hesitate to contact me with any questions or concerns. Consult note has been forwarded to the referral physician.

## 2024-07-18 ENCOUNTER — OFFICE VISIT (OUTPATIENT)
Dept: CARDIOLOGY | Facility: CLINIC | Age: 69
End: 2024-07-18
Payer: MEDICARE

## 2024-07-18 VITALS
BODY MASS INDEX: 27.93 KG/M2 | OXYGEN SATURATION: 99 % | DIASTOLIC BLOOD PRESSURE: 93 MMHG | SYSTOLIC BLOOD PRESSURE: 162 MMHG | HEIGHT: 63 IN | HEART RATE: 75 BPM | WEIGHT: 157.63 LBS

## 2024-07-18 DIAGNOSIS — R06.09 DOE (DYSPNEA ON EXERTION): ICD-10-CM

## 2024-07-18 DIAGNOSIS — I10 PRIMARY HYPERTENSION: ICD-10-CM

## 2024-07-18 DIAGNOSIS — E78.49 OTHER HYPERLIPIDEMIA: ICD-10-CM

## 2024-07-18 DIAGNOSIS — Z86.73 HISTORY OF TIA (TRANSIENT ISCHEMIC ATTACK): ICD-10-CM

## 2024-07-18 DIAGNOSIS — R00.2 PALPITATIONS: ICD-10-CM

## 2024-07-18 DIAGNOSIS — Z86.73 HISTORY OF CVA (CEREBROVASCULAR ACCIDENT): ICD-10-CM

## 2024-07-18 DIAGNOSIS — I50.32 CHRONIC HEART FAILURE WITH PRESERVED EJECTION FRACTION: Primary | ICD-10-CM

## 2024-07-18 DIAGNOSIS — E78.5 HYPERLIPIDEMIA LDL GOAL <70: ICD-10-CM

## 2024-07-18 DIAGNOSIS — E78.5 HYPERLIPIDEMIA, UNSPECIFIED HYPERLIPIDEMIA TYPE: ICD-10-CM

## 2024-07-18 PROCEDURE — G2211 COMPLEX E/M VISIT ADD ON: HCPCS | Mod: S$GLB,,, | Performed by: INTERNAL MEDICINE

## 2024-07-18 PROCEDURE — 1126F AMNT PAIN NOTED NONE PRSNT: CPT | Mod: CPTII,S$GLB,, | Performed by: INTERNAL MEDICINE

## 2024-07-18 PROCEDURE — 1160F RVW MEDS BY RX/DR IN RCRD: CPT | Mod: CPTII,S$GLB,, | Performed by: INTERNAL MEDICINE

## 2024-07-18 PROCEDURE — 4010F ACE/ARB THERAPY RXD/TAKEN: CPT | Mod: CPTII,S$GLB,, | Performed by: INTERNAL MEDICINE

## 2024-07-18 PROCEDURE — 3080F DIAST BP >= 90 MM HG: CPT | Mod: CPTII,S$GLB,, | Performed by: INTERNAL MEDICINE

## 2024-07-18 PROCEDURE — 3077F SYST BP >= 140 MM HG: CPT | Mod: CPTII,S$GLB,, | Performed by: INTERNAL MEDICINE

## 2024-07-18 PROCEDURE — 3008F BODY MASS INDEX DOCD: CPT | Mod: CPTII,S$GLB,, | Performed by: INTERNAL MEDICINE

## 2024-07-18 PROCEDURE — 3288F FALL RISK ASSESSMENT DOCD: CPT | Mod: CPTII,S$GLB,, | Performed by: INTERNAL MEDICINE

## 2024-07-18 PROCEDURE — 99214 OFFICE O/P EST MOD 30 MIN: CPT | Mod: S$GLB,,, | Performed by: INTERNAL MEDICINE

## 2024-07-18 PROCEDURE — 1101F PT FALLS ASSESS-DOCD LE1/YR: CPT | Mod: CPTII,S$GLB,, | Performed by: INTERNAL MEDICINE

## 2024-07-18 PROCEDURE — 99999 PR PBB SHADOW E&M-EST. PATIENT-LVL IV: CPT | Mod: PBBFAC,,, | Performed by: INTERNAL MEDICINE

## 2024-07-18 PROCEDURE — 1159F MED LIST DOCD IN RCRD: CPT | Mod: CPTII,S$GLB,, | Performed by: INTERNAL MEDICINE

## 2024-07-18 RX ORDER — NICOTINE 7MG/24HR
1 PATCH, TRANSDERMAL 24 HOURS TRANSDERMAL DAILY
Qty: 30 PATCH | Refills: 1 | Status: SHIPPED | OUTPATIENT
Start: 2024-07-18

## 2024-07-18 RX ORDER — ISOSORBIDE MONONITRATE 30 MG/1
30 TABLET, EXTENDED RELEASE ORAL NIGHTLY
Qty: 30 TABLET | Refills: 3 | Status: SHIPPED | OUTPATIENT
Start: 2024-07-18 | End: 2025-07-18

## 2024-08-21 ENCOUNTER — TELEPHONE (OUTPATIENT)
Dept: PSYCHIATRY | Facility: CLINIC | Age: 69
End: 2024-08-21
Payer: MEDICARE

## 2024-08-23 ENCOUNTER — OFFICE VISIT (OUTPATIENT)
Dept: PSYCHIATRY | Facility: CLINIC | Age: 69
End: 2024-08-23
Payer: COMMERCIAL

## 2024-08-23 VITALS
HEART RATE: 69 BPM | WEIGHT: 164 LBS | SYSTOLIC BLOOD PRESSURE: 173 MMHG | DIASTOLIC BLOOD PRESSURE: 103 MMHG | BODY MASS INDEX: 29.06 KG/M2

## 2024-08-23 DIAGNOSIS — F31.75 BIPOLAR 1 DISORDER, DEPRESSED, PARTIAL REMISSION: Primary | ICD-10-CM

## 2024-08-23 DIAGNOSIS — F19.90 SUBSTANCE USE DISORDER: ICD-10-CM

## 2024-08-23 PROCEDURE — 4010F ACE/ARB THERAPY RXD/TAKEN: CPT | Mod: CPTII,S$GLB,, | Performed by: PSYCHIATRY & NEUROLOGY

## 2024-08-23 PROCEDURE — 99999 PR PBB SHADOW E&M-EST. PATIENT-LVL II: CPT | Mod: PBBFAC,,, | Performed by: PSYCHIATRY & NEUROLOGY

## 2024-08-23 PROCEDURE — 99214 OFFICE O/P EST MOD 30 MIN: CPT | Mod: S$GLB,,, | Performed by: PSYCHIATRY & NEUROLOGY

## 2024-08-23 PROCEDURE — 3008F BODY MASS INDEX DOCD: CPT | Mod: CPTII,S$GLB,, | Performed by: PSYCHIATRY & NEUROLOGY

## 2024-08-23 PROCEDURE — 3080F DIAST BP >= 90 MM HG: CPT | Mod: CPTII,S$GLB,, | Performed by: PSYCHIATRY & NEUROLOGY

## 2024-08-23 PROCEDURE — 3077F SYST BP >= 140 MM HG: CPT | Mod: CPTII,S$GLB,, | Performed by: PSYCHIATRY & NEUROLOGY
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
## 2024-08-24 RX ORDER — ZIPRASIDONE HYDROCHLORIDE 20 MG/1
20 CAPSULE ORAL 2 TIMES DAILY
Qty: 180 CAPSULE | Refills: 1 | Status: SHIPPED | OUTPATIENT
Start: 2024-08-24 | End: 2025-02-20

## 2024-09-04 DIAGNOSIS — Z86.73 HISTORY OF CVA (CEREBROVASCULAR ACCIDENT): Primary | ICD-10-CM

## 2024-09-04 RX ORDER — ASPIRIN 81 MG/1
81 TABLET ORAL DAILY
Qty: 90 TABLET | Refills: 3 | Status: SHIPPED | OUTPATIENT
Start: 2024-09-04

## 2024-09-18 ENCOUNTER — PATIENT MESSAGE (OUTPATIENT)
Dept: NEUROSURGERY | Facility: CLINIC | Age: 69
End: 2024-09-18
Payer: MEDICARE

## 2024-09-18 ENCOUNTER — TELEPHONE (OUTPATIENT)
Dept: NEUROSURGERY | Facility: CLINIC | Age: 69
End: 2024-09-18
Payer: MEDICARE

## 2024-09-18 DIAGNOSIS — I67.1 CEREBRAL ANEURYSM, NONRUPTURED: Primary | ICD-10-CM

## 2024-09-19 ENCOUNTER — LAB VISIT (OUTPATIENT)
Dept: LAB | Facility: HOSPITAL | Age: 69
End: 2024-09-19
Attending: NEUROLOGICAL SURGERY
Payer: MEDICARE

## 2024-09-19 DIAGNOSIS — I67.1 CEREBRAL ANEURYSM, NONRUPTURED: ICD-10-CM

## 2024-09-19 LAB
ANION GAP SERPL CALC-SCNC: 11 MMOL/L (ref 8–16)
BUN SERPL-MCNC: 58 MG/DL (ref 8–23)
CALCIUM SERPL-MCNC: 9.4 MG/DL (ref 8.7–10.5)
CHLORIDE SERPL-SCNC: 106 MMOL/L (ref 95–110)
CO2 SERPL-SCNC: 24 MMOL/L (ref 23–29)
CREAT SERPL-MCNC: 3.2 MG/DL (ref 0.5–1.4)
EST. GFR  (NO RACE VARIABLE): 14.9 ML/MIN/1.73 M^2
GLUCOSE SERPL-MCNC: 119 MG/DL (ref 70–110)
POTASSIUM SERPL-SCNC: 4.3 MMOL/L (ref 3.5–5.1)
SODIUM SERPL-SCNC: 141 MMOL/L (ref 136–145)

## 2024-09-19 PROCEDURE — 80048 BASIC METABOLIC PNL TOTAL CA: CPT | Performed by: NEUROLOGICAL SURGERY

## 2024-09-19 PROCEDURE — 36415 COLL VENOUS BLD VENIPUNCTURE: CPT | Performed by: NEUROLOGICAL SURGERY

## 2024-09-23 ENCOUNTER — PATIENT MESSAGE (OUTPATIENT)
Dept: NEUROSURGERY | Facility: CLINIC | Age: 69
End: 2024-09-23
Payer: MEDICARE

## 2024-09-23 ENCOUNTER — TELEPHONE (OUTPATIENT)
Dept: FAMILY MEDICINE | Facility: CLINIC | Age: 69
End: 2024-09-23
Payer: MEDICARE

## 2024-09-23 VITALS — DIASTOLIC BLOOD PRESSURE: 90 MMHG | SYSTOLIC BLOOD PRESSURE: 148 MMHG

## 2024-09-26 ENCOUNTER — OFFICE VISIT (OUTPATIENT)
Dept: CARDIOLOGY | Facility: CLINIC | Age: 69
End: 2024-09-26
Payer: MEDICARE

## 2024-09-26 VITALS
OXYGEN SATURATION: 97 % | WEIGHT: 168.44 LBS | DIASTOLIC BLOOD PRESSURE: 90 MMHG | SYSTOLIC BLOOD PRESSURE: 142 MMHG | HEART RATE: 70 BPM | BODY MASS INDEX: 29.85 KG/M2

## 2024-09-26 DIAGNOSIS — I10 CHRONIC HYPERTENSION: ICD-10-CM

## 2024-09-26 DIAGNOSIS — I67.1 BRAIN ANEURYSM: ICD-10-CM

## 2024-09-26 DIAGNOSIS — R06.09 DOE (DYSPNEA ON EXERTION): ICD-10-CM

## 2024-09-26 DIAGNOSIS — I10 PRIMARY HYPERTENSION: ICD-10-CM

## 2024-09-26 DIAGNOSIS — Z86.73 HISTORY OF TIA (TRANSIENT ISCHEMIC ATTACK): ICD-10-CM

## 2024-09-26 DIAGNOSIS — E78.5 HYPERLIPIDEMIA LDL GOAL <70: ICD-10-CM

## 2024-09-26 DIAGNOSIS — I63.9 CEREBROVASCULAR ACCIDENT (CVA), UNSPECIFIED MECHANISM: ICD-10-CM

## 2024-09-26 DIAGNOSIS — I50.32 CHRONIC HEART FAILURE WITH PRESERVED EJECTION FRACTION: ICD-10-CM

## 2024-09-26 DIAGNOSIS — E78.49 OTHER HYPERLIPIDEMIA: ICD-10-CM

## 2024-09-26 DIAGNOSIS — E66.9 OBESITY (BMI 30-39.9): ICD-10-CM

## 2024-09-26 DIAGNOSIS — Z86.73 HISTORY OF CVA (CEREBROVASCULAR ACCIDENT): ICD-10-CM

## 2024-09-26 DIAGNOSIS — E78.5 HYPERLIPIDEMIA, UNSPECIFIED HYPERLIPIDEMIA TYPE: ICD-10-CM

## 2024-09-26 DIAGNOSIS — I25.10 ASCVD (ARTERIOSCLEROTIC CARDIOVASCULAR DISEASE): Primary | ICD-10-CM

## 2024-09-26 DIAGNOSIS — N18.4 STAGE 4 CHRONIC KIDNEY DISEASE: ICD-10-CM

## 2024-09-26 DIAGNOSIS — B18.2 CHRONIC HEPATITIS C WITHOUT HEPATIC COMA: ICD-10-CM

## 2024-09-26 PROCEDURE — 99999 PR PBB SHADOW E&M-EST. PATIENT-LVL III: CPT | Mod: PBBFAC,,, | Performed by: INTERNAL MEDICINE

## 2024-09-26 RX ORDER — HYDRALAZINE HYDROCHLORIDE 100 MG/1
100 TABLET, FILM COATED ORAL EVERY 8 HOURS
Qty: 180 TABLET | Refills: 1 | OUTPATIENT
Start: 2024-09-26

## 2024-09-26 RX ORDER — CLOPIDOGREL BISULFATE 75 MG/1
75 TABLET ORAL DAILY
Qty: 90 TABLET | Refills: 1 | Status: SHIPPED | OUTPATIENT
Start: 2024-09-26

## 2024-09-26 RX ORDER — CLONIDINE 0.3 MG/24H
1 PATCH, EXTENDED RELEASE TRANSDERMAL
Qty: 4 PATCH | Refills: 11 | Status: SHIPPED | OUTPATIENT
Start: 2024-09-26 | End: 2025-09-26

## 2024-09-26 RX ORDER — IRBESARTAN 300 MG/1
300 TABLET ORAL NIGHTLY
Qty: 90 TABLET | Refills: 1 | Status: SHIPPED | OUTPATIENT
Start: 2024-09-26 | End: 2024-09-26 | Stop reason: SDUPTHER

## 2024-09-26 RX ORDER — CARVEDILOL 25 MG/1
25 TABLET ORAL 2 TIMES DAILY
Qty: 180 TABLET | Refills: 1 | Status: SHIPPED | OUTPATIENT
Start: 2024-09-26

## 2024-09-26 RX ORDER — IRBESARTAN 300 MG/1
300 TABLET ORAL NIGHTLY
Qty: 90 TABLET | Refills: 1 | Status: SHIPPED | OUTPATIENT
Start: 2024-09-26 | End: 2025-09-26

## 2024-09-26 RX ORDER — IRBESARTAN 150 MG/1
300 TABLET ORAL NIGHTLY
Qty: 180 TABLET | Refills: 3 | OUTPATIENT
Start: 2024-09-26

## 2024-09-26 RX ORDER — ASPIRIN 81 MG/1
81 TABLET ORAL DAILY
Qty: 90 TABLET | Refills: 3 | Status: SHIPPED | OUTPATIENT
Start: 2024-09-26

## 2024-09-26 RX ORDER — NICOTINE 7MG/24HR
1 PATCH, TRANSDERMAL 24 HOURS TRANSDERMAL DAILY
Qty: 30 PATCH | Refills: 1 | Status: SHIPPED | OUTPATIENT
Start: 2024-09-26

## 2024-09-26 RX ORDER — SEMAGLUTIDE 0.25 MG/.5ML
0.25 INJECTION, SOLUTION SUBCUTANEOUS WEEKLY
Qty: 3 ML | Refills: 1 | Status: SHIPPED | OUTPATIENT
Start: 2024-09-26

## 2024-09-26 RX ORDER — ROSUVASTATIN CALCIUM 40 MG/1
40 TABLET, COATED ORAL NIGHTLY
Qty: 90 TABLET | Refills: 1 | Status: SHIPPED | OUTPATIENT
Start: 2024-09-26 | End: 2025-09-26

## 2024-09-26 RX ORDER — IRBESARTAN 150 MG/1
300 TABLET ORAL NIGHTLY
Qty: 180 TABLET | Refills: 3 | Status: SHIPPED | OUTPATIENT
Start: 2024-09-26 | End: 2024-09-26 | Stop reason: SDUPTHER

## 2024-09-26 RX ORDER — HYDRALAZINE HYDROCHLORIDE 100 MG/1
100 TABLET, FILM COATED ORAL EVERY 8 HOURS
Qty: 180 TABLET | Refills: 1 | Status: SHIPPED | OUTPATIENT
Start: 2024-09-26

## 2024-09-26 RX ORDER — ISOSORBIDE MONONITRATE 60 MG/1
60 TABLET, EXTENDED RELEASE ORAL NIGHTLY
Qty: 30 TABLET | Refills: 3 | Status: SHIPPED | OUTPATIENT
Start: 2024-09-26 | End: 2025-09-26

## 2024-09-26 NOTE — PROGRESS NOTES
Subjective:   Patient ID:  Rose Milligan is a 69 y.o. female who presents for cardiac consult of No chief complaint on file.      The patient came in today for cardiac consult of No chief complaint on file.      Rose Milligan is a 69 y.o. female  with HFpEF, HTN, pulm HTN, h/o TIA/CVA, tobacco abuse, h/o drug abuse, bipolar, hep C, CKD4, obesity presents for follow up CV eval.      1/16/24  BP elevated 214/116, she needs meds refills has more stress this AM due to walking and needing to de-ice the car.   She has more SOB today. She had more salty food lately.   BP has improved to 170s/90s with CLonidine 0.2 mg x2 in office now.     4/18/24  BP and HR well controlled. BMI 27 - 155 lbs       ED Vital Signs:         Vitals:     04/04/24 0057 04/04/24 0334 04/04/24 0438 04/04/24 0503   BP: (!) 194/95 (!) 193/103 (!) 207/99 (!) 176/86   Pulse: 81 71   61   Resp: 20         Temp: 97.8 °F (36.6 °C)         TempSrc: Oral         SpO2: 98% 100%   99%     04/04/24 0633 04/04/24 0714 04/04/24 0733 04/04/24 0802   BP: (!) 193/102 (!) 166/98 (!) 194/90 (!) 167/87   Pulse: 64 66 61 64   Resp:           Temp:           TempSrc:           SpO2: 98%   98% 97%         Recent ER eval for abd pain/ stress.     Improved BP now  She will have  Angiogram changed from 05/02/24 to 05/16/24 7/18/24  BP elevated 162/93. HR 75   She has been smoking.   Dr. Patrick  9/24/24 at 3:00 pm. You will need an MRA Vessel Wall Imaging done prior to this appointment so I have it scheduled at [facility] on 9/24/24 at 1:30 pm.   Has been eating more salty food at times.     9/26/24  BP elevated 140/92. BMI 30 - 168 lbs  She has stopped smoking and has gained more weight lately.     Benefits of Glp1 agonist like medication prescribed reviewed with patient including improvement of insulin resistance which is the underlying hormonal dysfunction causing and leading to diabetes. This improves hormonal balance and can lower blood sugars if patient  sugars are elevated. The patient was explained how the medicine works. I also reviewed side effects of medication including loss of appetite , heartburn/GERD, constipation, diarrhea, nausea (usually low blood sugar if before or between meals ), vomiting bloating , headache and risk of cancer if patient has personal or family history of MEN syndrome.       Results for orders placed during the hospital encounter of 08/04/23    Echo    Interpretation Summary    Left Ventricle: The left ventricle is normal in size. Moderately increased wall thickness. There is moderate concentrict hypertrophy. Normal wall motion. There is normal systolic function with a visually estimated ejection fraction of 55 - 70%. Grade I diastolic dysfunction.    Left Atrium: Left atrium is mildly dilated.    Right Ventricle: Normal right ventricular cavity size. Wall thickness is normal. Right ventricle wall motion  is normal. Systolic function is normal.    Mitral Valve: There is mild regurgitation.    Tricuspid Valve: There is mild regurgitation.    IVC/SVC: Intermediate venous pressure at 8 mmHg.    Bubble study negative          Conclusion 6/2023     There is 0-19% right Internal Carotid Stenosis.  There is 0-19% left Internal Carotid Stenosis.    Conclusion 6/2023    The patient was monitored for a total of 12d 16h, underlying rhythm is Sinus.  The minimum heart rate was 55 bpm; the maximum 116 bpm; the average 76 bpm.  0 % of Atrial fibrillation/Atrial flutter with longest episode of 0 ms.  -- AV block with 0 %  There were 0 pauses, the longest pause was 0 ms at --.  7 episodes of VT were found with Longest VT at 10 beats .  21 supraventricular episodes were found. Longest SVT Episode 15 beats, Fastest  bpm  There were a total of 1346 PVCs with 2 morphologies and 7 couplets. Overall PVC [place] at 0.1 %  There were a total of 1132 PSVCs with 1 morphologies and 0 couplets. Overall PSVC [place] at 0.08 %  There is a total of 1 patient  events    Results for orders placed during the hospital encounter of 06/06/23    Nuclear Stress - Cardiology Interpreted    Interpretation Summary    Abnormal myocardial perfusion scan.    There is a moderate to severe intensity, moderate sized, fixed perfusion abnormality consistent with scar in the basal to mid lateral wall(s).    There are no other significant perfusion abnormalities.    The gated perfusion images showed an ejection fraction of 50% at rest. The gated perfusion images showed an ejection fraction of 53% post stress.    The ECG portion of the study is uninterpretable due to abnormal baseline.    The patient reported no chest pain during the stress test.    There were no arrhythmias during stress.          Past Medical History:   Diagnosis Date    Acute CVA (cerebrovascular accident) 08/04/2023    Acute renal failure superimposed on stage 4 chronic kidney disease 10/17/2023    Careful hydration  Serial labs  Nephrology consult and follow up in am     Bipolar 1 disorder     CHF (congestive heart failure)     Depression     Hepatitis C     Hypertension     Other hyperlipidemia 08/15/2019       Past Surgical History:   Procedure Laterality Date    HYSTERECTOMY         Social History     Tobacco Use    Smoking status: Some Days     Current packs/day: 0.25     Average packs/day: 0.3 packs/day for 8.7 years (2.2 ttl pk-yrs)     Types: Cigarettes     Start date: 1/11/2016    Smokeless tobacco: Never    Tobacco comments:     1 pack every 3 days   Substance Use Topics    Alcohol use: Yes    Drug use: Not Currently     Types: Cocaine     Comment: states last use 10/2023       Family History   Problem Relation Name Age of Onset    Heart attack Maternal Grandmother      Hypertension Mother         Patient's Medications   New Prescriptions    SEMAGLUTIDE, WEIGHT LOSS, (WEGOVY) 0.25 MG/0.5 ML PNIJ    Inject 0.25 mg into the skin once a week. Call office to increase dose in 6 weeks   Previous Medications     COLCHICINE, GOUT, (COLCRYS) 0.6 MG TABLET    Take every 8 hours on day 1; take every 12 hours on day 2; then once per day thereafter    DIPHENHYDRAMINE (BENADRYL) 25 MG CAPSULE    Take 1 capsule (25 mg total) by mouth every 6 (six) hours as needed for Itching.    ERGOCALCIFEROL (ERGOCALCIFEROL) 50,000 UNIT CAP    Take 1 capsule (50,000 Units total) by mouth every 7 days.    FUROSEMIDE (LASIX) 40 MG TABLET    Take 1 tablet (40 mg total) by mouth once daily. Do not take if BP is low - under 110/70 or feeling dry/dizzy/lightheaded due to dehydration    HYDROCODONE-ACETAMINOPHEN (NORCO) 5-325 MG PER TABLET    Take 1 tablet by mouth every 4 (four) hours as needed.    HYDROXYZINE (ATARAX) 50 MG TABLET    Take 1 tablet (50 mg total) by mouth 2 (two) times daily as needed for Anxiety.    LIDOCAINE-PRILOCAINE (EMLA) CREAM    Apply topically as needed (on right wrist).    TRETINOIN (RETIN-A) 0.025 % CREAM    Apply topically every evening. Pea sized amount to entire face at night. Start out using every other night for 2 weeks, then increase to every night as tolerated    ZIPRASIDONE (GEODON) 20 MG CAP    Take 1 capsule (20 mg total) by mouth 2 (two) times daily.   Modified Medications    Modified Medication Previous Medication    ASPIRIN (ECOTRIN) 81 MG EC TABLET aspirin (ECOTRIN) 81 MG EC tablet       Take 1 tablet (81 mg total) by mouth once daily.    Take 1 tablet (81 mg total) by mouth once daily.    CARVEDILOL (COREG) 25 MG TABLET carvediloL (COREG) 25 MG tablet       Take 1 tablet (25 mg total) by mouth 2 (two) times daily.    Take 1 tablet (25 mg total) by mouth 2 (two) times daily.    CLONIDINE 0.3 MG/24 HR TD PTWK (CATAPRES) 0.3 MG/24 HR cloNIDine 0.3 mg/24 hr td ptwk (CATAPRES) 0.3 mg/24 hr       Place 1 patch onto the skin every 7 days.    Place 1 patch onto the skin every 7 days.    CLOPIDOGREL (PLAVIX) 75 MG TABLET clopidogreL (PLAVIX) 75 mg tablet       Take 1 tablet (75 mg total) by mouth once daily.    Take 1  tablet (75 mg total) by mouth once daily. for 20 days    HYDRALAZINE (APRESOLINE) 100 MG TABLET hydrALAZINE (APRESOLINE) 100 MG tablet       Take 1 tablet (100 mg total) by mouth every 8 (eight) hours.    Take 1 tablet (100 mg total) by mouth every 8 (eight) hours.    IRBESARTAN (AVAPRO) 150 MG TABLET irbesartan (AVAPRO) 150 MG tablet       Take 2 tablets (300 mg total) by mouth every evening.    Take 2 tablets (300 mg total) by mouth every evening.    ISOSORBIDE MONONITRATE (IMDUR) 60 MG 24 HR TABLET isosorbide mononitrate (IMDUR) 30 MG 24 hr tablet       Take 1 tablet (60 mg total) by mouth every evening.    Take 1 tablet (30 mg total) by mouth every evening.    NICOTINE (NICODERM CQ) 7 MG/24 HR nicotine (NICODERM CQ) 7 mg/24 hr       Place 1 patch onto the skin once daily.    Place 1 patch onto the skin once daily.    ROSUVASTATIN (CRESTOR) 40 MG TAB rosuvastatin (CRESTOR) 40 MG Tab       Take 1 tablet (40 mg total) by mouth every evening.    Take 1 tablet (40 mg total) by mouth every evening.   Discontinued Medications    No medications on file       Review of Systems   Constitutional: Negative.    HENT: Negative.     Eyes: Negative.    Respiratory:  Positive for shortness of breath.    Cardiovascular:  Positive for palpitations. Negative for chest pain.   Gastrointestinal: Negative.    Genitourinary: Negative.    Musculoskeletal: Negative.    Skin: Negative.    Neurological:  Positive for dizziness.   Endo/Heme/Allergies: Negative.    Psychiatric/Behavioral:  The patient is not nervous/anxious.    All 12 systems otherwise negative.      Wt Readings from Last 3 Encounters:   09/26/24 76.4 kg (168 lb 6.9 oz)   07/18/24 71.5 kg (157 lb 10.1 oz)   05/29/24 70.6 kg (155 lb 10.3 oz)     Temp Readings from Last 3 Encounters:   05/29/24 98 °F (36.7 °C) (Oral)   05/16/24 98.1 °F (36.7 °C)   04/10/24 97.9 °F (36.6 °C)     BP Readings from Last 3 Encounters:   09/26/24 (!) 142/90   09/23/24 (!) 148/90   07/18/24 (!)  162/93     Pulse Readings from Last 3 Encounters:   09/26/24 70   07/18/24 75   05/29/24 87       BP (!) 142/90 (BP Location: Left arm, Patient Position: Sitting)   Pulse 70   Wt 76.4 kg (168 lb 6.9 oz)   SpO2 97%   BMI 29.85 kg/m²     Objective:   Physical Exam  Vitals and nursing note reviewed.   Constitutional:       General: She is not in acute distress.     Appearance: She is well-developed. She is not diaphoretic.   HENT:      Head: Normocephalic and atraumatic.      Nose: Nose normal.   Eyes:      General: No scleral icterus.     Conjunctiva/sclera: Conjunctivae normal.   Neck:      Thyroid: No thyromegaly.      Vascular: No JVD.   Cardiovascular:      Rate and Rhythm: Normal rate and regular rhythm.      Heart sounds: S1 normal and S2 normal. No murmur heard.     No friction rub. No gallop. No S3 or S4 sounds.   Pulmonary:      Effort: Pulmonary effort is normal. No respiratory distress.      Breath sounds: Normal breath sounds. No stridor. No wheezing or rales.   Chest:      Chest wall: No tenderness.   Abdominal:      General: Bowel sounds are normal. There is no distension.      Palpations: Abdomen is soft. There is no mass.      Tenderness: There is no abdominal tenderness. There is no rebound.   Genitourinary:     Comments: Deferred  Musculoskeletal:         General: No tenderness or deformity. Normal range of motion.      Cervical back: Normal range of motion and neck supple.   Lymphadenopathy:      Cervical: No cervical adenopathy.   Skin:     General: Skin is warm and dry.      Coloration: Skin is not pale.      Findings: No erythema or rash.   Neurological:      Mental Status: She is alert and oriented to person, place, and time.      Motor: No abnormal muscle tone.      Coordination: Coordination normal.   Psychiatric:         Behavior: Behavior normal.         Thought Content: Thought content normal.         Judgment: Judgment normal.         Lab Results   Component Value Date      09/19/2024    K 4.3 09/19/2024     09/19/2024    CO2 24 09/19/2024    BUN 58 (H) 09/19/2024    CREATININE 3.2 (H) 09/19/2024     (H) 09/19/2024    HGBA1C 5.1 07/19/2023    MG 2.1 08/05/2023    AST 15 05/29/2024    ALT 9 (L) 05/29/2024    ALBUMIN 4.0 05/29/2024    PROT 8.0 05/29/2024    BILITOT 0.3 05/29/2024    WBC 7.22 05/29/2024    HGB 12.1 05/29/2024    HCT 38.0 05/29/2024    MCV 87 05/29/2024     05/29/2024    INR 1.0 08/05/2023    TSH 0.761 08/04/2023    CHOL 155 08/04/2023    HDL 44 08/04/2023    LDLCALC 101.4 08/04/2023    TRIG 48 08/04/2023     (H) 04/04/2024     Assessment:      1. ASCVD (arteriosclerotic cardiovascular disease)    2. History of CVA (cerebrovascular accident)    3. History of TIA (transient ischemic attack)    4. Obesity (BMI 30-39.9)    5. Hyperlipidemia LDL goal <70    6. Primary hypertension    7. Chronic heart failure with preserved ejection fraction    8. AUGUSTE (dyspnea on exertion)    9. Hyperlipidemia, unspecified hyperlipidemia type    10. Other hyperlipidemia    11. Chronic hypertension    12. Stage 4 chronic kidney disease    13. Chronic hepatitis C without hepatic coma    14. Cerebrovascular accident (CVA), unspecified mechanism    15. Brain aneurysm          Plan:   1. HFPEF, with occ CP/SOB; last  --> 236   - Nuclear stress 6/2023 neg for ischemia, basal, mid lateral wall scar note  - titrate meds  - cont lasix - increase to 40mg   - ECHO 8/2023 with normal bi V function and valves, grade 1 DD. Neg bubble study.     2. HTN - elevated/uncontrolled, recent HTN emergency 10/2023 and 4/2024 - elevated today    - cont meds - increased Coreg dose   - low salt diet  - chance clonidine to patch - increased to Clonidine 0.3 mg patch  - inc Hydralazine to 100mg   - if remains severely elevated needs ER eval   - cont Imdur 30mg QHS --increase to 60mg     3. CKD4  - f/u with nephro/PCP  - saw Dr. Sexton in past    4. Hep C  - cont tx per PCP    5. HLD  -  cont statin - changed to pravastatin 40mg  - unsure if she was taking livalo     6. SOB ? COPD with smoking abuse - stopped smoking!  - refer to smoking cessation  - f/u pulm  - rec nicotine patch - cannot smoke while on it     7. Recurrent H/O TIA/CVA - L sided weakness? -again 3/2023, 8/2023  - cont meds - asa and plavix; 0.5 cm right MCA bifurcation aneurysm.   - ECHO 8/2023 with normal bi V function and valves, grade 1 DD. Neg bubble study.   - Vital monitor 6/2023 with brief SVT/NSVT, rare PVCS, PACs noted; AVG HR 76 bpm.  - Carotids 6/2023 overall normal.    - f/u Neuro - needs angio    8. Obesity - BMI 30  - start Wegovy 0.25    Visit today included increased complexity associated with the care of the episodic problem dyspnea addressed and managing the longitudinal care of the patient due to the serious and/or complex managed problem(s) .      Thank you for allowing me to participate in this patient's care. Please do not hesitate to contact me with any questions or concerns. Consult note has been forwarded to the referral physician.

## 2024-09-26 NOTE — TELEPHONE ENCOUNTER
Notified pt that wegovy takes a couple of weeks to go through before ready at pharmacy and needs refill on irbesartan  Refill sent to DR montgomery for approval      ----- Message from Belkis Moe sent at 9/26/2024 10:50 AM CDT -----  Contact: [phone]  [email or web address]     Patient is calling for Medical Advice regarding:just left     How long has patient had these symptoms:prescription that was done today     Pharmacy name and phone#:  [id number] - [place] - [address] Located within [facility]  [address] [address]  Phone: [phone] Fax: [phone]       Patient wants a call back or thru [employer]:call back     Comments:  She states she is needing the dr to put in a prescription that is covered under her insurance please advise   Please advise patient replies from provider may take up to 48 hours.

## 2024-09-29 ENCOUNTER — HOSPITAL ENCOUNTER (OUTPATIENT)
Dept: RADIOLOGY | Facility: HOSPITAL | Age: 69
Discharge: HOME | End: 2024-09-29
Attending: NEUROLOGICAL SURGERY
Payer: MEDICARE

## 2024-09-29 DIAGNOSIS — I67.1 CEREBRAL ANEURYSM, NONRUPTURED: ICD-10-CM

## 2024-09-29 PROCEDURE — 70544 MR ANGIOGRAPHY HEAD W/O DYE: CPT | Mod: TC

## 2024-09-29 PROCEDURE — 70544 MR ANGIOGRAPHY HEAD W/O DYE: CPT | Mod: 26,,, | Performed by: RADIOLOGY

## 2024-10-07 ENCOUNTER — TELEPHONE (OUTPATIENT)
Dept: CARDIOLOGY | Facility: CLINIC | Age: 69
End: 2024-10-07
Payer: MEDICARE

## 2024-10-07 NOTE — TELEPHONE ENCOUNTER
Your prior authorization for Wegovy has been approved!  More InfoPersonalized support and financial assistance may be available through the 's [employer] program. For more information, and to see program requirements, click on the More Info button to the right.  Message from plan: Request Reference Number: [id number]. WEGOVY INJ 0.25MG is approved through 12/31/2025. Your patient may now fill this prescription and it will be covered.. Authorization Expiration Date: December 31, 2025.

## 2024-10-21 DIAGNOSIS — I50.32 CHRONIC HEART FAILURE WITH PRESERVED EJECTION FRACTION: ICD-10-CM

## 2024-10-21 DIAGNOSIS — I25.10 ASCVD (ARTERIOSCLEROTIC CARDIOVASCULAR DISEASE): Primary | ICD-10-CM

## 2024-11-05 ENCOUNTER — TELEPHONE (OUTPATIENT)
Dept: NEUROSURGERY | Facility: CLINIC | Age: 69
End: 2024-11-05
Payer: MEDICARE

## 2024-11-14 ENCOUNTER — OFFICE VISIT (OUTPATIENT)
Dept: CARDIOLOGY | Facility: CLINIC | Age: 69
End: 2024-11-14
Payer: MEDICARE

## 2024-11-14 ENCOUNTER — HOSPITAL ENCOUNTER (OUTPATIENT)
Dept: CARDIOLOGY | Facility: HOSPITAL | Age: 69
Discharge: HOME | End: 2024-11-14
Attending: INTERNAL MEDICINE
Payer: MEDICARE

## 2024-11-14 VITALS
HEART RATE: 80 BPM | HEIGHT: 62 IN | BODY MASS INDEX: 31.2 KG/M2 | DIASTOLIC BLOOD PRESSURE: 100 MMHG | OXYGEN SATURATION: 98 % | SYSTOLIC BLOOD PRESSURE: 170 MMHG | WEIGHT: 169.56 LBS

## 2024-11-14 DIAGNOSIS — N18.4 CKD (CHRONIC KIDNEY DISEASE) STAGE 4, GFR 15-29 ML/MIN: ICD-10-CM

## 2024-11-14 DIAGNOSIS — I10 PRIMARY HYPERTENSION: ICD-10-CM

## 2024-11-14 DIAGNOSIS — I16.0 HYPERTENSIVE URGENCY: ICD-10-CM

## 2024-11-14 DIAGNOSIS — I50.32 CHRONIC HEART FAILURE WITH PRESERVED EJECTION FRACTION: ICD-10-CM

## 2024-11-14 DIAGNOSIS — E78.5 HYPERLIPIDEMIA LDL GOAL <70: ICD-10-CM

## 2024-11-14 DIAGNOSIS — E78.49 OTHER HYPERLIPIDEMIA: ICD-10-CM

## 2024-11-14 DIAGNOSIS — Z86.73 HISTORY OF CVA (CEREBROVASCULAR ACCIDENT): ICD-10-CM

## 2024-11-14 DIAGNOSIS — I25.10 ASCVD (ARTERIOSCLEROTIC CARDIOVASCULAR DISEASE): Primary | ICD-10-CM

## 2024-11-14 DIAGNOSIS — E78.5 HYPERLIPIDEMIA, UNSPECIFIED HYPERLIPIDEMIA TYPE: ICD-10-CM

## 2024-11-14 DIAGNOSIS — I10 MALIGNANT HYPERTENSION: ICD-10-CM

## 2024-11-14 DIAGNOSIS — E66.9 OBESITY (BMI 30-39.9): ICD-10-CM

## 2024-11-14 DIAGNOSIS — Z86.73 HISTORY OF TIA (TRANSIENT ISCHEMIC ATTACK): ICD-10-CM

## 2024-11-14 DIAGNOSIS — N18.4 STAGE 4 CHRONIC KIDNEY DISEASE: ICD-10-CM

## 2024-11-14 DIAGNOSIS — I10 CHRONIC HYPERTENSION: ICD-10-CM

## 2024-11-14 DIAGNOSIS — I25.10 ASCVD (ARTERIOSCLEROTIC CARDIOVASCULAR DISEASE): ICD-10-CM

## 2024-11-14 DIAGNOSIS — R06.09 DOE (DYSPNEA ON EXERTION): ICD-10-CM

## 2024-11-14 DIAGNOSIS — R00.2 PALPITATIONS: ICD-10-CM

## 2024-11-14 PROCEDURE — 3077F SYST BP >= 140 MM HG: CPT | Mod: CPTII,S$GLB,, | Performed by: INTERNAL MEDICINE

## 2024-11-14 PROCEDURE — 1160F RVW MEDS BY RX/DR IN RCRD: CPT | Mod: CPTII,S$GLB,, | Performed by: INTERNAL MEDICINE

## 2024-11-14 PROCEDURE — 4010F ACE/ARB THERAPY RXD/TAKEN: CPT | Mod: CPTII,S$GLB,, | Performed by: INTERNAL MEDICINE

## 2024-11-14 PROCEDURE — 99214 OFFICE O/P EST MOD 30 MIN: CPT | Mod: S$GLB,,, | Performed by: INTERNAL MEDICINE

## 2024-11-14 PROCEDURE — 99999 PR PBB SHADOW E&M-EST. PATIENT-LVL III: CPT | Mod: PBBFAC,,, | Performed by: INTERNAL MEDICINE

## 2024-11-14 PROCEDURE — 1159F MED LIST DOCD IN RCRD: CPT | Mod: CPTII,S$GLB,, | Performed by: INTERNAL MEDICINE

## 2024-11-14 PROCEDURE — 1101F PT FALLS ASSESS-DOCD LE1/YR: CPT | Mod: CPTII,S$GLB,, | Performed by: INTERNAL MEDICINE

## 2024-11-14 PROCEDURE — G2211 COMPLEX E/M VISIT ADD ON: HCPCS | Mod: S$GLB,,, | Performed by: INTERNAL MEDICINE

## 2024-11-14 PROCEDURE — 1126F AMNT PAIN NOTED NONE PRSNT: CPT | Mod: CPTII,S$GLB,, | Performed by: INTERNAL MEDICINE

## 2024-11-14 PROCEDURE — 3288F FALL RISK ASSESSMENT DOCD: CPT | Mod: CPTII,S$GLB,, | Performed by: INTERNAL MEDICINE

## 2024-11-14 PROCEDURE — 93010 ELECTROCARDIOGRAM REPORT: CPT | Mod: ,,, | Performed by: INTERNAL MEDICINE

## 2024-11-14 PROCEDURE — 3008F BODY MASS INDEX DOCD: CPT | Mod: CPTII,S$GLB,, | Performed by: INTERNAL MEDICINE

## 2024-11-14 PROCEDURE — 93005 ELECTROCARDIOGRAM TRACING: CPT

## 2024-11-14 PROCEDURE — 3080F DIAST BP >= 90 MM HG: CPT | Mod: CPTII,S$GLB,, | Performed by: INTERNAL MEDICINE

## 2024-11-14 RX ORDER — SEMAGLUTIDE 0.25 MG/.5ML
0.25 INJECTION, SOLUTION SUBCUTANEOUS WEEKLY
Qty: 3 ML | Refills: 1 | Status: SHIPPED | OUTPATIENT
Start: 2024-11-14

## 2024-11-14 RX ORDER — ISOSORBIDE MONONITRATE 120 MG/1
120 TABLET, EXTENDED RELEASE ORAL NIGHTLY
Qty: 90 TABLET | Refills: 1 | Status: SHIPPED | OUTPATIENT
Start: 2024-11-14 | End: 2025-11-14

## 2024-11-14 NOTE — PROGRESS NOTES
Subjective:   Patient ID:  Rose Milligan is a 69 y.o. female who presents for cardiac consult of Follow-up      The patient came in today for cardiac consult of Follow-up      Rose Milligan is a 69 y.o. female  with HFpEF, HTN, pulm HTN, h/o TIA/CVA, tobacco abuse, h/o drug abuse, bipolar, hep C, CKD4, obesity presents for follow up CV eval.      1/16/24  BP elevated 214/116, she needs meds refills has more stress this AM due to walking and needing to de-ice the car.   She has more SOB today. She had more salty food lately.   BP has improved to 170s/90s with CLonidine 0.2 mg x2 in office now.     4/18/24  BP and HR well controlled. BMI 27 - 155 lbs       ED Vital Signs:         Vitals:     04/04/24 0057 04/04/24 0334 04/04/24 0438 04/04/24 0503   BP: (!) 194/95 (!) 193/103 (!) 207/99 (!) 176/86   Pulse: 81 71   61   Resp: 20         Temp: 97.8 °F (36.6 °C)         TempSrc: Oral         SpO2: 98% 100%   99%     04/04/24 0633 04/04/24 0714 04/04/24 0733 04/04/24 0802   BP: (!) 193/102 (!) 166/98 (!) 194/90 (!) 167/87   Pulse: 64 66 61 64   Resp:           Temp:           TempSrc:           SpO2: 98%   98% 97%         Recent ER eval for abd pain/ stress.     Improved BP now  She will have Angiogram changed from 05/02/24 to 05/16/24 7/18/24  BP elevated 162/93. HR 75   She has been smoking.   Dr. Patrick  9/24/24 at 3:00 pm. You will need an MRA Vessel Wall Imaging done prior to this appointment so I have it scheduled at [facility] on 9/24/24 at 1:30 pm.   Has been eating more salty food at times.     9/26/24  BP elevated 140/92. BMI 30 - 168 lbs  She has stopped smoking and has gained more weight lately.     Benefits of Glp1 agonist like medication prescribed reviewed with patient including improvement of insulin resistance which is the underlying hormonal dysfunction causing and leading to diabetes. This improves hormonal balance and can lower blood sugars if patient sugars are elevated. The patient was  explained how the medicine works. I also reviewed side effects of medication including loss of appetite , heartburn/GERD, constipation, diarrhea, nausea (usually low blood sugar if before or between meals ), vomiting bloating , headache and risk of cancer if patient has personal or family history of MEN syndrome.     11/14/24  Wegovy has been approved. Has not picked it up yet - will resend to pharmacy  BP elevated 170/100. HR 80s. BMI 31 - 169 lbs       Results for orders placed during the hospital encounter of 08/04/23    Echo    Interpretation Summary    Left Ventricle: The left ventricle is normal in size. Moderately increased wall thickness. There is moderate concentrict hypertrophy. Normal wall motion. There is normal systolic function with a visually estimated ejection fraction of 55 - 70%. Grade I diastolic dysfunction.    Left Atrium: Left atrium is mildly dilated.    Right Ventricle: Normal right ventricular cavity size. Wall thickness is normal. Right ventricle wall motion  is normal. Systolic function is normal.    Mitral Valve: There is mild regurgitation.    Tricuspid Valve: There is mild regurgitation.    IVC/SVC: Intermediate venous pressure at 8 mmHg.    Bubble study negative          Conclusion 6/2023     There is 0-19% right Internal Carotid Stenosis.  There is 0-19% left Internal Carotid Stenosis.    Conclusion 6/2023    The patient was monitored for a total of 12d 16h, underlying rhythm is Sinus.  The minimum heart rate was 55 bpm; the maximum 116 bpm; the average 76 bpm.  0 % of Atrial fibrillation/Atrial flutter with longest episode of 0 ms.  -- AV block with 0 %  There were 0 pauses, the longest pause was 0 ms at --.  7 episodes of VT were found with Longest VT at 10 beats .  21 supraventricular episodes were found. Longest SVT Episode 15 beats, Fastest  bpm  There were a total of 1346 PVCs with 2 morphologies and 7 couplets. Overall PVC [place] at 0.1 %  There were a total of 1132  PSVCs with 1 morphologies and 0 couplets. Overall PSVC [place] at 0.08 %  There is a total of 1 patient events    Results for orders placed during the hospital encounter of 06/06/23    Nuclear Stress - Cardiology Interpreted    Interpretation Summary    Abnormal myocardial perfusion scan.    There is a moderate to severe intensity, moderate sized, fixed perfusion abnormality consistent with scar in the basal to mid lateral wall(s).    There are no other significant perfusion abnormalities.    The gated perfusion images showed an ejection fraction of 50% at rest. The gated perfusion images showed an ejection fraction of 53% post stress.    The ECG portion of the study is uninterpretable due to abnormal baseline.    The patient reported no chest pain during the stress test.    There were no arrhythmias during stress.          Past Medical History:   Diagnosis Date    Acute CVA (cerebrovascular accident) 08/04/2023    Acute renal failure superimposed on stage 4 chronic kidney disease 10/17/2023    Careful hydration  Serial labs  Nephrology consult and follow up in am     Bipolar 1 disorder     CHF (congestive heart failure)     Depression     Hepatitis C     Hypertension     Other hyperlipidemia 08/15/2019       Past Surgical History:   Procedure Laterality Date    HYSTERECTOMY         Social History     Tobacco Use    Smoking status: Some Days     Current packs/day: 0.25     Average packs/day: 0.3 packs/day for 8.8 years (2.2 ttl pk-yrs)     Types: Cigarettes     Start date: 1/11/2016    Smokeless tobacco: Never    Tobacco comments:     1 pack every 3 days   Substance Use Topics    Alcohol use: Yes    Drug use: Not Currently     Types: Cocaine     Comment: states last use 10/2023       Family History   Problem Relation Name Age of Onset    Heart attack Maternal Grandmother      Hypertension Mother         Patient's Medications   New Prescriptions    No medications on file   Previous Medications    ASPIRIN (ECOTRIN) 81  MG EC TABLET    Take 1 tablet (81 mg total) by mouth once daily.    CARVEDILOL (COREG) 25 MG TABLET    Take 1 tablet (25 mg total) by mouth 2 (two) times daily.    CLONIDINE 0.3 MG/24 HR TD PTWK (CATAPRES) 0.3 MG/24 HR    Place 1 patch onto the skin every 7 days.    CLOPIDOGREL (PLAVIX) 75 MG TABLET    Take 1 tablet (75 mg total) by mouth once daily.    COLCHICINE, GOUT, (COLCRYS) 0.6 MG TABLET    Take every 8 hours on day 1; take every 12 hours on day 2; then once per day thereafter    DIPHENHYDRAMINE (BENADRYL) 25 MG CAPSULE    Take 1 capsule (25 mg total) by mouth every 6 (six) hours as needed for Itching.    ERGOCALCIFEROL (ERGOCALCIFEROL) 50,000 UNIT CAP    Take 1 capsule (50,000 Units total) by mouth every 7 days.    FUROSEMIDE (LASIX) 40 MG TABLET    Take 1 tablet (40 mg total) by mouth once daily. Do not take if BP is low - under 110/70 or feeling dry/dizzy/lightheaded due to dehydration    HYDRALAZINE (APRESOLINE) 100 MG TABLET    Take 1 tablet (100 mg total) by mouth every 8 (eight) hours.    HYDROCODONE-ACETAMINOPHEN (NORCO) 5-325 MG PER TABLET    Take 1 tablet by mouth every 4 (four) hours as needed.    HYDROXYZINE (ATARAX) 50 MG TABLET    Take 1 tablet (50 mg total) by mouth 2 (two) times daily as needed for Anxiety.    IRBESARTAN (AVAPRO) 300 MG TABLET    Take 1 tablet (300 mg total) by mouth every evening.    LIDOCAINE-PRILOCAINE (EMLA) CREAM    Apply topically as needed (on right wrist).    NICOTINE (NICODERM CQ) 7 MG/24 HR    Place 1 patch onto the skin once daily.    ROSUVASTATIN (CRESTOR) 40 MG TAB    Take 1 tablet (40 mg total) by mouth every evening.    TRETINOIN (RETIN-A) 0.025 % CREAM    Apply topically every evening. Pea sized amount to entire face at night. Start out using every other night for 2 weeks, then increase to every night as tolerated    ZIPRASIDONE (GEODON) 20 MG CAP    Take 1 capsule (20 mg total) by mouth 2 (two) times daily.   Modified Medications    Modified Medication  "Previous Medication    ISOSORBIDE MONONITRATE (IMDUR) 120 MG 24 HR TABLET isosorbide mononitrate (IMDUR) 60 MG 24 hr tablet       Take 1 tablet (120 mg total) by mouth every evening.    Take 1 tablet (60 mg total) by mouth every evening.    SEMAGLUTIDE, WEIGHT LOSS, (WEGOVY) 0.25 MG/0.5 ML PNIJ semaglutide, weight loss, (WEGOVY) 0.25 mg/0.5 mL PnIj       Inject 0.25 mg into the skin once a week. Call office to increase dose in 6 weeks    Inject 0.25 mg into the skin once a week. Call office to increase dose in 6 weeks   Discontinued Medications    No medications on file       Review of Systems   Constitutional: Negative.    HENT: Negative.     Eyes: Negative.    Respiratory:  Positive for shortness of breath.    Cardiovascular:  Positive for palpitations. Negative for chest pain.   Gastrointestinal: Negative.    Genitourinary: Negative.    Musculoskeletal: Negative.    Skin: Negative.    Neurological:  Positive for dizziness.   Endo/Heme/Allergies: Negative.    Psychiatric/Behavioral:  The patient is not nervous/anxious.    All 12 systems otherwise negative.      Wt Readings from Last 3 Encounters:   11/14/24 76.9 kg (169 lb 8.5 oz)   09/26/24 76.4 kg (168 lb 6.9 oz)   07/18/24 71.5 kg (157 lb 10.1 oz)     Temp Readings from Last 3 Encounters:   05/29/24 98 °F (36.7 °C) (Oral)   05/16/24 98.1 °F (36.7 °C)   04/10/24 97.9 °F (36.6 °C)     BP Readings from Last 3 Encounters:   11/14/24 (!) 170/100   09/26/24 (!) 142/90   09/23/24 (!) 148/90     Pulse Readings from Last 3 Encounters:   11/14/24 80   09/26/24 70   07/18/24 75       BP (!) 170/100 (BP Location: Right arm, Patient Position: Sitting)   Pulse 80   Ht 5' 2" (1.575 m)   Wt 76.9 kg (169 lb 8.5 oz)   SpO2 98%   BMI 31.01 kg/m²     Objective:   Physical Exam  Vitals and nursing note reviewed.   Constitutional:       General: She is not in acute distress.     Appearance: She is well-developed. She is not diaphoretic.   HENT:      Head: Normocephalic and " atraumatic.      Nose: Nose normal.   Eyes:      General: No scleral icterus.     Conjunctiva/sclera: Conjunctivae normal.   Neck:      Thyroid: No thyromegaly.      Vascular: No JVD.   Cardiovascular:      Rate and Rhythm: Normal rate and regular rhythm.      Heart sounds: S1 normal and S2 normal. No murmur heard.     No friction rub. No gallop. No S3 or S4 sounds.   Pulmonary:      Effort: Pulmonary effort is normal. No respiratory distress.      Breath sounds: Normal breath sounds. No stridor. No wheezing or rales.   Chest:      Chest wall: No tenderness.   Abdominal:      General: Bowel sounds are normal. There is no distension.      Palpations: Abdomen is soft. There is no mass.      Tenderness: There is no abdominal tenderness. There is no rebound.   Genitourinary:     Comments: Deferred  Musculoskeletal:         General: No tenderness or deformity. Normal range of motion.      Cervical back: Normal range of motion and neck supple.   Lymphadenopathy:      Cervical: No cervical adenopathy.   Skin:     General: Skin is warm and dry.      Coloration: Skin is not pale.      Findings: No erythema or rash.   Neurological:      Mental Status: She is alert and oriented to person, place, and time.      Motor: No abnormal muscle tone.      Coordination: Coordination normal.   Psychiatric:         Behavior: Behavior normal.         Thought Content: Thought content normal.         Judgment: Judgment normal.         Lab Results   Component Value Date     09/19/2024    K 4.3 09/19/2024     09/19/2024    CO2 24 09/19/2024    BUN 58 (H) 09/19/2024    CREATININE 3.2 (H) 09/19/2024     (H) 09/19/2024    HGBA1C 5.1 07/19/2023    MG 2.1 08/05/2023    AST 15 05/29/2024    ALT 9 (L) 05/29/2024    ALBUMIN 4.0 05/29/2024    PROT 8.0 05/29/2024    BILITOT 0.3 05/29/2024    WBC 7.22 05/29/2024    HGB 12.1 05/29/2024    HCT 38.0 05/29/2024    MCV 87 05/29/2024     05/29/2024    INR 1.0 08/05/2023    TSH 0.761  08/04/2023    CHOL 155 08/04/2023    HDL 44 08/04/2023    LDLCALC 101.4 08/04/2023    TRIG 48 08/04/2023     (H) 04/04/2024     Assessment:      1. ASCVD (arteriosclerotic cardiovascular disease)    2. History of CVA (cerebrovascular accident)    3. Chronic heart failure with preserved ejection fraction    4. Primary hypertension    5. History of TIA (transient ischemic attack)    6. Obesity (BMI 30-39.9)    7. AUGUSTE (dyspnea on exertion)    8. Hyperlipidemia LDL goal <70    9. Hyperlipidemia, unspecified hyperlipidemia type    10. Chronic hypertension    11. Other hyperlipidemia    12. Stage 4 chronic kidney disease    13. Palpitations    14. CKD (chronic kidney disease) stage 4, GFR 15-29 ml/min    15. Malignant hypertension    16. Hypertensive urgency          Plan:   1. HFPEF, with occ CP/SOB; last  --> 236   - Nuclear stress 6/2023 neg for ischemia, basal, mid lateral wall scar note  - titrate meds  - cont lasix - increased to 40mg   - ECHO 8/2023 with normal bi V function and valves, grade 1 DD. Neg bubble study.     2. HTN - elevated/uncontrolled, recent HTN emergency 10/2023 and 4/2024 - elevated today    - cont meds - increased Coreg dose   - low salt diet  - chance clonidine to patch - increased to Clonidine 0.3 mg patch  - inc Hydralazine to 100mg TID - needs to take TID  - if remains severely elevated needs ER eval   - cont Imdur 30mg QHS --increase to 60mg  --> increase to 120 mg     3. CKD4  - f/u with nephro/PCP  - saw Dr. Sexton in past    4. Hep C  - cont tx per PCP    5. HLD  - cont statin - changed to pravastatin 40mg  - unsure if she was taking livalo     6. SOB ? COPD with smoking abuse - stopped smoking!  - refer to smoking cessation  - f/u pulm  - rec nicotine patch - cannot smoke while on it     7. Recurrent H/O TIA/CVA - L sided weakness? -again 3/2023, 8/2023  - cont meds - asa and plavix; 0.5 cm right MCA bifurcation aneurysm.   - ECHO 8/2023 with normal bi V function and  valves, grade 1 DD. Neg bubble study.   - Vital monitor 6/2023 with brief SVT/NSVT, rare PVCS, PACs noted; AVG HR 76 bpm.  - Carotids 6/2023 overall normal.    - f/u Neuro - needs angio    8. Obesity - BMI 30 --> BMI  -31 169 lbs   - start Wegovy 0.25  - Wegovy has been approved. Has not picked it up yet - will resend to pharmacy    Visit today included increased complexity associated with the care of the episodic problem dyspnea addressed and managing the longitudinal care of the patient due to the serious and/or complex managed problem(s) .      Thank you for allowing me to participate in this patient's care. Please do not hesitate to contact me with any questions or concerns. Consult note has been forwarded to the referral physician.

## 2024-11-15 LAB
OHS QRS DURATION: 98 MS
OHS QTC CALCULATION: 475 MS

## 2024-11-26 ENCOUNTER — TELEPHONE (OUTPATIENT)
Dept: NEUROSURGERY | Facility: CLINIC | Age: 69
End: 2024-11-26
Payer: MEDICAID

## 2024-11-26 NOTE — TELEPHONE ENCOUNTER
----- Message from Eunice sent at 11/26/2024 12:33 PM CST -----  Regarding: patient advice  Contact: pt [phone]  Patient returning call from missed call. Pls call

## 2024-11-26 NOTE — TELEPHONE ENCOUNTER
Returned call to pt. Pt stated that she had a new phone and could not join the visit last time. Rescheduled the appt to 12/17/2024 at 9:30 pm. Pt wanted in person visit.

## 2024-12-04 DIAGNOSIS — N18.4 CKD (CHRONIC KIDNEY DISEASE) STAGE 4, GFR 15-29 ML/MIN: ICD-10-CM

## 2024-12-06 ENCOUNTER — OFFICE VISIT (OUTPATIENT)
Dept: PSYCHIATRY | Facility: CLINIC | Age: 69
End: 2024-12-06
Payer: COMMERCIAL

## 2024-12-06 VITALS
BODY MASS INDEX: 30.77 KG/M2 | HEART RATE: 78 BPM | SYSTOLIC BLOOD PRESSURE: 163 MMHG | DIASTOLIC BLOOD PRESSURE: 100 MMHG | WEIGHT: 168.19 LBS

## 2024-12-06 DIAGNOSIS — F31.75 BIPOLAR 1 DISORDER, DEPRESSED, PARTIAL REMISSION: ICD-10-CM

## 2024-12-06 DIAGNOSIS — F14.21: Primary | ICD-10-CM

## 2024-12-06 PROCEDURE — 99999 PR PBB SHADOW E&M-EST. PATIENT-LVL II: CPT | Mod: PBBFAC,,, | Performed by: PSYCHIATRY & NEUROLOGY

## 2024-12-06 PROCEDURE — 99214 OFFICE O/P EST MOD 30 MIN: CPT | Mod: S$GLB,,, | Performed by: PSYCHIATRY & NEUROLOGY

## 2024-12-06 PROCEDURE — 3080F DIAST BP >= 90 MM HG: CPT | Mod: CPTII,S$GLB,, | Performed by: PSYCHIATRY & NEUROLOGY

## 2024-12-06 PROCEDURE — 3008F BODY MASS INDEX DOCD: CPT | Mod: CPTII,S$GLB,, | Performed by: PSYCHIATRY & NEUROLOGY

## 2024-12-06 PROCEDURE — 3077F SYST BP >= 140 MM HG: CPT | Mod: CPTII,S$GLB,, | Performed by: PSYCHIATRY & NEUROLOGY

## 2024-12-06 PROCEDURE — 4010F ACE/ARB THERAPY RXD/TAKEN: CPT | Mod: CPTII,S$GLB,, | Performed by: PSYCHIATRY & NEUROLOGY
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
## 2024-12-08 RX ORDER — ZIPRASIDONE HYDROCHLORIDE 20 MG/1
20 CAPSULE ORAL 2 TIMES DAILY
Qty: 180 CAPSULE | Refills: 1 | Status: SHIPPED | OUTPATIENT
Start: 2024-12-08 | End: 2025-06-06

## 2024-12-17 ENCOUNTER — PATIENT MESSAGE (OUTPATIENT)
Dept: NEUROSURGERY | Facility: CLINIC | Age: 69
End: 2024-12-17

## 2024-12-17 ENCOUNTER — OFFICE VISIT (OUTPATIENT)
Dept: NEUROSURGERY | Facility: CLINIC | Age: 69
End: 2024-12-17
Payer: MEDICARE

## 2024-12-17 ENCOUNTER — TELEPHONE (OUTPATIENT)
Dept: NEUROSURGERY | Facility: CLINIC | Age: 69
End: 2024-12-17
Payer: MEDICARE

## 2024-12-17 VITALS — SYSTOLIC BLOOD PRESSURE: 153 MMHG | DIASTOLIC BLOOD PRESSURE: 86 MMHG | HEART RATE: 65 BPM

## 2024-12-17 DIAGNOSIS — I67.1 BRAIN ANEURYSM: ICD-10-CM

## 2024-12-17 DIAGNOSIS — I67.1 CEREBRAL ANEURYSM, NONRUPTURED: Primary | ICD-10-CM

## 2024-12-17 PROCEDURE — 99213 OFFICE O/P EST LOW 20 MIN: CPT | Mod: S$GLB,,, | Performed by: NEUROLOGICAL SURGERY

## 2024-12-17 PROCEDURE — 3079F DIAST BP 80-89 MM HG: CPT | Mod: CPTII,S$GLB,, | Performed by: NEUROLOGICAL SURGERY

## 2024-12-17 PROCEDURE — 1101F PT FALLS ASSESS-DOCD LE1/YR: CPT | Mod: CPTII,S$GLB,, | Performed by: NEUROLOGICAL SURGERY

## 2024-12-17 PROCEDURE — 3077F SYST BP >= 140 MM HG: CPT | Mod: CPTII,S$GLB,, | Performed by: NEUROLOGICAL SURGERY

## 2024-12-17 PROCEDURE — 99999 PR PBB SHADOW E&M-EST. PATIENT-LVL III: CPT | Mod: PBBFAC,,, | Performed by: NEUROLOGICAL SURGERY

## 2024-12-17 PROCEDURE — 3288F FALL RISK ASSESSMENT DOCD: CPT | Mod: CPTII,S$GLB,, | Performed by: NEUROLOGICAL SURGERY

## 2024-12-17 PROCEDURE — 4010F ACE/ARB THERAPY RXD/TAKEN: CPT | Mod: CPTII,S$GLB,, | Performed by: NEUROLOGICAL SURGERY

## 2024-12-17 PROCEDURE — 1126F AMNT PAIN NOTED NONE PRSNT: CPT | Mod: CPTII,S$GLB,, | Performed by: NEUROLOGICAL SURGERY

## 2024-12-26 ENCOUNTER — PATIENT OUTREACH (OUTPATIENT)
Dept: ADMINISTRATIVE | Facility: HOSPITAL | Age: 69
End: 2024-12-26
Payer: MEDICARE

## 2024-12-27 DIAGNOSIS — I50.32 CHRONIC HEART FAILURE WITH PRESERVED EJECTION FRACTION: ICD-10-CM

## 2024-12-27 RX ORDER — HYDRALAZINE HYDROCHLORIDE 100 MG/1
100 TABLET, FILM COATED ORAL EVERY 8 HOURS
Qty: 180 TABLET | Refills: 1 | Status: SHIPPED | OUTPATIENT
Start: 2024-12-27

## 2025-01-02 ENCOUNTER — OFFICE VISIT (OUTPATIENT)
Dept: CARDIOLOGY | Facility: CLINIC | Age: 70
End: 2025-01-02
Payer: MEDICARE

## 2025-01-02 VITALS
BODY MASS INDEX: 30.97 KG/M2 | SYSTOLIC BLOOD PRESSURE: 139 MMHG | OXYGEN SATURATION: 99 % | DIASTOLIC BLOOD PRESSURE: 98 MMHG | RESPIRATION RATE: 16 BRPM | WEIGHT: 168.31 LBS | HEART RATE: 80 BPM | HEIGHT: 62 IN

## 2025-01-02 DIAGNOSIS — I10 PRIMARY HYPERTENSION: ICD-10-CM

## 2025-01-02 DIAGNOSIS — E78.5 HYPERLIPIDEMIA LDL GOAL <70: ICD-10-CM

## 2025-01-02 DIAGNOSIS — I67.1 CEREBRAL ANEURYSM, NONRUPTURED: ICD-10-CM

## 2025-01-02 DIAGNOSIS — E66.9 OBESITY (BMI 30-39.9): ICD-10-CM

## 2025-01-02 DIAGNOSIS — E78.49 OTHER HYPERLIPIDEMIA: ICD-10-CM

## 2025-01-02 DIAGNOSIS — B18.2 CHRONIC HEPATITIS C WITHOUT HEPATIC COMA: ICD-10-CM

## 2025-01-02 DIAGNOSIS — Z86.73 HISTORY OF CVA (CEREBROVASCULAR ACCIDENT): ICD-10-CM

## 2025-01-02 DIAGNOSIS — R06.09 DOE (DYSPNEA ON EXERTION): Primary | ICD-10-CM

## 2025-01-02 DIAGNOSIS — E78.5 HYPERLIPIDEMIA, UNSPECIFIED HYPERLIPIDEMIA TYPE: ICD-10-CM

## 2025-01-02 DIAGNOSIS — I50.32 CHRONIC HEART FAILURE WITH PRESERVED EJECTION FRACTION: ICD-10-CM

## 2025-01-02 DIAGNOSIS — I10 CHRONIC HYPERTENSION: ICD-10-CM

## 2025-01-02 DIAGNOSIS — I25.10 ASCVD (ARTERIOSCLEROTIC CARDIOVASCULAR DISEASE): ICD-10-CM

## 2025-01-02 DIAGNOSIS — Z72.0 TOBACCO USE: ICD-10-CM

## 2025-01-02 DIAGNOSIS — N18.4 STAGE 4 CHRONIC KIDNEY DISEASE: ICD-10-CM

## 2025-01-02 DIAGNOSIS — Z86.73 HISTORY OF TIA (TRANSIENT ISCHEMIC ATTACK): ICD-10-CM

## 2025-01-02 DIAGNOSIS — R94.31 ABNORMAL ECG: ICD-10-CM

## 2025-01-02 PROCEDURE — 3008F BODY MASS INDEX DOCD: CPT | Mod: CPTII,S$GLB,, | Performed by: INTERNAL MEDICINE

## 2025-01-02 PROCEDURE — 1159F MED LIST DOCD IN RCRD: CPT | Mod: CPTII,S$GLB,, | Performed by: INTERNAL MEDICINE

## 2025-01-02 PROCEDURE — 3080F DIAST BP >= 90 MM HG: CPT | Mod: CPTII,S$GLB,, | Performed by: INTERNAL MEDICINE

## 2025-01-02 PROCEDURE — G2211 COMPLEX E/M VISIT ADD ON: HCPCS | Mod: S$GLB,,, | Performed by: INTERNAL MEDICINE

## 2025-01-02 PROCEDURE — 99999 PR PBB SHADOW E&M-EST. PATIENT-LVL IV: CPT | Mod: PBBFAC,,, | Performed by: INTERNAL MEDICINE

## 2025-01-02 PROCEDURE — 3288F FALL RISK ASSESSMENT DOCD: CPT | Mod: CPTII,S$GLB,, | Performed by: INTERNAL MEDICINE

## 2025-01-02 PROCEDURE — 1160F RVW MEDS BY RX/DR IN RCRD: CPT | Mod: CPTII,S$GLB,, | Performed by: INTERNAL MEDICINE

## 2025-01-02 PROCEDURE — 1101F PT FALLS ASSESS-DOCD LE1/YR: CPT | Mod: CPTII,S$GLB,, | Performed by: INTERNAL MEDICINE

## 2025-01-02 PROCEDURE — 3075F SYST BP GE 130 - 139MM HG: CPT | Mod: CPTII,S$GLB,, | Performed by: INTERNAL MEDICINE

## 2025-01-02 PROCEDURE — 99214 OFFICE O/P EST MOD 30 MIN: CPT | Mod: S$GLB,,, | Performed by: INTERNAL MEDICINE

## 2025-01-02 RX ORDER — FUROSEMIDE 40 MG/1
40 TABLET ORAL DAILY
Qty: 90 TABLET | Refills: 1 | Status: SHIPPED | OUTPATIENT
Start: 2025-01-02

## 2025-01-02 RX ORDER — IRBESARTAN 300 MG/1
300 TABLET ORAL NIGHTLY
Qty: 90 TABLET | Refills: 1 | Status: SHIPPED | OUTPATIENT
Start: 2025-01-02 | End: 2026-01-02

## 2025-01-02 RX ORDER — HYDRALAZINE HYDROCHLORIDE 100 MG/1
100 TABLET, FILM COATED ORAL EVERY 8 HOURS
Qty: 180 TABLET | Refills: 1 | Status: SHIPPED | OUTPATIENT
Start: 2025-01-02

## 2025-01-02 RX ORDER — ISOSORBIDE MONONITRATE 120 MG/1
240 TABLET, EXTENDED RELEASE ORAL NIGHTLY
Qty: 90 TABLET | Refills: 1 | Status: SHIPPED | OUTPATIENT
Start: 2025-01-02 | End: 2026-01-02

## 2025-01-02 RX ORDER — CARVEDILOL 25 MG/1
25 TABLET ORAL 2 TIMES DAILY
Qty: 180 TABLET | Refills: 1 | Status: SHIPPED | OUTPATIENT
Start: 2025-01-02

## 2025-01-02 RX ORDER — CLONIDINE 0.3 MG/24H
1 PATCH, EXTENDED RELEASE TRANSDERMAL
Qty: 4 PATCH | Refills: 11 | Status: SHIPPED | OUTPATIENT
Start: 2025-01-02 | End: 2026-01-02

## 2025-01-02 NOTE — PROGRESS NOTES
Subjective:   Patient ID:  Rose Milligan is a 69 y.o. female who presents for cardiac consult of No chief complaint on file.      The patient came in today for cardiac consult of No chief complaint on file.      Rose Milligan is a 69 y.o. female  with HFpEF, HTN, pulm HTN, h/o TIA/CVA, tobacco abuse, h/o drug abuse, bipolar, hep C, CKD4, obesity presents for follow up CV eval.      11/14/24  Wegovy has been approved. Has not picked it up yet - will resend to pharmacy  BP elevated 170/100. HR 80s. BMI 31 - 169 lbs     1/2/25  She has upcoming MRI/MRA of brain - had 0.5 cm right proximal M2 MCA aneurysm   She had trouble taking Wegovy. Performed in office today.   BP elevated 139/98. HR 90s. BMI 30 - 168  She had a fall in Nov, unsure how she fell but had leg soreness.       Results for orders placed during the hospital encounter of 08/04/23    Echo    Interpretation Summary    Left Ventricle: The left ventricle is normal in size. Moderately increased wall thickness. There is moderate concentrict hypertrophy. Normal wall motion. There is normal systolic function with a visually estimated ejection fraction of 55 - 70%. Grade I diastolic dysfunction.    Left Atrium: Left atrium is mildly dilated.    Right Ventricle: Normal right ventricular cavity size. Wall thickness is normal. Right ventricle wall motion  is normal. Systolic function is normal.    Mitral Valve: There is mild regurgitation.    Tricuspid Valve: There is mild regurgitation.    IVC/SVC: Intermediate venous pressure at 8 mmHg.    Bubble study negative          Conclusion 6/2023     There is 0-19% right Internal Carotid Stenosis.  There is 0-19% left Internal Carotid Stenosis.    Conclusion 6/2023    The patient was monitored for a total of 12d 16h, underlying rhythm is Sinus.  The minimum heart rate was 55 bpm; the maximum 116 bpm; the average 76 bpm.  0 % of Atrial fibrillation/Atrial flutter with longest episode of 0 ms.  -- AV block with 0 %  There  were 0 pauses, the longest pause was 0 ms at --.  7 episodes of VT were found with Longest VT at 10 beats .  21 supraventricular episodes were found. Longest SVT Episode 15 beats, Fastest  bpm  There were a total of 1346 PVCs with 2 morphologies and 7 couplets. Overall PVC [place] at 0.1 %  There were a total of 1132 PSVCs with 1 morphologies and 0 couplets. Overall PSVC [place] at 0.08 %  There is a total of 1 patient events    Results for orders placed during the hospital encounter of 06/06/23    Nuclear Stress - Cardiology Interpreted    Interpretation Summary    Abnormal myocardial perfusion scan.    There is a moderate to severe intensity, moderate sized, fixed perfusion abnormality consistent with scar in the basal to mid lateral wall(s).    There are no other significant perfusion abnormalities.    The gated perfusion images showed an ejection fraction of 50% at rest. The gated perfusion images showed an ejection fraction of 53% post stress.    The ECG portion of the study is uninterpretable due to abnormal baseline.    The patient reported no chest pain during the stress test.    There were no arrhythmias during stress.          Past Medical History:   Diagnosis Date    Acute CVA (cerebrovascular accident) 08/04/2023    Acute renal failure superimposed on stage 4 chronic kidney disease 10/17/2023    Careful hydration  Serial labs  Nephrology consult and follow up in am     Bipolar 1 disorder     CHF (congestive heart failure)     Depression     Hepatitis C     Hypertension     Other hyperlipidemia 08/15/2019       Past Surgical History:   Procedure Laterality Date    HYSTERECTOMY         Social History     Tobacco Use    Smoking status: Some Days     Current packs/day: 0.25     Average packs/day: 0.3 packs/day for 9.0 years (2.2 ttl pk-yrs)     Types: Cigarettes     Start date: 1/11/2016    Smokeless tobacco: Never    Tobacco comments:     1 pack every 3 days   Substance Use Topics    Alcohol use: Yes     Drug use: Not Currently     Types: Cocaine     Comment: states last use 10/2023       Family History   Problem Relation Name Age of Onset    Heart attack Maternal Grandmother      Hypertension Mother         Patient's Medications   New Prescriptions    No medications on file   Previous Medications    ASPIRIN (ECOTRIN) 81 MG EC TABLET    Take 1 tablet (81 mg total) by mouth once daily.    CARVEDILOL (COREG) 25 MG TABLET    Take 1 tablet (25 mg total) by mouth 2 (two) times daily.    CLONIDINE 0.3 MG/24 HR TD PTWK (CATAPRES) 0.3 MG/24 HR    Place 1 patch onto the skin every 7 days.    CLOPIDOGREL (PLAVIX) 75 MG TABLET    Take 1 tablet (75 mg total) by mouth once daily.    COLCHICINE, GOUT, (COLCRYS) 0.6 MG TABLET    Take every 8 hours on day 1; take every 12 hours on day 2; then once per day thereafter    DIPHENHYDRAMINE (BENADRYL) 25 MG CAPSULE    Take 1 capsule (25 mg total) by mouth every 6 (six) hours as needed for Itching.    ERGOCALCIFEROL (ERGOCALCIFEROL) 50,000 UNIT CAP    Take 1 capsule (50,000 Units total) by mouth every 7 days.    FUROSEMIDE (LASIX) 40 MG TABLET    Take 1 tablet (40 mg total) by mouth once daily. Do not take if BP is low - under 110/70 or feeling dry/dizzy/lightheaded due to dehydration    HYDRALAZINE (APRESOLINE) 100 MG TABLET    Take 1 tablet (100 mg total) by mouth every 8 (eight) hours.    HYDROCODONE-ACETAMINOPHEN (NORCO) 5-325 MG PER TABLET    Take 1 tablet by mouth every 4 (four) hours as needed.    HYDROXYZINE (ATARAX) 50 MG TABLET    Take 1 tablet (50 mg total) by mouth 2 (two) times daily as needed for Anxiety.    IRBESARTAN (AVAPRO) 300 MG TABLET    Take 1 tablet (300 mg total) by mouth every evening.    ISOSORBIDE MONONITRATE (IMDUR) 120 MG 24 HR TABLET    Take 1 tablet (120 mg total) by mouth every evening.    LIDOCAINE-PRILOCAINE (EMLA) CREAM    Apply topically as needed (on right wrist).    NICOTINE (NICODERM CQ) 7 MG/24 HR    Place 1 patch onto the skin once daily.     ROSUVASTATIN (CRESTOR) 40 MG TAB    Take 1 tablet (40 mg total) by mouth every evening.    SEMAGLUTIDE, WEIGHT LOSS, (WEGOVY) 0.25 MG/0.5 ML PNIJ    Inject 0.25 mg into the skin once a week. Call office to increase dose in 6 weeks    TRETINOIN (RETIN-A) 0.025 % CREAM    Apply topically every evening. Pea sized amount to entire face at night. Start out using every other night for 2 weeks, then increase to every night as tolerated    ZIPRASIDONE (GEODON) 20 MG CAP    Take 1 capsule (20 mg total) by mouth 2 (two) times daily.   Modified Medications    No medications on file   Discontinued Medications    No medications on file       Review of Systems   Constitutional: Negative.    HENT: Negative.     Eyes: Negative.    Respiratory:  Positive for shortness of breath.    Cardiovascular:  Positive for palpitations. Negative for chest pain.   Gastrointestinal: Negative.    Genitourinary: Negative.    Musculoskeletal: Negative.    Skin: Negative.    Neurological:  Positive for dizziness.   Endo/Heme/Allergies: Negative.    Psychiatric/Behavioral:  The patient is not nervous/anxious.    All 12 systems otherwise negative.      Wt Readings from Last 3 Encounters:   11/14/24 76.9 kg (169 lb 8.5 oz)   09/26/24 76.4 kg (168 lb 6.9 oz)   07/18/24 71.5 kg (157 lb 10.1 oz)     Temp Readings from Last 3 Encounters:   05/29/24 98 °F (36.7 °C) (Oral)   05/16/24 98.1 °F (36.7 °C)   04/10/24 97.9 °F (36.6 °C)     BP Readings from Last 3 Encounters:   12/17/24 (!) 153/86   11/14/24 (!) 170/100   09/26/24 (!) 142/90     Pulse Readings from Last 3 Encounters:   12/17/24 65   11/14/24 80   09/26/24 70       There were no vitals taken for this visit.    Objective:   Physical Exam  Vitals and nursing note reviewed.   Constitutional:       General: She is not in acute distress.     Appearance: She is well-developed. She is not diaphoretic.   HENT:      Head: Normocephalic and atraumatic.      Nose: Nose normal.   Eyes:      General: No scleral  icterus.     Conjunctiva/sclera: Conjunctivae normal.   Neck:      Thyroid: No thyromegaly.      Vascular: No JVD.   Cardiovascular:      Rate and Rhythm: Normal rate and regular rhythm.      Heart sounds: S1 normal and S2 normal. No murmur heard.     No friction rub. No gallop. No S3 or S4 sounds.   Pulmonary:      Effort: Pulmonary effort is normal. No respiratory distress.      Breath sounds: Normal breath sounds. No stridor. No wheezing or rales.   Chest:      Chest wall: No tenderness.   Abdominal:      General: Bowel sounds are normal. There is no distension.      Palpations: Abdomen is soft. There is no mass.      Tenderness: There is no abdominal tenderness. There is no rebound.   Genitourinary:     Comments: Deferred  Musculoskeletal:         General: No tenderness or deformity. Normal range of motion.      Cervical back: Normal range of motion and neck supple.   Lymphadenopathy:      Cervical: No cervical adenopathy.   Skin:     General: Skin is warm and dry.      Coloration: Skin is not pale.      Findings: No erythema or rash.   Neurological:      Mental Status: She is alert and oriented to person, place, and time.      Motor: No abnormal muscle tone.      Coordination: Coordination normal.   Psychiatric:         Behavior: Behavior normal.         Thought Content: Thought content normal.         Judgment: Judgment normal.         Lab Results   Component Value Date     09/19/2024    K 4.3 09/19/2024     09/19/2024    CO2 24 09/19/2024    BUN 58 (H) 09/19/2024    CREATININE 3.2 (H) 09/19/2024     (H) 09/19/2024    HGBA1C 5.1 07/19/2023    MG 2.1 08/05/2023    AST 15 05/29/2024    ALT 9 (L) 05/29/2024    ALBUMIN 4.0 05/29/2024    PROT 8.0 05/29/2024    BILITOT 0.3 05/29/2024    WBC 7.22 05/29/2024    HGB 12.1 05/29/2024    HCT 38.0 05/29/2024    MCV 87 05/29/2024     05/29/2024    INR 1.0 08/05/2023    TSH 0.761 08/04/2023    CHOL 155 08/04/2023    HDL 44 08/04/2023    LDLCALC  101.4 08/04/2023    TRIG 48 08/04/2023     (H) 04/04/2024     Assessment:      1. AUGUSTE (dyspnea on exertion)    2. Chronic heart failure with preserved ejection fraction    3. Primary hypertension    4. ASCVD (arteriosclerotic cardiovascular disease)    5. History of TIA (transient ischemic attack)    6. History of CVA (cerebrovascular accident)    7. Obesity (BMI 30-39.9)    8. Hyperlipidemia LDL goal <70    9. Hyperlipidemia, unspecified hyperlipidemia type    10. Chronic hypertension    11. Other hyperlipidemia    12. Stage 4 chronic kidney disease    13. Chronic hepatitis C without hepatic coma    14. Abnormal ECG    15. Tobacco use    16. Cerebral aneurysm, nonruptured          Plan:   1. HFPEF, with occ CP/SOB; last  --> 236   - Nuclear stress 6/2023 neg for ischemia, basal, mid lateral wall scar note  - titrate meds  - cont lasix - increased to 40mg   - ECHO 8/2023 with normal bi V function and valves, grade 1 DD. Neg bubble study.     2. HTN - elevated/uncontrolled, h/o HTN emergency 10/2023 and 4/2024 - elevated occ    - cont meds - increased Coreg dose ,increase Imdur to 240 mg   - low salt diet  - chance clonidine to patch - increased to Clonidine 0.3 mg patch  - inc Hydralazine to 100mg TID - needs to take TID  - cont Imdur 30mg QHS --increase to 60mg  --> increase to 120 mg     3. CKD4  - f/u with nephro/PCP  - saw Dr. Sexton in past    4. Hep C  - cont tx per PCP    5. HLD  - cont statin - changed to pravastatin 40mg  - unsure if she was taking livalo     6. SOB ? COPD with smoking abuse - stopped smoking!  - referred to smoking cessation  - f/u pulm  - rec nicotine patch - cannot smoke while on it     7. Recurrent H/O TIA/CVA - L sided weakness? -3/2023, 8/2023  - cont meds - asa and plavix; 0.5 cm right MCA bifurcation aneurysm.   - ECHO 8/2023 with normal bi V function and valves, grade 1 DD. Neg bubble study.   - Vital monitor 6/2023 with brief SVT/NSVT, rare PVCS, PACs noted; AVG HR  76 bpm.  - Carotids 6/2023 overall normal.    - f/u Neuro - may need angio - upcoming MRI/MRA of brain - had 0.5 cm right proximal M2 MCA aneurysm     8. Obesity - BMI 30 --> BMI  -31 169 lbs  --> 168  - start Wegovy 0.25  - Wegovy has been approved. Took one dose in office today.     Visit today included increased complexity associated with the care of the episodic problem dyspnea addressed and managing the longitudinal care of the patient due to the serious and/or complex managed problem(s) .      Thank you for allowing me to participate in this patient's care. Please do not hesitate to contact me with any questions or concerns. Consult note has been forwarded to the referral physician.

## 2025-02-03 DIAGNOSIS — I25.10 ASCVD (ARTERIOSCLEROTIC CARDIOVASCULAR DISEASE): ICD-10-CM

## 2025-02-03 DIAGNOSIS — Z86.73 HISTORY OF CVA (CEREBROVASCULAR ACCIDENT): ICD-10-CM

## 2025-02-03 DIAGNOSIS — E66.9 OBESITY (BMI 30-39.9): ICD-10-CM

## 2025-02-03 DIAGNOSIS — Z86.73 HISTORY OF TIA (TRANSIENT ISCHEMIC ATTACK): ICD-10-CM

## 2025-02-03 RX ORDER — SEMAGLUTIDE 0.25 MG/.5ML
0.25 INJECTION, SOLUTION SUBCUTANEOUS WEEKLY
Qty: 3 ML | Refills: 1 | Status: SHIPPED | OUTPATIENT
Start: 2025-02-03 | End: 2025-02-18

## 2025-02-03 NOTE — PROGRESS NOTES
Neurosurgery  History & Physical    SUBJECTIVE:     Chief Complaint:   Aneurysm    History of Present Illness:    Ngoc presents with concerns about stuttering and difficulty speaking, which have been occurring for about two months.    Ngoc reports stuttering and difficulty speaking for approximately 2 months, with no prior history of speech problems. She describes her tongue becoming immobilized during conversations, preventing her from continuing to speak. The stuttering occurs intermittently, typically when initiating a conversation or midway through. Ngoc expresses concern that this may be related to her aneurysm.    Ngoc recently resumed smoking due to weight gain, which she finds frustrating. 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 She misses singing and considers it a significant part of her life.    Ngoc has a history of aneurysm.    Ngoc has previously undergone an MRI of the brain.    She recently resumed smoking after a period of quitting. Ngoc formerly worked as a rush.      ROS:  Musculoskeletal: -muscle weakness         Review of patient's allergies indicates:   Allergen Reactions    Tramadol Itching    Risperdal [risperidone] Other (See Comments)     Did not like way felt       Current Outpatient Medications   Medication Sig Dispense Refill    aspirin (ECOTRIN) 81 MG EC tablet Take 1 tablet (81 mg total) by mouth once daily. 90 tablet 3    carvediloL (COREG) 25 MG tablet Take 1 tablet (25 mg total) by mouth 2 (two) times daily. 180 tablet 1    cloNIDine 0.3 mg/24 hr td ptwk (CATAPRES) 0.3 mg/24 hr Place 1 patch onto the skin every 7 days. 4 patch 11    clopidogreL (PLAVIX) 75 mg tablet Take 1 tablet (75 mg total) by mouth once daily. 90 tablet 1    colchicine, gout, (COLCRYS) 0.6 mg tablet Take every 8 hours on day 1; take every 12 hours on day 2; then once per day thereafter 15 tablet 0    diphenhydrAMINE (BENADRYL) 25 mg  capsule Take 1 capsule (25 mg total) by mouth every 6 (six) hours as needed for Itching. 30 capsule 0    ergocalciferol (ERGOCALCIFEROL) 50,000 unit Cap Take 1 capsule (50,000 Units total) by mouth every 7 days. 12 capsule 3    furosemide (LASIX) 40 MG tablet Take 1 tablet (40 mg total) by mouth once daily. Do not take if BP is low - under 110/70 or feeling dry/dizzy/lightheaded due to dehydration 90 tablet 1    hydrALAZINE (APRESOLINE) 100 MG tablet Take 1 tablet (100 mg total) by mouth every 8 (eight) hours. 180 tablet 1    HYDROcodone-acetaminophen (NORCO) 5-325 mg per tablet Take 1 tablet by mouth every 4 (four) hours as needed. 18 tablet 0    hydrOXYzine (ATARAX) 50 MG tablet Take 1 tablet (50 mg total) by mouth 2 (two) times daily as needed for Anxiety. 20 tablet 0    irbesartan (AVAPRO) 300 MG tablet Take 1 tablet (300 mg total) by mouth every evening. 90 tablet 1    isosorbide mononitrate (IMDUR) 120 MG 24 hr tablet Take 2 tablets (240 mg total) by mouth every evening. 90 tablet 1    LIDOcaine-prilocaine (EMLA) cream Apply topically as needed (on right wrist). 2 g 0    nicotine (NICODERM CQ) 7 mg/24 hr Place 1 patch onto the skin once daily. 30 patch 1    rosuvastatin (CRESTOR) 40 MG Tab Take 1 tablet (40 mg total) by mouth every evening. 90 tablet 1    semaglutide, weight loss, (WEGOVY) 0.25 mg/0.5 mL PnIj Inject 0.25 mg into the skin once a week. Call office to increase dose in 6 weeks 3 mL 1    tretinoin (RETIN-A) 0.025 % cream Apply topically every evening. Pea sized amount to entire face at night. Start out using every other night for 2 weeks, then increase to every night as tolerated 20 g 0    ziprasidone (GEODON) 20 MG Cap Take 1 capsule (20 mg total) by mouth 2 (two) times daily. 180 capsule 1     No current facility-administered medications for this visit.       Past Medical History:   Diagnosis Date    Acute CVA (cerebrovascular accident) 08/04/2023    Acute renal failure superimposed on stage 4  chronic kidney disease 10/17/2023    Careful hydration  Serial labs  Nephrology consult and follow up in am     Bipolar 1 disorder     CHF (congestive heart failure)     Depression     Hepatitis C     Hypertension     Other hyperlipidemia 08/15/2019     Past Surgical History:   Procedure Laterality Date    HYSTERECTOMY       Family History       Problem Relation (Age of Onset)    Heart attack Maternal Grandmother    Hypertension Mother          Social History     Socioeconomic History    Marital status:    Tobacco Use    Smoking status: Some Days     Current packs/day: 0.25     Average packs/day: 0.3 packs/day for 9.1 years (2.3 ttl pk-yrs)     Types: Cigarettes     Start date: 1/11/2016    Smokeless tobacco: Never    Tobacco comments:     1 pack every 3 days   Substance and Sexual Activity    Alcohol use: Yes    Drug use: Not Currently     Types: Cocaine     Comment: states last use 10/2023    Sexual activity: Not Currently     Social Drivers of Health     Financial Resource Strain: Low Risk  (5/30/2024)    Overall Financial Resource Strain (CARDIA)     Difficulty of Paying Living Expenses: Not very hard   Food Insecurity: No Food Insecurity (5/30/2024)    Hunger Vital Sign     Worried About Running Out of Food in the Last Year: Never true     Ran Out of Food in the Last Year: Never true   Transportation Needs: Unmet Transportation Needs (5/30/2024)    PRAPARE - Transportation     Lack of Transportation (Medical): Yes     Lack of Transportation (Non-Medical): Yes   Physical Activity: Inactive (1/10/2024)    Exercise Vital Sign     Days of Exercise per Week: 0 days     Minutes of Exercise per Session: 0 min   Stress: No Stress Concern Present (1/10/2024)    [employer] [place] [employer] - Occupational Stress Questionnaire     Feeling of Stress : Only a little   Housing Stability: Low Risk  (5/30/2024)    Housing Stability Vital Sign     Unable to Pay for Housing in the Last Year: No     Homeless in  the Last Year: No       Review of Systems    OBJECTIVE:     Vital Signs  Pulse: 65  BP: (!) 153/86  Pain Score: 0-No pain  There is no height or weight on file to calculate BMI.      Neurosurgery Physical Exam    General: no acute distress  Head: Non-traumatic, normocephalic  Eyes: Pupils equal, EOMI  Neck: Supple, normal ROM, no tenderness to palpation  CVS: Normal rate and rhythm, distal pulses present  Pulm: Symmetric expansion, no respiratory distress  GI: Abdomen nondistended, nontender    MSK: Moves all extremities without restriction, atraumatic  Skin: Dry, intact  Psych: Normal thought content and cognition    Neuro:  Alert, awake, oriented, to self, place, time  Language:  Speech is fluent, goal directed without any noted dysarthria or aphasia    Cranial nerves:    CNII-XII: Intact on fine exam,   Pupils equal round react to light,   Extraocular muscles are intact  V1 to V3 is intact to light touch,   no facial asymmetry,   Hearing is intact to finger rub and voice  tongue/uvula/palate midline,   shoulder shrug equal,     No pronator drift    Extremities:  Motor:  Upper Extremity    Deltoid Triceps Biceps Wrist  Extension Wrist  Flexion Interosseous   R 5/5 5/5 5/5 5/5 5/5 5/5   L 5/5 5/5 5/5 5/5 5/5 5/5       Thumb   Abduction Thumb  ADDuction Finger  Flexion Finger  Extension     R 5/5 5/5 5/5 5/5     L 5/5 5/5 5/5 5/5        Lower Extremity     Iliopsoas Quadriceps Hamstring Plantarflexion Dorsiflexion EHL   R 5/5 5/5 5/5 5/5 5/5 5/5   L 5/5 5/5 5/5 5/5 5/5 5/5       Reflexes:     DTR: 2+ biceps    2+ triceps   2+ brachioradialis   2+ patellar  2+ Achilles     Aragon's: Negative     Babinski's: Negative     Clonus: Negative     Sensory:      Sensation intact to light touch, temperature sensation, vibration, pinprick     Coordination:      Coordination intact throughout, no dysmetria, normal rapid alternating movements, no dysdiadochokinesia  Cerebellar:  Normal finger-to-nose, normal heel-to-shin         Diagnostic Results:   Personally reviewed patient's Diagnostic Imaging.    ASSESSMENT/PLAN:        Assessment & Plan    IMPRESSION:  - Consider MRI of the brain to investigate cause of patient's recent stuttering, as it is atypical and unlikely related to the existing aneurysm  - Recommend treatment of the aneurysm, likely with stent and coil procedure to prevent growth and rupture  - Cautious about attributing stuttering to aneurysm, as location typically does not cause such symptoms  - Noted patient's return to smoking    CEREBRAL ANEURYSM:  - Explained that aneurysm treatment aims to prevent growth and rupture.  - Described stent and coil procedure: stent placed inside blood vessel with coils inserted into aneurysm to block blood flow to that area.  - MRI of the brain ordered.  - MRI with enhancement of blood vessel wall ordered (to be done after initial brain MRI).         Thank you for allowing me to participate in the care of this patient , please feel free to call any questions, comments, concerns.      Rey Patrick MD,MSc  Department of Neurosurgery   Department of Radiology  Department of Neurology  [facility]    [facility]   [employer] [facility] [employer]    Note dictated with voice recognition software, please excuse any grammatical errors.  This note was generated with the assistance of ambient listening technology. Verbal consent was obtained by the patient and accompanying visitor(s) for the recording of patient appointment to facilitate this note. I attest to having reviewed and edited the generated note for accuracy, though some syntax or spelling errors may persist. Please contact the author of this note for any clarification.

## 2025-02-12 DIAGNOSIS — Z12.31 OTHER SCREENING MAMMOGRAM: ICD-10-CM

## 2025-02-18 ENCOUNTER — OFFICE VISIT (OUTPATIENT)
Dept: CARDIOLOGY | Facility: CLINIC | Age: 70
End: 2025-02-18
Payer: MEDICARE

## 2025-02-18 VITALS
BODY MASS INDEX: 29.03 KG/M2 | HEIGHT: 62 IN | HEART RATE: 85 BPM | OXYGEN SATURATION: 97 % | WEIGHT: 157.75 LBS | RESPIRATION RATE: 16 BRPM | SYSTOLIC BLOOD PRESSURE: 139 MMHG | DIASTOLIC BLOOD PRESSURE: 88 MMHG

## 2025-02-18 DIAGNOSIS — E78.5 HYPERLIPIDEMIA LDL GOAL <70: ICD-10-CM

## 2025-02-18 DIAGNOSIS — R00.2 PALPITATIONS: ICD-10-CM

## 2025-02-18 DIAGNOSIS — I50.32 CHRONIC HEART FAILURE WITH PRESERVED EJECTION FRACTION: ICD-10-CM

## 2025-02-18 DIAGNOSIS — R94.31 ABNORMAL ECG: ICD-10-CM

## 2025-02-18 DIAGNOSIS — N18.4 STAGE 4 CHRONIC KIDNEY DISEASE: ICD-10-CM

## 2025-02-18 DIAGNOSIS — Z86.73 HISTORY OF TIA (TRANSIENT ISCHEMIC ATTACK): ICD-10-CM

## 2025-02-18 DIAGNOSIS — I10 CHRONIC HYPERTENSION: ICD-10-CM

## 2025-02-18 DIAGNOSIS — I10 PRIMARY HYPERTENSION: ICD-10-CM

## 2025-02-18 DIAGNOSIS — E66.9 OBESITY (BMI 30-39.9): ICD-10-CM

## 2025-02-18 DIAGNOSIS — B18.2 CHRONIC HEPATITIS C WITHOUT HEPATIC COMA: ICD-10-CM

## 2025-02-18 DIAGNOSIS — R06.09 DOE (DYSPNEA ON EXERTION): ICD-10-CM

## 2025-02-18 DIAGNOSIS — Z72.0 TOBACCO USE: ICD-10-CM

## 2025-02-18 DIAGNOSIS — E78.49 OTHER HYPERLIPIDEMIA: ICD-10-CM

## 2025-02-18 DIAGNOSIS — Z86.73 HISTORY OF CVA (CEREBROVASCULAR ACCIDENT): Primary | ICD-10-CM

## 2025-02-18 DIAGNOSIS — I25.10 ASCVD (ARTERIOSCLEROTIC CARDIOVASCULAR DISEASE): ICD-10-CM

## 2025-02-18 DIAGNOSIS — I67.1 CEREBRAL ANEURYSM, NONRUPTURED: ICD-10-CM

## 2025-02-18 DIAGNOSIS — E78.5 HYPERLIPIDEMIA, UNSPECIFIED HYPERLIPIDEMIA TYPE: ICD-10-CM

## 2025-02-18 DIAGNOSIS — I67.1 BRAIN ANEURYSM: ICD-10-CM

## 2025-02-18 PROCEDURE — G2211 COMPLEX E/M VISIT ADD ON: HCPCS | Mod: S$GLB,,, | Performed by: INTERNAL MEDICINE

## 2025-02-18 PROCEDURE — 99214 OFFICE O/P EST MOD 30 MIN: CPT | Mod: S$GLB,,, | Performed by: INTERNAL MEDICINE

## 2025-02-18 RX ORDER — SEMAGLUTIDE 0.5 MG/.5ML
0.5 INJECTION, SOLUTION SUBCUTANEOUS WEEKLY
Qty: 6 ML | Refills: 0 | Status: SHIPPED | OUTPATIENT
Start: 2025-02-18

## 2025-02-18 NOTE — PROGRESS NOTES
Subjective:   Patient ID:  Rose Milligan is a 69 y.o. female who presents for cardiac consult of No chief complaint on file.      The patient came in today for cardiac consult of No chief complaint on file.      Rose Milligan is a 69 y.o. female  with HFpEF, HTN, pulm HTN, h/o TIA/CVA, tobacco abuse, h/o drug abuse, bipolar, hep C, CKD4, obesity presents for follow up CV eval.      11/14/24  Wegovy has been approved. Has not picked it up yet - will resend to pharmacy  BP elevated 170/100. HR 80s. BMI 31 - 169 lbs     1/2/25  She has upcoming MRI/MRA of brain - had 0.5 cm right proximal M2 MCA aneurysm   She had trouble taking Wegovy. Performed in office today.   BP elevated 139/98. HR 90s. BMI 30 - 168  She had a fall in Nov, unsure how she fell but had leg soreness.     2/18/25  BP and HR stable. BMI 28 - 157 lbs   Pt wants to increase dose.   Overall doing well, has knee pain but improving.     Results for orders placed during the hospital encounter of 08/04/23    Echo    Interpretation Summary    Left Ventricle: The left ventricle is normal in size. Moderately increased wall thickness. There is moderate concentrict hypertrophy. Normal wall motion. There is normal systolic function with a visually estimated ejection fraction of 55 - 70%. Grade I diastolic dysfunction.    Left Atrium: Left atrium is mildly dilated.    Right Ventricle: Normal right ventricular cavity size. Wall thickness is normal. Right ventricle wall motion  is normal. Systolic function is normal.    Mitral Valve: There is mild regurgitation.    Tricuspid Valve: There is mild regurgitation.    IVC/SVC: Intermediate venous pressure at 8 mmHg.    Bubble study negative          Conclusion 6/2023     There is 0-19% right Internal Carotid Stenosis.  There is 0-19% left Internal Carotid Stenosis.    Conclusion 6/2023    The patient was monitored for a total of 12d 16h, underlying rhythm is Sinus.  The minimum heart rate was 55 bpm; the maximum 116  bpm; the average 76 bpm.  0 % of Atrial fibrillation/Atrial flutter with longest episode of 0 ms.  -- AV block with 0 %  There were 0 pauses, the longest pause was 0 ms at --.  7 episodes of VT were found with Longest VT at 10 beats .  21 supraventricular episodes were found. Longest SVT Episode 15 beats, Fastest  bpm  There were a total of 1346 PVCs with 2 morphologies and 7 couplets. Overall PVC Sandy at 0.1 %  There were a total of 1132 PSVCs with 1 morphologies and 0 couplets. Overall PSVC Sandy at 0.08 %  There is a total of 1 patient events    Results for orders placed during the hospital encounter of 06/06/23    Nuclear Stress - Cardiology Interpreted    Interpretation Summary    Abnormal myocardial perfusion scan.    There is a moderate to severe intensity, moderate sized, fixed perfusion abnormality consistent with scar in the basal to mid lateral wall(s).    There are no other significant perfusion abnormalities.    The gated perfusion images showed an ejection fraction of 50% at rest. The gated perfusion images showed an ejection fraction of 53% post stress.    The ECG portion of the study is uninterpretable due to abnormal baseline.    The patient reported no chest pain during the stress test.    There were no arrhythmias during stress.          Past Medical History:   Diagnosis Date    Acute CVA (cerebrovascular accident) 08/04/2023    Acute renal failure superimposed on stage 4 chronic kidney disease 10/17/2023    Careful hydration  Serial labs  Nephrology consult and follow up in am     Bipolar 1 disorder     CHF (congestive heart failure)     Depression     Hepatitis C     Hypertension     Other hyperlipidemia 08/15/2019       Past Surgical History:   Procedure Laterality Date    HYSTERECTOMY         Social History     Tobacco Use    Smoking status: Some Days     Current packs/day: 0.25     Average packs/day: 0.3 packs/day for 9.1 years (2.3 ttl pk-yrs)     Types: Cigarettes     Start date:  1/11/2016    Smokeless tobacco: Never    Tobacco comments:     1 pack every 3 days   Substance Use Topics    Alcohol use: Yes    Drug use: Not Currently     Types: Cocaine     Comment: states last use 10/2023       Family History   Problem Relation Name Age of Onset    Heart attack Maternal Grandmother      Hypertension Mother         Patient's Medications   New Prescriptions    No medications on file   Previous Medications    ASPIRIN (ECOTRIN) 81 MG EC TABLET    Take 1 tablet (81 mg total) by mouth once daily.    CARVEDILOL (COREG) 25 MG TABLET    Take 1 tablet (25 mg total) by mouth 2 (two) times daily.    CLONIDINE 0.3 MG/24 HR TD PTWK (CATAPRES) 0.3 MG/24 HR    Place 1 patch onto the skin every 7 days.    CLOPIDOGREL (PLAVIX) 75 MG TABLET    Take 1 tablet (75 mg total) by mouth once daily.    COLCHICINE, GOUT, (COLCRYS) 0.6 MG TABLET    Take every 8 hours on day 1; take every 12 hours on day 2; then once per day thereafter    DIPHENHYDRAMINE (BENADRYL) 25 MG CAPSULE    Take 1 capsule (25 mg total) by mouth every 6 (six) hours as needed for Itching.    ERGOCALCIFEROL (ERGOCALCIFEROL) 50,000 UNIT CAP    Take 1 capsule (50,000 Units total) by mouth every 7 days.    FUROSEMIDE (LASIX) 40 MG TABLET    Take 1 tablet (40 mg total) by mouth once daily. Do not take if BP is low - under 110/70 or feeling dry/dizzy/lightheaded due to dehydration    HYDRALAZINE (APRESOLINE) 100 MG TABLET    Take 1 tablet (100 mg total) by mouth every 8 (eight) hours.    HYDROCODONE-ACETAMINOPHEN (NORCO) 5-325 MG PER TABLET    Take 1 tablet by mouth every 4 (four) hours as needed.    HYDROXYZINE (ATARAX) 50 MG TABLET    Take 1 tablet (50 mg total) by mouth 2 (two) times daily as needed for Anxiety.    IRBESARTAN (AVAPRO) 300 MG TABLET    Take 1 tablet (300 mg total) by mouth every evening.    ISOSORBIDE MONONITRATE (IMDUR) 120 MG 24 HR TABLET    Take 2 tablets (240 mg total) by mouth every evening.    LIDOCAINE-PRILOCAINE (EMLA) CREAM     "Apply topically as needed (on right wrist).    NICOTINE (NICODERM CQ) 7 MG/24 HR    Place 1 patch onto the skin once daily.    ROSUVASTATIN (CRESTOR) 40 MG TAB    Take 1 tablet (40 mg total) by mouth every evening.    SEMAGLUTIDE, WEIGHT LOSS, (WEGOVY) 0.25 MG/0.5 ML PNIJ    Inject 0.25 mg into the skin once a week. Call office to increase dose in 6 weeks    TRETINOIN (RETIN-A) 0.025 % CREAM    Apply topically every evening. Pea sized amount to entire face at night. Start out using every other night for 2 weeks, then increase to every night as tolerated    ZIPRASIDONE (GEODON) 20 MG CAP    Take 1 capsule (20 mg total) by mouth 2 (two) times daily.   Modified Medications    No medications on file   Discontinued Medications    No medications on file       Review of Systems   Constitutional: Negative.    HENT: Negative.     Eyes: Negative.    Respiratory:  Positive for shortness of breath.    Cardiovascular:  Positive for palpitations. Negative for chest pain.   Gastrointestinal: Negative.    Genitourinary: Negative.    Musculoskeletal: Negative.    Skin: Negative.    Neurological:  Positive for dizziness.   Endo/Heme/Allergies: Negative.    Psychiatric/Behavioral:  The patient is not nervous/anxious.    All 12 systems otherwise negative.      Wt Readings from Last 3 Encounters:   02/18/25 71.6 kg (157 lb 11.8 oz)   01/02/25 76.4 kg (168 lb 5.1 oz)   11/14/24 76.9 kg (169 lb 8.5 oz)     Temp Readings from Last 3 Encounters:   05/29/24 98 °F (36.7 °C) (Oral)   05/16/24 98.1 °F (36.7 °C)   04/10/24 97.9 °F (36.6 °C)     BP Readings from Last 3 Encounters:   02/18/25 139/88   01/02/25 (!) 139/98   12/17/24 (!) 153/86     Pulse Readings from Last 3 Encounters:   02/18/25 85   01/02/25 80   12/17/24 65       /88   Pulse 85   Resp 16   Ht 5' 2" (1.575 m)   Wt 71.6 kg (157 lb 11.8 oz)   SpO2 97%   BMI 28.85 kg/m²     Objective:   Physical Exam  Vitals and nursing note reviewed.   Constitutional:       General: She " is not in acute distress.     Appearance: She is well-developed. She is not diaphoretic.   HENT:      Head: Normocephalic and atraumatic.      Nose: Nose normal.   Eyes:      General: No scleral icterus.     Conjunctiva/sclera: Conjunctivae normal.   Neck:      Thyroid: No thyromegaly.      Vascular: No JVD.   Cardiovascular:      Rate and Rhythm: Normal rate and regular rhythm.      Heart sounds: S1 normal and S2 normal. No murmur heard.     No friction rub. No gallop. No S3 or S4 sounds.   Pulmonary:      Effort: Pulmonary effort is normal. No respiratory distress.      Breath sounds: Normal breath sounds. No stridor. No wheezing or rales.   Chest:      Chest wall: No tenderness.   Abdominal:      General: Bowel sounds are normal. There is no distension.      Palpations: Abdomen is soft. There is no mass.      Tenderness: There is no abdominal tenderness. There is no rebound.   Genitourinary:     Comments: Deferred  Musculoskeletal:         General: No tenderness or deformity. Normal range of motion.      Cervical back: Normal range of motion and neck supple.   Lymphadenopathy:      Cervical: No cervical adenopathy.   Skin:     General: Skin is warm and dry.      Coloration: Skin is not pale.      Findings: No erythema or rash.   Neurological:      Mental Status: She is alert and oriented to person, place, and time.      Motor: No abnormal muscle tone.      Coordination: Coordination normal.   Psychiatric:         Behavior: Behavior normal.         Thought Content: Thought content normal.         Judgment: Judgment normal.         Lab Results   Component Value Date     09/19/2024    K 4.3 09/19/2024     09/19/2024    CO2 24 09/19/2024    BUN 58 (H) 09/19/2024    CREATININE 3.2 (H) 09/19/2024     (H) 09/19/2024    HGBA1C 5.1 07/19/2023    MG 2.1 08/05/2023    AST 15 05/29/2024    ALT 9 (L) 05/29/2024    ALBUMIN 4.0 05/29/2024    PROT 8.0 05/29/2024    BILITOT 0.3 05/29/2024    WBC 7.22 05/29/2024     HGB 12.1 05/29/2024    HCT 38.0 05/29/2024    MCV 87 05/29/2024     05/29/2024    INR 1.0 08/05/2023    TSH 0.761 08/04/2023    CHOL 155 08/04/2023    HDL 44 08/04/2023    LDLCALC 101.4 08/04/2023    TRIG 48 08/04/2023     (H) 04/04/2024     Assessment:      1. History of CVA (cerebrovascular accident)    2. ASCVD (arteriosclerotic cardiovascular disease)    3. History of TIA (transient ischemic attack)    4. Obesity (BMI 30-39.9)    5. Chronic heart failure with preserved ejection fraction    6. AUGUSTE (dyspnea on exertion)    7. Primary hypertension    8. Hyperlipidemia LDL goal <70    9. Hyperlipidemia, unspecified hyperlipidemia type    10. Chronic hypertension    11. Other hyperlipidemia    12. Stage 4 chronic kidney disease    13. Chronic hepatitis C without hepatic coma    14. Abnormal ECG    15. Tobacco use    16. Cerebral aneurysm, nonruptured    17. Palpitations    18. Brain aneurysm          Plan:   1. HFPEF, with occ CP/SOB; last  --> 236   - Nuclear stress 6/2023 neg for ischemia, basal, mid lateral wall scar note  - titrate meds  - cont lasix - increased to 40mg   - ECHO 8/2023 with normal bi V function and valves, grade 1 DD. Neg bubble study.     2. HTN - elevated/uncontrolled, h/o HTN emergency 10/2023 and 4/2024 - elevated occ    - cont meds - increased Coreg dose ,increase Imdur to 240 mg   - low salt diet  - chance clonidine to patch - increased to Clonidine 0.3 mg patch  - inc Hydralazine to 100mg TID - needs to take TID  - cont Imdur 30mg QHS --increase to 60mg  --> increase to 120 mg     3. CKD4  - f/u with nephro/PCP  - saw Dr. Sexton in past    4. Hep C  - cont tx per PCP    5. HLD  - cont statin - changed to pravastatin 40mg  - unsure if she was taking livalo     6. SOB ? COPD with smoking abuse - stopped smoking!  - referred to smoking cessation  - f/u pulm  - rec nicotine patch - cannot smoke while on it     7. Recurrent H/O TIA/CVA - L sided weakness? -3/2023,  8/2023  - cont meds - asa and plavix; 0.5 cm right MCA bifurcation aneurysm.   - ECHO 8/2023 with normal bi V function and valves, grade 1 DD. Neg bubble study.   - Vital monitor 6/2023 with brief SVT/NSVT, rare PVCS, PACs noted; AVG HR 76 bpm.  - Carotids 6/2023 overall normal.    - f/u Neuro - may need angio - upcoming MRI/MRA of brain - had 0.5 cm right proximal M2 MCA aneurysm     8. H/o Obesity - BMI 30 --> BMI  -31 169 lbs  --> 168 --. BMI 28 - 157 lbs   - cont Wegovy 0.25  - Wegovy has been approved. Took one dose in office today. --> increase to 0.5 mg     Visit today included increased complexity associated with the care of the episodic problem dyspnea addressed and managing the longitudinal care of the patient due to the serious and/or complex managed problem(s) .      Thank you for allowing me to participate in this patient's care. Please do not hesitate to contact me with any questions or concerns. Consult note has been forwarded to the referral physician.

## 2025-03-17 ENCOUNTER — TELEPHONE (OUTPATIENT)
Dept: NEUROSURGERY | Facility: CLINIC | Age: 70
End: 2025-03-17
Payer: MEDICARE

## 2025-03-17 NOTE — TELEPHONE ENCOUNTER
----- Message from Radha sent at 3/17/2025  9:34 AM CDT -----  Pt calling to see when her next giovanni will be for her to get her Stint put in Confirmed patient's contact info below:Contact Name: Rose MilliganPhone Number: [phone]

## 2025-03-18 DIAGNOSIS — F31.75 BIPOLAR 1 DISORDER, DEPRESSED, PARTIAL REMISSION: ICD-10-CM

## 2025-03-18 RX ORDER — ZIPRASIDONE HYDROCHLORIDE 20 MG/1
CAPSULE ORAL
Qty: 180 CAPSULE | Refills: 0 | Status: SHIPPED | OUTPATIENT
Start: 2025-03-18

## 2025-03-20 DIAGNOSIS — F31.75 BIPOLAR 1 DISORDER, DEPRESSED, PARTIAL REMISSION: ICD-10-CM

## 2025-03-20 DIAGNOSIS — E66.9 OBESITY (BMI 30-39.9): ICD-10-CM

## 2025-03-20 DIAGNOSIS — I25.10 ASCVD (ARTERIOSCLEROTIC CARDIOVASCULAR DISEASE): ICD-10-CM

## 2025-03-20 DIAGNOSIS — I50.32 CHRONIC HEART FAILURE WITH PRESERVED EJECTION FRACTION: ICD-10-CM

## 2025-03-20 DIAGNOSIS — Z86.73 HISTORY OF TIA (TRANSIENT ISCHEMIC ATTACK): ICD-10-CM

## 2025-03-20 DIAGNOSIS — Z86.73 HISTORY OF CVA (CEREBROVASCULAR ACCIDENT): ICD-10-CM

## 2025-03-20 DIAGNOSIS — I10 PRIMARY HYPERTENSION: ICD-10-CM

## 2025-03-20 DIAGNOSIS — E78.5 HYPERLIPIDEMIA LDL GOAL <70: ICD-10-CM

## 2025-03-20 RX ORDER — ROSUVASTATIN CALCIUM 40 MG/1
40 TABLET, COATED ORAL NIGHTLY
Qty: 90 TABLET | Refills: 1 | Status: SHIPPED | OUTPATIENT
Start: 2025-03-20 | End: 2026-03-20

## 2025-03-20 RX ORDER — FUROSEMIDE 40 MG/1
40 TABLET ORAL DAILY
Qty: 90 TABLET | Refills: 1 | Status: SHIPPED | OUTPATIENT
Start: 2025-03-20 | End: 2025-03-20 | Stop reason: SDUPTHER

## 2025-03-20 RX ORDER — FUROSEMIDE 40 MG/1
40 TABLET ORAL DAILY
Qty: 90 TABLET | Refills: 1 | Status: SHIPPED | OUTPATIENT
Start: 2025-03-20

## 2025-03-20 RX ORDER — ASPIRIN 81 MG/1
81 TABLET ORAL DAILY
Qty: 90 TABLET | Refills: 3 | Status: SHIPPED | OUTPATIENT
Start: 2025-03-20

## 2025-03-20 RX ORDER — IRBESARTAN 300 MG/1
300 TABLET ORAL NIGHTLY
Qty: 90 TABLET | Refills: 1 | Status: SHIPPED | OUTPATIENT
Start: 2025-03-20 | End: 2026-03-20

## 2025-03-20 RX ORDER — ISOSORBIDE MONONITRATE 120 MG/1
240 TABLET, EXTENDED RELEASE ORAL NIGHTLY
Qty: 90 TABLET | Refills: 1 | Status: SHIPPED | OUTPATIENT
Start: 2025-03-20 | End: 2026-03-20

## 2025-03-20 RX ORDER — SEMAGLUTIDE 0.5 MG/.5ML
0.5 INJECTION, SOLUTION SUBCUTANEOUS WEEKLY
Qty: 6 ML | Refills: 0 | Status: SHIPPED | OUTPATIENT
Start: 2025-03-20

## 2025-03-20 RX ORDER — CLONIDINE 0.3 MG/24H
1 PATCH, EXTENDED RELEASE TRANSDERMAL
Qty: 4 PATCH | Refills: 11 | Status: SHIPPED | OUTPATIENT
Start: 2025-03-20 | End: 2026-03-20

## 2025-03-20 RX ORDER — ZIPRASIDONE HYDROCHLORIDE 20 MG/1
CAPSULE ORAL
Qty: 180 CAPSULE | Refills: 0 | OUTPATIENT
Start: 2025-03-20

## 2025-03-20 RX ORDER — CLOPIDOGREL BISULFATE 75 MG/1
75 TABLET ORAL DAILY
Qty: 90 TABLET | Refills: 1 | Status: SHIPPED | OUTPATIENT
Start: 2025-03-20

## 2025-03-20 RX ORDER — HYDRALAZINE HYDROCHLORIDE 100 MG/1
100 TABLET, FILM COATED ORAL EVERY 8 HOURS
Qty: 180 TABLET | Refills: 1 | Status: SHIPPED | OUTPATIENT
Start: 2025-03-20

## 2025-03-20 RX ORDER — CARVEDILOL 25 MG/1
25 TABLET ORAL 2 TIMES DAILY
Qty: 180 TABLET | Refills: 1 | Status: SHIPPED | OUTPATIENT
Start: 2025-03-20

## 2025-03-20 RX ORDER — SEMAGLUTIDE 0.5 MG/.5ML
0.5 INJECTION, SOLUTION SUBCUTANEOUS WEEKLY
Qty: 6 ML | Refills: 0 | Status: SHIPPED | OUTPATIENT
Start: 2025-03-20 | End: 2025-03-20 | Stop reason: SDUPTHER

## 2025-03-24 DIAGNOSIS — Z86.73 HISTORY OF CVA (CEREBROVASCULAR ACCIDENT): ICD-10-CM

## 2025-03-24 DIAGNOSIS — I25.10 ASCVD (ARTERIOSCLEROTIC CARDIOVASCULAR DISEASE): ICD-10-CM

## 2025-03-24 DIAGNOSIS — E66.9 OBESITY (BMI 30-39.9): ICD-10-CM

## 2025-03-24 DIAGNOSIS — Z86.73 HISTORY OF TIA (TRANSIENT ISCHEMIC ATTACK): ICD-10-CM

## 2025-03-24 RX ORDER — SEMAGLUTIDE 0.5 MG/.5ML
0.5 INJECTION, SOLUTION SUBCUTANEOUS WEEKLY
Qty: 6 ML | Refills: 0 | Status: SHIPPED | OUTPATIENT
Start: 2025-03-24 | End: 2025-03-25 | Stop reason: SDUPTHER

## 2025-03-24 NOTE — TELEPHONE ENCOUNTER
----- Message from Kenna sent at 3/24/2025  3:24 PM CDT -----  Contact: celina bowie  Type:  RX Refill RequestWho Called: celina bowieRefill or New Rx:refillRX Name and Strength:semaglutide, weight loss, (WEGOVY) 0.5 mg/0.5 mL PnIjPreferred Pharmacy with phone number:[facility] [id number] [place] [id number]: [phone] Fax: [phone] or Mail Order:mailOrdering Provider:Bart the patient rather a call back or a response via [facility]? Call [facility] Call Back Number:[phone] Information: n/a

## 2025-03-25 DIAGNOSIS — Z86.73 HISTORY OF CVA (CEREBROVASCULAR ACCIDENT): ICD-10-CM

## 2025-03-25 DIAGNOSIS — I25.10 ASCVD (ARTERIOSCLEROTIC CARDIOVASCULAR DISEASE): ICD-10-CM

## 2025-03-25 DIAGNOSIS — Z86.73 HISTORY OF TIA (TRANSIENT ISCHEMIC ATTACK): ICD-10-CM

## 2025-03-25 DIAGNOSIS — E66.9 OBESITY (BMI 30-39.9): ICD-10-CM

## 2025-03-25 RX ORDER — SEMAGLUTIDE 0.5 MG/.5ML
0.5 INJECTION, SOLUTION SUBCUTANEOUS WEEKLY
Qty: 6 ML | Refills: 0 | Status: SHIPPED | OUTPATIENT
Start: 2025-03-25

## 2025-04-04 ENCOUNTER — TELEPHONE (OUTPATIENT)
Dept: PSYCHIATRY | Facility: CLINIC | Age: 70
End: 2025-04-04
Payer: MEDICARE

## 2025-04-04 NOTE — TELEPHONE ENCOUNTER
----- Message from Fabby Vidal sent at 4/4/2025  9:23 AM CDT -----  Contact: Ngoc  .Type: Patient  Requesting Call BackWho Called: Karen is this regarding?: AppointmentWould the patient rather a call back or a response via [facility]?  [facility] Call Back Number:.[phone] (home) Additional Information:  Patient call to reschedule. Please Call Back

## 2025-04-05 ENCOUNTER — HOSPITAL ENCOUNTER (OUTPATIENT)
Dept: RADIOLOGY | Facility: HOSPITAL | Age: 70
Discharge: HOME | End: 2025-04-05
Attending: NEUROLOGICAL SURGERY
Payer: MEDICARE

## 2025-04-05 DIAGNOSIS — I67.1 CEREBRAL ANEURYSM, NONRUPTURED: ICD-10-CM

## 2025-04-05 PROCEDURE — 70551 MRI BRAIN STEM W/O DYE: CPT | Mod: 26,,, | Performed by: RADIOLOGY

## 2025-04-05 PROCEDURE — 70551 MRI BRAIN STEM W/O DYE: CPT | Mod: TC

## 2025-04-11 ENCOUNTER — OFFICE VISIT (OUTPATIENT)
Dept: PSYCHIATRY | Facility: CLINIC | Age: 70
End: 2025-04-11
Payer: COMMERCIAL

## 2025-04-11 VITALS
BODY MASS INDEX: 27.14 KG/M2 | SYSTOLIC BLOOD PRESSURE: 103 MMHG | DIASTOLIC BLOOD PRESSURE: 70 MMHG | WEIGHT: 148.38 LBS | HEART RATE: 88 BPM

## 2025-04-11 DIAGNOSIS — F14.21: Primary | ICD-10-CM

## 2025-04-11 DIAGNOSIS — F31.75 BIPOLAR 1 DISORDER, DEPRESSED, PARTIAL REMISSION: ICD-10-CM

## 2025-04-11 PROCEDURE — 99999 PR PBB SHADOW E&M-EST. PATIENT-LVL I: CPT | Mod: PBBFAC,,, | Performed by: PSYCHIATRY & NEUROLOGY

## 2025-04-11 PROCEDURE — 3078F DIAST BP <80 MM HG: CPT | Mod: CPTII,S$GLB,, | Performed by: PSYCHIATRY & NEUROLOGY

## 2025-04-11 PROCEDURE — 3074F SYST BP LT 130 MM HG: CPT | Mod: CPTII,S$GLB,, | Performed by: PSYCHIATRY & NEUROLOGY

## 2025-04-11 PROCEDURE — 3008F BODY MASS INDEX DOCD: CPT | Mod: CPTII,S$GLB,, | Performed by: PSYCHIATRY & NEUROLOGY

## 2025-04-11 PROCEDURE — 4010F ACE/ARB THERAPY RXD/TAKEN: CPT | Mod: CPTII,S$GLB,, | Performed by: PSYCHIATRY & NEUROLOGY

## 2025-04-11 PROCEDURE — 99213 OFFICE O/P EST LOW 20 MIN: CPT | Mod: S$GLB,,, | Performed by: PSYCHIATRY & NEUROLOGY
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
## 2025-04-20 RX ORDER — ZIPRASIDONE HYDROCHLORIDE 20 MG/1
20 CAPSULE ORAL 2 TIMES DAILY
Qty: 180 CAPSULE | Refills: 1 | Status: SHIPPED | OUTPATIENT
Start: 2025-04-20

## 2025-04-22 ENCOUNTER — LAB VISIT (OUTPATIENT)
Dept: LAB | Facility: HOSPITAL | Age: 70
End: 2025-04-22
Attending: INTERNAL MEDICINE
Payer: MEDICARE

## 2025-04-22 ENCOUNTER — TELEPHONE (OUTPATIENT)
Dept: CARDIOLOGY | Facility: CLINIC | Age: 70
End: 2025-04-22

## 2025-04-22 ENCOUNTER — OFFICE VISIT (OUTPATIENT)
Dept: CARDIOLOGY | Facility: CLINIC | Age: 70
End: 2025-04-22
Payer: MEDICARE

## 2025-04-22 ENCOUNTER — RESULTS FOLLOW-UP (OUTPATIENT)
Dept: CARDIOLOGY | Facility: CLINIC | Age: 70
End: 2025-04-22

## 2025-04-22 VITALS
WEIGHT: 145.63 LBS | SYSTOLIC BLOOD PRESSURE: 127 MMHG | HEART RATE: 79 BPM | BODY MASS INDEX: 26.8 KG/M2 | OXYGEN SATURATION: 99 % | RESPIRATION RATE: 16 BRPM | DIASTOLIC BLOOD PRESSURE: 86 MMHG | HEIGHT: 62 IN

## 2025-04-22 DIAGNOSIS — I25.10 ASCVD (ARTERIOSCLEROTIC CARDIOVASCULAR DISEASE): Primary | ICD-10-CM

## 2025-04-22 DIAGNOSIS — I10 HYPERTENSION, UNSPECIFIED TYPE: ICD-10-CM

## 2025-04-22 DIAGNOSIS — Z86.73 HISTORY OF CVA (CEREBROVASCULAR ACCIDENT): ICD-10-CM

## 2025-04-22 DIAGNOSIS — R06.09 DOE (DYSPNEA ON EXERTION): ICD-10-CM

## 2025-04-22 DIAGNOSIS — N18.4 STAGE 4 CHRONIC KIDNEY DISEASE: ICD-10-CM

## 2025-04-22 DIAGNOSIS — B18.2 CHRONIC HEPATITIS C WITHOUT HEPATIC COMA: ICD-10-CM

## 2025-04-22 DIAGNOSIS — I10 CHRONIC HYPERTENSION: ICD-10-CM

## 2025-04-22 DIAGNOSIS — N18.4 CKD (CHRONIC KIDNEY DISEASE) STAGE 4, GFR 15-29 ML/MIN: Primary | ICD-10-CM

## 2025-04-22 DIAGNOSIS — E78.49 OTHER HYPERLIPIDEMIA: ICD-10-CM

## 2025-04-22 DIAGNOSIS — E66.9 OBESITY (BMI 30-39.9): ICD-10-CM

## 2025-04-22 DIAGNOSIS — I10 PRIMARY HYPERTENSION: ICD-10-CM

## 2025-04-22 DIAGNOSIS — I50.32 CHRONIC HEART FAILURE WITH PRESERVED EJECTION FRACTION: ICD-10-CM

## 2025-04-22 DIAGNOSIS — Z86.73 HISTORY OF TIA (TRANSIENT ISCHEMIC ATTACK): ICD-10-CM

## 2025-04-22 DIAGNOSIS — Z72.0 TOBACCO USE: ICD-10-CM

## 2025-04-22 DIAGNOSIS — I16.0 HYPERTENSIVE URGENCY: ICD-10-CM

## 2025-04-22 DIAGNOSIS — E78.5 HYPERLIPIDEMIA LDL GOAL <70: ICD-10-CM

## 2025-04-22 DIAGNOSIS — E78.5 HYPERLIPIDEMIA, UNSPECIFIED HYPERLIPIDEMIA TYPE: ICD-10-CM

## 2025-04-22 DIAGNOSIS — N18.4 CKD (CHRONIC KIDNEY DISEASE) STAGE 4, GFR 15-29 ML/MIN: ICD-10-CM

## 2025-04-22 DIAGNOSIS — R00.2 PALPITATIONS: ICD-10-CM

## 2025-04-22 LAB
ANION GAP (OHS): 14 MMOL/L (ref 8–16)
BNP SERPL-MCNC: 48 PG/ML (ref 0–99)
BUN SERPL-MCNC: 73 MG/DL (ref 8–23)
CALCIUM SERPL-MCNC: 9.4 MG/DL (ref 8.7–10.5)
CHLORIDE SERPL-SCNC: 110 MMOL/L (ref 95–110)
CO2 SERPL-SCNC: 19 MMOL/L (ref 23–29)
CREAT SERPL-MCNC: 3.9 MG/DL (ref 0.5–1.4)
GFR SERPLBLD CREATININE-BSD FMLA CKD-EPI: 12 ML/MIN/1.73/M2
GLUCOSE SERPL-MCNC: 110 MG/DL (ref 70–110)
MAGNESIUM SERPL-MCNC: 2.2 MG/DL (ref 1.6–2.6)
POTASSIUM SERPL-SCNC: 4.1 MMOL/L (ref 3.5–5.1)
SODIUM SERPL-SCNC: 143 MMOL/L (ref 136–145)

## 2025-04-22 PROCEDURE — 1160F RVW MEDS BY RX/DR IN RCRD: CPT | Mod: CPTII,S$GLB,, | Performed by: INTERNAL MEDICINE

## 2025-04-22 PROCEDURE — 1159F MED LIST DOCD IN RCRD: CPT | Mod: CPTII,S$GLB,, | Performed by: INTERNAL MEDICINE

## 2025-04-22 PROCEDURE — 1101F PT FALLS ASSESS-DOCD LE1/YR: CPT | Mod: CPTII,S$GLB,, | Performed by: INTERNAL MEDICINE

## 2025-04-22 PROCEDURE — 3074F SYST BP LT 130 MM HG: CPT | Mod: CPTII,S$GLB,, | Performed by: INTERNAL MEDICINE

## 2025-04-22 PROCEDURE — 83735 ASSAY OF MAGNESIUM: CPT

## 2025-04-22 PROCEDURE — 36415 COLL VENOUS BLD VENIPUNCTURE: CPT

## 2025-04-22 PROCEDURE — 4010F ACE/ARB THERAPY RXD/TAKEN: CPT | Mod: CPTII,S$GLB,, | Performed by: INTERNAL MEDICINE

## 2025-04-22 PROCEDURE — 3288F FALL RISK ASSESSMENT DOCD: CPT | Mod: CPTII,S$GLB,, | Performed by: INTERNAL MEDICINE

## 2025-04-22 PROCEDURE — 99214 OFFICE O/P EST MOD 30 MIN: CPT | Mod: S$GLB,,, | Performed by: INTERNAL MEDICINE

## 2025-04-22 PROCEDURE — 83880 ASSAY OF NATRIURETIC PEPTIDE: CPT

## 2025-04-22 PROCEDURE — G2211 COMPLEX E/M VISIT ADD ON: HCPCS | Mod: S$GLB,,, | Performed by: INTERNAL MEDICINE

## 2025-04-22 PROCEDURE — 99999 PR PBB SHADOW E&M-EST. PATIENT-LVL IV: CPT | Mod: PBBFAC,,, | Performed by: INTERNAL MEDICINE

## 2025-04-22 PROCEDURE — 3079F DIAST BP 80-89 MM HG: CPT | Mod: CPTII,S$GLB,, | Performed by: INTERNAL MEDICINE

## 2025-04-22 PROCEDURE — 80048 BASIC METABOLIC PNL TOTAL CA: CPT

## 2025-04-22 PROCEDURE — 3008F BODY MASS INDEX DOCD: CPT | Mod: CPTII,S$GLB,, | Performed by: INTERNAL MEDICINE

## 2025-04-22 RX ORDER — FUROSEMIDE 40 MG/1
40 TABLET ORAL DAILY
Qty: 90 TABLET | Refills: 1 | Status: SHIPPED | OUTPATIENT
Start: 2025-04-22 | End: 2025-04-22 | Stop reason: SDUPTHER

## 2025-04-22 RX ORDER — FUROSEMIDE 40 MG/1
40 TABLET ORAL DAILY PRN
Qty: 90 TABLET | Refills: 1 | Status: SHIPPED | OUTPATIENT
Start: 2025-04-22

## 2025-04-22 RX ORDER — SEMAGLUTIDE 0.5 MG/.5ML
0.5 INJECTION, SOLUTION SUBCUTANEOUS WEEKLY
Qty: 6 ML | Refills: 0 | Status: SHIPPED | OUTPATIENT
Start: 2025-04-22

## 2025-04-22 NOTE — TELEPHONE ENCOUNTER
The patient has been notified of this information and all questions answered.  No further questions    ----- Message from Cullen Hannon MD sent at 4/22/2025  5:10 PM CDT -----  Please contact the patient and let them know that their results reveal worsening/dry kidneys - need to stop taking lasix daily and increase water intake. Can restart lasix only if having any swelling   or fluid build up with elevated BP. Fluid level - BNP is normal/low ; I have also placed a referral to see nephrologist to check out kidneys further. Continue low salt diet and monitor BP and   weights.   ----- Message -----  From: Lab, Background User  Sent: 4/22/2025  10:19 AM CDT  To: Cullen Hannon MD

## 2025-04-22 NOTE — PROGRESS NOTES
Subjective:   Patient ID:  Rose Milligan is a 69 y.o. female who presents for cardiac consult of No chief complaint on file.      The patient came in today for cardiac consult of No chief complaint on file.      Rose Milligan is a 69 y.o. female  with HFpEF, HTN, pulm HTN, h/o TIA/CVA, tobacco abuse, h/o drug abuse, bipolar, hep C, CKD4, obesity presents for follow up CV eval.      2/18/25  BP and HR stable. BMI 28 - 157 lbs   Pt wants to increase dose.   Overall doing well, has knee pain but improving.     4/22/25  She had recent MRI brain - neg for acute process, MRA pending.   BP and HR stable. BMI 26 - 145 lbs   She is doing well overall.     Results for orders placed during the hospital encounter of 08/04/23    Echo    Interpretation Summary    Left Ventricle: The left ventricle is normal in size. Moderately increased wall thickness. There is moderate concentrict hypertrophy. Normal wall motion. There is normal systolic function with a visually estimated ejection fraction of 55 - 70%. Grade I diastolic dysfunction.    Left Atrium: Left atrium is mildly dilated.    Right Ventricle: Normal right ventricular cavity size. Wall thickness is normal. Right ventricle wall motion  is normal. Systolic function is normal.    Mitral Valve: There is mild regurgitation.    Tricuspid Valve: There is mild regurgitation.    IVC/SVC: Intermediate venous pressure at 8 mmHg.    Bubble study negative          Conclusion 6/2023     There is 0-19% right Internal Carotid Stenosis.  There is 0-19% left Internal Carotid Stenosis.    Conclusion 6/2023    The patient was monitored for a total of 12d 16h, underlying rhythm is Sinus.  The minimum heart rate was 55 bpm; the maximum 116 bpm; the average 76 bpm.  0 % of Atrial fibrillation/Atrial flutter with longest episode of 0 ms.  -- AV block with 0 %  There were 0 pauses, the longest pause was 0 ms at --.  7 episodes of VT were found with Longest VT at 10 beats .  21 supraventricular  episodes were found. Longest SVT Episode 15 beats, Fastest  bpm  There were a total of 1346 PVCs with 2 morphologies and 7 couplets. Overall PVC [place] at 0.1 %  There were a total of 1132 PSVCs with 1 morphologies and 0 couplets. Overall PSVC [place] at 0.08 %  There is a total of 1 patient events    Results for orders placed during the hospital encounter of 06/06/23    Nuclear Stress - Cardiology Interpreted    Interpretation Summary    Abnormal myocardial perfusion scan.    There is a moderate to severe intensity, moderate sized, fixed perfusion abnormality consistent with scar in the basal to mid lateral wall(s).    There are no other significant perfusion abnormalities.    The gated perfusion images showed an ejection fraction of 50% at rest. The gated perfusion images showed an ejection fraction of 53% post stress.    The ECG portion of the study is uninterpretable due to abnormal baseline.    The patient reported no chest pain during the stress test.    There were no arrhythmias during stress.          Past Medical History:   Diagnosis Date    Acute CVA (cerebrovascular accident) 08/04/2023    Acute renal failure superimposed on stage 4 chronic kidney disease 10/17/2023    Careful hydration  Serial labs  Nephrology consult and follow up in am     Bipolar 1 disorder     CHF (congestive heart failure)     Depression     Hepatitis C     Hypertension     Other hyperlipidemia 08/15/2019       Past Surgical History:   Procedure Laterality Date    HYSTERECTOMY         Social History     Tobacco Use    Smoking status: Some Days     Current packs/day: 0.25     Average packs/day: 0.3 packs/day for 9.3 years (2.3 ttl pk-yrs)     Types: Cigarettes     Start date: 1/11/2016    Smokeless tobacco: Never    Tobacco comments:     1 pack every 3 days   Substance Use Topics    Alcohol use: Yes    Drug use: Not Currently     Types: Cocaine     Comment: states last use 10/2023       Family History   Problem Relation Name  Age of Onset    Heart attack Maternal Grandmother      Hypertension Mother         Patient's Medications   New Prescriptions    No medications on file   Previous Medications    ASPIRIN (ECOTRIN) 81 MG EC TABLET    Take 1 tablet (81 mg total) by mouth once daily.    CARVEDILOL (COREG) 25 MG TABLET    Take 1 tablet (25 mg total) by mouth 2 (two) times daily.    CLONIDINE 0.3 MG/24 HR TD PTWK (CATAPRES) 0.3 MG/24 HR    Place 1 patch onto the skin every 7 days.    CLOPIDOGREL (PLAVIX) 75 MG TABLET    Take 1 tablet (75 mg total) by mouth once daily.    COLCHICINE, GOUT, (COLCRYS) 0.6 MG TABLET    Take every 8 hours on day 1; take every 12 hours on day 2; then once per day thereafter    DIPHENHYDRAMINE (BENADRYL) 25 MG CAPSULE    Take 1 capsule (25 mg total) by mouth every 6 (six) hours as needed for Itching.    ERGOCALCIFEROL (ERGOCALCIFEROL) 50,000 UNIT CAP    Take 1 capsule (50,000 Units total) by mouth every 7 days.    FUROSEMIDE (LASIX) 40 MG TABLET    Take 1 tablet (40 mg total) by mouth once daily. Do not take if BP is low - under 110/70 or feeling dry/dizzy/lightheaded due to dehydration    HYDRALAZINE (APRESOLINE) 100 MG TABLET    Take 1 tablet (100 mg total) by mouth every 8 (eight) hours.    HYDROCODONE-ACETAMINOPHEN (NORCO) 5-325 MG PER TABLET    Take 1 tablet by mouth every 4 (four) hours as needed.    HYDROXYZINE (ATARAX) 50 MG TABLET    Take 1 tablet (50 mg total) by mouth 2 (two) times daily as needed for Anxiety.    IRBESARTAN (AVAPRO) 300 MG TABLET    Take 1 tablet (300 mg total) by mouth every evening.    ISOSORBIDE MONONITRATE (IMDUR) 120 MG 24 HR TABLET    Take 2 tablets (240 mg total) by mouth every evening.    LIDOCAINE-PRILOCAINE (EMLA) CREAM    Apply topically as needed (on right wrist).    NICOTINE (NICODERM CQ) 7 MG/24 HR    Place 1 patch onto the skin once daily.    ROSUVASTATIN (CRESTOR) 40 MG TAB    Take 1 tablet (40 mg total) by mouth every evening.    SEMAGLUTIDE, WEIGHT LOSS, (WEGOVY) 0.5  MG/0.5 ML PNIJ    Inject 0.5 mg into the skin once a week. Call office in 6 weeks to increase dose    TRETINOIN (RETIN-A) 0.025 % CREAM    Apply topically every evening. Pea sized amount to entire face at night. Start out using every other night for 2 weeks, then increase to every night as tolerated    ZIPRASIDONE (GEODON) 20 MG CAP    Take 1 capsule (20 mg total) by mouth 2 (two) times daily.   Modified Medications    No medications on file   Discontinued Medications    No medications on file       Review of Systems   Constitutional: Negative.    HENT: Negative.     Eyes: Negative.    Respiratory:  Positive for shortness of breath.    Cardiovascular:  Positive for palpitations. Negative for chest pain.   Gastrointestinal: Negative.    Genitourinary: Negative.    Musculoskeletal: Negative.    Skin: Negative.    Neurological:  Positive for dizziness.   Endo/Heme/Allergies: Negative.    Psychiatric/Behavioral:  The patient is not nervous/anxious.    All 12 systems otherwise negative.      Wt Readings from Last 3 Encounters:   02/18/25 71.6 kg (157 lb 11.8 oz)   01/02/25 76.4 kg (168 lb 5.1 oz)   11/14/24 76.9 kg (169 lb 8.5 oz)     Temp Readings from Last 3 Encounters:   05/29/24 98 °F (36.7 °C) (Oral)   05/16/24 98.1 °F (36.7 °C)   04/10/24 97.9 °F (36.6 °C)     BP Readings from Last 3 Encounters:   02/18/25 139/88   01/02/25 (!) 139/98   12/17/24 (!) 153/86     Pulse Readings from Last 3 Encounters:   02/18/25 85   01/02/25 80   12/17/24 65       There were no vitals taken for this visit.    Objective:   Physical Exam  Vitals and nursing note reviewed.   Constitutional:       General: She is not in acute distress.     Appearance: She is well-developed. She is not diaphoretic.   HENT:      Head: Normocephalic and atraumatic.      Nose: Nose normal.   Eyes:      General: No scleral icterus.     Conjunctiva/sclera: Conjunctivae normal.   Neck:      Thyroid: No thyromegaly.      Vascular: No JVD.   Cardiovascular:       Rate and Rhythm: Normal rate and regular rhythm.      Heart sounds: S1 normal and S2 normal. No murmur heard.     No friction rub. No gallop. No S3 or S4 sounds.   Pulmonary:      Effort: Pulmonary effort is normal. No respiratory distress.      Breath sounds: Normal breath sounds. No stridor. No wheezing or rales.   Chest:      Chest wall: No tenderness.   Abdominal:      General: Bowel sounds are normal. There is no distension.      Palpations: Abdomen is soft. There is no mass.      Tenderness: There is no abdominal tenderness. There is no rebound.   Genitourinary:     Comments: Deferred  Musculoskeletal:         General: No tenderness or deformity. Normal range of motion.      Cervical back: Normal range of motion and neck supple.   Lymphadenopathy:      Cervical: No cervical adenopathy.   Skin:     General: Skin is warm and dry.      Coloration: Skin is not pale.      Findings: No erythema or rash.   Neurological:      Mental Status: She is alert and oriented to person, place, and time.      Motor: No abnormal muscle tone.      Coordination: Coordination normal.   Psychiatric:         Behavior: Behavior normal.         Thought Content: Thought content normal.         Judgment: Judgment normal.         Lab Results   Component Value Date     09/19/2024    K 4.3 09/19/2024     09/19/2024    CO2 24 09/19/2024    BUN 58 (H) 09/19/2024    CREATININE 3.2 (H) 09/19/2024     (H) 09/19/2024    HGBA1C 5.1 07/19/2023    MG 2.1 08/05/2023    AST 15 05/29/2024    ALT 9 (L) 05/29/2024    ALBUMIN 4.0 05/29/2024    PROT 8.0 05/29/2024    BILITOT 0.3 05/29/2024    WBC 7.22 05/29/2024    HGB 12.1 05/29/2024    HCT 38.0 05/29/2024    MCV 87 05/29/2024     05/29/2024    INR 1.0 08/05/2023    TSH 0.761 08/04/2023    CHOL 155 08/04/2023    HDL 44 08/04/2023    LDLCALC 101.4 08/04/2023    TRIG 48 08/04/2023     (H) 04/04/2024     Assessment:      1. ASCVD (arteriosclerotic cardiovascular disease)    2.  History of CVA (cerebrovascular accident)    3. History of TIA (transient ischemic attack)    4. Obesity (BMI 30-39.9)    5. Chronic heart failure with preserved ejection fraction    6. Primary hypertension    7. Hyperlipidemia LDL goal <70    8. AUGUSTE (dyspnea on exertion)    9. Hyperlipidemia, unspecified hyperlipidemia type    10. Chronic hypertension    11. Other hyperlipidemia    12. Stage 4 chronic kidney disease    13. Chronic hepatitis C without hepatic coma    14. Tobacco use    15. CKD (chronic kidney disease) stage 4, GFR 15-29 ml/min    16. Palpitations    17. Hypertensive urgency    18. Hypertension, unspecified type            Plan:   1. HFPEF, with occ CP/SOB; last  --> 236   - Nuclear stress 6/2023 neg for ischemia, basal, mid lateral wall scar note  - titrate meds  - cont lasix - increased to 40mg   - ECHO 8/2023 with normal bi V function and valves, grade 1 DD. Neg bubble study.   - repeat labs     2. HTN - h/o HTN emergency 10/2023 and 4/2024 - elevated occ    - cont meds - increased Coreg dose ,increase Imdur to 240 mg   - low salt diet  - change clonidine to patch - increased to Clonidine 0.3 mg patch  - inc Hydralazine to 100mg TID - needs to take TID  - cont Imdur 30mg QHS --increase to 60mg  --> increase to 120 mg     3. CKD4  - f/u with nephro/PCP  - saw Dr. Sexton in past    4. Hep C  - cont tx per PCP    5. HLD  - cont statin - changed to pravastatin 40mg  - unsure if she was taking livalo     6. SOB ? COPD with smoking abuse - stopped smoking!  - referred to smoking cessation  - f/u pulm  - rec nicotine patch - cannot smoke while on it     7. Recurrent H/O TIA/CVA - L sided weakness? -3/2023, 8/2023  - cont meds - asa and plavix; 0.5 cm right MCA bifurcation aneurysm.   - ECHO 8/2023 with normal bi V function and valves, grade 1 DD. Neg bubble study.   - Vital monitor 6/2023 with brief SVT/NSVT, rare PVCS, PACs noted; AVG HR 76 bpm.  - Carotids 6/2023 overall normal.    - f/u Neuro -  may need angio - had 0.5 cm right proximal M2 MCA aneurysm ; had MRI 4/2025    8. H/o Obesity - BMI 30 --> BMI  -31 169 lbs  --> 168 --. BMI 28 - 157 lbs -.  BMI 26 - 145 lbs    - cont Wegovy 0.25  - Wegovy has been approved. Took one dose in office today. --> increase to 0.5 mg - STABLE    Visit today included increased complexity associated with the care of the episodic problem dyspnea addressed and managing the longitudinal care of the patient due to the serious and/or complex managed problem(s) .      Thank you for allowing me to participate in this patient's care. Please do not hesitate to contact me with any questions or concerns. Consult note has been forwarded to the referral physician.

## 2025-04-23 ENCOUNTER — TELEPHONE (OUTPATIENT)
Dept: CARDIOLOGY | Facility: CLINIC | Age: 70
End: 2025-04-23
Payer: MEDICARE

## 2025-04-23 NOTE — TELEPHONE ENCOUNTER
Spoke with patient to advise that per Dr. Hannon:  results reveal worsening/dry kidneys - need to stop taking lasix daily and increase water intake. Can restart lasix only if having any swelling or fluid build up with elevated BP. Fluid level - BNP is normal/low ; I have also placed a referral to see nephrologist to check out kidneys further. Continue low salt diet and monitor BP and weights. Patient voiced understanding.      ----- Message from Cullen Hannon MD sent at 4/22/2025  5:10 PM CDT -----  Please contact the patient and let them know that their results reveal worsening/dry kidneys - need to stop taking lasix daily and increase water intake. Can restart lasix only if having any swelling   or fluid build up with elevated BP. Fluid level - BNP is normal/low ; I have also placed a referral to see nephrologist to check out kidneys further. Continue low salt diet and monitor BP and   weights.   ----- Message -----  From: Lab, Background User  Sent: 4/22/2025  10:19 AM CDT  To: Cullen Hannon MD

## 2025-05-02 ENCOUNTER — PATIENT OUTREACH (OUTPATIENT)
Dept: ADMINISTRATIVE | Facility: HOSPITAL | Age: 70
End: 2025-05-02
Payer: MEDICARE

## 2025-05-08 ENCOUNTER — PATIENT OUTREACH (OUTPATIENT)
Dept: ADMINISTRATIVE | Facility: HOSPITAL | Age: 70
End: 2025-05-08
Payer: MEDICARE

## 2025-05-08 NOTE — TELEPHONE ENCOUNTER
CALLED PT VIA PHONE     INFORMED OF BP/labs check with JAUN 12/167/ 22    AND CARD APPT 12/20/22   No change

## 2025-05-15 ENCOUNTER — LAB VISIT (OUTPATIENT)
Dept: LAB | Facility: HOSPITAL | Age: 70
End: 2025-05-15
Attending: INTERNAL MEDICINE
Payer: MEDICARE

## 2025-05-15 ENCOUNTER — OFFICE VISIT (OUTPATIENT)
Dept: FAMILY MEDICINE | Facility: CLINIC | Age: 70
End: 2025-05-15
Payer: MEDICARE

## 2025-05-15 VITALS
HEART RATE: 70 BPM | WEIGHT: 152.44 LBS | BODY MASS INDEX: 28.05 KG/M2 | DIASTOLIC BLOOD PRESSURE: 88 MMHG | HEIGHT: 62 IN | OXYGEN SATURATION: 98 % | SYSTOLIC BLOOD PRESSURE: 122 MMHG | TEMPERATURE: 98 F

## 2025-05-15 DIAGNOSIS — Z79.899 LONG TERM USE OF DRUG: ICD-10-CM

## 2025-05-15 DIAGNOSIS — G95.9 MYELOPATHY: ICD-10-CM

## 2025-05-15 DIAGNOSIS — N18.5 CHRONIC KIDNEY DISEASE (CKD), STAGE V: ICD-10-CM

## 2025-05-15 DIAGNOSIS — E78.5 HYPERLIPIDEMIA LDL GOAL <70: ICD-10-CM

## 2025-05-15 DIAGNOSIS — I10 MALIGNANT HYPERTENSION: ICD-10-CM

## 2025-05-15 DIAGNOSIS — Z72.0 TOBACCO USE: ICD-10-CM

## 2025-05-15 DIAGNOSIS — Z00.00 ANNUAL PHYSICAL EXAM: Primary | ICD-10-CM

## 2025-05-15 DIAGNOSIS — Z00.00 ANNUAL PHYSICAL EXAM: ICD-10-CM

## 2025-05-15 DIAGNOSIS — J44.9 CHRONIC OBSTRUCTIVE PULMONARY DISEASE, UNSPECIFIED COPD TYPE: ICD-10-CM

## 2025-05-15 LAB
ABSOLUTE EOSINOPHIL (OHS): 0.2 K/UL
ABSOLUTE MONOCYTE (OHS): 0.64 K/UL (ref 0.3–1)
ABSOLUTE NEUTROPHIL COUNT (OHS): 3.09 K/UL (ref 1.8–7.7)
ALBUMIN SERPL BCP-MCNC: 3.5 G/DL (ref 3.5–5.2)
ALBUMIN/CREAT UR: 79.9 UG/MG
ALP SERPL-CCNC: 78 UNIT/L (ref 40–150)
ALT SERPL W/O P-5'-P-CCNC: 7 UNIT/L (ref 10–44)
ANION GAP (OHS): 10 MMOL/L (ref 8–16)
AST SERPL-CCNC: 14 UNIT/L (ref 11–45)
BASOPHILS # BLD AUTO: 0.03 K/UL
BASOPHILS NFR BLD AUTO: 0.5 %
BILIRUB SERPL-MCNC: 0.2 MG/DL (ref 0.1–1)
BUN SERPL-MCNC: 28 MG/DL (ref 8–23)
CALCIUM SERPL-MCNC: 8.3 MG/DL (ref 8.7–10.5)
CHLORIDE SERPL-SCNC: 115 MMOL/L (ref 95–110)
CHOLEST SERPL-MCNC: 92 MG/DL (ref 120–199)
CHOLEST/HDLC SERPL: 2.4 {RATIO} (ref 2–5)
CO2 SERPL-SCNC: 21 MMOL/L (ref 23–29)
CREAT SERPL-MCNC: 2.5 MG/DL (ref 0.5–1.4)
CREAT UR-MCNC: 144 MG/DL (ref 15–325)
EAG (OHS): 91 MG/DL (ref 68–131)
ERYTHROCYTE [DISTWIDTH] IN BLOOD BY AUTOMATED COUNT: 15.7 % (ref 11.5–14.5)
GFR SERPLBLD CREATININE-BSD FMLA CKD-EPI: 20 ML/MIN/1.73/M2
GLUCOSE SERPL-MCNC: 92 MG/DL (ref 70–110)
HBA1C MFR BLD: 4.8 % (ref 4–5.6)
HCT VFR BLD AUTO: 34.1 % (ref 37–48.5)
HDLC SERPL-MCNC: 39 MG/DL (ref 40–75)
HDLC SERPL: 42.4 % (ref 20–50)
HGB BLD-MCNC: 10.2 GM/DL (ref 12–16)
IMM GRANULOCYTES # BLD AUTO: 0.02 K/UL (ref 0–0.04)
IMM GRANULOCYTES NFR BLD AUTO: 0.4 % (ref 0–0.5)
LDLC SERPL CALC-MCNC: 43.8 MG/DL (ref 63–159)
LYMPHOCYTES # BLD AUTO: 1.67 K/UL (ref 1–4.8)
MCH RBC QN AUTO: 28.7 PG (ref 27–31)
MCHC RBC AUTO-ENTMCNC: 29.9 G/DL (ref 32–36)
MCV RBC AUTO: 96 FL (ref 82–98)
MICROALBUMIN UR-MCNC: 115 UG/ML (ref ?–5000)
NONHDLC SERPL-MCNC: 53 MG/DL
NUCLEATED RBC (/100WBC) (OHS): 0 /100 WBC
PLATELET # BLD AUTO: 201 K/UL (ref 150–450)
PMV BLD AUTO: 12 FL (ref 9.2–12.9)
POTASSIUM SERPL-SCNC: 4.8 MMOL/L (ref 3.5–5.1)
PROT SERPL-MCNC: 6.7 GM/DL (ref 6–8.4)
RBC # BLD AUTO: 3.56 M/UL (ref 4–5.4)
RELATIVE EOSINOPHIL (OHS): 3.5 %
RELATIVE LYMPHOCYTE (OHS): 29.6 % (ref 18–48)
RELATIVE MONOCYTE (OHS): 11.3 % (ref 4–15)
RELATIVE NEUTROPHIL (OHS): 54.7 % (ref 38–73)
SODIUM SERPL-SCNC: 146 MMOL/L (ref 136–145)
TRIGL SERPL-MCNC: 46 MG/DL (ref 30–150)
TSH SERPL-ACNC: 1.43 UIU/ML (ref 0.4–4)
WBC # BLD AUTO: 5.65 K/UL (ref 3.9–12.7)

## 2025-05-15 PROCEDURE — 3079F DIAST BP 80-89 MM HG: CPT | Mod: CPTII,S$GLB,, | Performed by: INTERNAL MEDICINE

## 2025-05-15 PROCEDURE — 1101F PT FALLS ASSESS-DOCD LE1/YR: CPT | Mod: CPTII,S$GLB,, | Performed by: INTERNAL MEDICINE

## 2025-05-15 PROCEDURE — 84443 ASSAY THYROID STIM HORMONE: CPT

## 2025-05-15 PROCEDURE — 99397 PER PM REEVAL EST PAT 65+ YR: CPT | Mod: S$GLB,,, | Performed by: INTERNAL MEDICINE

## 2025-05-15 PROCEDURE — 82465 ASSAY BLD/SERUM CHOLESTEROL: CPT

## 2025-05-15 PROCEDURE — 99999 PR PBB SHADOW E&M-EST. PATIENT-LVL IV: CPT | Mod: PBBFAC,,, | Performed by: INTERNAL MEDICINE

## 2025-05-15 PROCEDURE — 3008F BODY MASS INDEX DOCD: CPT | Mod: CPTII,S$GLB,, | Performed by: INTERNAL MEDICINE

## 2025-05-15 PROCEDURE — 80053 COMPREHEN METABOLIC PANEL: CPT

## 2025-05-15 PROCEDURE — 3288F FALL RISK ASSESSMENT DOCD: CPT | Mod: CPTII,S$GLB,, | Performed by: INTERNAL MEDICINE

## 2025-05-15 PROCEDURE — 1159F MED LIST DOCD IN RCRD: CPT | Mod: CPTII,S$GLB,, | Performed by: INTERNAL MEDICINE

## 2025-05-15 PROCEDURE — 1126F AMNT PAIN NOTED NONE PRSNT: CPT | Mod: CPTII,S$GLB,, | Performed by: INTERNAL MEDICINE

## 2025-05-15 PROCEDURE — 36415 COLL VENOUS BLD VENIPUNCTURE: CPT | Mod: PO

## 2025-05-15 PROCEDURE — 82043 UR ALBUMIN QUANTITATIVE: CPT | Performed by: INTERNAL MEDICINE

## 2025-05-15 PROCEDURE — 3074F SYST BP LT 130 MM HG: CPT | Mod: CPTII,S$GLB,, | Performed by: INTERNAL MEDICINE

## 2025-05-15 PROCEDURE — 4010F ACE/ARB THERAPY RXD/TAKEN: CPT | Mod: CPTII,S$GLB,, | Performed by: INTERNAL MEDICINE

## 2025-05-15 PROCEDURE — 83036 HEMOGLOBIN GLYCOSYLATED A1C: CPT

## 2025-05-15 PROCEDURE — 85025 COMPLETE CBC W/AUTO DIFF WBC: CPT

## 2025-05-15 NOTE — PROGRESS NOTES
Patient ID: Rose Milligan is a 69 y.o. female.    Chief Complaint: Annual Exam      HPI  History of Present Illness    - Ms. Milligan presents for a routine physical exam and labs.    Ms. Milligan was called to come in for labs and a physical exam. She reports not having had a physical in quite a while. She denies any pain or discomfort.      ROS:  General: denies fever, denies chills, denies fatigue, denies weight gain, denies weight loss  Eyes: denies vision changes, denies redness, denies discharge  ENT: denies ear pain, denies nasal congestion, denies sore throat  Cardiovascular: denies chest pain, denies palpitations, denies lower extremity edema  Respiratory: denies cough, denies shortness of breath  Gastrointestinal: denies abdominal pain, denies nausea, denies vomiting, denies diarrhea, denies constipation, denies blood in stool  Genitourinary: denies dysuria, denies hematuria, denies frequency  Musculoskeletal: denies joint pain, denies muscle pain  Skin: denies rash, denies lesion  Neurological: denies headache, denies dizziness, denies numbness, denies tingling  Psychiatric: denies anxiety, denies depression, denies sleep difficulty                   Objective     Current Medications[1]     Physical Exam    General: In no acute distress.  Head: Normocephalic. Non traumatic.  Eyes: PERRLA. EOMs full. Conjunctivae clear. Fundi grossly normal.  Ears: EACs clear. TMs normal.  Nose: Mucosa pink. Mucosa moist. No obstruction.  Throat: Clear. No exudates. No lesions.  Neck: Supple. No masses. No thyromegaly. No bruits.  Chest: Lungs clear. No rales. No rhonchi. No wheezes.  Heart: RRR. No murmurs. No rubs. No gallops.  Abdomen: Soft. No tenderness. No masses. BS normal.  : Normal external genitalia. No lesions. No discharge. No hernias  noted.  Back: Normal curvature. No scoliosis. No tenderness.  Extremities: Warm. Well perfused. No upper extremity edema. No lower extremity edema. FROM. No deformities. No  "joint erythema.  Neuro: No focal deficits appreciated. Good muscle tone. Normal response to visual stimuli. Normal response to auditory stimuli.  Skin: Normal. No rashes. No lesions noted.               VITAL SIGNS:  Vitals:    05/15/25 1047   BP: 122/88   Pulse: 70   Temp: 97.7 °F (36.5 °C)   TempSrc: Tympanic   SpO2: 98%   Weight: 69.2 kg (152 lb 7.2 oz)   Height: 5' 2" (1.575 m)       CURRENT BMI:   Body mass index is 27.88 kg/m².    LABS REVIEWED:    CBC:  Lab Results   Component Value Date    WBC 7.22 05/29/2024    RBC 4.39 05/29/2024    HGB 12.1 05/29/2024    HCT 38.0 05/29/2024    MCV 87 05/29/2024    MCH 27.6 05/29/2024    MCHC 31.8 (L) 05/29/2024    RDW 13.0 05/29/2024     05/29/2024    MPV 10.0 05/29/2024    GRAN 3.5 05/29/2024    GRAN 48.7 05/29/2024    LYMPH 2.6 05/29/2024    LYMPH 36.0 05/29/2024    MONO 0.7 05/29/2024    MONO 10.2 05/29/2024    EOS 0.3 05/29/2024    BASO 0.05 05/29/2024    EOSINOPHIL 4.0 05/29/2024    BASOPHIL 0.7 05/29/2024       CHEMISTRY:  Sodium   Date Value Ref Range Status   04/22/2025 143 136 - 145 mmol/L Final   09/19/2024 141 136 - 145 mmol/L Final     Potassium   Date Value Ref Range Status   04/22/2025 4.1 3.5 - 5.1 mmol/L Final   09/19/2024 4.3 3.5 - 5.1 mmol/L Final     Chloride   Date Value Ref Range Status   04/22/2025 110 95 - 110 mmol/L Final   09/19/2024 106 95 - 110 mmol/L Final     CO2   Date Value Ref Range Status   04/22/2025 19 (L) 23 - 29 mmol/L Final   09/19/2024 24 23 - 29 mmol/L Final     Glucose   Date Value Ref Range Status   04/22/2025 110 70 - 110 mg/dL Final   09/19/2024 119 (H) 70 - 110 mg/dL Final     BUN   Date Value Ref Range Status   04/22/2025 73 (H) 8 - 23 mg/dL Final     Creatinine   Date Value Ref Range Status   04/22/2025 3.9 (H) 0.5 - 1.4 mg/dL Final     Calcium   Date Value Ref Range Status   04/22/2025 9.4 8.7 - 10.5 mg/dL Final   09/19/2024 9.4 8.7 - 10.5 mg/dL Final     Total Protein   Date Value Ref Range Status   05/29/2024 8.0 " 6.0 - 8.4 g/dL Final     Albumin   Date Value Ref Range Status   05/29/2024 4.0 3.5 - 5.2 g/dL Final     Total Bilirubin   Date Value Ref Range Status   05/29/2024 0.3 0.1 - 1.0 mg/dL Final     Comment:     For infants and newborns, interpretation of results should be based  on gestational age, weight and in agreement with clinical  observations.    Premature Infant recommended reference ranges:  Up to 24 hours.............<8.0 mg/dL  Up to 48 hours............<12.0 mg/dL  3-5 days..................<15.0 mg/dL  6-29 days.................<15.0 mg/dL       Alkaline Phosphatase   Date Value Ref Range Status   05/29/2024 100 55 - 135 U/L Final     AST   Date Value Ref Range Status   05/29/2024 15 10 - 40 U/L Final     ALT   Date Value Ref Range Status   05/29/2024 9 (L) 10 - 44 U/L Final     Anion Gap   Date Value Ref Range Status   04/22/2025 14 8 - 16 mmol/L Final     eGFR   Date Value Ref Range Status   04/22/2025 12 (L) >60 mL/min/1.73/m2 Final     Comment:     Estimated GFR calculated using the CKD-EPI creatinine (2021) equation.   09/19/2024 14.9 (A) >60 mL/min/1.73 m^2 Final       LIPID PANEL:  Lab Results   Component Value Date    CHOL 155 08/04/2023    CHOL 178 07/19/2023     Lab Results   Component Value Date    TRIG 48 08/04/2023    TRIG 59 07/19/2023     Lab Results   Component Value Date    HDL 44 08/04/2023    HDL 60 07/19/2023     Lab Results   Component Value Date    LDLCALC 101.4 08/04/2023    LDLCALC 106.2 07/19/2023       THYROID:  Lab Results   Component Value Date    TSH 0.761 08/04/2023       DIABETES:  Lab Results   Component Value Date    HGBA1C 5.1 07/19/2023     Microalb/Creat Ratio   Date Value Ref Range Status   03/29/2023 344.4 (H) 0.0 - 30.0 ug/mg Final       Assessment and Plan     1. Annual physical exam  Comments:  Will obtain CBC, CMP, TSH, A1c and lipid panel for regular health monitoring  Orders:  -     CBC Auto Differential; Future; Expected date: 05/15/2025  -     Comprehensive  Metabolic Panel; Future; Expected date: 05/15/2025  -     Lipid Panel; Future; Expected date: 05/15/2025  -     Hemoglobin A1C; Future; Expected date: 05/15/2025  -     TSH; Future; Expected date: 05/15/2025  -     Microalbumin/Creatinine Ratio, Urine    2. Chronic kidney disease (CKD), stage V  Comments:  Chronic, has followed with nephrology in the past. Will obtain urine microalbumin for continued monitoring  Orders:  -     Microalbumin/Creatinine Ratio, Urine    3. Chronic obstructive pulmonary disease, unspecified COPD type  Comments:  Chronic, stable    4. Myelopathy  Comments:  Chronic, stable, sees neurosurgery    5. Hyperlipidemia LDL goal <70  Comments:  Chronic, continue Crestor. Will obtain Lipid panel and TSH for medication monitoring  Orders:  -     Lipid Panel; Future; Expected date: 05/15/2025  -     TSH; Future; Expected date: 05/15/2025    6. Malignant hypertension  Comments:  Chronic, now under control. Will obtain CBC and CMP for medication monitoring  Overview:  Cardene infusion to keep SBP < 180mmHg.  Continue home medications.   Careful hydration  Serial labs    Patient needs IV infusion for BP control  She will need careful hydration for acute on CKD  Her oral medications will be adjusted after 24 hrs of IV infusion to optimize her regimen for home.    I anticipate she will stay 2 midnights for reasons mentioned above.    Orders:  -     CBC Auto Differential; Future; Expected date: 05/15/2025  -     Comprehensive Metabolic Panel; Future; Expected date: 05/15/2025    7. Long term use of drug  Comments:  Will obtain CBC, CMP, TSH, A1c and lipid panel for regular health monitoring  Orders:  -     CBC Auto Differential; Future; Expected date: 05/15/2025  -     Comprehensive Metabolic Panel; Future; Expected date: 05/15/2025  -     Lipid Panel; Future; Expected date: 05/15/2025  -     Hemoglobin A1C; Future; Expected date: 05/15/2025  -     TSH; Future; Expected date: 05/15/2025  -      Microalbumin/Creatinine Ratio, Urine    8. Tobacco use  Comments:  Patient has tried quitting in the past and is not interested in help        Assessment & Plan    NICOTINE DEPENDENCE:  - Discussed smoking cessation with the patient.  - Ms. Milligan reports smoking off and on, but has reduced frequency compared to before.  - Assessed interest in quitting and previous cessation attempts, noting that despite reduction, patient has been unsuccessful in completely quitting.    GENERAL HEALTH MAINTENANCE:  - Ms. Milligan due for physical exam.  - Ordered labs and urine test.               Follow up in about 1 year (around 5/15/2026) for Follow up, Physical, with PCP.  This note was generated with the assistance of ambient listening technology. Verbal consent was obtained by the patient and accompanying visitor(s) for the recording of patient appointment to facilitate this note. I attest to having reviewed and edited the generated note for accuracy, though some syntax or spelling errors may persist. Please contact the author of this note for any clarification.            [1]   Current Outpatient Medications:     aspirin (ECOTRIN) 81 MG EC tablet, Take 1 tablet (81 mg total) by mouth once daily., Disp: 90 tablet, Rfl: 3    carvediloL (COREG) 25 MG tablet, Take 1 tablet (25 mg total) by mouth 2 (two) times daily., Disp: 180 tablet, Rfl: 1    cloNIDine 0.3 mg/24 hr td ptwk (CATAPRES) 0.3 mg/24 hr, Place 1 patch onto the skin every 7 days., Disp: 4 patch, Rfl: 11    clopidogreL (PLAVIX) 75 mg tablet, Take 1 tablet (75 mg total) by mouth once daily., Disp: 90 tablet, Rfl: 1    colchicine, gout, (COLCRYS) 0.6 mg tablet, Take every 8 hours on day 1; take every 12 hours on day 2; then once per day thereafter, Disp: 15 tablet, Rfl: 0    diphenhydrAMINE (BENADRYL) 25 mg capsule, Take 1 capsule (25 mg total) by mouth every 6 (six) hours as needed for Itching., Disp: 30 capsule, Rfl: 0    ergocalciferol (ERGOCALCIFEROL) 50,000 unit  Cap, Take 1 capsule (50,000 Units total) by mouth every 7 days., Disp: 12 capsule, Rfl: 3    furosemide (LASIX) 40 MG tablet, Take 1 tablet (40 mg total) by mouth daily as needed (swelling, edema, weight gain due to fluid;). Do not take if BP is low - under 110/70 or feeling dry/dizzy/lightheaded due to dehydration, Disp: 90 tablet, Rfl: 1    hydrALAZINE (APRESOLINE) 100 MG tablet, Take 1 tablet (100 mg total) by mouth every 8 (eight) hours., Disp: 180 tablet, Rfl: 1    HYDROcodone-acetaminophen (NORCO) 5-325 mg per tablet, Take 1 tablet by mouth every 4 (four) hours as needed., Disp: 18 tablet, Rfl: 0    hydrOXYzine (ATARAX) 50 MG tablet, Take 1 tablet (50 mg total) by mouth 2 (two) times daily as needed for Anxiety., Disp: 20 tablet, Rfl: 0    irbesartan (AVAPRO) 300 MG tablet, Take 1 tablet (300 mg total) by mouth every evening., Disp: 90 tablet, Rfl: 1    isosorbide mononitrate (IMDUR) 120 MG 24 hr tablet, Take 2 tablets (240 mg total) by mouth every evening., Disp: 90 tablet, Rfl: 1    rosuvastatin (CRESTOR) 40 MG Tab, Take 1 tablet (40 mg total) by mouth every evening., Disp: 90 tablet, Rfl: 1    semaglutide, weight loss, (WEGOVY) 0.5 mg/0.5 mL PnIj, Inject 0.5 mg into the skin once a week., Disp: 6 mL, Rfl: 0    tretinoin (RETIN-A) 0.025 % cream, Apply topically every evening. Pea sized amount to entire face at night. Start out using every other night for 2 weeks, then increase to every night as tolerated, Disp: 20 g, Rfl: 0    ziprasidone (GEODON) 20 MG Cap, Take 1 capsule (20 mg total) by mouth 2 (two) times daily., Disp: 180 capsule, Rfl: 1    LIDOcaine-prilocaine (EMLA) cream, Apply topically as needed (on right wrist). (Patient not taking: Reported on 5/15/2025), Disp: 2 g, Rfl: 0    nicotine (NICODERM CQ) 7 mg/24 hr, Place 1 patch onto the skin once daily. (Patient not taking: Reported on 5/15/2025), Disp: 30 patch, Rfl: 1

## 2025-05-16 ENCOUNTER — RESULTS FOLLOW-UP (OUTPATIENT)
Dept: FAMILY MEDICINE | Facility: CLINIC | Age: 70
End: 2025-05-16

## 2025-05-19 ENCOUNTER — TELEPHONE (OUTPATIENT)
Dept: FAMILY MEDICINE | Facility: CLINIC | Age: 70
End: 2025-05-19
Payer: MEDICARE

## 2025-05-19 DIAGNOSIS — N18.5 CHRONIC KIDNEY DISEASE (CKD), STAGE V: Primary | ICD-10-CM

## 2025-05-19 NOTE — TELEPHONE ENCOUNTER
Called pt w lab results. She ZEN. Said no she has not seen a nephrologist recently. Tried scheduling her w referral form diff provider but was unable to.

## 2025-05-19 NOTE — TELEPHONE ENCOUNTER
CERTIFICATE OF RETURN TO WORK    August 8, 2017      Re:   Carolina Gay  [address]  [facility] [address]                        This is to certify that Carolina Gay has been under my care from 8/8/2017 and can return to regular work on August 14, 2017.    RESTRICTIONS: Cannot stand or walk for long periods      REMARKS:         SIGNATURE:___________________________________________,   8/8/2017      MD Zander Wong MD  Family Medicine  [address] [place] [address]  [phone]   ----- Message from Leeann Parham DO sent at 5/16/2025  7:14 AM CDT -----  Micro albumin labs reflect kidney function decrease. Have you seen your nephrologist recently?  ----- Message -----  From: Lab, Background User  Sent: 5/15/2025   9:29 PM CDT  To: Leeann Parham DO

## 2025-05-27 ENCOUNTER — TELEPHONE (OUTPATIENT)
Dept: NEUROSURGERY | Facility: CLINIC | Age: 70
End: 2025-05-27
Payer: MEDICARE

## 2025-05-27 NOTE — TELEPHONE ENCOUNTER
----- Message from Flavio Gregg sent at 5/27/2025  2:03 PM CDT -----  Regarding: RE: GFR  Okay, let me know.  ----- Message -----  From: Carole Diane, RN  Sent: 5/27/2025   1:54 PM CDT  To: Marysol Mendoza, RT  Subject: RE: GFR                                          No she needs the vessel wall imaging. We will ask Dr. Patrick if he wants something else  ----- Message -----  From: Marysol Mendoza, RT  Sent: 5/27/2025   1:37 PM CDT  To: Celina Le S Staff  Subject: GFR                                              Good afternoon, This patient has a GFR under 30. [facility]'s policy is no contrast if the patients GFR is under 30. Are you okay with us changing th e order to without?Ovi, Marysol  
Spoke with Dr. Patrick. Ok to do standard MRA.   
Universal Safety Interventions

## 2025-05-29 ENCOUNTER — HOSPITAL ENCOUNTER (OUTPATIENT)
Dept: RADIOLOGY | Facility: HOSPITAL | Age: 70
Discharge: HOME | End: 2025-05-29
Attending: NEUROLOGICAL SURGERY
Payer: MEDICARE

## 2025-05-29 DIAGNOSIS — I67.1 CEREBRAL ANEURYSM, NONRUPTURED: ICD-10-CM

## 2025-05-29 PROCEDURE — 70544 MR ANGIOGRAPHY HEAD W/O DYE: CPT | Mod: TC,PN

## 2025-05-29 PROCEDURE — 70544 MR ANGIOGRAPHY HEAD W/O DYE: CPT | Mod: 26,,, | Performed by: STUDENT IN AN ORGANIZED HEALTH CARE EDUCATION/TRAINING PROGRAM

## 2025-06-03 ENCOUNTER — PATIENT OUTREACH (OUTPATIENT)
Dept: ADMINISTRATIVE | Facility: HOSPITAL | Age: 70
End: 2025-06-03
Payer: MEDICARE

## 2025-06-04 ENCOUNTER — TELEPHONE (OUTPATIENT)
Dept: NEUROSURGERY | Facility: CLINIC | Age: 70
End: 2025-06-04
Payer: MEDICARE

## 2025-06-04 DIAGNOSIS — I67.1 CEREBRAL ANEURYSM, NONRUPTURED: Primary | ICD-10-CM

## 2025-06-11 DIAGNOSIS — I10 PRIMARY HYPERTENSION: ICD-10-CM

## 2025-06-11 DIAGNOSIS — Z86.73 HISTORY OF TIA (TRANSIENT ISCHEMIC ATTACK): ICD-10-CM

## 2025-06-11 DIAGNOSIS — I25.10 ASCVD (ARTERIOSCLEROTIC CARDIOVASCULAR DISEASE): ICD-10-CM

## 2025-06-11 DIAGNOSIS — E78.5 HYPERLIPIDEMIA LDL GOAL <70: ICD-10-CM

## 2025-06-11 DIAGNOSIS — I50.32 CHRONIC HEART FAILURE WITH PRESERVED EJECTION FRACTION: ICD-10-CM

## 2025-06-11 DIAGNOSIS — E66.9 OBESITY (BMI 30-39.9): ICD-10-CM

## 2025-06-11 DIAGNOSIS — Z86.73 HISTORY OF CVA (CEREBROVASCULAR ACCIDENT): ICD-10-CM

## 2025-06-11 RX ORDER — IRBESARTAN 300 MG/1
300 TABLET ORAL NIGHTLY
Qty: 90 TABLET | Refills: 1 | Status: SHIPPED | OUTPATIENT
Start: 2025-06-11 | End: 2026-06-11

## 2025-06-11 RX ORDER — CLOPIDOGREL BISULFATE 75 MG/1
75 TABLET ORAL DAILY
Qty: 90 TABLET | Refills: 1 | Status: SHIPPED | OUTPATIENT
Start: 2025-06-11

## 2025-06-11 RX ORDER — ROSUVASTATIN CALCIUM 40 MG/1
40 TABLET, COATED ORAL NIGHTLY
Qty: 90 TABLET | Refills: 1 | Status: SHIPPED | OUTPATIENT
Start: 2025-06-11 | End: 2026-06-11

## 2025-06-11 RX ORDER — SEMAGLUTIDE 0.5 MG/.5ML
0.5 INJECTION, SOLUTION SUBCUTANEOUS WEEKLY
Qty: 6 ML | Refills: 0 | Status: SHIPPED | OUTPATIENT
Start: 2025-06-11

## 2025-06-11 RX ORDER — CLONIDINE 0.3 MG/24H
1 PATCH, EXTENDED RELEASE TRANSDERMAL
Qty: 4 PATCH | Refills: 11 | Status: SHIPPED | OUTPATIENT
Start: 2025-06-11 | End: 2026-06-11

## 2025-06-11 RX ORDER — FUROSEMIDE 40 MG/1
40 TABLET ORAL DAILY PRN
Qty: 90 TABLET | Refills: 1 | Status: SHIPPED | OUTPATIENT
Start: 2025-06-11

## 2025-06-11 RX ORDER — ASPIRIN 81 MG/1
81 TABLET ORAL DAILY
Qty: 90 TABLET | Refills: 3 | Status: SHIPPED | OUTPATIENT
Start: 2025-06-11

## 2025-06-11 RX ORDER — CARVEDILOL 25 MG/1
25 TABLET ORAL 2 TIMES DAILY
Qty: 180 TABLET | Refills: 1 | Status: SHIPPED | OUTPATIENT
Start: 2025-06-11

## 2025-06-13 DIAGNOSIS — N18.4 CKD (CHRONIC KIDNEY DISEASE) STAGE 4, GFR 15-29 ML/MIN: Primary | ICD-10-CM

## 2025-06-30 ENCOUNTER — TELEPHONE (OUTPATIENT)
Dept: NEUROSURGERY | Facility: CLINIC | Age: 70
End: 2025-06-30
Payer: MEDICARE

## 2025-06-30 NOTE — TELEPHONE ENCOUNTER
Returned call 2x. LVM informing that pt is on the list, and once nurse Carole has her scheduled, she will call and let pt know. LVM advising call back.

## 2025-06-30 NOTE — TELEPHONE ENCOUNTER
Copied from CRM #[id number]. Topic: General Inquiry - Patient Advice  >> Jun 30, 2025 10:51 AM Shari wrote:  Grand daughter called states still waiting on someone to call them to schedule a Angiogram. Please advise    Kay (Grand Daughter)  [employer]          [phone]

## 2025-07-07 ENCOUNTER — LAB VISIT (OUTPATIENT)
Dept: LAB | Facility: HOSPITAL | Age: 70
End: 2025-07-07
Attending: INTERNAL MEDICINE
Payer: MEDICARE

## 2025-07-07 DIAGNOSIS — N18.4 CKD (CHRONIC KIDNEY DISEASE) STAGE 4, GFR 15-29 ML/MIN: ICD-10-CM

## 2025-07-07 LAB
ABSOLUTE EOSINOPHIL (OHS): 0.21 K/UL
ABSOLUTE MONOCYTE (OHS): 0.7 K/UL (ref 0.3–1)
ABSOLUTE NEUTROPHIL COUNT (OHS): 3.04 K/UL (ref 1.8–7.7)
ANION GAP (OHS): 13 MMOL/L (ref 8–16)
BACTERIA #/AREA URNS AUTO: ABNORMAL /HPF
BASOPHILS # BLD AUTO: 0.03 K/UL
BASOPHILS NFR BLD AUTO: 0.5 %
BUN SERPL-MCNC: 58 MG/DL (ref 8–23)
CALCIUM SERPL-MCNC: 9.1 MG/DL (ref 8.7–10.5)
CHLORIDE SERPL-SCNC: 109 MMOL/L (ref 95–110)
CO2 SERPL-SCNC: 19 MMOL/L (ref 23–29)
CREAT SERPL-MCNC: 3.3 MG/DL (ref 0.5–1.4)
CREAT UR-MCNC: 126 MG/DL (ref 15–325)
ERYTHROCYTE [DISTWIDTH] IN BLOOD BY AUTOMATED COUNT: 13.5 % (ref 11.5–14.5)
GFR SERPLBLD CREATININE-BSD FMLA CKD-EPI: 14 ML/MIN/1.73/M2
GLUCOSE SERPL-MCNC: 121 MG/DL (ref 70–110)
HCT VFR BLD AUTO: 38.7 % (ref 37–48.5)
HGB BLD-MCNC: 11.9 GM/DL (ref 12–16)
IMM GRANULOCYTES # BLD AUTO: 0.02 K/UL (ref 0–0.04)
IMM GRANULOCYTES NFR BLD AUTO: 0.3 % (ref 0–0.5)
LYMPHOCYTES # BLD AUTO: 1.79 K/UL (ref 1–4.8)
MCH RBC QN AUTO: 28.2 PG (ref 27–31)
MCHC RBC AUTO-ENTMCNC: 30.7 G/DL (ref 32–36)
MCV RBC AUTO: 92 FL (ref 82–98)
MICROSCOPIC COMMENT: ABNORMAL
NUCLEATED RBC (/100WBC) (OHS): 0 /100 WBC
PLATELET # BLD AUTO: 209 K/UL (ref 150–450)
PMV BLD AUTO: 11.6 FL (ref 9.2–12.9)
POTASSIUM SERPL-SCNC: 4.4 MMOL/L (ref 3.5–5.1)
PROT UR-MCNC: 15 MG/DL
PROT/CREAT UR: 0.12 MG/G{CREAT}
RBC # BLD AUTO: 4.22 M/UL (ref 4–5.4)
RBC #/AREA URNS AUTO: 7 /HPF (ref 0–4)
RELATIVE EOSINOPHIL (OHS): 3.6 %
RELATIVE LYMPHOCYTE (OHS): 30.9 % (ref 18–48)
RELATIVE MONOCYTE (OHS): 12.1 % (ref 4–15)
RELATIVE NEUTROPHIL (OHS): 52.6 % (ref 38–73)
SODIUM SERPL-SCNC: 141 MMOL/L (ref 136–145)
SQUAMOUS #/AREA URNS AUTO: 28 /HPF
WBC # BLD AUTO: 5.79 K/UL (ref 3.9–12.7)
WBC #/AREA URNS AUTO: >100 /HPF (ref 0–5)
WBC CLUMPS UR QL AUTO: ABNORMAL

## 2025-07-07 PROCEDURE — 84156 ASSAY OF PROTEIN URINE: CPT

## 2025-07-07 PROCEDURE — 81001 URINALYSIS AUTO W/SCOPE: CPT

## 2025-07-07 PROCEDURE — 80048 BASIC METABOLIC PNL TOTAL CA: CPT

## 2025-07-07 PROCEDURE — 36415 COLL VENOUS BLD VENIPUNCTURE: CPT

## 2025-07-07 PROCEDURE — 85025 COMPLETE CBC W/AUTO DIFF WBC: CPT

## 2025-07-10 ENCOUNTER — OFFICE VISIT (OUTPATIENT)
Dept: NEPHROLOGY | Facility: CLINIC | Age: 70
End: 2025-07-10
Payer: MEDICARE

## 2025-07-10 VITALS
DIASTOLIC BLOOD PRESSURE: 60 MMHG | HEIGHT: 63 IN | WEIGHT: 149.69 LBS | BODY MASS INDEX: 26.52 KG/M2 | HEART RATE: 74 BPM | SYSTOLIC BLOOD PRESSURE: 128 MMHG

## 2025-07-10 DIAGNOSIS — I50.32 CHRONIC HEART FAILURE WITH PRESERVED EJECTION FRACTION: ICD-10-CM

## 2025-07-10 DIAGNOSIS — N18.4 CKD (CHRONIC KIDNEY DISEASE) STAGE 4, GFR 15-29 ML/MIN: ICD-10-CM

## 2025-07-10 PROCEDURE — 99999 PR PBB SHADOW E&M-EST. PATIENT-LVL IV: CPT | Mod: PBBFAC,,, | Performed by: INTERNAL MEDICINE

## 2025-07-10 RX ORDER — FUROSEMIDE 20 MG/1
20 TABLET ORAL DAILY PRN
Qty: 90 TABLET | Refills: 3 | Status: SHIPPED | OUTPATIENT
Start: 2025-07-10

## 2025-07-10 NOTE — PROGRESS NOTES
Rose Milligan is a 69 y.o. female for whom nephrology consult has been requested to evaluate and give opinion.   Renal clinic consultation note:  Date of consultation and patient visit:  07/10/2025  Referring physician:  Dr. Leeann Parham  Reason for consultation:  Chronic kidney disease    HPI: Thank you for referring the pt to us. H/o and chart were reviewed. Pt was seen and examined.  Patient is a 69-year-old female with history of HTN, gout, diastolic CHF, bipolar disease, tobacco use, and previous drug use including cocaine ([id number]), who was referred to renal clinic for evaluation of abnormal renal function.  Records were reviewed. S Cr has gradually worsened in the past 13 years to current level above 3 mg/dL.  Baseline Cr appears to be close to 3.  Two to 3 years ago, Cr at baseline was closer to 2.  Patient says that she is not using any drugs anymore.  Last use of cocaine was 4 years ago.  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    On presentation to clinic today, patient is feeling well, no acute issues, no discomfort, no CP, no SOB, no leg swelling.  Patient denies taking any NSAIDs.  No cardiopulmonary symptoms.  No dysuria or urinary issues.  No GI losses.  No dehydration.  No other pertinent issues.  Patient says that she tends to get gouty type attacks quite after.  Her diet is rich in certain foods that are high risk for gout.      PAST MEDICAL HISTORY:  CKD stage 4, HTN, gout, diastolic CHF, bipolar disease, tobacco use, and previous drug use including cocaine ([id number]), Acute CVA (cerebrovascular accident) (08/04/2023), Depression, Hepatitis C, hyperlipidemia    PAST SURGICAL HISTORY:  She  has a past surgical history that includes Hysterectomy.    SOCIAL HISTORY:  She  reports that she has been smoking cigarettes. She started smoking about 9 years ago. She has a 2.4 pack-year  smoking history. She has never used smokeless tobacco. She reports current alcohol use. She reports that she does not currently use drugs after having used the following drugs: Cocaine.    FAMILY MEDICAL HISTORY:  Her family history includes Heart attack in her maternal grandmother; Hypertension in her mother.    Review of patient's allergies indicates:   Allergen Reactions    Tramadol Itching    Risperdal [risperidone] Other (See Comments)     Did not like way felt           Prior to Admission medications    Medication Sig Start Date End Date Taking? Authorizing Provider   aspirin (ECOTRIN) 81 MG EC tablet Take 1 tablet (81 mg total) by mouth once daily. 6/11/25  Yes Cullen Hannon MD   carvediloL (COREG) 25 MG tablet Take 1 tablet (25 mg total) by mouth 2 (two) times daily. 6/11/25  Yes Cullen Hannon MD   cloNIDine 0.3 mg/24 hr td ptwk (CATAPRES) 0.3 mg/24 hr Place 1 patch onto the skin every 7 days. 6/11/25 6/11/26 Yes Cullen Hannon MD   clopidogreL (PLAVIX) 75 mg tablet Take 1 tablet (75 mg total) by mouth once daily. 6/11/25  Yes Cullen Hannon MD   colchicine, gout, (COLCRYS) 0.6 mg tablet Take every 8 hours on day 1; take every 12 hours on day 2; then once per day thereafter 11/10/23  Yes Carolyn Parisi PA-C   diphenhydrAMINE (BENADRYL) 25 mg capsule Take 1 capsule (25 mg total) by mouth every 6 (six) hours as needed for Itching. 11/10/23  Yes Carolyn Parisi PA-C   ergocalciferol (ERGOCALCIFEROL) 50,000 unit Cap Take 1 capsule (50,000 Units total) by mouth every 7 days. 12/22/23  Yes Ian Estes MD   hydrALAZINE (APRESOLINE) 100 MG tablet Take 1 tablet (100 mg total) by mouth every 8 (eight) hours. 3/20/25  Yes Cullen Hannon MD   HYDROcodone-acetaminophen (NORCO) 5-325 mg per tablet Take 1 tablet by mouth every 4 (four) hours as needed. 5/29/24  Yes Sunil Solorzano NP   hydrOXYzine (ATARAX) 50 MG tablet Take 1 tablet (50 mg total) by mouth 2 (two) times daily as needed for Anxiety. 8/6/23  Yes  "Felipa Hayes NP   irbesartan (AVAPRO) 300 MG tablet Take 1 tablet (300 mg total) by mouth every evening. 6/11/25 6/11/26 Yes Cullen Hannon MD   isosorbide mononitrate (IMDUR) 120 MG 24 hr tablet Take 2 tablets (240 mg total) by mouth every evening. 3/20/25 3/20/26 Yes Cullen Hannon MD   LIDOcaine-prilocaine (EMLA) cream Apply topically as needed (on right wrist). 5/16/24  Yes Jam Joshua MD   nicotine (NICODERM CQ) 7 mg/24 hr Place 1 patch onto the skin once daily. 9/26/24  Yes Cullen Hannon MD   rosuvastatin (CRESTOR) 40 MG Tab Take 1 tablet (40 mg total) by mouth every evening. 6/11/25 6/11/26 Yes Cullen Hannon MD   semaglutide, weight loss, (WEGOVY) 0.5 mg/0.5 mL PnIj Inject 0.5 mg into the skin once a week. 6/11/25  Yes Cullen Hannon MD   tretinoin (RETIN-A) 0.025 % cream Apply topically every evening. Pea sized amount to entire face at night. Start out using every other night for 2 weeks, then increase to every night as tolerated 9/14/23  Yes Edie Schmidt MD   ziprasidone (GEODON) 20 MG Cap Take 1 capsule (20 mg total) by mouth 2 (two) times daily. 4/20/25  Yes Oliver Dela Cruz MD   furosemide (LASIX) 40 MG tablet Take 1 tablet (40 mg total) by mouth daily as needed (swelling, edema, weight gain due to fluid;). Do not take if BP is low - under 110/70 or feeling dry/dizzy/lightheaded due to dehydration 6/11/25 7/10/25 Yes Cullen Hannon MD   furosemide (LASIX) 20 MG tablet Take 1 tablet (20 mg total) by mouth daily as needed (swelling, edema, weight gain due to fluid;). Do not take if BP is low - under 110/70 or feeling dry/dizzy/lightheaded due to dehydration 7/10/25   Livier Sexton MD        REVIEW OF SYSTEMS:  Patient has no fever, fatigue, visual changes, chest pain, edema, cough, dyspnea, nausea, vomiting, constipation, diarrhea, arthralgias, pruritis, dizziness, weakness, depression, confusion.    PHYSICAL EXAM:   height is 5' 3" (1.6 m) and weight is 67.9 kg (149 lb 11.1 oz). " Her blood pressure is 128/60 and her pulse is 74.   Gen: WDWN female in no apparent distress  Psych: Normal mood and affect  Skin: No rashes or ulcers  Neck: No JVD  Chest: Clear with no rales, rhonchi, wheezing with normal effort  CV: Regular with no murmurs, gallops or rubs  Abd: Soft, nontender, no distension,  Ext: No edema    Labs reviewed:  BMP  Lab Results   Component Value Date     07/07/2025    K 4.4 07/07/2025     07/07/2025    CO2 19 (L) 07/07/2025    BUN 58 (H) 07/07/2025    CREATININE 3.3 (H) 07/07/2025    CALCIUM 9.1 07/07/2025    ANIONGAP 13 07/07/2025    EGFRNORACEVR 14 (L) 07/07/2025     Lab Results   Component Value Date    WBC 5.79 07/07/2025    HGB 11.9 (L) 07/07/2025    HCT 38.7 07/07/2025    MCV 92 07/07/2025     07/07/2025       Lab Results   Component Value Date    .3 (H) 12/15/2023    CALCIUM 9.1 07/07/2025    PHOS 4.0 12/15/2023     Uric acid 9.1  Urine protein/Cr 120 mg        IMPRESSION AND RECOMMENDATIONS:  69-year-old female with history of HTN and CHF, as well as history of hepatitis-C, gout, and prior cocaine use presented to renal clinic for evaluation:    1. Renal: s Cr he is currently close to previous baseline.  Noted tends to fluctuate close to 3 mg/dL  At baseline has CKD stage 4.  Patient has multiple risk factors for renal damage.  These include the following:  HTN, gout and hyperuricemia, active smoking, CHF, history of hep C, and cocaine use.  All of above can cause kidney injury  Microalbuminuria    Complications of CKD:  K normal  Na normal  Metabolic acidosis, mild  Ca normal  PO4 normal  Secondary hyperparathyroidism, mild  Anemia    2. Hyperuricemia:  Advised patient on foods that are high risk for gout.  Was given a printed list of such foods to avoid  We will repeat uric acid level with next visit, above level was from a year ago    3. HTN:  BP appears to be controlled.  Meds reviewed    4. Cardiac:  History of diastolic CHF  Well compensated  at this point.  Hemodynamically stable  Appears slightly over diuresed  Reduce Lasix from 40 to 20 mg p.o. q.d.    5. Substance abuse and tobacco use:  Used cocaine in the past.  Says last used four years ago  This may have been the reason for patient's previously elevated blood pressure and her stroke  Advised patient that is cocaine use can lead to heart attack, stroke, and kidney failure.  Advised patient against use of any drugs  Also advised patient to quit smoking    6. History of hep C noted:  Despite self can cause hepatitis-C induced renal disease, including membranous nephropathy and membranoproliferative glomerular nephritis (MPGN)  We will evaluate fit further by obtaining UA.      Plans and recommendations:  As reviewed above  Opportunity for questions provided  Total time spent 60 minutes including time needed to review the records, the   patient evaluation, documentation, face-to-face discussion with the patient,   more than 50% of the time was spent on coordination of care and counseling.    Level V visit.  RTC 3 months    Livier Sexton MD okay to take it

## 2025-07-21 ENCOUNTER — HOSPITAL ENCOUNTER (OUTPATIENT)
Dept: RADIOLOGY | Facility: HOSPITAL | Age: 70
Discharge: HOME | End: 2025-07-21
Attending: PODIATRIST
Payer: MEDICARE

## 2025-07-21 ENCOUNTER — OFFICE VISIT (OUTPATIENT)
Dept: PODIATRY | Facility: CLINIC | Age: 70
End: 2025-07-21
Payer: MEDICARE

## 2025-07-21 DIAGNOSIS — M79.674 TOE PAIN, RIGHT: Primary | ICD-10-CM

## 2025-07-21 DIAGNOSIS — M79.674 TOE PAIN, RIGHT: ICD-10-CM

## 2025-07-21 PROCEDURE — 1101F PT FALLS ASSESS-DOCD LE1/YR: CPT | Mod: CPTII,S$GLB,, | Performed by: PODIATRIST

## 2025-07-21 PROCEDURE — 73630 X-RAY EXAM OF FOOT: CPT | Mod: TC,RT

## 2025-07-21 PROCEDURE — 3060F POS MICROALBUMINURIA REV: CPT | Mod: CPTII,S$GLB,, | Performed by: PODIATRIST

## 2025-07-21 PROCEDURE — 99999 PR PBB SHADOW E&M-EST. PATIENT-LVL III: CPT | Mod: PBBFAC,,, | Performed by: PODIATRIST

## 2025-07-21 PROCEDURE — 99203 OFFICE O/P NEW LOW 30 MIN: CPT | Mod: S$GLB,,, | Performed by: PODIATRIST

## 2025-07-21 PROCEDURE — 3044F HG A1C LEVEL LT 7.0%: CPT | Mod: CPTII,S$GLB,, | Performed by: PODIATRIST

## 2025-07-21 PROCEDURE — 3066F NEPHROPATHY DOC TX: CPT | Mod: CPTII,S$GLB,, | Performed by: PODIATRIST

## 2025-07-21 PROCEDURE — 4010F ACE/ARB THERAPY RXD/TAKEN: CPT | Mod: CPTII,S$GLB,, | Performed by: PODIATRIST

## 2025-07-21 PROCEDURE — 73630 X-RAY EXAM OF FOOT: CPT | Mod: 26,RT,, | Performed by: RADIOLOGY

## 2025-07-21 PROCEDURE — 3288F FALL RISK ASSESSMENT DOCD: CPT | Mod: CPTII,S$GLB,, | Performed by: PODIATRIST

## 2025-07-21 PROCEDURE — 1159F MED LIST DOCD IN RCRD: CPT | Mod: CPTII,S$GLB,, | Performed by: PODIATRIST

## 2025-07-21 RX ORDER — METHYLPREDNISOLONE 4 MG/1
TABLET ORAL
Qty: 1 EACH | Refills: 0 | Status: SHIPPED | OUTPATIENT
Start: 2025-07-21

## 2025-07-21 NOTE — PROGRESS NOTES
Podiatry Department    Patient ID: Rose Milligan is a 70 y.o. female.    Chief Complaint: Toe Pain (4th toe RLE foot. X1 month. Pt reports no injury noted or surgery. Pt states pain shoots up back of leg. Pt non diabetic. Pt 5/10 pain. Pt wears slides no socks. Pt last PCP appt with Dr Leeann Parham was 5/15/2025.)    History of Present Illness    CHIEF COMPLAINT:  - Right foot pain in the second through fifth toes.    HPI:  Ngoc presents with pain in the right foot, specifically from the second toe to the fifth toe, radiating to the back of her leg. The pain has been present for about a month. Current pain is rated as 4/10 on a pain scale. Pain worsens during sleep, particularly when her feet are uncovered. Aleve has been taken for pain relief, but no specific alleviating factors are mentioned. This is her first visit to this clinic for this issue, and no prior XRs have been taken.    PREVIOUS TREATMENTS:  - Aleve: Provided some benefit for pain relief    MEDICATIONS:  - Aleve: For pain relief      ROS:  Musculoskeletal: +limb pain            Physical Exam    MSK: Foot/Ankle - Right: Right fourth toe: Swelling present. Right fourth toe: Tenderness to palpation.           Diagnoses:  1. Toe pain, right  -     X-Ray Foot Complete Right; Future; Expected date: 07/21/2025  -     Uric acid; Future; Expected date: 07/21/2025    Other orders  -     methylPREDNISolone (MEDROL DOSEPACK) 4 mg tablet; use as directed  Dispense: 1 each; Refill: 0        Assessment & Plan    M79.671 Pain in right foot  M25.871 Other specified joint disorders, right ankle and foot  R22.30 Localized swelling, mass and lump, unspecified upper limb    Baseline xray ordered r/o fx  Appears to be gout- will order UA  Rx medrol dosepak       Future Appointments   Date Time Provider Department Center   8/5/2025  9:00 AM Cullen Hannon MD [facility]   8/12/2025 10:00 AM Oliver Dela Cruz MD [facility]   10/16/2025   9:40 AM [facility] [place] [facility] [place]   10/23/2025 10:00 AM Livier Sexton MD [facility]        This note was generated with the assistance of ambient listening technology. Verbal consent was obtained by the patient and accompanying visitor(s) for the recording of patient appointment to facilitate this note. I attest to having reviewed and edited the generated note for accuracy, though some syntax or spelling errors may persist. Please contact the author of this note for any clarification.      Report Electronically Signed By:     Ilda Meyers DPM   Podiatry  [facility]  7/21/2025

## 2025-08-05 ENCOUNTER — OFFICE VISIT (OUTPATIENT)
Dept: CARDIOLOGY | Facility: CLINIC | Age: 70
End: 2025-08-05
Payer: MEDICARE

## 2025-08-05 VITALS
DIASTOLIC BLOOD PRESSURE: 80 MMHG | BODY MASS INDEX: 25.6 KG/M2 | WEIGHT: 144.5 LBS | HEART RATE: 74 BPM | SYSTOLIC BLOOD PRESSURE: 120 MMHG

## 2025-08-05 DIAGNOSIS — I10 CHRONIC HYPERTENSION: ICD-10-CM

## 2025-08-05 DIAGNOSIS — R06.09 DOE (DYSPNEA ON EXERTION): ICD-10-CM

## 2025-08-05 DIAGNOSIS — E78.5 HYPERLIPIDEMIA LDL GOAL <70: ICD-10-CM

## 2025-08-05 DIAGNOSIS — Z86.73 HISTORY OF TIA (TRANSIENT ISCHEMIC ATTACK): ICD-10-CM

## 2025-08-05 DIAGNOSIS — I10 PRIMARY HYPERTENSION: ICD-10-CM

## 2025-08-05 DIAGNOSIS — Z86.73 HISTORY OF CVA (CEREBROVASCULAR ACCIDENT): ICD-10-CM

## 2025-08-05 DIAGNOSIS — E78.49 OTHER HYPERLIPIDEMIA: ICD-10-CM

## 2025-08-05 DIAGNOSIS — I25.10 ASCVD (ARTERIOSCLEROTIC CARDIOVASCULAR DISEASE): Primary | ICD-10-CM

## 2025-08-05 DIAGNOSIS — B18.2 CHRONIC HEPATITIS C WITHOUT HEPATIC COMA: ICD-10-CM

## 2025-08-05 DIAGNOSIS — I50.32 CHRONIC HEART FAILURE WITH PRESERVED EJECTION FRACTION: ICD-10-CM

## 2025-08-05 DIAGNOSIS — N18.4 CKD (CHRONIC KIDNEY DISEASE) STAGE 4, GFR 15-29 ML/MIN: ICD-10-CM

## 2025-08-05 DIAGNOSIS — E66.9 OBESITY (BMI 30-39.9): ICD-10-CM

## 2025-08-05 DIAGNOSIS — Z72.0 TOBACCO USE: ICD-10-CM

## 2025-08-05 DIAGNOSIS — E78.5 HYPERLIPIDEMIA, UNSPECIFIED HYPERLIPIDEMIA TYPE: ICD-10-CM

## 2025-08-05 DIAGNOSIS — N18.4 STAGE 4 CHRONIC KIDNEY DISEASE: ICD-10-CM

## 2025-08-05 PROCEDURE — 1101F PT FALLS ASSESS-DOCD LE1/YR: CPT | Mod: CPTII,S$GLB,, | Performed by: INTERNAL MEDICINE

## 2025-08-05 PROCEDURE — 4010F ACE/ARB THERAPY RXD/TAKEN: CPT | Mod: CPTII,S$GLB,, | Performed by: INTERNAL MEDICINE

## 2025-08-05 PROCEDURE — G2211 COMPLEX E/M VISIT ADD ON: HCPCS | Mod: S$GLB,,, | Performed by: INTERNAL MEDICINE

## 2025-08-05 PROCEDURE — 1159F MED LIST DOCD IN RCRD: CPT | Mod: CPTII,S$GLB,, | Performed by: INTERNAL MEDICINE

## 2025-08-05 PROCEDURE — 3079F DIAST BP 80-89 MM HG: CPT | Mod: CPTII,S$GLB,, | Performed by: INTERNAL MEDICINE

## 2025-08-05 PROCEDURE — 1160F RVW MEDS BY RX/DR IN RCRD: CPT | Mod: CPTII,S$GLB,, | Performed by: INTERNAL MEDICINE

## 2025-08-05 PROCEDURE — 99999 PR PBB SHADOW E&M-EST. PATIENT-LVL III: CPT | Mod: PBBFAC,,, | Performed by: INTERNAL MEDICINE

## 2025-08-05 PROCEDURE — 3288F FALL RISK ASSESSMENT DOCD: CPT | Mod: CPTII,S$GLB,, | Performed by: INTERNAL MEDICINE

## 2025-08-05 PROCEDURE — 3044F HG A1C LEVEL LT 7.0%: CPT | Mod: CPTII,S$GLB,, | Performed by: INTERNAL MEDICINE

## 2025-08-05 PROCEDURE — 3060F POS MICROALBUMINURIA REV: CPT | Mod: CPTII,S$GLB,, | Performed by: INTERNAL MEDICINE

## 2025-08-05 PROCEDURE — 3074F SYST BP LT 130 MM HG: CPT | Mod: CPTII,S$GLB,, | Performed by: INTERNAL MEDICINE

## 2025-08-05 PROCEDURE — 3008F BODY MASS INDEX DOCD: CPT | Mod: CPTII,S$GLB,, | Performed by: INTERNAL MEDICINE

## 2025-08-05 PROCEDURE — 3066F NEPHROPATHY DOC TX: CPT | Mod: CPTII,S$GLB,, | Performed by: INTERNAL MEDICINE

## 2025-08-05 PROCEDURE — 1126F AMNT PAIN NOTED NONE PRSNT: CPT | Mod: CPTII,S$GLB,, | Performed by: INTERNAL MEDICINE

## 2025-08-05 PROCEDURE — 99214 OFFICE O/P EST MOD 30 MIN: CPT | Mod: S$GLB,,, | Performed by: INTERNAL MEDICINE

## 2025-08-05 RX ORDER — HYDRALAZINE HYDROCHLORIDE 100 MG/1
100 TABLET, FILM COATED ORAL EVERY 8 HOURS
Qty: 180 TABLET | Refills: 1 | Status: SHIPPED | OUTPATIENT
Start: 2025-08-05

## 2025-08-05 RX ORDER — SEMAGLUTIDE 0.5 MG/.5ML
0.5 INJECTION, SOLUTION SUBCUTANEOUS WEEKLY
Qty: 6 ML | Refills: 0 | Status: SHIPPED | OUTPATIENT
Start: 2025-08-05

## 2025-08-05 RX ORDER — ROSUVASTATIN CALCIUM 40 MG/1
40 TABLET, COATED ORAL NIGHTLY
Qty: 90 TABLET | Refills: 1 | Status: SHIPPED | OUTPATIENT
Start: 2025-08-05 | End: 2026-08-05

## 2025-08-05 NOTE — PROGRESS NOTES
Subjective:   Patient ID:  Rose Milligan is a 70 y.o. female who presents for cardiac consult of No chief complaint on file.      The patient came in today for cardiac consult of No chief complaint on file.      Rose Milligan is a 70 y.o. female  with HFpEF, HTN, pulm HTN, h/o TIA/CVA, tobacco abuse, h/o drug abuse, bipolar, hep C, CKD4, obesity presents for follow up CV eval.      2/18/25  BP and HR stable. BMI 28 - 157 lbs   Pt wants to increase dose.   Overall doing well, has knee pain but improving.     4/22/25  She had recent MRI brain - neg for acute process, MRA pending.   BP and HR stable. BMI 26 - 145 lbs   She is doing well overall.     8/5/25    BNP (pg/mL)   Date Value   04/22/2025 48   04/04/2024 236 (H)   10/17/2023 135 (H)   04/04/2023 305 (H)   08/15/2019 1,513 (H)        After last visit told pt- results reveal worsening/dry kidneys - need to stop taking lasix daily and increase water intake. Can restart lasix only if having any swelling or fluid build up with elevated BP. Fluid level - BNP is normal/low ;     Pt has seen Dr. Sexton recently - had reduced lasix to 20mg.   Lipids improvevd well 5/2025 - Tc 92, LDL 43  BP and HR satble. BMI 25 - 144 lbs     Results for orders placed during the hospital encounter of 08/04/23    Echo    Interpretation Summary    Left Ventricle: The left ventricle is normal in size. Moderately increased wall thickness. There is moderate concentrict hypertrophy. Normal wall motion. There is normal systolic function with a visually estimated ejection fraction of 55 - 70%. Grade I diastolic dysfunction.    Left Atrium: Left atrium is mildly dilated.    Right Ventricle: Normal right ventricular cavity size. Wall thickness is normal. Right ventricle wall motion  is normal. Systolic function is normal.    Mitral Valve: There is mild regurgitation.    Tricuspid Valve: There is mild regurgitation.    IVC/SVC: Intermediate venous pressure at 8 mmHg.    Bubble study  negative          Conclusion 6/2023     There is 0-19% right Internal Carotid Stenosis.  There is 0-19% left Internal Carotid Stenosis.    Conclusion 6/2023    The patient was monitored for a total of 12d 16h, underlying rhythm is Sinus.  The minimum heart rate was 55 bpm; the maximum 116 bpm; the average 76 bpm.  0 % of Atrial fibrillation/Atrial flutter with longest episode of 0 ms.  -- AV block with 0 %  There were 0 pauses, the longest pause was 0 ms at --.  7 episodes of VT were found with Longest VT at 10 beats .  21 supraventricular episodes were found. Longest SVT Episode 15 beats, Fastest  bpm  There were a total of 1346 PVCs with 2 morphologies and 7 couplets. Overall PVC [place] at 0.1 %  There were a total of 1132 PSVCs with 1 morphologies and 0 couplets. Overall PSVC [place] at 0.08 %  There is a total of 1 patient events    Results for orders placed during the hospital encounter of 06/06/23    Nuclear Stress - Cardiology Interpreted    Interpretation Summary    Abnormal myocardial perfusion scan.    There is a moderate to severe intensity, moderate sized, fixed perfusion abnormality consistent with scar in the basal to mid lateral wall(s).    There are no other significant perfusion abnormalities.    The gated perfusion images showed an ejection fraction of 50% at rest. The gated perfusion images showed an ejection fraction of 53% post stress.    The ECG portion of the study is uninterpretable due to abnormal baseline.    The patient reported no chest pain during the stress test.    There were no arrhythmias during stress.          Past Medical History:   Diagnosis Date    Acute CVA (cerebrovascular accident) 08/04/2023    Acute renal failure superimposed on stage 4 chronic kidney disease 10/17/2023    Careful hydration  Serial labs  Nephrology consult and follow up in am     Bipolar 1 disorder     CHF (congestive heart failure)     Depression     Hepatitis C     Hypertension     Other  hyperlipidemia 08/15/2019       Past Surgical History:   Procedure Laterality Date    HYSTERECTOMY         Social History     Tobacco Use    Smoking status: Some Days     Current packs/day: 0.25     Average packs/day: 0.3 packs/day for 9.6 years (2.4 ttl pk-yrs)     Types: Cigarettes     Start date: 1/11/2016    Smokeless tobacco: Never    Tobacco comments:     1 pack every 3 days   Substance Use Topics    Alcohol use: Yes    Drug use: Not Currently     Types: Cocaine     Comment: states last use 10/2023       Family History   Problem Relation Name Age of Onset    Hypertension Mother      No Known Problems Father      Heart attack Maternal Grandmother         Patient's Medications   New Prescriptions    No medications on file   Previous Medications    ASPIRIN (ECOTRIN) 81 MG EC TABLET    Take 1 tablet (81 mg total) by mouth once daily.    CARVEDILOL (COREG) 25 MG TABLET    Take 1 tablet (25 mg total) by mouth 2 (two) times daily.    CLONIDINE 0.3 MG/24 HR TD PTWK (CATAPRES) 0.3 MG/24 HR    Place 1 patch onto the skin every 7 days.    CLOPIDOGREL (PLAVIX) 75 MG TABLET    Take 1 tablet (75 mg total) by mouth once daily.    COLCHICINE, GOUT, (COLCRYS) 0.6 MG TABLET    Take every 8 hours on day 1; take every 12 hours on day 2; then once per day thereafter    DIPHENHYDRAMINE (BENADRYL) 25 MG CAPSULE    Take 1 capsule (25 mg total) by mouth every 6 (six) hours as needed for Itching.    ERGOCALCIFEROL (ERGOCALCIFEROL) 50,000 UNIT CAP    Take 1 capsule (50,000 Units total) by mouth every 7 days.    FUROSEMIDE (LASIX) 20 MG TABLET    Take 1 tablet (20 mg total) by mouth daily as needed (swelling, edema, weight gain due to fluid;). Do not take if BP is low - under 110/70 or feeling dry/dizzy/lightheaded due to dehydration    HYDRALAZINE (APRESOLINE) 100 MG TABLET    Take 1 tablet (100 mg total) by mouth every 8 (eight) hours.    HYDROCODONE-ACETAMINOPHEN (NORCO) 5-325 MG PER TABLET    Take 1 tablet by mouth every 4 (four)  hours as needed.    HYDROXYZINE (ATARAX) 50 MG TABLET    Take 1 tablet (50 mg total) by mouth 2 (two) times daily as needed for Anxiety.    IRBESARTAN (AVAPRO) 300 MG TABLET    Take 1 tablet (300 mg total) by mouth every evening.    ISOSORBIDE MONONITRATE (IMDUR) 120 MG 24 HR TABLET    Take 2 tablets (240 mg total) by mouth every evening.    LIDOCAINE-PRILOCAINE (EMLA) CREAM    Apply topically as needed (on right wrist).    METHYLPREDNISOLONE (MEDROL DOSEPACK) 4 MG TABLET    use as directed    NICOTINE (NICODERM CQ) 7 MG/24 HR    Place 1 patch onto the skin once daily.    ROSUVASTATIN (CRESTOR) 40 MG TAB    Take 1 tablet (40 mg total) by mouth every evening.    SEMAGLUTIDE, WEIGHT LOSS, (WEGOVY) 0.5 MG/0.5 ML PNIJ    Inject 0.5 mg into the skin once a week.    TRETINOIN (RETIN-A) 0.025 % CREAM    Apply topically every evening. Pea sized amount to entire face at night. Start out using every other night for 2 weeks, then increase to every night as tolerated    ZIPRASIDONE (GEODON) 20 MG CAP    Take 1 capsule (20 mg total) by mouth 2 (two) times daily.   Modified Medications    No medications on file   Discontinued Medications    No medications on file       Review of Systems   Constitutional: Negative.    HENT: Negative.     Eyes: Negative.    Respiratory:  Positive for shortness of breath.    Cardiovascular:  Positive for palpitations. Negative for chest pain.   Gastrointestinal: Negative.    Genitourinary: Negative.    Musculoskeletal: Negative.    Skin: Negative.    Neurological:  Positive for dizziness.   Endo/Heme/Allergies: Negative.    Psychiatric/Behavioral:  The patient is not nervous/anxious.    All 12 systems otherwise negative.      Wt Readings from Last 3 Encounters:   07/10/25 67.9 kg (149 lb 11.1 oz)   05/15/25 69.2 kg (152 lb 7.2 oz)   04/22/25 66 kg (145 lb 9.8 oz)     Temp Readings from Last 3 Encounters:   05/15/25 97.7 °F (36.5 °C) (Tympanic)   05/29/24 98 °F (36.7 °C) (Oral)   05/16/24 98.1 °F  (36.7 °C)     BP Readings from Last 3 Encounters:   07/10/25 128/60   05/15/25 122/88   04/22/25 127/86     Pulse Readings from Last 3 Encounters:   07/10/25 74   05/15/25 70   04/22/25 79       There were no vitals taken for this visit.    Objective:   Physical Exam  Vitals and nursing note reviewed.   Constitutional:       General: She is not in acute distress.     Appearance: She is well-developed. She is not diaphoretic.   HENT:      Head: Normocephalic and atraumatic.      Nose: Nose normal.   Eyes:      General: No scleral icterus.     Conjunctiva/sclera: Conjunctivae normal.   Neck:      Thyroid: No thyromegaly.      Vascular: No JVD.   Cardiovascular:      Rate and Rhythm: Normal rate and regular rhythm.      Heart sounds: S1 normal and S2 normal. No murmur heard.     No friction rub. No gallop. No S3 or S4 sounds.   Pulmonary:      Effort: Pulmonary effort is normal. No respiratory distress.      Breath sounds: Normal breath sounds. No stridor. No wheezing or rales.   Chest:      Chest wall: No tenderness.   Abdominal:      General: Bowel sounds are normal. There is no distension.      Palpations: Abdomen is soft. There is no mass.      Tenderness: There is no abdominal tenderness. There is no rebound.   Genitourinary:     Comments: Deferred  Musculoskeletal:         General: No tenderness or deformity. Normal range of motion.      Cervical back: Normal range of motion and neck supple.   Lymphadenopathy:      Cervical: No cervical adenopathy.   Skin:     General: Skin is warm and dry.      Coloration: Skin is not pale.      Findings: No erythema or rash.   Neurological:      Mental Status: She is alert and oriented to person, place, and time.      Motor: No abnormal muscle tone.      Coordination: Coordination normal.   Psychiatric:         Behavior: Behavior normal.         Thought Content: Thought content normal.         Judgment: Judgment normal.         Lab Results   Component Value Date      07/21/2025     09/19/2024    K 4.9 07/21/2025    K 4.3 09/19/2024     07/21/2025     09/19/2024    CO2 23 07/21/2025    CO2 24 09/19/2024    BUN 51 (H) 07/21/2025    CREATININE 2.9 (H) 07/21/2025     07/21/2025     (H) 09/19/2024    HGBA1C 4.8 05/15/2025    HGBA1C 5.1 07/19/2023    MG 2.2 04/22/2025    AST 14 05/15/2025    AST 15 05/29/2024    ALT 7 (L) 05/15/2025    ALT 9 (L) 05/29/2024    ALBUMIN 4.2 07/21/2025    ALBUMIN 4.0 05/29/2024    PROT 6.7 05/15/2025    PROT 8.0 05/29/2024    BILITOT 0.2 05/15/2025    BILITOT 0.3 05/29/2024    WBC 6.33 07/21/2025    HGB 12.5 07/21/2025    HGB 12.1 05/29/2024    HCT 40.5 07/21/2025    HCT 38.0 05/29/2024    MCV 90 07/21/2025    MCV 87 05/29/2024     07/21/2025     05/29/2024    INR 1.0 08/05/2023    TSH 1.431 05/15/2025    TSH 0.761 08/04/2023    CHOL 92 (L) 05/15/2025    CHOL 155 08/04/2023    HDL 39 (L) 05/15/2025    LDLCALC 43.8 (L) 05/15/2025    TRIG 46 05/15/2025    TRIG 48 08/04/2023    BNP 48 04/22/2025     Assessment:      No diagnosis found.          Plan:   1. HFPEF, with occ CP/SOB; last  --> 236  --> 48  - Nuclear stress 6/2023 neg for ischemia, basal, mid lateral wall scar note  - titrate meds  - cont lasix - increased to 40mg  --> dec to 20mg   - ECHO 8/2023 with normal bi V function and valves, grade 1 DD. Neg bubble study.   - repeat labs     2. HTN - h/o HTN emergency 10/2023 and 4/2024 - elevated occ    - cont meds - increased Coreg dose ,increase Imdur to 240 mg   - low salt diet  - change clonidine to patch - increased to Clonidine 0.3 mg patch  - inc Hydralazine to 100mg TID - needs to take TID  - cont Imdur 30mg QHS --increase to 60mg  --> increase to 120 mg     3. CKD4  - f/u with nephro/PCP  - saw Dr. Sexton in past    4. Hep C  - cont tx per PCP    5. HLD  - unsure if she was taking livalo   - cont statin - on Crestor  Lipids improvevd well 5/2025 - Tc 92, LDL 43    6. SOB ? COPD with smoking  abuse - stopped smoking!  - referred to smoking cessation  - f/u pulm  - rec nicotine patch - cannot smoke while on it     7. Recurrent H/O TIA/CVA - L sided weakness? -3/2023, 8/2023  - cont meds - asa and plavix; 0.5 cm right MCA bifurcation aneurysm.   - ECHO 8/2023 with normal bi V function and valves, grade 1 DD. Neg bubble study.   - Vital monitor 6/2023 with brief SVT/NSVT, rare PVCS, PACs noted; AVG HR 76 bpm.  - Carotids 6/2023 overall normal.    - f/u Neuro - may need angio - had 0.5 cm right proximal M2 MCA aneurysm ; had MRI 4/2025    8. H/o Obesity - BMI 30 --> BMI  -31 169 lbs  --> 168 --> BMI 28 - 157 lbs -.  BMI 26 - 145 lbs    - cont Wegovy 0.25  - Wegovy has been approved. Took one dose in office today. --> increase to 0.5 mg - STABLE    Visit today included increased complexity associated with the care of the episodic problem dyspnea addressed and managing the longitudinal care of the patient due to the serious and/or complex managed problem(s) .      Thank you for allowing me to participate in this patient's care. Please do not hesitate to contact me with any questions or concerns. Consult note has been forwarded to the referral physician.

## 2025-08-13 ENCOUNTER — PATIENT MESSAGE (OUTPATIENT)
Dept: ADMINISTRATIVE | Facility: HOSPITAL | Age: 70
End: 2025-08-13
Payer: MEDICARE

## 2025-08-25 ENCOUNTER — TELEPHONE (OUTPATIENT)
Dept: NEUROSURGERY | Facility: CLINIC | Age: 70
End: 2025-08-25
Payer: MEDICARE